# Patient Record
Sex: MALE | Race: WHITE | NOT HISPANIC OR LATINO | URBAN - METROPOLITAN AREA
[De-identification: names, ages, dates, MRNs, and addresses within clinical notes are randomized per-mention and may not be internally consistent; named-entity substitution may affect disease eponyms.]

---

## 2017-02-22 ENCOUNTER — OUTPATIENT (OUTPATIENT)
Dept: OUTPATIENT SERVICES | Facility: HOSPITAL | Age: 52
LOS: 1 days | Discharge: HOME | End: 2017-02-22

## 2017-06-27 DIAGNOSIS — E11.65 TYPE 2 DIABETES MELLITUS WITH HYPERGLYCEMIA: ICD-10-CM

## 2017-07-30 ENCOUNTER — INPATIENT (INPATIENT)
Facility: HOSPITAL | Age: 52
LOS: 24 days | Discharge: NURSING, REHAB OR LONG-TERM CARE | End: 2017-08-24
Attending: INTERNAL MEDICINE

## 2017-07-30 DIAGNOSIS — M86.179 OTHER ACUTE OSTEOMYELITIS, UNSPECIFIED ANKLE AND FOOT: ICD-10-CM

## 2017-07-30 DIAGNOSIS — L97.509 NON-PRESSURE CHRONIC ULCER OF OTHER PART OF UNSPECIFIED FOOT WITH UNSPECIFIED SEVERITY: ICD-10-CM

## 2017-08-11 ENCOUNTER — APPOINTMENT (OUTPATIENT)
Dept: VASCULAR SURGERY | Facility: HOSPITAL | Age: 52
End: 2017-08-11
Payer: MEDICARE

## 2017-08-11 PROCEDURE — 75625 CONTRAST EXAM ABDOMINL AORTA: CPT | Mod: 26

## 2017-08-11 PROCEDURE — 76937 US GUIDE VASCULAR ACCESS: CPT | Mod: 26

## 2017-08-11 PROCEDURE — 36247 INS CATH ABD/L-EXT ART 3RD: CPT | Mod: LT

## 2017-08-11 PROCEDURE — 75710 ARTERY X-RAYS ARM/LEG: CPT | Mod: 26

## 2017-08-25 ENCOUNTER — INPATIENT (INPATIENT)
Facility: HOSPITAL | Age: 52
LOS: 25 days | Discharge: NURSING, REHAB OR LONG-TERM CARE | End: 2017-09-20
Attending: INTERNAL MEDICINE | Admitting: INTERNAL MEDICINE

## 2017-08-25 DIAGNOSIS — L97.509 NON-PRESSURE CHRONIC ULCER OF OTHER PART OF UNSPECIFIED FOOT WITH UNSPECIFIED SEVERITY: ICD-10-CM

## 2017-08-25 DIAGNOSIS — M86.179 OTHER ACUTE OSTEOMYELITIS, UNSPECIFIED ANKLE AND FOOT: ICD-10-CM

## 2017-08-25 DIAGNOSIS — T81.4XXA INFECTION FOLLOWING A PROCEDURE, INITIAL ENCOUNTER: ICD-10-CM

## 2017-08-29 DIAGNOSIS — L97.529 NON-PRESSURE CHRONIC ULCER OF OTHER PART OF LEFT FOOT WITH UNSPECIFIED SEVERITY: ICD-10-CM

## 2017-08-29 DIAGNOSIS — E11.52 TYPE 2 DIABETES MELLITUS WITH DIABETIC PERIPHERAL ANGIOPATHY WITH GANGRENE: ICD-10-CM

## 2017-08-29 DIAGNOSIS — E11.43 TYPE 2 DIABETES MELLITUS WITH DIABETIC AUTONOMIC (POLY)NEUROPATHY: ICD-10-CM

## 2017-08-29 DIAGNOSIS — E11.22 TYPE 2 DIABETES MELLITUS WITH DIABETIC CHRONIC KIDNEY DISEASE: ICD-10-CM

## 2017-08-29 DIAGNOSIS — A41.9 SEPSIS, UNSPECIFIED ORGANISM: ICD-10-CM

## 2017-08-29 DIAGNOSIS — E83.39 OTHER DISORDERS OF PHOSPHORUS METABOLISM: ICD-10-CM

## 2017-08-29 DIAGNOSIS — E11.319 TYPE 2 DIABETES MELLITUS WITH UNSPECIFIED DIABETIC RETINOPATHY WITHOUT MACULAR EDEMA: ICD-10-CM

## 2017-08-29 DIAGNOSIS — R26.9 UNSPECIFIED ABNORMALITIES OF GAIT AND MOBILITY: ICD-10-CM

## 2017-08-29 DIAGNOSIS — L97.519 NON-PRESSURE CHRONIC ULCER OF OTHER PART OF RIGHT FOOT WITH UNSPECIFIED SEVERITY: ICD-10-CM

## 2017-08-29 DIAGNOSIS — Z99.2 DEPENDENCE ON RENAL DIALYSIS: ICD-10-CM

## 2017-08-29 DIAGNOSIS — N18.6 END STAGE RENAL DISEASE: ICD-10-CM

## 2017-08-29 DIAGNOSIS — M72.6 NECROTIZING FASCIITIS: ICD-10-CM

## 2017-08-29 DIAGNOSIS — F32.9 MAJOR DEPRESSIVE DISORDER, SINGLE EPISODE, UNSPECIFIED: ICD-10-CM

## 2017-08-29 DIAGNOSIS — I25.10 ATHEROSCLEROTIC HEART DISEASE OF NATIVE CORONARY ARTERY WITHOUT ANGINA PECTORIS: ICD-10-CM

## 2017-08-29 DIAGNOSIS — Z53.29 PROCEDURE AND TREATMENT NOT CARRIED OUT BECAUSE OF PATIENT'S DECISION FOR OTHER REASONS: ICD-10-CM

## 2017-08-29 DIAGNOSIS — B96.89 OTHER SPECIFIED BACTERIAL AGENTS AS THE CAUSE OF DISEASES CLASSIFIED ELSEWHERE: ICD-10-CM

## 2017-08-29 DIAGNOSIS — Z79.4 LONG TERM (CURRENT) USE OF INSULIN: ICD-10-CM

## 2017-08-29 DIAGNOSIS — F41.1 GENERALIZED ANXIETY DISORDER: ICD-10-CM

## 2017-08-29 DIAGNOSIS — E78.5 HYPERLIPIDEMIA, UNSPECIFIED: ICD-10-CM

## 2017-08-29 DIAGNOSIS — M86.9 OSTEOMYELITIS, UNSPECIFIED: ICD-10-CM

## 2017-08-29 DIAGNOSIS — I12.0 HYPERTENSIVE CHRONIC KIDNEY DISEASE WITH STAGE 5 CHRONIC KIDNEY DISEASE OR END STAGE RENAL DISEASE: ICD-10-CM

## 2017-08-29 DIAGNOSIS — D63.1 ANEMIA IN CHRONIC KIDNEY DISEASE: ICD-10-CM

## 2017-08-29 DIAGNOSIS — Z91.14 PATIENT'S OTHER NONCOMPLIANCE WITH MEDICATION REGIMEN: ICD-10-CM

## 2017-08-29 DIAGNOSIS — E11.65 TYPE 2 DIABETES MELLITUS WITH HYPERGLYCEMIA: ICD-10-CM

## 2017-08-29 DIAGNOSIS — H54.8 LEGAL BLINDNESS, AS DEFINED IN USA: ICD-10-CM

## 2017-08-29 DIAGNOSIS — G47.00 INSOMNIA, UNSPECIFIED: ICD-10-CM

## 2017-08-29 DIAGNOSIS — K31.84 GASTROPARESIS: ICD-10-CM

## 2017-08-29 DIAGNOSIS — E66.01 MORBID (SEVERE) OBESITY DUE TO EXCESS CALORIES: ICD-10-CM

## 2017-08-30 DIAGNOSIS — I77.6 ARTERITIS, UNSPECIFIED: ICD-10-CM

## 2017-09-22 ENCOUNTER — OUTPATIENT (OUTPATIENT)
Dept: OUTPATIENT SERVICES | Facility: HOSPITAL | Age: 52
LOS: 1 days | Discharge: HOME | End: 2017-09-22

## 2017-09-22 DIAGNOSIS — L97.509 NON-PRESSURE CHRONIC ULCER OF OTHER PART OF UNSPECIFIED FOOT WITH UNSPECIFIED SEVERITY: ICD-10-CM

## 2017-09-22 DIAGNOSIS — M86.179 OTHER ACUTE OSTEOMYELITIS, UNSPECIFIED ANKLE AND FOOT: ICD-10-CM

## 2017-09-23 DIAGNOSIS — Z91.14 PATIENT'S OTHER NONCOMPLIANCE WITH MEDICATION REGIMEN: ICD-10-CM

## 2017-09-23 DIAGNOSIS — M86.8X7 OTHER OSTEOMYELITIS, ANKLE AND FOOT: ICD-10-CM
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
## 2017-09-23 DIAGNOSIS — L97.429 NON-PRESSURE CHRONIC ULCER OF LEFT HEEL AND MIDFOOT WITH UNSPECIFIED SEVERITY: ICD-10-CM

## 2017-09-23 DIAGNOSIS — E66.01 MORBID (SEVERE) OBESITY DUE TO EXCESS CALORIES: ICD-10-CM

## 2017-09-23 DIAGNOSIS — T81.4XXA INFECTION FOLLOWING A PROCEDURE, INITIAL ENCOUNTER: ICD-10-CM

## 2017-09-23 DIAGNOSIS — L97.514 NON-PRESSURE CHRONIC ULCER OF OTHER PART OF RIGHT FOOT WITH NECROSIS OF BONE: ICD-10-CM

## 2017-09-23 DIAGNOSIS — Z79.4 LONG TERM (CURRENT) USE OF INSULIN: ICD-10-CM

## 2017-09-23 DIAGNOSIS — E87.1 HYPO-OSMOLALITY AND HYPONATREMIA: ICD-10-CM

## 2017-09-23 DIAGNOSIS — Z99.2 DEPENDENCE ON RENAL DIALYSIS: ICD-10-CM

## 2017-09-23 DIAGNOSIS — I12.0 HYPERTENSIVE CHRONIC KIDNEY DISEASE WITH STAGE 5 CHRONIC KIDNEY DISEASE OR END STAGE RENAL DISEASE: ICD-10-CM

## 2017-09-23 DIAGNOSIS — E78.5 HYPERLIPIDEMIA, UNSPECIFIED: ICD-10-CM

## 2017-09-23 DIAGNOSIS — E11.52 TYPE 2 DIABETES MELLITUS WITH DIABETIC PERIPHERAL ANGIOPATHY WITH GANGRENE: ICD-10-CM

## 2017-09-23 DIAGNOSIS — E11.22 TYPE 2 DIABETES MELLITUS WITH DIABETIC CHRONIC KIDNEY DISEASE: ICD-10-CM

## 2017-09-23 DIAGNOSIS — E11.621 TYPE 2 DIABETES MELLITUS WITH FOOT ULCER: ICD-10-CM

## 2017-09-23 DIAGNOSIS — L03.115 CELLULITIS OF RIGHT LOWER LIMB: ICD-10-CM

## 2017-09-23 DIAGNOSIS — T87.43 INFECTION OF AMPUTATION STUMP, RIGHT LOWER EXTREMITY: ICD-10-CM

## 2017-09-23 DIAGNOSIS — E11.69 TYPE 2 DIABETES MELLITUS WITH OTHER SPECIFIED COMPLICATION: ICD-10-CM

## 2017-09-23 DIAGNOSIS — N18.6 END STAGE RENAL DISEASE: ICD-10-CM

## 2017-09-25 ENCOUNTER — OUTPATIENT (OUTPATIENT)
Dept: OUTPATIENT SERVICES | Facility: HOSPITAL | Age: 52
LOS: 1 days | Discharge: HOME | End: 2017-09-25

## 2017-09-25 DIAGNOSIS — M86.179 OTHER ACUTE OSTEOMYELITIS, UNSPECIFIED ANKLE AND FOOT: ICD-10-CM

## 2017-09-25 DIAGNOSIS — D64.9 ANEMIA, UNSPECIFIED: ICD-10-CM

## 2017-09-25 DIAGNOSIS — L97.509 NON-PRESSURE CHRONIC ULCER OF OTHER PART OF UNSPECIFIED FOOT WITH UNSPECIFIED SEVERITY: ICD-10-CM

## 2017-09-25 DIAGNOSIS — R79.9 ABNORMAL FINDING OF BLOOD CHEMISTRY, UNSPECIFIED: ICD-10-CM

## 2017-09-28 DIAGNOSIS — R05 COUGH: ICD-10-CM

## 2017-10-02 DIAGNOSIS — R76.11 NONSPECIFIC REACTION TO TUBERCULIN SKIN TEST WITHOUT ACTIVE TUBERCULOSIS: ICD-10-CM

## 2017-11-10 ENCOUNTER — EMERGENCY (EMERGENCY)
Facility: HOSPITAL | Age: 52
LOS: 0 days | Discharge: HOME | End: 2017-11-11

## 2017-11-10 DIAGNOSIS — N18.6 END STAGE RENAL DISEASE: ICD-10-CM

## 2017-11-10 DIAGNOSIS — R55 SYNCOPE AND COLLAPSE: ICD-10-CM
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
## 2017-11-10 DIAGNOSIS — Z99.2 DEPENDENCE ON RENAL DIALYSIS: ICD-10-CM

## 2017-11-10 DIAGNOSIS — L97.509 NON-PRESSURE CHRONIC ULCER OF OTHER PART OF UNSPECIFIED FOOT WITH UNSPECIFIED SEVERITY: ICD-10-CM

## 2017-11-10 DIAGNOSIS — M86.179 OTHER ACUTE OSTEOMYELITIS, UNSPECIFIED ANKLE AND FOOT: ICD-10-CM

## 2017-11-10 DIAGNOSIS — S09.90XA UNSPECIFIED INJURY OF HEAD, INITIAL ENCOUNTER: ICD-10-CM

## 2017-11-10 DIAGNOSIS — E78.00 PURE HYPERCHOLESTEROLEMIA, UNSPECIFIED: ICD-10-CM

## 2017-11-10 DIAGNOSIS — Z79.899 OTHER LONG TERM (CURRENT) DRUG THERAPY: ICD-10-CM

## 2017-11-10 DIAGNOSIS — E11.9 TYPE 2 DIABETES MELLITUS WITHOUT COMPLICATIONS: ICD-10-CM
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
## 2017-11-10 DIAGNOSIS — Z23 ENCOUNTER FOR IMMUNIZATION: ICD-10-CM

## 2017-11-10 DIAGNOSIS — I12.0 HYPERTENSIVE CHRONIC KIDNEY DISEASE WITH STAGE 5 CHRONIC KIDNEY DISEASE OR END STAGE RENAL DISEASE: ICD-10-CM
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
## 2017-11-24 ENCOUNTER — OUTPATIENT (OUTPATIENT)
Dept: OUTPATIENT SERVICES | Facility: HOSPITAL | Age: 52
LOS: 1 days | Discharge: HOME | End: 2017-11-24

## 2017-11-24 DIAGNOSIS — L97.509 NON-PRESSURE CHRONIC ULCER OF OTHER PART OF UNSPECIFIED FOOT WITH UNSPECIFIED SEVERITY: ICD-10-CM

## 2017-11-24 DIAGNOSIS — M86.179 OTHER ACUTE OSTEOMYELITIS, UNSPECIFIED ANKLE AND FOOT: ICD-10-CM

## 2017-11-27 ENCOUNTER — OUTPATIENT (OUTPATIENT)
Dept: OUTPATIENT SERVICES | Facility: HOSPITAL | Age: 52
LOS: 1 days | Discharge: HOME | End: 2017-11-27

## 2017-11-27 DIAGNOSIS — L97.509 NON-PRESSURE CHRONIC ULCER OF OTHER PART OF UNSPECIFIED FOOT WITH UNSPECIFIED SEVERITY: ICD-10-CM

## 2017-11-27 DIAGNOSIS — M86.179 OTHER ACUTE OSTEOMYELITIS, UNSPECIFIED ANKLE AND FOOT: ICD-10-CM

## 2017-11-30 DIAGNOSIS — B99.9 UNSPECIFIED INFECTIOUS DISEASE: ICD-10-CM

## 2017-12-12 ENCOUNTER — OUTPATIENT (OUTPATIENT)
Dept: OUTPATIENT SERVICES | Facility: HOSPITAL | Age: 52
LOS: 1 days | Discharge: HOME | End: 2017-12-12

## 2017-12-12 DIAGNOSIS — M86.179 OTHER ACUTE OSTEOMYELITIS, UNSPECIFIED ANKLE AND FOOT: ICD-10-CM

## 2017-12-12 DIAGNOSIS — L97.509 NON-PRESSURE CHRONIC ULCER OF OTHER PART OF UNSPECIFIED FOOT WITH UNSPECIFIED SEVERITY: ICD-10-CM

## 2017-12-12 DIAGNOSIS — R79.9 ABNORMAL FINDING OF BLOOD CHEMISTRY, UNSPECIFIED: ICD-10-CM

## 2017-12-21 ENCOUNTER — OUTPATIENT (OUTPATIENT)
Dept: OUTPATIENT SERVICES | Facility: HOSPITAL | Age: 52
LOS: 1 days | Discharge: HOME | End: 2017-12-21

## 2017-12-21 DIAGNOSIS — L97.509 NON-PRESSURE CHRONIC ULCER OF OTHER PART OF UNSPECIFIED FOOT WITH UNSPECIFIED SEVERITY: ICD-10-CM

## 2017-12-21 DIAGNOSIS — M86.179 OTHER ACUTE OSTEOMYELITIS, UNSPECIFIED ANKLE AND FOOT: ICD-10-CM

## 2017-12-22 DIAGNOSIS — Z51.81 ENCOUNTER FOR THERAPEUTIC DRUG LEVEL MONITORING: ICD-10-CM

## 2017-12-28 ENCOUNTER — OUTPATIENT (OUTPATIENT)
Dept: OUTPATIENT SERVICES | Facility: HOSPITAL | Age: 52
LOS: 1 days | Discharge: HOME | End: 2017-12-28

## 2017-12-28 DIAGNOSIS — M86.179 OTHER ACUTE OSTEOMYELITIS, UNSPECIFIED ANKLE AND FOOT: ICD-10-CM

## 2017-12-28 DIAGNOSIS — L97.509 NON-PRESSURE CHRONIC ULCER OF OTHER PART OF UNSPECIFIED FOOT WITH UNSPECIFIED SEVERITY: ICD-10-CM

## 2017-12-29 ENCOUNTER — OUTPATIENT (OUTPATIENT)
Dept: OUTPATIENT SERVICES | Facility: HOSPITAL | Age: 52
LOS: 1 days | Discharge: HOME | End: 2017-12-29

## 2017-12-29 DIAGNOSIS — L97.509 NON-PRESSURE CHRONIC ULCER OF OTHER PART OF UNSPECIFIED FOOT WITH UNSPECIFIED SEVERITY: ICD-10-CM

## 2017-12-29 DIAGNOSIS — R79.9 ABNORMAL FINDING OF BLOOD CHEMISTRY, UNSPECIFIED: ICD-10-CM

## 2017-12-29 DIAGNOSIS — Z79.899 OTHER LONG TERM (CURRENT) DRUG THERAPY: ICD-10-CM

## 2017-12-29 DIAGNOSIS — M86.179 OTHER ACUTE OSTEOMYELITIS, UNSPECIFIED ANKLE AND FOOT: ICD-10-CM

## 2018-01-02 DIAGNOSIS — Z79.2 LONG TERM (CURRENT) USE OF ANTIBIOTICS: ICD-10-CM

## 2018-01-02 DIAGNOSIS — R78.81 BACTEREMIA: ICD-10-CM

## 2018-01-16 ENCOUNTER — APPOINTMENT (OUTPATIENT)
Dept: PLASTIC SURGERY | Facility: CLINIC | Age: 53
End: 2018-01-16

## 2018-01-16 ENCOUNTER — OUTPATIENT (OUTPATIENT)
Dept: OUTPATIENT SERVICES | Facility: HOSPITAL | Age: 53
LOS: 1 days | Discharge: HOME | End: 2018-01-16

## 2018-01-16 DIAGNOSIS — M86.179 OTHER ACUTE OSTEOMYELITIS, UNSPECIFIED ANKLE AND FOOT: ICD-10-CM

## 2018-01-16 DIAGNOSIS — L97.509 NON-PRESSURE CHRONIC ULCER OF OTHER PART OF UNSPECIFIED FOOT WITH UNSPECIFIED SEVERITY: ICD-10-CM

## 2018-01-24 ENCOUNTER — OUTPATIENT (OUTPATIENT)
Dept: OUTPATIENT SERVICES | Facility: HOSPITAL | Age: 53
LOS: 1 days | Discharge: HOME | End: 2018-01-24

## 2018-01-24 DIAGNOSIS — E10.9 TYPE 1 DIABETES MELLITUS WITHOUT COMPLICATIONS: ICD-10-CM

## 2018-01-24 DIAGNOSIS — D64.9 ANEMIA, UNSPECIFIED: ICD-10-CM

## 2018-01-24 DIAGNOSIS — R79.9 ABNORMAL FINDING OF BLOOD CHEMISTRY, UNSPECIFIED: ICD-10-CM

## 2018-01-25 ENCOUNTER — OUTPATIENT (OUTPATIENT)
Dept: OUTPATIENT SERVICES | Facility: HOSPITAL | Age: 53
LOS: 1 days | Discharge: HOME | End: 2018-01-25

## 2018-01-27 ENCOUNTER — OUTPATIENT (OUTPATIENT)
Dept: OUTPATIENT SERVICES | Facility: HOSPITAL | Age: 53
LOS: 1 days | Discharge: HOME | End: 2018-01-27

## 2018-01-30 ENCOUNTER — OUTPATIENT (OUTPATIENT)
Dept: OUTPATIENT SERVICES | Facility: HOSPITAL | Age: 53
LOS: 1 days | Discharge: HOME | End: 2018-01-30

## 2018-01-30 DIAGNOSIS — A49.9 BACTERIAL INFECTION, UNSPECIFIED: ICD-10-CM

## 2018-02-01 ENCOUNTER — OUTPATIENT (OUTPATIENT)
Dept: OUTPATIENT SERVICES | Facility: HOSPITAL | Age: 53
LOS: 1 days | Discharge: HOME | End: 2018-02-01

## 2018-02-01 DIAGNOSIS — R79.9 ABNORMAL FINDING OF BLOOD CHEMISTRY, UNSPECIFIED: ICD-10-CM

## 2018-02-04 DIAGNOSIS — M86.179 OTHER ACUTE OSTEOMYELITIS, UNSPECIFIED ANKLE AND FOOT: ICD-10-CM

## 2018-02-04 DIAGNOSIS — L97.509 NON-PRESSURE CHRONIC ULCER OF OTHER PART OF UNSPECIFIED FOOT WITH UNSPECIFIED SEVERITY: ICD-10-CM

## 2018-02-06 ENCOUNTER — APPOINTMENT (OUTPATIENT)
Dept: PODIATRY | Facility: CLINIC | Age: 53
End: 2018-02-06
Payer: COMMERCIAL

## 2018-02-06 ENCOUNTER — OUTPATIENT (OUTPATIENT)
Dept: OUTPATIENT SERVICES | Facility: HOSPITAL | Age: 53
LOS: 1 days | Discharge: HOME | End: 2018-02-06

## 2018-02-06 DIAGNOSIS — L97.409 NON-PRESSURE CHRONIC ULCER OF UNSPECIFIED HEEL AND MIDFOOT WITH UNSPECIFIED SEVERITY: ICD-10-CM

## 2018-02-06 PROCEDURE — ZZZZZ: CPT

## 2018-02-07 PROBLEM — L97.409 CHRONIC HEEL ULCER: Status: ACTIVE | Noted: 2018-02-07

## 2018-02-08 DIAGNOSIS — Z79.899 OTHER LONG TERM (CURRENT) DRUG THERAPY: ICD-10-CM

## 2018-02-09 ENCOUNTER — OUTPATIENT (OUTPATIENT)
Dept: OUTPATIENT SERVICES | Facility: HOSPITAL | Age: 53
LOS: 1 days | Discharge: HOME | End: 2018-02-09

## 2018-02-09 DIAGNOSIS — Z79.899 OTHER LONG TERM (CURRENT) DRUG THERAPY: ICD-10-CM

## 2018-02-13 ENCOUNTER — APPOINTMENT (OUTPATIENT)
Dept: PODIATRY | Facility: CLINIC | Age: 53
End: 2018-02-13
Payer: MEDICARE

## 2018-02-13 ENCOUNTER — OUTPATIENT (OUTPATIENT)
Dept: OUTPATIENT SERVICES | Facility: HOSPITAL | Age: 53
LOS: 1 days | Discharge: HOME | End: 2018-02-13

## 2018-02-13 DIAGNOSIS — L97.512 NON-PRESSURE CHRONIC ULCER OF OTHER PART OF RIGHT FOOT WITH FAT LAYER EXPOSED: ICD-10-CM

## 2018-02-13 DIAGNOSIS — E11.610 TYPE 2 DIABETES MELLITUS WITH DIABETIC NEUROPATHIC ARTHROPATHY: ICD-10-CM

## 2018-02-13 PROCEDURE — 29580 STRAPPING UNNA BOOT: CPT

## 2018-02-21 ENCOUNTER — OUTPATIENT (OUTPATIENT)
Dept: OUTPATIENT SERVICES | Facility: HOSPITAL | Age: 53
LOS: 1 days | Discharge: HOME | End: 2018-02-21

## 2018-02-21 ENCOUNTER — APPOINTMENT (OUTPATIENT)
Dept: PODIATRY | Facility: CLINIC | Age: 53
End: 2018-02-21
Payer: MEDICARE

## 2018-02-21 VITALS
HEIGHT: 72.5 IN | BODY MASS INDEX: 42.2 KG/M2 | WEIGHT: 315 LBS | DIASTOLIC BLOOD PRESSURE: 74 MMHG | HEART RATE: 80 BPM | SYSTOLIC BLOOD PRESSURE: 149 MMHG

## 2018-02-21 PROCEDURE — 11042 DBRDMT SUBQ TIS 1ST 20SQCM/<: CPT

## 2018-02-22 ENCOUNTER — OUTPATIENT (OUTPATIENT)
Dept: OUTPATIENT SERVICES | Facility: HOSPITAL | Age: 53
LOS: 1 days | Discharge: HOME | End: 2018-02-22

## 2018-02-22 DIAGNOSIS — Z02.9 ENCOUNTER FOR ADMINISTRATIVE EXAMINATIONS, UNSPECIFIED: ICD-10-CM

## 2018-02-22 DIAGNOSIS — Z51.81 ENCOUNTER FOR THERAPEUTIC DRUG LEVEL MONITORING: ICD-10-CM

## 2018-02-22 RX ORDER — COLLAGENASE SANTYL 250 [ARB'U]/G
250 OINTMENT TOPICAL DAILY
Qty: 1 | Refills: 3 | Status: ACTIVE | COMMUNITY
Start: 2018-02-21 | End: 1900-01-01

## 2018-02-27 ENCOUNTER — APPOINTMENT (OUTPATIENT)
Dept: PODIATRY | Facility: CLINIC | Age: 53
End: 2018-02-27

## 2018-02-27 ENCOUNTER — EMERGENCY (EMERGENCY)
Facility: HOSPITAL | Age: 53
LOS: 0 days | Discharge: LEFT BEFORE DISCHARGE | End: 2018-02-27
Attending: EMERGENCY MEDICINE

## 2018-02-27 ENCOUNTER — OUTPATIENT (OUTPATIENT)
Dept: OUTPATIENT SERVICES | Facility: HOSPITAL | Age: 53
LOS: 1 days | Discharge: HOME | End: 2018-02-27

## 2018-02-27 VITALS
DIASTOLIC BLOOD PRESSURE: 59 MMHG | RESPIRATION RATE: 18 BRPM | SYSTOLIC BLOOD PRESSURE: 123 MMHG | OXYGEN SATURATION: 95 % | HEART RATE: 103 BPM | TEMPERATURE: 100 F

## 2018-02-27 VITALS
OXYGEN SATURATION: 98 % | RESPIRATION RATE: 20 BRPM | HEART RATE: 108 BPM | DIASTOLIC BLOOD PRESSURE: 84 MMHG | TEMPERATURE: 100 F | SYSTOLIC BLOOD PRESSURE: 186 MMHG

## 2018-02-27 DIAGNOSIS — R71.8 OTHER ABNORMALITY OF RED BLOOD CELLS: ICD-10-CM

## 2018-02-27 DIAGNOSIS — N18.6 END STAGE RENAL DISEASE: ICD-10-CM

## 2018-02-27 DIAGNOSIS — Z79.899 OTHER LONG TERM (CURRENT) DRUG THERAPY: ICD-10-CM

## 2018-02-27 DIAGNOSIS — L97.519 NON-PRESSURE CHRONIC ULCER OF OTHER PART OF RIGHT FOOT WITH UNSPECIFIED SEVERITY: ICD-10-CM

## 2018-02-27 DIAGNOSIS — Z48.00 ENCOUNTER FOR CHANGE OR REMOVAL OF NONSURGICAL WOUND DRESSING: ICD-10-CM

## 2018-02-27 DIAGNOSIS — E11.9 TYPE 2 DIABETES MELLITUS WITHOUT COMPLICATIONS: ICD-10-CM

## 2018-02-27 DIAGNOSIS — E78.5 HYPERLIPIDEMIA, UNSPECIFIED: ICD-10-CM

## 2018-02-27 DIAGNOSIS — D64.9 ANEMIA, UNSPECIFIED: ICD-10-CM

## 2018-02-27 DIAGNOSIS — L97.511 NON-PRESSURE CHRONIC ULCER OF OTHER PART OF RIGHT FOOT LIMITED TO BREAKDOWN OF SKIN: ICD-10-CM

## 2018-02-27 DIAGNOSIS — L97.521 NON-PRESSURE CHRONIC ULCER OF OTHER PART OF LEFT FOOT LIMITED TO BREAKDOWN OF SKIN: ICD-10-CM

## 2018-02-27 DIAGNOSIS — E11.621 TYPE 2 DIABETES MELLITUS WITH FOOT ULCER: ICD-10-CM

## 2018-02-27 DIAGNOSIS — F41.9 ANXIETY DISORDER, UNSPECIFIED: ICD-10-CM

## 2018-02-27 DIAGNOSIS — I12.0 HYPERTENSIVE CHRONIC KIDNEY DISEASE WITH STAGE 5 CHRONIC KIDNEY DISEASE OR END STAGE RENAL DISEASE: ICD-10-CM

## 2018-02-27 DIAGNOSIS — Z51.81 ENCOUNTER FOR THERAPEUTIC DRUG LEVEL MONITORING: ICD-10-CM

## 2018-02-27 DIAGNOSIS — I48.91 UNSPECIFIED ATRIAL FIBRILLATION: ICD-10-CM

## 2018-02-27 LAB
ALBUMIN SERPL ELPH-MCNC: 2.5 G/DL — LOW (ref 3–5.5)
ALP SERPL-CCNC: 131 U/L — HIGH (ref 30–115)
ALT FLD-CCNC: 11 U/L — SIGNIFICANT CHANGE UP (ref 0–41)
ANION GAP SERPL CALC-SCNC: 11 MMOL/L — SIGNIFICANT CHANGE UP (ref 7–14)
APTT BLD: 31.4 SEC — SIGNIFICANT CHANGE UP (ref 27–39.2)
AST SERPL-CCNC: 11 U/L — SIGNIFICANT CHANGE UP (ref 0–41)
BASOPHILS # BLD AUTO: 0.06 K/UL — SIGNIFICANT CHANGE UP (ref 0–0.2)
BASOPHILS NFR BLD AUTO: 0.4 % — SIGNIFICANT CHANGE UP (ref 0–1)
BILIRUB SERPL-MCNC: 0.7 MG/DL — SIGNIFICANT CHANGE UP (ref 0.2–1.2)
BUN SERPL-MCNC: 48 MG/DL — HIGH (ref 10–20)
CALCIUM SERPL-MCNC: 8.7 MG/DL — SIGNIFICANT CHANGE UP (ref 8.5–10.1)
CHLORIDE SERPL-SCNC: 94 MMOL/L — LOW (ref 98–110)
CO2 SERPL-SCNC: 31 MMOL/L — SIGNIFICANT CHANGE UP (ref 17–32)
CREAT SERPL-MCNC: 5.5 MG/DL — CRITICAL HIGH (ref 0.7–1.5)
EOSINOPHIL # BLD AUTO: 0.49 K/UL — SIGNIFICANT CHANGE UP (ref 0–0.7)
EOSINOPHIL NFR BLD AUTO: 2.9 % — SIGNIFICANT CHANGE UP (ref 0–8)
ERYTHROCYTE [SEDIMENTATION RATE] IN BLOOD: 82 MM/HR — HIGH (ref 0–10)
GAS PNL BLDV: SIGNIFICANT CHANGE UP
GLUCOSE SERPL-MCNC: 213 MG/DL — HIGH (ref 70–110)
HCT VFR BLD CALC: 27.5 % — LOW (ref 42–52)
HGB BLD-MCNC: 8 G/DL — LOW (ref 14–18)
IMM GRANULOCYTES NFR BLD AUTO: 0.8 % — HIGH (ref 0.1–0.3)
INR BLD: 1.19 RATIO — SIGNIFICANT CHANGE UP (ref 0.65–1.3)
LYMPHOCYTES # BLD AUTO: 1.35 K/UL — SIGNIFICANT CHANGE UP (ref 1.2–3.4)
LYMPHOCYTES # BLD AUTO: 8.1 % — LOW (ref 20.5–51.1)
MCHC RBC-ENTMCNC: 23.7 PG — LOW (ref 27–31)
MCHC RBC-ENTMCNC: 29.1 G/DL — LOW (ref 32–37)
MCV RBC AUTO: 81.6 FL — SIGNIFICANT CHANGE UP (ref 80–94)
MONOCYTES # BLD AUTO: 1.28 K/UL — HIGH (ref 0.1–0.6)
MONOCYTES NFR BLD AUTO: 7.7 % — SIGNIFICANT CHANGE UP (ref 1.7–9.3)
NEUTROPHILS # BLD AUTO: 13.37 K/UL — HIGH (ref 1.4–6.5)
NEUTROPHILS NFR BLD AUTO: 80.1 % — HIGH (ref 42.2–75.2)
PLATELET # BLD AUTO: 367 K/UL — SIGNIFICANT CHANGE UP (ref 130–400)
POTASSIUM SERPL-MCNC: 4.8 MMOL/L — SIGNIFICANT CHANGE UP (ref 3.5–5)
POTASSIUM SERPL-SCNC: 4.8 MMOL/L — SIGNIFICANT CHANGE UP (ref 3.5–5)
PROT SERPL-MCNC: 5.5 G/DL — LOW (ref 6–8)
PROTHROM AB SERPL-ACNC: 12.9 SEC — HIGH (ref 9.95–12.87)
RBC # BLD: 3.37 M/UL — LOW (ref 4.7–6.1)
RBC # FLD: 16.5 % — HIGH (ref 11.5–14.5)
SODIUM SERPL-SCNC: 136 MMOL/L — SIGNIFICANT CHANGE UP (ref 135–146)
WBC # BLD: 16.68 K/UL — HIGH (ref 4.8–10.8)
WBC # FLD AUTO: 16.68 K/UL — HIGH (ref 4.8–10.8)

## 2018-02-27 RX ORDER — PIPERACILLIN AND TAZOBACTAM 4; .5 G/20ML; G/20ML
3.38 INJECTION, POWDER, LYOPHILIZED, FOR SOLUTION INTRAVENOUS ONCE
Qty: 0 | Refills: 0 | Status: COMPLETED | OUTPATIENT
Start: 2018-02-27 | End: 2018-02-27

## 2018-02-27 RX ORDER — SODIUM CHLORIDE 9 MG/ML
3 INJECTION INTRAMUSCULAR; INTRAVENOUS; SUBCUTANEOUS ONCE
Qty: 0 | Refills: 0 | Status: COMPLETED | OUTPATIENT
Start: 2018-02-27 | End: 2018-02-27

## 2018-02-27 RX ORDER — ACETAMINOPHEN 500 MG
650 TABLET ORAL ONCE
Qty: 0 | Refills: 0 | Status: COMPLETED | OUTPATIENT
Start: 2018-02-27 | End: 2018-02-27

## 2018-02-27 RX ADMIN — Medication 650 MILLIGRAM(S): at 06:45

## 2018-02-27 RX ADMIN — PIPERACILLIN AND TAZOBACTAM 200 GRAM(S): 4; .5 INJECTION, POWDER, LYOPHILIZED, FOR SOLUTION INTRAVENOUS at 06:45

## 2018-02-27 RX ADMIN — SODIUM CHLORIDE 3 MILLILITER(S): 9 INJECTION INTRAMUSCULAR; INTRAVENOUS; SUBCUTANEOUS at 06:46

## 2018-02-27 NOTE — ED PROVIDER NOTE - OBJECTIVE STATEMENT
54 yo obese m w hx of DM, ESRD w HD T/TH/SAT (Dr. Mckee), HTN, HLD, Anxiety, and multiple foot ulcers with all toes amputated on right foot as well as bone debridement for osteomylitis, on vancomycin for cellulitis on right foot sent from [facility] for bleeding ulcer to right lateral foot.  No fevers at home, but noted to be febrile on arrival.  Pt appears very anxious.  Denies chest pain or sob, no cough or congestion.  Podiatry is Dr. Georges/Misael.

## 2018-02-27 NOTE — ED PROVIDER NOTE - PROGRESS NOTE DETAILS
Endorsed to Dr Hough to follow up labs, podiatry consult, reassess and dispo . Patient seen by podiatry resident Dr. Sexton, recommended admission for IV antibiotics, patient does not want to stay, will finish IV antibiotics and requests to be discharged against medical advice. Patient is awake/alert/interactive with normal mental status and normal neurologic function. Patient reports no SI/HI and demonstrates normal thought processes with no evidence of intoxication, delirium, delusions or hallucinations. Patient requesting to leave against medical advice at this time. Advised patient of potential risks of leaving AMA which include potential disability or death. Attempted to convince patient to stay and continue work up/treatment and patient refused. Patient has capacity to make medical decisions at this time and will be signed out AMA. Patient instructed to follow up with his primary care provider as an outpatient and is aware they can return to the emergency department at any time for evaluation. Zosyn given here and will discuss with dr. Mckee to give dose during dialysis today. Patient states will return tomorrow for admission and Iv abx.

## 2018-02-27 NOTE — ED ADULT NURSE NOTE - PMH
Diabetes    End stage renal disease Blind    Diabetes    End stage renal disease    HTN (hypertension)

## 2018-02-27 NOTE — CONSULT NOTE ADULT - SUBJECTIVE AND OBJECTIVE BOX
PODIATRY CONSULT   THEURER, [place] is a pleasant well-nourished, well developed 53y Male in no acute distress, alert awake, and oriented to person, place and time.   Patient is a 53y old  Male who presents with a chief complaint of bleeding from right foot sent in by [facility].   Podiatry consulted for the bilateral lower extremity wounds bleeding from right foot sent in by [facility].   Pt voicing complaint of "he wants to leave since his parents are going to [place] and wants to see them before they leave. pt states he will leave AMA but will return Wednesday or Thursday."    PAST MEDICAL & SURGICAL HISTORY:  Blind  HTN (hypertension)  End stage renal disease  Diabetes      FAMILY HISTORY:    Vital Signs Last 24 Hrs  T(C): 38.2 (27 Feb 2018 05:30), Max: 38.2 (27 Feb 2018 05:30)  T(F): 100.8 (27 Feb 2018 05:30), Max: 100.8 (27 Feb 2018 05:30)  HR: 108 (27 Feb 2018 04:45) (108 - 108)  BP: 186/84 (27 Feb 2018 04:45) (186/84 - 186/84)  BP(mean): --  RR: 20 (27 Feb 2018 04:45) (20 - 20)  SpO2: 98% (27 Feb 2018 04:45) (98% - 98%)                        8.0    16.68 )-----------( 367      ( 27 Feb 2018 06:36 )             27.5     PHYSICAL EXAM  LE Focused examination conducted:    DERM:  Right foot plantar lateral ulceration superficial. Right lateral ankle would probes 2.5cm into ST. Left sub 5th styloid process.   VASC:   Dorsalis Pedis nonpalpable bilaterally   Posterior Tibial non palpable bilaterally  Temperature gradient: Right Warm to warm. ; Left: warm to warm.  Edema: Right & Left +2  NEURO: Protective sensation not intact to the level of the digits bilateral.  ORTHO: Muscle strength 5/5 all major muscle groups bilateral. TMA right foot closed.     A:  Acute charcot Right foot  Bilateral foot ulcera ration 2/2 DM and neuropathy  Right ankle ulceration 2.5cm  P:  Pt is NWB to the bilateral extremities.   IV ABx:   ID consult: for Abx coverage  Bilateral foot and tib-fib xrays: rule out soft tissue emphysema.   Podiatry will follow up if patient does not leave AMA.   Attending updated and added to note as coscaleer.     02-27-18 @ 07:12

## 2018-02-27 NOTE — ED POST DISCHARGE NOTE - OTHER COMMUNICATION
DISCUSSED FINDING WITH ATTENDING WILLIAMS HUDSON AND PATIENT IS AWARE OF FINDINGS OF OSTEOMYELITIS, BUT CHOOSE TO SIGN OUT AMA.

## 2018-02-27 NOTE — ED PROVIDER NOTE - MEDICAL DECISION MAKING DETAILS
54 yo M with DM presented to Ed with swelling and pain from R foot with discharge. Patien seen by podiatry. We recommended admission to patient, however patient does not want to be admitted, understands the risk of leaving without full treatment, understands importance of follow up and return to ED for further management.

## 2018-02-27 NOTE — ED PROVIDER NOTE - NS ED ROS FT
Gen: See HPI  Eyes:  No visual changes, eye pain or discharge.  ENMT:  No hearing changes, pain. No neck pain or stiffness.  Cardiac:  No chest pain, SOB   Respiratory:  No cough or respiratory distress.  GI:  No nausea, vomiting, diarrhea or abdominal pain.  :  see HPI  MS:  See HPI  Neuro:  No headache or weakness.  No LOC.    Skin:  see HPI  Endocrine: See HPI

## 2018-02-27 NOTE — ED PROVIDER NOTE - PHYSICAL EXAMINATION
CONSTITUTIONAL: Well-developed; obese, in no acute distress.   SKIN: swollen, warm, erythematous, pus-like discharge with intermittent bleeding from right lower ext.  Closed ulcer noted to sole of left foot, no bleeding or discharge.    HEAD: Normocephalic; atraumatic.  EYES: no conj injection  ENT: No nasal discharge; airway clear.  NECK: Supple; non tender.  CARD: S1, S2 normal; no murmurs, gallops, or rubs. Rapid, regular.    RESP: No wheezes, rales or rhonchi.  ABD: soft ntnd  EXT: Normal ROM.  see SKIN.    NEURO: Alert, oriented, grossly unremarkable  PSYCH: Cooperative, anxious.

## 2018-02-27 NOTE — ED PROVIDER NOTE - ATTENDING CONTRIBUTION TO CARE
54 yo male h/o longstanding diabetes, ESRD in HD ( due today), currently residing in NH since September of last year following partial rt foot amputation  sent for evaluation of bleeding from the rt ankle noticed last night.  Patient reports chronic pain in his rt foot, has an appointment with his podiatrist today at 10 AM.  Febrile in ED, awake and alert, speaking full sentences, + clean based ulcer outer edge of the left foot , +large skin defect on the plantar surface of the rt foot, ankle is chronically deformed , + few wounds over the lateral aspect of the ankle with copious bloody discharge, skin warm to the touch, patient is awake and alert.  Will check labs, give abx and antipyretics, podiatry consult.  Spoke with patient regarding need for admission, but a this time he refuses, says his parents are going to [place] today and he must see them off, agreable with initiation of work up.

## 2018-02-28 ENCOUNTER — OUTPATIENT (OUTPATIENT)
Dept: OUTPATIENT SERVICES | Facility: HOSPITAL | Age: 53
LOS: 1 days | Discharge: HOME | End: 2018-02-28

## 2018-02-28 DIAGNOSIS — R79.9 ABNORMAL FINDING OF BLOOD CHEMISTRY, UNSPECIFIED: ICD-10-CM

## 2018-02-28 DIAGNOSIS — E11.9 TYPE 2 DIABETES MELLITUS WITHOUT COMPLICATIONS: ICD-10-CM

## 2018-02-28 DIAGNOSIS — E11.621 TYPE 2 DIABETES MELLITUS WITH FOOT ULCER: ICD-10-CM

## 2018-02-28 DIAGNOSIS — L89.893 PRESSURE ULCER OF OTHER SITE, STAGE 3: ICD-10-CM

## 2018-03-01 ENCOUNTER — APPOINTMENT (OUTPATIENT)
Dept: PODIATRY | Facility: CLINIC | Age: 53
End: 2018-03-01
Payer: MEDICARE

## 2018-03-01 ENCOUNTER — OUTPATIENT (OUTPATIENT)
Dept: OUTPATIENT SERVICES | Facility: HOSPITAL | Age: 53
LOS: 1 days | Discharge: HOME | End: 2018-03-01

## 2018-03-01 DIAGNOSIS — L97.521 NON-PRESSURE CHRONIC ULCER OF OTHER PART OF LEFT FOOT LIMITED TO BREAKDOWN OF SKIN: ICD-10-CM

## 2018-03-01 DIAGNOSIS — E11.42 TYPE 2 DIABETES MELLITUS WITH DIABETIC POLYNEUROPATHY: ICD-10-CM

## 2018-03-01 DIAGNOSIS — L89.893 PRESSURE ULCER OF OTHER SITE, STAGE 3: ICD-10-CM

## 2018-03-01 DIAGNOSIS — L97.511 NON-PRESSURE CHRONIC ULCER OF OTHER PART OF RIGHT FOOT LIMITED TO BREAKDOWN OF SKIN: ICD-10-CM

## 2018-03-01 DIAGNOSIS — L97.512 NON-PRESSURE CHRONIC ULCER OF OTHER PART OF RIGHT FOOT WITH FAT LAYER EXPOSED: ICD-10-CM

## 2018-03-01 DIAGNOSIS — E11.9 TYPE 2 DIABETES MELLITUS W/OUT COMPLICATIONS: ICD-10-CM

## 2018-03-01 PROCEDURE — 99212 OFFICE O/P EST SF 10 MIN: CPT

## 2018-03-02 ENCOUNTER — OUTPATIENT (OUTPATIENT)
Dept: OUTPATIENT SERVICES | Facility: HOSPITAL | Age: 53
LOS: 1 days | Discharge: HOME | End: 2018-03-02

## 2018-03-02 DIAGNOSIS — R79.9 ABNORMAL FINDING OF BLOOD CHEMISTRY, UNSPECIFIED: ICD-10-CM

## 2018-03-02 DIAGNOSIS — D64.9 ANEMIA, UNSPECIFIED: ICD-10-CM

## 2018-03-03 ENCOUNTER — OUTPATIENT (OUTPATIENT)
Dept: OUTPATIENT SERVICES | Facility: HOSPITAL | Age: 53
LOS: 1 days | Discharge: HOME | End: 2018-03-03

## 2018-03-03 DIAGNOSIS — R79.9 ABNORMAL FINDING OF BLOOD CHEMISTRY, UNSPECIFIED: ICD-10-CM

## 2018-03-04 LAB
CULTURE RESULTS: SIGNIFICANT CHANGE UP
SPECIMEN SOURCE: SIGNIFICANT CHANGE UP

## 2018-03-05 ENCOUNTER — OUTPATIENT (OUTPATIENT)
Dept: OUTPATIENT SERVICES | Facility: HOSPITAL | Age: 53
LOS: 1 days | Discharge: HOME | End: 2018-03-05

## 2018-03-05 DIAGNOSIS — A49.9 BACTERIAL INFECTION, UNSPECIFIED: ICD-10-CM

## 2018-03-06 ENCOUNTER — OUTPATIENT (OUTPATIENT)
Dept: OUTPATIENT SERVICES | Facility: HOSPITAL | Age: 53
LOS: 1 days | Discharge: HOME | End: 2018-03-06

## 2018-03-06 DIAGNOSIS — A49.8 OTHER BACTERIAL INFECTIONS OF UNSPECIFIED SITE: ICD-10-CM

## 2018-03-08 ENCOUNTER — OUTPATIENT (OUTPATIENT)
Dept: OUTPATIENT SERVICES | Facility: HOSPITAL | Age: 53
LOS: 1 days | Discharge: HOME | End: 2018-03-08

## 2018-03-08 ENCOUNTER — RESULT REVIEW (OUTPATIENT)
Age: 53
End: 2018-03-08

## 2018-03-08 ENCOUNTER — APPOINTMENT (OUTPATIENT)
Dept: VASCULAR SURGERY | Facility: HOSPITAL | Age: 53
End: 2018-03-08
Payer: MEDICARE

## 2018-03-08 ENCOUNTER — INPATIENT (INPATIENT)
Facility: HOSPITAL | Age: 53
LOS: 18 days | Discharge: NURSING, REHAB OR LONG-TERM CARE | End: 2018-03-27
Attending: SURGERY | Admitting: INTERNAL MEDICINE

## 2018-03-08 VITALS
SYSTOLIC BLOOD PRESSURE: 165 MMHG | HEART RATE: 116 BPM | OXYGEN SATURATION: 96 % | RESPIRATION RATE: 20 BRPM | TEMPERATURE: 100 F | DIASTOLIC BLOOD PRESSURE: 71 MMHG

## 2018-03-08 DIAGNOSIS — D64.9 ANEMIA, UNSPECIFIED: ICD-10-CM

## 2018-03-08 DIAGNOSIS — L98.499 NON-PRESSURE CHRONIC ULCER OF SKIN OF OTHER SITES WITH UNSPECIFIED SEVERITY: ICD-10-CM

## 2018-03-08 LAB
ALBUMIN SERPL ELPH-MCNC: 2.4 G/DL — LOW (ref 3–5.5)
ALP SERPL-CCNC: 211 U/L — HIGH (ref 30–115)
ALT FLD-CCNC: 38 U/L — SIGNIFICANT CHANGE UP (ref 0–41)
ANION GAP SERPL CALC-SCNC: 13 MMOL/L — SIGNIFICANT CHANGE UP (ref 7–14)
APTT BLD: 32.2 SEC — SIGNIFICANT CHANGE UP (ref 27–39.2)
AST SERPL-CCNC: 30 U/L — SIGNIFICANT CHANGE UP (ref 0–41)
B-TYPE NATRIURETIC PEPTIDE BNP RESULT: 258 PG/ML — HIGH (ref 0–99)
BASOPHILS # BLD AUTO: 0.09 K/UL — SIGNIFICANT CHANGE UP (ref 0–0.2)
BASOPHILS NFR BLD AUTO: 0.6 % — SIGNIFICANT CHANGE UP (ref 0–1)
BILIRUB SERPL-MCNC: 0.7 MG/DL — SIGNIFICANT CHANGE UP (ref 0.2–1.2)
BLD GP AB SCN SERPL QL: SIGNIFICANT CHANGE UP
BUN SERPL-MCNC: 52 MG/DL — HIGH (ref 10–20)
CALCIUM SERPL-MCNC: 9 MG/DL — SIGNIFICANT CHANGE UP (ref 8.5–10.1)
CHLORIDE SERPL-SCNC: 96 MMOL/L — LOW (ref 98–110)
CO2 SERPL-SCNC: 27 MMOL/L — SIGNIFICANT CHANGE UP (ref 17–32)
CREAT SERPL-MCNC: 6.5 MG/DL — CRITICAL HIGH (ref 0.7–1.5)
EOSINOPHIL # BLD AUTO: 0.25 K/UL — SIGNIFICANT CHANGE UP (ref 0–0.7)
EOSINOPHIL NFR BLD AUTO: 1.7 % — SIGNIFICANT CHANGE UP (ref 0–8)
GLUCOSE SERPL-MCNC: 299 MG/DL — HIGH (ref 70–110)
HCT VFR BLD CALC: 27.1 % — LOW (ref 42–52)
HGB BLD-MCNC: 7.8 G/DL — LOW (ref 14–18)
IMM GRANULOCYTES NFR BLD AUTO: 1 % — HIGH (ref 0.1–0.3)
INR BLD: 1.32 RATIO — HIGH (ref 0.65–1.3)
LACTATE SERPL-SCNC: 2.1 MMOL/L — SIGNIFICANT CHANGE UP (ref 0.5–2.2)
LYMPHOCYTES # BLD AUTO: 1.12 K/UL — LOW (ref 1.2–3.4)
LYMPHOCYTES # BLD AUTO: 7.6 % — LOW (ref 20.5–51.1)
MCHC RBC-ENTMCNC: 23.4 PG — LOW (ref 27–31)
MCHC RBC-ENTMCNC: 28.8 G/DL — LOW (ref 32–37)
MCV RBC AUTO: 81.4 FL — SIGNIFICANT CHANGE UP (ref 80–94)
MONOCYTES # BLD AUTO: 1.61 K/UL — HIGH (ref 0.1–0.6)
MONOCYTES NFR BLD AUTO: 10.9 % — HIGH (ref 1.7–9.3)
NEUTROPHILS # BLD AUTO: 11.54 K/UL — HIGH (ref 1.4–6.5)
NEUTROPHILS NFR BLD AUTO: 78.2 % — HIGH (ref 42.2–75.2)
NRBC # BLD: 0 /100 WBCS — SIGNIFICANT CHANGE UP (ref 0–0)
PLATELET # BLD AUTO: 455 K/UL — HIGH (ref 130–400)
POTASSIUM SERPL-MCNC: 5 MMOL/L — SIGNIFICANT CHANGE UP (ref 3.5–5)
POTASSIUM SERPL-SCNC: 5 MMOL/L — SIGNIFICANT CHANGE UP (ref 3.5–5)
PROT SERPL-MCNC: 5.9 G/DL — LOW (ref 6–8)
PROTHROM AB SERPL-ACNC: 14.3 SEC — HIGH (ref 9.95–12.87)
RBC # BLD: 3.33 M/UL — LOW (ref 4.7–6.1)
RBC # FLD: 16.1 % — HIGH (ref 11.5–14.5)
SODIUM SERPL-SCNC: 136 MMOL/L — SIGNIFICANT CHANGE UP (ref 135–146)
TYPE + AB SCN PNL BLD: SIGNIFICANT CHANGE UP
WBC # BLD: 14.76 K/UL — HIGH (ref 4.8–10.8)
WBC # FLD AUTO: 14.76 K/UL — HIGH (ref 4.8–10.8)

## 2018-03-08 PROCEDURE — 27882 AMPUTATION OF LOWER LEG: CPT | Mod: RT

## 2018-03-08 RX ORDER — SIMVASTATIN 20 MG/1
10 TABLET, FILM COATED ORAL AT BEDTIME
Qty: 0 | Refills: 0 | Status: DISCONTINUED | OUTPATIENT
Start: 2018-03-08 | End: 2018-03-13

## 2018-03-08 RX ORDER — HYDROMORPHONE HYDROCHLORIDE 2 MG/ML
0.5 INJECTION INTRAMUSCULAR; INTRAVENOUS; SUBCUTANEOUS
Qty: 0 | Refills: 0 | Status: DISCONTINUED | OUTPATIENT
Start: 2018-03-08 | End: 2018-03-08

## 2018-03-08 RX ORDER — SODIUM CHLORIDE 9 MG/ML
1000 INJECTION INTRAMUSCULAR; INTRAVENOUS; SUBCUTANEOUS ONCE
Qty: 0 | Refills: 0 | Status: DISCONTINUED | OUTPATIENT
Start: 2018-03-08 | End: 2018-03-08

## 2018-03-08 RX ORDER — INSULIN HUMAN 100 [IU]/ML
6 INJECTION, SOLUTION SUBCUTANEOUS ONCE
Qty: 0 | Refills: 0 | Status: COMPLETED | OUTPATIENT
Start: 2018-03-08 | End: 2018-03-08

## 2018-03-08 RX ORDER — ERYTHROPOIETIN 10000 [IU]/ML
10000 INJECTION, SOLUTION INTRAVENOUS; SUBCUTANEOUS
Qty: 0 | Refills: 0 | Status: DISCONTINUED | OUTPATIENT
Start: 2018-03-08 | End: 2018-03-08

## 2018-03-08 RX ORDER — MORPHINE SULFATE 50 MG/1
4 CAPSULE, EXTENDED RELEASE ORAL ONCE
Qty: 0 | Refills: 0 | Status: DISCONTINUED | OUTPATIENT
Start: 2018-03-08 | End: 2018-03-08

## 2018-03-08 RX ORDER — SODIUM CHLORIDE 9 MG/ML
1000 INJECTION INTRAMUSCULAR; INTRAVENOUS; SUBCUTANEOUS ONCE
Qty: 0 | Refills: 0 | Status: COMPLETED | OUTPATIENT
Start: 2018-03-08 | End: 2018-03-08

## 2018-03-08 RX ORDER — HEPARIN SODIUM 5000 [USP'U]/ML
5000 INJECTION INTRAVENOUS; SUBCUTANEOUS EVERY 8 HOURS
Qty: 0 | Refills: 0 | Status: DISCONTINUED | OUTPATIENT
Start: 2018-03-09 | End: 2018-03-09

## 2018-03-08 RX ORDER — DEXTROSE 50 % IN WATER 50 %
25 SYRINGE (ML) INTRAVENOUS ONCE
Qty: 0 | Refills: 0 | Status: DISCONTINUED | OUTPATIENT
Start: 2018-03-08 | End: 2018-03-13

## 2018-03-08 RX ORDER — DOCUSATE SODIUM 100 MG
100 CAPSULE ORAL
Qty: 0 | Refills: 0 | Status: DISCONTINUED | OUTPATIENT
Start: 2018-03-08 | End: 2018-03-13

## 2018-03-08 RX ORDER — SEVELAMER CARBONATE 2400 MG/1
800 POWDER, FOR SUSPENSION ORAL
Qty: 0 | Refills: 0 | Status: DISCONTINUED | OUTPATIENT
Start: 2018-03-08 | End: 2018-03-12

## 2018-03-08 RX ORDER — SODIUM CHLORIDE 9 MG/ML
250 INJECTION INTRAMUSCULAR; INTRAVENOUS; SUBCUTANEOUS ONCE
Qty: 0 | Refills: 0 | Status: COMPLETED | OUTPATIENT
Start: 2018-03-08 | End: 2018-03-08

## 2018-03-08 RX ORDER — SODIUM CHLORIDE 9 MG/ML
1000 INJECTION INTRAMUSCULAR; INTRAVENOUS; SUBCUTANEOUS
Qty: 0 | Refills: 0 | Status: DISCONTINUED | OUTPATIENT
Start: 2018-03-08 | End: 2018-03-08

## 2018-03-08 RX ORDER — VANCOMYCIN HCL 1 G
750 VIAL (EA) INTRAVENOUS
Qty: 0 | Refills: 0 | Status: DISCONTINUED | OUTPATIENT
Start: 2018-03-08 | End: 2018-03-12

## 2018-03-08 RX ORDER — OXYCODONE AND ACETAMINOPHEN 5; 325 MG/1; MG/1
1 TABLET ORAL EVERY 4 HOURS
Qty: 0 | Refills: 0 | Status: DISCONTINUED | OUTPATIENT
Start: 2018-03-08 | End: 2018-03-13

## 2018-03-08 RX ORDER — INSULIN LISPRO 100/ML
VIAL (ML) SUBCUTANEOUS
Qty: 0 | Refills: 0 | Status: DISCONTINUED | OUTPATIENT
Start: 2018-03-08 | End: 2018-03-09

## 2018-03-08 RX ORDER — AMLODIPINE BESYLATE 2.5 MG/1
5 TABLET ORAL DAILY
Qty: 0 | Refills: 0 | Status: DISCONTINUED | OUTPATIENT
Start: 2018-03-08 | End: 2018-03-12

## 2018-03-08 RX ORDER — MORPHINE SULFATE 50 MG/1
2 CAPSULE, EXTENDED RELEASE ORAL EVERY 4 HOURS
Qty: 0 | Refills: 0 | Status: DISCONTINUED | OUTPATIENT
Start: 2018-03-08 | End: 2018-03-13

## 2018-03-08 RX ORDER — OXYCODONE AND ACETAMINOPHEN 5; 325 MG/1; MG/1
2 TABLET ORAL EVERY 4 HOURS
Qty: 0 | Refills: 0 | Status: DISCONTINUED | OUTPATIENT
Start: 2018-03-08 | End: 2018-03-13

## 2018-03-08 RX ORDER — PIPERACILLIN AND TAZOBACTAM 4; .5 G/20ML; G/20ML
4.5 INJECTION, POWDER, LYOPHILIZED, FOR SOLUTION INTRAVENOUS ONCE
Qty: 0 | Refills: 0 | Status: COMPLETED | OUTPATIENT
Start: 2018-03-08 | End: 2018-03-08

## 2018-03-08 RX ORDER — PIPERACILLIN AND TAZOBACTAM 4; .5 G/20ML; G/20ML
4.5 INJECTION, POWDER, LYOPHILIZED, FOR SOLUTION INTRAVENOUS EVERY 12 HOURS
Qty: 0 | Refills: 0 | Status: DISCONTINUED | OUTPATIENT
Start: 2018-03-08 | End: 2018-03-09

## 2018-03-08 RX ORDER — VANCOMYCIN HCL 1 G
750 VIAL (EA) INTRAVENOUS
Qty: 0 | Refills: 0 | Status: DISCONTINUED | OUTPATIENT
Start: 2018-03-08 | End: 2018-03-08

## 2018-03-08 RX ORDER — ERYTHROPOIETIN 10000 [IU]/ML
10000 INJECTION, SOLUTION INTRAVENOUS; SUBCUTANEOUS
Qty: 0 | Refills: 0 | Status: DISCONTINUED | OUTPATIENT
Start: 2018-03-08 | End: 2018-03-13

## 2018-03-08 RX ORDER — INSULIN LISPRO 100/ML
VIAL (ML) SUBCUTANEOUS AT BEDTIME
Qty: 0 | Refills: 0 | Status: DISCONTINUED | OUTPATIENT
Start: 2018-03-08 | End: 2018-03-09

## 2018-03-08 RX ORDER — METOPROLOL TARTRATE 50 MG
25 TABLET ORAL
Qty: 0 | Refills: 0 | Status: DISCONTINUED | OUTPATIENT
Start: 2018-03-08 | End: 2018-03-09

## 2018-03-08 RX ORDER — VANCOMYCIN HCL 1 G
3000 VIAL (EA) INTRAVENOUS ONCE
Qty: 0 | Refills: 0 | Status: DISCONTINUED | OUTPATIENT
Start: 2018-03-08 | End: 2018-03-08

## 2018-03-08 RX ADMIN — MORPHINE SULFATE 4 MILLIGRAM(S): 50 CAPSULE, EXTENDED RELEASE ORAL at 14:41

## 2018-03-08 RX ADMIN — MORPHINE SULFATE 2 MILLIGRAM(S): 50 CAPSULE, EXTENDED RELEASE ORAL at 22:39

## 2018-03-08 RX ADMIN — HYDROMORPHONE HYDROCHLORIDE 0.5 MILLIGRAM(S): 2 INJECTION INTRAMUSCULAR; INTRAVENOUS; SUBCUTANEOUS at 18:28

## 2018-03-08 RX ADMIN — INSULIN HUMAN 6 UNIT(S): 100 INJECTION, SOLUTION SUBCUTANEOUS at 22:51

## 2018-03-08 RX ADMIN — HYDROMORPHONE HYDROCHLORIDE 0.5 MILLIGRAM(S): 2 INJECTION INTRAMUSCULAR; INTRAVENOUS; SUBCUTANEOUS at 19:20

## 2018-03-08 RX ADMIN — Medication 100 MILLIGRAM(S): at 14:41

## 2018-03-08 RX ADMIN — ERYTHROPOIETIN 10000 UNIT(S): 10000 INJECTION, SOLUTION INTRAVENOUS; SUBCUTANEOUS at 14:13

## 2018-03-08 RX ADMIN — PIPERACILLIN AND TAZOBACTAM 25 GRAM(S): 4; .5 INJECTION, POWDER, LYOPHILIZED, FOR SOLUTION INTRAVENOUS at 22:56

## 2018-03-08 RX ADMIN — PIPERACILLIN AND TAZOBACTAM 200 GRAM(S): 4; .5 INJECTION, POWDER, LYOPHILIZED, FOR SOLUTION INTRAVENOUS at 14:41

## 2018-03-08 RX ADMIN — SODIUM CHLORIDE 1000 MILLILITER(S): 9 INJECTION INTRAMUSCULAR; INTRAVENOUS; SUBCUTANEOUS at 11:24

## 2018-03-08 RX ADMIN — SODIUM CHLORIDE 20 MILLILITER(S): 9 INJECTION INTRAMUSCULAR; INTRAVENOUS; SUBCUTANEOUS at 17:50

## 2018-03-08 RX ADMIN — HYDROMORPHONE HYDROCHLORIDE 0.5 MILLIGRAM(S): 2 INJECTION INTRAMUSCULAR; INTRAVENOUS; SUBCUTANEOUS at 18:40

## 2018-03-08 RX ADMIN — MORPHINE SULFATE 4 MILLIGRAM(S): 50 CAPSULE, EXTENDED RELEASE ORAL at 11:24

## 2018-03-08 RX ADMIN — Medication 100 MILLIGRAM(S): at 20:02

## 2018-03-08 RX ADMIN — SODIUM CHLORIDE 500 MILLILITER(S): 9 INJECTION INTRAMUSCULAR; INTRAVENOUS; SUBCUTANEOUS at 19:30

## 2018-03-08 RX ADMIN — Medication 100 MILLIGRAM(S): at 19:56

## 2018-03-08 RX ADMIN — MORPHINE SULFATE 2 MILLIGRAM(S): 50 CAPSULE, EXTENDED RELEASE ORAL at 22:00

## 2018-03-08 RX ADMIN — HYDROMORPHONE HYDROCHLORIDE 0.5 MILLIGRAM(S): 2 INJECTION INTRAMUSCULAR; INTRAVENOUS; SUBCUTANEOUS at 18:09

## 2018-03-08 RX ADMIN — Medication 6: at 20:17

## 2018-03-08 NOTE — ED PROVIDER NOTE - NS ED ROS FT
Review of Systems    Constitutional: (+) subjective fever  Eyes/ENT: (-) blurry vision  Cardiovascular: (-) chest pain, (-) syncope  Respiratory: (-) cough, (-) shortness of breath  Gastrointestinal: (-) vomiting, (-) diarrhea  Musculoskeletal: (-) neck pain, (-) back pain  Integumentary: (see hpi   Neurological: (-) headache, (-) altered mental status

## 2018-03-08 NOTE — PRE-OP CHECKLIST - SELECT TESTS ORDERED
Type and Screen/PT/PTT/BMP/CBC/CMP/INR INR/POCT Blood Glucose/BMP/CBC/CMP/PT/PTT/Type and Screen/, Dr Shaver aware

## 2018-03-08 NOTE — CONSULT NOTE ADULT - SUBJECTIVE AND OBJECTIVE BOX
VASCULAR SURGERY CONSULT NOTE    HPI:    53y year old Male seen at bedside for Right and Left foot ulceration.  Patient relates to having the ulceration for several months and previously had gas consistent with necrotizing fascitis in August/September 2017 with right foot TMA.  Patient has been coming to podiatry clinic for the past few months to have an Unna Boot applied by Charly and has been seen by the podiatry clinic. Patient was recently in the ED but left AMA.       Mr. Roth presents sent from nursing home with worsening bilateral foot/ankle ulcers, especially a new right lateral ankle. In ED pt is tachycardic, febrile and WBC 15. Xray c/w   < from: Xray Foot AP + Lateral + Oblique, Bilat (03.08.18 @ 11:27) >  1. Subcutaneous gas throughout the right ankle consistent with   necrotizing fasciitis/gangrene.  2. Septic arthritis/osteomyelitis in the right midfoot/hindfoot as above.  3. Diffuse soft tissue swelling throughout the left foot without definite   radiographic evidence for osteomyelitis.     < end of copied text >           PAST MEDICAL & SURGICAL HISTORY:  Blind  HTN (hypertension)  End stage renal disease  Diabetes  Right leg angiogram (Dr. Bocanegra) 8/2017: 3 vessel run off      No Known Allergies    Home Medications:  amLODIPine 5 mg oral tablet: 1 tab(s) orally once a day (27 Feb 2018 05:44)  Colace 100 mg oral capsule: 1 cap(s) orally 2 times a day (27 Feb 2018 05:44)  enalapril 10 mg oral tablet: 1 tab(s) orally once a day (27 Feb 2018 05:44)  furosemide 80 mg oral tablet: 1 tab(s) orally once a day (27 Feb 2018 05:44)  Lyrica 100 mg oral capsule: 1 cap(s) orally 2 times a day (27 Feb 2018 05:44)  metOLazone 5 mg oral tablet: 1 tab(s) orally once a day (27 Feb 2018 05:44)  Metoprolol Tartrate 25 mg oral tablet: 1 tab(s) orally 2 times a day (27 Feb 2018 05:44)  Renvela 800 mg oral tablet: 1 tab(s) orally 3 times a day (with meals) (27 Feb 2018 05:44)  simvastatin 10 mg oral tablet: 1 tab(s) orally once a day (at bedtime) (27 Feb 2018 05:44)      REVIEW OF SYSTEMS:  GENERAL:                                         negative  SKIN/BREAST:                                see HPI  OPTHALMOLOGIC:                          negative  ENMT:                                               negative  RESPIRATORY AND THORAX:        negative  CARDIOVASCULAR:                         negative  GASTROINTESTINAL:                       negative  MUSCULOSKELETAL:                       negative  NEUROLOGIC:                                   negative  PSYCHIATRIC:                                    negative  HEMATOLOGY/LYMPHATICS:         negative  ENDOCRINE:                                     negative  ALLERGIC/IMMUNOLOGIC:            negative      PHYSICAL EXAM  Vital Signs Last 24 Hrs  T(C): 38 (08 Mar 2018 10:03), Max: 38 (08 Mar 2018 10:03)  T(F): 100.4 (08 Mar 2018 10:03), Max: 100.4 (08 Mar 2018 10:03)  HR: 115 (08 Mar 2018 13:25) (103 - 116)  BP: 130/73 (08 Mar 2018 13:25) (130/73 - 165/71)  BP(mean): --  RR: 20 (08 Mar 2018 13:25) (20 - 20)  SpO2: 96% (08 Mar 2018 10:03) (96% - 96%)    Appearance: Normal	  HEENT:   Normal oral mucosa, PERRL, EOMI	  Neck: Supple, + JVD/ - JVD; Carotid Bruit   Cardiovascular: Normal S1 S2, No JVD, No murmurs,   Respiratory: Lungs clear to auscultation/Decreased Breath Sounds/No Rales, Rhonchi, Wheezing	  Gastrointestinal:  Soft, Non-tender, + BS	  Skin: No rashes, No ecchymoses, No cyanosis  Extremities: Vasc: palpable pulses to DP/PT bilaterally. Skin temp warm to cool on the left and warm to warm on the right with focal warmth to the lateral ankle. CFT <3 seconds to dorsal aspect of the foot bilaterally.  Derm: Left plantar foot wound with fibrogranular base and hyperkeratotic tissue periwound. Right lateral foot ulcerations with fibrogranular base and hyperkeratotic tissue periwound. Right lateral ankle ulcerations (x2) +PTB with erythema periwound. cyanotic discoloration noted to the more proximal wound at level of lateral malleoli.     Neurologic: Non-focal  Psychiatry: A & O x 3, Mood & affect appropriate          MEDICATIONS:   MEDICATIONS  (STANDING):  clindamycin IVPB 900 milliGRAM(s) IV Intermittent Once  epoetin keagan Injectable 22382 Unit(s) IV Push <User Schedule>  piperacillin/tazobactam IVPB. 4.5 Gram(s) IV Intermittent once  vancomycin  IVPB 750 milliGRAM(s) IV Intermittent <User Schedule>    MEDICATIONS  (PRN):      LAB/STUDIES:                        7.8    14.76 )-----------( 455      ( 08 Mar 2018 10:51 )             27.1     03-08    136  |  96<L>  |  52<H>  ----------------------------<  299<H>  5.0   |  27  |  6.5<HH>    Ca    9.0      08 Mar 2018 10:51    TPro  5.9<L>  /  Alb  2.4<L>  /  TBili  0.7  /  DBili  x   /  AST  30  /  ALT  38  /  AlkPhos  211<H>  03-08    PT/INR - ( 08 Mar 2018 10:51 )   PT: 14.30 sec;   INR: 1.32 ratio         PTT - ( 08 Mar 2018 10:51 )  PTT:32.2 sec  LIVER FUNCTIONS - ( 08 Mar 2018 10:51 )  Alb: 2.4 g/dL / Pro: 5.9 g/dL / ALK PHOS: 211 U/L / ALT: 38 U/L / AST: 30 U/L / GGT: x               ASSESSMENT/PLAN:   53y year old Male seen at bedside for Right and Left foot ulceration, Right TMA, known 3 vessel runoff, worsening right ankle, xray c/w necrotizing fasciitis  - follow up CT right foot/ankle  - will discuss debridement and possible amputations if CT is abnormal    Case seen with Dr. Bocanegra      SPECTRA: [id number]

## 2018-03-08 NOTE — ED ADULT TRIAGE NOTE - CHIEF COMPLAINT QUOTE
patient kenna from [facility] - reports right ankle pain. s/p right toe amputations - leg tender to touch . swollen mild redness. to leg . britney dressing on ankle in triage

## 2018-03-08 NOTE — CONSULT NOTE ADULT - ATTENDING COMMENTS
Chronic right foot ulcer, now with soft tissue gas on CT and X ray and crepitus consistent with necrotizing soft tissue infection of right foot and ankle. Offered emergent Right open BKA, initially patient kept on refusing. Spoke to patients brother, finally patient agreed for OR today. Risks of open amputation, need for above knee amputation, septic shock, MI and death discussed with the patient and family.     Plan to OR as level 1 for Right guillotine below knee amputation.
Assessment and plan above were discussed with me over the phone. I was not present for examination.
Patient will require right LE BKA due to the Proximal nature of aggressive infection     Vascular surgery consulted     Podiatry will f/u with patient

## 2018-03-08 NOTE — CONSULT NOTE ADULT - PROBLEM SELECTOR RECOMMENDATION 9
Chart reviewed and Patient evaluated  Applied Betadine with dry sterile dressing  X-rays reviewed : Shows locules of air on the right lateral ankle  Recommend A duplex and Vascular and ID consults  Podiatry will follow while in house.

## 2018-03-08 NOTE — PROGRESS NOTE ADULT - SUBJECTIVE AND OBJECTIVE BOX
[place] [facility] INITIAL CONSULT NOTE  --------------------------------------------------------------------------------  HPI: 52 YO male with past medical history of ESRD presumed secondary to diabetic nephropathy. He is maintained on hemodialysis on TTS at [facility]. His HD access is a LUE AVF. Has been receiving Vanco post dialysis for R foot cellulitis/OM. Presents with fever, chills, and nausea.     PAST HISTORY  --------------------------------------------------------------------------------  PAST MEDICAL & SURGICAL HISTORY:  Blind  HTN (hypertension)  End stage renal disease  Diabetes    FAMILY HISTORY: negative for kidney disease    PAST SOCIAL HISTORY: Denies ETOH, tobacco, and illicit drug use.    ALLERGIES & MEDICATIONS  --------------------------------------------------------------------------------  Allergies    No Known Allergies    Standing Inpatient Medications  piperacillin/tazobactam IVPB. 4.5 Gram(s) IV Intermittent once  sodium chloride 0.9% Bolus 1000 milliLiter(s) IV Bolus once    REVIEW OF SYSTEMS  --------------------------------------------------------------------------------  Gen:  fevers/chills  Skin: No rashes  Head/Eyes/Ears/Mouth: No sinus pain/discomfort, sore throat  Respiratory: No dyspnea, cough, wheezing, hemoptysis  CV: No chest pain, PND, orthopnea  GI: No abdominal pain, +nausea  : No increased frequency, dysuria, hematuria, nocturia    All other systems were reviewed and are negative, except as noted.    VITALS/PHYSICAL EXAM  --------------------------------------------------------------------------------  T(C): 38 (03-08-18 @ 10:03), Max: 38 (03-08-18 @ 10:03)  HR: 116 (03-08-18 @ 10:03) (116 - 116)  BP: 165/71 (03-08-18 @ 10:03) (165/71 - 165/71)  RR: 20 (03-08-18 @ 10:03) (20 - 20)  SpO2: 96% (03-08-18 @ 10:03) (96% - 96%)    Physical Exam:  	Gen: NAD  	HEENT: supple neck, clear oropharynx  	Pulm: CTA B/L  	CV: RRR, S1S2  	Back: No CVA tenderness; no sacral edema  	Abd: +BS, soft, nontender/nondistended  	: No suprapubic tenderness  	LE: Warm, edema  	Neuro: AAO x3  	Psych: Normal affect and mood  	Vascular access: LUE AVF with good thrill/bruit    LABS/STUDIES  --------------------------------------------------------------------------------              7.8    14.76 >-----------<  455      [03-08-18 @ 10:51]              27.1     136  |  96  |  52  ----------------------------<  299      [03-08-18 @ 10:51]  5.0   |  27  |  6.5        Ca     9.0     [03-08-18 @ 10:51]    TPro  5.9  /  Alb  2.4  /  TBili  0.7  /  DBili  x   /  AST  30  /  ALT  38  /  AlkPhos  211  [03-08-18 @ 10:51]    PT/INR: PT 14.30, INR 1.32       [03-08-18 @ 10:51]  PTT: 32.2       [03-08-18 @ 10:51]

## 2018-03-08 NOTE — ED PROVIDER NOTE - CRITICAL CARE PROVIDED
direct patient care (not related to procedure)/additional history taking/interpretation of diagnostic studies/documentation/consultation with other physicians/telephone consultation with the patient's family

## 2018-03-08 NOTE — ED PROVIDER NOTE - CARE PLAN
Principal Discharge DX:	Necrotizing fasciitis  Secondary Diagnosis:	Leukocytosis  Secondary Diagnosis:	Anemia

## 2018-03-08 NOTE — ED ADULT NURSE NOTE - CHIEF COMPLAINT QUOTE
patient kenna from [facility] - reports right ankle pain. s/p right toe amputations - leg tender to touch . swollen mild redness. to leg . britney dressing on ankle in triage

## 2018-03-08 NOTE — CONSULT NOTE ADULT - SUBJECTIVE AND OBJECTIVE BOX
SICU Consultation Note  =====================================================    53 male with hx HTN, DM,  ESRD on HD, chronic b/l LE cellulitis on post HD vanco,  followed by podiatry, p/w worsening b/l feet/ankle pain, Rt more than the left. On presentation in the ED, he was febrile, tachycardic, found to have rt ankle necrotizing fasciitis, septic arthritis/OM, and LUE soft tissue swelling. Pt was evaluated by vascular surgery and taken to OR for Rt yazan KRISHNAMURTHY. Prior to OR, received 1x Zosyn, Clinda. Intraop patient received vanco 1 gm.   Pt was extubated post surgery,       Surgery Information  OR time: 25mins    EBL: 150cc    UO: 250cc   IV Fluids: 500cc     PAST MEDICAL & SURGICAL HISTORY:  Blind  HTN (hypertension)  End stage renal disease  Diabetes    Allergies: No Known Allergies    Home Medications:  amlodipine 5 mg oral tablet: 1 tab(s) orally once a day   Colace 100 mg oral capsule: 1 cap(s) orally 2 times a day   enalapril 10 mg oral tablet: 1 tab(s) orally once a day  furosemide 80 mg oral tablet: 1 tab(s) orally once a day  Lyrica 100 mg oral capsule: 1 cap(s) orally 2 times a day   metolazone 5 mg oral tablet: 1 tab(s) orally once a day  Metoprolol Tartrate 25 mg oral tablet: 1 tab(s) orally 2 times a day  Renvela 800 mg oral tablet: 1 tab(s) orally 3 times a day (with meals)  simvastatin 10 mg oral tablet: 1 tab(s) orally once a day (at bedtime)    Advanced Directives: Full Code     ROS:  [X ] A ten-point review of systems was otherwise negative except as noted.  [ ] Due to altered mental status/intubation, subjective information were not able to be obtained from the patient. History was obtained, to the extent possible, from review of the chart and collateral sources of information.        CURRENT MEDICATIONS:   --------------------------------------------------------------------------------------  Neurologic Medications  morphine  - Injectable 2 milliGRAM(s) IV Push every 4 hours PRN severe pain, breakthrough after oral meds given  oxyCODONE    5 mG/acetaminophen 325 mG 1 Tablet(s) Oral every 4 hours PRN Mild Pain (1 - 3)  oxyCODONE    5 mG/acetaminophen 325 mG 2 Tablet(s) Oral every 4 hours PRN Moderate Pain (4 - 6)  pregabalin 100 milliGRAM(s) Oral two times a day    Respiratory Medications    Cardiovascular Medications:  amLODIPine   Tablet 5 milliGRAM(s) Oral daily  enalapril 10 milliGRAM(s) Oral daily  metolazone 5 milliGRAM(s) Oral daily  metoprolol     tartrate 25 milliGRAM(s) Oral two times a day    Gastrointestinal Medications:  docusate sodium 100 milliGRAM(s) Oral two times a day  sodium chloride 0.9%. 1000 milliLiter(s) IV Continuous <Continuous>  sodium chloride 0.9%. 1000 milliLiter(s) IV Continuous <Continuous>    Genitourinary Medications    Hematologic/Oncologic Medications:  epoetin keagan Injectable 15922 Unit(s) IV Push <User Schedule>    Antimicrobial/Immunologic Medications:  clindamycin IVPB      piperacillin/tazobactam IVPB. 4.5 Gram(s) IV Intermittent every 12 hours  vancomycin  IVPB 750 milliGRAM(s) IV Intermittent every 48 hours    Endocrine/Metabolic Medications:  dextrose 50% Injectable 25 Gram(s) IV Push once  insulin lispro (HumaLOG) corrective regimen sliding scale   SubCutaneous three times a day before meals  insulin lispro (HumaLOG) corrective regimen sliding scale   SubCutaneous at bedtime  insulin regular  human recombinant. 6 Unit(s) SubCutaneous once  simvastatin 10 milliGRAM(s) Oral at bedtime    Topical/Other Medications:  artificial  tears Solution 1 Drop(s) Both EYES every 12 hours  sevelamer hydrochloride 800 milliGRAM(s) Oral three times a day with meals    --------------------------------------------------------------------------------------    Vital Signs Last 24 Hrs  T(C): 37 (08 Mar 2018 20:45), Max: 38.4 (08 Mar 2018 17:50)  T(F): 98.6 (08 Mar 2018 20:45), Max: 101.2 (08 Mar 2018 17:50)  HR: 82 (08 Mar 2018 20:45) (81 - 116)  BP: 117/47 (08 Mar 2018 20:45) (100/49 - 165/71)  BP(mean): --  RR: 19 (08 Mar 2018 20:45) (12 - 23)  SpO2: 99% (08 Mar 2018 20:45) (96% - 100%)    EXAM:  General: Lying on the bed, extubated, not in respiratory distress  Neuro: GCS 15, AAO X 3, no focal deficits.  Respiratory: Lungs clear to auscultation  Cardiovascular: S1, S2.  Regular rate and rhythm.    Cardiac Rhythm: Normal Sinus Rhythm  GI: soft, Non-tender, Non-distended.  Current diet: on carb consistent diet  Extremities: Rt LE amputation with dressing intact, left LE dressing on the foot.   :  no Hunter catheter,     Tubes/Lines/Drains  ***  [x] Peripheral IV  [] Arterial Line		[] R	[] L	[] Fem	[] Rad	[] Ax	Date Placed: SICU Consultation Note  =====================================================    53 male with hx HTN, DM,  ESRD on HD, chronic b/l LE cellulitis on post HD vanco,  followed by podiatry, p/w worsening b/l feet/ankle pain, Rt more than the left. On presentation in the ED, he was febrile, tachycardic, found to have rt ankle necrotizing fasciitis, septic arthritis/OM, and LUE soft tissue swelling. Pt was evaluated by vascular surgery and taken to OR for Rt yazan KRISHNAMURTHY. Prior to OR, received 1x Zosyn, Clinda. Intraop patient received vanco 1 gm.   Pt was extubated post surgery,       Surgery Information  OR time: 25mins    EBL: 150cc    UO: 250cc   IV Fluids: 500cc     PAST MEDICAL & SURGICAL HISTORY:  Blind  HTN (hypertension)  End stage renal disease  Diabetes    Allergies: No Known Allergies    Home Medications:  amlodipine 5 mg oral tablet: 1 tab(s) orally once a day   Colace 100 mg oral capsule: 1 cap(s) orally 2 times a day   enalapril 10 mg oral tablet: 1 tab(s) orally once a day  furosemide 80 mg oral tablet: 1 tab(s) orally once a day  Lyrica 100 mg oral capsule: 1 cap(s) orally 2 times a day   metolazone 5 mg oral tablet: 1 tab(s) orally once a day  Metoprolol Tartrate 25 mg oral tablet: 1 tab(s) orally 2 times a day  Renvela 800 mg oral tablet: 1 tab(s) orally 3 times a day (with meals)  simvastatin 10 mg oral tablet: 1 tab(s) orally once a day (at bedtime)    Advanced Directives: Full Code     ROS:  [X ] A ten-point review of systems was otherwise negative except as noted.  [ ] Due to altered mental status/intubation, subjective information were not able to be obtained from the patient. History was obtained, to the extent possible, from review of the chart and collateral sources of information.        CURRENT MEDICATIONS:   --------------------------------------------------------------------------------------  Neurologic Medications  morphine  - Injectable 2 milliGRAM(s) IV Push every 4 hours PRN severe pain, breakthrough after oral meds given  oxyCODONE    5 mG/acetaminophen 325 mG 1 Tablet(s) Oral every 4 hours PRN Mild Pain (1 - 3)  oxyCODONE    5 mG/acetaminophen 325 mG 2 Tablet(s) Oral every 4 hours PRN Moderate Pain (4 - 6)  pregabalin 100 milliGRAM(s) Oral two times a day    Respiratory Medications    Cardiovascular Medications:  amLODIPine   Tablet 5 milliGRAM(s) Oral daily  enalapril 10 milliGRAM(s) Oral daily  metolazone 5 milliGRAM(s) Oral daily  metoprolol     tartrate 25 milliGRAM(s) Oral two times a day    Gastrointestinal Medications:  docusate sodium 100 milliGRAM(s) Oral two times a day  sodium chloride 0.9%. 1000 milliLiter(s) IV Continuous <Continuous>  sodium chloride 0.9%. 1000 milliLiter(s) IV Continuous <Continuous>    Genitourinary Medications    Hematologic/Oncologic Medications:  epoetin keagan Injectable 27485 Unit(s) IV Push <User Schedule>    Antimicrobial/Immunologic Medications:  clindamycin IVPB      piperacillin/tazobactam IVPB. 4.5 Gram(s) IV Intermittent every 12 hours  vancomycin  IVPB 750 milliGRAM(s) IV Intermittent every 48 hours    Endocrine/Metabolic Medications:  dextrose 50% Injectable 25 Gram(s) IV Push once  insulin lispro (HumaLOG) corrective regimen sliding scale   SubCutaneous three times a day before meals  insulin lispro (HumaLOG) corrective regimen sliding scale   SubCutaneous at bedtime  insulin regular  human recombinant. 6 Unit(s) SubCutaneous once  simvastatin 10 milliGRAM(s) Oral at bedtime    Topical/Other Medications:  artificial  tears Solution 1 Drop(s) Both EYES every 12 hours  sevelamer hydrochloride 800 milliGRAM(s) Oral three times a day with meals    --------------------------------------------------------------------------------------    Vital Signs Last 24 Hrs  T(C): 37 (08 Mar 2018 20:45), Max: 38.4 (08 Mar 2018 17:50)  T(F): 98.6 (08 Mar 2018 20:45), Max: 101.2 (08 Mar 2018 17:50)  HR: 82 (08 Mar 2018 20:45) (81 - 116)  BP: 117/47 (08 Mar 2018 20:45) (100/49 - 165/71)  RR: 19 (08 Mar 2018 20:45) (12 - 23)  SpO2: 99% (08 Mar 2018 20:45) (96% - 100%)    EXAM:  General: Lying on the bed, extubated, not in respiratory distress  Neuro: GCS 15, AAO X 3, no focal deficits.  Respiratory: Lungs clear to auscultation  Cardiovascular: S1, S2.  Regular rate and rhythm.    Cardiac Rhythm: Normal Sinus Rhythm  GI: soft, Non-tender, Non-distended.  Current diet: on carb consistent diet  Extremities: Rt LE amputation with dressing intact, left LE dressing on the foot.   :  no Hunter catheter,     Tubes/Lines/Drains  ***  [x] Peripheral IV  [] Arterial Line		[] R	[] L	[] Fem	[] Rad	[] Ax	Date Placed:

## 2018-03-08 NOTE — ED PROVIDER NOTE - PROGRESS NOTE DETAILS
podiatry, dr. saenz, at bedside requests xrays and duplex. pt to be taken to dialysis per RN they are aware of pt discussed with dr. watkins, nephro, he is aware of pt. discussed with vascular RADHA sapp she is aware of pt. pt in dialysis now. discussed with dr. watkins to transport pt back to ED due to nec fascitis. he relates will bring pt to CT as soon as ready d/w dr goins renal, will interrupt dialysis to take pt to CT to evaluate extent of fasciitis pt agreeable to OR. vascular . Dr. zheng approves for sicu Attending note:  54 y/o M PMH of ESRD, presents with R foot pain x 2 months, worse x 2 weeks. Pt has been treated for a DUF as an outpatient without improvement in SX. He now reports fever and was sent to ED for further evaluation and management.    VSS, awake, alert, non-toxic appearing, lying comfortably in stretcher, in NAD, ears clear, oropharynx clear, mmm, no JVD or bruit, no skin rash or lesions, chest CTAB, non-labored breathing, no w/r/r, +S1/S2, RRR, no m/r/g, abdomen soft, NT, ND, +BS. pt initially declined OR after discussion with Dr Bocanegra, however after extensive conversation with pts brother, agreeable to OR. vascular . Dr. zheng approves for sicu Attending note:  52 y/o M PMH of ESRD, presents with R foot pain x 2 months, worse x 2 weeks. Pt has been treated for a DUF as an outpatient without improvement in SX. He now reports fever and was sent to ED for further evaluation and management.  VSS, awake, alert, non-toxic appearing, lying comfortably in stretcher, in NAD, ears clear, oropharynx clear, mmm, no JVD or bruit, no skin rash or lesions, chest CTAB, non-labored breathing, no w/r/r, +S1/S2, RRR, no m/r/g, abdomen soft, NT, ND, +BS. Due to the need for continuous patient care in the emergency department, the times documented are the time of computer entry, not necessarily the one the event occurred. Attending note:  54 y/o M PMH of ESRD, presents with R foot pain x 2 months, worse x 2 weeks. Pt has been treated for a DUF as an outpatient without improvement in SX. He now reports fever and was sent to ED for further evaluation and management.  VSS, awake, alert, non-toxic appearing, lying comfortably in stretcher, in NAD, ears clear, oropharynx clear, mmm, no JVD or bruit, chest CTAB, non-labored breathing, no w/r/r, +S1/S2, RRR, no m/r/g, abdomen soft, NT, ND, +BS.

## 2018-03-08 NOTE — PROGRESS NOTE ADULT - SUBJECTIVE AND OBJECTIVE BOX
Patient was examined during HD treatment.    Hemodialysis Prescription:  	Access: 3  	Dialyzer: Opti 160  	Blood Flow (mL/Min): 400  	Dialysate Flow (mL/Min): 500  	Target UF (Liters): 4  	Treatment Time: 3 hours  	Potassium: 2K  	Calcium: 2.5

## 2018-03-08 NOTE — ED ADULT NURSE NOTE - OBJECTIVE STATEMENT
pt from nursing home with c/o of right and left foot pain, has a hx of cellulitis, pt feeling weak , febrile and has nausea and vomiting today.

## 2018-03-08 NOTE — CONSULT NOTE ADULT - SUBJECTIVE AND OBJECTIVE BOX
S : 53y year old Male seen at bedside for Right and Left foot ulceration.  Patient relates to having the ulceration for several months and previously had gas consistent with necrotizing fascitis in August/September 2017 with right foot TMA.  Patient has been coming to podiatry clinic for the past few months to have an Unna Boot applied by Charly and has been seen by the podiatry clinic. Patient was recently in the ED but left AMA.     Chief Complaint : Patient is a 53y old  Male who presents with a chief complaint of   HPI : 54 yo male presents with worsening pain and ulcerations to bilateral feet especially new ulcerations to the lateral right ankle. Patient relates that he came into the ED due to the pain and is currently tachycardic, febrile, and has an elevated BP. Patient is know to podiatry service.       Patient admits to  (+) Fevers, (-) Chills, (-) Nausea, (-) Vomiting, (-) Shortness of Breath      PMH: Blind  HTN (hypertension)  End stage renal disease  Diabetes    PSH:    Allergies:No Known Allergies      Labs:                          7.8    14.76 )-----------( 455      ( 08 Mar 2018 10:51 )             27.1     WBC Trend  14.76<H> Date (03-08 @ 10:51)  16.68<H> Date (02-27 @ 06:36)      Chem  03-08    136  |  96<L>  |  52<H>  ----------------------------<  299<H>  5.0   |  27  |  6.5<HH>    Ca    9.0      08 Mar 2018 10:51    TPro  5.9<L>  /  Alb  2.4<L>  /  TBili  0.7  /  DBili  x   /  AST  30  /  ALT  38  /  AlkPhos  211<H>  03-08          T(F): 100.4 (03-08-18 @ 10:03), Max: 100.4 (03-08-18 @ 10:03)  HR: 116 (03-08-18 @ 10:03) (116 - 116)  BP: 165/71 (03-08-18 @ 10:03) (165/71 - 165/71)  RR: 20 (03-08-18 @ 10:03) (20 - 20)  SpO2: 96% (03-08-18 @ 10:03) (96% - 96%)  Wt(kg): --    O:   Patient is NAD and AAOx3   Vasc: palpable pulses to DP/PT bilaterally. Skin temp warm to cool on the left and warm to warm on the right with focal warmth to the lateral ankle. CFT <3 seconds to dorsal aspect of the foot bilaterally.  Derm: Left plantar foot wound with fibrogranular base and hyperkeratotic tissue periwound. Right lateral foot ulcerations with fibrogranular base and hyperkeratotic tissue periwound. Right lateral ankle ulcerations (x2) +PTB with erythema periwound. cyanotic discoloration noted to the more proximal wound at level of lateral malleoli.   Msk: Pain to palpation of the right foot. Right foot s/p TMA.

## 2018-03-08 NOTE — CHART NOTE - NSCHARTNOTEFT_GEN_A_CORE
PACU ANESTHESIA ADMISSION NOTE      Procedure: necrotizing fascitis  Post op diagnosis:      ____  Intubated  TV:______       Rate: ______      FiO2: ______    _x___  Patent Airway    _x___  Full return of protective reflexes    __  Full recovery from anesthesia / back to baseline status    Vitals:  T100.5  HR: 113  BP: 141/75  RR: 18  SpO2: 99    Mental Status:  _x___ Awake   _____ Alert   _____ Drowsy   _____ Sedated    Nausea/Vomiting:  _x___  NO       ______Yes,   See Post - Op Orders         Pain Scale (0-10):  __0___    Treatment: _x___ None    ____ See Post - Op/PCA Orders    Post - Operative Fluids:   __x__ Oral   ____ See Post - Op Orders    Plan: Discharge:   ___Home       _____Floor     __x___Critical Care    _____  Other:_________________    Comments: hemodynamically stable, Spontaneously breathing  No anesthesia issues or complications noted.  Discharge when criteria met.

## 2018-03-08 NOTE — PROGRESS NOTE ADULT - ASSESSMENT
1. Fever, chills. Likely R foot OM. Received Zosyn in ER. Will give Vanco post-HD. ID and vascular surgery eval. Podiatry f/u.  2. ESRD on HD TTS. HD today: 3 hours, opti 160 dialyzer, 2K bath, 4L UF.  3. Anemia. Start EPO 10,000 units IV with HD.  4. Hyperphosphatemia. Renal diet. Sevelamer with meals.

## 2018-03-08 NOTE — ED PROVIDER NOTE - OBJECTIVE STATEMENT
54 y/o M with PMH obesity, HTN, HLD, DM, ESRD on HD T, Th, sat with L AV fistula followed by dr. mccallum, osteomyelitis, necrotizing fasciitis, s/p 5 R toe amputations, R>L DFU on IV vancomycin given at dialysis x 2 mos, in rehab at [facility] since july since toe amputation presents with worsening ulceration on R foot associated with increased swelling, erythema and pain , fever, chills, sweats x 2 mos. He was told that he needed a foot amputation in the past, but he declined. He also presented to the ED for the same symptoms and left AMA last week. He denies SOB, COLLIER, CP, n/v/d, calf tenderness, palliating/provoking factors, other symptoms.

## 2018-03-08 NOTE — PROGRESS NOTE ADULT - ASSESSMENT
Pt is s/p procedure above and appears to be recovering well despite RLE pain. Plan to continue care per ICU team, f/u renal for HD, bedrest.

## 2018-03-08 NOTE — ED PROVIDER NOTE - ATTENDING CONTRIBUTION TO CARE
I personally evaluated the patient. I reviewed the Physician Assistant’s note (as assigned above), and agree with the findings and plan except as documented in my note.

## 2018-03-08 NOTE — BRIEF OPERATIVE NOTE - PROCEDURE
<<-----Click on this checkbox to enter Procedure Amputation of leg through tibia and fibula  03/08/2018  Open right BKA  Active  [id number]

## 2018-03-08 NOTE — ED ADULT NURSE REASSESSMENT NOTE - NS ED NURSE REASSESS COMMENT FT1
pt taken to or by podiatry team for a stat procedure, report given to or nurse, pre op checklist done as per protocol.

## 2018-03-08 NOTE — ED PROVIDER NOTE - PHYSICAL EXAMINATION
PHYSICAL EXAM:    GENERAL: Alert, appears stated age, well appearing, non-toxic. anxious.   SKIN: Warm, pink and dry. MMM. R foot with 2 lateral ulcerations with minimally active serous discharge and with superimposed erythema up. there is some cyanosis proximal to superior ulceration.   EYE: Normal lids/conjunctiva  ENT: Normal hearing, patent oropharynx   NECK: +supple. No meningismus  Pulm: Bilateral BS, normal resp effort, no wheezes, stridor, or retractions  CV: RRR, no M/R/G, 2+ pulses throughout  Abd: soft, obese non-tender  Mskel: FROM. TTP of R anterior tib/fib area and foot. s/p 5 R toe amputations. see skin   Neuro: AAOx3, no motor deficits. decreased sensation R foot.

## 2018-03-08 NOTE — PROGRESS NOTE ADULT - SUBJECTIVE AND OBJECTIVE BOX
Procedure: Right BKA    Patient is HD stable and c/o pain in his right leg that is improved with pain medications. Pt denies fever, chills, nausea, vomiting, CP and endorses SOB due to anxiety. Pt is tolerating PO intake and is on bedrest.     PAST MEDICAL & SURGICAL HISTORY:  Blind  HTN (hypertension)  End stage renal disease  Diabetes    Vital Signs Last 24 Hrs  T(C): 37 (08 Mar 2018 20:45), Max: 38.4 (08 Mar 2018 17:50)  T(F): 98.6 (08 Mar 2018 20:45), Max: 101.2 (08 Mar 2018 17:50)  HR: 82 (08 Mar 2018 20:45) (81 - 116)  BP: 117/47 (08 Mar 2018 20:45) (100/49 - 165/71)  BP(mean): --  RR: 19 (08 Mar 2018 20:45) (12 - 23)  SpO2: 99% (08 Mar 2018 20:45) (96% - 100%)        Diet, Consistent Carbohydrate Renal/No Snacks (03-08-18 @ 18:13)      I&O's Summary    08 Mar 2018 07:01  -  08 Mar 2018 22:02  --------------------------------------------------------  IN: 500 mL / OUT: 1500 mL / NET: -1000 mL     I&O's Detail    08 Mar 2018 07:01  -  08 Mar 2018 22:02  --------------------------------------------------------  IN:    Oral Fluid: 210 mL    Sodium Chloride 0.9% IV Bolus: 250 mL    sodium chloride 0.9%.: 40 mL  Total IN: 500 mL    OUT:    Other: 1500 mL  Total OUT: 1500 mL    Total NET: -1000 mL          MEDICATIONS  (STANDING):  amLODIPine   Tablet 5 milliGRAM(s) Oral daily  artificial  tears Solution 1 Drop(s) Both EYES every 12 hours  clindamycin IVPB      dextrose 50% Injectable 25 Gram(s) IV Push once  docusate sodium 100 milliGRAM(s) Oral two times a day  enalapril 10 milliGRAM(s) Oral daily  epoetin keagan Injectable 21648 Unit(s) IV Push <User Schedule>  insulin lispro (HumaLOG) corrective regimen sliding scale   SubCutaneous three times a day before meals  insulin lispro (HumaLOG) corrective regimen sliding scale   SubCutaneous at bedtime  metolazone 5 milliGRAM(s) Oral daily  metoprolol     tartrate 25 milliGRAM(s) Oral two times a day  piperacillin/tazobactam IVPB. 4.5 Gram(s) IV Intermittent every 12 hours  pregabalin 100 milliGRAM(s) Oral two times a day  sevelamer hydrochloride 800 milliGRAM(s) Oral three times a day with meals  simvastatin 10 milliGRAM(s) Oral at bedtime  sodium chloride 0.9%. 1000 milliLiter(s) (20 mL/Hr) IV Continuous <Continuous>  sodium chloride 0.9%. 1000 milliLiter(s) (50 mL/Hr) IV Continuous <Continuous>  vancomycin  IVPB 750 milliGRAM(s) IV Intermittent every 48 hours    MEDICATIONS  (PRN):  morphine  - Injectable 2 milliGRAM(s) IV Push every 4 hours PRN severe pain, breakthrough after oral meds given  oxyCODONE    5 mG/acetaminophen 325 mG 1 Tablet(s) Oral every 4 hours PRN Mild Pain (1 - 3)  oxyCODONE    5 mG/acetaminophen 325 mG 2 Tablet(s) Oral every 4 hours PRN Moderate Pain (4 - 6)      PHYSICAL EXAM:  GENERAL: NAD  HEENT: NCAT  CHEST/LUNGS: CTAB  HEART: RRR,  No murmurs, rubs, or gallops  ABDOMEN: SNTND +BS  EXTREMITIES:  FROM, No clubbing, cyanosis, or edema, palpable pulse  NEURO: No focal neurological deficits  SKIN: No rashes or lesions  INCISION/WOUNDS: RLE in knee immobilizer and wrapped in ACE bandage, bloody fluid noted on dejah under RLE, no signs of active bleeding.                           7.8    14.76 )-----------( 455      ( 08 Mar 2018 10:51 )             27.1        CBC Full  -  ( 08 Mar 2018 10:51 )  WBC Count : 14.76 K/uL  Hemoglobin : 7.8 g/dL  Hematocrit : 27.1 %  Platelet Count - Automated : 455 K/uL  Mean Cell Volume : 81.4 fL  Mean Cell Hemoglobin : 23.4 pg  Mean Cell Hemoglobin Concentration : 28.8 g/dL  Auto Neutrophil # : 11.54 K/uL  Auto Lymphocyte # : 1.12 K/uL  Auto Monocyte # : 1.61 K/uL  Auto Eosinophil # : 0.25 K/uL  Auto Basophil # : 0.09 K/uL  Auto Neutrophil % : 78.2 %  Auto Lymphocyte % : 7.6 %  Auto Monocyte % : 10.9 %  Auto Eosinophil % : 1.7 %  Auto Basophil % : 0.6 %               136   |  96    |  52                 Ca: 9.0    BMP:   ----------------------------< 299    Mg: x     (03-08-18 @ 10:51)             5.0    |  27    | 6.5                Ph: x        LFT:     TPro: 5.9 / Alb: 2.4 / TBili: 0.7 / DBili: x / AST: 30 / ALT: 38 / AlkPhos: 211   (03-08-18 @ 10:51)    LIVER FUNCTIONS - ( 08 Mar 2018 10:51 )  Alb: 2.4 g/dL / Pro: 5.9 g/dL / ALK PHOS: 211 U/L / ALT: 38 U/L / AST: 30 U/L / GGT: x           PT/INR - ( 08 Mar 2018 10:51 )   PT: 14.30 sec;   INR: 1.32 ratio    PTT - ( 08 Mar 2018 10:51 )  PTT:32.2 sec            IMAGING:    PATHOLOGY:      SPECTRA:

## 2018-03-09 DIAGNOSIS — Z02.9 ENCOUNTER FOR ADMINISTRATIVE EXAMINATIONS, UNSPECIFIED: ICD-10-CM

## 2018-03-09 LAB
ANION GAP SERPL CALC-SCNC: 10 MMOL/L — SIGNIFICANT CHANGE UP (ref 7–14)
BASOPHILS NFR BLD AUTO: 0 % — SIGNIFICANT CHANGE UP (ref 0–1)
BUN SERPL-MCNC: 38 MG/DL — HIGH (ref 10–20)
CALCIUM SERPL-MCNC: 8 MG/DL — LOW (ref 8.5–10.1)
CHLORIDE SERPL-SCNC: 100 MMOL/L — SIGNIFICANT CHANGE UP (ref 98–110)
CK MB BLD-MCNC: 4 % — SIGNIFICANT CHANGE UP (ref 0–4)
CK MB CFR SERPL CALC: 1.8 NG/ML — SIGNIFICANT CHANGE UP (ref 0.6–6.3)
CK MB CFR SERPL CALC: 2.4 NG/ML — SIGNIFICANT CHANGE UP (ref 0.6–6.3)
CK MB CFR SERPL CALC: 2.5 NG/ML — SIGNIFICANT CHANGE UP (ref 0.6–6.3)
CK SERPL-CCNC: 45 U/L — SIGNIFICANT CHANGE UP (ref 0–225)
CK SERPL-CCNC: 64 U/L — SIGNIFICANT CHANGE UP (ref 0–225)
CK SERPL-CCNC: 69 U/L — SIGNIFICANT CHANGE UP (ref 0–225)
CO2 SERPL-SCNC: 27 MMOL/L — SIGNIFICANT CHANGE UP (ref 17–32)
CREAT SERPL-MCNC: 5.1 MG/DL — CRITICAL HIGH (ref 0.7–1.5)
EOSINOPHIL NFR BLD AUTO: 1.8 % — SIGNIFICANT CHANGE UP (ref 0–8)
ESTIMATED AVERAGE GLUCOSE: 197 MG/DL — HIGH (ref 68–114)
GLUCOSE SERPL-MCNC: 207 MG/DL — HIGH (ref 70–110)
HBA1C BLD-MCNC: 8.5 % — HIGH (ref 4–5.6)
HCT VFR BLD CALC: 21.4 % — LOW (ref 42–52)
HCT VFR BLD CALC: 22.9 % — LOW (ref 42–52)
HCT VFR BLD CALC: 22.9 % — LOW (ref 42–52)
HCT VFR BLD CALC: 28.5 % — LOW (ref 42–52)
HGB BLD-MCNC: 6 G/DL — CRITICAL LOW (ref 14–18)
HGB BLD-MCNC: 6.6 G/DL — CRITICAL LOW (ref 14–18)
HGB BLD-MCNC: 8.4 G/DL — LOW (ref 14–18)
LYMPHOCYTES # BLD AUTO: 0.69 K/UL — LOW (ref 1.2–3.4)
LYMPHOCYTES # BLD AUTO: 7.1 % — LOW (ref 20.5–51.1)
MAGNESIUM SERPL-MCNC: 2.2 MG/DL — SIGNIFICANT CHANGE UP (ref 1.8–2.4)
MCHC RBC-ENTMCNC: 23.2 PG — LOW (ref 27–31)
MCHC RBC-ENTMCNC: 23.3 PG — LOW (ref 27–31)
MCHC RBC-ENTMCNC: 23.8 PG — LOW (ref 27–31)
MCHC RBC-ENTMCNC: 24.3 PG — LOW (ref 27–31)
MCHC RBC-ENTMCNC: 27.9 G/DL — LOW (ref 32–37)
MCHC RBC-ENTMCNC: 28 G/DL — LOW (ref 32–37)
MCHC RBC-ENTMCNC: 28.8 G/DL — LOW (ref 32–37)
MCHC RBC-ENTMCNC: 29.5 G/DL — LOW (ref 32–37)
MCV RBC AUTO: 82.4 FL — SIGNIFICANT CHANGE UP (ref 80–94)
MCV RBC AUTO: 82.6 FL — SIGNIFICANT CHANGE UP (ref 80–94)
MCV RBC AUTO: 82.7 FL — SIGNIFICANT CHANGE UP (ref 80–94)
MCV RBC AUTO: 83.3 FL — SIGNIFICANT CHANGE UP (ref 80–94)
MONOCYTES NFR BLD AUTO: 3.6 % — SIGNIFICANT CHANGE UP (ref 1.7–9.3)
NEUTROPHILS # BLD AUTO: 8.27 K/UL — HIGH (ref 1.4–6.5)
NEUTROPHILS NFR BLD AUTO: 85.7 % — HIGH (ref 42.2–75.2)
NRBC # BLD: 0 /100 WBCS — SIGNIFICANT CHANGE UP (ref 0–0)
PHOSPHATE SERPL-MCNC: 4.2 MG/DL — SIGNIFICANT CHANGE UP (ref 2.1–4.9)
PLATELET # BLD AUTO: 356 K/UL — SIGNIFICANT CHANGE UP (ref 130–400)
PLATELET # BLD AUTO: 373 K/UL — SIGNIFICANT CHANGE UP (ref 130–400)
PLATELET # BLD AUTO: 394 K/UL — SIGNIFICANT CHANGE UP (ref 130–400)
PLATELET # BLD AUTO: 407 K/UL — HIGH (ref 130–400)
POTASSIUM SERPL-MCNC: 4.8 MMOL/L — SIGNIFICANT CHANGE UP (ref 3.5–5)
POTASSIUM SERPL-SCNC: 4.8 MMOL/L — SIGNIFICANT CHANGE UP (ref 3.5–5)
RBC # BLD: 2.59 M/UL — LOW (ref 4.7–6.1)
RBC # BLD: 2.75 M/UL — LOW (ref 4.7–6.1)
RBC # BLD: 2.77 M/UL — LOW (ref 4.7–6.1)
RBC # BLD: 3.46 M/UL — LOW (ref 4.7–6.1)
RBC # FLD: 16 % — HIGH (ref 11.5–14.5)
RBC # FLD: 16 % — HIGH (ref 11.5–14.5)
RBC # FLD: 16.1 % — HIGH (ref 11.5–14.5)
RBC # FLD: 16.2 % — HIGH (ref 11.5–14.5)
SODIUM SERPL-SCNC: 137 MMOL/L — SIGNIFICANT CHANGE UP (ref 135–146)
TROPONIN I SERPL-MCNC: 0.04 NG/ML — SIGNIFICANT CHANGE UP (ref 0–0.05)
TROPONIN I SERPL-MCNC: 0.05 NG/ML — SIGNIFICANT CHANGE UP (ref 0–0.05)
TROPONIN I SERPL-MCNC: 0.05 NG/ML — SIGNIFICANT CHANGE UP (ref 0–0.05)
VANCOMYCIN TROUGH SERPL-MCNC: 16.8 UG/ML — HIGH (ref 5–10)
WBC # BLD: 11 K/UL — HIGH (ref 4.8–10.8)
WBC # BLD: 11.15 K/UL — HIGH (ref 4.8–10.8)
WBC # BLD: 12.63 K/UL — HIGH (ref 4.8–10.8)
WBC # BLD: 9.65 K/UL — SIGNIFICANT CHANGE UP (ref 4.8–10.8)
WBC # FLD AUTO: 11 K/UL — HIGH (ref 4.8–10.8)
WBC # FLD AUTO: 11.15 K/UL — HIGH (ref 4.8–10.8)
WBC # FLD AUTO: 12.63 K/UL — HIGH (ref 4.8–10.8)
WBC # FLD AUTO: 9.65 K/UL — SIGNIFICANT CHANGE UP (ref 4.8–10.8)

## 2018-03-09 RX ORDER — METOPROLOL TARTRATE 50 MG
25 TABLET ORAL
Qty: 0 | Refills: 0 | Status: DISCONTINUED | OUTPATIENT
Start: 2018-03-09 | End: 2018-03-13

## 2018-03-09 RX ORDER — DEXTROSE 50 % IN WATER 50 %
1 SYRINGE (ML) INTRAVENOUS ONCE
Qty: 0 | Refills: 0 | Status: DISCONTINUED | OUTPATIENT
Start: 2018-03-09 | End: 2018-03-13

## 2018-03-09 RX ORDER — GLUCAGON INJECTION, SOLUTION 0.5 MG/.1ML
1 INJECTION, SOLUTION SUBCUTANEOUS ONCE
Qty: 0 | Refills: 0 | Status: DISCONTINUED | OUTPATIENT
Start: 2018-03-09 | End: 2018-03-13

## 2018-03-09 RX ORDER — MORPHINE SULFATE 50 MG/1
4 CAPSULE, EXTENDED RELEASE ORAL ONCE
Qty: 0 | Refills: 0 | Status: DISCONTINUED | OUTPATIENT
Start: 2018-03-09 | End: 2018-03-09

## 2018-03-09 RX ORDER — FUROSEMIDE 40 MG
40 TABLET ORAL EVERY 8 HOURS
Qty: 0 | Refills: 0 | Status: DISCONTINUED | OUTPATIENT
Start: 2018-03-09 | End: 2018-03-12

## 2018-03-09 RX ORDER — PIPERACILLIN AND TAZOBACTAM 4; .5 G/20ML; G/20ML
2.25 INJECTION, POWDER, LYOPHILIZED, FOR SOLUTION INTRAVENOUS EVERY 12 HOURS
Qty: 0 | Refills: 0 | Status: DISCONTINUED | OUTPATIENT
Start: 2018-03-09 | End: 2018-03-10

## 2018-03-09 RX ORDER — INSULIN LISPRO 100/ML
VIAL (ML) SUBCUTANEOUS EVERY 4 HOURS
Qty: 0 | Refills: 0 | Status: DISCONTINUED | OUTPATIENT
Start: 2018-03-09 | End: 2018-03-13

## 2018-03-09 RX ORDER — SODIUM CHLORIDE 9 MG/ML
1000 INJECTION, SOLUTION INTRAVENOUS
Qty: 0 | Refills: 0 | Status: DISCONTINUED | OUTPATIENT
Start: 2018-03-09 | End: 2018-03-13

## 2018-03-09 RX ORDER — HYDROMORPHONE HYDROCHLORIDE 2 MG/ML
0.5 INJECTION INTRAMUSCULAR; INTRAVENOUS; SUBCUTANEOUS ONCE
Qty: 0 | Refills: 0 | Status: DISCONTINUED | OUTPATIENT
Start: 2018-03-09 | End: 2018-03-09

## 2018-03-09 RX ORDER — ACETAMINOPHEN 500 MG
650 TABLET ORAL EVERY 6 HOURS
Qty: 0 | Refills: 0 | Status: COMPLETED | OUTPATIENT
Start: 2018-03-09 | End: 2018-03-12

## 2018-03-09 RX ORDER — IBUPROFEN 200 MG
400 TABLET ORAL EVERY 4 HOURS
Qty: 0 | Refills: 0 | Status: COMPLETED | OUTPATIENT
Start: 2018-03-09 | End: 2018-03-12

## 2018-03-09 RX ADMIN — HEPARIN SODIUM 5000 UNIT(S): 5000 INJECTION INTRAVENOUS; SUBCUTANEOUS at 05:29

## 2018-03-09 RX ADMIN — PIPERACILLIN AND TAZOBACTAM 25 GRAM(S): 4; .5 INJECTION, POWDER, LYOPHILIZED, FOR SOLUTION INTRAVENOUS at 08:00

## 2018-03-09 RX ADMIN — Medication 400 MILLIGRAM(S): at 23:57

## 2018-03-09 RX ADMIN — Medication 100 MILLIGRAM(S): at 21:08

## 2018-03-09 RX ADMIN — Medication 1 DROP(S): at 05:56

## 2018-03-09 RX ADMIN — MORPHINE SULFATE 2 MILLIGRAM(S): 50 CAPSULE, EXTENDED RELEASE ORAL at 19:26

## 2018-03-09 RX ADMIN — OXYCODONE AND ACETAMINOPHEN 2 TABLET(S): 5; 325 TABLET ORAL at 05:28

## 2018-03-09 RX ADMIN — Medication 40 MILLIGRAM(S): at 13:44

## 2018-03-09 RX ADMIN — Medication 100 MILLIGRAM(S): at 05:25

## 2018-03-09 RX ADMIN — Medication 400 MILLIGRAM(S): at 13:45

## 2018-03-09 RX ADMIN — SEVELAMER CARBONATE 800 MILLIGRAM(S): 2400 POWDER, FOR SUSPENSION ORAL at 18:05

## 2018-03-09 RX ADMIN — Medication 100 MILLIGRAM(S): at 19:26

## 2018-03-09 RX ADMIN — Medication 150 MILLIGRAM(S): at 06:22

## 2018-03-09 RX ADMIN — MORPHINE SULFATE 2 MILLIGRAM(S): 50 CAPSULE, EXTENDED RELEASE ORAL at 04:00

## 2018-03-09 RX ADMIN — MORPHINE SULFATE 2 MILLIGRAM(S): 50 CAPSULE, EXTENDED RELEASE ORAL at 04:15

## 2018-03-09 RX ADMIN — OXYCODONE AND ACETAMINOPHEN 2 TABLET(S): 5; 325 TABLET ORAL at 20:25

## 2018-03-09 RX ADMIN — MORPHINE SULFATE 4 MILLIGRAM(S): 50 CAPSULE, EXTENDED RELEASE ORAL at 10:20

## 2018-03-09 RX ADMIN — Medication 100 MILLIGRAM(S): at 05:26

## 2018-03-09 RX ADMIN — Medication 1 DROP(S): at 18:00

## 2018-03-09 RX ADMIN — MORPHINE SULFATE 4 MILLIGRAM(S): 50 CAPSULE, EXTENDED RELEASE ORAL at 10:00

## 2018-03-09 RX ADMIN — Medication 400 MILLIGRAM(S): at 21:08

## 2018-03-09 RX ADMIN — SEVELAMER CARBONATE 800 MILLIGRAM(S): 2400 POWDER, FOR SUSPENSION ORAL at 11:38

## 2018-03-09 RX ADMIN — Medication 2: at 21:22

## 2018-03-09 RX ADMIN — Medication 400 MILLIGRAM(S): at 18:01

## 2018-03-09 RX ADMIN — Medication 8: at 13:59

## 2018-03-09 RX ADMIN — OXYCODONE AND ACETAMINOPHEN 2 TABLET(S): 5; 325 TABLET ORAL at 05:30

## 2018-03-09 RX ADMIN — SIMVASTATIN 10 MILLIGRAM(S): 20 TABLET, FILM COATED ORAL at 01:33

## 2018-03-09 RX ADMIN — Medication 4: at 18:02

## 2018-03-09 RX ADMIN — SEVELAMER CARBONATE 800 MILLIGRAM(S): 2400 POWDER, FOR SUSPENSION ORAL at 07:52

## 2018-03-09 RX ADMIN — Medication 100 MILLIGRAM(S): at 05:30

## 2018-03-09 RX ADMIN — Medication 2: at 06:38

## 2018-03-09 RX ADMIN — OXYCODONE AND ACETAMINOPHEN 2 TABLET(S): 5; 325 TABLET ORAL at 19:30

## 2018-03-09 RX ADMIN — MORPHINE SULFATE 2 MILLIGRAM(S): 50 CAPSULE, EXTENDED RELEASE ORAL at 18:07

## 2018-03-09 RX ADMIN — Medication 10 MILLIGRAM(S): at 05:39

## 2018-03-09 RX ADMIN — Medication 8: at 00:55

## 2018-03-09 RX ADMIN — AMLODIPINE BESYLATE 5 MILLIGRAM(S): 2.5 TABLET ORAL at 05:29

## 2018-03-09 RX ADMIN — Medication 400 MILLIGRAM(S): at 14:42

## 2018-03-09 RX ADMIN — SIMVASTATIN 10 MILLIGRAM(S): 20 TABLET, FILM COATED ORAL at 21:08

## 2018-03-09 RX ADMIN — Medication 650 MILLIGRAM(S): at 12:00

## 2018-03-09 RX ADMIN — PIPERACILLIN AND TAZOBACTAM 200 GRAM(S): 4; .5 INJECTION, POWDER, LYOPHILIZED, FOR SOLUTION INTRAVENOUS at 18:02

## 2018-03-09 RX ADMIN — Medication 650 MILLIGRAM(S): at 20:47

## 2018-03-09 RX ADMIN — Medication 650 MILLIGRAM(S): at 18:01

## 2018-03-09 RX ADMIN — Medication 650 MILLIGRAM(S): at 11:38

## 2018-03-09 RX ADMIN — Medication 2: at 09:08

## 2018-03-09 RX ADMIN — Medication 25 MILLIGRAM(S): at 05:39

## 2018-03-09 RX ADMIN — Medication 400 MILLIGRAM(S): at 19:26

## 2018-03-09 NOTE — PROGRESS NOTE ADULT - ASSESSMENT
1. R foot necrotizing fasciitis. On vanco/Zosyn. Zosyn dose should be 2.25g every 12 hours.   2. ESRD on HD TTS. HD tomorrow: 3 hours, opti 160 dialyzer, 2K bath, 4L UF.  3. Anemia. Start EPO 10,000 units IV with HD.  4. Hyperphosphatemia. Renal diet. Sevelamer with meals.

## 2018-03-09 NOTE — PROGRESS NOTE ADULT - SUBJECTIVE AND OBJECTIVE BOX
PODIATRY PROGRESS NOTE   [id number] FL[place] HAMM is a pleasant well-nourished, well developed 53y Male   Pt seen bedside with Attending Dr. Damon Hinojosa.   Patient is being followed by Podiatry Service for management of: Left foot .    Vital Signs Last 24 Hrs  T(C): 36.8 (09 Mar 2018 04:09), Max: 38.4 (08 Mar 2018 17:50)  T(F): 98.3 (09 Mar 2018 04:09), Max: 101.2 (08 Mar 2018 17:50)  HR: 86 (09 Mar 2018 06:24) (78 - 116)  BP: 97/42 (09 Mar 2018 06:24) (86/40 - 167/70)  BP(mean): 55 (09 Mar 2018 06:24) (55 - 110)  RR: 10 (09 Mar 2018 06:24) (10 - 27)  SpO2: 99% (09 Mar 2018 06:24) (52% - 100%)                        6.4    11.15 )-----------( 356      ( 09 Mar 2018 02:21 )             22.9              03-08  137  |  100  |  38<H>  ----------------------------<  207<H>  4.8   |  27  |  5.1<HH>    Ca    8.0<L>      08 Mar 2018 23:37  Phos  4.2     03-08  Mg     2.2     03-08    TPro  5.9<L>  /  Alb  2.4<L>  /  TBili  0.7  /  DBili  x   /  AST  30  /  ALT  38  /  AlkPhos  211<H>  03-08    A:  Left foot bandage CDI     P:  Wound Care Orders: Betadine DSD kerlix  Continue with heel off  to left foot   Podiatry to follow up in q24h for continued evaluation and management.    Attending updated and added to note for review.   03-09-18 @ 07:27

## 2018-03-09 NOTE — PROGRESS NOTE ADULT - SUBJECTIVE AND OBJECTIVE BOX
FLORIDA ABARCAR  53y Male   [id number]    Hospital Day: 2  Post Operative Day: 1  Procedure: right BKA  Patient is a 53y old  Male who presents with a chief complaint of   PAST MEDICAL & SURGICAL HISTORY:  Blind  HTN (hypertension)  End stage renal disease  Diabetes      Events of the Last 24h: Pt s/p right BKA, admitted to icu, heme 6.4->getting 1U PRBCs  Vital Signs Last 24 Hrs  T(C): 36.8 (09 Mar 2018 04:09), Max: 38.4 (08 Mar 2018 17:50)  T(F): 98.3 (09 Mar 2018 04:09), Max: 101.2 (08 Mar 2018 17:50)  HR: 84 (09 Mar 2018 04:09) (78 - 116)  BP: 141/58 (09 Mar 2018 04:09) (86/40 - 165/71)  BP(mean): 100 (09 Mar 2018 04:09) (57 - 100)  RR: 15 (09 Mar 2018 04:09) (10 - 26)  SpO2: 100% (09 Mar 2018 04:09) (52% - 100%)        Diet, Consistent Carbohydrate Renal/No Snacks (18 @ 18:13)      I&O's Summary    08 Mar 2018 07:  -  09 Mar 2018 05:28  --------------------------------------------------------  IN: 620 mL / OUT: 1940 mL / NET: -1320 mL     I&O's Detail    08 Mar 2018 07:  -  09 Mar 2018 05:28  --------------------------------------------------------  IN:    IV PiggyBack: 100 mL    Oral Fluid: 210 mL    sodium chloride 0.9%: 60 mL    Sodium Chloride 0.9% IV Bolus: 250 mL  Total IN: 620 mL    OUT:    Indwelling Catheter - Urethral: 440 mL    Other: 1500 mL  Total OUT: 1940 mL    Total NET: -1320 mL        MEDICATIONS  (STANDING):  amLODIPine   Tablet 5 milliGRAM(s) Oral daily  artificial  tears Solution 1 Drop(s) Both EYES every 12 hours  clindamycin IVPB      clindamycin IVPB 600 milliGRAM(s) IV Intermittent every 8 hours  dextrose 5%. 1000 milliLiter(s) (50 mL/Hr) IV Continuous <Continuous>  dextrose 50% Injectable 25 Gram(s) IV Push once  docusate sodium 100 milliGRAM(s) Oral two times a day  enalapril 10 milliGRAM(s) Oral daily  epoetin keagan Injectable 49314 Unit(s) IV Push <User Schedule>  heparin  Injectable 5000 Unit(s) SubCutaneous every 8 hours  insulin lispro (HumaLOG) corrective regimen sliding scale   SubCutaneous every 4 hours  metolazone 5 milliGRAM(s) Oral daily  metoprolol     tartrate 25 milliGRAM(s) Oral two times a day  piperacillin/tazobactam IVPB. 4.5 Gram(s) IV Intermittent every 12 hours  pregabalin 100 milliGRAM(s) Oral two times a day  sevelamer hydrochloride 800 milliGRAM(s) Oral three times a day with meals  simvastatin 10 milliGRAM(s) Oral at bedtime  vancomycin  IVPB 750 milliGRAM(s) IV Intermittent every 48 hours    MEDICATIONS  (PRN):  dextrose Gel 1 Dose(s) Oral once PRN Blood Glucose LESS THAN 70 milliGRAM(s)/deciliter  glucagon  Injectable 1 milliGRAM(s) IntraMuscular once PRN Glucose LESS THAN 70 milligrams/deciliter  morphine  - Injectable 2 milliGRAM(s) IV Push every 4 hours PRN severe pain, breakthrough after oral meds given  oxyCODONE    5 mG/acetaminophen 325 mG 1 Tablet(s) Oral every 4 hours PRN Mild Pain (1 - 3)  oxyCODONE    5 mG/acetaminophen 325 mG 2 Tablet(s) Oral every 4 hours PRN Moderate Pain (4 - 6)      PHYSICAL EXAM:  GENERAL: distress due to pain  HEENT: NCAT  CHEST/LUNGS: CTAB  HEART: RRR,  No murmurs, rubs, or gallops  ABDOMEN: SNTND +BS  EXTREMITIES:  FROM, No clubbing, cyanosis, or edema, unable to assess pule due to pain  NEURO: No focal neurological deficits  SKIN: No rashes or lesions  INCISION/WOUNDS: RLE in knee immobilizer and wrapped in ACE bandage, bloody fluid noted on dejah under RLE, no signs of active bleeding.                           6.4    11.15 )-----------( 356      ( 09 Mar 2018 02:21 )             22.9        CBC Full  -  ( 09 Mar 2018 02:21 )  WBC Count : 11.15 K/uL  Hemoglobin : 6.4 g/dL  Hematocrit : 22.9 %  Platelet Count - Automated : 356 K/uL  Mean Cell Volume : 83.3 fL  Mean Cell Hemoglobin : 23.3 pg  Mean Cell Hemoglobin Concentration : 27.9 g/dL                 137   |  100   |  38                 Ca: 8.0    BMP:   ----------------------------< 207    M.2   (18 @ 23:37)             4.8    |  27    | 5.1                Ph: 4.2      LFT:     TPro: 5.9 / Alb: 2.4 / TBili: 0.7 / DBili: x / AST: 30 / ALT: 38 / AlkPhos: 211   (18 @ 10:51)    LIVER FUNCTIONS - ( 08 Mar 2018 10:51 )  Alb: 2.4 g/dL / Pro: 5.9 g/dL / ALK PHOS: 211 U/L / ALT: 38 U/L / AST: 30 U/L / GGT: x           PT/INR - ( 08 Mar 2018 10:51 )   PT: 14.30 sec;   INR: 1.32 ratio    PTT - ( 08 Mar 2018 10:51 )  PTT:32.2 sec  CARDIAC MARKERS ( 08 Mar 2018 23:37 )  0.05 ng/mL / x     / 69 U/L / x     / 2.5 ng/mL

## 2018-03-09 NOTE — PROGRESS NOTE ADULT - SUBJECTIVE AND OBJECTIVE BOX
[facility] FOLLOW UP NOTE  --------------------------------------------------------------------------------  24 hour events/subjective: Patient examined. Appears comfortable.    PAST HISTORY  --------------------------------------------------------------------------------  No significant changes to PMH, PSH, FHx, SHx, unless otherwise noted    ALLERGIES & MEDICATIONS  --------------------------------------------------------------------------------  Allergies    No Known Allergies    Standing Inpatient Medications  acetaminophen   Tablet. 650 milliGRAM(s) Oral every 6 hours  amLODIPine   Tablet 5 milliGRAM(s) Oral daily  artificial  tears Solution 1 Drop(s) Both EYES every 12 hours  clindamycin IVPB      clindamycin IVPB 600 milliGRAM(s) IV Intermittent every 8 hours  dextrose 5%. 1000 milliLiter(s) IV Continuous <Continuous>  dextrose 50% Injectable 25 Gram(s) IV Push once  docusate sodium 100 milliGRAM(s) Oral two times a day  enalapril 10 milliGRAM(s) Oral daily  epoetin keagan Injectable 15224 Unit(s) IV Push <User Schedule>  furosemide   Injectable 40 milliGRAM(s) IV Push every 8 hours  heparin  Injectable 5000 Unit(s) SubCutaneous every 8 hours  ibuprofen  Tablet 400 milliGRAM(s) Oral every 4 hours  insulin lispro (HumaLOG) corrective regimen sliding scale   SubCutaneous every 4 hours  metolazone 5 milliGRAM(s) Oral daily  metoprolol     tartrate 25 milliGRAM(s) Oral two times a day  piperacillin/tazobactam IVPB. 4.5 Gram(s) IV Intermittent every 12 hours  pregabalin 100 milliGRAM(s) Oral two times a day  sevelamer hydrochloride 800 milliGRAM(s) Oral three times a day with meals  simvastatin 10 milliGRAM(s) Oral at bedtime  vancomycin  IVPB 750 milliGRAM(s) IV Intermittent every 48 hours    PRN Inpatient Medications  dextrose Gel 1 Dose(s) Oral once PRN  glucagon  Injectable 1 milliGRAM(s) IntraMuscular once PRN  morphine  - Injectable 2 milliGRAM(s) IV Push every 4 hours PRN  oxyCODONE    5 mG/acetaminophen 325 mG 1 Tablet(s) Oral every 4 hours PRN  oxyCODONE    5 mG/acetaminophen 325 mG 2 Tablet(s) Oral every 4 hours PRN      VITALS/PHYSICAL EXAM  --------------------------------------------------------------------------------  T(C): 36.7 (03-09-18 @ 08:09), Max: 38.4 (03-08-18 @ 17:50)  HR: 74 (03-09-18 @ 10:30) (74 - 116)  BP: 109/58 (03-09-18 @ 10:30) (86/40 - 167/70)  RR: 12 (03-09-18 @ 10:30) (9 - 34)  SpO2: 100% (03-09-18 @ 10:30) (52% - 100%)  Height (cm): 184.15 (03-08-18 @ 16:00)  Weight (kg): 140.6 (03-08-18 @ 16:00)  BMI (kg/m2): 41.5 (03-08-18 @ 16:00)  BSA (m2): 2.58 (03-08-18 @ 16:00)    03-08-18 @ 07:01  -  03-09-18 @ 07:00  --------------------------------------------------------  IN: 820 mL / OUT: 2150 mL / NET: -1330 mL    03-09-18 @ 07:01  -  03-09-18 @ 13:15  --------------------------------------------------------  IN: 389 mL / OUT: 20 mL / NET: 369 mL    Physical Exam:  	Gen: NAD  	Pulm: CTA B/L  	CV: RRR, S1S2  	Abd: +BS, soft, nontender/nondistended  	: No suprapubic tenderness  	LE: Warm,  edema  	Vascular access: AVF    LABS/STUDIES  --------------------------------------------------------------------------------              6.6    11.00 >-----------<  394      [03-09-18 @ 11:18]              22.9     137  |  100  |  38  ----------------------------<  207      [03-08-18 @ 23:37]  4.8   |  27  |  5.1        Ca     8.0     [03-08-18 @ 23:37]      Mg     2.2     [03-08-18 @ 23:37]      Phos  4.2     [03-08-18 @ 23:37]    TPro  5.9  /  Alb  2.4  /  TBili  0.7  /  DBili  x   /  AST  30  /  ALT  38  /  AlkPhos  211  [03-08-18 @ 10:51]    PT/INR: PT 14.30, INR 1.32       [03-08-18 @ 10:51]  PTT: 32.2       [03-08-18 @ 10:51]    Troponin 0.04      [03-09-18 @ 11:18]  CK 45      [03-09-18 @ 11:18]

## 2018-03-09 NOTE — CHART NOTE - NSCHARTNOTEFT_GEN_A_CORE
53 male with hx HTN, DM,  ESRD on HD, chronic b/l LE cellulitis on post HD vanco,  followed by podiatry, p/w worsening b/l feet/ankle pain, R>L. On presentation in the ED, he was febrile, tachycardic, found to have rt ankle necrotizing fasciitis, septic arthritis/OM, and LUE soft tissue swelling. Pt was evaluated by vascular surgery and taken to OR for Rt yazan KRISHNAMURTHY. Prior to OR, received 1x Zosyn, Clinda. Intraop patient received vanco 1 gm. Extubated post surgery, OR time: 25mins    EBL: 150cc    UO: 250cc   IV Fluids: 500cc - Post op Hb6.1, 1UPRBC transfused. This AM while dressing was changed, a lot of oozing, repeat CBC Hb 6.6, also chronically anemic 2/2 ESRD, starting EPO during next HD, transfusing 2UPRBC. Dr. Graciela fuentes pt, no HD today. On Zosyn, Clinda, and post HD Vanc. VSS, ok for floor 53 male with hx HTN, DM,  ESRD on HD, chronic b/l LE cellulitis on post HD vanco,  followed by podiatry, p/w worsening b/l feet/ankle pain, R>L. On presentation in the ED, he was febrile, tachycardic, found to have rt ankle necrotizing fasciitis, septic arthritis/OM, and LUE soft tissue swelling. Pt was evaluated by vascular surgery and taken to OR for Rt yazan KRISHNAMURTHY. Prior to OR, received 1x Zosyn, Clinda. Intraop patient received vanco 1 gm. Extubated post surgery, OR time: 25mins    EBL: 150cc    UO: 250cc   IV Fluids: 500cc - Post op Hb6.1, 1UPRBC transfused. This AM while dressing was changed, a lot of oozing, repeat CBC Hb 6.6, also chronically anemic 2/2 ESRD, starting EPO during next HD, transfusing 2UPRBC. Dr. Graciela encarnacion-marcie pt, no HD today. Requesting Lasix to see if pt can make urine- Hunter d/c-d. On Zosyn, Clinda, and post HD Vanc. VSS, ok for floor, d/w Dr. Barney

## 2018-03-09 NOTE — PROGRESS NOTE ADULT - SUBJECTIVE AND OBJECTIVE BOX
FLORIDA HAMM  [id number]  53y Male    Indication for ICU admission: necrotizing fasciitis/ septic arthritis    Admit Date:03-08-18  ICU Date: 3/8/18  OR Date: 3/18/18    Allergies:   No Known Allergies    PAST MEDICAL & SURGICAL HISTORY:  Blind  HTN (hypertension)  End stage renal disease  Diabetes    Home Medications:  amLODIPine 5 mg oral tablet: 1 tab(s) orally once a day (27 Feb 2018 05:44)  Colace 100 mg oral capsule: 1 cap(s) orally 2 times a day (27 Feb 2018 05:44)  enalapril 10 mg oral tablet: 1 tab(s) orally once a day (27 Feb 2018 05:44)  furosemide 80 mg oral tablet: 1 tab(s) orally once a day (27 Feb 2018 05:44)  Lyrica 100 mg oral capsule: 1 cap(s) orally 2 times a day (27 Feb 2018 05:44)  metOLazone 5 mg oral tablet: 1 tab(s) orally once a day (27 Feb 2018 05:44)  Metoprolol Tartrate 25 mg oral tablet: 1 tab(s) orally 2 times a day (27 Feb 2018 05:44)  Renvela 800 mg oral tablet: 1 tab(s) orally 3 times a day (with meals) (27 Feb 2018 05:44)  simvastatin 10 mg oral tablet: 1 tab(s) orally once a day (at bedtime) (27 Feb 2018 05:44)        24HRS EVENT:  53 male with hx HTN, DM,  ESRD on HD, chronic b/l LE cellulitis on post HD vanco,  followed by podiatry, p/w worsening b/l feet/ankle pain, Rt more than the left. On presentation in the ED, he was febrile, tachycardic, found to have rt ankle necrotizing fasciitis, septic arthritis/OM, and LUE soft tissue swelling. Pt was evaluated by vascular surgery and taken to OR for Rt yazan KRISHNAMURTHY. Prior to OR, received 1x Zosyn, Clinda. Intraop patient received vanco 1 gm.   Pt was extubated post surgery,   Surgery Information  OR time: 25mins    EBL: 150cc    UO: 250cc   IV Fluids: 500cc     stable post op, singh inserted for retention       DVT PTX:     GI PTX:     Code Status:     ***Tubes/Lines/Drains  ***  Peripheral IV      Urinary Catheter	 		Indication: Strict I&O    Date Placed:     REVIEW OF SYSTEMS    [ ] A ten-point review of systems was otherwise negative except as noted.  [ ] Due to altered mental status/intubation, subjective information were not able to be obtained from the patient. History was obtained, to the extent possible, from review of the chart and collateral sources of information. FLOR[place] HAMM  [id number]  53y Male    Indication for ICU admission: necrotizing fasciitis/ septic arthritis    Admit Date:03-08-18  ICU Date: 3/8/18  OR Date: 3/18/18    Allergies:   No Known Allergies    PAST MEDICAL & SURGICAL HISTORY:  Blind  HTN (hypertension)  End stage renal disease  Diabetes    Home Medications:  amLODIPine 5 mg oral tablet: 1 tab(s) orally once a day (27 Feb 2018 05:44)  Colace 100 mg oral capsule: 1 cap(s) orally 2 times a day (27 Feb 2018 05:44)  enalapril 10 mg oral tablet: 1 tab(s) orally once a day (27 Feb 2018 05:44)  furosemide 80 mg oral tablet: 1 tab(s) orally once a day (27 Feb 2018 05:44)  Lyrica 100 mg oral capsule: 1 cap(s) orally 2 times a day (27 Feb 2018 05:44)  metOLazone 5 mg oral tablet: 1 tab(s) orally once a day (27 Feb 2018 05:44)  Metoprolol Tartrate 25 mg oral tablet: 1 tab(s) orally 2 times a day (27 Feb 2018 05:44)  Renvela 800 mg oral tablet: 1 tab(s) orally 3 times a day (with meals) (27 Feb 2018 05:44)  simvastatin 10 mg oral tablet: 1 tab(s) orally once a day (at bedtime) (27 Feb 2018 05:44)        24HRS EVENT:  53 male with hx HTN, DM,  ESRD on HD, chronic b/l LE cellulitis on post HD vanco,  followed by podiatry, p/w worsening b/l feet/ankle pain, Rt more than the left. On presentation in the ED, he was febrile, tachycardic, found to have rt ankle necrotizing fasciitis, septic arthritis/OM, and LUE soft tissue swelling. Pt was evaluated by vascular surgery and taken to OR for Rt yazan KRISHNAMURTHY. Prior to OR, received 1x Zosyn, Clinda. Intraop patient received vanco 1 gm.   Pt was extubated post surgery,   Surgery Information  OR time: 25mins    EBL: 150cc    UO: 250cc   IV Fluids: 500cc     stable post op, singh inserted for retention   post op hg 6 1 u prbc transfused    DVT PTX:     GI PTX:     Code Status:     ***Tubes/Lines/Drains  ***  Peripheral IV      Urinary Catheter	 		Indication: Strict I&O    Date Placed:     REVIEW OF SYSTEMS    [ ] A ten-point review of systems was otherwise negative except as noted.  [ ] Due to altered mental status/intubation, subjective information were not able to be obtained from the patient. History was obtained, to the extent possible, from review of the chart and collateral sources of information.    HD: 1d  Daily Height in cm: 195.58 (08 Mar 2018 15:38)    Daily     Diet, Consistent Carbohydrate Renal/No Snacks (03-08-18 @ 18:13)      CURRENT MEDS:  Neurologic Medications  morphine  - Injectable 2 milliGRAM(s) IV Push every 4 hours PRN severe pain, breakthrough after oral meds given  oxyCODONE    5 mG/acetaminophen 325 mG 1 Tablet(s) Oral every 4 hours PRN Mild Pain (1 - 3)  oxyCODONE    5 mG/acetaminophen 325 mG 2 Tablet(s) Oral every 4 hours PRN Moderate Pain (4 - 6)  pregabalin 100 milliGRAM(s) Oral two times a day    Respiratory Medications    Cardiovascular Medications  amLODIPine   Tablet 5 milliGRAM(s) Oral daily  enalapril 10 milliGRAM(s) Oral daily  metolazone 5 milliGRAM(s) Oral daily  metoprolol     tartrate 25 milliGRAM(s) Oral two times a day    Gastrointestinal Medications  dextrose 5%. 1000 milliLiter(s) IV Continuous <Continuous>  docusate sodium 100 milliGRAM(s) Oral two times a day    Genitourinary Medications    Hematologic/Oncologic Medications  epoetin keagan Injectable 03546 Unit(s) IV Push <User Schedule>  heparin  Injectable 5000 Unit(s) SubCutaneous every 8 hours    Antimicrobial/Immunologic Medications  clindamycin IVPB      clindamycin IVPB 600 milliGRAM(s) IV Intermittent every 8 hours  piperacillin/tazobactam IVPB. 4.5 Gram(s) IV Intermittent every 12 hours  vancomycin  IVPB 750 milliGRAM(s) IV Intermittent every 48 hours    Endocrine/Metabolic Medications  dextrose 50% Injectable 25 Gram(s) IV Push once  dextrose Gel 1 Dose(s) Oral once PRN Blood Glucose LESS THAN 70 milliGRAM(s)/deciliter  glucagon  Injectable 1 milliGRAM(s) IntraMuscular once PRN Glucose LESS THAN 70 milligrams/deciliter  insulin lispro (HumaLOG) corrective regimen sliding scale   SubCutaneous every 4 hours  simvastatin 10 milliGRAM(s) Oral at bedtime    Topical/Other Medications  artificial  tears Solution 1 Drop(s) Both EYES every 12 hours  sevelamer hydrochloride 800 milliGRAM(s) Oral three times a day with meals      ICU Vital Signs Last 24 Hrs  T(C): 36.8 (09 Mar 2018 04:09), Max: 38.4 (08 Mar 2018 17:50)  T(F): 98.3 (09 Mar 2018 04:09), Max: 101.2 (08 Mar 2018 17:50)  HR: 84 (09 Mar 2018 04:09) (78 - 116)  BP: 141/58 (09 Mar 2018 04:09) (86/40 - 165/71)  BP(mean): 100 (09 Mar 2018 04:09) (57 - 100)  ABP: --  ABP(mean): --  RR: 15 (09 Mar 2018 04:09) (10 - 26)  SpO2: 100% (09 Mar 2018 04:09) (52% - 100%)            I&O's Summary    08 Mar 2018 07:01  -  09 Mar 2018 05:06  --------------------------------------------------------  IN: 620 mL / OUT: 1940 mL / NET: -1320 mL      I&O's Detail    08 Mar 2018 07:01  -  09 Mar 2018 05:06  --------------------------------------------------------  IN:    IV PiggyBack: 100 mL    Oral Fluid: 210 mL    sodium chloride 0.9%: 60 mL    Sodium Chloride 0.9% IV Bolus: 250 mL  Total IN: 620 mL    OUT:    Indwelling Catheter - Urethral: 440 mL    Other: 1500 mL  Total OUT: 1940 mL    Total NET: -1320 mL          PHYSICAL EXAM:    General/Neuro  RASS:             GCS:    15  Deficits:                             alert & oriented x 3, no focal deficits    Lungs:      clear to auscultation, Normal expansion/effort.     Cardiovascular : S1, S2.  Regular rate and rhythm.    Cardiac Rhythm: Normal Sinus Rhythm    GI: Abdomen soft, Non-tender, Non-distended.      Extremities: rle wound clean dressing in ace wrap    Derm: Good skin turgor, no skin breakdown.      :       Singh catheter in place.      CXR:     LABS:    Labs:  CAPILLARY BLOOD GLUCOSE  195 (09 Mar 2018 01:39)  220 (09 Mar 2018 00:00)  280 (08 Mar 2018 20:00)                              6.4    11.15 )-----------( 356      ( 09 Mar 2018 02:21 )             22.9       Auto Neutrophil %: 85.7 % (03-08-18 @ 23:37)  Auto Neutrophil %: 78.2 % (03-08-18 @ 10:51)  Auto Immature Granulocyte %: 1.0 % (03-08-18 @ 10:51)    03-08    137  |  100  |  38<H>  ----------------------------<  207<H>  4.8   |  27  |  5.1<HH>      Calcium, Total Serum: 8.0 mg/dL (03-08-18 @ 23:37)  Magnesium, Serum: 2.2 mg/dL (03-08-18 @ 23:37)  Phosphorus Level, Serum: 4.2 mg/dL (03-08-18 @ 23:37)      LFTs:             5.9  | 0.7  | 30       ------------------[211     ( 08 Mar 2018 10:51 )  2.4  | x    | 38          Lipase:x      Amylase:x         Lactate, Blood: 2.1 mmol/L (03-08-18 @ 10:51)      Coags:     14.30  ----< 1.32    ( 08 Mar 2018 10:51 )     32.2        CARDIAC MARKERS ( 08 Mar 2018 23:37 )  0.05 ng/mL / x     / 69 U/L / x     / 2.5 ng/mL

## 2018-03-09 NOTE — PROGRESS NOTE ADULT - ASSESSMENT
Assessment and Plan: 52yo M s/p R open BKA for necrotizing fasciitis     Neurologic: AAO x 3. Motor 5/5. Sensory intact.   A/P: pain control percocet morphine    Respiratory: CTA B/L No wheeze, rales, rhochi.  A/P: is, pulm toleit    Cardiovascular: RRR. No murmurs, rubs, gallops.  A/P: home meds, hd monitering    Gastrointestinal/Nutrition: Soft NTND; bowel sounds equal.     Renal/Genitourinary: Singh in place: [x]yes []no  A/P:  ESRD on HD TRS: Nephro following- receiving post HD vanco  however oliguric, singh catheter reinserted as patient wanted to void    Hematologic:   A/P: Anemia 2/2 ESRD: Starting EPO during HD    Infectious Disease:   A/P:  clinda, zosyn, vanco post HD   Sepsis 2/2 RLE necrotizing fasciitis/osteomyelitis, s/p BKA- Cont abx, f/u cultures    Endocrine:   A/P: DM2: On ISS    MSK: 2+ peripheral pulses equal B/L.   A/P: RLE necrotizing fasciitis/OM s/p BKA- wound c/d/i    Lines/Tubes:  piv singh    Disposition: downgrade Assessment and Plan: 54yo M s/p R open BKA for necrotizing fasciitis     Neurologic: AAO x 3. Motor 5/5. Sensory intact.   A/P: pain control percocet morphine    Respiratory: CTA B/L No wheeze, rales, rhochi.  A/P: is, pulm toleit    Cardiovascular: RRR. No murmurs, rubs, gallops.  A/P: home meds, hd monitering    Gastrointestinal/Nutrition: Soft NTND; bowel sounds equal.     Renal/Genitourinary: Singh in place: [x]yes []no  A/P:  ESRD on HD TRS: Nephro following- receiving post HD vanco, hd interrupted for or will need to complete   however oliguric, singh catheter reinserted as patient wanted to void    Hematologic:   A/P: Anemia 2/2 ESRD: Starting EPO during HD    Infectious Disease:   A/P:  clinda, zosyn, vanco post HD   Sepsis 2/2 RLE necrotizing fasciitis/osteomyelitis, s/p BKA- Cont abx, f/u cultures    Endocrine:   A/P: DM2: On ISS    MSK: 2+ peripheral pulses equal B/L.   A/P: RLE necrotizing fasciitis/OM s/p BKA- wound c/d/i    Lines/Tubes:  piv singh    Disposition: downgrade Assessment and Plan: 54yo M s/p R open BKA for necrotizing fasciitis     Neurologic: AAO x 3. Motor 5/5. Sensory intact.   A/P: pain control percocet morphine    Respiratory: CTA B/L No wheeze, rales, rhochi.  A/P: is, pulm toleit    Cardiovascular: RRR. No murmurs, rubs, gallops.  A/P: home meds, hd monitering    Gastrointestinal/Nutrition: Soft NTND; bowel sounds equal.     Renal/Genitourinary: Singh in place: [x]yes []no  A/P:  ESRD on HD TRS: Nephro following- receiving post HD vanco, hd interrupted for or will need to complete   however oliguric, singh catheter reinserted as patient wanted to void    Hematologic:   A/P: Anemia 2/2 ESRD: Starting EPO during HD  acute anemia hg6 -> 1 u prbc    Infectious Disease:   A/P:  clinda, zosyn, vanco post HD   Sepsis 2/2 RLE necrotizing fasciitis/osteomyelitis, s/p BKA- Cont abx, f/u cultures    Endocrine:   A/P: DM2: On ISS    MSK: 2+ peripheral pulses equal B/L.   A/P: RLE necrotizing fasciitis/OM s/p BKA- wound c/d/i    Lines/Tubes:  piv singh    Disposition: downgrade

## 2018-03-09 NOTE — PROGRESS NOTE ADULT - ASSESSMENT
Pt is HD stable s/p procedure. Pt was found to have heme of 6.4 and will be transfused 1U PRBC this AM, will f/u H&H. Plan to continue ABX, knee immobilizer and bedrest. Will f/u w/ attending when revision will be done. Continue care per ICU team

## 2018-03-10 LAB
ANION GAP SERPL CALC-SCNC: 10 MMOL/L — SIGNIFICANT CHANGE UP (ref 7–14)
BUN SERPL-MCNC: 50 MG/DL — HIGH (ref 10–20)
CALCIUM SERPL-MCNC: 8.7 MG/DL — SIGNIFICANT CHANGE UP (ref 8.5–10.1)
CHLORIDE SERPL-SCNC: 104 MMOL/L — SIGNIFICANT CHANGE UP (ref 98–110)
CO2 SERPL-SCNC: 27 MMOL/L — SIGNIFICANT CHANGE UP (ref 17–32)
CREAT SERPL-MCNC: 6.4 MG/DL — CRITICAL HIGH (ref 0.7–1.5)
GLUCOSE SERPL-MCNC: 113 MG/DL — HIGH (ref 70–110)
HCT VFR BLD CALC: 29.3 % — LOW (ref 42–52)
HGB BLD-MCNC: 8.7 G/DL — LOW (ref 14–18)
MAGNESIUM SERPL-MCNC: 2.4 MG/DL — SIGNIFICANT CHANGE UP (ref 1.8–2.4)
MCHC RBC-ENTMCNC: 24.4 PG — LOW (ref 27–31)
MCHC RBC-ENTMCNC: 29.7 G/DL — LOW (ref 32–37)
MCV RBC AUTO: 82.1 FL — SIGNIFICANT CHANGE UP (ref 80–94)
NRBC # BLD: 0 /100 WBCS — SIGNIFICANT CHANGE UP (ref 0–0)
PHOSPHATE SERPL-MCNC: 6 MG/DL — HIGH (ref 2.1–4.9)
PLATELET # BLD AUTO: 433 K/UL — HIGH (ref 130–400)
POTASSIUM SERPL-MCNC: 5.5 MMOL/L — HIGH (ref 3.5–5)
POTASSIUM SERPL-SCNC: 5.5 MMOL/L — HIGH (ref 3.5–5)
RBC # BLD: 3.57 M/UL — LOW (ref 4.7–6.1)
RBC # FLD: 16 % — HIGH (ref 11.5–14.5)
SODIUM SERPL-SCNC: 141 MMOL/L — SIGNIFICANT CHANGE UP (ref 135–146)
VANCOMYCIN FLD-MCNC: 13.7 UG/ML — HIGH (ref 5–10)
WBC # BLD: 12.09 K/UL — HIGH (ref 4.8–10.8)
WBC # FLD AUTO: 12.09 K/UL — HIGH (ref 4.8–10.8)

## 2018-03-10 RX ORDER — MORPHINE SULFATE 50 MG/1
2 CAPSULE, EXTENDED RELEASE ORAL ONCE
Qty: 0 | Refills: 0 | Status: DISCONTINUED | OUTPATIENT
Start: 2018-03-10 | End: 2018-03-10

## 2018-03-10 RX ORDER — PIPERACILLIN AND TAZOBACTAM 4; .5 G/20ML; G/20ML
INJECTION, POWDER, LYOPHILIZED, FOR SOLUTION INTRAVENOUS
Qty: 0 | Refills: 0 | Status: DISCONTINUED | OUTPATIENT
Start: 2018-03-10 | End: 2018-03-13

## 2018-03-10 RX ORDER — PIPERACILLIN AND TAZOBACTAM 4; .5 G/20ML; G/20ML
2.25 INJECTION, POWDER, LYOPHILIZED, FOR SOLUTION INTRAVENOUS EVERY 6 HOURS
Qty: 0 | Refills: 0 | Status: DISCONTINUED | OUTPATIENT
Start: 2018-03-11 | End: 2018-03-13

## 2018-03-10 RX ORDER — PIPERACILLIN AND TAZOBACTAM 4; .5 G/20ML; G/20ML
2.25 INJECTION, POWDER, LYOPHILIZED, FOR SOLUTION INTRAVENOUS ONCE
Qty: 0 | Refills: 0 | Status: COMPLETED | OUTPATIENT
Start: 2018-03-10 | End: 2018-03-10

## 2018-03-10 RX ADMIN — Medication 400 MILLIGRAM(S): at 02:18

## 2018-03-10 RX ADMIN — MORPHINE SULFATE 2 MILLIGRAM(S): 50 CAPSULE, EXTENDED RELEASE ORAL at 02:22

## 2018-03-10 RX ADMIN — Medication 100 MILLIGRAM(S): at 10:49

## 2018-03-10 RX ADMIN — MORPHINE SULFATE 2 MILLIGRAM(S): 50 CAPSULE, EXTENDED RELEASE ORAL at 10:33

## 2018-03-10 RX ADMIN — PIPERACILLIN AND TAZOBACTAM 200 GRAM(S): 4; .5 INJECTION, POWDER, LYOPHILIZED, FOR SOLUTION INTRAVENOUS at 18:57

## 2018-03-10 RX ADMIN — Medication 25 MILLIGRAM(S): at 08:31

## 2018-03-10 RX ADMIN — Medication 100 MILLIGRAM(S): at 17:01

## 2018-03-10 RX ADMIN — Medication 100 MILLIGRAM(S): at 22:12

## 2018-03-10 RX ADMIN — Medication 25 MILLIGRAM(S): at 17:02

## 2018-03-10 RX ADMIN — PIPERACILLIN AND TAZOBACTAM 200 GRAM(S): 4; .5 INJECTION, POWDER, LYOPHILIZED, FOR SOLUTION INTRAVENOUS at 10:48

## 2018-03-10 RX ADMIN — Medication 650 MILLIGRAM(S): at 02:15

## 2018-03-10 RX ADMIN — MORPHINE SULFATE 2 MILLIGRAM(S): 50 CAPSULE, EXTENDED RELEASE ORAL at 20:51

## 2018-03-10 RX ADMIN — Medication 40 MILLIGRAM(S): at 22:06

## 2018-03-10 RX ADMIN — Medication 650 MILLIGRAM(S): at 17:09

## 2018-03-10 RX ADMIN — Medication 400 MILLIGRAM(S): at 03:01

## 2018-03-10 RX ADMIN — MORPHINE SULFATE 2 MILLIGRAM(S): 50 CAPSULE, EXTENDED RELEASE ORAL at 08:38

## 2018-03-10 RX ADMIN — MORPHINE SULFATE 2 MILLIGRAM(S): 50 CAPSULE, EXTENDED RELEASE ORAL at 03:01

## 2018-03-10 RX ADMIN — Medication 1 DROP(S): at 22:05

## 2018-03-10 RX ADMIN — SIMVASTATIN 10 MILLIGRAM(S): 20 TABLET, FILM COATED ORAL at 22:07

## 2018-03-10 RX ADMIN — Medication 40 MILLIGRAM(S): at 08:10

## 2018-03-10 RX ADMIN — Medication 650 MILLIGRAM(S): at 03:01

## 2018-03-10 RX ADMIN — MORPHINE SULFATE 2 MILLIGRAM(S): 50 CAPSULE, EXTENDED RELEASE ORAL at 17:09

## 2018-03-10 RX ADMIN — Medication 1 DROP(S): at 10:47

## 2018-03-10 RX ADMIN — Medication 400 MILLIGRAM(S): at 18:58

## 2018-03-10 RX ADMIN — Medication 4: at 22:08

## 2018-03-10 RX ADMIN — SEVELAMER CARBONATE 800 MILLIGRAM(S): 2400 POWDER, FOR SUSPENSION ORAL at 17:01

## 2018-03-10 RX ADMIN — ERYTHROPOIETIN 10000 UNIT(S): 10000 INJECTION, SOLUTION INTRAVENOUS; SUBCUTANEOUS at 14:28

## 2018-03-10 RX ADMIN — Medication 400 MILLIGRAM(S): at 10:48

## 2018-03-10 RX ADMIN — Medication 400 MILLIGRAM(S): at 22:05

## 2018-03-10 RX ADMIN — Medication 6: at 17:03

## 2018-03-10 NOTE — PROGRESS NOTE ADULT - ASSESSMENT
A/P  1. R foot necrotizing fasciitis. s/p Rt BKA  vascular on case.  2. ESRD on HD TTS.   HD today 3 hours, opti 160, UF 4 litres  3. Anemia.  EPO 10,000 units IV with HD.  4. Hyperphosphatemia. Renal diet. Sevelamer with meals.

## 2018-03-10 NOTE — PROGRESS NOTE ADULT - SUBJECTIVE AND OBJECTIVE BOX
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    PAST MEDICAL & SURGICAL HISTORY:  Blind  HTN (hypertension)  End stage renal disease  Diabetes    Vital Signs Last 24 Hrs  T(C): 36.5 (09 Mar 2018 22:31), Max: 36.8 (09 Mar 2018 12:00)  T(F): 97.7 (09 Mar 2018 22:31), Max: 98.2 (09 Mar 2018 12:00)  HR: 82 (09 Mar 2018 22:31) (70 - 92)  BP: 160/71 (09 Mar 2018 22:31) (79/46 - 160/71)  BP(mean): 70 (09 Mar 2018 21:00) (53 - 110)  RR: 20 (09 Mar 2018 22:31) (9 - 34)  SpO2: 98% (09 Mar 2018 21:00) (94% - 100%)    I&O's Detail    08 Mar 2018 07:01  -  09 Mar 2018 07:00  --------------------------------------------------------  IN:    Oral Fluid: 210 mL    sodium chloride 0.9%: 60 mL    Sodium Chloride 0.9% IV Bolus: 250 mL    Solution: 50 mL    Solution: 250 mL  Total IN: 820 mL    OUT:    Indwelling Catheter - Urethral: 650 mL    Other: 1500 mL  Total OUT: 2150 mL    Total NET: -1330 mL      09 Mar 2018 07:01  -  10 Mar 2018 05:02  --------------------------------------------------------  IN:    Packed Red Blood Cells: 789 mL    Solution: 100 mL  Total IN: 889 mL    OUT:    Indwelling Catheter - Urethral: 520 mL    Voided: 200 mL  Total OUT: 720 mL    Total NET: 169 mL               8.7    12.09 )-----------( 433      ( 03-10 @ 01:15 )             29.3                8.4    12.63 )-----------( 407      (  @ 20:44 )             28.5                6.6    11.00 )-----------( 394      (  @ 11:18 )             22.9                6.4    11.15 )-----------( 356      (  @ 02:21 )             22.9                6.0    9.65  )-----------( 373      (  @ 23:37 )             21.4                7.8    14.76 )-----------( 455      (  @ 10:51 )             27.1                  141   |  104   |  50                 Ca: 8.7    BMP:   ----------------------------< 113    M.4   (03-10-18 @ 01:15)             5.5    |  27    | 6.4                Ph: 6.0      LFT:     TPro: 5.9 / Alb: 2.4 / TBili: 0.7 / DBili: x / AST: 30 / ALT: 38 / AlkPhos: 211   (18 @ 10:51)      PT/INR - ( 08 Mar 2018 10:51 )   PT: 14.30 sec;   INR: 1.32 ratio       PTT - ( 08 Mar 2018 10:51 )  PTT:32.2 sec    CARDIAC MARKERS ( 09 Mar 2018 11:18 )  0.04 ng/mL / x     / 45 U/L / x     / 1.8 ng/mL  CARDIAC MARKERS ( 09 Mar 2018 04:05 )  0.05 ng/mL / x     / 64 U/L / x     / 2.4 ng/mL  CARDIAC MARKERS ( 08 Mar 2018 23:37 )  0.05 ng/mL / x     / 69 U/L / x     / 2.5 ng/mL    Culture - Blood (collected 08 Mar 2018 10:45)  Source: .Blood Blood  Preliminary Report (09 Mar 2018 16:02):    No growth to date.    MEDICATIONS  (STANDING):  acetaminophen   Tablet. 650 milliGRAM(s) Oral every 6 hours  amLODIPine   Tablet 5 milliGRAM(s) Oral daily  artificial  tears Solution 1 Drop(s) Both EYES every 12 hours  dextrose 5%. 1000 milliLiter(s) (50 mL/Hr) IV Continuous <Continuous>  dextrose 50% Injectable 25 Gram(s) IV Push once  docusate sodium 100 milliGRAM(s) Oral two times a day  enalapril 10 milliGRAM(s) Oral daily  epoetin keagan Injectable 22544 Unit(s) IV Push <User Schedule>  furosemide   Injectable 40 milliGRAM(s) IV Push every 8 hours  ibuprofen  Tablet 400 milliGRAM(s) Oral every 4 hours  insulin lispro (HumaLOG) corrective regimen sliding scale   SubCutaneous every 4 hours  metolazone 5 milliGRAM(s) Oral daily  metoprolol     tartrate 25 milliGRAM(s) Oral two times a day  piperacillin/tazobactam IVPB. 2.25 Gram(s) IV Intermittent every 12 hours  pregabalin 100 milliGRAM(s) Oral two times a day  sevelamer hydrochloride 800 milliGRAM(s) Oral three times a day with meals  simvastatin 10 milliGRAM(s) Oral at bedtime  vancomycin  IVPB 750 milliGRAM(s) IV Intermittent every 48 hours    MEDICATIONS  (PRN):  dextrose Gel 1 Dose(s) Oral once PRN Blood Glucose LESS THAN 70 milliGRAM(s)/deciliter  glucagon  Injectable 1 milliGRAM(s) IntraMuscular once PRN Glucose LESS THAN 70 milligrams/deciliter  morphine  - Injectable 2 milliGRAM(s) IV Push every 4 hours PRN severe pain, breakthrough after oral meds given  oxyCODONE    5 mG/acetaminophen 325 mG 1 Tablet(s) Oral every 4 hours PRN Mild Pain (1 - 3)  oxyCODONE    5 mG/acetaminophen 325 mG 2 Tablet(s) Oral every 4 hours PRN Moderate Pain (4 - 6)      Diet, Consistent Carbohydrate Renal/No Snacks (18 @ 18:13)      PHYSICAL EXAM:  General Appearance:NAD  Neck: Supple  Chest: Equal expansion bilaterally, equal breath sounds  CV: S1, S2, RRR  Abdomen: Soft, NT, ND  Extremities: RIGHT BKA, dressing on, dry  Neuro: A&Ox3

## 2018-03-10 NOTE — PROGRESS NOTE ADULT - SUBJECTIVE AND OBJECTIVE BOX
INTERVAL HPI/OVERNIGHT EVENTS:  Pt seen  and examined for follow up of ESRD and Rt ankle Nectrotizing fascitis s/p Rt BKA  doing ok, denies chest pain or sob  going for HD today        REVIEW OF SYSTEMS:  CONSTITUTIONAL: No fever, weight loss, or fatigue  EYES: No eye pain, visual disturbances, or discharge  ENMT:  No difficulty hearing, tinnitus, vertigo; No sinus or throat pain  NECK: No pain or stiffness  BREASTS: No pain, masses, or nipple discharge  RESPIRATORY: No cough, wheezing, chills or hemoptysis; No shortness of breath  CARDIOVASCULAR: No chest pain, palpitations, dizziness, or leg swelling  GASTROINTESTINAL: No abdominal or epigastric pain. No nausea, vomiting, or hematemesis; No diarrhea or constipation. No melena or hematochezia.  GENITOURINARY: No dysuria, frequency, hematuria, or incontinence  NEUROLOGICAL: No headaches, memory loss, loss of strength, numbness, or tremors  SKIN: No itching, burning, rashes, or lesions   LYMPH NODES: No enlarged glands  ENDOCRINE: No heat or cold intolerance; No hair loss  MUSCULOSKELETAL: Rt stump pain  PSYCHIATRIC: No depression, anxiety, mood swings, or difficulty sleeping  HEME/LYMPH: No easy bruising, or bleeding gums  ALLERY AND IMMUNOLOGIC: No hives or eczema    VITALS:  T(F): 96.6, Max: 98 (03-09-18 @ 20:00)  HR: 75  BP: 145/65  RR: 16  SpO2: 98%    PHYSICAL EXAM:  GENERAL: NAD,  HEAD:  Atraumatic, Normocephalic  EYES: EOMI, PERRLA, conjunctiva and sclera clear  ENMT: No tonsillar erythema, exudates, or enlargement; Moist mucous membranes, Good dentition, No lesions  NECK: Supple, No JVD, Normal thyroid  NERVOUS SYSTEM:  Alert & Oriented X3,   CHEST/LUNG: decrease breath sounds  HEART: Regular rate and rhythm; No murmurs, rubs, or gallops  ABDOMEN: Soft, Nontender, Nondistended; Bowel sounds present  EXTREMITIES:  rt BKA  LYMPH: No lymphadenopathy noted  SKIN: No rashes or lesions      LABS:    03-10    141  |  104  |  50<H>  ----------------------------<  113<H>  5.5<H>   |  27  |  6.4<HH>    Ca    8.7      10 Mar 2018 01:15  Phos  6.0     03-10  Mg     2.4     03-10                            8.7    12.09 )-----------( 433      ( 10 Mar 2018 01:15 )             29.3       Culture - Surgical Swab (collected 08 Mar 2018 21:11)  Source: .Surgical Swab WOUND  Preliminary Report (10 Mar 2018 11:24):    No growth    Culture - Surgical Swab (collected 08 Mar 2018 21:11)  Source: .Surgical Swab WOUND  Preliminary Report (10 Mar 2018 11:26):    No growth    Culture - Blood (collected 08 Mar 2018 10:45)  Source: .Blood Blood  Preliminary Report (09 Mar 2018 16:02):    No growth to date.          RADIOLOGY & ADDITIONAL TESTS:    Imaging Personally Reviewed:  [ ] YES  [ ] NO    MEDICATIONS:     MEDICATIONS  (STANDING):  acetaminophen   Tablet. 650 milliGRAM(s) Oral every 6 hours  amLODIPine   Tablet 5 milliGRAM(s) Oral daily  artificial  tears Solution 1 Drop(s) Both EYES every 12 hours  dextrose 5%. 1000 milliLiter(s) (50 mL/Hr) IV Continuous <Continuous>  dextrose 50% Injectable 25 Gram(s) IV Push once  docusate sodium 100 milliGRAM(s) Oral two times a day  enalapril 10 milliGRAM(s) Oral daily  epoetin keagan Injectable 65291 Unit(s) IV Push <User Schedule>  furosemide   Injectable 40 milliGRAM(s) IV Push every 8 hours  ibuprofen  Tablet 400 milliGRAM(s) Oral every 4 hours  insulin lispro (HumaLOG) corrective regimen sliding scale   SubCutaneous every 4 hours  metolazone 5 milliGRAM(s) Oral daily  metoprolol     tartrate 25 milliGRAM(s) Oral two times a day  piperacillin/tazobactam IVPB. 2.25 Gram(s) IV Intermittent every 12 hours  pregabalin 100 milliGRAM(s) Oral two times a day  sevelamer hydrochloride 800 milliGRAM(s) Oral three times a day with meals  simvastatin 10 milliGRAM(s) Oral at bedtime  vancomycin  IVPB 750 milliGRAM(s) IV Intermittent every 48 hours    MEDICATIONS  (PRN):  dextrose Gel 1 Dose(s) Oral once PRN Blood Glucose LESS THAN 70 milliGRAM(s)/deciliter  glucagon  Injectable 1 milliGRAM(s) IntraMuscular once PRN Glucose LESS THAN 70 milligrams/deciliter  morphine  - Injectable 2 milliGRAM(s) IV Push every 4 hours PRN severe pain, breakthrough after oral meds given  oxyCODONE    5 mG/acetaminophen 325 mG 1 Tablet(s) Oral every 4 hours PRN Mild Pain (1 - 3)  oxyCODONE    5 mG/acetaminophen 325 mG 2 Tablet(s) Oral every 4 hours PRN Moderate Pain (4 - 6)

## 2018-03-10 NOTE — CONSULT NOTE ADULT - SUBJECTIVE AND OBJECTIVE BOX
53 male with extensive past hx including HTN, DM,  ESRD on HD presents with worsening bilateral  leg pain but much worse in right leg. He also had fevers and imaging revealed necrotizing fasciitis and septic arthritis. He has /o MRSA. He was seen in dialysis unit. The patient is currently s/p Right BKA.      Surgery Information  OR time: 25mins    EBL: 150cc    UO: 250cc   IV Fluids: 500cc     PAST MEDICAL & SURGICAL HISTORY:  Blind  HTN (hypertension)  End stage renal disease  Diabetes    EXAM: Sleepy, at HD, Right BKA (surgical dressing), abd soft, no wheezing.   Allergies: No Known Allergies    Home Medications:  amlodipine 5 mg oral tablet: 1 tab(s) orally once a day   Colace 100 mg oral capsule: 1 cap(s) orally 2 times a day   enalapril 10 mg oral tablet: 1 tab(s) orally once a day  furosemide 80 mg oral tablet: 1 tab(s) orally once a day  Lyrica 100 mg oral capsule: 1 cap(s) orally 2 times a day   metolazone 5 mg oral tablet: 1 tab(s) orally once a day  Metoprolol Tartrate 25 mg oral tablet: 1 tab(s) orally 2 times a day  Renvela 800 mg oral tablet: 1 tab(s) orally 3 times a day (with meals)  simvastatin 10 mg oral tablet: 1 tab(s) orally once a day (at bedtime)    Advanced Directives: Full Code   Vital Signs Last 24 Hrs  T(C): 35.9 (10 Mar 2018 06:02), Max: 36.7 (09 Mar 2018 20:00)  T(F): 96.6 (10 Mar 2018 06:02), Max: 98 (09 Mar 2018 20:00)  HR: 74 (10 Mar 2018 14:30) (70 - 86)  BP: 120/63 (10 Mar 2018 14:30) (108/48 - 190/80)  BP(mean): 70 (09 Mar 2018 21:00) (58 - 72)  RR: 16 (10 Mar 2018 11:30) (11 - 20)  SpO2: 98% (09 Mar 2018 21:00) (96% - 98%)    MEDS:   acetaminophen   Tablet. 650 milliGRAM(s) Oral every 6 hours  amLODIPine   Tablet 5 milliGRAM(s) Oral daily  artificial  tears Solution 1 Drop(s) Both EYES every 12 hours  dextrose 5%. 1000 milliLiter(s) IV Continuous <Continuous>  dextrose 50% Injectable 25 Gram(s) IV Push once  dextrose Gel 1 Dose(s) Oral once PRN  docusate sodium 100 milliGRAM(s) Oral two times a day  enalapril 10 milliGRAM(s) Oral daily  epoetin keagan Injectable 54760 Unit(s) IV Push <User Schedule>  furosemide   Injectable 40 milliGRAM(s) IV Push every 8 hours  glucagon  Injectable 1 milliGRAM(s) IntraMuscular once PRN  ibuprofen  Tablet 400 milliGRAM(s) Oral every 4 hours  insulin lispro (HumaLOG) corrective regimen sliding scale   SubCutaneous every 4 hours  metolazone 5 milliGRAM(s) Oral daily  metoprolol     tartrate 25 milliGRAM(s) Oral two times a day  morphine  - Injectable 2 milliGRAM(s) IV Push every 4 hours PRN  oxyCODONE    5 mG/acetaminophen 325 mG 1 Tablet(s) Oral every 4 hours PRN  oxyCODONE    5 mG/acetaminophen 325 mG 2 Tablet(s) Oral every 4 hours PRN  piperacillin/tazobactam IVPB.      pregabalin 100 milliGRAM(s) Oral two times a day  sevelamer hydrochloride 800 milliGRAM(s) Oral three times a day with meals  simvastatin 10 milliGRAM(s) Oral at bedtime  vancomycin  IVPB 750 milliGRAM(s) IV Intermittent every 48 hours    LABS:                        8.7    12.09 )-----------( 433      ( 10 Mar 2018 01:15 )             29.3     03-10    141  |  104  |  50<H>  ----------------------------<  113<H>  5.5<H>   |  27  |  6.4<HH>    Complete Blood Count in AM (03.10.18 @ 01:15)    Nucleated RBC: 0 /100 WBCs    WBC Count: 12.09 K/uL    RBC Count: 3.57 M/uL    Hemoglobin: 8.7 g/dL    Hematocrit: 29.3 %    Mean Cell Volume: 82.1 fL    Mean Cell Hemoglobin: 24.4 pg    Mean Cell Hemoglobin Conc: 29.7 g/dL    Red Cell Distrib Width: 16.0 %    Platelet Count - Automated: 433 K/uL    Creatinine, Serum: 6.4 mg/dL (03.10.18 @ 01:15)  Culture - Surgical Swab (03.08.18 @ 21:11)    Specimen Source: .Surgical Swab WOUND    Culture Results:   No growth    Culture - Surgical Swab (03.08.18 @ 21:11)    Specimen Source: .Surgical Swab WOUND    Culture Results:   No growth    Culture - Blood (03.08.18 @ 10:45)    Specimen Source: .Blood Blood    Culture Results:   No growth to date.    Culture - Blood (02.27.18 @ 06:45)    Specimen Source: .Blood Blood-Peripheral    Culture Results:   No growth at 5 days.  CXR- No focal pulmonary consolidation.  CT right leg;  Extensive subcutaneous emphysema surrounding the midfoot and ankle,   extending proximally to the distal tibia/fibula, approximately 4.5 cm   proximal to the ankle joint consistent with necrotizing fasciitis.   2. Ulcer overlying the lateral hindfoot with evidence of septic   arthritis/osteomyelitis throughout the midfoot and hindfoot as above  3. Severe neuropathic arthropathy.

## 2018-03-10 NOTE — PROGRESS NOTE ADULT - ASSESSMENT
Doing well. Postop hgb 8.7; had to be transfused 3u prbc because of low hgb postop and oozing from amputation site. Dressing change today.

## 2018-03-11 LAB
ANION GAP SERPL CALC-SCNC: 10 MMOL/L — SIGNIFICANT CHANGE UP (ref 7–14)
BUN SERPL-MCNC: 30 MG/DL — HIGH (ref 10–20)
CALCIUM SERPL-MCNC: 8.4 MG/DL — LOW (ref 8.5–10.1)
CHLORIDE SERPL-SCNC: 101 MMOL/L — SIGNIFICANT CHANGE UP (ref 98–110)
CO2 SERPL-SCNC: 31 MMOL/L — SIGNIFICANT CHANGE UP (ref 17–32)
CREAT SERPL-MCNC: 5 MG/DL — CRITICAL HIGH (ref 0.7–1.5)
GLUCOSE SERPL-MCNC: 182 MG/DL — HIGH (ref 70–110)
HCT VFR BLD CALC: 33 % — LOW (ref 42–52)
HGB BLD-MCNC: 9.7 G/DL — LOW (ref 14–18)
MAGNESIUM SERPL-MCNC: 2.3 MG/DL — SIGNIFICANT CHANGE UP (ref 1.8–2.4)
MCHC RBC-ENTMCNC: 24.2 PG — LOW (ref 27–31)
MCHC RBC-ENTMCNC: 29.4 G/DL — LOW (ref 32–37)
MCV RBC AUTO: 82.3 FL — SIGNIFICANT CHANGE UP (ref 80–94)
NRBC # BLD: 0 /100 WBCS — SIGNIFICANT CHANGE UP (ref 0–0)
PHOSPHATE SERPL-MCNC: 5.2 MG/DL — HIGH (ref 2.1–4.9)
PLATELET # BLD AUTO: 444 K/UL — HIGH (ref 130–400)
POTASSIUM SERPL-MCNC: 4.8 MMOL/L — SIGNIFICANT CHANGE UP (ref 3.5–5)
POTASSIUM SERPL-SCNC: 4.8 MMOL/L — SIGNIFICANT CHANGE UP (ref 3.5–5)
RBC # BLD: 4.01 M/UL — LOW (ref 4.7–6.1)
RBC # FLD: 16.3 % — HIGH (ref 11.5–14.5)
SODIUM SERPL-SCNC: 142 MMOL/L — SIGNIFICANT CHANGE UP (ref 135–146)
WBC # BLD: 12.42 K/UL — HIGH (ref 4.8–10.8)
WBC # FLD AUTO: 12.42 K/UL — HIGH (ref 4.8–10.8)

## 2018-03-11 RX ADMIN — SEVELAMER CARBONATE 800 MILLIGRAM(S): 2400 POWDER, FOR SUSPENSION ORAL at 09:21

## 2018-03-11 RX ADMIN — MORPHINE SULFATE 2 MILLIGRAM(S): 50 CAPSULE, EXTENDED RELEASE ORAL at 21:53

## 2018-03-11 RX ADMIN — Medication 400 MILLIGRAM(S): at 17:34

## 2018-03-11 RX ADMIN — Medication 100 MILLIGRAM(S): at 17:35

## 2018-03-11 RX ADMIN — Medication 40 MILLIGRAM(S): at 21:55

## 2018-03-11 RX ADMIN — PIPERACILLIN AND TAZOBACTAM 200 GRAM(S): 4; .5 INJECTION, POWDER, LYOPHILIZED, FOR SOLUTION INTRAVENOUS at 17:37

## 2018-03-11 RX ADMIN — Medication 650 MILLIGRAM(S): at 17:35

## 2018-03-11 RX ADMIN — Medication 400 MILLIGRAM(S): at 01:15

## 2018-03-11 RX ADMIN — MORPHINE SULFATE 2 MILLIGRAM(S): 50 CAPSULE, EXTENDED RELEASE ORAL at 23:03

## 2018-03-11 RX ADMIN — Medication 650 MILLIGRAM(S): at 05:00

## 2018-03-11 RX ADMIN — Medication 10: at 21:59

## 2018-03-11 RX ADMIN — Medication 25 MILLIGRAM(S): at 17:34

## 2018-03-11 RX ADMIN — Medication 400 MILLIGRAM(S): at 14:38

## 2018-03-11 RX ADMIN — SIMVASTATIN 10 MILLIGRAM(S): 20 TABLET, FILM COATED ORAL at 21:54

## 2018-03-11 RX ADMIN — Medication 1 DROP(S): at 17:36

## 2018-03-11 RX ADMIN — Medication 4: at 12:06

## 2018-03-11 RX ADMIN — PIPERACILLIN AND TAZOBACTAM 200 GRAM(S): 4; .5 INJECTION, POWDER, LYOPHILIZED, FOR SOLUTION INTRAVENOUS at 01:14

## 2018-03-11 RX ADMIN — Medication 100 MILLIGRAM(S): at 17:34

## 2018-03-11 RX ADMIN — Medication 400 MILLIGRAM(S): at 21:54

## 2018-03-11 RX ADMIN — Medication 400 MILLIGRAM(S): at 18:31

## 2018-03-11 RX ADMIN — Medication 10 MILLIGRAM(S): at 04:59

## 2018-03-11 RX ADMIN — Medication 25 MILLIGRAM(S): at 04:59

## 2018-03-11 RX ADMIN — Medication 1 DROP(S): at 04:58

## 2018-03-11 RX ADMIN — SEVELAMER CARBONATE 800 MILLIGRAM(S): 2400 POWDER, FOR SUSPENSION ORAL at 17:34

## 2018-03-11 RX ADMIN — Medication 40 MILLIGRAM(S): at 14:38

## 2018-03-11 RX ADMIN — Medication 4: at 09:20

## 2018-03-11 RX ADMIN — PIPERACILLIN AND TAZOBACTAM 200 GRAM(S): 4; .5 INJECTION, POWDER, LYOPHILIZED, FOR SOLUTION INTRAVENOUS at 04:57

## 2018-03-11 RX ADMIN — Medication 650 MILLIGRAM(S): at 01:14

## 2018-03-11 RX ADMIN — Medication 400 MILLIGRAM(S): at 09:23

## 2018-03-11 RX ADMIN — Medication 40 MILLIGRAM(S): at 04:59

## 2018-03-11 RX ADMIN — PIPERACILLIN AND TAZOBACTAM 200 GRAM(S): 4; .5 INJECTION, POWDER, LYOPHILIZED, FOR SOLUTION INTRAVENOUS at 12:07

## 2018-03-11 RX ADMIN — Medication 100 MILLIGRAM(S): at 05:00

## 2018-03-11 RX ADMIN — SEVELAMER CARBONATE 800 MILLIGRAM(S): 2400 POWDER, FOR SUSPENSION ORAL at 12:07

## 2018-03-11 RX ADMIN — Medication 650 MILLIGRAM(S): at 12:07

## 2018-03-11 RX ADMIN — Medication 6: at 17:35

## 2018-03-11 RX ADMIN — AMLODIPINE BESYLATE 5 MILLIGRAM(S): 2.5 TABLET ORAL at 04:59

## 2018-03-11 RX ADMIN — Medication 650 MILLIGRAM(S): at 18:31

## 2018-03-11 RX ADMIN — Medication 400 MILLIGRAM(S): at 05:02

## 2018-03-11 RX ADMIN — Medication 100 MILLIGRAM(S): at 05:06

## 2018-03-11 RX ADMIN — Medication 400 MILLIGRAM(S): at 23:03

## 2018-03-11 RX ADMIN — Medication 150 MILLIGRAM(S): at 04:57

## 2018-03-11 NOTE — PROGRESS NOTE ADULT - ASSESSMENT
A/P  1. Rt  foot necrotizing fasciitis/septic arthritis/cellulitis .   s/p Rt BKA  vascular on case.  2. ESRD on HD TTS.   s/p HD yesterday  no need for HD today  next HD tuesday  3. Anemia.  EPO 10,000 units IV with HD.  4. Hyperphosphatemia. Renal diet. Sevelamer with meals.  Discussed with vascular team

## 2018-03-11 NOTE — PROGRESS NOTE ADULT - SUBJECTIVE AND OBJECTIVE BOX
INTERVAL HPI/OVERNIGHT EVENTS:  Pt seen  and examined for follow up of ESRD and Rt ankle Nectrotizing fascitis s/p Rt BKA  doing ok, denies chest pain or sob  s/p HD yesterday  c/o mild pain at the stump site      REVIEW OF SYSTEMS:  CONSTITUTIONAL:  fatigue  EYES: No eye pain, visual disturbances, or discharge  ENMT:  No difficulty hearing, tinnitus, vertigo; No sinus or throat pain  NECK: No pain or stiffness  BREASTS: No pain, masses, or nipple discharge  RESPIRATORY: No cough, wheezing, chills or hemoptysis; No shortness of breath  CARDIOVASCULAR: No chest pain, palpitations, dizziness, or leg swelling  GASTROINTESTINAL: No abdominal or epigastric pain. No nausea, vomiting, or hematemesis; No diarrhea or constipation. No melena or hematochezia.  GENITOURINARY: No dysuria, frequency, hematuria, or incontinence  NEUROLOGICAL: No headaches, memory loss, loss of strength, numbness, or tremors  SKIN: No itching, burning, rashes, or lesions   LYMPH NODES: No enlarged glands  ENDOCRINE: No heat or cold intolerance; No hair loss  MUSCULOSKELETAL:pain at the rt BKA stump site  PSYCHIATRIC: No depression, anxiety, mood swings, or difficulty sleeping  HEME/LYMPH: No easy bruising, or bleeding gums  ALLERY AND IMMUNOLOGIC: No hives or eczema    VITALS:  T(F): 97.5, Max: 97.6 (03-10-18 @ 22:17)  HR: 76  BP: 164/74  RR: 19  SpO2: 97%    PHYSICAL EXAM:  GENERAL: NAD, well-groomed, well-developed  HEAD:  Atraumatic, Normocephalic  EYES: EOMI, PERRLA, conjunctiva and sclera clear  ENMT: No tonsillar erythema, exudates, or enlargement; Moist mucous membranes, Good dentition, No lesions  NECK: Supple, No JVD, Normal thyroid  NERVOUS SYSTEM:  Alert & Oriented X3, Good concentration; Motor Strength 5/5 B/L upper and lower extremities; DTRs 2+ intact and symmetric  CHEST/LUNG: decrease breath sounds  ABDOMEN: Soft, Nontender, Nondistended; Bowel sounds present  EXTREMITIES:  Rt BKA  : No lymphadenopathy noted  SKIN: No rashes or lesions      LABS:    03-11    142  |  101  |  30<H>  ----------------------------<  182<H>  4.8   |  31  |  5.0<HH>    Ca    8.4<L>      11 Mar 2018 00:55  Phos  5.2     03-11  Mg     2.3     03-11                            9.7    12.42 )-----------( 444      ( 11 Mar 2018 00:55 )             33.0       Culture - Surgical Swab (collected 08 Mar 2018 21:11)  Source: .Surgical Swab WOUND  Preliminary Report (10 Mar 2018 11:24):    No growth    Culture - Surgical Swab (collected 08 Mar 2018 21:11)  Source: .Surgical Swab WOUND  Preliminary Report (10 Mar 2018 11:26):    No growth          RADIOLOGY & ADDITIONAL TESTS:    Imaging Personally Reviewed:  [ ] YES  [ ] NO    MEDICATIONS:     MEDICATIONS  (STANDING):  acetaminophen   Tablet. 650 milliGRAM(s) Oral every 6 hours  amLODIPine   Tablet 5 milliGRAM(s) Oral daily  artificial  tears Solution 1 Drop(s) Both EYES every 12 hours  dextrose 5%. 1000 milliLiter(s) (50 mL/Hr) IV Continuous <Continuous>  dextrose 50% Injectable 25 Gram(s) IV Push once  docusate sodium 100 milliGRAM(s) Oral two times a day  enalapril 10 milliGRAM(s) Oral daily  epoetin keagan Injectable 64452 Unit(s) IV Push <User Schedule>  furosemide   Injectable 40 milliGRAM(s) IV Push every 8 hours  ibuprofen  Tablet 400 milliGRAM(s) Oral every 4 hours  insulin lispro (HumaLOG) corrective regimen sliding scale   SubCutaneous every 4 hours  metolazone 5 milliGRAM(s) Oral daily  metoprolol     tartrate 25 milliGRAM(s) Oral two times a day  piperacillin/tazobactam IVPB.      piperacillin/tazobactam IVPB. 2.25 Gram(s) IV Intermittent every 6 hours  pregabalin 100 milliGRAM(s) Oral two times a day  sevelamer hydrochloride 800 milliGRAM(s) Oral three times a day with meals  simvastatin 10 milliGRAM(s) Oral at bedtime  vancomycin  IVPB 750 milliGRAM(s) IV Intermittent every 48 hours    MEDICATIONS  (PRN):  dextrose Gel 1 Dose(s) Oral once PRN Blood Glucose LESS THAN 70 milliGRAM(s)/deciliter  glucagon  Injectable 1 milliGRAM(s) IntraMuscular once PRN Glucose LESS THAN 70 milligrams/deciliter  morphine  - Injectable 2 milliGRAM(s) IV Push every 4 hours PRN severe pain, breakthrough after oral meds given  oxyCODONE    5 mG/acetaminophen 325 mG 1 Tablet(s) Oral every 4 hours PRN Mild Pain (1 - 3)  oxyCODONE    5 mG/acetaminophen 325 mG 2 Tablet(s) Oral every 4 hours PRN Moderate Pain (4 - 6)

## 2018-03-11 NOTE — PROGRESS NOTE ADULT - SUBJECTIVE AND OBJECTIVE BOX
VASCULAR SURGERY PROGRESS NOTE    Hospital Day #3d  Post-Op Day # 4    Procedure/Dx: s/p right BKA    Events of past 24 hours:    Patient went for HD 3/10, reports "not feeling well", pt c/o vomiting 1/2 his food lunch/dinner. Dressing was changed, stump looks healthy, pink. Negative for fever/chills/sob/cp  Diet:    I&O's Detail    09 Mar 2018 07:01  -  10 Mar 2018 07:00  --------------------------------------------------------  IN:    Packed Red Blood Cells: 789 mL    Solution: 100 mL  Total IN: 889 mL    OUT:    Indwelling Catheter - Urethral: 520 mL    Voided: 200 mL  Total OUT: 720 mL    Total NET: 169 mL      10 Mar 2018 06:01  -  11 Mar 2018 06:01  --------------------------------------------------------  IN:    Solution: 200 mL    Solution: 150 mL  Total IN: 350 mL    OUT:    Other: 4000 mL  Total OUT: 4000 mL    Total NET: -3650 mL          PHYSICAL EXAM    Vital Signs Last 24 Hrs  T(C): 36.4 (11 Mar 2018 05:37), Max: 36.4 (10 Mar 2018 22:17)  T(F): 97.5 (11 Mar 2018 05:37), Max: 97.6 (10 Mar 2018 22:17)  HR: 76 (11 Mar 2018 05:17) (74 - 86)  BP: 164/74 (11 Mar 2018 05:17) (120/63 - 190/80)  BP(mean): --  RR: 19 (11 Mar 2018 05:17) (16 - 19)  SpO2: 97% (11 Mar 2018 05:17) (97% - 97%)    Gen: NAD, AAOx3  CV: S1 S2 RRR  Lungs: CTABL  Abd: +BS SOFT NT ND  Incision: right open bka  Ext: NO EDEMA NON TENDER    MEDICATIONS  (STANDING):  acetaminophen   Tablet. 650 milliGRAM(s) Oral every 6 hours  amLODIPine   Tablet 5 milliGRAM(s) Oral daily  artificial  tears Solution 1 Drop(s) Both EYES every 12 hours  dextrose 5%. 1000 milliLiter(s) (50 mL/Hr) IV Continuous <Continuous>  dextrose 50% Injectable 25 Gram(s) IV Push once  docusate sodium 100 milliGRAM(s) Oral two times a day  enalapril 10 milliGRAM(s) Oral daily  epoetin keagan Injectable 49846 Unit(s) IV Push <User Schedule>  furosemide   Injectable 40 milliGRAM(s) IV Push every 8 hours  ibuprofen  Tablet 400 milliGRAM(s) Oral every 4 hours  insulin lispro (HumaLOG) corrective regimen sliding scale   SubCutaneous every 4 hours  metolazone 5 milliGRAM(s) Oral daily  metoprolol     tartrate 25 milliGRAM(s) Oral two times a day  piperacillin/tazobactam IVPB.      piperacillin/tazobactam IVPB. 2.25 Gram(s) IV Intermittent every 6 hours  pregabalin 100 milliGRAM(s) Oral two times a day  sevelamer hydrochloride 800 milliGRAM(s) Oral three times a day with meals  simvastatin 10 milliGRAM(s) Oral at bedtime  vancomycin  IVPB 750 milliGRAM(s) IV Intermittent every 48 hours    MEDICATIONS  (PRN):  dextrose Gel 1 Dose(s) Oral once PRN Blood Glucose LESS THAN 70 milliGRAM(s)/deciliter  glucagon  Injectable 1 milliGRAM(s) IntraMuscular once PRN Glucose LESS THAN 70 milligrams/deciliter  morphine  - Injectable 2 milliGRAM(s) IV Push every 4 hours PRN severe pain, breakthrough after oral meds given  oxyCODONE    5 mG/acetaminophen 325 mG 1 Tablet(s) Oral every 4 hours PRN Mild Pain (1 - 3)  oxyCODONE    5 mG/acetaminophen 325 mG 2 Tablet(s) Oral every 4 hours PRN Moderate Pain (4 - 6)        LAB/STUDIES:                        9.7    12.42 )-----------( 444      ( 11 Mar 2018 00:55 )             33.0     03-11    142  |  101  |  30<H>  ----------------------------<  182<H>  4.8   |  31  |  5.0<HH>    Ca    8.4<L>      11 Mar 2018 00:55  Phos  5.2     03-11  Mg     2.3     03-11        CARDIAC MARKERS ( 09 Mar 2018 11:18 )  0.04 ng/mL / x     / 45 U/L / x     / 1.8 ng/mL          Culture - Surgical Swab (collected 08 Mar 2018 21:11)  Source: .Surgical Swab WOUND  Preliminary Report (10 Mar 2018 11:24):    No growth    Culture - Surgical Swab (collected 08 Mar 2018 21:11)  Source: .Surgical Swab WOUND  Preliminary Report (10 Mar 2018 11:26):    No growth    Culture - Blood (collected 08 Mar 2018 10:45)  Source: .Blood Blood  Preliminary Report (09 Mar 2018 16:02):    No growth to date.

## 2018-03-12 LAB
BLD GP AB SCN SERPL QL: SIGNIFICANT CHANGE UP
TYPE + AB SCN PNL BLD: SIGNIFICANT CHANGE UP

## 2018-03-12 RX ORDER — FUROSEMIDE 40 MG
80 TABLET ORAL
Qty: 0 | Refills: 0 | Status: DISCONTINUED | OUTPATIENT
Start: 2018-03-12 | End: 2018-03-13

## 2018-03-12 RX ORDER — VANCOMYCIN HCL 1 G
1000 VIAL (EA) INTRAVENOUS
Qty: 0 | Refills: 0 | Status: DISCONTINUED | OUTPATIENT
Start: 2018-03-12 | End: 2018-03-13

## 2018-03-12 RX ORDER — ALPRAZOLAM 0.25 MG
0.5 TABLET ORAL ONCE
Qty: 0 | Refills: 0 | Status: DISCONTINUED | OUTPATIENT
Start: 2018-03-12 | End: 2018-03-12

## 2018-03-12 RX ORDER — SEVELAMER CARBONATE 2400 MG/1
1600 POWDER, FOR SUSPENSION ORAL
Qty: 0 | Refills: 0 | Status: DISCONTINUED | OUTPATIENT
Start: 2018-03-12 | End: 2018-03-13

## 2018-03-12 RX ADMIN — PIPERACILLIN AND TAZOBACTAM 200 GRAM(S): 4; .5 INJECTION, POWDER, LYOPHILIZED, FOR SOLUTION INTRAVENOUS at 19:35

## 2018-03-12 RX ADMIN — PIPERACILLIN AND TAZOBACTAM 200 GRAM(S): 4; .5 INJECTION, POWDER, LYOPHILIZED, FOR SOLUTION INTRAVENOUS at 06:54

## 2018-03-12 RX ADMIN — SEVELAMER CARBONATE 800 MILLIGRAM(S): 2400 POWDER, FOR SUSPENSION ORAL at 08:11

## 2018-03-12 RX ADMIN — Medication 10 MILLIGRAM(S): at 06:51

## 2018-03-12 RX ADMIN — Medication 40 MILLIGRAM(S): at 06:51

## 2018-03-12 RX ADMIN — Medication 25 MILLIGRAM(S): at 06:51

## 2018-03-12 RX ADMIN — Medication 400 MILLIGRAM(S): at 06:56

## 2018-03-12 RX ADMIN — Medication 1 DROP(S): at 06:52

## 2018-03-12 RX ADMIN — SEVELAMER CARBONATE 800 MILLIGRAM(S): 2400 POWDER, FOR SUSPENSION ORAL at 12:05

## 2018-03-12 RX ADMIN — Medication 100 MILLIGRAM(S): at 06:53

## 2018-03-12 RX ADMIN — Medication 650 MILLIGRAM(S): at 00:51

## 2018-03-12 RX ADMIN — Medication 12: at 18:42

## 2018-03-12 RX ADMIN — Medication 250 MILLIGRAM(S): at 16:35

## 2018-03-12 RX ADMIN — MORPHINE SULFATE 2 MILLIGRAM(S): 50 CAPSULE, EXTENDED RELEASE ORAL at 12:12

## 2018-03-12 RX ADMIN — Medication 8: at 12:04

## 2018-03-12 RX ADMIN — Medication 100 MILLIGRAM(S): at 18:41

## 2018-03-12 RX ADMIN — Medication 400 MILLIGRAM(S): at 02:23

## 2018-03-12 RX ADMIN — Medication 80 MILLIGRAM(S): at 18:42

## 2018-03-12 RX ADMIN — MORPHINE SULFATE 2 MILLIGRAM(S): 50 CAPSULE, EXTENDED RELEASE ORAL at 01:53

## 2018-03-12 RX ADMIN — PIPERACILLIN AND TAZOBACTAM 200 GRAM(S): 4; .5 INJECTION, POWDER, LYOPHILIZED, FOR SOLUTION INTRAVENOUS at 12:05

## 2018-03-12 RX ADMIN — MORPHINE SULFATE 2 MILLIGRAM(S): 50 CAPSULE, EXTENDED RELEASE ORAL at 08:10

## 2018-03-12 RX ADMIN — Medication 0.5 MILLIGRAM(S): at 03:28

## 2018-03-12 RX ADMIN — Medication 100 MILLIGRAM(S): at 06:50

## 2018-03-12 RX ADMIN — SIMVASTATIN 10 MILLIGRAM(S): 20 TABLET, FILM COATED ORAL at 23:10

## 2018-03-12 RX ADMIN — AMLODIPINE BESYLATE 5 MILLIGRAM(S): 2.5 TABLET ORAL at 06:50

## 2018-03-12 RX ADMIN — Medication 400 MILLIGRAM(S): at 01:53

## 2018-03-12 RX ADMIN — Medication 20 MILLIGRAM(S): at 18:41

## 2018-03-12 RX ADMIN — Medication 1 DROP(S): at 18:41

## 2018-03-12 RX ADMIN — Medication 650 MILLIGRAM(S): at 06:50

## 2018-03-12 RX ADMIN — Medication 14: at 23:33

## 2018-03-12 RX ADMIN — Medication 25 MILLIGRAM(S): at 18:43

## 2018-03-12 RX ADMIN — PIPERACILLIN AND TAZOBACTAM 200 GRAM(S): 4; .5 INJECTION, POWDER, LYOPHILIZED, FOR SOLUTION INTRAVENOUS at 00:45

## 2018-03-12 RX ADMIN — MORPHINE SULFATE 2 MILLIGRAM(S): 50 CAPSULE, EXTENDED RELEASE ORAL at 02:08

## 2018-03-12 RX ADMIN — SEVELAMER CARBONATE 1600 MILLIGRAM(S): 2400 POWDER, FOR SUSPENSION ORAL at 18:43

## 2018-03-12 RX ADMIN — Medication 2: at 06:55

## 2018-03-12 RX ADMIN — MORPHINE SULFATE 2 MILLIGRAM(S): 50 CAPSULE, EXTENDED RELEASE ORAL at 12:18

## 2018-03-12 RX ADMIN — PIPERACILLIN AND TAZOBACTAM 200 GRAM(S): 4; .5 INJECTION, POWDER, LYOPHILIZED, FOR SOLUTION INTRAVENOUS at 23:10

## 2018-03-12 RX ADMIN — MORPHINE SULFATE 2 MILLIGRAM(S): 50 CAPSULE, EXTENDED RELEASE ORAL at 08:30

## 2018-03-12 RX ADMIN — Medication 100 MILLIGRAM(S): at 18:43

## 2018-03-12 NOTE — PROGRESS NOTE ADULT - SUBJECTIVE AND OBJECTIVE BOX
VASCULAR SURGERY PROGRESS NOTE    Hospital Day #4d  Post-Op Day #4    Procedure/Dx:s.p right below knee amputation        PAST MEDICAL & SURGICAL HISTORY:  Blind  HTN (hypertension)  End stage renal disease  Diabetes      Vital Signs Last 24 Hrs  T(C): 35.9 (11 Mar 2018 21:20), Max: 36.7 (11 Mar 2018 12:21)  T(F): 96.7 (11 Mar 2018 21:20), Max: 98.1 (11 Mar 2018 12:21)  HR: 68 (11 Mar 2018 21:20) (68 - 68)  BP: 142/65 (11 Mar 2018 21:20) (142/65 - 173/77)  BP(mean): --  RR: 20 (11 Mar 2018 21:20) (20 - 20)  SpO2: --    Pain (0-10):            Pain Control Adequate: [] YES [] N    Diet:  Diet, Consistent Carbohydrate Renal/No Snacks      I&O's Detail    10 Mar 2018 06:01  -  11 Mar 2018 07:00  --------------------------------------------------------  IN:    Solution: 200 mL    Solution: 150 mL  Total IN: 350 mL    OUT:    Other: 4000 mL    Voided: 600 mL  Total OUT: 4600 mL    Total NET: -4250 mL      11 Mar 2018 07:01  -  12 Mar 2018 05:37  --------------------------------------------------------  IN:  Total IN: 0 mL    OUT:    Stool: 3 mL  Total OUT: 3 mL    Total NET: -3 mL          Bowel Movement:[x] YES [] NO  Flatus: [x] YES [] NO  Activity: bedrest  PHYSICAL EXAM  Gen: pallor  CV: no icterus  Lungs: b/l clear  Abd: s1/s2 normal  Inc: right bka present  Ext: right bka stump present    MEDICATIONS  (STANDING):  acetaminophen   Tablet. 650 milliGRAM(s) Oral every 6 hours  amLODIPine   Tablet 5 milliGRAM(s) Oral daily  artificial  tears Solution 1 Drop(s) Both EYES every 12 hours  dextrose 5%. 1000 milliLiter(s) (50 mL/Hr) IV Continuous <Continuous>  dextrose 50% Injectable 25 Gram(s) IV Push once  docusate sodium 100 milliGRAM(s) Oral two times a day  enalapril 10 milliGRAM(s) Oral daily  epoetin keagan Injectable 76056 Unit(s) IV Push <User Schedule>  furosemide   Injectable 40 milliGRAM(s) IV Push every 8 hours  ibuprofen  Tablet 400 milliGRAM(s) Oral every 4 hours  insulin lispro (HumaLOG) corrective regimen sliding scale   SubCutaneous every 4 hours  metolazone 5 milliGRAM(s) Oral daily  metoprolol     tartrate 25 milliGRAM(s) Oral two times a day  piperacillin/tazobactam IVPB.      piperacillin/tazobactam IVPB. 2.25 Gram(s) IV Intermittent every 6 hours  pregabalin 100 milliGRAM(s) Oral two times a day  sevelamer hydrochloride 800 milliGRAM(s) Oral three times a day with meals  simvastatin 10 milliGRAM(s) Oral at bedtime  vancomycin  IVPB 750 milliGRAM(s) IV Intermittent every 48 hours    MEDICATIONS  (PRN):  dextrose Gel 1 Dose(s) Oral once PRN Blood Glucose LESS THAN 70 milliGRAM(s)/deciliter  glucagon  Injectable 1 milliGRAM(s) IntraMuscular once PRN Glucose LESS THAN 70 milligrams/deciliter  morphine  - Injectable 2 milliGRAM(s) IV Push every 4 hours PRN severe pain, breakthrough after oral meds given  oxyCODONE    5 mG/acetaminophen 325 mG 1 Tablet(s) Oral every 4 hours PRN Mild Pain (1 - 3)  oxyCODONE    5 mG/acetaminophen 325 mG 2 Tablet(s) Oral every 4 hours PRN Moderate Pain (4 - 6)      DVT PROPHYLAXIS: [] YES [x] NO   GI PROPHYLAXIS: [] YES [x] NO   ANTIPLATELETS: [] YES [x] NO   ANTICOAGULATION: [] YES [x] NO   ANTIBIOTICS: [x] YES [] NO piperacillin/tazobactam IVPB.    piperacillin/tazobactam IVPB. 2.25 Gram(s)  vancomycin  IVPB 750 milliGRAM(s)      LAB/STUDIES:              9.7                 12.42  >---------<  444                             33.0                               142    | 101    | 30     ---------------------------<182   03-11-18 @ 00:55  4.8    | 31     | 5.0      Anion Gap:10     eGFR:12               8.4              v           03-11-18 @ 00:55  5.2     |  2.3              x      |x      |x                          -------------------------[x    (03-11-18 @ 00:55)           x       |x       |x                                                                         x      ----------<  x     03-11-18 @ 00:55  x        Lipase: x    03-11-18 @ 00:55  Amylase: x    03-11-18 @ 00:55  Lactate: x    03-11-18 @ 00:55        CAPILLARY BLOOD GLUCOSE  267 (11 Mar 2018 21:20)  205 (11 Mar 2018 16:09)  177 (11 Mar 2018 11:09)  167 (11 Mar 2018 05:40)              Culture - Surgical Swab (collected 03-08-18 @ 21:11)  Source: .Surgical Swab WOUND  Preliminary Report (03-10-18 @ 11:24):    No growth    Culture - Surgical Swab (collected 03-08-18 @ 21:11)  Source: .Surgical Swab WOUND  Preliminary Report (03-10-18 @ 11:26):    No growth    Culture - Blood (collected 03-08-18 @ 10:45)  Source: .Blood Blood  Preliminary Report (03-09-18 @ 16:02):    No growth to date.    Culture - Blood (collected 02-27-18 @ 06:45)  Source: .Blood Blood-Peripheral  Final Report (03-04-18 @ 11:00):    No growth at 5 days.      SPECTRA: [id number]

## 2018-03-12 NOTE — PROGRESS NOTE ADULT - ASSESSMENT
1. R foot necrotizing fasciitis. s/p BKA, revision tomorrow.  2. ESRD on HD TTS. HD tomorrow: 3 hours, opti 160 dialyzer, 2K bath, 4L UF.  3. Anemia. Start EPO 10,000 units IV with HD.  4. Hyperphosphatemia. Renal diet. Increase sevelamer to 1600mg with meals.  5. HTN. Stop amlodipine. Increase enalapril to 20mg PO BID. Stop IV furosemide and change to furosemide 80mg PO BID. Continue metolazone.

## 2018-03-12 NOTE — PROGRESS NOTE ADULT - ASSESSMENT
#Diabetic male with ESRD p/w necrotizing fasciitis of right lower extremity.   #Currently s/p BKA, right leg. OR tomorrow for closure of wound.   #Sepsis- Improving   #ESRD    - IV zosyn 2.5 G q6  - IV vancomycin post HD  - Level is low. Increase to vancomycin 1g post HD.     At discharge: One more week of Vancomycin 1gpostHD, Oral ciprofloxacin 750 mg daily and oral Flagyl 500 q8.   d/w Vascular service.

## 2018-03-12 NOTE — PROGRESS NOTE ADULT - SUBJECTIVE AND OBJECTIVE BOX
[facility] NEPHROLOGY FOLLOW UP NOTE  --------------------------------------------------------------------------------  24 hour events/subjective: Patient examined. Appears comfortable.    PAST HISTORY  --------------------------------------------------------------------------------  No significant changes to PMH, PSH, FHx, SHx, unless otherwise noted    ALLERGIES & MEDICATIONS  --------------------------------------------------------------------------------  Allergies    No Known Allergies    Standing Inpatient Medications  amLODIPine   Tablet 5 milliGRAM(s) Oral daily  artificial  tears Solution 1 Drop(s) Both EYES every 12 hours  dextrose 5%. 1000 milliLiter(s) IV Continuous <Continuous>  dextrose 50% Injectable 25 Gram(s) IV Push once  docusate sodium 100 milliGRAM(s) Oral two times a day  enalapril 10 milliGRAM(s) Oral daily  epoetin keagan Injectable 01590 Unit(s) IV Push <User Schedule>  furosemide   Injectable 40 milliGRAM(s) IV Push every 8 hours  insulin lispro (HumaLOG) corrective regimen sliding scale   SubCutaneous every 4 hours  metolazone 5 milliGRAM(s) Oral daily  metoprolol     tartrate 25 milliGRAM(s) Oral two times a day  piperacillin/tazobactam IVPB.      piperacillin/tazobactam IVPB. 2.25 Gram(s) IV Intermittent every 6 hours  pregabalin 100 milliGRAM(s) Oral two times a day  sevelamer hydrochloride 800 milliGRAM(s) Oral three times a day with meals  simvastatin 10 milliGRAM(s) Oral at bedtime  vancomycin  IVPB 750 milliGRAM(s) IV Intermittent every 48 hours    PRN Inpatient Medications  dextrose Gel 1 Dose(s) Oral once PRN  glucagon  Injectable 1 milliGRAM(s) IntraMuscular once PRN  morphine  - Injectable 2 milliGRAM(s) IV Push every 4 hours PRN  oxyCODONE    5 mG/acetaminophen 325 mG 1 Tablet(s) Oral every 4 hours PRN  oxyCODONE    5 mG/acetaminophen 325 mG 2 Tablet(s) Oral every 4 hours PRN    VITALS/PHYSICAL EXAM  --------------------------------------------------------------------------------  T(C): 36.3 (03-12-18 @ 05:45), Max: 36.3 (03-12-18 @ 05:45)  HR: 65 (03-12-18 @ 05:45) (65 - 68)  BP: 157/63 (03-12-18 @ 05:45) (142/65 - 157/63)  RR: 20 (03-11-18 @ 21:20) (20 - 20)  Height (cm): 182.88 (03-11-18 @ 06:01)  Weight (kg): 140.6 (03-11-18 @ 06:01)  BMI (kg/m2): 42 (03-11-18 @ 06:01)  BSA (m2): 2.57 (03-11-18 @ 06:01)    03-11-18 @ 07:01  -  03-12-18 @ 07:00  --------------------------------------------------------  IN: 0 mL / OUT: 3 mL / NET: -3 mL    Physical Exam:  	Gen: NAD  	Pulm: CTA B/L  	CV: RRR, S1S2  	Abd: +BS, soft, nontender/nondistended  	: No suprapubic tenderness  	LE: Warm,  no edema  	Vascular access: AVF    LABS/STUDIES  --------------------------------------------------------------------------------              9.7    12.42 >-----------<  444      [03-11-18 @ 00:55]              33.0     142  |  101  |  30  ----------------------------<  182      [03-11-18 @ 00:55]  4.8   |  31  |  5.0        Ca     8.4     [03-11-18 @ 00:55]      Mg     2.3     [03-11-18 @ 00:55]      Phos  5.2     [03-11-18 @ 00:55]

## 2018-03-12 NOTE — PROGRESS NOTE ADULT - SUBJECTIVE AND OBJECTIVE BOX
INTERVAL HPI/OVERNIGHT EVENTS:  OR tomorrow. S/P BKA  Allergies: No Known Allergies    EXAM: AOx3, worried about tomorrow's procedure, Right BKA (surgical dressing), abd soft, no wheezing.     Vital Signs Last 24 Hrs  T(C): 36.1 (12 Mar 2018 12:32), Max: 36.3 (12 Mar 2018 05:45)  T(F): 97 (12 Mar 2018 12:32), Max: 97.4 (12 Mar 2018 05:45)  HR: 77 (12 Mar 2018 14:20) (65 - 77)  BP: 165/66 (12 Mar 2018 14:20) (132/81 - 165/66)  BP(mean): --  RR: 18 (12 Mar 2018 14:20) (18 - 20)  MEDS:   artificial  tears Solution 1 Drop(s) Both EYES every 12 hours  dextrose 5%. 1000 milliLiter(s) IV Continuous <Continuous>  dextrose 50% Injectable 25 Gram(s) IV Push once  dextrose Gel 1 Dose(s) Oral once PRN  docusate sodium 100 milliGRAM(s) Oral two times a day  enalapril 20 milliGRAM(s) Oral two times a day  epoetin keagan Injectable 07787 Unit(s) IV Push <User Schedule>  furosemide    Tablet 80 milliGRAM(s) Oral two times a day  glucagon  Injectable 1 milliGRAM(s) IntraMuscular once PRN  insulin lispro (HumaLOG) corrective regimen sliding scale   SubCutaneous every 4 hours  metolazone 5 milliGRAM(s) Oral daily  metoprolol     tartrate 25 milliGRAM(s) Oral two times a day  morphine  - Injectable 2 milliGRAM(s) IV Push every 4 hours PRN  oxyCODONE    5 mG/acetaminophen 325 mG 1 Tablet(s) Oral every 4 hours PRN  oxyCODONE    5 mG/acetaminophen 325 mG 2 Tablet(s) Oral every 4 hours PRN  piperacillin/tazobactam IVPB.      piperacillin/tazobactam IVPB. 2.25 Gram(s) IV Intermittent every 6 hours  pregabalin 100 milliGRAM(s) Oral two times a day  sevelamer hydrochloride 1600 milliGRAM(s) Oral three times a day with meals  simvastatin 10 milliGRAM(s) Oral at bedtime  vancomycin  IVPB 1000 milliGRAM(s) IV Intermittent every 48 hours PRN    LABS:                        9.7    12.42 )-----------( 444      ( 11 Mar 2018 00:55 )             33.0     03-11    142  |  101  |  30<H>  ----------------------------<  182<H>  4.8   |  31  |  5.0<HH>    Culture - Surgical Swab (03.08.18 @ 21:11)    Specimen Source: .Surgical Swab WOUND    Culture Results:   No growth    Culture - Surgical Swab (03.08.18 @ 21:11)    Specimen Source: .Surgical Swab WOUND    Culture Results:   No growth    Culture - Blood (03.08.18 @ 10:45)    Specimen Source: .Blood Blood    Culture Results:   No growth to date.    Culture - Blood (02.27.18 @ 06:45)    Specimen Source: .Blood Blood-Peripheral    Culture Results:   No growth at 5 days.    3/9 CXR- No focal pulmonary consolidation.  3/8 CT right leg:  Extensive subcutaneous emphysema surrounding the midfoot and ankle,   extending proximally to the distal tibia/fibula, approximately 4.5 cm   proximal to the ankle joint consistent with necrotizing fasciitis.   2. Ulcer overlying the lateral hindfoot with evidence of septic   arthritis/osteomyelitis throughout the midfoot and hindfoot as above  3. Severe neuropathic arthropathy.

## 2018-03-13 ENCOUNTER — APPOINTMENT (OUTPATIENT)
Dept: VASCULAR SURGERY | Facility: HOSPITAL | Age: 53
End: 2018-03-13
Payer: MEDICARE

## 2018-03-13 ENCOUNTER — APPOINTMENT (OUTPATIENT)
Dept: PODIATRY | Facility: CLINIC | Age: 53
End: 2018-03-13

## 2018-03-13 ENCOUNTER — RESULT REVIEW (OUTPATIENT)
Age: 53
End: 2018-03-13

## 2018-03-13 LAB
CULTURE RESULTS: SIGNIFICANT CHANGE UP
SPECIMEN SOURCE: SIGNIFICANT CHANGE UP

## 2018-03-13 PROCEDURE — 27884 AMPUTATION FOLLOW-UP SURGERY: CPT | Mod: 58,RT

## 2018-03-13 RX ORDER — OXYCODONE AND ACETAMINOPHEN 5; 325 MG/1; MG/1
1 TABLET ORAL EVERY 4 HOURS
Qty: 0 | Refills: 0 | Status: DISCONTINUED | OUTPATIENT
Start: 2018-03-13 | End: 2018-03-15

## 2018-03-13 RX ORDER — ONDANSETRON 8 MG/1
4 TABLET, FILM COATED ORAL ONCE
Qty: 0 | Refills: 0 | Status: DISCONTINUED | OUTPATIENT
Start: 2018-03-13 | End: 2018-03-13

## 2018-03-13 RX ORDER — SODIUM CHLORIDE 9 MG/ML
1000 INJECTION, SOLUTION INTRAVENOUS
Qty: 0 | Refills: 0 | Status: DISCONTINUED | OUTPATIENT
Start: 2018-03-13 | End: 2018-03-20

## 2018-03-13 RX ORDER — MORPHINE SULFATE 50 MG/1
2 CAPSULE, EXTENDED RELEASE ORAL EVERY 4 HOURS
Qty: 0 | Refills: 0 | Status: DISCONTINUED | OUTPATIENT
Start: 2018-03-13 | End: 2018-03-14

## 2018-03-13 RX ORDER — BUTORPHANOL TARTRATE 2 MG/ML
1 INJECTION, SOLUTION INTRAMUSCULAR; INTRAVENOUS ONCE
Qty: 0 | Refills: 0 | Status: DISCONTINUED | OUTPATIENT
Start: 2018-03-13 | End: 2018-03-13

## 2018-03-13 RX ORDER — DEXTROSE 50 % IN WATER 50 %
1 SYRINGE (ML) INTRAVENOUS ONCE
Qty: 0 | Refills: 0 | Status: DISCONTINUED | OUTPATIENT
Start: 2018-03-13 | End: 2018-03-18

## 2018-03-13 RX ORDER — GLUCAGON INJECTION, SOLUTION 0.5 MG/.1ML
1 INJECTION, SOLUTION SUBCUTANEOUS ONCE
Qty: 0 | Refills: 0 | Status: DISCONTINUED | OUTPATIENT
Start: 2018-03-13 | End: 2018-03-18

## 2018-03-13 RX ORDER — AMLODIPINE BESYLATE 2.5 MG/1
5 TABLET ORAL DAILY
Qty: 0 | Refills: 0 | Status: DISCONTINUED | OUTPATIENT
Start: 2018-03-13 | End: 2018-03-13

## 2018-03-13 RX ORDER — SEVELAMER CARBONATE 2400 MG/1
1600 POWDER, FOR SUSPENSION ORAL
Qty: 0 | Refills: 0 | Status: DISCONTINUED | OUTPATIENT
Start: 2018-03-13 | End: 2018-03-20

## 2018-03-13 RX ORDER — MORPHINE SULFATE 50 MG/1
2 CAPSULE, EXTENDED RELEASE ORAL EVERY 4 HOURS
Qty: 0 | Refills: 0 | Status: DISCONTINUED | OUTPATIENT
Start: 2018-03-13 | End: 2018-03-13

## 2018-03-13 RX ORDER — INSULIN LISPRO 100/ML
VIAL (ML) SUBCUTANEOUS EVERY 4 HOURS
Qty: 0 | Refills: 0 | Status: DISCONTINUED | OUTPATIENT
Start: 2018-03-13 | End: 2018-03-13

## 2018-03-13 RX ORDER — FUROSEMIDE 40 MG
80 TABLET ORAL
Qty: 0 | Refills: 0 | Status: DISCONTINUED | OUTPATIENT
Start: 2018-03-13 | End: 2018-03-20

## 2018-03-13 RX ORDER — FUROSEMIDE 40 MG
80 TABLET ORAL
Qty: 0 | Refills: 0 | Status: DISCONTINUED | OUTPATIENT
Start: 2018-03-13 | End: 2018-03-13

## 2018-03-13 RX ORDER — GLUCAGON INJECTION, SOLUTION 0.5 MG/.1ML
1 INJECTION, SOLUTION SUBCUTANEOUS ONCE
Qty: 0 | Refills: 0 | Status: DISCONTINUED | OUTPATIENT
Start: 2018-03-13 | End: 2018-03-13

## 2018-03-13 RX ORDER — INSULIN LISPRO 100/ML
VIAL (ML) SUBCUTANEOUS EVERY 4 HOURS
Qty: 0 | Refills: 0 | Status: DISCONTINUED | OUTPATIENT
Start: 2018-03-13 | End: 2018-03-20

## 2018-03-13 RX ORDER — MORPHINE SULFATE 50 MG/1
4 CAPSULE, EXTENDED RELEASE ORAL ONCE
Qty: 0 | Refills: 0 | Status: DISCONTINUED | OUTPATIENT
Start: 2018-03-13 | End: 2018-03-13

## 2018-03-13 RX ORDER — OXYCODONE AND ACETAMINOPHEN 5; 325 MG/1; MG/1
2 TABLET ORAL EVERY 4 HOURS
Qty: 0 | Refills: 0 | Status: DISCONTINUED | OUTPATIENT
Start: 2018-03-13 | End: 2018-03-13

## 2018-03-13 RX ORDER — SEVELAMER CARBONATE 2400 MG/1
1600 POWDER, FOR SUSPENSION ORAL
Qty: 0 | Refills: 0 | Status: DISCONTINUED | OUTPATIENT
Start: 2018-03-13 | End: 2018-03-13

## 2018-03-13 RX ORDER — SIMVASTATIN 20 MG/1
10 TABLET, FILM COATED ORAL AT BEDTIME
Qty: 0 | Refills: 0 | Status: DISCONTINUED | OUTPATIENT
Start: 2018-03-13 | End: 2018-03-13

## 2018-03-13 RX ORDER — DEXTROSE 50 % IN WATER 50 %
1 SYRINGE (ML) INTRAVENOUS ONCE
Qty: 0 | Refills: 0 | Status: DISCONTINUED | OUTPATIENT
Start: 2018-03-13 | End: 2018-03-13

## 2018-03-13 RX ORDER — HYDROMORPHONE HYDROCHLORIDE 2 MG/ML
0.5 INJECTION INTRAMUSCULAR; INTRAVENOUS; SUBCUTANEOUS
Qty: 0 | Refills: 0 | Status: DISCONTINUED | OUTPATIENT
Start: 2018-03-13 | End: 2018-03-13

## 2018-03-13 RX ORDER — ALPRAZOLAM 0.25 MG
0.5 TABLET ORAL ONCE
Qty: 0 | Refills: 0 | Status: DISCONTINUED | OUTPATIENT
Start: 2018-03-13 | End: 2018-03-13

## 2018-03-13 RX ORDER — DEXTROSE 50 % IN WATER 50 %
25 SYRINGE (ML) INTRAVENOUS ONCE
Qty: 0 | Refills: 0 | Status: DISCONTINUED | OUTPATIENT
Start: 2018-03-13 | End: 2018-03-13

## 2018-03-13 RX ORDER — DOCUSATE SODIUM 100 MG
100 CAPSULE ORAL
Qty: 0 | Refills: 0 | Status: DISCONTINUED | OUTPATIENT
Start: 2018-03-13 | End: 2018-03-20

## 2018-03-13 RX ORDER — SIMVASTATIN 20 MG/1
10 TABLET, FILM COATED ORAL AT BEDTIME
Qty: 0 | Refills: 0 | Status: DISCONTINUED | OUTPATIENT
Start: 2018-03-13 | End: 2018-03-20

## 2018-03-13 RX ORDER — METOPROLOL TARTRATE 50 MG
25 TABLET ORAL
Qty: 0 | Refills: 0 | Status: DISCONTINUED | OUTPATIENT
Start: 2018-03-13 | End: 2018-03-13

## 2018-03-13 RX ORDER — AMLODIPINE BESYLATE 2.5 MG/1
5 TABLET ORAL DAILY
Qty: 0 | Refills: 0 | Status: DISCONTINUED | OUTPATIENT
Start: 2018-03-13 | End: 2018-03-20

## 2018-03-13 RX ORDER — ERYTHROPOIETIN 10000 [IU]/ML
10000 INJECTION, SOLUTION INTRAVENOUS; SUBCUTANEOUS
Qty: 0 | Refills: 0 | Status: DISCONTINUED | OUTPATIENT
Start: 2018-03-13 | End: 2018-03-13

## 2018-03-13 RX ORDER — METOPROLOL TARTRATE 50 MG
25 TABLET ORAL
Qty: 0 | Refills: 0 | Status: DISCONTINUED | OUTPATIENT
Start: 2018-03-13 | End: 2018-03-20

## 2018-03-13 RX ORDER — OXYCODONE AND ACETAMINOPHEN 5; 325 MG/1; MG/1
1 TABLET ORAL ONCE
Qty: 0 | Refills: 0 | Status: DISCONTINUED | OUTPATIENT
Start: 2018-03-13 | End: 2018-03-13

## 2018-03-13 RX ORDER — MIDAZOLAM HYDROCHLORIDE 1 MG/ML
1 INJECTION, SOLUTION INTRAMUSCULAR; INTRAVENOUS ONCE
Qty: 0 | Refills: 0 | Status: DISCONTINUED | OUTPATIENT
Start: 2018-03-13 | End: 2018-03-13

## 2018-03-13 RX ORDER — DOCUSATE SODIUM 100 MG
100 CAPSULE ORAL
Qty: 0 | Refills: 0 | Status: DISCONTINUED | OUTPATIENT
Start: 2018-03-13 | End: 2018-03-13

## 2018-03-13 RX ORDER — ERYTHROPOIETIN 10000 [IU]/ML
10000 INJECTION, SOLUTION INTRAVENOUS; SUBCUTANEOUS
Qty: 0 | Refills: 0 | Status: DISCONTINUED | OUTPATIENT
Start: 2018-03-13 | End: 2018-03-14

## 2018-03-13 RX ADMIN — Medication 1 DROP(S): at 14:28

## 2018-03-13 RX ADMIN — Medication 100 MILLIGRAM(S): at 06:54

## 2018-03-13 RX ADMIN — Medication 6: at 14:25

## 2018-03-13 RX ADMIN — MIDAZOLAM HYDROCHLORIDE 1 MILLIGRAM(S): 1 INJECTION, SOLUTION INTRAMUSCULAR; INTRAVENOUS at 10:44

## 2018-03-13 RX ADMIN — MORPHINE SULFATE 4 MILLIGRAM(S): 50 CAPSULE, EXTENDED RELEASE ORAL at 18:47

## 2018-03-13 RX ADMIN — Medication 8: at 02:17

## 2018-03-13 RX ADMIN — Medication 10: at 18:48

## 2018-03-13 RX ADMIN — Medication 80 MILLIGRAM(S): at 18:45

## 2018-03-13 RX ADMIN — SEVELAMER CARBONATE 1600 MILLIGRAM(S): 2400 POWDER, FOR SUSPENSION ORAL at 18:43

## 2018-03-13 RX ADMIN — BUTORPHANOL TARTRATE 1 MILLIGRAM(S): 2 INJECTION, SOLUTION INTRAMUSCULAR; INTRAVENOUS at 10:57

## 2018-03-13 RX ADMIN — OXYCODONE AND ACETAMINOPHEN 1 TABLET(S): 5; 325 TABLET ORAL at 15:54

## 2018-03-13 RX ADMIN — MORPHINE SULFATE 2 MILLIGRAM(S): 50 CAPSULE, EXTENDED RELEASE ORAL at 14:35

## 2018-03-13 RX ADMIN — Medication 25 MILLIGRAM(S): at 06:58

## 2018-03-13 RX ADMIN — BUTORPHANOL TARTRATE 1 MILLIGRAM(S): 2 INJECTION, SOLUTION INTRAMUSCULAR; INTRAVENOUS at 11:25

## 2018-03-13 RX ADMIN — OXYCODONE AND ACETAMINOPHEN 1 TABLET(S): 5; 325 TABLET ORAL at 22:51

## 2018-03-13 RX ADMIN — Medication 80 MILLIGRAM(S): at 06:26

## 2018-03-13 RX ADMIN — Medication 100 MILLIGRAM(S): at 18:43

## 2018-03-13 RX ADMIN — PIPERACILLIN AND TAZOBACTAM 200 GRAM(S): 4; .5 INJECTION, POWDER, LYOPHILIZED, FOR SOLUTION INTRAVENOUS at 06:30

## 2018-03-13 RX ADMIN — OXYCODONE AND ACETAMINOPHEN 1 TABLET(S): 5; 325 TABLET ORAL at 11:55

## 2018-03-13 RX ADMIN — OXYCODONE AND ACETAMINOPHEN 1 TABLET(S): 5; 325 TABLET ORAL at 11:05

## 2018-03-13 RX ADMIN — Medication 6: at 22:46

## 2018-03-13 RX ADMIN — Medication 25 MILLIGRAM(S): at 18:43

## 2018-03-13 RX ADMIN — MORPHINE SULFATE 2 MILLIGRAM(S): 50 CAPSULE, EXTENDED RELEASE ORAL at 21:03

## 2018-03-13 RX ADMIN — Medication 1 DROP(S): at 06:22

## 2018-03-13 RX ADMIN — BUTORPHANOL TARTRATE 1 MILLIGRAM(S): 2 INJECTION, SOLUTION INTRAMUSCULAR; INTRAVENOUS at 11:53

## 2018-03-13 RX ADMIN — Medication 0.5 MILLIGRAM(S): at 23:11

## 2018-03-13 RX ADMIN — Medication 100 MILLIGRAM(S): at 18:46

## 2018-03-13 RX ADMIN — AMLODIPINE BESYLATE 5 MILLIGRAM(S): 2.5 TABLET ORAL at 14:24

## 2018-03-13 RX ADMIN — SIMVASTATIN 10 MILLIGRAM(S): 20 TABLET, FILM COATED ORAL at 21:04

## 2018-03-13 RX ADMIN — Medication 20 MILLIGRAM(S): at 18:48

## 2018-03-13 NOTE — CHART NOTE - NSCHARTNOTEFT_GEN_A_CORE
Vascular Surgery Post-Op Note, PCN:     Pre-Op Dx: NECROTIZING FASCITIS LEUKOCYTOSIS ANEMIA                      .....  NECROTIZING FASCITIS LEUKOCYTOSIS ANEMIA  Gangrene  Necrotizing fasciitis    Procedure: Amputation below knee  Amputation of leg through tibia and fibula    Surgeon: Daljit    Subjective: 54yo male s/p Right BKA, resting In bed complaining of pain.      Physical Exam:  General: NAD, resting comfortably in bed  Pulmonary: Nonlabored breathing, no respiratory distress  Cardiovascular: NSR  Abdominal: soft, NT/ND  Extremities: Right BKA.  Neuro: A/O x 3, CNs II-XII grossly intact, normal motor/sensation, no focal deficits          Assessment:53y Male s/p Rt BKA    Plan:  Pain/nausea control PRN  Home meds  Incentive spirometer  Vitals per protocol

## 2018-03-13 NOTE — PROGRESS NOTE ADULT - SUBJECTIVE AND OBJECTIVE BOX
[facility] NEPHROLOGY FOLLOW UP NOTE  --------------------------------------------------------------------------------  24 hour events/subjective: Patient examined. Appears comfortable.    PAST HISTORY  --------------------------------------------------------------------------------  No significant changes to PMH, PSH, FHx, SHx, unless otherwise noted    ALLERGIES & MEDICATIONS  --------------------------------------------------------------------------------  Allergies    No Known Allergies    Standing Inpatient Medications    PRN Inpatient Medications  HYDROmorphone  Injectable 0.5 milliGRAM(s) IV Push every 10 minutes PRN  ondansetron Injectable 4 milliGRAM(s) IV Push once PRN    VITALS/PHYSICAL EXAM  --------------------------------------------------------------------------------  T(C): 36.1 (03-13-18 @ 10:31), Max: 36.4 (03-12-18 @ 21:44)  HR: 78 (03-13-18 @ 11:40) (71 - 90)  BP: 154/59 (03-13-18 @ 12:00) (107/53 - 166/93)  RR: 24 (03-13-18 @ 12:00) (16 - 33)  SpO2: 98% (03-13-18 @ 12:00) (95% - 98%)  Height (cm): 182.88 (03-12-18 @ 17:16)  Weight (kg): 140.6 (03-12-18 @ 17:16)  BMI (kg/m2): 42 (03-12-18 @ 17:16)  BSA (m2): 2.57 (03-12-18 @ 17:16)    03-12-18 @ 07:01  -  03-13-18 @ 07:00  --------------------------------------------------------  IN: 0 mL / OUT: 4001 mL / NET: -4001 mL    Physical Exam:  	Gen: NAD  	Pulm: CTA B/L  	CV: RRR, S1S2  	Abd: +BS, soft, nontender/nondistended  	: No suprapubic tenderness  	LE: Warm, no edema  	Vascular access: AVF    LABS/STUDIES  --------------------------------------------------------------------------------  Creatinine Trend:  SCr 5.0 [03-11 @ 00:55]  SCr 6.4 [03-10 @ 01:15]  SCr 5.1 [03-08 @ 23:37]  SCr 6.5 [03-08 @ 10:51]  SCr 5.5 [02-27 @ 06:36]

## 2018-03-13 NOTE — PROGRESS NOTE ADULT - ASSESSMENT
1. R foot necrotizing fasciitis. s/p BKA, revision today.  2. ESRD on HD TTS. HD tomorrow: 3 hours, opti 160 dialyzer, 2K bath, 4L UF.  3. Anemia. Start EPO 10,000 units IV with HD.  4. Hyperphosphatemia. Renal diet. Sevelamer with meals.  5. HTN. Continue enalapril, furosemide, and metolazone.

## 2018-03-13 NOTE — H&P ADULT - ASSESSMENT
53 male with hx HTN, DM,  ESRD on HD, chronic b/l LE cellulitis on post HD vanco,  followed by podiatry, p/w worsening b/l feet/ankle pain, Rt more than the left. On presentation in the ED, he was febrile, tachycardic, found to have rt ankle necrotizing fasciitis, septic arthritis/OM, and LUE soft tissue swelling. Pt was evaluated by vascular surgery and taken to OR for Rt yazan [email or web address]. Prior to OR, received 1x Zosyn, Clinda. Intraop patient received vanco 1 gm.   Pt started on Abx and awaits closure of BKA

## 2018-03-13 NOTE — PROGRESS NOTE ADULT - ASSESSMENT
#Diabetic male with ESRD p/w necrotizing fasciitis of right lower extremity.   #Currently s/p BKA, right leg. s/p closure of wound.   #Sepsis- Improving   #ESRD  IV vancomycin 1 g post HD  One more week of Vancomycin 1gpostHD.  Oral ciprofloxacin 750 mg daily and oral Flagyl 500 q8 x 1 week.  Close follow up with vascular for assessment of stump.   d/w Vascular service.

## 2018-03-13 NOTE — H&P ADULT - HISTORY OF PRESENT ILLNESS
53 male with hx HTN, DM,  ESRD on HD, chronic b/l LE cellulitis on post HD vanco,  followed by podiatry, p/w worsening b/l feet/ankle pain, Rt more than the left. On presentation in the ED, he was febrile, tachycardic, found to have rt ankle necrotizing fasciitis, septic arthritis/OM, and LUE soft tissue swelling. Pt was evaluated by vascular surgery and taken to OR for Rt yazan [id number]. Prior to OR, received 1x Zosyn, Clinda. Intraop patient received vanco 1 gm.

## 2018-03-13 NOTE — H&P ADULT - NSHPPHYSICALEXAM_GEN_ALL_CORE
EXAM:  General: Lying on the bed, extubated, not in respiratory distress  Neuro: GCS 15, AAO X 3, no focal deficits.  Respiratory: Lungs clear to auscultation  Cardiovascular: S1, S2.  Regular rate and rhythm.    Cardiac Rhythm: Normal Sinus Rhythm  GI: soft, Non-tender, Non-distended.  Current diet: on carb consistent diet  Extremities: Rt LE amputation with dressing intact, left LE dressing on the foot.   :  no Hunter catheter,

## 2018-03-13 NOTE — PROGRESS NOTE ADULT - SUBJECTIVE AND OBJECTIVE BOX
INTERVAL HPI/OVERNIGHT EVENTS:  S/P BKA  Allergies: No Known Allergies  EXAM: AOx3, Right BKA, s/p closure (surgical dressing), abd soft, no wheezing.     Vital Signs Last 24 Hrs  T(C): 36.4 (13 Mar 2018 12:42), Max: 36.6 (13 Mar 2018 12:25)  T(F): 97.6 (13 Mar 2018 12:42), Max: 97.8 (13 Mar 2018 12:25)  HR: 72 (13 Mar 2018 12:42) (72 - 92)  BP: 129/72 (13 Mar 2018 12:42) (107/53 - 166/93)  BP(mean): --  RR: 18 (13 Mar 2018 12:42) (12 - 33)  SpO2: 96% (13 Mar 2018 12:25) (95% - 98%)    03-12-18 @ 07:01  -  03-13-18 @ 07:00  --------------------------------------------------------  IN: 0 mL / OUT: 4001 mL / NET: -4001 mL    MEDS:   amLODIPine   Tablet 5 milliGRAM(s) Oral daily  artificial  tears Solution 1 Drop(s) Both EYES every 12 hours  dextrose 5%. 1000 milliLiter(s) IV Continuous <Continuous>  dextrose 5%. 1000 milliLiter(s) IV Continuous <Continuous>  dextrose Gel 1 Dose(s) Oral once PRN  docusate sodium 100 milliGRAM(s) Oral two times a day  enalapril 20 milliGRAM(s) Oral two times a day  epoetin keagan Injectable 65733 Unit(s) IV Push <User Schedule>  furosemide    Tablet 80 milliGRAM(s) Oral two times a day  glucagon  Injectable 1 milliGRAM(s) IntraMuscular once PRN  insulin lispro (HumaLOG) corrective regimen sliding scale   SubCutaneous every 4 hours  metolazone 5 milliGRAM(s) Oral daily  metoprolol     tartrate 25 milliGRAM(s) Oral two times a day  morphine  - Injectable 2 milliGRAM(s) IV Push every 4 hours PRN  oxyCODONE    5 mG/acetaminophen 325 mG 1 Tablet(s) Oral every 4 hours PRN  oxyCODONE    5 mG/acetaminophen 325 mG 1 Tablet(s) Oral every 4 hours PRN  pregabalin 100 milliGRAM(s) Oral two times a day  sevelamer hydrochloride 1600 milliGRAM(s) Oral three times a day with meals  simvastatin 10 milliGRAM(s) Oral at bedtime                          9.7    12.42 )-----------( 444      ( 11 Mar 2018 00:55 )             33.0     03-11    142  |  101  |  30<H>  ----------------------------<  182<H>  4.8   |  31  |  5.0<HH>    Culture - Surgical Swab (03.08.18 @ 21:11)    Specimen Source: .Surgical Swab WOUND    Culture Results:   No growth    Culture - Surgical Swab (03.08.18 @ 21:11)    Specimen Source: .Surgical Swab WOUND    Culture Results:   No growth    Culture - Blood (03.08.18 @ 10:45)    Specimen Source: .Blood Blood    Culture Results:   No growth to date.    Culture - Blood (02.27.18 @ 06:45)    Specimen Source: .Blood Blood-Peripheral    Culture Results:   No growth at 5 days.    3/9 CXR- No focal pulmonary consolidation.  3/8 CT right leg:  Extensive subcutaneous emphysema surrounding the midfoot and ankle,   extending proximally to the distal tibia/fibula, approximately 4.5 cm   proximal to the ankle joint consistent with necrotizing fasciitis.   2. Ulcer overlying the lateral hindfoot with evidence of septic   arthritis/osteomyelitis throughout the midfoot and hindfoot as above  3. Severe neuropathic arthropathy.

## 2018-03-13 NOTE — BRIEF OPERATIVE NOTE - PROCEDURE
<<-----Click on this checkbox to enter Procedure Amputation below knee  03/13/2018  BKA REVISION  Active  [id number]

## 2018-03-14 LAB
ANION GAP SERPL CALC-SCNC: 11 MMOL/L — SIGNIFICANT CHANGE UP (ref 7–14)
BUN SERPL-MCNC: 35 MG/DL — HIGH (ref 10–20)
CALCIUM SERPL-MCNC: 8.6 MG/DL — SIGNIFICANT CHANGE UP (ref 8.5–10.1)
CHLORIDE SERPL-SCNC: 102 MMOL/L — SIGNIFICANT CHANGE UP (ref 98–110)
CO2 SERPL-SCNC: 28 MMOL/L — SIGNIFICANT CHANGE UP (ref 17–32)
CREAT SERPL-MCNC: 6.6 MG/DL — CRITICAL HIGH (ref 0.7–1.5)
CULTURE RESULTS: SIGNIFICANT CHANGE UP
CULTURE RESULTS: SIGNIFICANT CHANGE UP
GLUCOSE SERPL-MCNC: 147 MG/DL — HIGH (ref 70–110)
HCT VFR BLD CALC: 30.1 % — LOW (ref 42–52)
HGB BLD-MCNC: 8.7 G/DL — LOW (ref 14–18)
MAGNESIUM SERPL-MCNC: 2.2 MG/DL — SIGNIFICANT CHANGE UP (ref 1.8–2.4)
MCHC RBC-ENTMCNC: 24.2 PG — LOW (ref 27–31)
MCHC RBC-ENTMCNC: 28.9 G/DL — LOW (ref 32–37)
MCV RBC AUTO: 83.8 FL — SIGNIFICANT CHANGE UP (ref 80–94)
NRBC # BLD: 0 /100 WBCS — SIGNIFICANT CHANGE UP (ref 0–0)
PLATELET # BLD AUTO: 439 K/UL — HIGH (ref 130–400)
POTASSIUM SERPL-MCNC: 4 MMOL/L — SIGNIFICANT CHANGE UP (ref 3.5–5)
POTASSIUM SERPL-SCNC: 4 MMOL/L — SIGNIFICANT CHANGE UP (ref 3.5–5)
RBC # BLD: 3.59 M/UL — LOW (ref 4.7–6.1)
RBC # FLD: 17 % — HIGH (ref 11.5–14.5)
SODIUM SERPL-SCNC: 141 MMOL/L — SIGNIFICANT CHANGE UP (ref 135–146)
SPECIMEN SOURCE: SIGNIFICANT CHANGE UP
SPECIMEN SOURCE: SIGNIFICANT CHANGE UP
WBC # BLD: 16.45 K/UL — HIGH (ref 4.8–10.8)
WBC # FLD AUTO: 16.45 K/UL — HIGH (ref 4.8–10.8)

## 2018-03-14 RX ORDER — ERYTHROPOIETIN 10000 [IU]/ML
10000 INJECTION, SOLUTION INTRAVENOUS; SUBCUTANEOUS
Qty: 0 | Refills: 0 | Status: DISCONTINUED | OUTPATIENT
Start: 2018-03-14 | End: 2018-03-20

## 2018-03-14 RX ORDER — VANCOMYCIN HCL 1 G
1000 VIAL (EA) INTRAVENOUS ONCE
Qty: 0 | Refills: 0 | Status: COMPLETED | OUTPATIENT
Start: 2018-03-14 | End: 2018-03-14

## 2018-03-14 RX ORDER — MORPHINE SULFATE 50 MG/1
4 CAPSULE, EXTENDED RELEASE ORAL EVERY 6 HOURS
Qty: 0 | Refills: 0 | Status: DISCONTINUED | OUTPATIENT
Start: 2018-03-14 | End: 2018-03-20

## 2018-03-14 RX ADMIN — OXYCODONE AND ACETAMINOPHEN 1 TABLET(S): 5; 325 TABLET ORAL at 12:30

## 2018-03-14 RX ADMIN — SEVELAMER CARBONATE 1600 MILLIGRAM(S): 2400 POWDER, FOR SUSPENSION ORAL at 11:35

## 2018-03-14 RX ADMIN — Medication 80 MILLIGRAM(S): at 06:12

## 2018-03-14 RX ADMIN — AMLODIPINE BESYLATE 5 MILLIGRAM(S): 2.5 TABLET ORAL at 05:59

## 2018-03-14 RX ADMIN — Medication 10: at 17:16

## 2018-03-14 RX ADMIN — Medication 25 MILLIGRAM(S): at 06:12

## 2018-03-14 RX ADMIN — Medication 1 DROP(S): at 17:09

## 2018-03-14 RX ADMIN — Medication 100 MILLIGRAM(S): at 17:10

## 2018-03-14 RX ADMIN — Medication 250 MILLIGRAM(S): at 15:53

## 2018-03-14 RX ADMIN — MORPHINE SULFATE 2 MILLIGRAM(S): 50 CAPSULE, EXTENDED RELEASE ORAL at 01:13

## 2018-03-14 RX ADMIN — ERYTHROPOIETIN 10000 UNIT(S): 10000 INJECTION, SOLUTION INTRAVENOUS; SUBCUTANEOUS at 15:52

## 2018-03-14 RX ADMIN — Medication 2: at 02:07

## 2018-03-14 RX ADMIN — SEVELAMER CARBONATE 1600 MILLIGRAM(S): 2400 POWDER, FOR SUSPENSION ORAL at 17:09

## 2018-03-14 RX ADMIN — MORPHINE SULFATE 2 MILLIGRAM(S): 50 CAPSULE, EXTENDED RELEASE ORAL at 10:19

## 2018-03-14 RX ADMIN — MORPHINE SULFATE 2 MILLIGRAM(S): 50 CAPSULE, EXTENDED RELEASE ORAL at 05:59

## 2018-03-14 RX ADMIN — Medication 100 MILLIGRAM(S): at 06:18

## 2018-03-14 RX ADMIN — SEVELAMER CARBONATE 1600 MILLIGRAM(S): 2400 POWDER, FOR SUSPENSION ORAL at 08:29

## 2018-03-14 RX ADMIN — MORPHINE SULFATE 4 MILLIGRAM(S): 50 CAPSULE, EXTENDED RELEASE ORAL at 21:51

## 2018-03-14 RX ADMIN — MORPHINE SULFATE 4 MILLIGRAM(S): 50 CAPSULE, EXTENDED RELEASE ORAL at 21:23

## 2018-03-14 RX ADMIN — Medication 1 DROP(S): at 06:19

## 2018-03-14 RX ADMIN — OXYCODONE AND ACETAMINOPHEN 1 TABLET(S): 5; 325 TABLET ORAL at 09:30

## 2018-03-14 RX ADMIN — MORPHINE SULFATE 2 MILLIGRAM(S): 50 CAPSULE, EXTENDED RELEASE ORAL at 10:54

## 2018-03-14 RX ADMIN — Medication 20 MILLIGRAM(S): at 06:03

## 2018-03-14 RX ADMIN — Medication 6: at 10:22

## 2018-03-14 RX ADMIN — Medication 20 MILLIGRAM(S): at 17:11

## 2018-03-14 RX ADMIN — Medication 100 MILLIGRAM(S): at 06:01

## 2018-03-14 RX ADMIN — Medication 10: at 21:30

## 2018-03-14 RX ADMIN — SIMVASTATIN 10 MILLIGRAM(S): 20 TABLET, FILM COATED ORAL at 21:24

## 2018-03-14 RX ADMIN — Medication 25 MILLIGRAM(S): at 17:11

## 2018-03-14 RX ADMIN — Medication 100 MILLIGRAM(S): at 17:15

## 2018-03-14 RX ADMIN — OXYCODONE AND ACETAMINOPHEN 1 TABLET(S): 5; 325 TABLET ORAL at 08:28

## 2018-03-14 RX ADMIN — Medication 80 MILLIGRAM(S): at 17:10

## 2018-03-14 NOTE — PROGRESS NOTE ADULT - ASSESSMENT
ID recommends to continue antibiotics for 1 week, please continue ID plan and will continue to follow

## 2018-03-14 NOTE — PROGRESS NOTE ADULT - SUBJECTIVE AND OBJECTIVE BOX
[facility] NEPHROLOGY FOLLOW UP NOTE  --------------------------------------------------------------------------------  24 hour events/subjective: Patient examined. Appears comfortable.    PAST HISTORY  --------------------------------------------------------------------------------  No significant changes to PMH, PSH, FHx, SHx, unless otherwise noted    ALLERGIES & MEDICATIONS  --------------------------------------------------------------------------------  Allergies    No Known Allergies    Standing Inpatient Medications  amLODIPine   Tablet 5 milliGRAM(s) Oral daily  artificial  tears Solution 1 Drop(s) Both EYES every 12 hours  dextrose 5%. 1000 milliLiter(s) IV Continuous <Continuous>  dextrose 5%. 1000 milliLiter(s) IV Continuous <Continuous>  docusate sodium 100 milliGRAM(s) Oral two times a day  enalapril 20 milliGRAM(s) Oral two times a day  epoetin keagan Injectable 39757 Unit(s) IV Push <User Schedule>  furosemide    Tablet 80 milliGRAM(s) Oral two times a day  insulin lispro (HumaLOG) corrective regimen sliding scale   SubCutaneous every 4 hours  metolazone 5 milliGRAM(s) Oral daily  metoprolol     tartrate 25 milliGRAM(s) Oral two times a day  pregabalin 100 milliGRAM(s) Oral two times a day  sevelamer hydrochloride 1600 milliGRAM(s) Oral three times a day with meals  simvastatin 10 milliGRAM(s) Oral at bedtime    PRN Inpatient Medications  dextrose Gel 1 Dose(s) Oral once PRN  glucagon  Injectable 1 milliGRAM(s) IntraMuscular once PRN  morphine  - Injectable 2 milliGRAM(s) IV Push every 4 hours PRN  oxyCODONE    5 mG/acetaminophen 325 mG 1 Tablet(s) Oral every 4 hours PRN  oxyCODONE    5 mG/acetaminophen 325 mG 1 Tablet(s) Oral every 4 hours PRN      VITALS/PHYSICAL EXAM  --------------------------------------------------------------------------------  T(C): 37.3 (03-14-18 @ 05:07), Max: 37.3 (03-14-18 @ 05:07)  HR: 77 (03-14-18 @ 05:07) (77 - 87)  BP: 130/60 (03-14-18 @ 05:07) (130/60 - 179/78)  RR: 18 (03-14-18 @ 05:07) (18 - 18)  Height (cm): 182.88 (03-12-18 @ 17:16)  Weight (kg): 140.6 (03-12-18 @ 17:16)  BMI (kg/m2): 42 (03-12-18 @ 17:16)  BSA (m2): 2.57 (03-12-18 @ 17:16)    03-13-18 @ 07:01  -  03-14-18 @ 07:00  --------------------------------------------------------  IN: 0 mL / OUT: 1 mL / NET: -1 mL    03-14-18 @ 07:01  -  03-14-18 @ 12:48  --------------------------------------------------------  IN: 240 mL / OUT: 0 mL / NET: 240 mL    Physical Exam:  	Gen: NAD  	Pulm: CTA B/L  	CV: RRR, S1S2  	Abd: +BS, soft, nontender/nondistended  	: No suprapubic tenderness  	LE: Warm, no edema  	Vascular access: AVF    LABS/STUDIES  --------------------------------------------------------------------------------              8.7    16.45 >-----------<  439      [03-14-18 @ 01:17]              30.1     141  |  102  |  35  ----------------------------<  147      [03-14-18 @ 01:17]  4.0   |  28  |  6.6        Ca     8.6     [03-14-18 @ 01:17]      Mg     2.2     [03-14-18 @ 01:17]

## 2018-03-14 NOTE — CONSULT NOTE ADULT - ASSESSMENT
#Diabetic male with ESRD p/w necrotizing fasciitis of right lower extremity.   #Currently s/p BKA, right leg.  #Sepsis- Improving   #ESRD     IV zosyn 2.5 G q6   IV vancomycin post HD   Check level brodie.    Cultures are prelim neg.
IMPRESSION: Rehab of R BKA    PRECAUTIONS: [  ] Cardiac  [  ] Respiratory  [  ] Seizures [  ] Contact Isolation  [  ] Droplet Isolation  [  ] Other    Weight Bearing Status: NWB RLE    RECOMMENDATION:    Out of Bed to Chair     DVT/Decubiti Prophylaxis    REHAB PLAN:     [ X  ] Bedside P/T 3-5 times a week   [   ]   Bedside O/T  2-3 times a week             [   ] No Rehab Therapy Indicated                   [   ]  Speech Therapy   Conditioning/ROM                                    ADL  Bed Mobility                                               Conditioning/ROM  Transfers                                                     Bed Mobility  Sitting /Standing Balance                         Transfers                                        Gait Training                                               Sitting/Standing Balance  Stair Training [   ]Applicable                    Home equipment Eval                                                                        Splinting  [   ] Only      GOALS:   ADL   [   ]   Independent                    Transfers  [   ] Independent                          Ambulation  [   ] Independent     [  X  ] With device                            [   ]  CG                                                         [X   ]  CG                                                                  [   ] CG                            [    ] Min A                                                   [   ] Min A                                                              [ X  ] Min  A          DISCHARGE PLAN:   [   ]  Good candidate for Intensive Rehabilitation/Hospital based-[facility]                                             Will tolerate 3hrs Intensive Rehab Daily                                       [ X   ]  Short Term Rehab in Skilled Nursing Facility                                       [    ]  Home with Outpatient or  services                                         [    ]  Possible Candidate for Intensive Hospital based Rehab
Assessment and Plan: 53 male with rt yazan KRISHNAMURTHY necrotizing fasciitis    Neurologic: AAO X 3, pain control    Respiratory: stable, saturating good on room air    Cardiovascular: stable heart rate and blood pressure.     Gastrointestinal/Nutrition: on carb consistent diet    Renal/Genitourinary: ESRD on HD< however oliguric, singh catheter reinserted as patient wanted to void    Hematologic: hep s/q started    Infectious Disease: clinda, zosyn, vanco post HD    Endocrine: Insulin, monitor FS and give insulin as needed     Lines/Tubes:    Disposition: Upgraded to SICU for overnight monitoring
Right foot ulcerations with OM and superimposed cellulitis  Left foot ulceration

## 2018-03-14 NOTE — CONSULT NOTE ADULT - CONSULT REASON
Right DFU with suspected necrotizing fasciitis
Right lateral ankle wounds
S/P right guillotine BKA for  necrotizing fasciitis due to nonhealing diabetic foot ulcers
BKA
Sepsis

## 2018-03-14 NOTE — PROGRESS NOTE ADULT - SUBJECTIVE AND OBJECTIVE BOX
VASCULAR SURGERY PROGRESS NOTE    Hospital Day #6d  Post-Op Day # 2    Procedure/Dx: s/p completion of closure of below knee amputation    Events of past 24 hours: complaining of pain given morphine yesterday      PAST MEDICAL & SURGICAL HISTORY:  Blind  HTN (hypertension)  End stage renal disease  Diabetes      Vital Signs Last 24 Hrs  T(C): 37.3 (14 Mar 2018 05:07), Max: 37.3 (14 Mar 2018 05:07)  T(F): 99.1 (14 Mar 2018 05:07), Max: 99.1 (14 Mar 2018 05:07)  HR: 77 (14 Mar 2018 05:07) (72 - 92)  BP: 130/60 (14 Mar 2018 05:07) (129/72 - 179/78)  BP(mean): --  RR: 18 (14 Mar 2018 05:07) (12 - 28)  SpO2: 96% (13 Mar 2018 12:25) (96% - 98%)    Pain (0-10):            Pain Control Adequate: [] YES [] N    Diet:  Diet, Consistent Carbohydrate Renal/No Snacks      I&O's Detail    13 Mar 2018 07:01  -  14 Mar 2018 07:00  --------------------------------------------------------  IN:  Total IN: 0 mL    OUT:    Voided: 1 mL  Total OUT: 1 mL    Total NET: -1 mL      14 Mar 2018 07:01  -  14 Mar 2018 10:42  --------------------------------------------------------  IN:    Oral Fluid: 240 mL  Total IN: 240 mL    OUT:  Total OUT: 0 mL    Total NET: 240 mL          Bowel Movement:[x] YES [] NO  Flatus: [x] YES [] NO  Activity: bed rest, OOB to chair  PHYSICAL EXAM  Gen: no pallor  CV: no icterus  Lungs: s1/s2 normal  Abd: soft, non tender  Inc: closed stump present  Ext: right below knee stump present and dressed     MEDICATIONS  (STANDING):  amLODIPine   Tablet 5 milliGRAM(s) Oral daily  artificial  tears Solution 1 Drop(s) Both EYES every 12 hours  dextrose 5%. 1000 milliLiter(s) (50 mL/Hr) IV Continuous <Continuous>  dextrose 5%. 1000 milliLiter(s) (50 mL/Hr) IV Continuous <Continuous>  docusate sodium 100 milliGRAM(s) Oral two times a day  enalapril 20 milliGRAM(s) Oral two times a day  epoetin keagan Injectable 70719 Unit(s) IV Push <User Schedule>  furosemide    Tablet 80 milliGRAM(s) Oral two times a day  insulin lispro (HumaLOG) corrective regimen sliding scale   SubCutaneous every 4 hours  metolazone 5 milliGRAM(s) Oral daily  metoprolol     tartrate 25 milliGRAM(s) Oral two times a day  pregabalin 100 milliGRAM(s) Oral two times a day  sevelamer hydrochloride 1600 milliGRAM(s) Oral three times a day with meals  simvastatin 10 milliGRAM(s) Oral at bedtime    MEDICATIONS  (PRN):  dextrose Gel 1 Dose(s) Oral once PRN Blood Glucose LESS THAN 70 milliGRAM(s)/deciliter  glucagon  Injectable 1 milliGRAM(s) IntraMuscular once PRN Glucose LESS THAN 70 milligrams/deciliter  morphine  - Injectable 2 milliGRAM(s) IV Push every 4 hours PRN severe pain, breakthrough after oral meds given  oxyCODONE    5 mG/acetaminophen 325 mG 1 Tablet(s) Oral every 4 hours PRN Mild Pain (1 - 3)  oxyCODONE    5 mG/acetaminophen 325 mG 1 Tablet(s) Oral every 4 hours PRN Mild Pain (1 - 3)      DVT PROPHYLAXIS: [] YES [] NO   GI PROPHYLAXIS: [x] YES [] NO   ANTIPLATELETS: [] YES [] NO   ANTICOAGULATION: [] YES [x] NO   ANTIBIOTICS: [] YES [] NO     LAB/STUDIES:              8.7                 16.45  >---------<  439                             30.1                               141    | 102    | 35     ---------------------------<147   03-14-18 @ 01:17  4.0    | 28     | 6.6      Anion Gap:11     eGFR:9                8.6              v           03-14-18 @ 01:17  x       |  2.2              x      |x      |x                          -------------------------[x    (03-14-18 @ 01:17)           x       |x       |x                                                                         x      ----------<  x     03-14-18 @ 01:17  x        Lipase: x    03-14-18 @ 01:17  Amylase: x    03-14-18 @ 01:17  Lactate: x    03-14-18 @ 01:17        CAPILLARY BLOOD GLUCOSE  198 (14 Mar 2018 10:25)  148 (14 Mar 2018 06:10)  199 (13 Mar 2018 22:25)  246 (13 Mar 2018 16:40)  196 (13 Mar 2018 13:47)              Culture - Surgical Swab (collected 03-08-18 @ 21:11)  Source: .Surgical Swab WOUND  Preliminary Report (03-10-18 @ 11:24):    No growth    Culture - Surgical Swab (collected 03-08-18 @ 21:11)  Source: .Surgical Swab WOUND  Preliminary Report (03-10-18 @ 11:26):    No growth    Culture - Blood (collected 03-08-18 @ 10:45)  Source: .Blood Blood  Final Report (03-13-18 @ 17:01):    No growth at 5 days.    Culture - Blood (collected 02-27-18 @ 06:45)  Source: .Blood Blood-Peripheral  Final Report (03-04-18 @ 11:00):    No growth at 5 days.        IMAGING:    ASSESSMENT/PLAN:      SPECTRA: [id number]

## 2018-03-14 NOTE — CONSULT NOTE ADULT - SUBJECTIVE AND OBJECTIVE BOX
HPI:  53 male with hx HTN, DM,  ESRD on HD, chronic b/l LE cellulitis on post HD vanco,  followed by podiatry, p/w worsening b/l feet/ankle pain, Rt more than the left. On presentation in the ED, he was febrile, tachycardic, found to have rt ankle necrotizing fasciitis, septic arthritis/OM, and LUE soft tissue swelling. Pt was evaluated by vascular surgery and taken to OR for Rt yazan KRISHNAMURTHY. Prior to OR, received 1x Zosyn, Clinda. Intraop patient received vanco 1 gm. (13 Mar 2018 07:29). S/P closure of BKA      PAST MEDICAL & SURGICAL HISTORY:  Blind  HTN (hypertension)  End stage renal disease  Diabetes      Hospital Course:    TODAY'S SUBJECTIVE & REVIEW OF SYMPTOMS:     Constitutional WNL   Cardio WNL   Resp WNL   GI WNL  Heme WNL  Endo WNL  Skin WNL  MSK right leg stump pain  Neuro WNL  Cognitive WNL  Psych WNL      MEDICATIONS  (STANDING):  amLODIPine   Tablet 5 milliGRAM(s) Oral daily  artificial  tears Solution 1 Drop(s) Both EYES every 12 hours  dextrose 5%. 1000 milliLiter(s) (50 mL/Hr) IV Continuous <Continuous>  dextrose 5%. 1000 milliLiter(s) (50 mL/Hr) IV Continuous <Continuous>  docusate sodium 100 milliGRAM(s) Oral two times a day  enalapril 20 milliGRAM(s) Oral two times a day  epoetin keagan Injectable 43597 Unit(s) IV Push <User Schedule>  furosemide    Tablet 80 milliGRAM(s) Oral two times a day  insulin lispro (HumaLOG) corrective regimen sliding scale   SubCutaneous every 4 hours  metolazone 5 milliGRAM(s) Oral daily  metoprolol     tartrate 25 milliGRAM(s) Oral two times a day  pregabalin 100 milliGRAM(s) Oral two times a day  sevelamer hydrochloride 1600 milliGRAM(s) Oral three times a day with meals  simvastatin 10 milliGRAM(s) Oral at bedtime    MEDICATIONS  (PRN):  dextrose Gel 1 Dose(s) Oral once PRN Blood Glucose LESS THAN 70 milliGRAM(s)/deciliter  glucagon  Injectable 1 milliGRAM(s) IntraMuscular once PRN Glucose LESS THAN 70 milligrams/deciliter  morphine  - Injectable 2 milliGRAM(s) IV Push every 4 hours PRN severe pain, breakthrough after oral meds given  oxyCODONE    5 mG/acetaminophen 325 mG 1 Tablet(s) Oral every 4 hours PRN Mild Pain (1 - 3)  oxyCODONE    5 mG/acetaminophen 325 mG 1 Tablet(s) Oral every 4 hours PRN Mild Pain (1 - 3)  vancomycin  IVPB 1000 milliGRAM(s) IV Intermittent once PRN dialysis      FAMILY HISTORY:      Allergies    No Known Allergies    Intolerances        SOCIAL HISTORY:    [  ] Etoh  [  ] Smoking  [  ] Substance abuse     Home Environment:  [  ] Home Alone  [  ] Lives with Family  [  ] Home Health Aid    Dwelling:  [  ] Apartment  [  ] Private House  [  ] Adult Home  [  ] Skilled Nursing Facility      [ x ] Short Term  [  ] Long Term  [  ] Stairs       Elevator [  ]    FUNCTIONAL STATUS PTA: (Check all that apply)  Ambulation: [   ]Independent    [ x ] Dependent     [  ] Non-Ambulatory  Assistive Device: [  ] SA Cane  [  ]  Q Cane  [x  ] Walker  [  ]  Wheelchair  ADL : [  ] Independent  [x  ]  Dependent       Vital Signs Last 24 Hrs  T(C): 37 (14 Mar 2018 12:48), Max: 37.3 (14 Mar 2018 05:07)  T(F): 98.6 (14 Mar 2018 12:48), Max: 99.1 (14 Mar 2018 05:07)  HR: 87 (14 Mar 2018 13:30) (77 - 89)  BP: 142/49 (14 Mar 2018 13:30) (130/60 - 179/78)  BP(mean): --  RR: 15 (14 Mar 2018 13:30) (15 - 18)  SpO2: --      PHYSICAL EXAM: Alert & Oriented X3  GENERAL: NAD, well-groomed, well-developed  HEAD:  Atraumatic, Normocephalic  EYES: EOMI, PERRLA, conjunctiva and sclera clear  NECK: Supple, No JVD, Normal thyroid  CHEST/LUNG: Clear  HEART: Regular   ABDOMEN: Soft  EXTREMITIES: R BKA    NERVOUS SYSTEM:  Cranial Nerves 2-12 intact [  ] Abnormal  [  ]  ROM: WFL all extremities [  ]  Abnormal [ x ]limited rle  Motor Strength: WFL all extremities  [  ]  Abnormal [x  ]limited rle  Sensation: intact to light touch [  ] Abnormal [  ]  Reflexes: Symmetric [  ]  Abnormal [  ]    FUNCTIONAL STATUS:  Bed Mobility: Independent [  ]  Supervision [  ]  Needs Assistance [x  ]  N/A [  ]  Transfers: Independent [  ]  Supervision [  ]  Needs Assistance [x  ]  N/A [  ]   Ambulation: Independent [  ]  Supervision [  ]  Needs Assistance [  ]  N/A [  ]  ADL: Independent [  ] Requires Assistance [  ] N/A [  ]      LABS:                        8.7    16.45 )-----------( 439      ( 14 Mar 2018 01:17 )             30.1     03-14    141  |  102  |  35<H>  ----------------------------<  147<H>  4.0   |  28  |  6.6<HH>    Ca    8.6      14 Mar 2018 01:17  Mg     2.2     03-14            RADIOLOGY & ADDITIONAL STUDIES:    Assesment:

## 2018-03-14 NOTE — PROGRESS NOTE ADULT - ASSESSMENT
1. R foot necrotizing fasciitis. s/p BKA, revision today.  2. ESRD on HD TTS. HD today: 3 hours, opti 160 dialyzer, 2K bath, 4L UF.  3. Anemia. Start EPO 10,000 units IV with HD.  4. Hyperphosphatemia. Renal diet. Sevelamer with meals.  5. HTN. Continue enalapril, furosemide, and metolazone.

## 2018-03-14 NOTE — PROGRESS NOTE ADULT - SUBJECTIVE AND OBJECTIVE BOX
PROGRESS NOTE   Pt seen at bedside NAD.      Vital Signs Last 24 Hrs  T(C): 37.3 (14 Mar 2018 05:07), Max: 37.3 (14 Mar 2018 05:07)  T(F): 99.1 (14 Mar 2018 05:07), Max: 99.1 (14 Mar 2018 05:07)  HR: 77 (14 Mar 2018 05:07) (72 - 87)  BP: 130/60 (14 Mar 2018 05:07) (129/72 - 179/78)  BP(mean): --  RR: 18 (14 Mar 2018 05:07) (12 - 28)  SpO2: 96% (13 Mar 2018 12:25) (96% - 98%)                          8.7    16.45 )-----------( 439      ( 14 Mar 2018 01:17 )             30.1               03-14    141  |  102  |  35<H>  ----------------------------<  147<H>  4.0   |  28  |  6.6<HH>    Ca    8.6      14 Mar 2018 01:17  Mg     2.2     03-14        PHYSICAL EXAM  GEN: FLOR[place] HAMM is a pleasant well-nourished, well developed 53y Male in no acute distress, alert awake, and oriented to person, place and time.   LE Focused:  Derm: left plantar foot preulcerative lesion. no active sign of infection.    A/P  left preulcerative lesion plantar foot.  no surgical intervention from podiatry stand point.  DSD applied q24h.

## 2018-03-15 LAB
ANION GAP SERPL CALC-SCNC: 9 MMOL/L — SIGNIFICANT CHANGE UP (ref 7–14)
BUN SERPL-MCNC: 30 MG/DL — HIGH (ref 10–20)
CALCIUM SERPL-MCNC: 8.5 MG/DL — SIGNIFICANT CHANGE UP (ref 8.5–10.1)
CHLORIDE SERPL-SCNC: 103 MMOL/L — SIGNIFICANT CHANGE UP (ref 98–110)
CO2 SERPL-SCNC: 30 MMOL/L — SIGNIFICANT CHANGE UP (ref 17–32)
CREAT SERPL-MCNC: 6.2 MG/DL — CRITICAL HIGH (ref 0.7–1.5)
GLUCOSE SERPL-MCNC: 182 MG/DL — HIGH (ref 70–110)
HCT VFR BLD CALC: 31.7 % — LOW (ref 42–52)
HGB BLD-MCNC: 9.1 G/DL — LOW (ref 14–18)
MAGNESIUM SERPL-MCNC: 2.4 MG/DL — SIGNIFICANT CHANGE UP (ref 1.8–2.4)
MCHC RBC-ENTMCNC: 24.5 PG — LOW (ref 27–31)
MCHC RBC-ENTMCNC: 28.7 G/DL — LOW (ref 32–37)
MCV RBC AUTO: 85.4 FL — SIGNIFICANT CHANGE UP (ref 80–94)
NRBC # BLD: 0 /100 WBCS — SIGNIFICANT CHANGE UP (ref 0–0)
PLATELET # BLD AUTO: 403 K/UL — HIGH (ref 130–400)
POTASSIUM SERPL-MCNC: 4.4 MMOL/L — SIGNIFICANT CHANGE UP (ref 3.5–5)
POTASSIUM SERPL-SCNC: 4.4 MMOL/L — SIGNIFICANT CHANGE UP (ref 3.5–5)
RBC # BLD: 3.71 M/UL — LOW (ref 4.7–6.1)
RBC # FLD: 17.5 % — HIGH (ref 11.5–14.5)
SODIUM SERPL-SCNC: 142 MMOL/L — SIGNIFICANT CHANGE UP (ref 135–146)
WBC # BLD: 17.37 K/UL — HIGH (ref 4.8–10.8)
WBC # FLD AUTO: 17.37 K/UL — HIGH (ref 4.8–10.8)

## 2018-03-15 RX ORDER — METRONIDAZOLE 500 MG
500 TABLET ORAL EVERY 8 HOURS
Qty: 0 | Refills: 0 | Status: DISCONTINUED | OUTPATIENT
Start: 2018-03-15 | End: 2018-03-20

## 2018-03-15 RX ORDER — OXYCODONE AND ACETAMINOPHEN 5; 325 MG/1; MG/1
2 TABLET ORAL EVERY 4 HOURS
Qty: 0 | Refills: 0 | Status: DISCONTINUED | OUTPATIENT
Start: 2018-03-15 | End: 2018-03-20

## 2018-03-15 RX ORDER — VANCOMYCIN HCL 1 G
1000 VIAL (EA) INTRAVENOUS
Qty: 0 | Refills: 0 | Status: DISCONTINUED | OUTPATIENT
Start: 2018-03-15 | End: 2018-03-16

## 2018-03-15 RX ORDER — CIPROFLOXACIN LACTATE 400MG/40ML
750 VIAL (ML) INTRAVENOUS DAILY
Qty: 0 | Refills: 0 | Status: DISCONTINUED | OUTPATIENT
Start: 2018-03-15 | End: 2018-03-20

## 2018-03-15 RX ADMIN — Medication 20 MILLIGRAM(S): at 17:37

## 2018-03-15 RX ADMIN — OXYCODONE AND ACETAMINOPHEN 1 TABLET(S): 5; 325 TABLET ORAL at 03:29

## 2018-03-15 RX ADMIN — AMLODIPINE BESYLATE 5 MILLIGRAM(S): 2.5 TABLET ORAL at 05:52

## 2018-03-15 RX ADMIN — Medication 4: at 05:57

## 2018-03-15 RX ADMIN — MORPHINE SULFATE 4 MILLIGRAM(S): 50 CAPSULE, EXTENDED RELEASE ORAL at 06:29

## 2018-03-15 RX ADMIN — OXYCODONE AND ACETAMINOPHEN 2 TABLET(S): 5; 325 TABLET ORAL at 17:46

## 2018-03-15 RX ADMIN — MORPHINE SULFATE 4 MILLIGRAM(S): 50 CAPSULE, EXTENDED RELEASE ORAL at 20:18

## 2018-03-15 RX ADMIN — SEVELAMER CARBONATE 1600 MILLIGRAM(S): 2400 POWDER, FOR SUSPENSION ORAL at 17:35

## 2018-03-15 RX ADMIN — MORPHINE SULFATE 4 MILLIGRAM(S): 50 CAPSULE, EXTENDED RELEASE ORAL at 13:22

## 2018-03-15 RX ADMIN — OXYCODONE AND ACETAMINOPHEN 2 TABLET(S): 5; 325 TABLET ORAL at 22:34

## 2018-03-15 RX ADMIN — Medication 100 MILLIGRAM(S): at 17:36

## 2018-03-15 RX ADMIN — Medication 1 DROP(S): at 17:38

## 2018-03-15 RX ADMIN — Medication 80 MILLIGRAM(S): at 17:37

## 2018-03-15 RX ADMIN — Medication 100 MILLIGRAM(S): at 05:55

## 2018-03-15 RX ADMIN — Medication 14: at 13:12

## 2018-03-15 RX ADMIN — Medication 8: at 22:43

## 2018-03-15 RX ADMIN — MORPHINE SULFATE 4 MILLIGRAM(S): 50 CAPSULE, EXTENDED RELEASE ORAL at 13:19

## 2018-03-15 RX ADMIN — MORPHINE SULFATE 4 MILLIGRAM(S): 50 CAPSULE, EXTENDED RELEASE ORAL at 06:02

## 2018-03-15 RX ADMIN — Medication 14: at 17:39

## 2018-03-15 RX ADMIN — Medication 1 DROP(S): at 05:52

## 2018-03-15 RX ADMIN — OXYCODONE AND ACETAMINOPHEN 2 TABLET(S): 5; 325 TABLET ORAL at 16:15

## 2018-03-15 RX ADMIN — OXYCODONE AND ACETAMINOPHEN 2 TABLET(S): 5; 325 TABLET ORAL at 16:04

## 2018-03-15 RX ADMIN — Medication 25 MILLIGRAM(S): at 17:35

## 2018-03-15 RX ADMIN — OXYCODONE AND ACETAMINOPHEN 2 TABLET(S): 5; 325 TABLET ORAL at 10:17

## 2018-03-15 RX ADMIN — OXYCODONE AND ACETAMINOPHEN 1 TABLET(S): 5; 325 TABLET ORAL at 02:26

## 2018-03-15 RX ADMIN — Medication 20 MILLIGRAM(S): at 05:52

## 2018-03-15 RX ADMIN — Medication 500 MILLIGRAM(S): at 22:38

## 2018-03-15 RX ADMIN — Medication 500 MILLIGRAM(S): at 16:04

## 2018-03-15 RX ADMIN — SIMVASTATIN 10 MILLIGRAM(S): 20 TABLET, FILM COATED ORAL at 22:38

## 2018-03-15 RX ADMIN — SEVELAMER CARBONATE 1600 MILLIGRAM(S): 2400 POWDER, FOR SUSPENSION ORAL at 13:13

## 2018-03-15 RX ADMIN — MORPHINE SULFATE 4 MILLIGRAM(S): 50 CAPSULE, EXTENDED RELEASE ORAL at 20:15

## 2018-03-15 RX ADMIN — Medication 100 MILLIGRAM(S): at 05:52

## 2018-03-15 RX ADMIN — Medication 4: at 13:08

## 2018-03-15 RX ADMIN — Medication 6: at 02:23

## 2018-03-15 RX ADMIN — Medication 750 MILLIGRAM(S): at 16:04

## 2018-03-15 RX ADMIN — Medication 80 MILLIGRAM(S): at 05:56

## 2018-03-15 RX ADMIN — Medication 250 MILLIGRAM(S): at 16:06

## 2018-03-15 RX ADMIN — Medication 25 MILLIGRAM(S): at 05:53

## 2018-03-15 RX ADMIN — Medication 100 MILLIGRAM(S): at 17:39

## 2018-03-15 NOTE — PHYSICAL THERAPY INITIAL EVALUATION ADULT - IMPAIRMENTS FOUND, PT EVAL
August 9, 2017      Hayde LUAJN Say  [address]  [place] [address]        Dear Hayde,    Your stool test from last week showed that you still have an infection with h pylori, a bacteria that is probably causing ulcers in your stomach. I think this is why you still have abdominal pain. I sent 3 medicines to your pharmacy that you should take two times per day for the next two weeks. You should also continue to take the omeprazole two times per day. Some of the medicines are the same as they ones you had before, and there is one new medicine. It is important that you do not drink any alcohol while taking these medicines. If you need to break the capsules in order to swallow the medicines, mix them with a little bit of food, such as applesauce. Please call the clinic if you have any questions about your new medications or how to take them. Please schedule an appointment to follow up with Dr. Rivas within 1 month.     Please see below for your test results.    Resulted Orders   H. Pylori Agn Fecal ([facility])   Result Value Ref Range    H Pylori Antigen Positive (A) Negative      Comment:         Test Performed by:  [address] [id number]      Narrative    Test performed by:  [facility]  [address] [place] [id number]       If you have any questions, please call the clinic to make an appointment.    Sincerely,    Dr. Pagan  
gait, locomotion, and balance/muscle strength/anthropometric characteristics

## 2018-03-15 NOTE — PROGRESS NOTE ADULT - ASSESSMENT
1. R foot necrotizing fasciitis. s/p BKA.  2. ESRD on HD TTS. HD tomorrow: 3 hours, opti 160 dialyzer, 2K bath, 4L UF. Blood clots in HD needles yesterday with poor blood flow; check AVF duplex.  3. Anemia. EPO 10,000 units IV with HD.  4. Hyperphosphatemia. Renal diet. Sevelamer with meals.  5. HTN. Continue enalapril, metoprolol, amlodipine, furosemide, and metolazone.

## 2018-03-15 NOTE — PHYSICAL THERAPY INITIAL EVALUATION ADULT - LEVEL OF INDEPENDENCE: SIT/STAND, REHAB EVAL
Patiuent achieved lift off , but unable to achieve full standing position and place second hand on rolling walker   x2 trials/unable to perform

## 2018-03-15 NOTE — DIETITIAN INITIAL EVALUATION ADULT. - ORAL INTAKE PTA
good/Pt reports good appetite/po intake and denies use of oral nutrition supplements. Pt doesn't follow a specific diet at home and has NKFA.

## 2018-03-15 NOTE — DIETITIAN INITIAL EVALUATION ADULT. - OTHER INFO
Pt p/w R foot necrotizing fasciitis s/p BKA and left plantar foot lesion. ESRD on HD TTS. Reason for Assessment: LOS

## 2018-03-15 NOTE — PROGRESS NOTE ADULT - SUBJECTIVE AND OBJECTIVE BOX
[facility] FOLLOW UP NOTE  --------------------------------------------------------------------------------  24 hour events/subjective: Patient examined. Appears comfortable.    PAST HISTORY  --------------------------------------------------------------------------------  No significant changes to PMH, PSH, FHx, SHx, unless otherwise noted    ALLERGIES & MEDICATIONS  --------------------------------------------------------------------------------  Allergies    No Known Allergies    Standing Inpatient Medications  amLODIPine   Tablet 5 milliGRAM(s) Oral daily  artificial  tears Solution 1 Drop(s) Both EYES every 12 hours  dextrose 5%. 1000 milliLiter(s) IV Continuous <Continuous>  dextrose 5%. 1000 milliLiter(s) IV Continuous <Continuous>  docusate sodium 100 milliGRAM(s) Oral two times a day  enalapril 20 milliGRAM(s) Oral two times a day  epoetin keagan Injectable 20447 Unit(s) IV Push <User Schedule>  furosemide    Tablet 80 milliGRAM(s) Oral two times a day  insulin lispro (HumaLOG) corrective regimen sliding scale   SubCutaneous every 4 hours  metolazone 5 milliGRAM(s) Oral daily  metoprolol     tartrate 25 milliGRAM(s) Oral two times a day  pregabalin 100 milliGRAM(s) Oral two times a day  sevelamer hydrochloride 1600 milliGRAM(s) Oral three times a day with meals  simvastatin 10 milliGRAM(s) Oral at bedtime    PRN Inpatient Medications  dextrose Gel 1 Dose(s) Oral once PRN  glucagon  Injectable 1 milliGRAM(s) IntraMuscular once PRN  morphine  - Injectable 4 milliGRAM(s) IV Push every 6 hours PRN  oxyCODONE    5 mG/acetaminophen 325 mG 2 Tablet(s) Oral every 4 hours PRN    VITALS/PHYSICAL EXAM  --------------------------------------------------------------------------------  T(C): 36.2 (03-15-18 @ 05:38), Max: 37 (03-14-18 @ 12:48)  HR: 72 (03-15-18 @ 05:38) (72 - 93)  BP: 151/71 (03-15-18 @ 05:38) (114/59 - 151/71)  RR: 18 (03-15-18 @ 05:38) (15 - 18)    03-14-18 @ 07:01  -  03-15-18 @ 07:00  --------------------------------------------------------  IN: 240 mL / OUT: 2100 mL / NET: -1860 mL    Physical Exam:  	Gen: NAD  	Pulm: CTA B/L  	CV: RRR, S1S2  	Abd: +BS, soft, nontender/nondistended  	: No suprapubic tenderness  	LE: Warm,  no edema  	Vascular access: AVF with good thrill/bruit    LABS/STUDIES  --------------------------------------------------------------------------------              9.1    17.37 >-----------<  403      [03-15-18 @ 01:27]              31.7     142  |  103  |  30  ----------------------------<  182      [03-15-18 @ 01:27]  4.4   |  30  |  6.2        Ca     8.5     [03-15-18 @ 01:27]      Mg     2.4     [03-15-18 @ 01:27]

## 2018-03-15 NOTE — PHYSICAL THERAPY INITIAL EVALUATION ADULT - GENERAL OBSERVATIONS, REHAB EVAL
13:55-14:30 35 min Patient encountered semi ron in bed + IV lock, + R stump bandaged , L foot bandaged , NAD receptive to PT  , family present at bed side

## 2018-03-15 NOTE — PROGRESS NOTE ADULT - SUBJECTIVE AND OBJECTIVE BOX
PT with s/p Right BKA and left plantar foot lesion.       Vital Signs Last 24 Hrs  T(C): 36.2 (15 Mar 2018 05:38), Max: 37 (14 Mar 2018 12:48)  T(F): 97.1 (15 Mar 2018 05:38), Max: 98.6 (14 Mar 2018 12:48)  HR: 72 (15 Mar 2018 05:38) (72 - 93)  BP: 151/71 (15 Mar 2018 05:38) (114/59 - 151/71)  BP(mean): --  RR: 18 (15 Mar 2018 05:38) (15 - 18)  SpO2: --  PROGRESS NOTE   Pt seen at bedside NAD.                          9.1    17.37 )-----------( 403      ( 15 Mar 2018 01:27 )             31.7     LE Focused:  Derm: left plantar foot preulcerative lesion. no active sign of infection.    A/P  left preulcerative lesion plantar foot.  no surgical intervention from podiatry stand point.  DSD applied q24h.  F/u with podiatry in Clinic  Recall podiatry PRN

## 2018-03-15 NOTE — DIETITIAN INITIAL EVALUATION ADULT. - ENERGY NEEDS
Estimated Calorie Needs: 4264-1645 kcal/day (25-30 kcal/kg IBW)  Estimated Protein Needs:  gm/day (1.2-1.3 gm/kg IBW)-- higher d/t HD   Estimated Fluid Needs: urine output + 1000ml

## 2018-03-15 NOTE — PROGRESS NOTE ADULT - SUBJECTIVE AND OBJECTIVE BOX
VASCULAR SURGERY PROGRESS NOTE    Hospital Day #7d  Post-Op Day # 2    Procedure/Dx: s/p completion of right bka closure      PAST MEDICAL & SURGICAL HISTORY:  Blind  HTN (hypertension)  End stage renal disease  Diabetes      Vital Signs Last 24 Hrs  T(C): 36.7 (14 Mar 2018 21:27), Max: 37.3 (14 Mar 2018 05:07)  T(F): 98.1 (14 Mar 2018 21:27), Max: 99.1 (14 Mar 2018 05:07)  HR: 80 (14 Mar 2018 21:27) (74 - 93)  BP: 114/59 (14 Mar 2018 21:27) (114/59 - 147/65)  BP(mean): --  RR: 18 (14 Mar 2018 21:27) (15 - 18)  SpO2: --    Pain (0-10):            Pain Control Adequate: [] YES [] N    Diet:  Diet, Consistent Carbohydrate Renal/No Snacks      I&O's Detail    13 Mar 2018 07:01  -  14 Mar 2018 07:00  --------------------------------------------------------  IN:  Total IN: 0 mL    OUT:    Voided: 1 mL  Total OUT: 1 mL    Total NET: -1 mL      14 Mar 2018 07:01  -  15 Mar 2018 00:57  --------------------------------------------------------  IN:    Oral Fluid: 240 mL  Total IN: 240 mL    OUT:    Other: 2100 mL  Total OUT: 2100 mL    Total NET: -1860 mL          Bowel Movement:[x] YES [] NO  Flatus: [x] YES [] NO  Activity:   PHYSICAL EXAM  Gen: no pallor  CV: s1/s2 normal  Lungs: b/l clear  Abd: soft, non tender  Inc: right bka stump closed and in splint      MEDICATIONS  (STANDING):  amLODIPine   Tablet 5 milliGRAM(s) Oral daily  artificial  tears Solution 1 Drop(s) Both EYES every 12 hours  dextrose 5%. 1000 milliLiter(s) (50 mL/Hr) IV Continuous <Continuous>  dextrose 5%. 1000 milliLiter(s) (50 mL/Hr) IV Continuous <Continuous>  docusate sodium 100 milliGRAM(s) Oral two times a day  enalapril 20 milliGRAM(s) Oral two times a day  epoetin keagan Injectable 27830 Unit(s) IV Push <User Schedule>  furosemide    Tablet 80 milliGRAM(s) Oral two times a day  insulin lispro (HumaLOG) corrective regimen sliding scale   SubCutaneous every 4 hours  metolazone 5 milliGRAM(s) Oral daily  metoprolol     tartrate 25 milliGRAM(s) Oral two times a day  pregabalin 100 milliGRAM(s) Oral two times a day  sevelamer hydrochloride 1600 milliGRAM(s) Oral three times a day with meals  simvastatin 10 milliGRAM(s) Oral at bedtime    MEDICATIONS  (PRN):  dextrose Gel 1 Dose(s) Oral once PRN Blood Glucose LESS THAN 70 milliGRAM(s)/deciliter  glucagon  Injectable 1 milliGRAM(s) IntraMuscular once PRN Glucose LESS THAN 70 milligrams/deciliter  morphine  - Injectable 4 milliGRAM(s) IV Push every 6 hours PRN Severe Pain (7 - 10)  oxyCODONE    5 mG/acetaminophen 325 mG 1 Tablet(s) Oral every 4 hours PRN Mild Pain (1 - 3)  oxyCODONE    5 mG/acetaminophen 325 mG 1 Tablet(s) Oral every 4 hours PRN Mild Pain (1 - 3)      DVT PROPHYLAXIS: x[x] YES [] NO   GI PROPHYLAXIS: [x] YES [] NO   ANTIPLATELETS: [] YES [] NO   ANTICOAGULATION: [] YES [] NO   ANTIBIOTICS: [] YES [] NO     LAB/STUDIES:              8.7                 16.45  >---------<  439                             30.1                               141    | 102    | 35     ---------------------------<147   03-14-18 @ 01:17  4.0    | 28     | 6.6      Anion Gap:11     eGFR:9                8.6              v           03-14-18 @ 01:17  x       |  2.2              x      |x      |x                          -------------------------[x    (03-14-18 @ 01:17)           x       |x       |x                                                                         x      ----------<  x     03-14-18 @ 01:17  x        Lipase: x    03-14-18 @ 01:17  Amylase: x    03-14-18 @ 01:17  Lactate: x    03-14-18 @ 01:17        CAPILLARY BLOOD GLUCOSE  269 (14 Mar 2018 21:31)  261 (14 Mar 2018 16:45)  198 (14 Mar 2018 10:25)  148 (14 Mar 2018 06:10)              Culture - Surgical Swab (collected 03-08-18 @ 21:11)  Source: .Surgical Swab WOUND  Final Report (03-14-18 @ 11:53):    No growth at 5 days    Culture - Surgical Swab (collected 03-08-18 @ 21:11)  Source: .Surgical Swab WOUND  Final Report (03-14-18 @ 12:05):    No growth at 5 days    Culture - Blood (collected 03-08-18 @ 10:45)  Source: .Blood Blood  Final Report (03-13-18 @ 17:01):    No growth at 5 days.    Culture - Blood (collected 02-27-18 @ 06:45)  Source: .Blood Blood-Peripheral  Final Report (03-04-18 @ 11:00):    No growth at 5 days.        IMAGING:    ASSESSMENT/PLAN:      SPECTRA: [id number]

## 2018-03-16 RX ORDER — VANCOMYCIN HCL 1 G
1000 VIAL (EA) INTRAVENOUS
Qty: 0 | Refills: 0 | Status: DISCONTINUED | OUTPATIENT
Start: 2018-03-16 | End: 2018-03-20

## 2018-03-16 RX ORDER — ALPRAZOLAM 0.25 MG
0.25 TABLET ORAL DAILY
Qty: 0 | Refills: 0 | Status: DISCONTINUED | OUTPATIENT
Start: 2018-03-16 | End: 2018-03-20

## 2018-03-16 RX ADMIN — SEVELAMER CARBONATE 1600 MILLIGRAM(S): 2400 POWDER, FOR SUSPENSION ORAL at 17:10

## 2018-03-16 RX ADMIN — OXYCODONE AND ACETAMINOPHEN 2 TABLET(S): 5; 325 TABLET ORAL at 20:20

## 2018-03-16 RX ADMIN — Medication 80 MILLIGRAM(S): at 06:53

## 2018-03-16 RX ADMIN — Medication 100 MILLIGRAM(S): at 17:07

## 2018-03-16 RX ADMIN — Medication 0.25 MILLIGRAM(S): at 21:18

## 2018-03-16 RX ADMIN — SIMVASTATIN 10 MILLIGRAM(S): 20 TABLET, FILM COATED ORAL at 21:23

## 2018-03-16 RX ADMIN — Medication 4: at 02:13

## 2018-03-16 RX ADMIN — Medication 500 MILLIGRAM(S): at 06:53

## 2018-03-16 RX ADMIN — Medication 6: at 08:41

## 2018-03-16 RX ADMIN — Medication 500 MILLIGRAM(S): at 12:44

## 2018-03-16 RX ADMIN — Medication 750 MILLIGRAM(S): at 12:45

## 2018-03-16 RX ADMIN — Medication 1 DROP(S): at 17:06

## 2018-03-16 RX ADMIN — Medication 1 DROP(S): at 06:52

## 2018-03-16 RX ADMIN — Medication 25 MILLIGRAM(S): at 06:53

## 2018-03-16 RX ADMIN — AMLODIPINE BESYLATE 5 MILLIGRAM(S): 2.5 TABLET ORAL at 06:52

## 2018-03-16 RX ADMIN — MORPHINE SULFATE 4 MILLIGRAM(S): 50 CAPSULE, EXTENDED RELEASE ORAL at 14:24

## 2018-03-16 RX ADMIN — Medication 80 MILLIGRAM(S): at 17:07

## 2018-03-16 RX ADMIN — OXYCODONE AND ACETAMINOPHEN 2 TABLET(S): 5; 325 TABLET ORAL at 19:48

## 2018-03-16 RX ADMIN — Medication 100 MILLIGRAM(S): at 06:53

## 2018-03-16 RX ADMIN — Medication 250 MILLIGRAM(S): at 14:56

## 2018-03-16 RX ADMIN — MORPHINE SULFATE 4 MILLIGRAM(S): 50 CAPSULE, EXTENDED RELEASE ORAL at 02:11

## 2018-03-16 RX ADMIN — Medication 25 MILLIGRAM(S): at 17:09

## 2018-03-16 RX ADMIN — Medication 20 MILLIGRAM(S): at 21:21

## 2018-03-16 RX ADMIN — Medication 8: at 17:04

## 2018-03-16 RX ADMIN — ERYTHROPOIETIN 10000 UNIT(S): 10000 INJECTION, SOLUTION INTRAVENOUS; SUBCUTANEOUS at 14:55

## 2018-03-16 RX ADMIN — MORPHINE SULFATE 4 MILLIGRAM(S): 50 CAPSULE, EXTENDED RELEASE ORAL at 10:11

## 2018-03-16 RX ADMIN — Medication 500 MILLIGRAM(S): at 21:18

## 2018-03-16 RX ADMIN — Medication 2: at 12:47

## 2018-03-16 RX ADMIN — Medication 20 MILLIGRAM(S): at 06:53

## 2018-03-16 RX ADMIN — Medication 100 MILLIGRAM(S): at 17:10

## 2018-03-16 RX ADMIN — SEVELAMER CARBONATE 1600 MILLIGRAM(S): 2400 POWDER, FOR SUSPENSION ORAL at 12:38

## 2018-03-16 NOTE — PROGRESS NOTE ADULT - SUBJECTIVE AND OBJECTIVE BOX
Patient was examined during HD treatment.    Hemodialysis Prescription:  	Access: AVF  	Dialyzer: Opti 160  	Blood Flow (mL/Min): 400  	Dialysate Flow (mL/Min): 500  	Target UF (Liters): 4  	Treatment Time: 3 hours  	Potassium: 2K  	Calcium: 2.5

## 2018-03-16 NOTE — PROGRESS NOTE ADULT - ASSESSMENT
1. R foot necrotizing fasciitis. s/p BKA. On abx.  2. ESRD on HD TTS. HD today: 3 hours, opti 160 dialyzer, 2K bath, 4L UF. AVF duplex shows stenosis, vascular f/u.  3. Anemia. EPO 10,000 units IV with HD.  4. Hyperphosphatemia. Renal diet. Sevelamer with meals.  5. HTN. Continue enalapril, metoprolol, amlodipine, furosemide, and metolazone.

## 2018-03-17 LAB
ANION GAP SERPL CALC-SCNC: 12 MMOL/L — SIGNIFICANT CHANGE UP (ref 7–14)
BUN SERPL-MCNC: 31 MG/DL — HIGH (ref 10–20)
CALCIUM SERPL-MCNC: 8.5 MG/DL — SIGNIFICANT CHANGE UP (ref 8.5–10.1)
CHLORIDE SERPL-SCNC: 97 MMOL/L — LOW (ref 98–110)
CO2 SERPL-SCNC: 29 MMOL/L — SIGNIFICANT CHANGE UP (ref 17–32)
CREAT SERPL-MCNC: 5.9 MG/DL — CRITICAL HIGH (ref 0.7–1.5)
GLUCOSE SERPL-MCNC: 143 MG/DL — HIGH (ref 70–110)
HCT VFR BLD CALC: 30.8 % — LOW (ref 42–52)
HGB BLD-MCNC: 8.7 G/DL — LOW (ref 14–18)
MCHC RBC-ENTMCNC: 24.2 PG — LOW (ref 27–31)
MCHC RBC-ENTMCNC: 28.2 G/DL — LOW (ref 32–37)
MCV RBC AUTO: 85.8 FL — SIGNIFICANT CHANGE UP (ref 80–94)
NRBC # BLD: 0 /100 WBCS — SIGNIFICANT CHANGE UP (ref 0–0)
PLATELET # BLD AUTO: 383 K/UL — SIGNIFICANT CHANGE UP (ref 130–400)
POTASSIUM SERPL-MCNC: 4.2 MMOL/L — SIGNIFICANT CHANGE UP (ref 3.5–5)
POTASSIUM SERPL-SCNC: 4.2 MMOL/L — SIGNIFICANT CHANGE UP (ref 3.5–5)
RBC # BLD: 3.59 M/UL — LOW (ref 4.7–6.1)
RBC # FLD: 18.1 % — HIGH (ref 11.5–14.5)
SODIUM SERPL-SCNC: 138 MMOL/L — SIGNIFICANT CHANGE UP (ref 135–146)
WBC # BLD: 15.85 K/UL — HIGH (ref 4.8–10.8)
WBC # FLD AUTO: 15.85 K/UL — HIGH (ref 4.8–10.8)

## 2018-03-17 RX ORDER — SENNA PLUS 8.6 MG/1
1 TABLET ORAL ONCE
Qty: 0 | Refills: 0 | Status: COMPLETED | OUTPATIENT
Start: 2018-03-17 | End: 2018-03-17

## 2018-03-17 RX ORDER — SENNA PLUS 8.6 MG/1
1 TABLET ORAL
Qty: 0 | Refills: 0 | Status: DISCONTINUED | OUTPATIENT
Start: 2018-03-17 | End: 2018-03-20

## 2018-03-17 RX ORDER — MAGNESIUM HYDROXIDE 400 MG/1
30 TABLET, CHEWABLE ORAL DAILY
Qty: 0 | Refills: 0 | Status: DISCONTINUED | OUTPATIENT
Start: 2018-03-17 | End: 2018-03-18

## 2018-03-17 RX ADMIN — OXYCODONE AND ACETAMINOPHEN 2 TABLET(S): 5; 325 TABLET ORAL at 08:23

## 2018-03-17 RX ADMIN — Medication 6: at 10:44

## 2018-03-17 RX ADMIN — Medication 20 MILLIGRAM(S): at 17:41

## 2018-03-17 RX ADMIN — Medication 750 MILLIGRAM(S): at 13:06

## 2018-03-17 RX ADMIN — Medication 500 MILLIGRAM(S): at 22:19

## 2018-03-17 RX ADMIN — OXYCODONE AND ACETAMINOPHEN 2 TABLET(S): 5; 325 TABLET ORAL at 08:50

## 2018-03-17 RX ADMIN — Medication 100 MILLIGRAM(S): at 17:38

## 2018-03-17 RX ADMIN — Medication 2: at 22:15

## 2018-03-17 RX ADMIN — SENNA PLUS 1 TABLET(S): 8.6 TABLET ORAL at 22:19

## 2018-03-17 RX ADMIN — SEVELAMER CARBONATE 1600 MILLIGRAM(S): 2400 POWDER, FOR SUSPENSION ORAL at 08:08

## 2018-03-17 RX ADMIN — Medication 4: at 02:15

## 2018-03-17 RX ADMIN — Medication 25 MILLIGRAM(S): at 17:40

## 2018-03-17 RX ADMIN — AMLODIPINE BESYLATE 5 MILLIGRAM(S): 2.5 TABLET ORAL at 05:52

## 2018-03-17 RX ADMIN — Medication 100 MILLIGRAM(S): at 05:52

## 2018-03-17 RX ADMIN — Medication 25 MILLIGRAM(S): at 05:52

## 2018-03-17 RX ADMIN — SENNA PLUS 1 TABLET(S): 8.6 TABLET ORAL at 11:40

## 2018-03-17 RX ADMIN — SEVELAMER CARBONATE 1600 MILLIGRAM(S): 2400 POWDER, FOR SUSPENSION ORAL at 16:37

## 2018-03-17 RX ADMIN — Medication 100 MILLIGRAM(S): at 17:42

## 2018-03-17 RX ADMIN — Medication 1 DROP(S): at 17:38

## 2018-03-17 RX ADMIN — Medication 500 MILLIGRAM(S): at 13:07

## 2018-03-17 RX ADMIN — OXYCODONE AND ACETAMINOPHEN 2 TABLET(S): 5; 325 TABLET ORAL at 00:36

## 2018-03-17 RX ADMIN — Medication 100 MILLIGRAM(S): at 05:56

## 2018-03-17 RX ADMIN — Medication 2: at 06:02

## 2018-03-17 RX ADMIN — Medication 20 MILLIGRAM(S): at 06:32

## 2018-03-17 RX ADMIN — Medication 500 MILLIGRAM(S): at 06:31

## 2018-03-17 RX ADMIN — Medication 6: at 14:32

## 2018-03-17 RX ADMIN — Medication 0.25 MILLIGRAM(S): at 22:24

## 2018-03-17 RX ADMIN — Medication 80 MILLIGRAM(S): at 17:39

## 2018-03-17 RX ADMIN — Medication 1 DROP(S): at 05:53

## 2018-03-17 RX ADMIN — Medication 6: at 17:36

## 2018-03-17 RX ADMIN — OXYCODONE AND ACETAMINOPHEN 2 TABLET(S): 5; 325 TABLET ORAL at 00:06

## 2018-03-17 RX ADMIN — SEVELAMER CARBONATE 1600 MILLIGRAM(S): 2400 POWDER, FOR SUSPENSION ORAL at 11:36

## 2018-03-17 RX ADMIN — SENNA PLUS 1 TABLET(S): 8.6 TABLET ORAL at 17:40

## 2018-03-17 RX ADMIN — Medication 80 MILLIGRAM(S): at 05:52

## 2018-03-17 RX ADMIN — SIMVASTATIN 10 MILLIGRAM(S): 20 TABLET, FILM COATED ORAL at 22:20

## 2018-03-17 RX ADMIN — OXYCODONE AND ACETAMINOPHEN 2 TABLET(S): 5; 325 TABLET ORAL at 14:34

## 2018-03-17 RX ADMIN — OXYCODONE AND ACETAMINOPHEN 2 TABLET(S): 5; 325 TABLET ORAL at 15:05

## 2018-03-17 NOTE — PROGRESS NOTE ADULT - SUBJECTIVE AND OBJECTIVE BOX
[place] [facility] FOLLOW UP NOTE  --------------------------------------------------------------------------------   52 yo wm with pmh of HTN, DM,  ESRD on HD, chronic b/l LE cellulitis, s/p right BKA 3/8 and R BKA completion 3/13.      24 hour events/subjective: Patient examined. Appears comfortable.    PAST HISTORY  --------------------------------------------------------------------------------  No significant changes to PMH, PSH, FHx, SHx, unless otherwise noted    ALLERGIES & MEDICATIONS  --------------------------------------------------------------------------------  Allergies    No Known Allergies      Physical Exam:  	Gen: NAD  	Pulm: CTA B/L  	CV: RRR, S1S2  	Abd: +BS, soft, nontender/nondistended  	: No suprapubic tenderness  	LE: Warm,  no edema  	Vascular access: AVF with good thrill/bruit    [facility] Medications:   MEDICATIONS  (STANDING):  amLODIPine   Tablet 5 milliGRAM(s) Oral daily  artificial  tears Solution 1 Drop(s) Both EYES every 12 hours  ciprofloxacin     Tablet 750 milliGRAM(s) Oral daily  dextrose 5%. 1000 milliLiter(s) (50 mL/Hr) IV Continuous <Continuous>  dextrose 5%. 1000 milliLiter(s) (50 mL/Hr) IV Continuous <Continuous>  docusate sodium 100 milliGRAM(s) Oral two times a day  enalapril 20 milliGRAM(s) Oral two times a day  epoetin keagan Injectable 67617 Unit(s) IV Push <User Schedule>  furosemide    Tablet 80 milliGRAM(s) Oral two times a day  insulin lispro (HumaLOG) corrective regimen sliding scale   SubCutaneous every 4 hours  metolazone 5 milliGRAM(s) Oral daily  metoprolol     tartrate 25 milliGRAM(s) Oral two times a day  metroNIDAZOLE    Tablet 500 milliGRAM(s) Oral every 8 hours  pregabalin 100 milliGRAM(s) Oral two times a day  sevelamer hydrochloride 1600 milliGRAM(s) Oral three times a day with meals  simvastatin 10 milliGRAM(s) Oral at bedtime  vancomycin  IVPB 1000 milliGRAM(s) IV Intermittent <User Schedule>        VITALS:  T(F): 97.7 (03-17-18 @ 05:36), Max: 98.7 (03-16-18 @ 17:01)  HR: 77 (03-17-18 @ 05:36)  BP: 170/71 (03-17-18 @ 05:36)  RR: 20 (03-16-18 @ 20:45)  SpO2: --  Wt(kg): --    03-15 @ 07:01  -  03-16 @ 07:00  --------------------------------------------------------  IN: 0 mL / OUT: 300 mL / NET: -300 mL    03-16 @ 07:01  -  03-17 @ 07:00  --------------------------------------------------------  IN: 0 mL / OUT: 3570 mL / NET: -3570 mL          LABS:  03-17    138  |  97<L>  |  31<H>  ----------------------------<  143<H>  4.2   |  29  |  5.9<HH>    Ca    8.5      17 Mar 2018 01:12                            8.7    15.85 )-----------( 383      ( 17 Mar 2018 01:12 )             30.8       Urine Studies:        RADIOLOGY & ADDITIONAL STUDIES: [place] [facility] FOLLOW UP NOTE  --------------------------------------------------------------------------------   52 yo wm with pmh of HTN, DM,  ESRD on HD, chronic b/l LE cellulitis, s/p right BKA 3/8 and R BKA completion 3/13.      spoke with vascular, hd nursing and pt's mother on phone.  No overnight events.  Last Hd yesterday.    PAST HISTORY  --------------------------------------------------------------------------------  No significant changes to PMH, PSH, FHx, SHx, unless otherwise noted    ALLERGIES & MEDICATIONS  --------------------------------------------------------------------------------  Allergies    No Known Allergies      Physical Exam: obese  	Gen: NAD  	Pulm: CTA B/L  	CV: RRR, S1S2  	Abd: +BS, soft, nontender/nondistended  	: No suprapubic tenderness  	LE: Warm,  no edema  	Vascular access: AVF with good thrill/bruit    Hospital Medications:   MEDICATIONS  (STANDING):  amLODIPine   Tablet 5 milliGRAM(s) Oral daily  artificial  tears Solution 1 Drop(s) Both EYES every 12 hours  ciprofloxacin     Tablet 750 milliGRAM(s) Oral daily  dextrose 5%. 1000 milliLiter(s) (50 mL/Hr) IV Continuous <Continuous>  dextrose 5%. 1000 milliLiter(s) (50 mL/Hr) IV Continuous <Continuous>  docusate sodium 100 milliGRAM(s) Oral two times a day  enalapril 20 milliGRAM(s) Oral two times a day  epoetin keagan Injectable 26020 Unit(s) IV Push <User Schedule>  furosemide    Tablet 80 milliGRAM(s) Oral two times a day  insulin lispro (HumaLOG) corrective regimen sliding scale   SubCutaneous every 4 hours  metolazone 5 milliGRAM(s) Oral daily  metoprolol     tartrate 25 milliGRAM(s) Oral two times a day  metroNIDAZOLE    Tablet 500 milliGRAM(s) Oral every 8 hours  pregabalin 100 milliGRAM(s) Oral two times a day  sevelamer hydrochloride 1600 milliGRAM(s) Oral three times a day with meals  simvastatin 10 milliGRAM(s) Oral at bedtime  vancomycin  IVPB 1000 milliGRAM(s) IV Intermittent <User Schedule>    Advance care planning and directives and coordination of care d/w pt and family    VITALS:  T(F): 97.7 (03-17-18 @ 05:36), Max: 98.7 (03-16-18 @ 17:01)  HR: 77 (03-17-18 @ 05:36)  BP: 170/71 (03-17-18 @ 05:36)  RR: 20 (03-16-18 @ 20:45)  SpO2: --  Wt(kg): --    03-15 @ 07:01  -  03-16 @ 07:00  --------------------------------------------------------  IN: 0 mL / OUT: 300 mL / NET: -300 mL    03-16 @ 07:01  -  03-17 @ 07:00  --------------------------------------------------------  IN: 0 mL / OUT: 3570 mL / NET: -3570 mL          LABS:  03-17    138  |  97<L>  |  31<H>  ----------------------------<  143<H>  4.2   |  29  |  5.9<HH>    Ca    8.5      17 Mar 2018 01:12                            8.7    15.85 )-----------( 383      ( 17 Mar 2018 01:12 )             30.8       Urine Studies:        RADIOLOGY & ADDITIONAL STUDIES:

## 2018-03-17 NOTE — PROGRESS NOTE ADULT - ASSESSMENT
ESRD on HD (MWF inpt and TTS oupt)  S/p Right BKA due to necrotizing fascitis  HTN  DM  Anemia on epo  AVF dysfunction - clots, poor flow - duplex with high grade distal vein stenosis    next hd monday  vanco / cipro / flagyl  epo wuth hd  f/u h/h  f/u vasc re fistulagram  no change in bp meds and diuretics  pt / rehab ESRD on HD (MWF inpt and TTS oupt)  S/p Right BKA due to necrotizing fascitis  HTN  DM  Anemia on epo  AVF dysfunction - clots, poor flow - duplex with high grade distal vein stenosis    next hd monday  fistulagram on Tuesday  vanco / cipro / flagyl  epo with hd  f/u h/h  no change in bp meds and diuretics  insulin regimen, check FS  pt / rehab  full code

## 2018-03-17 NOTE — PROGRESS NOTE ADULT - SUBJECTIVE AND OBJECTIVE BOX
[facility] [address]  [place]  53yMale  [id number]    SURGICAL INTERN PROGRESS NOTE  HPI: 53 male with hx HTN, DM,  ESRD on HD, chronic b/l LE cellulitis s/p right BKA 3/8 and RBKA completion 3/13    Home Medications:  amLODIPine 5 mg oral tablet: 1 tab(s) orally once a day (27 Feb 2018 05:44)  Colace 100 mg oral capsule: 1 cap(s) orally 2 times a day (27 Feb 2018 05:44)  enalapril 10 mg oral tablet: 1 tab(s) orally once a day (27 Feb 2018 05:44)  furosemide 80 mg oral tablet: 1 tab(s) orally once a day (27 Feb 2018 05:44)  Lyrica 100 mg oral capsule: 1 cap(s) orally 2 times a day (27 Feb 2018 05:44)  metOLazone 5 mg oral tablet: 1 tab(s) orally once a day (27 Feb 2018 05:44)  Metoprolol Tartrate 25 mg oral tablet: 1 tab(s) orally 2 times a day (27 Feb 2018 05:44)  Renvela 800 mg oral tablet: 1 tab(s) orally 3 times a day (with meals) (27 Feb 2018 05:44)  simvastatin 10 mg oral tablet: 1 tab(s) orally once a day (at bedtime) (27 Feb 2018 05:44)    OVERNIGHT EVENTS: Patient complained of SOB. Was vitally stable. Lungs CTA, RR 14. Resolved with reassurance   VITALS: Vital Signs Last 24 Hrs  T(C): 36.5 (17 Mar 2018 05:36), Max: 37.1 (16 Mar 2018 17:01)  T(F): 97.7 (17 Mar 2018 05:36), Max: 98.7 (16 Mar 2018 17:01)  HR: 77 (17 Mar 2018 05:36) (74 - 88)  BP: 170/71 (17 Mar 2018 05:36) (117/51 - 170/71)  RR: 20 (16 Mar 2018 20:45) (18 - 20)    PHYSICAL EXAM: middle-aged obese male, lying in bed with head elevated, NAD. right BKA. Lungs CTAb/L. Heart RRR.     MEDICATIONS  (STANDING):  amLODIPine   Tablet 5 milliGRAM(s) Oral daily  artificial  tears Solution 1 Drop(s) Both EYES every 12 hours  ciprofloxacin     Tablet 750 milliGRAM(s) Oral daily  dextrose 5%. 1000 milliLiter(s) (50 mL/Hr) IV Continuous <Continuous>  dextrose 5%. 1000 milliLiter(s) (50 mL/Hr) IV Continuous <Continuous>  docusate sodium 100 milliGRAM(s) Oral two times a day  enalapril 20 milliGRAM(s) Oral two times a day  epoetin keagan Injectable 61097 Unit(s) IV Push <User Schedule>  furosemide    Tablet 80 milliGRAM(s) Oral two times a day  insulin lispro (HumaLOG) corrective regimen sliding scale   SubCutaneous every 4 hours  metolazone 5 milliGRAM(s) Oral daily  metoprolol     tartrate 25 milliGRAM(s) Oral two times a day  metroNIDAZOLE    Tablet 500 milliGRAM(s) Oral every 8 hours  pregabalin 100 milliGRAM(s) Oral two times a day  sevelamer hydrochloride 1600 milliGRAM(s) Oral three times a day with meals  simvastatin 10 milliGRAM(s) Oral at bedtime  vancomycin  IVPB 1000 milliGRAM(s) IV Intermittent <User Schedule>    MEDICATIONS  (PRN):  ALPRAZolam 0.25 milliGRAM(s) Oral daily PRN anxiety  dextrose Gel 1 Dose(s) Oral once PRN Blood Glucose LESS THAN 70 milliGRAM(s)/deciliter  glucagon  Injectable 1 milliGRAM(s) IntraMuscular once PRN Glucose LESS THAN 70 milligrams/deciliter  morphine  - Injectable 4 milliGRAM(s) IV Push every 6 hours PRN Severe Pain (7 - 10)  oxyCODONE    5 mG/acetaminophen 325 mG 2 Tablet(s) Oral every 4 hours PRN Mild Pain (1 - 3)      LABS:                        8.7    15.85 )-----------( 383      ( 17 Mar 2018 01:12 )             30.8     03-17    138  |  97<L>  |  31<H>  ----------------------------<  143<H>  4.2   |  29  |  5.9<HH>    Ca    8.5      17 Mar 2018 01:12                      RADIOLOGY & ADDITIONAL STUDIES:

## 2018-03-18 LAB
ANION GAP SERPL CALC-SCNC: 17 MMOL/L — HIGH (ref 7–14)
BUN SERPL-MCNC: 46 MG/DL — HIGH (ref 10–20)
CALCIUM SERPL-MCNC: 8.8 MG/DL — SIGNIFICANT CHANGE UP (ref 8.5–10.1)
CHLORIDE SERPL-SCNC: 95 MMOL/L — LOW (ref 98–110)
CO2 SERPL-SCNC: 27 MMOL/L — SIGNIFICANT CHANGE UP (ref 17–32)
CREAT SERPL-MCNC: 7.4 MG/DL — CRITICAL HIGH (ref 0.7–1.5)
GLUCOSE SERPL-MCNC: 128 MG/DL — HIGH (ref 70–110)
HCT VFR BLD CALC: 32.7 % — LOW (ref 42–52)
HGB BLD-MCNC: 9.3 G/DL — LOW (ref 14–18)
MCHC RBC-ENTMCNC: 24.7 PG — LOW (ref 27–31)
MCHC RBC-ENTMCNC: 28.4 G/DL — LOW (ref 32–37)
MCV RBC AUTO: 86.7 FL — SIGNIFICANT CHANGE UP (ref 80–94)
NRBC # BLD: 0 /100 WBCS — SIGNIFICANT CHANGE UP (ref 0–0)
PLATELET # BLD AUTO: 393 K/UL — SIGNIFICANT CHANGE UP (ref 130–400)
POTASSIUM SERPL-MCNC: 4.5 MMOL/L — SIGNIFICANT CHANGE UP (ref 3.5–5)
POTASSIUM SERPL-SCNC: 4.5 MMOL/L — SIGNIFICANT CHANGE UP (ref 3.5–5)
RBC # BLD: 3.77 M/UL — LOW (ref 4.7–6.1)
RBC # FLD: 18.6 % — HIGH (ref 11.5–14.5)
SODIUM SERPL-SCNC: 139 MMOL/L — SIGNIFICANT CHANGE UP (ref 135–146)
WBC # BLD: 16.29 K/UL — HIGH (ref 4.8–10.8)
WBC # FLD AUTO: 16.29 K/UL — HIGH (ref 4.8–10.8)

## 2018-03-18 RX ORDER — NYSTATIN CREAM 100000 [USP'U]/G
1 CREAM TOPICAL
Qty: 0 | Refills: 0 | Status: DISCONTINUED | OUTPATIENT
Start: 2018-03-18 | End: 2018-03-20

## 2018-03-18 RX ORDER — MINERAL OIL
133 OIL (ML) MISCELLANEOUS ONCE
Qty: 0 | Refills: 0 | Status: DISCONTINUED | OUTPATIENT
Start: 2018-03-18 | End: 2018-03-20

## 2018-03-18 RX ORDER — MAGNESIUM HYDROXIDE 400 MG/1
60 TABLET, CHEWABLE ORAL ONCE
Qty: 0 | Refills: 0 | Status: DISCONTINUED | OUTPATIENT
Start: 2018-03-18 | End: 2018-03-20

## 2018-03-18 RX ADMIN — NYSTATIN CREAM 1 APPLICATION(S): 100000 CREAM TOPICAL at 17:53

## 2018-03-18 RX ADMIN — Medication 80 MILLIGRAM(S): at 06:08

## 2018-03-18 RX ADMIN — Medication 100 MILLIGRAM(S): at 17:29

## 2018-03-18 RX ADMIN — OXYCODONE AND ACETAMINOPHEN 2 TABLET(S): 5; 325 TABLET ORAL at 18:00

## 2018-03-18 RX ADMIN — SEVELAMER CARBONATE 1600 MILLIGRAM(S): 2400 POWDER, FOR SUSPENSION ORAL at 17:22

## 2018-03-18 RX ADMIN — Medication 25 MILLIGRAM(S): at 06:11

## 2018-03-18 RX ADMIN — Medication 500 MILLIGRAM(S): at 06:10

## 2018-03-18 RX ADMIN — Medication 750 MILLIGRAM(S): at 12:37

## 2018-03-18 RX ADMIN — Medication 1 DROP(S): at 06:11

## 2018-03-18 RX ADMIN — OXYCODONE AND ACETAMINOPHEN 2 TABLET(S): 5; 325 TABLET ORAL at 17:32

## 2018-03-18 RX ADMIN — Medication 500 MILLIGRAM(S): at 22:21

## 2018-03-18 RX ADMIN — Medication 2: at 10:14

## 2018-03-18 RX ADMIN — Medication 14: at 17:36

## 2018-03-18 RX ADMIN — Medication 100 MILLIGRAM(S): at 06:14

## 2018-03-18 RX ADMIN — SEVELAMER CARBONATE 1600 MILLIGRAM(S): 2400 POWDER, FOR SUSPENSION ORAL at 08:17

## 2018-03-18 RX ADMIN — Medication 2: at 07:34

## 2018-03-18 RX ADMIN — Medication 25 MILLIGRAM(S): at 17:23

## 2018-03-18 RX ADMIN — SENNA PLUS 1 TABLET(S): 8.6 TABLET ORAL at 06:12

## 2018-03-18 RX ADMIN — Medication 20 MILLIGRAM(S): at 06:10

## 2018-03-18 RX ADMIN — SEVELAMER CARBONATE 1600 MILLIGRAM(S): 2400 POWDER, FOR SUSPENSION ORAL at 11:33

## 2018-03-18 RX ADMIN — AMLODIPINE BESYLATE 5 MILLIGRAM(S): 2.5 TABLET ORAL at 06:07

## 2018-03-18 RX ADMIN — Medication 6: at 02:49

## 2018-03-18 RX ADMIN — Medication 2: at 13:41

## 2018-03-18 RX ADMIN — MORPHINE SULFATE 4 MILLIGRAM(S): 50 CAPSULE, EXTENDED RELEASE ORAL at 00:54

## 2018-03-18 RX ADMIN — Medication 4: at 22:25

## 2018-03-18 RX ADMIN — Medication 80 MILLIGRAM(S): at 17:22

## 2018-03-18 RX ADMIN — Medication 100 MILLIGRAM(S): at 06:07

## 2018-03-18 RX ADMIN — SENNA PLUS 1 TABLET(S): 8.6 TABLET ORAL at 17:23

## 2018-03-18 RX ADMIN — OXYCODONE AND ACETAMINOPHEN 2 TABLET(S): 5; 325 TABLET ORAL at 12:00

## 2018-03-18 RX ADMIN — Medication 20 MILLIGRAM(S): at 17:24

## 2018-03-18 RX ADMIN — SIMVASTATIN 10 MILLIGRAM(S): 20 TABLET, FILM COATED ORAL at 22:22

## 2018-03-18 RX ADMIN — OXYCODONE AND ACETAMINOPHEN 2 TABLET(S): 5; 325 TABLET ORAL at 11:29

## 2018-03-18 RX ADMIN — Medication 1 DROP(S): at 17:22

## 2018-03-18 RX ADMIN — Medication 100 MILLIGRAM(S): at 17:22

## 2018-03-18 RX ADMIN — Medication 500 MILLIGRAM(S): at 13:43

## 2018-03-18 NOTE — PROGRESS NOTE ADULT - ASSESSMENT
ESRD on HD (MWF inpt and TTS oupt)  S/p Right BKA   HTN  DM  Anemia on epo  AVF dysfunction - clots, poor flow - duplex with high grade distal vein stenosis    next hd monday  fistulagram on Tuesday per vasc fellow  con't abx  epo with hd  f/u h/h  cont lasix, metolazone, norvac, enalapril  insulin regimen, check FS  pt / rehab  dc planning

## 2018-03-18 NOTE — PROGRESS NOTE ADULT - SUBJECTIVE AND OBJECTIVE BOX
VASCULAR SURGERY PROGRESS NOTE    Hospital Day #10d  Post-Op Day # 6    Procedure/Dx: 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  S/P RT BKA COMPLETION 3/13    Events of past 24 hours:  Patient did not complain of any active issues over the day. Repeat CBC BMP to follow    PAST MEDICAL & SURGICAL HISTORY:  Blind  HTN (hypertension)  End stage renal disease  Diabetes      Vital Signs Last 24 Hrs  T(C): 36.9 (17 Mar 2018 19:50), Max: 36.9 (17 Mar 2018 19:50)  T(F): 98.4 (17 Mar 2018 19:50), Max: 98.4 (17 Mar 2018 19:50)  HR: 71 (17 Mar 2018 19:50) (71 - 101)  BP: 112/59 (17 Mar 2018 19:50) (112/59 - 170/71)  BP(mean): --  RR: 18 (17 Mar 2018 19:50) (18 - 18)  SpO2: --    Pain (0-10):            Pain Control Adequate: [] YES [] N    Diet:  Diet, Consistent Carbohydrate Renal/No Snacks      I&O's Detail    16 Mar 2018 07:01  -  17 Mar 2018 07:00  --------------------------------------------------------  IN:  Total IN: 0 mL    OUT:    Other: 3200 mL    Voided: 370 mL  Total OUT: 3570 mL    Total NET: -3570 mL          Bowel Movement:[x] YES [] NO  Flatus: [x] YES [] NO  Activity: abat  PHYSICAL EXAM  Gen: no pallor  CV: s1/s2 normal  Lungs: b/l clear  Abd: soft, non tender  Inc: right leg splint and dressing in place, not soaked, dry  Ext:     MEDICATIONS  (STANDING):  amLODIPine   Tablet 5 milliGRAM(s) Oral daily  artificial  tears Solution 1 Drop(s) Both EYES every 12 hours  ciprofloxacin     Tablet 750 milliGRAM(s) Oral daily  dextrose 5%. 1000 milliLiter(s) (50 mL/Hr) IV Continuous <Continuous>  dextrose 5%. 1000 milliLiter(s) (50 mL/Hr) IV Continuous <Continuous>  docusate sodium 100 milliGRAM(s) Oral two times a day  enalapril 20 milliGRAM(s) Oral two times a day  epoetin keagan Injectable 94494 Unit(s) IV Push <User Schedule>  furosemide    Tablet 80 milliGRAM(s) Oral two times a day  insulin lispro (HumaLOG) corrective regimen sliding scale   SubCutaneous every 4 hours  metolazone 5 milliGRAM(s) Oral daily  metoprolol     tartrate 25 milliGRAM(s) Oral two times a day  metroNIDAZOLE    Tablet 500 milliGRAM(s) Oral every 8 hours  pregabalin 100 milliGRAM(s) Oral two times a day  senna 1 Tablet(s) Oral two times a day  sevelamer hydrochloride 1600 milliGRAM(s) Oral three times a day with meals  simvastatin 10 milliGRAM(s) Oral at bedtime  vancomycin  IVPB 1000 milliGRAM(s) IV Intermittent <User Schedule>    MEDICATIONS  (PRN):  ALPRAZolam 0.25 milliGRAM(s) Oral daily PRN anxiety  dextrose Gel 1 Dose(s) Oral once PRN Blood Glucose LESS THAN 70 milliGRAM(s)/deciliter  glucagon  Injectable 1 milliGRAM(s) IntraMuscular once PRN Glucose LESS THAN 70 milligrams/deciliter  magnesium hydroxide Suspension 30 milliLiter(s) Oral daily PRN Constipation  morphine  - Injectable 4 milliGRAM(s) IV Push every 6 hours PRN Severe Pain (7 - 10)  oxyCODONE    5 mG/acetaminophen 325 mG 2 Tablet(s) Oral every 4 hours PRN Mild Pain (1 - 3)      DVT PROPHYLAXIS: [x] YES [] NO   GI PROPHYLAXIS: [x] YES [] NO   ANTIPLATELETS: [] YES [x] NO   ANTICOAGULATION: [] YES [x] NO   ANTIBIOTICS: [x] YES [] NO ciprofloxacin     Tablet 750 milliGRAM(s)  metroNIDAZOLE    Tablet 500 milliGRAM(s)  vancomycin  IVPB 1000 milliGRAM(s)      LAB/STUDIES:              8.7                 15.85  >---------<  383                             30.8                               138    | 97     | 31     ---------------------------<143   03-17-18 @ 01:12  4.2    | 29     | 5.9      Anion Gap:12     eGFR:10               8.5              v           03-17-18 @ 01:12  x       |  x                x      |x      |x                          -------------------------[x    (03-17-18 @ 01:12)           x       |x       |x                                                                         x      ----------<  x     03-17-18 @ 01:12  x        Lipase: x    03-17-18 @ 01:12  Amylase: x    03-17-18 @ 01:12  Lactate: x    03-17-18 @ 01:12        CAPILLARY BLOOD GLUCOSE  149 (17 Mar 2018 20:53)  207 (17 Mar 2018 16:32)  203 (17 Mar 2018 13:55)  187 (17 Mar 2018 10:00)  125 (17 Mar 2018 05:36)              Culture - Surgical Swab (collected 03-08-18 @ 21:11)  Source: .Surgical Swab WOUND  Final Report (03-14-18 @ 11:53):    No growth at 5 days    Culture - Surgical Swab (collected 03-08-18 @ 21:11)  Source: .Surgical Swab WOUND  Final Report (03-14-18 @ 12:05):    No growth at 5 days    Culture - Blood (collected 03-08-18 @ 10:45)  Source: .Blood Blood  Final Report (03-13-18 @ 17:01):    No growth at 5 days.    Culture - Blood (collected 02-27-18 @ 06:45)  Source: .Blood Blood-Peripheral  Final Report (03-04-18 @ 11:00):    No growth at 5 days.        IMAGING:    ASSESSMENT/PLAN:      SPECTRA: [id number]

## 2018-03-18 NOTE — PROGRESS NOTE ADULT - ASSESSMENT
#Diabetic male with ESRD p/w necrotizing fasciitis of right lower extremity.   #Currently s/p right BKA s/p closure of wound.  #Sepsis- Improving   #ESRD  -IV vancomycin 1 g post HD, Oral ciprofloxacin 750 mg daily and oral Flagyl 500 q8.   Extend another week. Still has mild leukocytosis.

## 2018-03-18 NOTE — PROGRESS NOTE ADULT - SUBJECTIVE AND OBJECTIVE BOX
[place] [facility] FOLLOW UP NOTE  --------------------------------------------------------------------------------   52 yo wm with pmh of HTN, DM,  ESRD on HD, chronic b/l LE cellulitis, s/p right BKA 3/8 and R BKA completion 3/13.      No new complaints.  Last hd friday.  No cp, sob, fever.    PAST HISTORY  --------------------------------------------------------------------------------  No significant changes to PMH, PSH, FHx, SHx, unless otherwise noted    ALLERGIES & MEDICATIONS  --------------------------------------------------------------------------------  Allergies    No Known Allergies      Physical Exam: obese  	Gen: NAD  	Pulm: CTA B/L  	CV: RRR, S1S2  	Abd: +BS, soft, nontender/nondistended  	: No suprapubic tenderness  	LE: Warm,  no edema  	Vascular access: AVF with good thrill/bruit    Hospital Medications:   MEDICATIONS  (STANDING):  amLODIPine   Tablet 5 milliGRAM(s) Oral daily  artificial  tears Solution 1 Drop(s) Both EYES every 12 hours  ciprofloxacin     Tablet 750 milliGRAM(s) Oral daily  dextrose 5%. 1000 milliLiter(s) (50 mL/Hr) IV Continuous <Continuous>  dextrose 5%. 1000 milliLiter(s) (50 mL/Hr) IV Continuous <Continuous>  docusate sodium 100 milliGRAM(s) Oral two times a day  enalapril 20 milliGRAM(s) Oral two times a day  epoetin keagan Injectable 70769 Unit(s) IV Push <User Schedule>  furosemide    Tablet 80 milliGRAM(s) Oral two times a day  insulin lispro (HumaLOG) corrective regimen sliding scale   SubCutaneous every 4 hours  metolazone 5 milliGRAM(s) Oral daily  metoprolol     tartrate 25 milliGRAM(s) Oral two times a day  metroNIDAZOLE    Tablet 500 milliGRAM(s) Oral every 8 hours  pregabalin 100 milliGRAM(s) Oral two times a day  senna 1 Tablet(s) Oral two times a day  sevelamer hydrochloride 1600 milliGRAM(s) Oral three times a day with meals  simvastatin 10 milliGRAM(s) Oral at bedtime  vancomycin  IVPB 1000 milliGRAM(s) IV Intermittent <User Schedule>        VITALS:  T(F): 97.7 (03-18-18 @ 06:07), Max: 98.4 (03-17-18 @ 19:50)  HR: 76 (03-18-18 @ 06:07)  BP: 150/66 (03-18-18 @ 06:07)  RR: 18 (03-17-18 @ 19:50)  SpO2: --  Wt(kg): --    03-16 @ 07:01  -  03-17 @ 07:00  --------------------------------------------------------  IN: 0 mL / OUT: 3570 mL / NET: -3570 mL    03-18 @ 07:01  -  03-18 @ 10:01  --------------------------------------------------------  IN: 240 mL / OUT: 0 mL / NET: 240 mL          LABS:  03-17    138  |  97<L>  |  31<H>  ----------------------------<  143<H>  4.2   |  29  |  5.9<HH>    Ca    8.5      17 Mar 2018 01:12                            8.7    15.85 )-----------( 383      ( 17 Mar 2018 01:12 )             30.8       Urine Studies:        RADIOLOGY & ADDITIONAL STUDIES:

## 2018-03-18 NOTE — PROGRESS NOTE ADULT - ASSESSMENT
Rehab and s/w evaluation for discharge planning, Nephro concerned about access possible fistulogram on tuesday

## 2018-03-18 NOTE — PROGRESS NOTE ADULT - SUBJECTIVE AND OBJECTIVE BOX
INTERVAL HPI/OVERNIGHT EVENTS:  S/P BKA  Allergies: No Known Allergies  EXAM: AOx3, Right BKA, s/p closure, wound is clean, mild erythema and pain, abd soft, no wheezing.   Vital Signs Last 24 Hrs  T(C): 36.2 (18 Mar 2018 14:00), Max: 36.9 (17 Mar 2018 19:50)  T(F): 97.2 (18 Mar 2018 14:00), Max: 98.4 (17 Mar 2018 19:50)  HR: 75 (18 Mar 2018 14:00) (71 - 76)  BP: 153/68 (18 Mar 2018 14:00) (112/59 - 153/68)  BP(mean): --  RR: 19 (18 Mar 2018 14:00) (18 - 19)    03-18-18 @ 07:01  -  03-18-18 @ 16:26  --------------------------------------------------------  IN: 240 mL / OUT: 0 mL / NET: 240 mL    ALPRAZolam 0.25 milliGRAM(s) Oral daily PRN  amLODIPine   Tablet 5 milliGRAM(s) Oral daily  artificial  tears Solution 1 Drop(s) Both EYES every 12 hours  ciprofloxacin     Tablet 750 milliGRAM(s) Oral daily  dextrose 5%. 1000 milliLiter(s) IV Continuous <Continuous>  dextrose 5%. 1000 milliLiter(s) IV Continuous <Continuous>  docusate sodium 100 milliGRAM(s) Oral two times a day  enalapril 20 milliGRAM(s) Oral two times a day  epoetin keagan Injectable 45036 Unit(s) IV Push <User Schedule>  furosemide    Tablet 80 milliGRAM(s) Oral two times a day  insulin lispro (HumaLOG) corrective regimen sliding scale   SubCutaneous every 4 hours  magnesium hydroxide Suspension 60 milliLiter(s) Oral once  metolazone 5 milliGRAM(s) Oral daily  metoprolol     tartrate 25 milliGRAM(s) Oral two times a day  metroNIDAZOLE    Tablet 500 milliGRAM(s) Oral every 8 hours  mineral oil enema 133 milliLiter(s) Rectal once  morphine  - Injectable 4 milliGRAM(s) IV Push every 6 hours PRN  nystatin Powder 1 Application(s) Topical two times a day  oxyCODONE    5 mG/acetaminophen 325 mG 2 Tablet(s) Oral every 4 hours PRN  pregabalin 100 milliGRAM(s) Oral two times a day  senna 1 Tablet(s) Oral two times a day  sevelamer hydrochloride 1600 milliGRAM(s) Oral three times a day with meals  simvastatin 10 milliGRAM(s) Oral at bedtime  vancomycin  IVPB 1000 milliGRAM(s) IV Intermittent <User Schedule>      LABS:                        9.3    16.29 )-----------( 393      ( 18 Mar 2018 11:47 )             32.7     03-18    139  |  95<L>  |  46<H>  ----------------------------<  128<H>  4.5   |  27  |  7.4<HH>                 9.7    12.42 )-----------( 444      ( 11 Mar 2018 00:55 )             33.0     03-11    142  |  101  |  30<H>  ----------------------------<  182<H>  4.8   |  31  |  5.0<HH>    Culture - Surgical Swab (03.08.18 @ 21:11)    Specimen Source: .Surgical Swab WOUND    Culture Results:   No growth    Culture - Surgical Swab (03.08.18 @ 21:11)    Specimen Source: .Surgical Swab WOUND    Culture Results:   No growth    Culture - Blood (03.08.18 @ 10:45)    Specimen Source: .Blood Blood    Culture Results:   No growth to date.    Culture - Blood (02.27.18 @ 06:45)    Specimen Source: .Blood Blood-Peripheral    Culture Results:   No growth at 5 days.    3/9 CXR- No focal pulmonary consolidation.  3/8 CT right leg:  Extensive subcutaneous emphysema surrounding the midfoot and ankle,   extending proximally to the distal tibia/fibula, approximately 4.5 cm   proximal to the ankle joint consistent with necrotizing fasciitis.   2. Ulcer overlying the lateral hindfoot with evidence of septic   arthritis/osteomyelitis throughout the midfoot and hindfoot as above  3. Severe neuropathic arthropathy.

## 2018-03-19 LAB
BLD GP AB SCN SERPL QL: SIGNIFICANT CHANGE UP
SURGICAL PATHOLOGY STUDY: SIGNIFICANT CHANGE UP
TYPE + AB SCN PNL BLD: SIGNIFICANT CHANGE UP

## 2018-03-19 RX ADMIN — MORPHINE SULFATE 4 MILLIGRAM(S): 50 CAPSULE, EXTENDED RELEASE ORAL at 14:24

## 2018-03-19 RX ADMIN — Medication 8: at 19:01

## 2018-03-19 RX ADMIN — Medication 500 MILLIGRAM(S): at 14:27

## 2018-03-19 RX ADMIN — Medication 100 MILLIGRAM(S): at 18:59

## 2018-03-19 RX ADMIN — SEVELAMER CARBONATE 1600 MILLIGRAM(S): 2400 POWDER, FOR SUSPENSION ORAL at 08:17

## 2018-03-19 RX ADMIN — Medication 500 MILLIGRAM(S): at 05:39

## 2018-03-19 RX ADMIN — SEVELAMER CARBONATE 1600 MILLIGRAM(S): 2400 POWDER, FOR SUSPENSION ORAL at 12:28

## 2018-03-19 RX ADMIN — Medication 750 MILLIGRAM(S): at 14:25

## 2018-03-19 RX ADMIN — Medication 1 DROP(S): at 05:40

## 2018-03-19 RX ADMIN — Medication 100 MILLIGRAM(S): at 05:38

## 2018-03-19 RX ADMIN — SENNA PLUS 1 TABLET(S): 8.6 TABLET ORAL at 19:03

## 2018-03-19 RX ADMIN — Medication 1 DROP(S): at 18:59

## 2018-03-19 RX ADMIN — Medication 0.25 MILLIGRAM(S): at 12:30

## 2018-03-19 RX ADMIN — NYSTATIN CREAM 1 APPLICATION(S): 100000 CREAM TOPICAL at 19:02

## 2018-03-19 RX ADMIN — Medication 100 MILLIGRAM(S): at 19:05

## 2018-03-19 RX ADMIN — MORPHINE SULFATE 4 MILLIGRAM(S): 50 CAPSULE, EXTENDED RELEASE ORAL at 08:21

## 2018-03-19 RX ADMIN — SEVELAMER CARBONATE 1600 MILLIGRAM(S): 2400 POWDER, FOR SUSPENSION ORAL at 19:00

## 2018-03-19 RX ADMIN — ERYTHROPOIETIN 10000 UNIT(S): 10000 INJECTION, SOLUTION INTRAVENOUS; SUBCUTANEOUS at 11:07

## 2018-03-19 RX ADMIN — Medication 14: at 14:26

## 2018-03-19 RX ADMIN — MORPHINE SULFATE 4 MILLIGRAM(S): 50 CAPSULE, EXTENDED RELEASE ORAL at 22:35

## 2018-03-19 RX ADMIN — Medication 2: at 06:39

## 2018-03-19 RX ADMIN — Medication 80 MILLIGRAM(S): at 05:48

## 2018-03-19 RX ADMIN — Medication 100 MILLIGRAM(S): at 05:45

## 2018-03-19 RX ADMIN — NYSTATIN CREAM 1 APPLICATION(S): 100000 CREAM TOPICAL at 05:46

## 2018-03-19 RX ADMIN — Medication 250 MILLIGRAM(S): at 10:34

## 2018-03-19 RX ADMIN — OXYCODONE AND ACETAMINOPHEN 2 TABLET(S): 5; 325 TABLET ORAL at 08:25

## 2018-03-19 RX ADMIN — SENNA PLUS 1 TABLET(S): 8.6 TABLET ORAL at 05:48

## 2018-03-19 NOTE — PROGRESS NOTE ADULT - ASSESSMENT
Pt continues to c/o pain at incision site. SOB my be caused by morphine use, Pt encouraged to use percocet by nursing w/o improvement. Pt is going to OR 3/20 for fistulogram. Will f/u w/ SW to begin dispo back to SNF.

## 2018-03-19 NOTE — PROGRESS NOTE ADULT - SUBJECTIVE AND OBJECTIVE BOX
[place] ABRACAR  53y Male   [id number]    Hospital Day: 11  Post Operative Day: 7  Procedure: 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, S/P RT BKA COMPLETION 3/13  Patient is a 53y old  Male who presents with a chief complaint of right foot/ankle necrotizing fasiitis (13 Mar 2018 07:29)    PAST MEDICAL & SURGICAL HISTORY:  Blind  HTN (hypertension)  End stage renal disease  Diabetes    Events of the Last 24h: Pt continues to c/o SOB and pain at amputation site, 8/10  Vital Signs Last 24 Hrs  T(C): 36.1 (19 Mar 2018 05:13), Max: 36.4 (18 Mar 2018 22:25)  T(F): 96.9 (19 Mar 2018 05:13), Max: 97.5 (18 Mar 2018 22:25)  HR: 68 (19 Mar 2018 05:13) (68 - 75)  BP: 112/54 (19 Mar 2018 05:13) (112/54 - 159/85)  BP(mean): --  RR: 18 (19 Mar 2018 05:13) (18 - 19)  SpO2: --        Diet, Consistent Carbohydrate Renal/No Snacks (03-13-18 @ 10:31)      I&O's Summary    18 Mar 2018 07:01  -  19 Mar 2018 06:40  --------------------------------------------------------  IN: 240 mL / OUT: 0 mL / NET: 240 mL     I&O's Detail    18 Mar 2018 07:01  -  19 Mar 2018 06:40  --------------------------------------------------------  IN:    Oral Fluid: 240 mL  Total IN: 240 mL    OUT:  Total OUT: 0 mL    Total NET: 240 mL          MEDICATIONS  (STANDING):  amLODIPine   Tablet 5 milliGRAM(s) Oral daily  artificial  tears Solution 1 Drop(s) Both EYES every 12 hours  ciprofloxacin     Tablet 750 milliGRAM(s) Oral daily  dextrose 5%. 1000 milliLiter(s) (50 mL/Hr) IV Continuous <Continuous>  dextrose 5%. 1000 milliLiter(s) (50 mL/Hr) IV Continuous <Continuous>  docusate sodium 100 milliGRAM(s) Oral two times a day  enalapril 20 milliGRAM(s) Oral two times a day  epoetin keagan Injectable 87330 Unit(s) IV Push <User Schedule>  furosemide    Tablet 80 milliGRAM(s) Oral two times a day  insulin lispro (HumaLOG) corrective regimen sliding scale   SubCutaneous every 4 hours  magnesium hydroxide Suspension 60 milliLiter(s) Oral once  metolazone 5 milliGRAM(s) Oral daily  metoprolol     tartrate 25 milliGRAM(s) Oral two times a day  metroNIDAZOLE    Tablet 500 milliGRAM(s) Oral every 8 hours  mineral oil enema 133 milliLiter(s) Rectal once  nystatin Powder 1 Application(s) Topical two times a day  pregabalin 100 milliGRAM(s) Oral two times a day  senna 1 Tablet(s) Oral two times a day  sevelamer hydrochloride 1600 milliGRAM(s) Oral three times a day with meals  simvastatin 10 milliGRAM(s) Oral at bedtime  vancomycin  IVPB 1000 milliGRAM(s) IV Intermittent <User Schedule>    MEDICATIONS  (PRN):  ALPRAZolam 0.25 milliGRAM(s) Oral daily PRN anxiety  morphine  - Injectable 4 milliGRAM(s) IV Push every 6 hours PRN Severe Pain (7 - 10)  oxyCODONE    5 mG/acetaminophen 325 mG 2 Tablet(s) Oral every 4 hours PRN Mild Pain (1 - 3)      PHYSICAL EXAM:  GENERAL: Some distress due to pain  HEENT: Atraumatic, no acute changes  CHEST/LUNGS: CTAB  HEART: RRR,  No murmurs, rubs, or gallops  ABDOMEN: SNTND, busing noted over abd  EXTREMITIES:  right bka w/ dressing and ACE bandage c/d/i, left foot discolored w/ c/d/i dressing. no palp pulses right PT.   NEURO: No focal neurological deficits  SKIN: see extrem description                          9.3    16.29 )-----------( 393      ( 18 Mar 2018 11:47 )             32.7        CBC Full  -  ( 18 Mar 2018 11:47 )  WBC Count : 16.29 K/uL  Hemoglobin : 9.3 g/dL  Hematocrit : 32.7 %  Platelet Count - Automated : 393 K/uL  Mean Cell Volume : 86.7 fL  Mean Cell Hemoglobin : 24.7 pg  Mean Cell Hemoglobin Concentration : 28.4 g/dL               139   |  95    |  46                 Ca: 8.8    BMP:   ----------------------------< 128    Mg: x     (03-18-18 @ 11:47)             4.5    |  27    | 7.4                Ph: x

## 2018-03-19 NOTE — PROGRESS NOTE ADULT - SUBJECTIVE AND OBJECTIVE BOX
Patient was examined during HD treatment.    Hemodialysis Prescription:  	Access: 3  	Dialyzer: Opti 160  	Blood Flow (mL/Min): 400  	Dialysate Flow (mL/Min): 500  	Target UF (Liters): 3-4  	Treatment Time: 3 hours  	Potassium: 2K  	Calcium: 2.5

## 2018-03-19 NOTE — PROGRESS NOTE ADULT - ASSESSMENT
1. R foot necrotizing fasciitis. s/p BKA. On abx.  2. ESRD on HD TTS (MWF in-hospital). HD today: 3 hours, opti 160 dialyzer, 2K bath, 4L UF. AVF duplex shows stenosis, fistulogram tomorrow.  3. Anemia. EPO 10,000 units IV with HD.  4. Hyperphosphatemia. Renal diet. Sevelamer with meals.  5. HTN. Continue enalapril, metoprolol, amlodipine, furosemide, and metolazone.

## 2018-03-20 ENCOUNTER — APPOINTMENT (OUTPATIENT)
Dept: VASCULAR SURGERY | Facility: HOSPITAL | Age: 53
End: 2018-03-20
Payer: MEDICARE

## 2018-03-20 LAB
ANION GAP SERPL CALC-SCNC: 19 MMOL/L — HIGH (ref 7–14)
BASOPHILS # BLD AUTO: 0.16 K/UL — SIGNIFICANT CHANGE UP (ref 0–0.2)
BASOPHILS NFR BLD AUTO: 0.8 % — SIGNIFICANT CHANGE UP (ref 0–1)
BUN SERPL-MCNC: 41 MG/DL — HIGH (ref 10–20)
CALCIUM SERPL-MCNC: 8.9 MG/DL — SIGNIFICANT CHANGE UP (ref 8.5–10.1)
CHLORIDE SERPL-SCNC: 95 MMOL/L — LOW (ref 98–110)
CO2 SERPL-SCNC: 26 MMOL/L — SIGNIFICANT CHANGE UP (ref 17–32)
CREAT SERPL-MCNC: 7 MG/DL — CRITICAL HIGH (ref 0.7–1.5)
EOSINOPHIL # BLD AUTO: 0.82 K/UL — HIGH (ref 0–0.7)
EOSINOPHIL NFR BLD AUTO: 4.2 % — SIGNIFICANT CHANGE UP (ref 0–8)
GLUCOSE SERPL-MCNC: 148 MG/DL — HIGH (ref 70–110)
HCT VFR BLD CALC: 36 % — LOW (ref 42–52)
HGB BLD-MCNC: 10 G/DL — LOW (ref 14–18)
IMM GRANULOCYTES NFR BLD AUTO: 1.2 % — HIGH (ref 0.1–0.3)
LYMPHOCYTES # BLD AUTO: 16.3 % — LOW (ref 20.5–51.1)
LYMPHOCYTES # BLD AUTO: 3.18 K/UL — SIGNIFICANT CHANGE UP (ref 1.2–3.4)
MAGNESIUM SERPL-MCNC: 2.5 MG/DL — HIGH (ref 1.8–2.4)
MCHC RBC-ENTMCNC: 24.4 PG — LOW (ref 27–31)
MCHC RBC-ENTMCNC: 27.8 G/DL — LOW (ref 32–37)
MCV RBC AUTO: 87.8 FL — SIGNIFICANT CHANGE UP (ref 80–94)
MONOCYTES # BLD AUTO: 1.28 K/UL — HIGH (ref 0.1–0.6)
MONOCYTES NFR BLD AUTO: 6.6 % — SIGNIFICANT CHANGE UP (ref 1.7–9.3)
NEUTROPHILS # BLD AUTO: 13.79 K/UL — HIGH (ref 1.4–6.5)
NEUTROPHILS NFR BLD AUTO: 70.9 % — SIGNIFICANT CHANGE UP (ref 42.2–75.2)
NRBC # BLD: 0 /100 WBCS — SIGNIFICANT CHANGE UP (ref 0–0)
PHOSPHATE SERPL-MCNC: 4.8 MG/DL — SIGNIFICANT CHANGE UP (ref 2.1–4.9)
PLATELET # BLD AUTO: 439 K/UL — HIGH (ref 130–400)
POTASSIUM SERPL-MCNC: 4.7 MMOL/L — SIGNIFICANT CHANGE UP (ref 3.5–5)
POTASSIUM SERPL-SCNC: 4.7 MMOL/L — SIGNIFICANT CHANGE UP (ref 3.5–5)
RBC # BLD: 4.1 M/UL — LOW (ref 4.7–6.1)
RBC # FLD: 18.8 % — HIGH (ref 11.5–14.5)
SODIUM SERPL-SCNC: 140 MMOL/L — SIGNIFICANT CHANGE UP (ref 135–146)
WBC # BLD: 19.46 K/UL — HIGH (ref 4.8–10.8)
WBC # FLD AUTO: 19.46 K/UL — HIGH (ref 4.8–10.8)

## 2018-03-20 PROCEDURE — 36902 INTRO CATH DIALYSIS CIRCUIT: CPT | Mod: 79

## 2018-03-20 RX ORDER — SENNA PLUS 8.6 MG/1
1 TABLET ORAL
Qty: 0 | Refills: 0 | Status: DISCONTINUED | OUTPATIENT
Start: 2018-03-20 | End: 2018-03-23

## 2018-03-20 RX ORDER — HYDROMORPHONE HYDROCHLORIDE 2 MG/ML
0.5 INJECTION INTRAMUSCULAR; INTRAVENOUS; SUBCUTANEOUS
Qty: 0 | Refills: 0 | Status: DISCONTINUED | OUTPATIENT
Start: 2018-03-20 | End: 2018-03-21

## 2018-03-20 RX ORDER — NYSTATIN CREAM 100000 [USP'U]/G
1 CREAM TOPICAL
Qty: 0 | Refills: 0 | Status: DISCONTINUED | OUTPATIENT
Start: 2018-03-20 | End: 2018-03-27

## 2018-03-20 RX ORDER — AMLODIPINE BESYLATE 2.5 MG/1
5 TABLET ORAL DAILY
Qty: 0 | Refills: 0 | Status: DISCONTINUED | OUTPATIENT
Start: 2018-03-20 | End: 2018-03-22

## 2018-03-20 RX ORDER — FUROSEMIDE 40 MG
80 TABLET ORAL
Qty: 0 | Refills: 0 | Status: DISCONTINUED | OUTPATIENT
Start: 2018-03-20 | End: 2018-03-27

## 2018-03-20 RX ORDER — OXYCODONE AND ACETAMINOPHEN 5; 325 MG/1; MG/1
2 TABLET ORAL EVERY 4 HOURS
Qty: 0 | Refills: 0 | Status: DISCONTINUED | OUTPATIENT
Start: 2018-03-20 | End: 2018-03-26

## 2018-03-20 RX ORDER — ERYTHROPOIETIN 10000 [IU]/ML
10000 INJECTION, SOLUTION INTRAVENOUS; SUBCUTANEOUS
Qty: 0 | Refills: 0 | Status: DISCONTINUED | OUTPATIENT
Start: 2018-03-20 | End: 2018-03-27

## 2018-03-20 RX ORDER — MORPHINE SULFATE 50 MG/1
4 CAPSULE, EXTENDED RELEASE ORAL EVERY 6 HOURS
Qty: 0 | Refills: 0 | Status: DISCONTINUED | OUTPATIENT
Start: 2018-03-20 | End: 2018-03-27

## 2018-03-20 RX ORDER — SERTRALINE 25 MG/1
25 TABLET, FILM COATED ORAL DAILY
Qty: 0 | Refills: 0 | Status: DISCONTINUED | OUTPATIENT
Start: 2018-03-20 | End: 2018-03-27

## 2018-03-20 RX ORDER — SERTRALINE 25 MG/1
25 TABLET, FILM COATED ORAL DAILY
Qty: 0 | Refills: 0 | Status: DISCONTINUED | OUTPATIENT
Start: 2018-03-20 | End: 2018-03-20

## 2018-03-20 RX ORDER — MEROPENEM 1 G/30ML
500 INJECTION INTRAVENOUS EVERY 24 HOURS
Qty: 0 | Refills: 0 | Status: DISCONTINUED | OUTPATIENT
Start: 2018-03-20 | End: 2018-03-27

## 2018-03-20 RX ORDER — ALPRAZOLAM 0.25 MG
0.25 TABLET ORAL DAILY
Qty: 0 | Refills: 0 | Status: DISCONTINUED | OUTPATIENT
Start: 2018-03-20 | End: 2018-03-26

## 2018-03-20 RX ORDER — MEPERIDINE HYDROCHLORIDE 50 MG/ML
12.5 INJECTION INTRAMUSCULAR; INTRAVENOUS; SUBCUTANEOUS
Qty: 0 | Refills: 0 | Status: DISCONTINUED | OUTPATIENT
Start: 2018-03-20 | End: 2018-03-21

## 2018-03-20 RX ORDER — DOCUSATE SODIUM 100 MG
100 CAPSULE ORAL
Qty: 0 | Refills: 0 | Status: DISCONTINUED | OUTPATIENT
Start: 2018-03-20 | End: 2018-03-23

## 2018-03-20 RX ORDER — MEROPENEM 1 G/30ML
500 INJECTION INTRAVENOUS EVERY 24 HOURS
Qty: 0 | Refills: 0 | Status: DISCONTINUED | OUTPATIENT
Start: 2018-03-20 | End: 2018-03-20

## 2018-03-20 RX ORDER — SIMVASTATIN 20 MG/1
10 TABLET, FILM COATED ORAL AT BEDTIME
Qty: 0 | Refills: 0 | Status: DISCONTINUED | OUTPATIENT
Start: 2018-03-20 | End: 2018-03-27

## 2018-03-20 RX ORDER — ONDANSETRON 8 MG/1
4 TABLET, FILM COATED ORAL EVERY 6 HOURS
Qty: 0 | Refills: 0 | Status: DISCONTINUED | OUTPATIENT
Start: 2018-03-20 | End: 2018-03-21

## 2018-03-20 RX ORDER — METOPROLOL TARTRATE 50 MG
25 TABLET ORAL
Qty: 0 | Refills: 0 | Status: DISCONTINUED | OUTPATIENT
Start: 2018-03-20 | End: 2018-03-27

## 2018-03-20 RX ORDER — ALPRAZOLAM 0.25 MG
0.25 TABLET ORAL ONCE
Qty: 0 | Refills: 0 | Status: DISCONTINUED | OUTPATIENT
Start: 2018-03-20 | End: 2018-03-21

## 2018-03-20 RX ORDER — HYDROMORPHONE HYDROCHLORIDE 2 MG/ML
0.25 INJECTION INTRAMUSCULAR; INTRAVENOUS; SUBCUTANEOUS
Qty: 0 | Refills: 0 | Status: DISCONTINUED | OUTPATIENT
Start: 2018-03-20 | End: 2018-03-21

## 2018-03-20 RX ORDER — ALPRAZOLAM 0.25 MG
0.25 TABLET ORAL ONCE
Qty: 0 | Refills: 0 | Status: DISCONTINUED | OUTPATIENT
Start: 2018-03-20 | End: 2018-03-20

## 2018-03-20 RX ORDER — INSULIN LISPRO 100/ML
VIAL (ML) SUBCUTANEOUS EVERY 4 HOURS
Qty: 0 | Refills: 0 | Status: DISCONTINUED | OUTPATIENT
Start: 2018-03-20 | End: 2018-03-25

## 2018-03-20 RX ORDER — SEVELAMER CARBONATE 2400 MG/1
1600 POWDER, FOR SUSPENSION ORAL
Qty: 0 | Refills: 0 | Status: DISCONTINUED | OUTPATIENT
Start: 2018-03-20 | End: 2018-03-22

## 2018-03-20 RX ORDER — VANCOMYCIN HCL 1 G
1000 VIAL (EA) INTRAVENOUS
Qty: 0 | Refills: 0 | Status: DISCONTINUED | OUTPATIENT
Start: 2018-03-20 | End: 2018-03-23

## 2018-03-20 RX ADMIN — Medication 500 MILLIGRAM(S): at 07:01

## 2018-03-20 RX ADMIN — SIMVASTATIN 10 MILLIGRAM(S): 20 TABLET, FILM COATED ORAL at 01:30

## 2018-03-20 RX ADMIN — Medication 2: at 01:31

## 2018-03-20 RX ADMIN — NYSTATIN CREAM 1 APPLICATION(S): 100000 CREAM TOPICAL at 07:03

## 2018-03-20 RX ADMIN — Medication 100 MILLIGRAM(S): at 19:12

## 2018-03-20 RX ADMIN — MORPHINE SULFATE 4 MILLIGRAM(S): 50 CAPSULE, EXTENDED RELEASE ORAL at 19:24

## 2018-03-20 RX ADMIN — Medication 2: at 20:41

## 2018-03-20 RX ADMIN — Medication 25 MILLIGRAM(S): at 07:01

## 2018-03-20 RX ADMIN — SENNA PLUS 1 TABLET(S): 8.6 TABLET ORAL at 07:01

## 2018-03-20 RX ADMIN — Medication 500 MILLIGRAM(S): at 01:30

## 2018-03-20 RX ADMIN — NYSTATIN CREAM 1 APPLICATION(S): 100000 CREAM TOPICAL at 19:29

## 2018-03-20 RX ADMIN — SEVELAMER CARBONATE 1600 MILLIGRAM(S): 2400 POWDER, FOR SUSPENSION ORAL at 19:13

## 2018-03-20 RX ADMIN — SERTRALINE 25 MILLIGRAM(S): 25 TABLET, FILM COATED ORAL at 21:03

## 2018-03-20 RX ADMIN — Medication 80 MILLIGRAM(S): at 07:02

## 2018-03-20 RX ADMIN — SENNA PLUS 1 TABLET(S): 8.6 TABLET ORAL at 19:12

## 2018-03-20 RX ADMIN — MEROPENEM 100 MILLIGRAM(S): 1 INJECTION INTRAVENOUS at 19:55

## 2018-03-20 RX ADMIN — Medication 0.25 MILLIGRAM(S): at 10:11

## 2018-03-20 RX ADMIN — AMLODIPINE BESYLATE 5 MILLIGRAM(S): 2.5 TABLET ORAL at 07:01

## 2018-03-20 RX ADMIN — MORPHINE SULFATE 4 MILLIGRAM(S): 50 CAPSULE, EXTENDED RELEASE ORAL at 10:38

## 2018-03-20 RX ADMIN — Medication 250 MILLIGRAM(S): at 21:02

## 2018-03-20 RX ADMIN — Medication 0.25 MILLIGRAM(S): at 01:31

## 2018-03-20 RX ADMIN — Medication 80 MILLIGRAM(S): at 19:12

## 2018-03-20 RX ADMIN — SIMVASTATIN 10 MILLIGRAM(S): 20 TABLET, FILM COATED ORAL at 21:02

## 2018-03-20 RX ADMIN — MORPHINE SULFATE 4 MILLIGRAM(S): 50 CAPSULE, EXTENDED RELEASE ORAL at 02:41

## 2018-03-20 RX ADMIN — Medication 100 MILLIGRAM(S): at 07:00

## 2018-03-20 RX ADMIN — Medication 1 DROP(S): at 19:08

## 2018-03-20 RX ADMIN — Medication 100 MILLIGRAM(S): at 07:01

## 2018-03-20 RX ADMIN — Medication 4: at 01:32

## 2018-03-20 RX ADMIN — Medication 1 DROP(S): at 07:02

## 2018-03-20 RX ADMIN — Medication 100 MILLIGRAM(S): at 21:03

## 2018-03-20 NOTE — PACU DISCHARGE NOTE - COMMENTS
Patient stable upon arrival to PACU. Report given to RN. VSS: 95/51, HR 71, RR 12, Temp 98, O2 saturation 100%. Uneventful course intraop.

## 2018-03-20 NOTE — BRIEF OPERATIVE NOTE - POST-OP DX
Gangrene  03/08/2018  right foot  Active  Milena Brunson
Arteriovenous fistula stenosis, initial encounter  03/20/2018    Active  Milena Brunson  Gangrene  03/08/2018  right foot  Active  Milena Brunson
Gangrene  03/08/2018  right foot  Active  Milena Brunson

## 2018-03-20 NOTE — PROGRESS NOTE ADULT - SUBJECTIVE AND OBJECTIVE BOX
INTERVAL HPI/OVERNIGHT EVENTS:  S/P BKA. Leukocytosis noted.   Allergies: No Known Allergies  EXAM: AOx3, feels anxious, right BKA, s/p closure, wound is clean, mild erythema and pain, abd soft, no wheezing.     Vital Signs Last 24 Hrs  T(C): 37 (20 Mar 2018 13:26), Max: 37.2 (20 Mar 2018 00:56)  T(F): 98.6 (20 Mar 2018 13:26), Max: 98.9 (20 Mar 2018 00:56)  HR: 69 (20 Mar 2018 13:26) (69 - 97)  BP: 100/49 (20 Mar 2018 13:26) (88/52 - 119/53)  BP(mean): --  RR: 18 (20 Mar 2018 13:26) (18 - 20)  SpO2: 97% (20 Mar 2018 11:04) (97% - 100%)  03-19-18 @ 07:01  -  03-20-18 @ 07:00  --------------------------------------------------------  IN: 0 mL / OUT: 3550 mL / NET: -3550 mL    ALPRAZolam 0.25 milliGRAM(s) Oral daily PRN  amLODIPine   Tablet 5 milliGRAM(s) Oral daily  artificial  tears Solution 1 Drop(s) Both EYES every 12 hours  dextrose 5%. 1000 milliLiter(s) IV Continuous <Continuous>  dextrose 5%. 1000 milliLiter(s) IV Continuous <Continuous>  docusate sodium 100 milliGRAM(s) Oral two times a day  enalapril 20 milliGRAM(s) Oral two times a day  epoetin keagan Injectable 73943 Unit(s) IV Push <User Schedule>  furosemide    Tablet 80 milliGRAM(s) Oral two times a day  insulin lispro (HumaLOG) corrective regimen sliding scale   SubCutaneous every 4 hours  magnesium hydroxide Suspension 60 milliLiter(s) Oral once  meropenem  IVPB 500 milliGRAM(s) IV Intermittent every 24 hours  metolazone 5 milliGRAM(s) Oral daily  metoprolol     tartrate 25 milliGRAM(s) Oral two times a day  mineral oil enema 133 milliLiter(s) Rectal once  morphine  - Injectable 4 milliGRAM(s) IV Push every 6 hours PRN  nystatin Powder 1 Application(s) Topical two times a day  oxyCODONE    5 mG/acetaminophen 325 mG 2 Tablet(s) Oral every 4 hours PRN  pregabalin 100 milliGRAM(s) Oral two times a day  senna 1 Tablet(s) Oral two times a day  sevelamer hydrochloride 1600 milliGRAM(s) Oral three times a day with meals  simvastatin 10 milliGRAM(s) Oral at bedtime  vancomycin  IVPB 1000 milliGRAM(s) IV Intermittent <User Schedule>  LABS:                        10.0   19.46 )-----------( 439      ( 20 Mar 2018 01:12 )             36.0     03-20    140  |  95<L>  |  41<H>  ----------------------------<  148<H>  4.7   |  26  |  7.0<HH>                        9.3    16.29 )-----------( 393      ( 18 Mar 2018 11:47 )             32.7     03-18    139  |  95<L>  |  46<H>  ----------------------------<  128<H>  4.5   |  27  |  7.4<HH>                 9.7    12.42 )-----------( 444      ( 11 Mar 2018 00:55 )             33.0     03-11    142  |  101  |  30<H>  ----------------------------<  182<H>  4.8   |  31  |  5.0<HH>    Culture - Surgical Swab (03.08.18 @ 21:11)    Specimen Source: .Surgical Swab WOUND    Culture Results:   No growth    Culture - Surgical Swab (03.08.18 @ 21:11)    Specimen Source: .Surgical Swab WOUND    Culture Results:   No growth    Culture - Blood (03.08.18 @ 10:45)    Specimen Source: .Blood Blood    Culture Results:   No growth to date.    Culture - Blood (02.27.18 @ 06:45)    Specimen Source: .Blood Blood-Peripheral    Culture Results:   No growth at 5 days.    3/9 CXR- No focal pulmonary consolidation.  3/8 CT right leg:  Extensive subcutaneous emphysema surrounding the midfoot and ankle,   extending proximally to the distal tibia/fibula, approximately 4.5 cm   proximal to the ankle joint consistent with necrotizing fasciitis.   2. Ulcer overlying the lateral hindfoot with evidence of septic   arthritis/osteomyelitis throughout the midfoot and hindfoot as above  3. Severe neuropathic arthropathy.

## 2018-03-20 NOTE — PRE-ANESTHESIA EVALUATION ADULT - NSANSTRISK2NDMDRD_GEN_ALL_CORE
Anesthesia risk alerts addressed and discussed with second provider
Anesthesia risk alerts addressed and discussed with second provider

## 2018-03-20 NOTE — PROGRESS NOTE ADULT - ASSESSMENT
1. R foot necrotizing fasciitis. s/p BKA. On abx.  2. ESRD on HD TTS (MWF in-hospital). HD tomorrow: 3 hours, opti 160 dialyzer, 2K bath, 3.5L UF. AVF duplex shows stenosis, fistulogram today.  3. Anemia. EPO 10,000 units IV with HD.  4. Hyperphosphatemia. Renal diet. Sevelamer with meals.  5. HTN. Continue enalapril, metoprolol, amlodipine, furosemide, and metolazone.

## 2018-03-20 NOTE — PRE-OP CHECKLIST - SELECT TESTS ORDERED
POCT Blood Glucose/EKG/CXR/BMP/Type and Screen/PT/PTT/CBC/CMP/INR finger stick noted at 14:00 = 159/POCT Blood Glucose/EKG/BMP/CMP/Type and Screen/PT/PTT/INR/CBC/CXR

## 2018-03-20 NOTE — PRE-ANESTHESIA EVALUATION ADULT - NSANTHPEFT_GEN_ALL_CORE
AAOX3,   CVS: S1S2 RRR  RESP: CTABL no W/R/R  ABD: Soft NT, ND   EXT: BKA noted.
[id number]  Lungs Clerar

## 2018-03-20 NOTE — PRE-OP CHECKLIST - PATIENT'S PERSONAL PROPERTY REMOVED
contacts/hearing aids/body piercing/prosthesis/dentures/glasses/jewelry/removable braces/retainers/bridgework

## 2018-03-20 NOTE — BRIEF OPERATIVE NOTE - PROCEDURE
<<-----Click on this checkbox to enter Procedure Fistulogram, arteriovenous  03/20/2018  Left arm arteriovenous fistula angiogram, drug-coated balloon angioplasty of venous outflow stenosis  Active  JESSA

## 2018-03-20 NOTE — PROGRESS NOTE ADULT - SUBJECTIVE AND OBJECTIVE BOX
[place] NEPHROLOGY FOLLOW UP NOTE  --------------------------------------------------------------------------------  24 hour events/subjective: Patient examined. Appears comfortable. Mother at bedside. Offered HD today but he refused.    PAST HISTORY  --------------------------------------------------------------------------------  No significant changes to PMH, PSH, FHx, SHx, unless otherwise noted    ALLERGIES & MEDICATIONS  --------------------------------------------------------------------------------  Allergies    No Known Allergies    Standing Inpatient Medications  amLODIPine   Tablet 5 milliGRAM(s) Oral daily  artificial  tears Solution 1 Drop(s) Both EYES every 12 hours  ciprofloxacin     Tablet 750 milliGRAM(s) Oral daily  dextrose 5%. 1000 milliLiter(s) IV Continuous <Continuous>  dextrose 5%. 1000 milliLiter(s) IV Continuous <Continuous>  docusate sodium 100 milliGRAM(s) Oral two times a day  enalapril 20 milliGRAM(s) Oral two times a day  epoetin keagan Injectable 54569 Unit(s) IV Push <User Schedule>  furosemide    Tablet 80 milliGRAM(s) Oral two times a day  insulin lispro (HumaLOG) corrective regimen sliding scale   SubCutaneous every 4 hours  magnesium hydroxide Suspension 60 milliLiter(s) Oral once  metolazone 5 milliGRAM(s) Oral daily  metoprolol     tartrate 25 milliGRAM(s) Oral two times a day  metroNIDAZOLE    Tablet 500 milliGRAM(s) Oral every 8 hours  mineral oil enema 133 milliLiter(s) Rectal once  nystatin Powder 1 Application(s) Topical two times a day  pregabalin 100 milliGRAM(s) Oral two times a day  senna 1 Tablet(s) Oral two times a day  sevelamer hydrochloride 1600 milliGRAM(s) Oral three times a day with meals  simvastatin 10 milliGRAM(s) Oral at bedtime  vancomycin  IVPB 1000 milliGRAM(s) IV Intermittent <User Schedule>    PRN Inpatient Medications  ALPRAZolam 0.25 milliGRAM(s) Oral daily PRN  morphine  - Injectable 4 milliGRAM(s) IV Push every 6 hours PRN  oxyCODONE    5 mG/acetaminophen 325 mG 2 Tablet(s) Oral every 4 hours PRN      VITALS/PHYSICAL EXAM  --------------------------------------------------------------------------------  T(C): 37 (03-20-18 @ 13:26), Max: 37.2 (03-20-18 @ 00:56)  HR: 69 (03-20-18 @ 13:26) (69 - 97)  BP: 100/49 (03-20-18 @ 13:26) (88/52 - 119/53)  RR: 18 (03-20-18 @ 13:26) (18 - 20)  SpO2: 97% (03-20-18 @ 11:04) (97% - 100%)    03-19-18 @ 07:01  -  03-20-18 @ 07:00  --------------------------------------------------------  IN: 0 mL / OUT: 3550 mL / NET: -3550 mL    Physical Exam:  	Gen: NAD  	Pulm: CTA B/L  	CV: RRR, S1S2  	Abd: +BS, soft, nontender/nondistended  	: No suprapubic tenderness  	LE: Warm, no edema  	Vascular access: AVF    LABS/STUDIES  --------------------------------------------------------------------------------              10.0   19.46 >-----------<  439      [03-20-18 @ 01:12]              36.0     140  |  95  |  41  ----------------------------<  148      [03-20-18 @ 01:12]  4.7   |  26  |  7.0        Ca     8.9     [03-20-18 @ 01:12]      Mg     2.5     [03-20-18 @ 01:12]      Phos  4.8     [03-20-18 @ 01:12]    Creatinine Trend:  SCr 7.0 [03-20 @ 01:12]  SCr 7.4 [03-18 @ 11:47]  SCr 5.9 [03-17 @ 01:12]  SCr 6.2 [03-15 @ 01:27]  SCr 6.6 [03-14 @ 01:17]    HbA1c 8.5      [03-09-18 @ 04:05]

## 2018-03-20 NOTE — BRIEF OPERATIVE NOTE - OPERATION/FINDINGS
Focal stenosis of venous outflow at distal end of existing stent; patent arterial inflow and anastomosis
Viable muscle; significant edema; no venus purulence
n/a

## 2018-03-20 NOTE — PROGRESS NOTE ADULT - ASSESSMENT
Pt has rhonchi on exam but no longer c/o SOB, will f/u CXR report and trend vitals. Pt going to OR today for fistulogram. Will continue to follow SW for SNF placement post op.

## 2018-03-20 NOTE — PROGRESS NOTE ADULT - SUBJECTIVE AND OBJECTIVE BOX
[place] HAMM  53y Male   [id number]    Hospital Day: 12  Post Operative Day: 8  Procedure: 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, S/P RT BKA COMPLETION 3/13  Patient is a 53y old  Male who presents with a chief complaint of right foot/ankle necrotizing fasiitis (13 Mar 2018 07:29)    PAST MEDICAL & SURGICAL HISTORY:  Blind  HTN (hypertension)  End stage renal disease  Diabetes      Events of the Last 24h: Pt c/o SOB possibly related to anxiety->improved with home dose alprazolam  Vital Signs Last 24 Hrs  T(C): 36.6 (20 Mar 2018 05:10), Max: 37.2 (20 Mar 2018 00:56)  T(F): 97.9 (20 Mar 2018 05:10), Max: 98.9 (20 Mar 2018 00:56)  HR: 85 (20 Mar 2018 05:10) (81 - 98)  BP: 119/53 (20 Mar 2018 05:10) (88/52 - 150/52)  BP(mean): --  RR: 18 (20 Mar 2018 05:10) (18 - 20)  SpO2: 100% (20 Mar 2018 00:56) (100% - 100%)        Diet, Consistent Carbohydrate Renal/No Snacks (18 @ 10:31)  Diet, NPO after Midnight:      NPO Start Date: 19-Mar-2018,   NPO Start Time: 23:59 (18 @ 10:05)      I&O's Summary    18 Mar 2018 07:  -  19 Mar 2018 07:00  --------------------------------------------------------  IN: 240 mL / OUT: 0 mL / NET: 240 mL    19 Mar 2018 07:  -  20 Mar 2018 06:38  --------------------------------------------------------  IN: 0 mL / OUT: 3550 mL / NET: -3550 mL     I&O's Detail    18 Mar 2018 07:01  -  19 Mar 2018 07:00  --------------------------------------------------------  IN:    Oral Fluid: 240 mL  Total IN: 240 mL    OUT:  Total OUT: 0 mL    Total NET: 240 mL      19 Mar 2018 07:  -  20 Mar 2018 06:38  --------------------------------------------------------  IN:  Total IN: 0 mL    OUT:    Other: 3500 mL    Voided: 50 mL  Total OUT: 3550 mL    Total NET: -3550 mL      MEDICATIONS  (STANDING):  amLODIPine   Tablet 5 milliGRAM(s) Oral daily  artificial  tears Solution 1 Drop(s) Both EYES every 12 hours  ciprofloxacin     Tablet 750 milliGRAM(s) Oral daily  dextrose 5%. 1000 milliLiter(s) (50 mL/Hr) IV Continuous <Continuous>  dextrose 5%. 1000 milliLiter(s) (50 mL/Hr) IV Continuous <Continuous>  docusate sodium 100 milliGRAM(s) Oral two times a day  enalapril 20 milliGRAM(s) Oral two times a day  epoetin keagan Injectable 46157 Unit(s) IV Push <User Schedule>  furosemide    Tablet 80 milliGRAM(s) Oral two times a day  insulin lispro (HumaLOG) corrective regimen sliding scale   SubCutaneous every 4 hours  magnesium hydroxide Suspension 60 milliLiter(s) Oral once  metolazone 5 milliGRAM(s) Oral daily  metoprolol     tartrate 25 milliGRAM(s) Oral two times a day  metroNIDAZOLE    Tablet 500 milliGRAM(s) Oral every 8 hours  mineral oil enema 133 milliLiter(s) Rectal once  nystatin Powder 1 Application(s) Topical two times a day  pregabalin 100 milliGRAM(s) Oral two times a day  senna 1 Tablet(s) Oral two times a day  sevelamer hydrochloride 1600 milliGRAM(s) Oral three times a day with meals  simvastatin 10 milliGRAM(s) Oral at bedtime  vancomycin  IVPB 1000 milliGRAM(s) IV Intermittent <User Schedule>    MEDICATIONS  (PRN):  ALPRAZolam 0.25 milliGRAM(s) Oral daily PRN anxiety  ALPRAZolam 0.25 milliGRAM(s) Oral once PRN anxiety  morphine  - Injectable 4 milliGRAM(s) IV Push every 6 hours PRN Severe Pain (7 - 10)  oxyCODONE    5 mG/acetaminophen 325 mG 2 Tablet(s) Oral every 4 hours PRN Mild Pain (1 - 3)      PHYSICAL EXAM:  GENERAL: somewhat somnolent but awakes easily answering questions appropriately   HEENT: NCAT  CHEST/LUNGS: expiratory rhonchi bilat apical fields  HEART: RRR,  No murmurs, rubs, or gallops  ABDOMEN: SNTND  EXTREMITIES:  R BKA w/ c/d/i dressing and ACE bandage  SKIN: No rashes or lesions                            10.0   19.46 )-----------( 439      ( 20 Mar 2018 01:12 )             36.0        CBC Full  -  ( 20 Mar 2018 01:12 )  WBC Count : 19.46 K/uL  Hemoglobin : 10.0 g/dL  Hematocrit : 36.0 %  Platelet Count - Automated : 439 K/uL  Mean Cell Volume : 87.8 fL  Mean Cell Hemoglobin : 24.4 pg  Mean Cell Hemoglobin Concentration : 27.8 g/dL  Auto Neutrophil # : 13.79 K/uL  Auto Lymphocyte # : 3.18 K/uL  Auto Monocyte # : 1.28 K/uL  Auto Eosinophil # : 0.82 K/uL  Auto Basophil # : 0.16 K/uL  Auto Neutrophil % : 70.9 %  Auto Lymphocyte % : 16.3 %  Auto Monocyte % : 6.6 %  Auto Eosinophil % : 4.2 %  Auto Basophil % : 0.8 %               140   |  95    |  41                 Ca: 8.9    BMP:   ----------------------------< 148    M.5   (18 @ 01:12)             4.7    |  26    | 7.0                Ph: 4.8          IMAGING:  Overnight CXR report pending

## 2018-03-20 NOTE — PRE-ANESTHESIA EVALUATION ADULT - NSANTHOSAYNRD_GEN_A_CORE
No. HOMAR screening performed.  STOP BANG Legend: 0-2 = LOW Risk; 3-4 = INTERMEDIATE Risk; 5-8 = HIGH Risk

## 2018-03-20 NOTE — PROGRESS NOTE ADULT - ASSESSMENT
#Diabetic male with ESRD p/w necrotizing fasciitis of right lower extremity.   #Currently s/p right BKA s/p closure of wound.  #Sepsis-Leukocytosis is worse from yesterday.   -IV vancomycin 1 g post HD. Add merrem 500 mg daily. CT scan of stump. D/W vascular team.   -PICC line

## 2018-03-21 LAB
ANION GAP SERPL CALC-SCNC: 15 MMOL/L — HIGH (ref 7–14)
BASOPHILS # BLD AUTO: 0.15 K/UL — SIGNIFICANT CHANGE UP (ref 0–0.2)
BASOPHILS NFR BLD AUTO: 0.9 % — SIGNIFICANT CHANGE UP (ref 0–1)
BUN SERPL-MCNC: 54 MG/DL — HIGH (ref 10–20)
CALCIUM SERPL-MCNC: 8.3 MG/DL — LOW (ref 8.5–10.1)
CHLORIDE SERPL-SCNC: 98 MMOL/L — SIGNIFICANT CHANGE UP (ref 98–110)
CO2 SERPL-SCNC: 27 MMOL/L — SIGNIFICANT CHANGE UP (ref 17–32)
CREAT SERPL-MCNC: 8.5 MG/DL — CRITICAL HIGH (ref 0.7–1.5)
EOSINOPHIL # BLD AUTO: 1.96 K/UL — HIGH (ref 0–0.7)
EOSINOPHIL NFR BLD AUTO: 12.4 % — HIGH (ref 0–8)
GLUCOSE SERPL-MCNC: 229 MG/DL — HIGH (ref 70–110)
HCT VFR BLD CALC: 31.3 % — LOW (ref 42–52)
HGB BLD-MCNC: 9 G/DL — LOW (ref 14–18)
IMM GRANULOCYTES NFR BLD AUTO: 0.7 % — HIGH (ref 0.1–0.3)
LYMPHOCYTES # BLD AUTO: 14.9 % — LOW (ref 20.5–51.1)
LYMPHOCYTES # BLD AUTO: 2.37 K/UL — SIGNIFICANT CHANGE UP (ref 1.2–3.4)
MAGNESIUM SERPL-MCNC: 2.5 MG/DL — HIGH (ref 1.8–2.4)
MCHC RBC-ENTMCNC: 24.9 PG — LOW (ref 27–31)
MCHC RBC-ENTMCNC: 28.8 G/DL — LOW (ref 32–37)
MCV RBC AUTO: 86.5 FL — SIGNIFICANT CHANGE UP (ref 80–94)
MONOCYTES # BLD AUTO: 1.17 K/UL — HIGH (ref 0.1–0.6)
MONOCYTES NFR BLD AUTO: 7.4 % — SIGNIFICANT CHANGE UP (ref 1.7–9.3)
NEUTROPHILS # BLD AUTO: 10.11 K/UL — HIGH (ref 1.4–6.5)
NEUTROPHILS NFR BLD AUTO: 63.7 % — SIGNIFICANT CHANGE UP (ref 42.2–75.2)
NRBC # BLD: 0 /100 WBCS — SIGNIFICANT CHANGE UP (ref 0–0)
PHOSPHATE SERPL-MCNC: 6.6 MG/DL — HIGH (ref 2.1–4.9)
PLATELET # BLD AUTO: 367 K/UL — SIGNIFICANT CHANGE UP (ref 130–400)
POTASSIUM SERPL-MCNC: 4.8 MMOL/L — SIGNIFICANT CHANGE UP (ref 3.5–5)
POTASSIUM SERPL-SCNC: 4.8 MMOL/L — SIGNIFICANT CHANGE UP (ref 3.5–5)
RBC # BLD: 3.62 M/UL — LOW (ref 4.7–6.1)
RBC # FLD: 18.6 % — HIGH (ref 11.5–14.5)
SODIUM SERPL-SCNC: 140 MMOL/L — SIGNIFICANT CHANGE UP (ref 135–146)
WBC # BLD: 15.87 K/UL — HIGH (ref 4.8–10.8)
WBC # FLD AUTO: 15.87 K/UL — HIGH (ref 4.8–10.8)

## 2018-03-21 RX ADMIN — ONDANSETRON 4 MILLIGRAM(S): 8 TABLET, FILM COATED ORAL at 06:44

## 2018-03-21 RX ADMIN — SENNA PLUS 1 TABLET(S): 8.6 TABLET ORAL at 18:01

## 2018-03-21 RX ADMIN — MORPHINE SULFATE 4 MILLIGRAM(S): 50 CAPSULE, EXTENDED RELEASE ORAL at 17:46

## 2018-03-21 RX ADMIN — Medication 100 MILLIGRAM(S): at 06:05

## 2018-03-21 RX ADMIN — SEVELAMER CARBONATE 1600 MILLIGRAM(S): 2400 POWDER, FOR SUSPENSION ORAL at 17:47

## 2018-03-21 RX ADMIN — SENNA PLUS 1 TABLET(S): 8.6 TABLET ORAL at 06:04

## 2018-03-21 RX ADMIN — Medication 80 MILLIGRAM(S): at 06:05

## 2018-03-21 RX ADMIN — MORPHINE SULFATE 4 MILLIGRAM(S): 50 CAPSULE, EXTENDED RELEASE ORAL at 14:26

## 2018-03-21 RX ADMIN — SEVELAMER CARBONATE 1600 MILLIGRAM(S): 2400 POWDER, FOR SUSPENSION ORAL at 13:16

## 2018-03-21 RX ADMIN — NYSTATIN CREAM 1 APPLICATION(S): 100000 CREAM TOPICAL at 06:07

## 2018-03-21 RX ADMIN — Medication 1 DROP(S): at 06:05

## 2018-03-21 RX ADMIN — Medication 20 MILLIGRAM(S): at 06:04

## 2018-03-21 RX ADMIN — Medication 250 MILLIGRAM(S): at 11:34

## 2018-03-21 RX ADMIN — MEROPENEM 100 MILLIGRAM(S): 1 INJECTION INTRAVENOUS at 18:09

## 2018-03-21 RX ADMIN — Medication 6: at 18:01

## 2018-03-21 RX ADMIN — Medication 0.25 MILLIGRAM(S): at 06:44

## 2018-03-21 RX ADMIN — SERTRALINE 25 MILLIGRAM(S): 25 TABLET, FILM COATED ORAL at 17:47

## 2018-03-21 RX ADMIN — AMLODIPINE BESYLATE 5 MILLIGRAM(S): 2.5 TABLET ORAL at 06:05

## 2018-03-21 RX ADMIN — Medication 1 DROP(S): at 17:47

## 2018-03-21 RX ADMIN — NYSTATIN CREAM 1 APPLICATION(S): 100000 CREAM TOPICAL at 18:00

## 2018-03-21 RX ADMIN — Medication 100 MILLIGRAM(S): at 17:48

## 2018-03-21 RX ADMIN — ERYTHROPOIETIN 10000 UNIT(S): 10000 INJECTION, SOLUTION INTRAVENOUS; SUBCUTANEOUS at 10:21

## 2018-03-21 RX ADMIN — Medication 4: at 06:06

## 2018-03-21 RX ADMIN — SIMVASTATIN 10 MILLIGRAM(S): 20 TABLET, FILM COATED ORAL at 22:15

## 2018-03-21 RX ADMIN — Medication 4: at 13:17

## 2018-03-21 RX ADMIN — Medication 100 MILLIGRAM(S): at 06:08

## 2018-03-21 RX ADMIN — MORPHINE SULFATE 4 MILLIGRAM(S): 50 CAPSULE, EXTENDED RELEASE ORAL at 07:51

## 2018-03-21 RX ADMIN — Medication 25 MILLIGRAM(S): at 06:05

## 2018-03-21 NOTE — PROGRESS NOTE ADULT - ASSESSMENT
Pt is recovering well s/p fistulogram yesterday. There is a palp thrill and he will go to dialysis today. Pt's WBC has also decreased from 19->15.8 after ABX change to alec. Will continue to follow WBC and vitals for signs of infection, especially at stump. Pt will also go for PICC today for access and long term abx.

## 2018-03-21 NOTE — PROGRESS NOTE ADULT - SUBJECTIVE AND OBJECTIVE BOX
FLOR[place] RIMAR  53y Male   [id number]    Hospital Day: 13  Post Operative Day: 1  Procedure: 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, S/P RT BKA COMPLETION 3/13, fistulogram 3/20  Patient is a 53y old  Male who presents with a chief complaint of right foot/ankle necrotizing fasiitis (13 Mar 2018 07:29)    PAST MEDICAL & SURGICAL HISTORY:  Blind  HTN (hypertension)  End stage renal disease  Diabetes      Events of the Last 24h: s/p fistulogram yesterday  Vital Signs Last 24 Hrs  T(C): 36.8 (20 Mar 2018 19:00), Max: 37 (20 Mar 2018 13:26)  T(F): 98.2 (20 Mar 2018 19:00), Max: 98.6 (20 Mar 2018 13:26)  HR: 72 (21 Mar 2018 08:35) (68 - 82)  BP: 92/42 (21 Mar 2018 08:35) (78/47 - 111/55)  BP(mean): --  RR: 18 (21 Mar 2018 08:35) (10 - 20)  SpO2: 100% (20 Mar 2018 18:30) (97% - 100%)        Diet, Consistent Carbohydrate Renal/No Snacks (18 @ 17:23)      I&O's Summary   I&O's Detail      MEDICATIONS  (STANDING):  amLODIPine   Tablet 5 milliGRAM(s) Oral daily  artificial  tears Solution 1 Drop(s) Both EYES every 12 hours  docusate sodium 100 milliGRAM(s) Oral two times a day  enalapril 20 milliGRAM(s) Oral two times a day  epoetin keagan Injectable 15929 Unit(s) IV Push <User Schedule>  furosemide    Tablet 80 milliGRAM(s) Oral two times a day  insulin lispro (HumaLOG) corrective regimen sliding scale   SubCutaneous every 4 hours  meropenem  IVPB 500 milliGRAM(s) IV Intermittent every 24 hours  metolazone 5 milliGRAM(s) Oral daily  metoprolol tartrate 25 milliGRAM(s) Oral two times a day  nystatin Powder 1 Application(s) Topical two times a day  pregabalin 100 milliGRAM(s) Oral two times a day  senna 1 Tablet(s) Oral two times a day  sertraline 25 milliGRAM(s) Oral daily  sevelamer hydrochloride 1600 milliGRAM(s) Oral three times a day with meals  simvastatin 10 milliGRAM(s) Oral at bedtime  vancomycin  IVPB 1000 milliGRAM(s) IV Intermittent every 48 hours    MEDICATIONS  (PRN):  ALPRAZolam 0.25 milliGRAM(s) Oral daily PRN anxiety  morphine  - Injectable 4 milliGRAM(s) IV Push every 6 hours PRN Severe Pain (7 - 10)  oxyCODONE    5 mG/acetaminophen 325 mG 2 Tablet(s) Oral every 4 hours PRN Mild Pain (1 - 3)      PHYSICAL EXAM:  GENERAL: NAD  HEENT: NCAT  CHEST/LUNGS: expiratory rhonchi bilat apical fields  HEART: RRR,  No murmurs, rubs, or gallops  ABDOMEN: SNTND  EXTREMITIES:  R BKA w/ c/d/i dressing and ACE bandage, BRANDONE has c/d/i dressing over fistula, palp thrill and upper extrem pulses bilat. L foot is chronically discolored and dressed.  SKIN: No rashes or lesions                          9.0    15.87 )-----------( 367      ( 21 Mar 2018 00:29 )             31.3        CBC Full  -  ( 21 Mar 2018 00:29 )  WBC Count : 15.87 K/uL  Hemoglobin : 9.0 g/dL  Hematocrit : 31.3 %  Platelet Count - Automated : 367 K/uL  Mean Cell Volume : 86.5 fL  Mean Cell Hemoglobin : 24.9 pg  Mean Cell Hemoglobin Concentration : 28.8 g/dL  Auto Neutrophil # : 10.11 K/uL  Auto Lymphocyte # : 2.37 K/uL  Auto Monocyte # : 1.17 K/uL  Auto Eosinophil # : 1.96 K/uL  Auto Basophil # : 0.15 K/uL  Auto Neutrophil % : 63.7 %  Auto Lymphocyte % : 14.9 %  Auto Monocyte % : 7.4 %  Auto Eosinophil % : 12.4 %  Auto Basophil % : 0.9 %               140   |  98    |  54                 Ca: 8.3    BMP:   ----------------------------< 229    M.5   (18 @ 00:29)             4.8    |  27    | 8.5                Ph: 6.6

## 2018-03-21 NOTE — PROGRESS NOTE ADULT - SUBJECTIVE AND OBJECTIVE BOX
Procedure: Status post fistulogram, arteriovenous , Left arm arteriovenous fistula angiogram, drug-coated balloon angioplasty of venous outflow stenosis    Post Operative day #1    Patient is resting comfortably no compliants.    vitals:    T(C): 36.8 (18 @ 19:00), Max: 37.2 (18 @ 00:56)  HR: 82 (18 @ 19:00) (68 - 86)  BP: 108/53 (18 @ 19:00) (78/47 - 119/53)  RR: 20 (18 @ 19:00) (10 - 20)  SpO2: 100% (18 @ 18:30) (97% - 100%)    18 @ 07:01  -  18 @ 07:00  --------------------------------------------------------  IN: 0 mL / OUT: 3550 mL / NET: -3550 mL      physical exam:  General: Alert and oriented times 3, not in acute distress   Heart: Regular rate and rhythm, no rubs , murmurs or gallops  Lungs: Clear to auscultation bilaterally, no wheezes, rales, rhonci appreciated  Abdomen: Soft , positive bowel sounds, no tenderness, no distention, no peritoneal signs   Extremites: left upper extremity warm good color incision clean dry intact , thrill present, radial and ulnar palpable pulses.               10.0   19.46 )-----------( 439      (  @ 01:12 )             36.0                    140   |  95    |  41                 Ca: 8.9    BMP:   ----------------------------< 148    M.5   (18 @ 01:12)             4.7    |  26    | 7.0                Ph: 4.8

## 2018-03-21 NOTE — PROGRESS NOTE ADULT - ASSESSMENT
Plan and assessment    Pt. 53yMale status post Left arm arteriovenous fistula angiogram, drug-coated balloon angioplasty of venous outflow stenosis      -Pain management  - picc line   -iv anitbiotics     CHIQUITA Hyman   03-21-18 @ 00:30  # [id number]/ [id number]

## 2018-03-21 NOTE — PROGRESS NOTE ADULT - SUBJECTIVE AND OBJECTIVE BOX
Patient was examined during HD treatment.    Hemodialysis Prescription:  	Access: AVF  	Dialyzer: Opti 160  	Blood Flow (mL/Min): 400  	Dialysate Flow (mL/Min): 500  	Target UF (Liters): 3.5  	Treatment Time: 3 hours  	Potassium: 2K  	Calcium: 2.5

## 2018-03-21 NOTE — PROGRESS NOTE ADULT - ASSESSMENT
1. R foot necrotizing fasciitis. s/p BKA. On abx.  2. ESRD on HD TTS (MWF in-hospital). HD today: 3 hours, opti 160 dialyzer, 2K bath, 3.5L UF. AVF duplex shows stenosis, fistulogram today.  3. Anemia. EPO 10,000 units IV with HD.  4. Hyperphosphatemia. Renal diet. Sevelamer with meals.  5. HTN. Continue enalapril, metoprolol, amlodipine, furosemide, and metolazone.

## 2018-03-22 LAB
ANION GAP SERPL CALC-SCNC: 12 MMOL/L — SIGNIFICANT CHANGE UP (ref 7–14)
BUN SERPL-MCNC: 40 MG/DL — HIGH (ref 10–20)
CALCIUM SERPL-MCNC: 8.2 MG/DL — LOW (ref 8.5–10.1)
CHLORIDE SERPL-SCNC: 102 MMOL/L — SIGNIFICANT CHANGE UP (ref 98–110)
CO2 SERPL-SCNC: 28 MMOL/L — SIGNIFICANT CHANGE UP (ref 17–32)
CREAT SERPL-MCNC: 7.6 MG/DL — CRITICAL HIGH (ref 0.7–1.5)
GLUCOSE SERPL-MCNC: 185 MG/DL — HIGH (ref 70–110)
HCT VFR BLD CALC: 31.2 % — LOW (ref 42–52)
HGB BLD-MCNC: 8.9 G/DL — LOW (ref 14–18)
MAGNESIUM SERPL-MCNC: 2.4 MG/DL — SIGNIFICANT CHANGE UP (ref 1.8–2.4)
MCHC RBC-ENTMCNC: 24.9 PG — LOW (ref 27–31)
MCHC RBC-ENTMCNC: 28.5 G/DL — LOW (ref 32–37)
MCV RBC AUTO: 87.4 FL — SIGNIFICANT CHANGE UP (ref 80–94)
NRBC # BLD: 0 /100 WBCS — SIGNIFICANT CHANGE UP (ref 0–0)
PHOSPHATE SERPL-MCNC: 5.9 MG/DL — HIGH (ref 2.1–4.9)
PLATELET # BLD AUTO: 310 K/UL — SIGNIFICANT CHANGE UP (ref 130–400)
POTASSIUM SERPL-MCNC: 4.4 MMOL/L — SIGNIFICANT CHANGE UP (ref 3.5–5)
POTASSIUM SERPL-SCNC: 4.4 MMOL/L — SIGNIFICANT CHANGE UP (ref 3.5–5)
RBC # BLD: 3.57 M/UL — LOW (ref 4.7–6.1)
RBC # FLD: 18.9 % — HIGH (ref 11.5–14.5)
SODIUM SERPL-SCNC: 142 MMOL/L — SIGNIFICANT CHANGE UP (ref 135–146)
WBC # BLD: 15.3 K/UL — HIGH (ref 4.8–10.8)
WBC # FLD AUTO: 15.3 K/UL — HIGH (ref 4.8–10.8)

## 2018-03-22 RX ORDER — SEVELAMER CARBONATE 2400 MG/1
2400 POWDER, FOR SUSPENSION ORAL THREE TIMES A DAY
Qty: 0 | Refills: 0 | Status: DISCONTINUED | OUTPATIENT
Start: 2018-03-22 | End: 2018-03-27

## 2018-03-22 RX ADMIN — Medication 80 MILLIGRAM(S): at 05:46

## 2018-03-22 RX ADMIN — Medication 20 MILLIGRAM(S): at 17:31

## 2018-03-22 RX ADMIN — MEROPENEM 100 MILLIGRAM(S): 1 INJECTION INTRAVENOUS at 19:51

## 2018-03-22 RX ADMIN — Medication 8: at 17:28

## 2018-03-22 RX ADMIN — Medication 250 MILLIGRAM(S): at 21:19

## 2018-03-22 RX ADMIN — MORPHINE SULFATE 4 MILLIGRAM(S): 50 CAPSULE, EXTENDED RELEASE ORAL at 14:16

## 2018-03-22 RX ADMIN — Medication 25 MILLIGRAM(S): at 17:30

## 2018-03-22 RX ADMIN — Medication 10: at 22:28

## 2018-03-22 RX ADMIN — Medication 25 MILLIGRAM(S): at 05:47

## 2018-03-22 RX ADMIN — SEVELAMER CARBONATE 2400 MILLIGRAM(S): 2400 POWDER, FOR SUSPENSION ORAL at 21:18

## 2018-03-22 RX ADMIN — Medication 100 MILLIGRAM(S): at 05:47

## 2018-03-22 RX ADMIN — MORPHINE SULFATE 4 MILLIGRAM(S): 50 CAPSULE, EXTENDED RELEASE ORAL at 15:26

## 2018-03-22 RX ADMIN — SENNA PLUS 1 TABLET(S): 8.6 TABLET ORAL at 05:51

## 2018-03-22 RX ADMIN — SEVELAMER CARBONATE 1600 MILLIGRAM(S): 2400 POWDER, FOR SUSPENSION ORAL at 07:58

## 2018-03-22 RX ADMIN — SENNA PLUS 1 TABLET(S): 8.6 TABLET ORAL at 17:32

## 2018-03-22 RX ADMIN — Medication 1 DROP(S): at 05:48

## 2018-03-22 RX ADMIN — Medication 100 MILLIGRAM(S): at 17:30

## 2018-03-22 RX ADMIN — SEVELAMER CARBONATE 1600 MILLIGRAM(S): 2400 POWDER, FOR SUSPENSION ORAL at 12:56

## 2018-03-22 RX ADMIN — Medication 100 MILLIGRAM(S): at 17:34

## 2018-03-22 RX ADMIN — Medication 100 MILLIGRAM(S): at 05:51

## 2018-03-22 RX ADMIN — MORPHINE SULFATE 4 MILLIGRAM(S): 50 CAPSULE, EXTENDED RELEASE ORAL at 07:57

## 2018-03-22 RX ADMIN — SIMVASTATIN 10 MILLIGRAM(S): 20 TABLET, FILM COATED ORAL at 21:18

## 2018-03-22 RX ADMIN — Medication 4: at 10:07

## 2018-03-22 RX ADMIN — NYSTATIN CREAM 1 APPLICATION(S): 100000 CREAM TOPICAL at 05:48

## 2018-03-22 RX ADMIN — Medication 20 MILLIGRAM(S): at 05:49

## 2018-03-22 RX ADMIN — SERTRALINE 25 MILLIGRAM(S): 25 TABLET, FILM COATED ORAL at 12:56

## 2018-03-22 RX ADMIN — Medication 80 MILLIGRAM(S): at 17:31

## 2018-03-22 RX ADMIN — AMLODIPINE BESYLATE 5 MILLIGRAM(S): 2.5 TABLET ORAL at 05:47

## 2018-03-22 RX ADMIN — Medication 1 DROP(S): at 17:29

## 2018-03-22 RX ADMIN — MORPHINE SULFATE 4 MILLIGRAM(S): 50 CAPSULE, EXTENDED RELEASE ORAL at 01:14

## 2018-03-22 RX ADMIN — MORPHINE SULFATE 4 MILLIGRAM(S): 50 CAPSULE, EXTENDED RELEASE ORAL at 01:30

## 2018-03-22 NOTE — PROGRESS NOTE ADULT - SUBJECTIVE AND OBJECTIVE BOX
[place] [facility] FOLLOW UP NOTE  --------------------------------------------------------------------------------  24 hour events/subjective: Patient examined. Appears comfortable.    PAST HISTORY  --------------------------------------------------------------------------------  No significant changes to PMH, PSH, FHx, SHx, unless otherwise noted    ALLERGIES & MEDICATIONS  --------------------------------------------------------------------------------  Allergies    No Known Allergies    Standing Inpatient Medications  amLODIPine   Tablet 5 milliGRAM(s) Oral daily  artificial  tears Solution 1 Drop(s) Both EYES every 12 hours  docusate sodium 100 milliGRAM(s) Oral two times a day  enalapril 20 milliGRAM(s) Oral two times a day  epoetin keagan Injectable 20166 Unit(s) IV Push <User Schedule>  furosemide    Tablet 80 milliGRAM(s) Oral two times a day  insulin lispro (HumaLOG) corrective regimen sliding scale   SubCutaneous every 4 hours  meropenem  IVPB 500 milliGRAM(s) IV Intermittent every 24 hours  metolazone 5 milliGRAM(s) Oral daily  metoprolol tartrate 25 milliGRAM(s) Oral two times a day  nystatin Powder 1 Application(s) Topical two times a day  pregabalin 100 milliGRAM(s) Oral two times a day  senna 1 Tablet(s) Oral two times a day  sertraline 25 milliGRAM(s) Oral daily  sevelamer hydrochloride 1600 milliGRAM(s) Oral three times a day with meals  simvastatin 10 milliGRAM(s) Oral at bedtime  vancomycin  IVPB 1000 milliGRAM(s) IV Intermittent every 48 hours    PRN Inpatient Medications  ALPRAZolam 0.25 milliGRAM(s) Oral daily PRN  morphine  - Injectable 4 milliGRAM(s) IV Push every 6 hours PRN  oxyCODONE    5 mG/acetaminophen 325 mG 2 Tablet(s) Oral every 4 hours PRN      VITALS/PHYSICAL EXAM  --------------------------------------------------------------------------------  T(C): 36.6 (03-22-18 @ 14:36), Max: 37.4 (03-21-18 @ 20:10)  HR: 75 (03-22-18 @ 14:36) (73 - 88)  BP: 130/64 (03-22-18 @ 14:36) (95/46 - 130/64)  RR: 18 (03-22-18 @ 14:36) (18 - 20)  Height (cm): 182.88 (03-20-18 @ 15:31)  Weight (kg): 140.6 (03-20-18 @ 15:31)  BMI (kg/m2): 42 (03-20-18 @ 15:31)  BSA (m2): 2.57 (03-20-18 @ 15:31)    03-21-18 @ 07:01  -  03-22-18 @ 07:00  --------------------------------------------------------  IN: 0 mL / OUT: 2300 mL / NET: -2300 mL    03-22-18 @ 07:01  -  03-22-18 @ 14:52  --------------------------------------------------------  IN: 1040 mL / OUT: 0 mL / NET: 1040 mL    Physical Exam:  	Gen: NAD  	Pulm: CTA B/L  	CV: RRR, S1S2  	Abd: +BS, soft, nontender/nondistended  	: No suprapubic tenderness  	LE: Warm, no edema  	Vascular access: AVF    LABS/STUDIES  --------------------------------------------------------------------------------              8.9    15.30 >-----------<  310      [03-22-18 @ 09:25]              31.2     142  |  102  |  40  ----------------------------<  185      [03-22-18 @ 09:25]  4.4   |  28  |  7.6        Ca     8.2     [03-22-18 @ 09:25]      Mg     2.4     [03-22-18 @ 09:25]      Phos  5.9     [03-22-18 @ 09:25]

## 2018-03-22 NOTE — PROGRESS NOTE ADULT - ASSESSMENT
1. R foot necrotizing fasciitis. s/p BKA. On abx.  2. ESRD on HD TTS (MWF in-hospital). HD today: 3 hours, opti 160 dialyzer, 2K bath, 3.5L UF. AVF duplex shows stenosis, fistulogram today.  3. Anemia. EPO 10,000 units IV with HD.  4. Hyperphosphatemia. Renal diet. Increase Sevelamer to 2400mg with meals.  5. HTN. Continue enalapril, metoprolol, furosemide, and metolazone. Stop amlodipine.

## 2018-03-22 NOTE — PROGRESS NOTE ADULT - SUBJECTIVE AND OBJECTIVE BOX
FLOR[place] HAMM  53y Male   [id number]    Hospital Day: 14  Post Operative Day: 2  Procedure: 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, S/P RT BKA COMPLETION 3/13, fistulogram 3/20  Patient is a 53y old  Male who presents with a chief complaint of right foot/ankle necrotizing fasiitis (13 Mar 2018 07:29)    PAST MEDICAL & SURGICAL HISTORY:  Blind  HTN (hypertension)  End stage renal disease  Diabetes    Events of the Last 24h: PICC placed in RUE  Vital Signs Last 24 Hrs  T(C): 35.6 (22 Mar 2018 01:13), Max: 37.4 (21 Mar 2018 20:10)  T(F): 96 (22 Mar 2018 01:13), Max: 99.3 (21 Mar 2018 20:10)  HR: 88 (22 Mar 2018 01:) (72 - 88)  BP: 110/51 (22 Mar 2018 01:13) (92/42 - 110/51)  BP(mean): --  RR: 19 (22 Mar 2018 01:13) (18 - 20)  SpO2: --        Diet, Consistent Carbohydrate Renal/No Snacks (18 @ 17:23)      I&O's Summary    21 Mar 2018 07:  -  22 Mar 2018 03:26  --------------------------------------------------------  IN: 0 mL / OUT: 2300 mL / NET: -2300 mL     I&O's Detail    21 Mar 2018 07:  -  22 Mar 2018 03:26  --------------------------------------------------------  IN:  Total IN: 0 mL    OUT:    Other: 2300 mL  Total OUT: 2300 mL    Total NET: -2300 mL    MEDICATIONS  (STANDING):  amLODIPine   Tablet 5 milliGRAM(s) Oral daily  artificial  tears Solution 1 Drop(s) Both EYES every 12 hours  docusate sodium 100 milliGRAM(s) Oral two times a day  enalapril 20 milliGRAM(s) Oral two times a day  epoetin keagan Injectable 19346 Unit(s) IV Push <User Schedule>  furosemide    Tablet 80 milliGRAM(s) Oral two times a day  insulin lispro (HumaLOG) corrective regimen sliding scale   SubCutaneous every 4 hours  meropenem  IVPB 500 milliGRAM(s) IV Intermittent every 24 hours  metolazone 5 milliGRAM(s) Oral daily  metoprolol tartrate 25 milliGRAM(s) Oral two times a day  nystatin Powder 1 Application(s) Topical two times a day  pregabalin 100 milliGRAM(s) Oral two times a day  senna 1 Tablet(s) Oral two times a day  sertraline 25 milliGRAM(s) Oral daily  sevelamer hydrochloride 1600 milliGRAM(s) Oral three times a day with meals  simvastatin 10 milliGRAM(s) Oral at bedtime  vancomycin  IVPB 1000 milliGRAM(s) IV Intermittent every 48 hours    MEDICATIONS  (PRN):  ALPRAZolam 0.25 milliGRAM(s) Oral daily PRN anxiety  morphine  - Injectable 4 milliGRAM(s) IV Push every 6 hours PRN Severe Pain (7 - 10)  oxyCODONE    5 mG/acetaminophen 325 mG 2 Tablet(s) Oral every 4 hours PRN Mild Pain (1 - 3)      PHYSICAL EXAM:  GENERAL: NAD  HEENT: NCAT  CHEST/LUNGS:  HEART: RRR,  No murmurs, rubs, or gallops  ABDOMEN: SNTND  EXTREMITIES:  R BKA w/ c/d/i dressing and ACE bandage, LUE has c/d/i dressing over fistula, palp thrill and upper extrem pulses bilat. L foot is chronically discolored and dressed. RUE PICC in place  SKIN: No rashes or lesions                          9.0    15.87 )-----------( 367      ( 21 Mar 2018 00:29 )             31.3        CBC Full  -  ( 21 Mar 2018 00:29 )  WBC Count : 15.87 K/uL  Hemoglobin : 9.0 g/dL  Hematocrit : 31.3 %  Platelet Count - Automated : 367 K/uL  Mean Cell Volume : 86.5 fL  Mean Cell Hemoglobin : 24.9 pg  Mean Cell Hemoglobin Concentration : 28.8 g/dL  Auto Neutrophil # : 10.11 K/uL  Auto Lymphocyte # : 2.37 K/uL  Auto Monocyte # : 1.17 K/uL  Auto Eosinophil # : 1.96 K/uL  Auto Basophil # : 0.15 K/uL  Auto Neutrophil % : 63.7 %  Auto Lymphocyte % : 14.9 %  Auto Monocyte % : 7.4 %  Auto Eosinophil % : 12.4 %  Auto Basophil % : 0.9 %               140   |  98    |  54                 Ca: 8.3    BMP:   ----------------------------< 229    M.5   (18 @ 00:29)             4.8    |  27    | 8.5                Ph: 6.6 [place] HAMM  53y Male   [id number]    Hospital Day: 14  Post Operative Day: 2  Procedure: 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, S/P RT BKA COMPLETION 3/13, fistulogram 3/20  Patient is a 53y old  Male who presents with a chief complaint of right foot/ankle necrotizing fasiitis (13 Mar 2018 07:29)    PAST MEDICAL & SURGICAL HISTORY:  Blind  HTN (hypertension)  End stage renal disease  Diabetes    Events of the Last 24h: PICC placed in RUE  Vital Signs Last 24 Hrs  T(C): 35.6 (22 Mar 2018 01:13), Max: 37.4 (21 Mar 2018 20:10)  T(F): 96 (22 Mar 2018 01:13), Max: 99.3 (21 Mar 2018 20:10)  HR: 88 (22 Mar 2018 01:) (72 - 88)  BP: 110/51 (22 Mar 2018 01:13) (92/42 - 110/51)  BP(mean): --  RR: 19 (22 Mar 2018 01:13) (18 - 20)  SpO2: --        Diet, Consistent Carbohydrate Renal/No Snacks (18 @ 17:23)      I&O's Summary    21 Mar 2018 07:  -  22 Mar 2018 03:26  --------------------------------------------------------  IN: 0 mL / OUT: 2300 mL / NET: -2300 mL     I&O's Detail    21 Mar 2018 07:  -  22 Mar 2018 03:26  --------------------------------------------------------  IN:  Total IN: 0 mL    OUT:    Other: 2300 mL  Total OUT: 2300 mL    Total NET: -2300 mL    MEDICATIONS  (STANDING):  amLODIPine   Tablet 5 milliGRAM(s) Oral daily  artificial  tears Solution 1 Drop(s) Both EYES every 12 hours  docusate sodium 100 milliGRAM(s) Oral two times a day  enalapril 20 milliGRAM(s) Oral two times a day  epoetin keagan Injectable 34015 Unit(s) IV Push <User Schedule>  furosemide    Tablet 80 milliGRAM(s) Oral two times a day  insulin lispro (HumaLOG) corrective regimen sliding scale   SubCutaneous every 4 hours  meropenem  IVPB 500 milliGRAM(s) IV Intermittent every 24 hours  metolazone 5 milliGRAM(s) Oral daily  metoprolol tartrate 25 milliGRAM(s) Oral two times a day  nystatin Powder 1 Application(s) Topical two times a day  pregabalin 100 milliGRAM(s) Oral two times a day  senna 1 Tablet(s) Oral two times a day  sertraline 25 milliGRAM(s) Oral daily  sevelamer hydrochloride 1600 milliGRAM(s) Oral three times a day with meals  simvastatin 10 milliGRAM(s) Oral at bedtime  vancomycin  IVPB 1000 milliGRAM(s) IV Intermittent every 48 hours    MEDICATIONS  (PRN):  ALPRAZolam 0.25 milliGRAM(s) Oral daily PRN anxiety  morphine  - Injectable 4 milliGRAM(s) IV Push every 6 hours PRN Severe Pain (7 - 10)  oxyCODONE    5 mG/acetaminophen 325 mG 2 Tablet(s) Oral every 4 hours PRN Mild Pain (1 - 3)      PHYSICAL EXAM:  GENERAL: NAD  HEENT: NCAT  CHEST/LUNGS: CTAB  HEART: RRR,  systolic murmur  ABDOMEN: SNTND  EXTREMITIES:  R BKA w/ c/d/i dressing and ACE bandage, LUE has c/d/i dressing over fistula, palp thrill and upper extrem pulses bilat. L foot is chronically discolored and dressed. RUE PICC in place  SKIN: No rashes or lesions                          9.0    15.87 )-----------( 367      ( 21 Mar 2018 00:29 )             31.3        CBC Full  -  ( 21 Mar 2018 00:29 )  WBC Count : 15.87 K/uL  Hemoglobin : 9.0 g/dL  Hematocrit : 31.3 %  Platelet Count - Automated : 367 K/uL  Mean Cell Volume : 86.5 fL  Mean Cell Hemoglobin : 24.9 pg  Mean Cell Hemoglobin Concentration : 28.8 g/dL  Auto Neutrophil # : 10.11 K/uL  Auto Lymphocyte # : 2.37 K/uL  Auto Monocyte # : 1.17 K/uL  Auto Eosinophil # : 1.96 K/uL  Auto Basophil # : 0.15 K/uL  Auto Neutrophil % : 63.7 %  Auto Lymphocyte % : 14.9 %  Auto Monocyte % : 7.4 %  Auto Eosinophil % : 12.4 %  Auto Basophil % : 0.9 %               140   |  98    |  54                 Ca: 8.3    BMP:   ----------------------------< 229    M.5   (18 @ 00:29)             4.8    |  27    | 8.5                Ph: 6.6

## 2018-03-22 NOTE — CHART NOTE - NSCHARTNOTEFT_GEN_A_CORE
Registered Dietitian Not At Risk Follow-Up    Pt s/p RT BKA completion on 3/13, fistulogram on 3/20. Pt s/p PICC placement on 3/21 for access and long term abx. Meds and labs reviewed. No GI distress noted. Spoke to pt and he reports he has a good appetite; consuming % of his meal trays. No nutritional interventions at this time.

## 2018-03-22 NOTE — PROGRESS NOTE ADULT - SUBJECTIVE AND OBJECTIVE BOX
INTERVAL HPI/OVERNIGHT EVENTS:  S/P BKA.   Episode of diarrhea    Allergies: No Known Allergies  EXAM: AOx3, right BKA, s/p closure, wound is clean, mild erythema and pain, abd soft, no wheezing.     Vital Signs Last 24 Hrs  T(C): 36.6 (22 Mar 2018 14:36), Max: 37.4 (21 Mar 2018 20:10)  T(F): 97.9 (22 Mar 2018 14:36), Max: 99.3 (21 Mar 2018 20:10)  HR: 75 (22 Mar 2018 14:36) (73 - 88)  BP: 130/64 (22 Mar 2018 14:36) (95/46 - 130/64)  BP(mean): --  RR: 18 (22 Mar 2018 14:36) (18 - 20)  SpO2: --    03-21-18 @ 07:01  -  03-22-18 @ 07:00  --------------------------------------------------------  IN: 0 mL / OUT: 2300 mL / NET: -2300 mL    03-22-18 @ 07:01  -  03-22-18 @ 15:44  --------------------------------------------------------  IN: 1040 mL / OUT: 0 mL / NET: 1040 m    ALPRAZolam 0.25 milliGRAM(s) Oral daily PRN  artificial  tears Solution 1 Drop(s) Both EYES every 12 hours  docusate sodium 100 milliGRAM(s) Oral two times a day  enalapril 20 milliGRAM(s) Oral two times a day  epoetin keagan Injectable 36560 Unit(s) IV Push <User Schedule>  furosemide    Tablet 80 milliGRAM(s) Oral two times a day  insulin lispro (HumaLOG) corrective regimen sliding scale   SubCutaneous every 4 hours  meropenem  IVPB 500 milliGRAM(s) IV Intermittent every 24 hours  metolazone 5 milliGRAM(s) Oral daily  metoprolol tartrate 25 milliGRAM(s) Oral two times a day  morphine  - Injectable 4 milliGRAM(s) IV Push every 6 hours PRN  nystatin Powder 1 Application(s) Topical two times a day  oxyCODONE    5 mG/acetaminophen 325 mG 2 Tablet(s) Oral every 4 hours PRN  pregabalin 100 milliGRAM(s) Oral two times a day  senna 1 Tablet(s) Oral two times a day  sertraline 25 milliGRAM(s) Oral daily  sevelamer hydrochloride 2400 milliGRAM(s) Oral three times a day  simvastatin 10 milliGRAM(s) Oral at bedtime  vancomycin  IVPB 1000 milliGRAM(s) IV Intermittent every 48 hours             8.9    15.30 )-----------( 310      ( 22 Mar 2018 09:25 )             31.2     03-22    142  |  102  |  40<H>  ----------------------------<  185<H>  4.4   |  28  |  7.6<HH>                   10.0   19.46 )-----------( 439      ( 20 Mar 2018 01:12 )             36.0     03-20    140  |  95<L>  |  41<H>  ----------------------------<  148<H>  4.7   |  26  |  7.0<HH>                        9.3    16.29 )-----------( 393      ( 18 Mar 2018 11:47 )             32.7     03-18    139  |  95<L>  |  46<H>  ----------------------------<  128<H>  4.5   |  27  |  7.4<HH>      Culture - Surgical Swab (03.08.18 @ 21:11)    Specimen Source: .Surgical Swab WOUND    Culture Results:   No growth    Culture - Surgical Swab (03.08.18 @ 21:11)    Specimen Source: .Surgical Swab WOUND    Culture Results:   No growth    Culture - Blood (03.08.18 @ 10:45)    Specimen Source: .Blood Blood    Culture Results:   No growth to date.    Culture - Blood (02.27.18 @ 06:45)    Specimen Source: .Blood Blood-Peripheral    Culture Results:   No growth at 5 days.    3/9 CXR- No focal pulmonary consolidation.  3/8 CT right leg:  Extensive subcutaneous emphysema surrounding the midfoot and ankle,   extending proximally to the distal tibia/fibula, approximately 4.5 cm   proximal to the ankle joint consistent with necrotizing fasciitis.   2. Ulcer overlying the lateral hindfoot with evidence of septic   arthritis/osteomyelitis throughout the midfoot and hindfoot as above  3. Severe neuropathic arthropathy.

## 2018-03-22 NOTE — PROGRESS NOTE ADULT - ASSESSMENT
Pt WBC count decreased 19->15 after change to alec, will continue to trend WBC. Pt continues to have pain at right stump. Will f/u ID recs for long term/outpatient abx treatment. Will also f/u with  for authorization to [facility] and dispo.

## 2018-03-22 NOTE — PROGRESS NOTE ADULT - ASSESSMENT
#Diabetic male with ESRD p/w necrotizing fasciitis of right lower extremity.   #Currently s/p right BKA s/p closure of wound.  #Sepsis-Leukocytosis improving  -IV vancomycin 1 g post HD and Merrem 500 mg daily. 2 weeks from now. F/U with vascular.   D/W vascular team and .   -PICC line placed  - Diarrhea If persists, check for c. difficle.

## 2018-03-23 RX ORDER — VANCOMYCIN HCL 1 G
1000 VIAL (EA) INTRAVENOUS
Qty: 0 | Refills: 0 | Status: DISCONTINUED | OUTPATIENT
Start: 2018-03-23 | End: 2018-03-27

## 2018-03-23 RX ADMIN — Medication 20 MILLIGRAM(S): at 06:22

## 2018-03-23 RX ADMIN — Medication 6: at 22:10

## 2018-03-23 RX ADMIN — Medication 100 MILLIGRAM(S): at 06:26

## 2018-03-23 RX ADMIN — Medication 6: at 14:44

## 2018-03-23 RX ADMIN — Medication 80 MILLIGRAM(S): at 06:21

## 2018-03-23 RX ADMIN — MORPHINE SULFATE 4 MILLIGRAM(S): 50 CAPSULE, EXTENDED RELEASE ORAL at 07:47

## 2018-03-23 RX ADMIN — Medication 6: at 03:13

## 2018-03-23 RX ADMIN — MEROPENEM 100 MILLIGRAM(S): 1 INJECTION INTRAVENOUS at 19:43

## 2018-03-23 RX ADMIN — SEVELAMER CARBONATE 2400 MILLIGRAM(S): 2400 POWDER, FOR SUSPENSION ORAL at 06:21

## 2018-03-23 RX ADMIN — Medication 25 MILLIGRAM(S): at 06:20

## 2018-03-23 RX ADMIN — Medication 1 DROP(S): at 06:21

## 2018-03-23 RX ADMIN — SENNA PLUS 1 TABLET(S): 8.6 TABLET ORAL at 06:22

## 2018-03-23 RX ADMIN — ERYTHROPOIETIN 10000 UNIT(S): 10000 INJECTION, SOLUTION INTRAVENOUS; SUBCUTANEOUS at 09:31

## 2018-03-23 RX ADMIN — SERTRALINE 25 MILLIGRAM(S): 25 TABLET, FILM COATED ORAL at 12:02

## 2018-03-23 RX ADMIN — SEVELAMER CARBONATE 2400 MILLIGRAM(S): 2400 POWDER, FOR SUSPENSION ORAL at 22:09

## 2018-03-23 RX ADMIN — SIMVASTATIN 10 MILLIGRAM(S): 20 TABLET, FILM COATED ORAL at 22:09

## 2018-03-23 RX ADMIN — Medication 2: at 06:25

## 2018-03-23 RX ADMIN — Medication 2 MILLIGRAM(S): at 17:52

## 2018-03-23 RX ADMIN — NYSTATIN CREAM 1 APPLICATION(S): 100000 CREAM TOPICAL at 06:24

## 2018-03-23 NOTE — PROGRESS NOTE ADULT - SUBJECTIVE AND OBJECTIVE BOX
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, S/P RT BKA COMPLETION 3/13, fistulogram 3/20    Events of past 24 hours:  ID recommendations updated, one loose bowel movement      PAST MEDICAL & SURGICAL HISTORY:  Blind  HTN (hypertension)  End stage renal disease  Diabetes      Vital Signs Last 24 Hrs  T(C): 36.9 (22 Mar 2018 20:56), Max: 36.9 (22 Mar 2018 20:56)  T(F): 98.4 (22 Mar 2018 20:56), Max: 98.4 (22 Mar 2018 20:56)  HR: 70 (22 Mar 2018 20:56) (70 - 88)  BP: 130/61 (22 Mar 2018 20:56) (110/51 - 130/64)  BP(mean): --  RR: 18 (22 Mar 2018 20:56) (18 - 19)  SpO2: 97% (22 Mar 2018 23:33) (97% - 97%)    Pain (0-10):            Pain Control Adequate: [] YES [] N    Diet:  Diet, Consistent Carbohydrate Renal/No Snacks      I&O's Detail    21 Mar 2018 07:01  -  22 Mar 2018 07:00  --------------------------------------------------------  IN:  Total IN: 0 mL    OUT:    Other: 2300 mL  Total OUT: 2300 mL    Total NET: -2300 mL      22 Mar 2018 07:01  -  23 Mar 2018 01:12  --------------------------------------------------------  IN:    Oral Fluid: 1040 mL  Total IN: 1040 mL    OUT:  Total OUT: 0 mL    Total NET: 1040 mL          Bowel Movement:[x] YES [] NO  Flatus: [x] YES [] NO  Activity: aat  PHYSICAL EXAM  Gen: no pallor  CV: s1/s2 normal  Lungs: b/l clear  Abd: soft, non tender  Inc: right bka stump in brace    MEDICATIONS  (STANDING):  artificial  tears Solution 1 Drop(s) Both EYES every 12 hours  docusate sodium 100 milliGRAM(s) Oral two times a day  enalapril 20 milliGRAM(s) Oral two times a day  epoetin keagan Injectable 68399 Unit(s) IV Push <User Schedule>  furosemide    Tablet 80 milliGRAM(s) Oral two times a day  insulin lispro (HumaLOG) corrective regimen sliding scale   SubCutaneous every 4 hours  meropenem  IVPB 500 milliGRAM(s) IV Intermittent every 24 hours  metolazone 5 milliGRAM(s) Oral daily  metoprolol tartrate 25 milliGRAM(s) Oral two times a day  nystatin Powder 1 Application(s) Topical two times a day  pregabalin 100 milliGRAM(s) Oral two times a day  senna 1 Tablet(s) Oral two times a day  sertraline 25 milliGRAM(s) Oral daily  sevelamer hydrochloride 2400 milliGRAM(s) Oral three times a day  simvastatin 10 milliGRAM(s) Oral at bedtime  vancomycin  IVPB 1000 milliGRAM(s) IV Intermittent every 48 hours    MEDICATIONS  (PRN):  ALPRAZolam 0.25 milliGRAM(s) Oral daily PRN anxiety  morphine  - Injectable 4 milliGRAM(s) IV Push every 6 hours PRN Severe Pain (7 - 10)  oxyCODONE    5 mG/acetaminophen 325 mG 2 Tablet(s) Oral every 4 hours PRN Mild Pain (1 - 3)      DVT PROPHYLAXIS: [] YES [x] NO   GI PROPHYLAXIS: [] YES [x] NO   ANTIPLATELETS: [] YES [x] NO   ANTICOAGULATION: [] YES [x] NO   ANTIBIOTICS: [x] YES [] NO meropenem  IVPB 500 milliGRAM(s)  vancomycin  IVPB 1000 milliGRAM(s)      LAB/STUDIES:              8.9                 15.30  >---------<  310                             31.2                               142    | 102    | 40     ---------------------------<185   03-22-18 @ 09:25  4.4    | 28     | 7.6      Anion Gap:12     eGFR:7                8.2              v           03-22-18 @ 09:25  5.9     |  2.4              x      |x      |x                          -------------------------[x    (03-22-18 @ 09:25)           x       |x       |x                                                                         x      ----------<  x     03-22-18 @ 09:25  x        Lipase: x    03-22-18 @ 09:25  Amylase: x    03-22-18 @ 09:25  Lactate: x    03-22-18 @ 09:25        CAPILLARY BLOOD GLUCOSE  251 (22 Mar 2018 21:47)  236 (22 Mar 2018 16:09)  157 (22 Mar 2018 10:00)  131 (22 Mar 2018 06:15)  77 (22 Mar 2018 01:27)              Culture - Surgical Swab (collected 03-08-18 @ 21:11)  Source: .Surgical Swab WOUND  Final Report (03-14-18 @ 11:53):    No growth at 5 days    Culture - Surgical Swab (collected 03-08-18 @ 21:11)  Source: .Surgical Swab WOUND  Final Report (03-14-18 @ 12:05):    No growth at 5 days    Culture - Blood (collected 03-08-18 @ 10:45)  Source: .Blood Blood  Final Report (03-13-18 @ 17:01):    No growth at 5 days.    Culture - Blood (collected 02-27-18 @ 06:45)  Source: .Blood Blood-Peripheral  Final Report (03-04-18 @ 11:00):    No growth at 5 days.        IMAGING:    ASSESSMENT/PLAN:      SPECTRA: [id number]

## 2018-03-23 NOTE — PROGRESS NOTE ADULT - ASSESSMENT
ID recommendations,  IV vancomycin 1 g post HD and Merrem 500 mg daily. 2 weeks from now, follow up authorization from NH, discharge planning and possible discharge today. Downtrending CBC, F/U AM labs

## 2018-03-23 NOTE — PROGRESS NOTE ADULT - ASSESSMENT
1. R foot necrotizing fasciitis. s/p BKA. On abx.  2. ESRD on HD TTS (MWF in-hospital). HD today: 3 hours, opti 160 dialyzer, 2K bath, 3.5L UF.   3. Anemia. EPO 10,000 units IV with HD.  4. Hyperphosphatemia. Renal diet.  Sevelamer with meals.  5. HTN. Continue enalapril, metoprolol, furosemide, and metolazone.

## 2018-03-24 LAB
ANION GAP SERPL CALC-SCNC: 13 MMOL/L — SIGNIFICANT CHANGE UP (ref 7–14)
BASOPHILS # BLD AUTO: 0.26 K/UL — HIGH (ref 0–0.2)
BASOPHILS NFR BLD AUTO: 1.9 % — HIGH (ref 0–1)
BUN SERPL-MCNC: 34 MG/DL — HIGH (ref 10–20)
CALCIUM SERPL-MCNC: 8.7 MG/DL — SIGNIFICANT CHANGE UP (ref 8.5–10.1)
CHLORIDE SERPL-SCNC: 100 MMOL/L — SIGNIFICANT CHANGE UP (ref 98–110)
CO2 SERPL-SCNC: 32 MMOL/L — SIGNIFICANT CHANGE UP (ref 17–32)
CREAT SERPL-MCNC: 7.4 MG/DL — CRITICAL HIGH (ref 0.7–1.5)
EOSINOPHIL # BLD AUTO: 2.22 K/UL — HIGH (ref 0–0.7)
EOSINOPHIL NFR BLD AUTO: 15.8 % — HIGH (ref 0–8)
GLUCOSE SERPL-MCNC: 162 MG/DL — HIGH (ref 70–99)
HCT VFR BLD CALC: 33.4 % — LOW (ref 42–52)
HGB BLD-MCNC: 9.6 G/DL — LOW (ref 14–18)
IMM GRANULOCYTES NFR BLD AUTO: 0.6 % — HIGH (ref 0.1–0.3)
LYMPHOCYTES # BLD AUTO: 14.8 % — LOW (ref 20.5–51.1)
LYMPHOCYTES # BLD AUTO: 2.08 K/UL — SIGNIFICANT CHANGE UP (ref 1.2–3.4)
MAGNESIUM SERPL-MCNC: 2.6 MG/DL — HIGH (ref 1.8–2.4)
MCHC RBC-ENTMCNC: 25.1 PG — LOW (ref 27–31)
MCHC RBC-ENTMCNC: 28.7 G/DL — LOW (ref 32–37)
MCV RBC AUTO: 87.2 FL — SIGNIFICANT CHANGE UP (ref 80–94)
MONOCYTES # BLD AUTO: 0.91 K/UL — HIGH (ref 0.1–0.6)
MONOCYTES NFR BLD AUTO: 6.5 % — SIGNIFICANT CHANGE UP (ref 1.7–9.3)
NEUTROPHILS # BLD AUTO: 8.48 K/UL — HIGH (ref 1.4–6.5)
NEUTROPHILS NFR BLD AUTO: 60.4 % — SIGNIFICANT CHANGE UP (ref 42.2–75.2)
NRBC # BLD: 0 /100 WBCS — SIGNIFICANT CHANGE UP (ref 0–0)
PHOSPHATE SERPL-MCNC: 5.4 MG/DL — HIGH (ref 2.1–4.9)
PLATELET # BLD AUTO: 317 K/UL — SIGNIFICANT CHANGE UP (ref 130–400)
POTASSIUM SERPL-MCNC: 4.6 MMOL/L — SIGNIFICANT CHANGE UP (ref 3.5–5)
POTASSIUM SERPL-SCNC: 4.6 MMOL/L — SIGNIFICANT CHANGE UP (ref 3.5–5)
RBC # BLD: 3.83 M/UL — LOW (ref 4.7–6.1)
RBC # FLD: 19.1 % — HIGH (ref 11.5–14.5)
SODIUM SERPL-SCNC: 145 MMOL/L — SIGNIFICANT CHANGE UP (ref 135–146)
WBC # BLD: 14.03 K/UL — HIGH (ref 4.8–10.8)
WBC # FLD AUTO: 14.03 K/UL — HIGH (ref 4.8–10.8)

## 2018-03-24 RX ADMIN — MORPHINE SULFATE 4 MILLIGRAM(S): 50 CAPSULE, EXTENDED RELEASE ORAL at 02:30

## 2018-03-24 RX ADMIN — Medication 25 MILLIGRAM(S): at 06:15

## 2018-03-24 RX ADMIN — MORPHINE SULFATE 4 MILLIGRAM(S): 50 CAPSULE, EXTENDED RELEASE ORAL at 02:07

## 2018-03-24 RX ADMIN — Medication 1 DROP(S): at 06:18

## 2018-03-24 RX ADMIN — Medication 80 MILLIGRAM(S): at 06:19

## 2018-03-24 RX ADMIN — SEVELAMER CARBONATE 2400 MILLIGRAM(S): 2400 POWDER, FOR SUSPENSION ORAL at 11:40

## 2018-03-24 RX ADMIN — MEROPENEM 100 MILLIGRAM(S): 1 INJECTION INTRAVENOUS at 21:21

## 2018-03-24 RX ADMIN — Medication 80 MILLIGRAM(S): at 18:26

## 2018-03-24 RX ADMIN — Medication 0.25 MILLIGRAM(S): at 02:08

## 2018-03-24 RX ADMIN — MORPHINE SULFATE 4 MILLIGRAM(S): 50 CAPSULE, EXTENDED RELEASE ORAL at 17:34

## 2018-03-24 RX ADMIN — OXYCODONE AND ACETAMINOPHEN 2 TABLET(S): 5; 325 TABLET ORAL at 21:25

## 2018-03-24 RX ADMIN — SIMVASTATIN 10 MILLIGRAM(S): 20 TABLET, FILM COATED ORAL at 21:22

## 2018-03-24 RX ADMIN — Medication 6: at 17:23

## 2018-03-24 RX ADMIN — NYSTATIN CREAM 1 APPLICATION(S): 100000 CREAM TOPICAL at 06:18

## 2018-03-24 RX ADMIN — SEVELAMER CARBONATE 2400 MILLIGRAM(S): 2400 POWDER, FOR SUSPENSION ORAL at 06:15

## 2018-03-24 RX ADMIN — Medication 100 MILLIGRAM(S): at 06:15

## 2018-03-24 RX ADMIN — Medication 20 MILLIGRAM(S): at 18:25

## 2018-03-24 RX ADMIN — Medication 6: at 09:56

## 2018-03-24 RX ADMIN — Medication 25 MILLIGRAM(S): at 18:26

## 2018-03-24 RX ADMIN — SERTRALINE 25 MILLIGRAM(S): 25 TABLET, FILM COATED ORAL at 11:39

## 2018-03-24 RX ADMIN — Medication 20 MILLIGRAM(S): at 06:18

## 2018-03-24 RX ADMIN — Medication 6: at 02:31

## 2018-03-24 RX ADMIN — Medication 4: at 23:34

## 2018-03-24 RX ADMIN — Medication 1 DROP(S): at 17:25

## 2018-03-24 RX ADMIN — MORPHINE SULFATE 4 MILLIGRAM(S): 50 CAPSULE, EXTENDED RELEASE ORAL at 23:54

## 2018-03-24 RX ADMIN — SEVELAMER CARBONATE 2400 MILLIGRAM(S): 2400 POWDER, FOR SUSPENSION ORAL at 17:28

## 2018-03-24 RX ADMIN — Medication 4: at 06:19

## 2018-03-24 RX ADMIN — NYSTATIN CREAM 1 APPLICATION(S): 100000 CREAM TOPICAL at 17:26

## 2018-03-24 NOTE — PROGRESS NOTE ADULT - SUBJECTIVE AND OBJECTIVE BOX
S/P LEFT ARM FISTULOGRAM  BALLOON ANGIOPLASTY OF VENOUS OUTFLOW 3/20, 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    PAST MEDICAL & SURGICAL HISTORY:  Blind  HTN (hypertension)  End stage renal disease  Diabetes    Vital Signs Last 24 Hrs  T(C): 36.3 (24 Mar 2018 05:05), Max: 37.2 (23 Mar 2018 14:00)  T(F): 97.3 (24 Mar 2018 05:05), Max: 98.9 (23 Mar 2018 14:00)  HR: 76 (24 Mar 2018 05:05) (71 - 99)  BP: 154/70 (24 Mar 2018 05:05) (97/56 - 154/70)  BP(mean): --  RR: 18 (24 Mar 2018 05:05) (18 - 29)  SpO2: 99% (23 Mar 2018 17:59) (98% - 99%)    I&O's Detail    22 Mar 2018 07:01  -  23 Mar 2018 07:00  --------------------------------------------------------  IN:    Oral Fluid: 1040 mL  Total IN: 1040 mL    OUT:  Total OUT: 0 mL    Total NET: 1040 mL      23 Mar 2018 07:01  -  24 Mar 2018 05:23  --------------------------------------------------------  IN:    Oral Fluid: 240 mL  Total IN: 240 mL    OUT:    Other: 3000 mL    Stool: 1 mL    Voided: 175 mL  Total OUT: 3176 mL    Total NET: -2936 mL                 8.9    15.30 )-----------( 310      (  @ 09:25 )             31.2                    142   |  102   |  40                 Ca: 8.2    BMP:   ----------------------------< 185    M.4   (18 @ 09:25)             4.4    |  28    | 7.6                Ph: 5.9      MEDICATIONS  (STANDING):  artificial  tears Solution 1 Drop(s) Both EYES every 12 hours  enalapril 20 milliGRAM(s) Oral two times a day  epoetin keagan Injectable 00377 Unit(s) IV Push <User Schedule>  furosemide    Tablet 80 milliGRAM(s) Oral two times a day  insulin lispro (HumaLOG) corrective regimen sliding scale   SubCutaneous every 4 hours  meropenem  IVPB 500 milliGRAM(s) IV Intermittent every 24 hours  metolazone 5 milliGRAM(s) Oral daily  metoprolol tartrate 25 milliGRAM(s) Oral two times a day  nystatin Powder 1 Application(s) Topical two times a day  pregabalin 100 milliGRAM(s) Oral two times a day  sertraline 25 milliGRAM(s) Oral daily  sevelamer hydrochloride 2400 milliGRAM(s) Oral three times a day  simvastatin 10 milliGRAM(s) Oral at bedtime  vancomycin  IVPB 1000 milliGRAM(s) IV Intermittent <User Schedule>    MEDICATIONS  (PRN):  ALPRAZolam 0.25 milliGRAM(s) Oral daily PRN anxiety  morphine  - Injectable 4 milliGRAM(s) IV Push every 6 hours PRN Severe Pain (7 - 10)  oxyCODONE    5 mG/acetaminophen 325 mG 2 Tablet(s) Oral every 4 hours PRN Mild Pain (1 - 3)    Diet, Consistent Carbohydrate Renal/No Snacks (18 @ 17:23)      PHYSICAL EXAM:  General Appearance:  NAD, sleeping  Neck: Supple  Chest: Equal expansion bilaterally, equal breath sounds  CV: S1, S2, RRR  Abdomen: Soft, NT, ND  Extremities: s/p rt bka  Neuro: A&Ox3

## 2018-03-24 NOTE — PROGRESS NOTE ADULT - ASSESSMENT
Had panic attack yesterday evening and was feeling restless, ordered 2mg ativan, medication helped . F/u social work.

## 2018-03-24 NOTE — PROGRESS NOTE ADULT - NSHPATTENDINGPLANDISCUSS_GEN_ALL_CORE
resident
House staff
House staff and family
Primary team
House staff
patient

## 2018-03-25 RX ORDER — GLUCAGON INJECTION, SOLUTION 0.5 MG/.1ML
1 INJECTION, SOLUTION SUBCUTANEOUS ONCE
Qty: 0 | Refills: 0 | Status: DISCONTINUED | OUTPATIENT
Start: 2018-03-25 | End: 2018-03-27

## 2018-03-25 RX ORDER — DEXTROSE 50 % IN WATER 50 %
12.5 SYRINGE (ML) INTRAVENOUS ONCE
Qty: 0 | Refills: 0 | Status: DISCONTINUED | OUTPATIENT
Start: 2018-03-25 | End: 2018-03-27

## 2018-03-25 RX ORDER — INSULIN LISPRO 100/ML
5 VIAL (ML) SUBCUTANEOUS
Qty: 0 | Refills: 0 | Status: DISCONTINUED | OUTPATIENT
Start: 2018-03-25 | End: 2018-03-27

## 2018-03-25 RX ORDER — INSULIN LISPRO 100/ML
VIAL (ML) SUBCUTANEOUS
Qty: 0 | Refills: 0 | Status: DISCONTINUED | OUTPATIENT
Start: 2018-03-25 | End: 2018-03-27

## 2018-03-25 RX ORDER — DEXTROSE 50 % IN WATER 50 %
25 SYRINGE (ML) INTRAVENOUS ONCE
Qty: 0 | Refills: 0 | Status: DISCONTINUED | OUTPATIENT
Start: 2018-03-25 | End: 2018-03-27

## 2018-03-25 RX ORDER — SODIUM CHLORIDE 9 MG/ML
1000 INJECTION, SOLUTION INTRAVENOUS
Qty: 0 | Refills: 0 | Status: DISCONTINUED | OUTPATIENT
Start: 2018-03-25 | End: 2018-03-27

## 2018-03-25 RX ORDER — DEXTROSE 50 % IN WATER 50 %
1 SYRINGE (ML) INTRAVENOUS ONCE
Qty: 0 | Refills: 0 | Status: DISCONTINUED | OUTPATIENT
Start: 2018-03-25 | End: 2018-03-27

## 2018-03-25 RX ADMIN — SEVELAMER CARBONATE 2400 MILLIGRAM(S): 2400 POWDER, FOR SUSPENSION ORAL at 11:44

## 2018-03-25 RX ADMIN — NYSTATIN CREAM 1 APPLICATION(S): 100000 CREAM TOPICAL at 05:55

## 2018-03-25 RX ADMIN — OXYCODONE AND ACETAMINOPHEN 2 TABLET(S): 5; 325 TABLET ORAL at 01:02

## 2018-03-25 RX ADMIN — Medication 2: at 05:54

## 2018-03-25 RX ADMIN — Medication 80 MILLIGRAM(S): at 18:07

## 2018-03-25 RX ADMIN — Medication 20 MILLIGRAM(S): at 21:39

## 2018-03-25 RX ADMIN — Medication 25 MILLIGRAM(S): at 05:55

## 2018-03-25 RX ADMIN — NYSTATIN CREAM 1 APPLICATION(S): 100000 CREAM TOPICAL at 18:08

## 2018-03-25 RX ADMIN — Medication 5 UNIT(S): at 18:05

## 2018-03-25 RX ADMIN — SIMVASTATIN 10 MILLIGRAM(S): 20 TABLET, FILM COATED ORAL at 21:23

## 2018-03-25 RX ADMIN — MORPHINE SULFATE 4 MILLIGRAM(S): 50 CAPSULE, EXTENDED RELEASE ORAL at 06:02

## 2018-03-25 RX ADMIN — Medication 20 MILLIGRAM(S): at 05:53

## 2018-03-25 RX ADMIN — SEVELAMER CARBONATE 2400 MILLIGRAM(S): 2400 POWDER, FOR SUSPENSION ORAL at 05:55

## 2018-03-25 RX ADMIN — Medication 0.5 MILLIGRAM(S): at 21:31

## 2018-03-25 RX ADMIN — Medication 1 DROP(S): at 05:52

## 2018-03-25 RX ADMIN — SEVELAMER CARBONATE 2400 MILLIGRAM(S): 2400 POWDER, FOR SUSPENSION ORAL at 18:09

## 2018-03-25 RX ADMIN — Medication 80 MILLIGRAM(S): at 05:52

## 2018-03-25 RX ADMIN — MEROPENEM 100 MILLIGRAM(S): 1 INJECTION INTRAVENOUS at 21:33

## 2018-03-25 RX ADMIN — Medication 1 MILLIGRAM(S): at 11:42

## 2018-03-25 RX ADMIN — SERTRALINE 25 MILLIGRAM(S): 25 TABLET, FILM COATED ORAL at 11:42

## 2018-03-25 RX ADMIN — Medication 25 MILLIGRAM(S): at 18:08

## 2018-03-25 RX ADMIN — Medication 1: at 18:06

## 2018-03-25 RX ADMIN — MORPHINE SULFATE 4 MILLIGRAM(S): 50 CAPSULE, EXTENDED RELEASE ORAL at 05:51

## 2018-03-25 RX ADMIN — Medication 2: at 21:28

## 2018-03-25 RX ADMIN — Medication 0.25 MILLIGRAM(S): at 00:38

## 2018-03-25 RX ADMIN — Medication 1 DROP(S): at 18:07

## 2018-03-25 RX ADMIN — Medication 2: at 10:23

## 2018-03-25 NOTE — PROGRESS NOTE ADULT - ASSESSMENT
ESRD on HD  awaiting auth for STR  patient requesting to go back to Tu, Th, Sat schedule  will draw BMP tomorrow  It being d/c to SNF tomorrow can resume outpt schedule on Tuesday

## 2018-03-25 NOTE — PROGRESS NOTE ADULT - SUBJECTIVE AND OBJECTIVE BOX
NETO FOLLOW UP NOTE  --------------------------------------------------------------------------------  24 hour events/subjective:  No new events      PAST HISTORY  --------------------------------------------------------------------------------  No significant changes to PMH, PSH, FHx, SHx, unless otherwise noted    ALLERGIES & MEDICATIONS  --------------------------------------------------------------------------------  Allergies    No Known Allergies    Intolerances      Standing Inpatient Medications  artificial  tears Solution 1 Drop(s) Both EYES every 12 hours  dextrose 5%. 1000 milliLiter(s) IV Continuous <Continuous>  dextrose 50% Injectable 12.5 Gram(s) IV Push once  dextrose 50% Injectable 25 Gram(s) IV Push once  dextrose 50% Injectable 25 Gram(s) IV Push once  enalapril 20 milliGRAM(s) Oral two times a day  epoetin keagan Injectable 53030 Unit(s) IV Push <User Schedule>  furosemide    Tablet 80 milliGRAM(s) Oral two times a day  insulin lispro (HumaLOG) corrective regimen sliding scale   SubCutaneous Before meals and at bedtime  insulin lispro Injectable (HumaLOG) 5 Unit(s) SubCutaneous before breakfast  insulin lispro Injectable (HumaLOG) 5 Unit(s) SubCutaneous before lunch  insulin lispro Injectable (HumaLOG) 5 Unit(s) SubCutaneous before dinner  meropenem  IVPB 500 milliGRAM(s) IV Intermittent every 24 hours  metolazone 5 milliGRAM(s) Oral daily  metoprolol tartrate 25 milliGRAM(s) Oral two times a day  nystatin Powder 1 Application(s) Topical two times a day  pregabalin 100 milliGRAM(s) Oral two times a day  sertraline 25 milliGRAM(s) Oral daily  sevelamer hydrochloride 2400 milliGRAM(s) Oral three times a day  simvastatin 10 milliGRAM(s) Oral at bedtime  vancomycin  IVPB 1000 milliGRAM(s) IV Intermittent <User Schedule>    PRN Inpatient Medications  ALPRAZolam 0.25 milliGRAM(s) Oral daily PRN  dextrose Gel 1 Dose(s) Oral once PRN  glucagon  Injectable 1 milliGRAM(s) IntraMuscular once PRN  morphine  - Injectable 4 milliGRAM(s) IV Push every 6 hours PRN  oxyCODONE    5 mG/acetaminophen 325 mG 2 Tablet(s) Oral every 4 hours PRN    VITALS/PHYSICAL EXAM  --------------------------------------------------------------------------------  T(C): 36.7 (03-25-18 @ 05:08), Max: 36.7 (03-25-18 @ 05:08)  HR: 75 (03-25-18 @ 05:08) (70 - 75)  BP: 180/75 (03-25-18 @ 05:08) (117/51 - 180/75)  RR: 18 (03-24-18 @ 21:14) (18 - 18)  SpO2: --  Wt(kg): --        03-24-18 @ 07:01  -  03-25-18 @ 07:00  --------------------------------------------------------  IN: 240 mL / OUT: 100 mL / NET: 140 mL      Physical Exam:  	Gen: NAD, no SOB lying flat  	HEENT: no JVD  	Pulm: CTA B/L  	CV: RRR, S1S2; no rub  	Abd: +BS, soft, nontender/nondistended  	tr edema    LABS/STUDIES  --------------------------------------------------------------------------------              9.6    14.03 >-----------<  317      [03-24-18 @ 13:43]              33.4     145  |  100  |  34  ----------------------------<  162      [03-24-18 @ 13:43]  4.6   |  32  |  7.4        Ca     8.7     [03-24-18 @ 13:43]      Mg     2.6     [03-24-18 @ 13:43]      Phos  5.4     [03-24-18 @ 13:43]      HbA1c 8.5      [03-09-18 @ 04:05]

## 2018-03-25 NOTE — PROGRESS NOTE ADULT - ASSESSMENT
Pt continues to do well post op. will continue ABX, f/u sw for SNF auth, f/u PT/rehab evaluation for SNF placement.

## 2018-03-26 ENCOUNTER — TRANSCRIPTION ENCOUNTER (OUTPATIENT)
Age: 53
End: 2018-03-26

## 2018-03-26 LAB
ANION GAP SERPL CALC-SCNC: 12 MMOL/L — SIGNIFICANT CHANGE UP (ref 7–14)
BASOPHILS # BLD AUTO: 0.25 K/UL — HIGH (ref 0–0.2)
BASOPHILS NFR BLD AUTO: 1.7 % — HIGH (ref 0–1)
BUN SERPL-MCNC: 25 MG/DL — HIGH (ref 10–20)
CALCIUM SERPL-MCNC: 8.6 MG/DL — SIGNIFICANT CHANGE UP (ref 8.5–10.1)
CHLORIDE SERPL-SCNC: 98 MMOL/L — SIGNIFICANT CHANGE UP (ref 98–110)
CO2 SERPL-SCNC: 30 MMOL/L — SIGNIFICANT CHANGE UP (ref 17–32)
CREAT SERPL-MCNC: 5.8 MG/DL — CRITICAL HIGH (ref 0.7–1.5)
EOSINOPHIL # BLD AUTO: 0.8 K/UL — HIGH (ref 0–0.7)
EOSINOPHIL NFR BLD AUTO: 5.3 % — SIGNIFICANT CHANGE UP (ref 0–8)
GLUCOSE SERPL-MCNC: 187 MG/DL — HIGH (ref 70–99)
HCT VFR BLD CALC: 34.4 % — LOW (ref 42–52)
HGB BLD-MCNC: 10.2 G/DL — LOW (ref 14–18)
IMM GRANULOCYTES NFR BLD AUTO: 0.6 % — HIGH (ref 0.1–0.3)
LYMPHOCYTES # BLD AUTO: 1.33 K/UL — SIGNIFICANT CHANGE UP (ref 1.2–3.4)
LYMPHOCYTES # BLD AUTO: 8.9 % — LOW (ref 20.5–51.1)
MAGNESIUM SERPL-MCNC: 2.2 MG/DL — SIGNIFICANT CHANGE UP (ref 1.8–2.4)
MCHC RBC-ENTMCNC: 25.4 PG — LOW (ref 27–31)
MCHC RBC-ENTMCNC: 29.7 G/DL — LOW (ref 32–37)
MCV RBC AUTO: 85.6 FL — SIGNIFICANT CHANGE UP (ref 80–94)
MONOCYTES # BLD AUTO: 0.91 K/UL — HIGH (ref 0.1–0.6)
MONOCYTES NFR BLD AUTO: 6.1 % — SIGNIFICANT CHANGE UP (ref 1.7–9.3)
NEUTROPHILS # BLD AUTO: 11.61 K/UL — HIGH (ref 1.4–6.5)
NEUTROPHILS NFR BLD AUTO: 77.4 % — HIGH (ref 42.2–75.2)
NRBC # BLD: 0 /100 WBCS — SIGNIFICANT CHANGE UP (ref 0–0)
PHOSPHATE SERPL-MCNC: 4 MG/DL — SIGNIFICANT CHANGE UP (ref 2.1–4.9)
PLATELET # BLD AUTO: 291 K/UL — SIGNIFICANT CHANGE UP (ref 130–400)
POTASSIUM SERPL-MCNC: 4.2 MMOL/L — SIGNIFICANT CHANGE UP (ref 3.5–5)
POTASSIUM SERPL-SCNC: 4.2 MMOL/L — SIGNIFICANT CHANGE UP (ref 3.5–5)
RBC # BLD: 4.02 M/UL — LOW (ref 4.7–6.1)
RBC # FLD: 18.9 % — HIGH (ref 11.5–14.5)
SODIUM SERPL-SCNC: 140 MMOL/L — SIGNIFICANT CHANGE UP (ref 135–146)
WBC # BLD: 14.99 K/UL — HIGH (ref 4.8–10.8)
WBC # FLD AUTO: 14.99 K/UL — HIGH (ref 4.8–10.8)

## 2018-03-26 RX ORDER — ALPRAZOLAM 0.25 MG
0.25 TABLET ORAL
Qty: 0 | Refills: 0 | Status: DISCONTINUED | OUTPATIENT
Start: 2018-03-26 | End: 2018-03-27

## 2018-03-26 RX ORDER — ALTEPLASE 100 MG
2 KIT INTRAVENOUS ONCE
Qty: 0 | Refills: 0 | Status: COMPLETED | OUTPATIENT
Start: 2018-03-26 | End: 2018-03-26

## 2018-03-26 RX ADMIN — Medication 25 MILLIGRAM(S): at 19:04

## 2018-03-26 RX ADMIN — SEVELAMER CARBONATE 2400 MILLIGRAM(S): 2400 POWDER, FOR SUSPENSION ORAL at 05:17

## 2018-03-26 RX ADMIN — Medication 20 MILLIGRAM(S): at 05:16

## 2018-03-26 RX ADMIN — Medication 5 UNIT(S): at 13:05

## 2018-03-26 RX ADMIN — OXYCODONE AND ACETAMINOPHEN 2 TABLET(S): 5; 325 TABLET ORAL at 08:18

## 2018-03-26 RX ADMIN — Medication 100 MILLIGRAM(S): at 05:19

## 2018-03-26 RX ADMIN — OXYCODONE AND ACETAMINOPHEN 2 TABLET(S): 5; 325 TABLET ORAL at 19:01

## 2018-03-26 RX ADMIN — SEVELAMER CARBONATE 2400 MILLIGRAM(S): 2400 POWDER, FOR SUSPENSION ORAL at 21:54

## 2018-03-26 RX ADMIN — Medication 25 MILLIGRAM(S): at 05:17

## 2018-03-26 RX ADMIN — SIMVASTATIN 10 MILLIGRAM(S): 20 TABLET, FILM COATED ORAL at 21:55

## 2018-03-26 RX ADMIN — MORPHINE SULFATE 4 MILLIGRAM(S): 50 CAPSULE, EXTENDED RELEASE ORAL at 17:12

## 2018-03-26 RX ADMIN — MORPHINE SULFATE 4 MILLIGRAM(S): 50 CAPSULE, EXTENDED RELEASE ORAL at 17:05

## 2018-03-26 RX ADMIN — NYSTATIN CREAM 1 APPLICATION(S): 100000 CREAM TOPICAL at 17:06

## 2018-03-26 RX ADMIN — Medication 1 DROP(S): at 17:03

## 2018-03-26 RX ADMIN — ERYTHROPOIETIN 10000 UNIT(S): 10000 INJECTION, SOLUTION INTRAVENOUS; SUBCUTANEOUS at 11:37

## 2018-03-26 RX ADMIN — Medication 100 MILLIGRAM(S): at 19:00

## 2018-03-26 RX ADMIN — Medication 250 MILLIGRAM(S): at 10:57

## 2018-03-26 RX ADMIN — Medication 1: at 17:04

## 2018-03-26 RX ADMIN — Medication 1 DROP(S): at 05:17

## 2018-03-26 RX ADMIN — Medication 1: at 08:22

## 2018-03-26 RX ADMIN — Medication 5 UNIT(S): at 17:04

## 2018-03-26 RX ADMIN — Medication 0.5 MILLIGRAM(S): at 22:17

## 2018-03-26 RX ADMIN — Medication 0.25 MILLIGRAM(S): at 05:15

## 2018-03-26 RX ADMIN — Medication 0.25 MILLIGRAM(S): at 15:06

## 2018-03-26 RX ADMIN — SERTRALINE 25 MILLIGRAM(S): 25 TABLET, FILM COATED ORAL at 15:07

## 2018-03-26 RX ADMIN — NYSTATIN CREAM 1 APPLICATION(S): 100000 CREAM TOPICAL at 05:21

## 2018-03-26 RX ADMIN — ALTEPLASE 2 MILLIGRAM(S): KIT at 15:09

## 2018-03-26 RX ADMIN — MEROPENEM 100 MILLIGRAM(S): 1 INJECTION INTRAVENOUS at 19:04

## 2018-03-26 RX ADMIN — Medication 5 UNIT(S): at 08:22

## 2018-03-26 RX ADMIN — SEVELAMER CARBONATE 2400 MILLIGRAM(S): 2400 POWDER, FOR SUSPENSION ORAL at 13:04

## 2018-03-26 RX ADMIN — Medication 1: at 13:05

## 2018-03-26 RX ADMIN — Medication 80 MILLIGRAM(S): at 19:03

## 2018-03-26 RX ADMIN — Medication 80 MILLIGRAM(S): at 05:16

## 2018-03-26 NOTE — DISCHARGE NOTE ADULT - PATIENT PORTAL LINK FT
You can access the [facility] Patient Portal, offered by [facility], by registering with the following website: [email or web address]

## 2018-03-26 NOTE — PROGRESS NOTE ADULT - SUBJECTIVE AND OBJECTIVE BOX
INTERVAL HPI/OVERNIGHT EVENTS:  S/P BKA.   Episode of diarrhea resolved  Now c/o some dyspnea without chest pain.     Allergies: No Known Allergies  EXAM: AOx3, right BKA, s/p closure, wound is clean, mild erythema and pain, abd soft, no wheezing or crackles.     Vital Signs Last 24 Hrs  T(C): 36.1 (26 Mar 2018 13:35), Max: 37 (25 Mar 2018 20:53)  T(F): 96.9 (26 Mar 2018 13:35), Max: 98.6 (25 Mar 2018 20:53)  HR: 74 (26 Mar 2018 13:35) (64 - 82)  BP: 113/49 (26 Mar 2018 13:35) (113/49 - 194/87)  BP(mean): --  RR: 20 (26 Mar 2018 13:35) (20 - 20)  SpO2: 99% (26 Mar 2018 08:45) (99% - 99%)      03-25-18 @ 07:01  -  03-26-18 @ 07:00  --------------------------------------------------------  IN: 290 mL / OUT: 200 mL / NET: 90 mL    03-26-18 @ 07:01  -  03-26-18 @ 15:07  --------------------------------------------------------  IN: 0 mL / OUT: 3000 mL / NET: -3000 mL      ALPRAZolam 0.25 milliGRAM(s) Oral two times a day PRN  alteplase for catheter clearance 2 milliGRAM(s) Catheter once  artificial  tears Solution 1 Drop(s) Both EYES every 12 hours  dextrose 5%. 1000 milliLiter(s) IV Continuous <Continuous>  dextrose 50% Injectable 12.5 Gram(s) IV Push once  dextrose 50% Injectable 25 Gram(s) IV Push once  dextrose 50% Injectable 25 Gram(s) IV Push once  dextrose Gel 1 Dose(s) Oral once PRN  enalapril 20 milliGRAM(s) Oral two times a day  epoetin keagan Injectable 34909 Unit(s) IV Push <User Schedule>  furosemide    Tablet 80 milliGRAM(s) Oral two times a day  glucagon  Injectable 1 milliGRAM(s) IntraMuscular once PRN  insulin lispro (HumaLOG) corrective regimen sliding scale   SubCutaneous Before meals and at bedtime  insulin lispro Injectable (HumaLOG) 5 Unit(s) SubCutaneous before breakfast  insulin lispro Injectable (HumaLOG) 5 Unit(s) SubCutaneous before lunch  insulin lispro Injectable (HumaLOG) 5 Unit(s) SubCutaneous before dinner  meropenem  IVPB 500 milliGRAM(s) IV Intermittent every 24 hours  metolazone 5 milliGRAM(s) Oral daily  metoprolol tartrate 25 milliGRAM(s) Oral two times a day  morphine  - Injectable 4 milliGRAM(s) IV Push every 6 hours PRN  nystatin Powder 1 Application(s) Topical two times a day  oxyCODONE    5 mG/acetaminophen 325 mG 2 Tablet(s) Oral every 4 hours PRN  pregabalin 100 milliGRAM(s) Oral two times a day  sertraline 25 milliGRAM(s) Oral daily  sevelamer hydrochloride 2400 milliGRAM(s) Oral three times a day  simvastatin 10 milliGRAM(s) Oral at bedtime  vancomycin  IVPB 1000 milliGRAM(s) IV Intermittent <User Schedule>  WBC 14               8.9    15.30 )-----------( 310      ( 22 Mar 2018 09:25 )             31.2                10.0   19.46 )-----------( 439      ( 20 Mar 2018 01:12 )             36.0     03-20    140  |  95<L>  |  41<H>  ----------------------------<  148<H>  4.7   |  26  |  7.0<HH>                        9.3    16.29 )-----------( 393      ( 18 Mar 2018 11:47 )             32.7     03-18    139  |  95<L>  |  46<H>  ----------------------------<  128<H>  4.5   |  27  |  7.4<HH>      Culture - Surgical Swab (03.08.18 @ 21:11)    Specimen Source: .Surgical Swab WOUND    Culture Results:   No growth    Culture - Surgical Swab (03.08.18 @ 21:11)    Specimen Source: .Surgical Swab WOUND    Culture Results:   No growth    Culture - Blood (03.08.18 @ 10:45)    Specimen Source: .Blood Blood    Culture Results:   No growth to date.    Culture - Blood (02.27.18 @ 06:45)    Specimen Source: .Blood Blood-Peripheral    Culture Results:   No growth at 5 days.  3/22 CXR- No consolidation or pneumothorax.  3/9 CXR- No focal pulmonary consolidation.  3/8 CT right leg:  Extensive subcutaneous emphysema surrounding the midfoot and ankle,   extending proximally to the distal tibia/fibula, approximately 4.5 cm   proximal to the ankle joint consistent with necrotizing fasciitis.   2. Ulcer overlying the lateral hindfoot with evidence of septic   arthritis/osteomyelitis throughout the midfoot and hindfoot as above  3. Severe neuropathic arthropathy.

## 2018-03-26 NOTE — PROGRESS NOTE ADULT - SUBJECTIVE AND OBJECTIVE BOX
[place] [employer]  53y Male   [id number]    Procedure: S/P LEFT ARM FISTULOGRAM  BALLOON ANGIOPLASTY OF VENOUS OUTFLOW 3/20, 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, S/P RT BKA COMPLETION 3/13  Patient is a 53y old  Male who presents with a chief complaint of right foot/ankle necrotizing fasiitis (13 Mar 2018 07:29)    PAST MEDICAL & SURGICAL HISTORY:  Blind  HTN (hypertension)  End stage renal disease  Diabetes    Events of the Last 24h: one episode of NB vomit not associated with nausea  Vital Signs Last 24 Hrs  T(C): 36.6 (26 Mar 2018 06:06), Max: 37 (25 Mar 2018 20:53)  T(F): 97.8 (26 Mar 2018 06:06), Max: 98.6 (25 Mar 2018 20:53)  HR: 82 (26 Mar 2018 06:06) (65 - 82)  BP: 194/87 (26 Mar 2018 06:06) (164/74 - 194/87)  BP(mean): --  RR: 20 (26 Mar 2018 06:06) (18 - 20)  SpO2: --      Diet, Consistent Carbohydrate Renal/No Snacks (18 @ 17:23)      I&O's Summary    24 Mar 2018 07:  -  25 Mar 2018 07:00  --------------------------------------------------------  IN: 240 mL / OUT: 100 mL / NET: 140 mL    25 Mar 2018 07:  -  26 Mar 2018 06:26  --------------------------------------------------------  IN: 290 mL / OUT: 200 mL / NET: 90 mL     I&O's Detail    24 Mar 2018 07:  -  25 Mar 2018 07:00  --------------------------------------------------------  IN:    Oral Fluid: 240 mL  Total IN: 240 mL    OUT:    Voided: 100 mL  Total OUT: 100 mL    Total NET: 140 mL      25 Mar 2018 07:01  -  26 Mar 2018 06:26  --------------------------------------------------------  IN:    Oral Fluid: 240 mL    Solution: 50 mL  Total IN: 290 mL    OUT:    Voided: 200 mL  Total OUT: 200 mL    Total NET: 90 mL      MEDICATIONS  (STANDING):  artificial  tears Solution 1 Drop(s) Both EYES every 12 hours  dextrose 5%. 1000 milliLiter(s) (50 mL/Hr) IV Continuous <Continuous>  dextrose 50% Injectable 12.5 Gram(s) IV Push once  dextrose 50% Injectable 25 Gram(s) IV Push once  dextrose 50% Injectable 25 Gram(s) IV Push once  enalapril 20 milliGRAM(s) Oral two times a day  epoetin keagan Injectable 69776 Unit(s) IV Push <User Schedule>  furosemide    Tablet 80 milliGRAM(s) Oral two times a day  insulin lispro (HumaLOG) corrective regimen sliding scale   SubCutaneous Before meals and at bedtime  insulin lispro Injectable (HumaLOG) 5 Unit(s) SubCutaneous before breakfast  insulin lispro Injectable (HumaLOG) 5 Unit(s) SubCutaneous before lunch  insulin lispro Injectable (HumaLOG) 5 Unit(s) SubCutaneous before dinner  meropenem  IVPB 500 milliGRAM(s) IV Intermittent every 24 hours  metolazone 5 milliGRAM(s) Oral daily  metoprolol tartrate 25 milliGRAM(s) Oral two times a day  nystatin Powder 1 Application(s) Topical two times a day  pregabalin 100 milliGRAM(s) Oral two times a day  sertraline 25 milliGRAM(s) Oral daily  sevelamer hydrochloride 2400 milliGRAM(s) Oral three times a day  simvastatin 10 milliGRAM(s) Oral at bedtime  vancomycin  IVPB 1000 milliGRAM(s) IV Intermittent <User Schedule>    MEDICATIONS  (PRN):  ALPRAZolam 0.25 milliGRAM(s) Oral daily PRN anxiety  dextrose Gel 1 Dose(s) Oral once PRN Blood Glucose LESS THAN 70 milliGRAM(s)/deciliter  glucagon  Injectable 1 milliGRAM(s) IntraMuscular once PRN Glucose LESS THAN 70 milligrams/deciliter  morphine  - Injectable 4 milliGRAM(s) IV Push every 6 hours PRN Severe Pain (7 - 10)  oxyCODONE    5 mG/acetaminophen 325 mG 2 Tablet(s) Oral every 4 hours PRN Mild Pain (1 - 3)      PHYSICAL EXAM:  GENERAL: NAD  HEENT: NCAT  CHEST/LUNGS: CTAB  HEART: RRR,  No murmurs, rubs, or gallops  ABDOMEN: SNTND  EXTREMITIES:  right bka, LLE w/o edema/ cyanosis  NEURO: partial blindness, motor/sensation grossly intact  SKIN: No rashes or lesions  INCISION/WOUNDS: RLE bka, stump dressed, c/d/i                          9.6    14.03 )-----------( 317      ( 24 Mar 2018 13:43 )             33.4        CBC Full  -  ( 24 Mar 2018 13:43 )  WBC Count : 14.03 K/uL  Hemoglobin : 9.6 g/dL  Hematocrit : 33.4 %  Platelet Count - Automated : 317 K/uL  Mean Cell Volume : 87.2 fL  Mean Cell Hemoglobin : 25.1 pg  Mean Cell Hemoglobin Concentration : 28.7 g/dL  Auto Neutrophil # : 8.48 K/uL  Auto Lymphocyte # : 2.08 K/uL  Auto Monocyte # : 0.91 K/uL  Auto Eosinophil # : 2.22 K/uL  Auto Basophil # : 0.26 K/uL  Auto Neutrophil % : 60.4 %  Auto Lymphocyte % : 14.8 %  Auto Monocyte % : 6.5 %  Auto Eosinophil % : 15.8 %  Auto Basophil % : 1.9 %               145   |  100   |  34                 Ca: 8.7    BMP:   ----------------------------< 162    M.6   (18 @ 13:43)             4.6    |  32    | 7.4                Ph: 5.4

## 2018-03-26 NOTE — DISCHARGE NOTE ADULT - CARE PLAN
Principal Discharge DX:	Necrotizing fasciitis  Goal:	Infection control  Assessment and plan of treatment:	Right BKA for infection control

## 2018-03-26 NOTE — DISCHARGE NOTE ADULT - CARE PROVIDER_API CALL
Kirk Bocanegra (HANS), Surgery; Vascular Surgery  [address] [place] [address]  Phone: [phone]  Fax: [phone] Kirk Bocanegra (HANS), Surgery; Vascular Surgery  [address] [place] [address]  Phone: [phone]  Fax: [phone]

## 2018-03-26 NOTE — DISCHARGE NOTE ADULT - MEDICATION SUMMARY - MEDICATIONS TO TAKE
I will START or STAY ON the medications listed below when I get home from the hospital:    enalapril 10 mg oral tablet  -- 1 tab(s) by mouth once a day  -- Indication: For Home medication    Lyrica 100 mg oral capsule  -- 1 cap(s) by mouth 2 times a day  -- Indication: For Home medication    Zoloft 25 mg oral tablet  -- 1 tab(s) by mouth once a day  -- Indication: For Home medication    simvastatin 10 mg oral tablet  -- 1 tab(s) by mouth once a day (at bedtime)  -- Indication: For Home medication    Metoprolol Tartrate 25 mg oral tablet  -- 1 tab(s) by mouth 2 times a day  -- Indication: For Home medication    amLODIPine 5 mg oral tablet  -- 1 tab(s) by mouth once a day  -- Indication: For Home medication    meropenem 500 mg intravenous injection  -- 500 milligram(s) intravenous every 24 hours  -- Indication: For Infection    metOLazone 5 mg oral tablet  -- 1 tab(s) by mouth once a day  -- Indication: For Home medication    furosemide 80 mg oral tablet  -- 1 tab(s) by mouth once a day  -- Indication: For Home medication    vancomycin 1 g intravenous injection  -- 1 gram(s) intravenous   -- Indication: For Infection    Colace 100 mg oral capsule  -- 1 cap(s) by mouth 2 times a day  -- Indication: For Home medication    Renvela 800 mg oral tablet  -- 1 tab(s) by mouth 3 times a day (with meals)  -- Indication: For Home medication

## 2018-03-26 NOTE — PROGRESS NOTE ADULT - ASSESSMENT
#Diabetic male with ESRD p/w necrotizing fasciitis of right lower extremity.   #Currently s/p right BKA s/p closure of wound.  #Sepsis-Leukocytosis improving  -IV vancomycin 1 g post HD and Merrem 500 mg daily.   -PICC line placed  -Diarrhea resolved  -Chest xray negative.

## 2018-03-26 NOTE — DISCHARGE NOTE ADULT - HOSPITAL COURSE
53 male with hx HTN, DM,  ESRD on HD, chronic b/l LE cellulitis on post HD vanco,  followed by podiatry, p/w worsening b/l feet/ankle pain, Rt more than the left. On presentation in the ED, he was febrile, tachycardic, found to have rt ankle necrotizing fasciitis, septic arthritis/OM, and LUE soft tissue swelling. Pt was evaluated by vascular surgery and taken to OR for Rt guillotine BKA. Prior to OR, received 1x Zosyn, Clinda. Intraop patient received vanco 1 gm. Pt was admitted and under went right BKA on 3/8 which he tolerated well and was admitted to ICU. Pt underwent BKA revision on 3/18. ID had been following Pt of increased WBC count and had been making ABX recs. While admitted Pt was found to have AVF stenosis and underwent Left arm arteriovenous fistula angiogram, drug-coated balloon angioplasty of venous outflow stenosis on 3/20. on 3/21 Pt had PICC line placed for long term ABX administration. PT/rehab also followed for recs for placement back to [facility]. On 3/27, Pt was discharged in good condition to [facility] w/ PICC and instruction to continue home meds as well as meropenem/vancomycin and f/u with Dr. Bocanegra in two weeks.

## 2018-03-26 NOTE — PROGRESS NOTE ADULT - ASSESSMENT
Pt c/o pain at stump site and SOB. Pt also had episode of emesis w/o nausea. Pt awaiting auth for return to NH, will f/u SW. PT/rehab was also consulted for evaluation, will f/u.

## 2018-03-27 VITALS
DIASTOLIC BLOOD PRESSURE: 58 MMHG | HEART RATE: 66 BPM | TEMPERATURE: 98 F | SYSTOLIC BLOOD PRESSURE: 127 MMHG | RESPIRATION RATE: 18 BRPM

## 2018-03-27 LAB
ANION GAP SERPL CALC-SCNC: 10 MMOL/L — SIGNIFICANT CHANGE UP (ref 7–14)
BASOPHILS # BLD AUTO: 0.29 K/UL — HIGH (ref 0–0.2)
BASOPHILS NFR BLD AUTO: 2.5 % — HIGH (ref 0–1)
BUN SERPL-MCNC: 37 MG/DL — HIGH (ref 10–20)
CALCIUM SERPL-MCNC: 8.7 MG/DL — SIGNIFICANT CHANGE UP (ref 8.5–10.1)
CHLORIDE SERPL-SCNC: 96 MMOL/L — LOW (ref 98–110)
CO2 SERPL-SCNC: 30 MMOL/L — SIGNIFICANT CHANGE UP (ref 17–32)
CREAT SERPL-MCNC: 7.7 MG/DL — CRITICAL HIGH (ref 0.7–1.5)
EOSINOPHIL # BLD AUTO: 0.75 K/UL — HIGH (ref 0–0.7)
EOSINOPHIL NFR BLD AUTO: 6.5 % — SIGNIFICANT CHANGE UP (ref 0–8)
GLUCOSE SERPL-MCNC: 239 MG/DL — HIGH (ref 70–99)
HCT VFR BLD CALC: 33.4 % — LOW (ref 42–52)
HGB BLD-MCNC: 9.7 G/DL — LOW (ref 14–18)
IMM GRANULOCYTES NFR BLD AUTO: 0.6 % — HIGH (ref 0.1–0.3)
LYMPHOCYTES # BLD AUTO: 17.3 % — LOW (ref 20.5–51.1)
LYMPHOCYTES # BLD AUTO: 2 K/UL — SIGNIFICANT CHANGE UP (ref 1.2–3.4)
MAGNESIUM SERPL-MCNC: 2.4 MG/DL — SIGNIFICANT CHANGE UP (ref 1.8–2.4)
MCHC RBC-ENTMCNC: 24.9 PG — LOW (ref 27–31)
MCHC RBC-ENTMCNC: 29 G/DL — LOW (ref 32–37)
MCV RBC AUTO: 85.6 FL — SIGNIFICANT CHANGE UP (ref 80–94)
MONOCYTES # BLD AUTO: 0.64 K/UL — HIGH (ref 0.1–0.6)
MONOCYTES NFR BLD AUTO: 5.5 % — SIGNIFICANT CHANGE UP (ref 1.7–9.3)
NEUTROPHILS # BLD AUTO: 7.79 K/UL — HIGH (ref 1.4–6.5)
NEUTROPHILS NFR BLD AUTO: 67.6 % — SIGNIFICANT CHANGE UP (ref 42.2–75.2)
NRBC # BLD: 0 /100 WBCS — SIGNIFICANT CHANGE UP (ref 0–0)
PHOSPHATE SERPL-MCNC: 5.6 MG/DL — HIGH (ref 2.1–4.9)
PLATELET # BLD AUTO: 271 K/UL — SIGNIFICANT CHANGE UP (ref 130–400)
POTASSIUM SERPL-MCNC: 4.6 MMOL/L — SIGNIFICANT CHANGE UP (ref 3.5–5)
POTASSIUM SERPL-SCNC: 4.6 MMOL/L — SIGNIFICANT CHANGE UP (ref 3.5–5)
RBC # BLD: 3.9 M/UL — LOW (ref 4.7–6.1)
RBC # FLD: 18.9 % — HIGH (ref 11.5–14.5)
SODIUM SERPL-SCNC: 136 MMOL/L — SIGNIFICANT CHANGE UP (ref 135–146)
WBC # BLD: 11.54 K/UL — HIGH (ref 4.8–10.8)
WBC # FLD AUTO: 11.54 K/UL — HIGH (ref 4.8–10.8)

## 2018-03-27 RX ORDER — MEROPENEM 1 G/30ML
500 INJECTION INTRAVENOUS
Qty: 0 | Refills: 0 | DISCHARGE
Start: 2018-03-27

## 2018-03-27 RX ORDER — SIMVASTATIN 20 MG/1
1 TABLET, FILM COATED ORAL
Qty: 0 | Refills: 0 | COMMUNITY

## 2018-03-27 RX ORDER — SERTRALINE 25 MG/1
1 TABLET, FILM COATED ORAL
Qty: 0 | Refills: 0 | COMMUNITY
Start: 2018-03-27

## 2018-03-27 RX ORDER — SIMVASTATIN 20 MG/1
1 TABLET, FILM COATED ORAL
Qty: 0 | Refills: 0 | DISCHARGE
Start: 2018-03-27

## 2018-03-27 RX ORDER — ALPRAZOLAM 0.25 MG
0.25 TABLET ORAL ONCE
Qty: 0 | Refills: 0 | Status: DISCONTINUED | OUTPATIENT
Start: 2018-03-27 | End: 2018-03-27

## 2018-03-27 RX ORDER — SERTRALINE 25 MG/1
3 TABLET, FILM COATED ORAL
Qty: 0 | Refills: 0 | COMMUNITY
Start: 2018-03-27

## 2018-03-27 RX ORDER — VANCOMYCIN HCL 1 G
1 VIAL (EA) INTRAVENOUS
Qty: 0 | Refills: 0 | DISCHARGE
Start: 2018-03-27

## 2018-03-27 RX ADMIN — NYSTATIN CREAM 1 APPLICATION(S): 100000 CREAM TOPICAL at 05:55

## 2018-03-27 RX ADMIN — NYSTATIN CREAM 1 APPLICATION(S): 100000 CREAM TOPICAL at 18:07

## 2018-03-27 RX ADMIN — Medication 1 DROP(S): at 18:04

## 2018-03-27 RX ADMIN — SEVELAMER CARBONATE 2400 MILLIGRAM(S): 2400 POWDER, FOR SUSPENSION ORAL at 05:56

## 2018-03-27 RX ADMIN — Medication 1 DROP(S): at 05:53

## 2018-03-27 RX ADMIN — Medication 5 UNIT(S): at 18:04

## 2018-03-27 RX ADMIN — SERTRALINE 25 MILLIGRAM(S): 25 TABLET, FILM COATED ORAL at 12:56

## 2018-03-27 RX ADMIN — MORPHINE SULFATE 4 MILLIGRAM(S): 50 CAPSULE, EXTENDED RELEASE ORAL at 12:55

## 2018-03-27 RX ADMIN — Medication 25 MILLIGRAM(S): at 05:55

## 2018-03-27 RX ADMIN — SEVELAMER CARBONATE 2400 MILLIGRAM(S): 2400 POWDER, FOR SUSPENSION ORAL at 18:03

## 2018-03-27 RX ADMIN — Medication 25 MILLIGRAM(S): at 18:06

## 2018-03-27 RX ADMIN — Medication 0.25 MILLIGRAM(S): at 18:10

## 2018-03-27 RX ADMIN — Medication 80 MILLIGRAM(S): at 18:06

## 2018-03-27 RX ADMIN — Medication 80 MILLIGRAM(S): at 05:55

## 2018-03-27 RX ADMIN — Medication 5 UNIT(S): at 12:56

## 2018-03-27 RX ADMIN — Medication 1: at 12:56

## 2018-03-27 RX ADMIN — Medication 20 MILLIGRAM(S): at 05:54

## 2018-03-27 RX ADMIN — MORPHINE SULFATE 4 MILLIGRAM(S): 50 CAPSULE, EXTENDED RELEASE ORAL at 05:53

## 2018-03-27 RX ADMIN — Medication 1: at 18:03

## 2018-03-27 NOTE — PROGRESS NOTE ADULT - ASSESSMENT
Pt no longer c/o pain in right stump but now c/o of intermittent SOB likely related to anxiety. Pt worked with PT yesterday, will f/u on their recs. Waiting for authorization from [facility] for d/c, will follow up with AL.

## 2018-03-27 NOTE — PROGRESS NOTE ADULT - SUBJECTIVE AND OBJECTIVE BOX
INTERVAL HPI/OVERNIGHT EVENTS:  S/P BKA.   Episode of diarrhea resolved. anxious.     Allergies: No Known Allergies  EXAM: AOx3, anxious and c/o something quick to fix his shortness of breath, right BKA, s/p closure, wound is clean, no wheezing or crackles.   Vital Signs Last 24 Hrs  T(C): 36.4 (27 Mar 2018 14:00), Max: 36.9 (27 Mar 2018 05:18)  T(F): 97.5 (27 Mar 2018 14:00), Max: 98.4 (27 Mar 2018 05:18)  HR: 66 (27 Mar 2018 14:00) (66 - 77)  BP: 127/58 (27 Mar 2018 14:00) (116/54 - 195/80)  BP(mean): --  RR: 18 (27 Mar 2018 14:00) (18 - 20)  SpO2: --      03-26-18 @ 07:01  -  03-27-18 @ 07:00  --------------------------------------------------------  IN: 0 mL / OUT: 3075 mL / NET: -3075 mL      ALPRAZolam 0.25 milliGRAM(s) Oral two times a day PRN  ALPRAZolam 0.25 milliGRAM(s) Oral once  artificial  tears Solution 1 Drop(s) Both EYES every 12 hours  dextrose 5%. 1000 milliLiter(s) IV Continuous <Continuous>  dextrose 50% Injectable 12.5 Gram(s) IV Push once  dextrose 50% Injectable 25 Gram(s) IV Push once  dextrose 50% Injectable 25 Gram(s) IV Push once  dextrose Gel 1 Dose(s) Oral once PRN  enalapril 20 milliGRAM(s) Oral two times a day  epoetin keagan Injectable 17764 Unit(s) IV Push <User Schedule>  furosemide    Tablet 80 milliGRAM(s) Oral two times a day  glucagon  Injectable 1 milliGRAM(s) IntraMuscular once PRN  insulin lispro (HumaLOG) corrective regimen sliding scale   SubCutaneous Before meals and at bedtime  insulin lispro Injectable (HumaLOG) 5 Unit(s) SubCutaneous before breakfast  insulin lispro Injectable (HumaLOG) 5 Unit(s) SubCutaneous before lunch  insulin lispro Injectable (HumaLOG) 5 Unit(s) SubCutaneous before dinner  meropenem  IVPB 500 milliGRAM(s) IV Intermittent every 24 hours  metolazone 5 milliGRAM(s) Oral daily  metoprolol tartrate 25 milliGRAM(s) Oral two times a day  morphine  - Injectable 4 milliGRAM(s) IV Push every 6 hours PRN  nystatin Powder 1 Application(s) Topical two times a day  oxyCODONE    5 mG/acetaminophen 325 mG 2 Tablet(s) Oral every 4 hours PRN  pregabalin 100 milliGRAM(s) Oral two times a day  sertraline 25 milliGRAM(s) Oral daily  sevelamer hydrochloride 2400 milliGRAM(s) Oral three times a day  simvastatin 10 milliGRAM(s) Oral at bedtime  vancomycin  IVPB 1000 milliGRAM(s) IV Intermittent <User Schedule>  LABS:                        9.7    11.54 )-----------( 271      ( 27 Mar 2018 14:31 )             33.4     03-26    140  |  98  |  25<H>  ----------------------------<  187<H>  4.2   |  30  |  5.8<HH>                 8.9    15.30 )-----------( 310      ( 22 Mar 2018 09:25 )             31.2                10.0   19.46 )-----------( 439      ( 20 Mar 2018 01:12 )             36.0     03-20    140  |  95<L>  |  41<H>  ----------------------------<  148<H>  4.7   |  26  |  7.0<HH>                        9.3    16.29 )-----------( 393      ( 18 Mar 2018 11:47 )             32.7     03-18    139  |  95<L>  |  46<H>  ----------------------------<  128<H>  4.5   |  27  |  7.4<HH>      Culture - Surgical Swab (03.08.18 @ 21:11)    Specimen Source: .Surgical Swab WOUND    Culture Results:   No growth    Culture - Surgical Swab (03.08.18 @ 21:11)    Specimen Source: .Surgical Swab WOUND    Culture Results:   No growth    Culture - Blood (03.08.18 @ 10:45)    Specimen Source: .Blood Blood    Culture Results:   No growth to date.    Culture - Blood (02.27.18 @ 06:45)    Specimen Source: .Blood Blood-Peripheral    Culture Results:   No growth at 5 days.  3/22 CXR- No consolidation or pneumothorax.  3/9 CXR- No focal pulmonary consolidation.  3/8 CT right leg:  Extensive subcutaneous emphysema surrounding the midfoot and ankle,   extending proximally to the distal tibia/fibula, approximately 4.5 cm   proximal to the ankle joint consistent with necrotizing fasciitis.   2. Ulcer overlying the lateral hindfoot with evidence of septic   arthritis/osteomyelitis throughout the midfoot and hindfoot as above  3. Severe neuropathic arthropathy.

## 2018-03-27 NOTE — PROGRESS NOTE ADULT - ASSESSMENT
#Diabetic male with ESRD p/w necrotizing fasciitis of right lower extremity.   #Currently s/p right BKA s/p closure of wound.  #Sepsis-Leukocytosis resolved.   -IV vancomycin 1 g post HD and Merrem 500 mg daily.   -PICC line placed  -Diarrhea resolved  -Chest xray negative, patient is anxious.

## 2018-03-27 NOTE — PROGRESS NOTE ADULT - ASSESSMENT
1. R foot necrotizing fasciitis. s/p BKA. On abx.  2. ESRD on HD TTS (MWF in-hospital). HD tomorrow: 3 hours, opti 160 dialyzer, 2K bath, 3L UF.   3. Anemia. EPO 10,000 units IV with HD.  4. Hyperphosphatemia. Renal diet.  Sevelamer with meals.  5. HTN. Continue enalapril, metoprolol, furosemide, and metolazone.

## 2018-03-27 NOTE — PROGRESS NOTE ADULT - SUBJECTIVE AND OBJECTIVE BOX
[facility] FOLLOW UP NOTE  --------------------------------------------------------------------------------  24 hour events/subjective: Patient examined. Appears comfortable.    PAST HISTORY  --------------------------------------------------------------------------------  No significant changes to PMH, PSH, FHx, SHx, unless otherwise noted    ALLERGIES & MEDICATIONS  --------------------------------------------------------------------------------  Allergies    No Known Allergies    Standing Inpatient Medications  artificial  tears Solution 1 Drop(s) Both EYES every 12 hours  dextrose 5%. 1000 milliLiter(s) IV Continuous <Continuous>  dextrose 50% Injectable 12.5 Gram(s) IV Push once  dextrose 50% Injectable 25 Gram(s) IV Push once  dextrose 50% Injectable 25 Gram(s) IV Push once  enalapril 20 milliGRAM(s) Oral two times a day  epoetin keagan Injectable 31718 Unit(s) IV Push <User Schedule>  furosemide    Tablet 80 milliGRAM(s) Oral two times a day  insulin lispro (HumaLOG) corrective regimen sliding scale   SubCutaneous Before meals and at bedtime  insulin lispro Injectable (HumaLOG) 5 Unit(s) SubCutaneous before breakfast  insulin lispro Injectable (HumaLOG) 5 Unit(s) SubCutaneous before lunch  insulin lispro Injectable (HumaLOG) 5 Unit(s) SubCutaneous before dinner  meropenem  IVPB 500 milliGRAM(s) IV Intermittent every 24 hours  metolazone 5 milliGRAM(s) Oral daily  metoprolol tartrate 25 milliGRAM(s) Oral two times a day  nystatin Powder 1 Application(s) Topical two times a day  pregabalin 100 milliGRAM(s) Oral two times a day  sertraline 25 milliGRAM(s) Oral daily  sevelamer hydrochloride 2400 milliGRAM(s) Oral three times a day  simvastatin 10 milliGRAM(s) Oral at bedtime  vancomycin  IVPB 1000 milliGRAM(s) IV Intermittent <User Schedule>    PRN Inpatient Medications  ALPRAZolam 0.25 milliGRAM(s) Oral two times a day PRN  dextrose Gel 1 Dose(s) Oral once PRN  glucagon  Injectable 1 milliGRAM(s) IntraMuscular once PRN  morphine  - Injectable 4 milliGRAM(s) IV Push every 6 hours PRN  oxyCODONE    5 mG/acetaminophen 325 mG 2 Tablet(s) Oral every 4 hours PRN    VITALS/PHYSICAL EXAM  --------------------------------------------------------------------------------  T(C): 36.9 (03-27-18 @ 05:18), Max: 36.9 (03-27-18 @ 05:18)  HR: 70 (03-27-18 @ 05:18) (67 - 77)  BP: 195/80 (03-27-18 @ 05:18) (113/49 - 195/80)  RR: 18 (03-27-18 @ 05:18) (18 - 20)    03-26-18 @ 07:01  -  03-27-18 @ 07:00  --------------------------------------------------------  IN: 0 mL / OUT: 3075 mL / NET: -3075 mL    Physical Exam:  	Gen: NAD  	Pulm: CTA B/L  	CV: RRR, S1S2  	Abd: +BS, soft, nontender/nondistended  	: No suprapubic tenderness  	LE: Warm, no edema  	Vascular access: AVF    LABS/STUDIES  --------------------------------------------------------------------------------              10.2   14.99 >-----------<  291      [03-26-18 @ 16:10]              34.4     140  |  98  |  25  ----------------------------<  187      [03-26-18 @ 16:10]  4.2   |  30  |  5.8        Ca     8.6     [03-26-18 @ 16:10]      Mg     2.2     [03-26-18 @ 16:10]      Phos  4.0     [03-26-18 @ 16:10]

## 2018-03-27 NOTE — PROGRESS NOTE ADULT - ATTENDING COMMENTS
Pt seen at bedside. Left ulcer is stable. Patient to follow up in podiatry clinic on Monday or Thursday afternoon
Antibiotics per ID, d/c to [facility], BKA stump stable.
BKA stump stable, no fever, continue IV abx, d/c to [facility] when approved. F/u in 2 weeks, staples to stay.
Continue antibiotics and dressing changes in BKA. Fistulogram on Tuesday, has stenosis in fistula
Continue cipro and vanco. Fistulogram on Tuesday
Doing well s/p fistuloplasty. Improving erythema on BKA stump. WBC trending down after change in antibiotics. PICC line and abx for at least 2 weeks. D/c planning. Keep staples.
Patient was admitted with sepsis secondary to necrotizing fascitis of right foot and ankle. Did not involve other organs. Sepsis is now resolving after amputation. Local cultures pending. Will do dressing change today. Start discharge planning.
WBC 16, no fever, stump stable. Continue abx. Fistulogram tomorrow.
agree
awaiting placement. Stump looks ok.
d/c planning, with IV antibiotics.
s/p HeRO graft, doing well. Some arm post op pain. pulse on the left arm graft. Recommend transfusion, stop IV heparin and use HeRo graft for next HD. Will f/u
Assessment and plan above were modified and discussed with residents, physician assistants, and nurses.  I have provided  25 min of Critical Care to this patient.

## 2018-03-27 NOTE — PROGRESS NOTE ADULT - PROVIDER SPECIALTY LIST ADULT
Infectious Disease
Nephrology
Podiatry
SICU
Vascular Surgery
Nephrology
Infectious Disease
Infectious Disease
Nephrology
Vascular Surgery

## 2018-03-27 NOTE — PROGRESS NOTE ADULT - SUBJECTIVE AND OBJECTIVE BOX
[facility] [employer]  53y Male   [id number]    Procedure: S/P LEFT ARM FISTULOGRAM  BALLOON ANGIOPLASTY OF VENOUS OUTFLOW 3/20, 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, S/P RT BKA COMPLETION 3/13  Patient is a 53y old  Male who presents with a chief complaint of right foot/ankle necrotizing fasiitis (13 Mar 2018 07:29)    PAST MEDICAL & SURGICAL HISTORY:  Blind  HTN (hypertension)  End stage renal disease  Diabetes    Events of the Last 24h: Pt started on standing xanax for anxiety, worked with PT as well  Vital Signs Last 24 Hrs  T(C): 36.9 (27 Mar 2018 05:18), Max: 36.9 (27 Mar 2018 05:18)  T(F): 98.4 (27 Mar 2018 05:18), Max: 98.4 (27 Mar 2018 05:18)  HR: 70 (27 Mar 2018 05:18) (64 - 77)  BP: 195/80 (27 Mar 2018 05:18) (113/49 - 195/80)  BP(mean): --  RR: 18 (27 Mar 2018 05:18) (18 - 20)  SpO2: 99% (26 Mar 2018 08:45) (99% - 99%)        Diet, Consistent Carbohydrate Renal/No Snacks (18 @ 17:23)      I&O's Summary    25 Mar 2018 07:  -  26 Mar 2018 07:00  --------------------------------------------------------  IN: 290 mL / OUT: 200 mL / NET: 90 mL    26 Mar 2018 07:  -  27 Mar 2018 06:26  --------------------------------------------------------  IN: 0 mL / OUT: 3075 mL / NET: -3075 mL     I&O's Detail    25 Mar 2018 07:  -  26 Mar 2018 07:00  --------------------------------------------------------  IN:    Oral Fluid: 240 mL    Solution: 50 mL  Total IN: 290 mL    OUT:    Voided: 200 mL  Total OUT: 200 mL    Total NET: 90 mL      26 Mar 2018 07:  -  27 Mar 2018 06:26  --------------------------------------------------------  IN:  Total IN: 0 mL    OUT:    Other: 3000 mL    Voided: 75 mL  Total OUT: 3075 mL    Total NET: -3075 mL          MEDICATIONS  (STANDING):  artificial  tears Solution 1 Drop(s) Both EYES every 12 hours  dextrose 5%. 1000 milliLiter(s) (50 mL/Hr) IV Continuous <Continuous>  dextrose 50% Injectable 12.5 Gram(s) IV Push once  dextrose 50% Injectable 25 Gram(s) IV Push once  dextrose 50% Injectable 25 Gram(s) IV Push once  enalapril 20 milliGRAM(s) Oral two times a day  epoetin keagan Injectable 20698 Unit(s) IV Push <User Schedule>  furosemide    Tablet 80 milliGRAM(s) Oral two times a day  insulin lispro (HumaLOG) corrective regimen sliding scale   SubCutaneous Before meals and at bedtime  insulin lispro Injectable (HumaLOG) 5 Unit(s) SubCutaneous before breakfast  insulin lispro Injectable (HumaLOG) 5 Unit(s) SubCutaneous before lunch  insulin lispro Injectable (HumaLOG) 5 Unit(s) SubCutaneous before dinner  meropenem  IVPB 500 milliGRAM(s) IV Intermittent every 24 hours  metolazone 5 milliGRAM(s) Oral daily  metoprolol tartrate 25 milliGRAM(s) Oral two times a day  nystatin Powder 1 Application(s) Topical two times a day  pregabalin 100 milliGRAM(s) Oral two times a day  sertraline 25 milliGRAM(s) Oral daily  sevelamer hydrochloride 2400 milliGRAM(s) Oral three times a day  simvastatin 10 milliGRAM(s) Oral at bedtime  vancomycin  IVPB 1000 milliGRAM(s) IV Intermittent <User Schedule>    MEDICATIONS  (PRN):  ALPRAZolam 0.25 milliGRAM(s) Oral two times a day PRN Anxiety  dextrose Gel 1 Dose(s) Oral once PRN Blood Glucose LESS THAN 70 milliGRAM(s)/deciliter  glucagon  Injectable 1 milliGRAM(s) IntraMuscular once PRN Glucose LESS THAN 70 milligrams/deciliter  morphine  - Injectable 4 milliGRAM(s) IV Push every 6 hours PRN Severe Pain (7 - 10)  oxyCODONE    5 mG/acetaminophen 325 mG 2 Tablet(s) Oral every 4 hours PRN Mild Pain (1 - 3)      PHYSICAL EXAM:  GENERAL: NAD  HEENT: NCAT  CHEST/LUNGS: CTAB  HEART: RRR,  No murmurs, rubs, or gallops  ABDOMEN: SNTND  EXTREMITIES:  right bka, LLE w/o edema/ cyanosis, left foot dressed c/d/i  NEURO: partial blindness, motor/sensation grossly intact  SKIN: No rashes or lesions  INCISION/WOUNDS: RLE bka, stump dressed, c/d/i                          10.2   14.99 )-----------( 291      ( 26 Mar 2018 16:10 )             34.4        CBC Full  -  ( 26 Mar 2018 16:10 )  WBC Count : 14.99 K/uL  Hemoglobin : 10.2 g/dL  Hematocrit : 34.4 %  Platelet Count - Automated : 291 K/uL  Mean Cell Volume : 85.6 fL  Mean Cell Hemoglobin : 25.4 pg  Mean Cell Hemoglobin Concentration : 29.7 g/dL  Auto Neutrophil # : 11.61 K/uL  Auto Lymphocyte # : 1.33 K/uL  Auto Monocyte # : 0.91 K/uL  Auto Eosinophil # : 0.80 K/uL  Auto Basophil # : 0.25 K/uL  Auto Neutrophil % : 77.4 %  Auto Lymphocyte % : 8.9 %  Auto Monocyte % : 6.1 %  Auto Eosinophil % : 5.3 %  Auto Basophil % : 1.7 %               140   |  98    |  25                 Ca: 8.6    BMP:   ----------------------------< 187    M.2   (18 @ 16:10)             4.2    |  30    | 5.8                Ph: 4.0

## 2018-03-29 LAB — SURGICAL PATHOLOGY STUDY: SIGNIFICANT CHANGE UP

## 2018-03-31 ENCOUNTER — OUTPATIENT (OUTPATIENT)
Dept: OUTPATIENT SERVICES | Facility: HOSPITAL | Age: 53
LOS: 1 days | Discharge: HOME | End: 2018-03-31

## 2018-03-31 DIAGNOSIS — D64.9 ANEMIA, UNSPECIFIED: ICD-10-CM

## 2018-03-31 DIAGNOSIS — E21.3 HYPERPARATHYROIDISM, UNSPECIFIED: ICD-10-CM

## 2018-03-31 DIAGNOSIS — E11.9 TYPE 2 DIABETES MELLITUS WITHOUT COMPLICATIONS: ICD-10-CM

## 2018-03-31 DIAGNOSIS — I10 ESSENTIAL (PRIMARY) HYPERTENSION: ICD-10-CM

## 2018-03-31 DIAGNOSIS — R79.9 ABNORMAL FINDING OF BLOOD CHEMISTRY, UNSPECIFIED: ICD-10-CM

## 2018-03-31 DIAGNOSIS — Z51.81 ENCOUNTER FOR THERAPEUTIC DRUG LEVEL MONITORING: ICD-10-CM

## 2018-04-05 ENCOUNTER — OUTPATIENT (OUTPATIENT)
Dept: OUTPATIENT SERVICES | Facility: HOSPITAL | Age: 53
LOS: 1 days | Discharge: HOME | End: 2018-04-05

## 2018-04-05 DIAGNOSIS — T82.858A STENOSIS OF OTHER VASCULAR PROSTHETIC DEVICES, IMPLANTS AND GRAFTS, INITIAL ENCOUNTER: ICD-10-CM

## 2018-04-05 DIAGNOSIS — E66.01 MORBID (SEVERE) OBESITY DUE TO EXCESS CALORIES: ICD-10-CM

## 2018-04-05 DIAGNOSIS — E66.9 OBESITY, UNSPECIFIED: ICD-10-CM

## 2018-04-05 DIAGNOSIS — Z99.2 DEPENDENCE ON RENAL DIALYSIS: ICD-10-CM

## 2018-04-05 DIAGNOSIS — M86.9 OSTEOMYELITIS, UNSPECIFIED: ICD-10-CM

## 2018-04-05 DIAGNOSIS — M25.579 PAIN IN UNSPECIFIED ANKLE AND JOINTS OF UNSPECIFIED FOOT: ICD-10-CM

## 2018-04-05 DIAGNOSIS — A41.9 SEPSIS, UNSPECIFIED ORGANISM: ICD-10-CM

## 2018-04-05 DIAGNOSIS — I12.0 HYPERTENSIVE CHRONIC KIDNEY DISEASE WITH STAGE 5 CHRONIC KIDNEY DISEASE OR END STAGE RENAL DISEASE: ICD-10-CM

## 2018-04-05 DIAGNOSIS — L97.511 NON-PRESSURE CHRONIC ULCER OF OTHER PART OF RIGHT FOOT LIMITED TO BREAKDOWN OF SKIN: ICD-10-CM

## 2018-04-05 DIAGNOSIS — D63.1 ANEMIA IN CHRONIC KIDNEY DISEASE: ICD-10-CM

## 2018-04-05 DIAGNOSIS — M00.9 PYOGENIC ARTHRITIS, UNSPECIFIED: ICD-10-CM

## 2018-04-05 DIAGNOSIS — D72.829 ELEVATED WHITE BLOOD CELL COUNT, UNSPECIFIED: ICD-10-CM

## 2018-04-05 DIAGNOSIS — Z51.81 ENCOUNTER FOR THERAPEUTIC DRUG LEVEL MONITORING: ICD-10-CM

## 2018-04-05 DIAGNOSIS — E78.5 HYPERLIPIDEMIA, UNSPECIFIED: ICD-10-CM

## 2018-04-05 DIAGNOSIS — M72.6 NECROTIZING FASCIITIS: ICD-10-CM

## 2018-04-05 DIAGNOSIS — L03.116 CELLULITIS OF LEFT LOWER LIMB: ICD-10-CM

## 2018-04-05 DIAGNOSIS — N18.6 END STAGE RENAL DISEASE: ICD-10-CM

## 2018-04-05 DIAGNOSIS — E83.39 OTHER DISORDERS OF PHOSPHORUS METABOLISM: ICD-10-CM

## 2018-04-05 DIAGNOSIS — L97.521 NON-PRESSURE CHRONIC ULCER OF OTHER PART OF LEFT FOOT LIMITED TO BREAKDOWN OF SKIN: ICD-10-CM

## 2018-04-05 DIAGNOSIS — E11.21 TYPE 2 DIABETES MELLITUS WITH DIABETIC NEPHROPATHY: ICD-10-CM

## 2018-04-05 DIAGNOSIS — E11.69 TYPE 2 DIABETES MELLITUS WITH OTHER SPECIFIED COMPLICATION: ICD-10-CM

## 2018-04-05 DIAGNOSIS — E11.22 TYPE 2 DIABETES MELLITUS WITH DIABETIC CHRONIC KIDNEY DISEASE: ICD-10-CM

## 2018-04-05 DIAGNOSIS — L03.115 CELLULITIS OF RIGHT LOWER LIMB: ICD-10-CM
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
## 2018-04-05 DIAGNOSIS — E11.621 TYPE 2 DIABETES MELLITUS WITH FOOT ULCER: ICD-10-CM

## 2018-04-10 ENCOUNTER — OUTPATIENT (OUTPATIENT)
Dept: OUTPATIENT SERVICES | Facility: HOSPITAL | Age: 53
LOS: 1 days | Discharge: HOME | End: 2018-04-10

## 2018-04-10 DIAGNOSIS — Z51.81 ENCOUNTER FOR THERAPEUTIC DRUG LEVEL MONITORING: ICD-10-CM

## 2018-04-12 ENCOUNTER — OUTPATIENT (OUTPATIENT)
Dept: OUTPATIENT SERVICES | Facility: HOSPITAL | Age: 53
LOS: 1 days | Discharge: HOME | End: 2018-04-12

## 2018-04-12 DIAGNOSIS — Z51.81 ENCOUNTER FOR THERAPEUTIC DRUG LEVEL MONITORING: ICD-10-CM

## 2018-04-16 ENCOUNTER — APPOINTMENT (OUTPATIENT)
Dept: VASCULAR SURGERY | Facility: CLINIC | Age: 53
End: 2018-04-16
Payer: MEDICARE

## 2018-04-16 VITALS — WEIGHT: 278 LBS | BODY MASS INDEX: 37.65 KG/M2 | HEIGHT: 72 IN

## 2018-04-16 DIAGNOSIS — Z89.511 ACQUIRED ABSENCE OF RIGHT LEG BELOW KNEE: ICD-10-CM

## 2018-04-16 DIAGNOSIS — Z99.2 END STAGE RENAL DISEASE: ICD-10-CM

## 2018-04-16 DIAGNOSIS — L97.511 NON-PRESSURE CHRONIC ULCER OF OTHER PART OF RIGHT FOOT LIMITED TO BREAKDOWN OF SKIN: ICD-10-CM

## 2018-04-16 DIAGNOSIS — N18.6 END STAGE RENAL DISEASE: ICD-10-CM

## 2018-04-16 PROCEDURE — 99024 POSTOP FOLLOW-UP VISIT: CPT

## 2018-04-19 ENCOUNTER — OUTPATIENT (OUTPATIENT)
Dept: OUTPATIENT SERVICES | Facility: HOSPITAL | Age: 53
LOS: 1 days | Discharge: HOME | End: 2018-04-19

## 2018-04-19 DIAGNOSIS — R06.00 DYSPNEA, UNSPECIFIED: ICD-10-CM

## 2018-04-19 DIAGNOSIS — R94.5 ABNORMAL RESULTS OF LIVER FUNCTION STUDIES: ICD-10-CM

## 2018-04-20 ENCOUNTER — OUTPATIENT (OUTPATIENT)
Dept: OUTPATIENT SERVICES | Facility: HOSPITAL | Age: 53
LOS: 1 days | Discharge: HOME | End: 2018-04-20

## 2018-04-20 DIAGNOSIS — N39.0 URINARY TRACT INFECTION, SITE NOT SPECIFIED: ICD-10-CM

## 2018-04-21 ENCOUNTER — OUTPATIENT (OUTPATIENT)
Dept: OUTPATIENT SERVICES | Facility: HOSPITAL | Age: 53
LOS: 1 days | Discharge: HOME | End: 2018-04-21

## 2018-04-21 DIAGNOSIS — R79.9 ABNORMAL FINDING OF BLOOD CHEMISTRY, UNSPECIFIED: ICD-10-CM

## 2018-04-23 ENCOUNTER — OUTPATIENT (OUTPATIENT)
Dept: OUTPATIENT SERVICES | Facility: HOSPITAL | Age: 53
LOS: 1 days | Discharge: HOME | End: 2018-04-23

## 2018-04-23 DIAGNOSIS — R79.9 ABNORMAL FINDING OF BLOOD CHEMISTRY, UNSPECIFIED: ICD-10-CM

## 2018-04-26 ENCOUNTER — OUTPATIENT (OUTPATIENT)
Dept: OUTPATIENT SERVICES | Facility: HOSPITAL | Age: 53
LOS: 1 days | Discharge: HOME | End: 2018-04-26

## 2018-04-26 DIAGNOSIS — R79.9 ABNORMAL FINDING OF BLOOD CHEMISTRY, UNSPECIFIED: ICD-10-CM

## 2018-04-26 DIAGNOSIS — I48.91 UNSPECIFIED ATRIAL FIBRILLATION: ICD-10-CM

## 2018-05-15 ENCOUNTER — OUTPATIENT (OUTPATIENT)
Dept: OUTPATIENT SERVICES | Facility: HOSPITAL | Age: 53
LOS: 1 days | Discharge: HOME | End: 2018-05-15

## 2018-05-15 DIAGNOSIS — I77.0 ARTERIOVENOUS FISTULA, ACQUIRED: Chronic | ICD-10-CM

## 2018-05-15 DIAGNOSIS — Z98.83 FILTERING (VITREOUS) BLEB AFTER GLAUCOMA SURGERY STATUS: Chronic | ICD-10-CM

## 2018-05-15 DIAGNOSIS — R10.9 UNSPECIFIED ABDOMINAL PAIN: ICD-10-CM

## 2018-05-15 DIAGNOSIS — Z89.519 ACQUIRED ABSENCE OF UNSPECIFIED LEG BELOW KNEE: Chronic | ICD-10-CM

## 2018-05-30 ENCOUNTER — OUTPATIENT (OUTPATIENT)
Dept: OUTPATIENT SERVICES | Facility: HOSPITAL | Age: 53
LOS: 1 days | Discharge: HOME | End: 2018-05-30

## 2018-05-30 ENCOUNTER — EMERGENCY (EMERGENCY)
Facility: HOSPITAL | Age: 53
LOS: 0 days | Discharge: HOME | End: 2018-05-30
Attending: EMERGENCY MEDICINE | Admitting: EMERGENCY MEDICINE

## 2018-05-30 VITALS
TEMPERATURE: 97 F | HEART RATE: 74 BPM | OXYGEN SATURATION: 95 % | RESPIRATION RATE: 16 BRPM | DIASTOLIC BLOOD PRESSURE: 78 MMHG | SYSTOLIC BLOOD PRESSURE: 164 MMHG

## 2018-05-30 DIAGNOSIS — I12.0 HYPERTENSIVE CHRONIC KIDNEY DISEASE WITH STAGE 5 CHRONIC KIDNEY DISEASE OR END STAGE RENAL DISEASE: ICD-10-CM

## 2018-05-30 DIAGNOSIS — Z98.83 FILTERING (VITREOUS) BLEB AFTER GLAUCOMA SURGERY STATUS: Chronic | ICD-10-CM

## 2018-05-30 DIAGNOSIS — Z89.519 ACQUIRED ABSENCE OF UNSPECIFIED LEG BELOW KNEE: Chronic | ICD-10-CM

## 2018-05-30 DIAGNOSIS — I77.0 ARTERIOVENOUS FISTULA, ACQUIRED: Chronic | ICD-10-CM

## 2018-05-30 DIAGNOSIS — N18.6 END STAGE RENAL DISEASE: ICD-10-CM

## 2018-05-30 DIAGNOSIS — F41.9 ANXIETY DISORDER, UNSPECIFIED: ICD-10-CM

## 2018-05-30 DIAGNOSIS — E11.9 TYPE 2 DIABETES MELLITUS WITHOUT COMPLICATIONS: ICD-10-CM

## 2018-05-30 DIAGNOSIS — Z79.899 OTHER LONG TERM (CURRENT) DRUG THERAPY: ICD-10-CM

## 2018-05-30 DIAGNOSIS — R41.82 ALTERED MENTAL STATUS, UNSPECIFIED: ICD-10-CM

## 2018-05-30 DIAGNOSIS — R41.0 DISORIENTATION, UNSPECIFIED: ICD-10-CM

## 2018-05-30 LAB
ALBUMIN SERPL ELPH-MCNC: 3.4 G/DL — LOW (ref 3.5–5.2)
ALP SERPL-CCNC: 167 U/L — HIGH (ref 30–115)
ALT FLD-CCNC: 217 U/L — HIGH (ref 0–41)
ANION GAP SERPL CALC-SCNC: 14 MMOL/L — SIGNIFICANT CHANGE UP (ref 7–14)
APPEARANCE UR: CLEAR — SIGNIFICANT CHANGE UP
AST SERPL-CCNC: 26 U/L — SIGNIFICANT CHANGE UP (ref 0–41)
BASE EXCESS BLDV CALC-SCNC: 4.9 MMOL/L — HIGH (ref -2–2)
BILIRUB SERPL-MCNC: 0.5 MG/DL — SIGNIFICANT CHANGE UP (ref 0.2–1.2)
BILIRUB UR-MCNC: NEGATIVE — SIGNIFICANT CHANGE UP
BUN SERPL-MCNC: 50 MG/DL — HIGH (ref 10–20)
CA-I SERPL-SCNC: 1.15 MMOL/L — SIGNIFICANT CHANGE UP (ref 1.12–1.3)
CALCIUM SERPL-MCNC: 8.5 MG/DL — SIGNIFICANT CHANGE UP (ref 8.5–10.1)
CHLORIDE SERPL-SCNC: 96 MMOL/L — LOW (ref 98–110)
CO2 SERPL-SCNC: 29 MMOL/L — SIGNIFICANT CHANGE UP (ref 17–32)
COLOR SPEC: YELLOW — SIGNIFICANT CHANGE UP
CREAT SERPL-MCNC: 5.5 MG/DL — CRITICAL HIGH (ref 0.7–1.5)
DIFF PNL FLD: (no result)
EPI CELLS # UR: (no result) /HPF
GAS PNL BLDV: 137 MMOL/L — SIGNIFICANT CHANGE UP (ref 136–145)
GAS PNL BLDV: SIGNIFICANT CHANGE UP
GLUCOSE SERPL-MCNC: 142 MG/DL — HIGH (ref 70–99)
GLUCOSE UR QL: NEGATIVE MG/DL — SIGNIFICANT CHANGE UP
HCO3 BLDV-SCNC: 31 MMOL/L — HIGH (ref 22–29)
HCT VFR BLD CALC: 28.8 % — LOW (ref 42–52)
HCT VFR BLDA CALC: 31.8 % — LOW (ref 34–44)
HGB BLD CALC-MCNC: 10.4 G/DL — LOW (ref 14–18)
HGB BLD-MCNC: 8.5 G/DL — LOW (ref 14–18)
KETONES UR-MCNC: NEGATIVE — SIGNIFICANT CHANGE UP
LACTATE BLDV-MCNC: 0.6 MMOL/L — SIGNIFICANT CHANGE UP (ref 0.5–1.6)
LEUKOCYTE ESTERASE UR-ACNC: NEGATIVE — SIGNIFICANT CHANGE UP
MCHC RBC-ENTMCNC: 24.4 PG — LOW (ref 27–31)
MCHC RBC-ENTMCNC: 29.5 G/DL — LOW (ref 32–37)
MCV RBC AUTO: 82.5 FL — SIGNIFICANT CHANGE UP (ref 80–94)
NITRITE UR-MCNC: NEGATIVE — SIGNIFICANT CHANGE UP
NRBC # BLD: 0 /100 WBCS — SIGNIFICANT CHANGE UP (ref 0–0)
PCO2 BLDV: 52 MMHG — HIGH (ref 41–51)
PH BLDV: 7.38 — SIGNIFICANT CHANGE UP (ref 7.26–7.43)
PH UR: 7 — SIGNIFICANT CHANGE UP (ref 5–8)
PLATELET # BLD AUTO: 228 K/UL — SIGNIFICANT CHANGE UP (ref 130–400)
PO2 BLDV: 28 MMHG — SIGNIFICANT CHANGE UP (ref 20–40)
POTASSIUM BLDV-SCNC: 4.6 MMOL/L — SIGNIFICANT CHANGE UP (ref 3.3–5.6)
POTASSIUM SERPL-MCNC: 5 MMOL/L — SIGNIFICANT CHANGE UP (ref 3.5–5)
POTASSIUM SERPL-SCNC: 5 MMOL/L — SIGNIFICANT CHANGE UP (ref 3.5–5)
PROT SERPL-MCNC: 6.6 G/DL — SIGNIFICANT CHANGE UP (ref 6–8)
PROT UR-MCNC: 100 MG/DL
RBC # BLD: 3.49 M/UL — LOW (ref 4.7–6.1)
RBC # FLD: 19.8 % — HIGH (ref 11.5–14.5)
RBC CASTS # UR COMP ASSIST: (no result) /HPF
SAO2 % BLDV: 42 % — SIGNIFICANT CHANGE UP
SODIUM SERPL-SCNC: 139 MMOL/L — SIGNIFICANT CHANGE UP (ref 135–146)
SP GR SPEC: 1.01 — SIGNIFICANT CHANGE UP (ref 1.01–1.03)
UROBILINOGEN FLD QL: 0.2 MG/DL — SIGNIFICANT CHANGE UP (ref 0.2–0.2)
WBC # BLD: 14.42 K/UL — HIGH (ref 4.8–10.8)
WBC # FLD AUTO: 14.42 K/UL — HIGH (ref 4.8–10.8)

## 2018-05-30 RX ORDER — HYDROXYZINE HCL 10 MG
25 TABLET ORAL ONCE
Qty: 0 | Refills: 0 | Status: COMPLETED | OUTPATIENT
Start: 2018-05-30 | End: 2018-05-30

## 2018-05-30 RX ORDER — HYDROXYZINE HCL 10 MG
25 TABLET ORAL ONCE
Qty: 0 | Refills: 0 | Status: DISCONTINUED | OUTPATIENT
Start: 2018-05-30 | End: 2018-05-30

## 2018-05-30 NOTE — ED ADULT TRIAGE NOTE - NURSING HOMES
[facility] and [facility], formerly [facility], [facility]

## 2018-05-30 NOTE — ED BEHAVIORAL HEALTH ASSESSMENT NOTE - HPI (INCLUDE ILLNESS QUALITY, SEVERITY, DURATION, TIMING, CONTEXT, MODIFYING FACTORS, ASSOCIATED SIGNS AND SYMPTOMS)
Pt is a 54y/o male, disabled, domiciled at nursing home, psych history of depression, with a pmh of ESRD on HD (MWF), HTN, HLD, DM presents today c/o worsening anxiety x 1 week since being started on haldol at group home for agitation. Patient states that he has been feeling anxious and has been finding himself confused and crying for the last month. He states that he typically is "sharp" and knows what is going on but states that he has been feeling worse since being started on haldol which he and his mother believe to be a standing order. He states that when he is given haldol he falls asleep and tends to sleep all day and be awake all night. He notes sleep-wake reversal. He states that he also takes zoloft 100mg which has helped his mood. Patient states that he used to take xanax at the nursing home and that this medication may have been stopped recently. He is treated in [facility] x 6 months by Dr. Mosqueda and has an appointment to see her this Saturday. He states that he does not know what triggers his anxiety attacks. Denies panic symptoms. He gives an example when he woke up feeling sad and started crying which is out of character for him. His mother also states that sometimes he becomes confused and forgets where he is or what the year is. On approach, patient is alert, oriented, calm, compliant, and appears anxious. He states that he wants to go home and he believes the haldol is worsening his anxiety.

## 2018-05-30 NOTE — ED BEHAVIORAL HEALTH ASSESSMENT NOTE - SUMMARY
Patient is a 55 year old male with past psych history of depression who comes in to the ED to be evaluated for anxiety in the setting of recently being started on Haldol x 1 week. He states that this medication sedates him throughout the day, causing him to be awake all night. He also states that his medication causes him anxiety and makes him feel confused. His presentation might also be complicated by the fact that he his given xanax which may have also been stopped. Benzodiazepines are known to increase the risk of delirium, which the patient appears to have given his waxing and waning mental status  and disorientation. Haldol is likely causing him daytime sedation and leading to sleep wake reversals.

## 2018-05-30 NOTE — ED PROVIDER NOTE - NS ED ROS FT
Cardiac:  No chest pain, SOB or edema  Respiratory:  No cough or respiratory distress. No hemoptysis. No history of asthma or RAD.  GI:  No nausea, vomiting, diarrhea or abdominal pain.  :  No dysuria, frequency or burning.  MS:  No myalgia, muscle weakness, joint pain or back pain.  Neuro:  No headache or weakness.  No LOC.  Skin:  No skin rash.   Endocrine: + diabetes.  Except as documented in the HPI,  all other systems are negative.

## 2018-05-30 NOTE — ED PROVIDER NOTE - PROGRESS NOTE DETAILS
2 calls put out for psych, pt en route to [place] psych aware and will see pt Pt seen and eval by psych, ok to d/c back to [facility], to stop haldol and xanax and pt can take vistaril 25 mg q6 hours prn.  Called [facility], spoke with pt's nurse Maureen and explained psych rec's.  to d/c.

## 2018-05-30 NOTE — ED PROVIDER NOTE - PHYSICAL EXAMINATION
VITAL SIGNS: I have reviewed nursing notes and confirm.  CONSTITUTIONAL: Well-developed; well-nourished; in no acute distress.   SKIN: skin exam is warm and dry, no acute rash.    HEAD: Normocephalic; atraumatic.  EYES: conjunctiva and sclera clear.  ENT: No nasal discharge; airway clear.  CARD: S1, S2 normal; no murmurs, gallops, or rubs. Regular rate and rhythm.   RESP: No wheezes, rales or rhonchi.  ABD: Normal bowel sounds; soft; non-distended; non-tender  EXT: right BKA, Normal ROM.  No clubbing, cyanosis or edema.   NEURO: Alert, oriented, grossly unremarkable

## 2018-05-30 NOTE — ED PROVIDER NOTE - ATTENDING CONTRIBUTION TO CARE
52 y/o male with h/o ESRD on HD (MWF), HTN, HLD, DM, s/p R BKA sent to ER for HD for eval of anxiety (completed 2.5 hrs of HD).  Pt has been at [facility] since 9/2017, states he's been more anxious and agitated recently and so was started on haldol by [facility], pt states since then his symptoms have gotten worse. Today at HD, became very agitated and anxious and so sent to ER for eval.  PT denies any depression, si/hi.  denies any recent illness or injury.  no ha/dizziness/loc.  no abd pain.    pe - as above,  -check labs, psych eval.

## 2018-05-30 NOTE — ED BEHAVIORAL HEALTH ASSESSMENT NOTE - SUICIDE PROTECTIVE FACTORS
Responsibility to family and others/Future oriented/Fear of death or dying due to pain/suffering/Ability to cope with stress/Identifies reasons for living

## 2018-05-30 NOTE — ED ADULT NURSE NOTE - PSH
A-V fistula  LEFT ARM  S/P BKA (below knee amputation) unilateral  RIGHT  Status post glaucoma surgery  RIGHT EYE

## 2018-05-30 NOTE — ED PROVIDER NOTE - OBJECTIVE STATEMENT
Pt is a 54y/o male with a pmh xof ESRD on HD (MWF), HTN, HLD, DM presents today c/o worsening anxiety x 1 week since being started on haldol at group home for agitation. Pt denies CP, SOB, weakness, n/v/d, fever, chills, abd pain.

## 2018-05-31 ENCOUNTER — OUTPATIENT (OUTPATIENT)
Dept: OUTPATIENT SERVICES | Facility: HOSPITAL | Age: 53
LOS: 1 days | Discharge: HOME | End: 2018-05-31

## 2018-05-31 ENCOUNTER — INPATIENT (INPATIENT)
Facility: HOSPITAL | Age: 53
LOS: 6 days | Discharge: NURSING, REHAB OR LONG-TERM CARE | End: 2018-06-07
Attending: INTERNAL MEDICINE | Admitting: INTERNAL MEDICINE

## 2018-05-31 VITALS
OXYGEN SATURATION: 97 % | RESPIRATION RATE: 18 BRPM | WEIGHT: 309.97 LBS | HEIGHT: 72 IN | HEART RATE: 66 BPM | TEMPERATURE: 98 F | DIASTOLIC BLOOD PRESSURE: 70 MMHG | SYSTOLIC BLOOD PRESSURE: 154 MMHG

## 2018-05-31 DIAGNOSIS — Z98.83 FILTERING (VITREOUS) BLEB AFTER GLAUCOMA SURGERY STATUS: Chronic | ICD-10-CM

## 2018-05-31 DIAGNOSIS — I77.0 ARTERIOVENOUS FISTULA, ACQUIRED: Chronic | ICD-10-CM

## 2018-05-31 DIAGNOSIS — N18.6 END STAGE RENAL DISEASE: ICD-10-CM

## 2018-05-31 DIAGNOSIS — Z89.519 ACQUIRED ABSENCE OF UNSPECIFIED LEG BELOW KNEE: Chronic | ICD-10-CM

## 2018-05-31 DIAGNOSIS — D72.829 ELEVATED WHITE BLOOD CELL COUNT, UNSPECIFIED: ICD-10-CM

## 2018-05-31 DIAGNOSIS — I10 ESSENTIAL (PRIMARY) HYPERTENSION: ICD-10-CM

## 2018-05-31 DIAGNOSIS — R52 PAIN, UNSPECIFIED: ICD-10-CM

## 2018-05-31 DIAGNOSIS — D64.9 ANEMIA, UNSPECIFIED: ICD-10-CM

## 2018-05-31 DIAGNOSIS — H40.053 OCULAR HYPERTENSION, BILATERAL: ICD-10-CM

## 2018-05-31 DIAGNOSIS — F31.9 BIPOLAR DISORDER, UNSPECIFIED: ICD-10-CM

## 2018-05-31 DIAGNOSIS — R74.8 ABNORMAL LEVELS OF OTHER SERUM ENZYMES: ICD-10-CM

## 2018-05-31 LAB
ALBUMIN SERPL ELPH-MCNC: 3.3 G/DL — LOW (ref 3.5–5.2)
ALP SERPL-CCNC: 152 U/L — HIGH (ref 30–115)
ALT FLD-CCNC: 164 U/L — HIGH (ref 0–41)
ANION GAP SERPL CALC-SCNC: 13 MMOL/L — SIGNIFICANT CHANGE UP (ref 7–14)
ANION GAP SERPL CALC-SCNC: 16 MMOL/L — HIGH (ref 7–14)
APPEARANCE UR: CLEAR — SIGNIFICANT CHANGE UP
AST SERPL-CCNC: 29 U/L — SIGNIFICANT CHANGE UP (ref 0–41)
BASE EXCESS BLDV CALC-SCNC: 3.2 MMOL/L — HIGH (ref -2–2)
BASOPHILS # BLD AUTO: 0.08 K/UL — SIGNIFICANT CHANGE UP (ref 0–0.2)
BASOPHILS # BLD AUTO: 0.1 K/UL — SIGNIFICANT CHANGE UP (ref 0–0.2)
BASOPHILS NFR BLD AUTO: 0.6 % — SIGNIFICANT CHANGE UP (ref 0–1)
BASOPHILS NFR BLD AUTO: 0.8 % — SIGNIFICANT CHANGE UP (ref 0–1)
BILIRUB DIRECT SERPL-MCNC: 0.3 MG/DL — HIGH (ref 0–0.2)
BILIRUB INDIRECT FLD-MCNC: 0.2 MG/DL — SIGNIFICANT CHANGE UP (ref 0.2–1.2)
BILIRUB SERPL-MCNC: 0.5 MG/DL — SIGNIFICANT CHANGE UP (ref 0.2–1.2)
BILIRUB UR-MCNC: NEGATIVE — SIGNIFICANT CHANGE UP
BUN SERPL-MCNC: 66 MG/DL — CRITICAL HIGH (ref 10–20)
BUN SERPL-MCNC: 74 MG/DL — CRITICAL HIGH (ref 10–20)
CA-I SERPL-SCNC: 1.12 MMOL/L — SIGNIFICANT CHANGE UP (ref 1.12–1.3)
CALCIUM SERPL-MCNC: 8.7 MG/DL — SIGNIFICANT CHANGE UP (ref 8.5–10.1)
CALCIUM SERPL-MCNC: 9 MG/DL — SIGNIFICANT CHANGE UP (ref 8.5–10.1)
CHLORIDE SERPL-SCNC: 96 MMOL/L — LOW (ref 98–110)
CHLORIDE SERPL-SCNC: 96 MMOL/L — LOW (ref 98–110)
CK MB CFR SERPL CALC: 4.1 NG/ML — SIGNIFICANT CHANGE UP (ref 0.6–6.3)
CK SERPL-CCNC: 80 U/L — SIGNIFICANT CHANGE UP (ref 0–225)
CK SERPL-CCNC: 86 U/L — SIGNIFICANT CHANGE UP (ref 0–225)
CO2 SERPL-SCNC: 26 MMOL/L — SIGNIFICANT CHANGE UP (ref 17–32)
CO2 SERPL-SCNC: 29 MMOL/L — SIGNIFICANT CHANGE UP (ref 17–32)
COLOR SPEC: YELLOW — SIGNIFICANT CHANGE UP
CREAT SERPL-MCNC: 6.5 MG/DL — CRITICAL HIGH (ref 0.7–1.5)
CREAT SERPL-MCNC: 6.8 MG/DL — CRITICAL HIGH (ref 0.7–1.5)
DIFF PNL FLD: (no result)
EOSINOPHIL # BLD AUTO: 0.59 K/UL — SIGNIFICANT CHANGE UP (ref 0–0.7)
EOSINOPHIL # BLD AUTO: 0.65 K/UL — SIGNIFICANT CHANGE UP (ref 0–0.7)
EOSINOPHIL NFR BLD AUTO: 4.5 % — SIGNIFICANT CHANGE UP (ref 0–8)
EOSINOPHIL NFR BLD AUTO: 4.6 % — SIGNIFICANT CHANGE UP (ref 0–8)
GAS PNL BLDV: 138 MMOL/L — SIGNIFICANT CHANGE UP (ref 136–145)
GAS PNL BLDV: SIGNIFICANT CHANGE UP
GLUCOSE SERPL-MCNC: 162 MG/DL — HIGH (ref 70–99)
GLUCOSE SERPL-MCNC: 90 MG/DL — SIGNIFICANT CHANGE UP (ref 70–99)
GLUCOSE UR QL: NEGATIVE MG/DL — SIGNIFICANT CHANGE UP
HCO3 BLDV-SCNC: 30 MMOL/L — HIGH (ref 22–29)
HCT VFR BLD CALC: 27.9 % — LOW (ref 42–52)
HCT VFR BLD CALC: 29.9 % — LOW (ref 42–52)
HCT VFR BLDA CALC: 36.9 % — SIGNIFICANT CHANGE UP (ref 34–44)
HGB BLD CALC-MCNC: 12 G/DL — LOW (ref 14–18)
HGB BLD-MCNC: 8.4 G/DL — LOW (ref 14–18)
HGB BLD-MCNC: 9 G/DL — LOW (ref 14–18)
HOROWITZ INDEX BLDV+IHG-RTO: 21 — SIGNIFICANT CHANGE UP
IMM GRANULOCYTES NFR BLD AUTO: 0.4 % — HIGH (ref 0.1–0.3)
IMM GRANULOCYTES NFR BLD AUTO: 0.8 % — HIGH (ref 0.1–0.3)
KETONES UR-MCNC: NEGATIVE — SIGNIFICANT CHANGE UP
LACTATE BLDV-MCNC: 0.9 MMOL/L — SIGNIFICANT CHANGE UP (ref 0.5–1.6)
LACTATE SERPL-SCNC: 0.6 MMOL/L — SIGNIFICANT CHANGE UP (ref 0.5–2.2)
LEUKOCYTE ESTERASE UR-ACNC: NEGATIVE — SIGNIFICANT CHANGE UP
LYMPHOCYTES # BLD AUTO: 1.71 K/UL — SIGNIFICANT CHANGE UP (ref 1.2–3.4)
LYMPHOCYTES # BLD AUTO: 1.86 K/UL — SIGNIFICANT CHANGE UP (ref 1.2–3.4)
LYMPHOCYTES # BLD AUTO: 13.2 % — LOW (ref 20.5–51.1)
LYMPHOCYTES # BLD AUTO: 13.3 % — LOW (ref 20.5–51.1)
MCHC RBC-ENTMCNC: 24.9 PG — LOW (ref 27–31)
MCHC RBC-ENTMCNC: 25.3 PG — LOW (ref 27–31)
MCHC RBC-ENTMCNC: 30.1 G/DL — LOW (ref 32–37)
MCHC RBC-ENTMCNC: 30.1 G/DL — LOW (ref 32–37)
MCV RBC AUTO: 82.8 FL — SIGNIFICANT CHANGE UP (ref 80–94)
MCV RBC AUTO: 84 FL — SIGNIFICANT CHANGE UP (ref 80–94)
MONOCYTES # BLD AUTO: 1.24 K/UL — HIGH (ref 0.1–0.6)
MONOCYTES # BLD AUTO: 1.26 K/UL — HIGH (ref 0.1–0.6)
MONOCYTES NFR BLD AUTO: 8.9 % — SIGNIFICANT CHANGE UP (ref 1.7–9.3)
MONOCYTES NFR BLD AUTO: 9.7 % — HIGH (ref 1.7–9.3)
NEUTROPHILS # BLD AUTO: 10.11 K/UL — HIGH (ref 1.4–6.5)
NEUTROPHILS # BLD AUTO: 9.21 K/UL — HIGH (ref 1.4–6.5)
NEUTROPHILS NFR BLD AUTO: 71 % — SIGNIFICANT CHANGE UP (ref 42.2–75.2)
NEUTROPHILS NFR BLD AUTO: 72.2 % — SIGNIFICANT CHANGE UP (ref 42.2–75.2)
NITRITE UR-MCNC: NEGATIVE — SIGNIFICANT CHANGE UP
PCO2 BLDV: 55 MMHG — HIGH (ref 41–51)
PH BLDV: 7.35 — SIGNIFICANT CHANGE UP (ref 7.26–7.43)
PH UR: 7.5 — SIGNIFICANT CHANGE UP (ref 5–8)
PLATELET # BLD AUTO: 245 K/UL — SIGNIFICANT CHANGE UP (ref 130–400)
PLATELET # BLD AUTO: 249 K/UL — SIGNIFICANT CHANGE UP (ref 130–400)
PO2 BLDV: 34 MMHG — SIGNIFICANT CHANGE UP (ref 20–40)
POTASSIUM BLDV-SCNC: 4.8 MMOL/L — SIGNIFICANT CHANGE UP (ref 3.3–5.6)
POTASSIUM SERPL-MCNC: 5.2 MMOL/L — HIGH (ref 3.5–5)
POTASSIUM SERPL-MCNC: 5.8 MMOL/L — HIGH (ref 3.5–5)
POTASSIUM SERPL-SCNC: 5.2 MMOL/L — HIGH (ref 3.5–5)
POTASSIUM SERPL-SCNC: 5.8 MMOL/L — HIGH (ref 3.5–5)
PROT SERPL-MCNC: 6.5 G/DL — SIGNIFICANT CHANGE UP (ref 6–8)
PROT UR-MCNC: 100 MG/DL
RBC # BLD: 3.37 M/UL — LOW (ref 4.7–6.1)
RBC # BLD: 3.56 M/UL — LOW (ref 4.7–6.1)
RBC # FLD: 19.8 % — HIGH (ref 11.5–14.5)
RBC # FLD: 19.8 % — HIGH (ref 11.5–14.5)
SAO2 % BLDV: 43 % — SIGNIFICANT CHANGE UP
SODIUM SERPL-SCNC: 138 MMOL/L — SIGNIFICANT CHANGE UP (ref 135–146)
SODIUM SERPL-SCNC: 138 MMOL/L — SIGNIFICANT CHANGE UP (ref 135–146)
SP GR SPEC: 1.01 — SIGNIFICANT CHANGE UP (ref 1.01–1.03)
TROPONIN T SERPL-MCNC: 0.15 NG/ML — CRITICAL HIGH
TROPONIN T SERPL-MCNC: 0.18 NG/ML — CRITICAL HIGH
UROBILINOGEN FLD QL: 0.2 MG/DL — SIGNIFICANT CHANGE UP (ref 0.2–0.2)
WBC # BLD: 12.97 K/UL — HIGH (ref 4.8–10.8)
WBC # BLD: 14 K/UL — HIGH (ref 4.8–10.8)
WBC # FLD AUTO: 12.97 K/UL — HIGH (ref 4.8–10.8)
WBC # FLD AUTO: 14 K/UL — HIGH (ref 4.8–10.8)

## 2018-05-31 RX ORDER — INSULIN LISPRO 100/ML
VIAL (ML) SUBCUTANEOUS AT BEDTIME
Qty: 0 | Refills: 0 | Status: DISCONTINUED | OUTPATIENT
Start: 2018-05-31 | End: 2018-06-06

## 2018-05-31 RX ORDER — DEXTROSE 50 % IN WATER 50 %
25 SYRINGE (ML) INTRAVENOUS ONCE
Qty: 0 | Refills: 0 | Status: DISCONTINUED | OUTPATIENT
Start: 2018-05-31 | End: 2018-06-07

## 2018-05-31 RX ORDER — DEXTROSE 50 % IN WATER 50 %
15 SYRINGE (ML) INTRAVENOUS ONCE
Qty: 0 | Refills: 0 | Status: DISCONTINUED | OUTPATIENT
Start: 2018-05-31 | End: 2018-06-07

## 2018-05-31 RX ORDER — DORZOLAMIDE HYDROCHLORIDE 20 MG/ML
1 SOLUTION/ DROPS OPHTHALMIC
Qty: 0 | Refills: 0 | Status: DISCONTINUED | OUTPATIENT
Start: 2018-05-31 | End: 2018-06-07

## 2018-05-31 RX ORDER — FUROSEMIDE 40 MG
1 TABLET ORAL
Qty: 0 | Refills: 0 | COMMUNITY

## 2018-05-31 RX ORDER — HEPARIN SODIUM 5000 [USP'U]/ML
5000 INJECTION INTRAVENOUS; SUBCUTANEOUS EVERY 8 HOURS
Qty: 0 | Refills: 0 | Status: DISCONTINUED | OUTPATIENT
Start: 2018-05-31 | End: 2018-06-07

## 2018-05-31 RX ORDER — SERTRALINE 25 MG/1
75 TABLET, FILM COATED ORAL DAILY
Qty: 0 | Refills: 0 | Status: DISCONTINUED | OUTPATIENT
Start: 2018-05-31 | End: 2018-06-02

## 2018-05-31 RX ORDER — TRAZODONE HCL 50 MG
75 TABLET ORAL AT BEDTIME
Qty: 0 | Refills: 0 | Status: DISCONTINUED | OUTPATIENT
Start: 2018-05-31 | End: 2018-06-02

## 2018-05-31 RX ORDER — ASCORBIC ACID 60 MG
250 TABLET,CHEWABLE ORAL DAILY
Qty: 0 | Refills: 0 | Status: DISCONTINUED | OUTPATIENT
Start: 2018-05-31 | End: 2018-06-07

## 2018-05-31 RX ORDER — METOPROLOL TARTRATE 50 MG
25 TABLET ORAL
Qty: 0 | Refills: 0 | Status: DISCONTINUED | OUTPATIENT
Start: 2018-05-31 | End: 2018-06-07

## 2018-05-31 RX ORDER — ACETAMINOPHEN 500 MG
650 TABLET ORAL EVERY 6 HOURS
Qty: 0 | Refills: 0 | Status: DISCONTINUED | OUTPATIENT
Start: 2018-05-31 | End: 2018-06-07

## 2018-05-31 RX ORDER — HYDROCORTISONE 1 %
1 OINTMENT (GRAM) TOPICAL
Qty: 0 | Refills: 0 | Status: DISCONTINUED | OUTPATIENT
Start: 2018-05-31 | End: 2018-06-07

## 2018-05-31 RX ORDER — DOCUSATE SODIUM 100 MG
100 CAPSULE ORAL
Qty: 0 | Refills: 0 | Status: DISCONTINUED | OUTPATIENT
Start: 2018-05-31 | End: 2018-06-07

## 2018-05-31 RX ORDER — IPRATROPIUM/ALBUTEROL SULFATE 18-103MCG
3 AEROSOL WITH ADAPTER (GRAM) INHALATION EVERY 6 HOURS
Qty: 0 | Refills: 0 | Status: DISCONTINUED | OUTPATIENT
Start: 2018-05-31 | End: 2018-06-07

## 2018-05-31 RX ORDER — ZOLPIDEM TARTRATE 10 MG/1
5 TABLET ORAL AT BEDTIME
Qty: 0 | Refills: 0 | Status: DISCONTINUED | OUTPATIENT
Start: 2018-05-31 | End: 2018-06-06

## 2018-05-31 RX ORDER — ASPIRIN/CALCIUM CARB/MAGNESIUM 324 MG
325 TABLET ORAL DAILY
Qty: 0 | Refills: 0 | Status: DISCONTINUED | OUTPATIENT
Start: 2018-05-31 | End: 2018-06-01

## 2018-05-31 RX ORDER — BRIMONIDINE TARTRATE 2 MG/MG
1 SOLUTION/ DROPS OPHTHALMIC
Qty: 0 | Refills: 0 | Status: DISCONTINUED | OUTPATIENT
Start: 2018-05-31 | End: 2018-06-07

## 2018-05-31 RX ORDER — FUROSEMIDE 40 MG
80 TABLET ORAL
Qty: 0 | Refills: 0 | Status: DISCONTINUED | OUTPATIENT
Start: 2018-05-31 | End: 2018-06-07

## 2018-05-31 RX ORDER — INSULIN LISPRO 100/ML
VIAL (ML) SUBCUTANEOUS
Qty: 0 | Refills: 0 | Status: DISCONTINUED | OUTPATIENT
Start: 2018-05-31 | End: 2018-06-06

## 2018-05-31 RX ORDER — OXYCODONE AND ACETAMINOPHEN 5; 325 MG/1; MG/1
1 TABLET ORAL EVERY 4 HOURS
Qty: 0 | Refills: 0 | Status: DISCONTINUED | OUTPATIENT
Start: 2018-05-31 | End: 2018-06-06

## 2018-05-31 RX ORDER — HYDROXYZINE HCL 10 MG
25 TABLET ORAL ONCE
Qty: 0 | Refills: 0 | Status: COMPLETED | OUTPATIENT
Start: 2018-05-31 | End: 2018-05-31

## 2018-05-31 RX ORDER — TIMOLOL 0.5 %
1 DROPS OPHTHALMIC (EYE)
Qty: 0 | Refills: 0 | Status: DISCONTINUED | OUTPATIENT
Start: 2018-05-31 | End: 2018-06-07

## 2018-05-31 RX ORDER — AMLODIPINE BESYLATE 2.5 MG/1
5 TABLET ORAL DAILY
Qty: 0 | Refills: 0 | Status: DISCONTINUED | OUTPATIENT
Start: 2018-05-31 | End: 2018-06-07

## 2018-05-31 RX ORDER — SEVELAMER CARBONATE 2400 MG/1
800 POWDER, FOR SUSPENSION ORAL THREE TIMES A DAY
Qty: 0 | Refills: 0 | Status: DISCONTINUED | OUTPATIENT
Start: 2018-05-31 | End: 2018-06-07

## 2018-05-31 RX ORDER — KETOROLAC TROMETHAMINE 0.5 %
1 DROPS OPHTHALMIC (EYE)
Qty: 0 | Refills: 0 | Status: DISCONTINUED | OUTPATIENT
Start: 2018-05-31 | End: 2018-06-07

## 2018-05-31 RX ORDER — DEXTROSE 50 % IN WATER 50 %
12.5 SYRINGE (ML) INTRAVENOUS ONCE
Qty: 0 | Refills: 0 | Status: DISCONTINUED | OUTPATIENT
Start: 2018-05-31 | End: 2018-06-07

## 2018-05-31 RX ORDER — GLUCAGON INJECTION, SOLUTION 0.5 MG/.1ML
1 INJECTION, SOLUTION SUBCUTANEOUS ONCE
Qty: 0 | Refills: 0 | Status: DISCONTINUED | OUTPATIENT
Start: 2018-05-31 | End: 2018-06-07

## 2018-05-31 RX ORDER — LATANOPROST 0.05 MG/ML
1 SOLUTION/ DROPS OPHTHALMIC; TOPICAL AT BEDTIME
Qty: 0 | Refills: 0 | Status: DISCONTINUED | OUTPATIENT
Start: 2018-05-31 | End: 2018-06-07

## 2018-05-31 RX ORDER — SIMVASTATIN 20 MG/1
10 TABLET, FILM COATED ORAL AT BEDTIME
Qty: 0 | Refills: 0 | Status: DISCONTINUED | OUTPATIENT
Start: 2018-05-31 | End: 2018-06-07

## 2018-05-31 RX ORDER — INSULIN GLARGINE 100 [IU]/ML
40 INJECTION, SOLUTION SUBCUTANEOUS AT BEDTIME
Qty: 0 | Refills: 0 | Status: DISCONTINUED | OUTPATIENT
Start: 2018-05-31 | End: 2018-06-06

## 2018-05-31 RX ORDER — DORZOLAMIDE HYDROCHLORIDE TIMOLOL MALEATE 20; 5 MG/ML; MG/ML
1 SOLUTION/ DROPS OPHTHALMIC
Qty: 0 | Refills: 0 | Status: DISCONTINUED | OUTPATIENT
Start: 2018-05-31 | End: 2018-05-31

## 2018-05-31 RX ORDER — KETOCONAZOLE 20 MG/G
1 AEROSOL, FOAM TOPICAL
Qty: 0 | Refills: 0 | Status: DISCONTINUED | OUTPATIENT
Start: 2018-05-31 | End: 2018-05-31

## 2018-05-31 RX ORDER — SODIUM CHLORIDE 9 MG/ML
1000 INJECTION, SOLUTION INTRAVENOUS
Qty: 0 | Refills: 0 | Status: DISCONTINUED | OUTPATIENT
Start: 2018-05-31 | End: 2018-06-07

## 2018-05-31 RX ADMIN — SIMVASTATIN 10 MILLIGRAM(S): 20 TABLET, FILM COATED ORAL at 21:46

## 2018-05-31 RX ADMIN — INSULIN GLARGINE 40 UNIT(S): 100 INJECTION, SOLUTION SUBCUTANEOUS at 22:20

## 2018-05-31 RX ADMIN — DORZOLAMIDE HYDROCHLORIDE 1 DROP(S): 20 SOLUTION/ DROPS OPHTHALMIC at 22:16

## 2018-05-31 RX ADMIN — HEPARIN SODIUM 5000 UNIT(S): 5000 INJECTION INTRAVENOUS; SUBCUTANEOUS at 22:17

## 2018-05-31 RX ADMIN — Medication 75 MILLIGRAM(S): at 21:47

## 2018-05-31 RX ADMIN — Medication 25 MILLIGRAM(S): at 16:25

## 2018-05-31 RX ADMIN — SEVELAMER CARBONATE 800 MILLIGRAM(S): 2400 POWDER, FOR SUSPENSION ORAL at 21:46

## 2018-05-31 RX ADMIN — Medication 25 MILLIGRAM(S): at 00:31

## 2018-05-31 RX ADMIN — ZOLPIDEM TARTRATE 5 MILLIGRAM(S): 10 TABLET ORAL at 22:36

## 2018-05-31 RX ADMIN — Medication 4: at 19:08

## 2018-05-31 RX ADMIN — LATANOPROST 1 DROP(S): 0.05 SOLUTION/ DROPS OPHTHALMIC; TOPICAL at 21:47

## 2018-05-31 NOTE — H&P ADULT - HISTORY OF PRESENT ILLNESS
Patient is 54yo M whom resides at [facility] sent to ED for WBC 17 and AMS r/o sepsis. Patient found to have elevated troponin in ED and admitted for r/o acs. Patient denies any complaints, denies cp ,sob, abd pain, anorexia, fever, vomiting, headache, dysuria, or chills.

## 2018-05-31 NOTE — ED ADULT NURSE REASSESSMENT NOTE - NS ED NURSE REASSESS COMMENT FT1
Patient refusing fall alarm and safety precautions, red socks place and fall arm placed to left arm, patient moved close to nursing station, will continue to monitor patient closely.

## 2018-05-31 NOTE — H&P ADULT - PMH
Anemia, unspecified type    Anxiety disorder, unspecified type    Bilateral ocular hypertension    Bipolar affective disorder, remission status unspecified    Blind    Diabetes    End stage renal disease    HTN (hypertension)    Insomnia, unspecified type    Obesity, unspecified classification, unspecified obesity type, unspecified whether serious comorbidity present    Pain disorder

## 2018-05-31 NOTE — ED PROVIDER NOTE - OBJECTIVE STATEMENT
54 y/o male with pmhx of ESRD on HD MWF, BKA, HTN, DLD, DM presents to ER for r/o sepsis. Patient was sent in by [facility] and rehab center (Dr. Ahumada) for r/o sepsis due to AMS and increased WBC count on bloodwork done by [facility]. Patient was evaluated in ER yesterday for anxiety/agitation, was given vistaril and advised not to give haldol by psychiatry and discharged home. 52 y/o male with pmhx of ESRD on HD MWF, BKA, HTN, DLD, DM presents to ER for r/o sepsis. Patient was sent in by [facility] and rehab center (Dr. Ahumada) for r/o sepsis due to AMS and increased WBC count on bloodwork done by [facility]. Patient was evaluated in ER yesterday for anxiety/agitation, was given vistaril and advised not to give haldol by psychiatry and discharged home. Denies fevers/chills, SOB, chest pain, abdominal pain, n/v/d.

## 2018-05-31 NOTE — H&P ADULT - NSHPLABSRESULTS_GEN_ALL_CORE
9.0    12.97 )-----------( 249      ( 31 May 2018 14:21 )             29.9       138  |  96<L>  |  66<HH>  ----------------------------<  90  5.2<H>   |  29  |  6.5<HH>    Ca    9.0      31 May 2018 14:21    TPro  6.5  /  Alb  3.3<L>  /  TBili  0.5  /  DBili  0.3<H>  /  AST  29  /  ALT  164<H>  /  AlkPhos  152<H>    Urinalysis Basic - ( 31 May 2018 16:45 )    Color: Yellow / Appearance: Clear / S.015 / pH: x  Gluc: x / Ketone: Negative  / Bili: Negative / Urobili: 0.2 mg/dL   Blood: x / Protein: 100 mg/dL / Nitrite: Negative   Leuk Esterase: Negative / RBC: 1-2 /HPF / WBC 1-2 /HPF   Sq Epi: x / Non Sq Epi: Occasional /HPF / Bacteria: See Note        CARDIAC MARKERS ( 31 May 2018 14:21 )  x     / 0.18 ng/mL / 86 U/L / x     / x          < from: Xray Chest 1 View AP/PA (18 @ 13:57) >    Impression:      No radiographic evidence of acute pulmonary disease.    < from: CT Head No Cont (18 @ 22:27) >    Impression:      No acute intracranial hemorrhage or midline shift.    EKG NSR flattened t waves diffusely slight inversion III and AVF

## 2018-05-31 NOTE — ED PROVIDER NOTE - PROGRESS NOTE DETAILS
Pt with an elevated troponin which might be secondary to his renal status, but is elevated from his previous Troponin levels.  EKG now with flattening not present on previous.  Case D/w Renal (Dr. Wayne) who will follow.  Dr. Genao (Intensivist) rec admission to nonICU tele.  D/w Dr. Bowser from NH who requests pt be admitted to the hospitalist service.

## 2018-05-31 NOTE — H&P ADULT - NSHPPHYSICALEXAM_GEN_ALL_CORE
Vital Signs Last 24 Hrs  T(F): 97.6 (31 May 2018 17:35), Max: 97.6 (31 May 2018 17:35)  HR: 78 (31 May 2018 17:35) (66 - 78)  BP: 165/65 (31 May 2018 17:35) (154/70 - 165/65)  RR: 18 (31 May 2018 17:35) (18 - 18)  SpO2: 98% (31 May 2018 17:35) (97% - 98%)    PHYSICAL EXAM:      Constitutional: alert in NAD obese male sitting up in bed  Eyes: PERRLA, EOM intact  ENMT: no sore throat  Neck: no tenderness  Back: no spinal tenderness  Respiratory: cta b/l  Cardiovascular: s1 s2 rrr  Gastrointestinal: soft nt  nd + bs no rebound or guarding  Genitourinary: no cva tenderness  Extremities: +right AKA left leg swelling  Vascular: no cyanosis   Neurological :no focal deficits  Skin: erythema to lle  Lymph Nodes: no lymphadenopathy  Musculoskeletal: no joint swelling

## 2018-05-31 NOTE — ED PROVIDER NOTE - CARE PLAN
Principal Discharge DX:	Elevated troponin  Secondary Diagnosis:	End stage renal disease Principal Discharge DX:	Elevated troponin  Secondary Diagnosis:	End stage renal disease  Secondary Diagnosis:	Abnormal EKG

## 2018-05-31 NOTE — H&P ADULT - NSHPREVIEWOFSYSTEMS_GEN_ALL_CORE
General:	no fever, weight loss,  chills  Skin: +erythema/venous stasis dermatitis to LLE  Ophthalmologic: no visual changes  ENMT:	no sore throat  Respiratory and Thorax: no cough, wheeze,  sob  Cardiovascular:	no chest pain, palpitations, dizziness  Gastrointestinal:	no nausea, vomiting, diarrhea, abd pain  Genitourinary:	no dysuria, hematuria  Musculoskeletal:	no joint pains  Neurological:	 no speech disturbance, focal weakness, numbness

## 2018-05-31 NOTE — H&P ADULT - PROBLEM SELECTOR PLAN 1
admit to tele  check cardiac enzyme  dvt prophylaxis sq heparin admit to tele  check cardiac enzyme  dvt prophylaxis sq heparin  c/w asa  repeat ekg in am

## 2018-05-31 NOTE — ED ADULT NURSE NOTE - CHIEF COMPLAINT QUOTE
pt states he was sent to ED from [facility] because he has an infection in his blood

## 2018-05-31 NOTE — ED PROVIDER NOTE - PHYSICAL EXAMINATION
CONSTITUTIONAL: Well-developed; well-nourished; in no acute distress.   SKIN: warm, dry  HEAD: Normocephalic; atraumatic.  EYES: no conj injection  ENT: No nasal discharge; airway clear.  CARD: S1, S2 normal; no murmurs, gallops, or rubs. Regular rate and rhythm.   RESP: No wheezes, rales or rhonchi.  ABD: soft ntnd  EXT: R BKA, no signs of infection noted at stump; Normal ROM LLE.    NEURO: Alert, oriented, grossly unremarkable  PSYCH: Cooperative, appropriate.

## 2018-06-01 DIAGNOSIS — Z02.9 ENCOUNTER FOR ADMINISTRATIVE EXAMINATIONS, UNSPECIFIED: ICD-10-CM

## 2018-06-01 LAB
CK MB CFR SERPL CALC: 3.8 NG/ML — SIGNIFICANT CHANGE UP (ref 0.6–6.3)
CK SERPL-CCNC: 70 U/L — SIGNIFICANT CHANGE UP (ref 0–225)
ESTIMATED AVERAGE GLUCOSE: 200 MG/DL — HIGH (ref 68–114)
HBA1C BLD-MCNC: 8.6 % — HIGH (ref 4–5.6)
TROPONIN T SERPL-MCNC: 0.17 NG/ML — CRITICAL HIGH

## 2018-06-01 RX ORDER — ERYTHROPOIETIN 10000 [IU]/ML
10000 INJECTION, SOLUTION INTRAVENOUS; SUBCUTANEOUS
Qty: 0 | Refills: 0 | Status: DISCONTINUED | OUTPATIENT
Start: 2018-06-01 | End: 2018-06-07

## 2018-06-01 RX ORDER — ASPIRIN/CALCIUM CARB/MAGNESIUM 324 MG
81 TABLET ORAL DAILY
Qty: 0 | Refills: 0 | Status: DISCONTINUED | OUTPATIENT
Start: 2018-06-01 | End: 2018-06-07

## 2018-06-01 RX ADMIN — SERTRALINE 75 MILLIGRAM(S): 25 TABLET, FILM COATED ORAL at 14:43

## 2018-06-01 RX ADMIN — BRIMONIDINE TARTRATE 1 DROP(S): 2 SOLUTION/ DROPS OPHTHALMIC at 06:38

## 2018-06-01 RX ADMIN — Medication 1 DROP(S): at 06:38

## 2018-06-01 RX ADMIN — Medication 1 APPLICATION(S): at 18:55

## 2018-06-01 RX ADMIN — DORZOLAMIDE HYDROCHLORIDE 1 DROP(S): 20 SOLUTION/ DROPS OPHTHALMIC at 12:47

## 2018-06-01 RX ADMIN — Medication 100 MILLIGRAM(S): at 06:37

## 2018-06-01 RX ADMIN — SEVELAMER CARBONATE 800 MILLIGRAM(S): 2400 POWDER, FOR SUSPENSION ORAL at 12:47

## 2018-06-01 RX ADMIN — Medication 2 MILLIGRAM(S): at 02:03

## 2018-06-01 RX ADMIN — Medication 100 MILLIGRAM(S): at 06:55

## 2018-06-01 RX ADMIN — Medication 1 DROP(S): at 18:52

## 2018-06-01 RX ADMIN — HEPARIN SODIUM 5000 UNIT(S): 5000 INJECTION INTRAVENOUS; SUBCUTANEOUS at 06:39

## 2018-06-01 RX ADMIN — ERYTHROPOIETIN 10000 UNIT(S): 10000 INJECTION, SOLUTION INTRAVENOUS; SUBCUTANEOUS at 11:19

## 2018-06-01 RX ADMIN — AMLODIPINE BESYLATE 5 MILLIGRAM(S): 2.5 TABLET ORAL at 06:36

## 2018-06-01 RX ADMIN — Medication 25 MILLIGRAM(S): at 18:51

## 2018-06-01 RX ADMIN — Medication 1 APPLICATION(S): at 06:37

## 2018-06-01 RX ADMIN — Medication 80 MILLIGRAM(S): at 06:37

## 2018-06-01 RX ADMIN — Medication 20 MILLIGRAM(S): at 06:37

## 2018-06-01 RX ADMIN — BRIMONIDINE TARTRATE 1 DROP(S): 2 SOLUTION/ DROPS OPHTHALMIC at 18:54

## 2018-06-01 RX ADMIN — Medication 75 MILLIGRAM(S): at 21:12

## 2018-06-01 RX ADMIN — Medication 50 MILLIGRAM(S): at 21:13

## 2018-06-01 RX ADMIN — Medication 80 MILLIGRAM(S): at 18:55

## 2018-06-01 RX ADMIN — DORZOLAMIDE HYDROCHLORIDE 1 DROP(S): 20 SOLUTION/ DROPS OPHTHALMIC at 21:15

## 2018-06-01 RX ADMIN — OXYCODONE AND ACETAMINOPHEN 1 TABLET(S): 5; 325 TABLET ORAL at 22:00

## 2018-06-01 RX ADMIN — ZOLPIDEM TARTRATE 5 MILLIGRAM(S): 10 TABLET ORAL at 21:13

## 2018-06-01 RX ADMIN — Medication 25 MILLIGRAM(S): at 06:37

## 2018-06-01 RX ADMIN — INSULIN GLARGINE 40 UNIT(S): 100 INJECTION, SOLUTION SUBCUTANEOUS at 21:36

## 2018-06-01 RX ADMIN — OXYCODONE AND ACETAMINOPHEN 1 TABLET(S): 5; 325 TABLET ORAL at 21:13

## 2018-06-01 RX ADMIN — Medication 1 DROP(S): at 06:39

## 2018-06-01 RX ADMIN — SIMVASTATIN 10 MILLIGRAM(S): 20 TABLET, FILM COATED ORAL at 21:12

## 2018-06-01 RX ADMIN — LATANOPROST 1 DROP(S): 0.05 SOLUTION/ DROPS OPHTHALMIC; TOPICAL at 21:15

## 2018-06-01 RX ADMIN — Medication 100 MILLIGRAM(S): at 18:55

## 2018-06-01 RX ADMIN — Medication 250 MILLIGRAM(S): at 14:44

## 2018-06-01 RX ADMIN — SEVELAMER CARBONATE 800 MILLIGRAM(S): 2400 POWDER, FOR SUSPENSION ORAL at 06:37

## 2018-06-01 RX ADMIN — HEPARIN SODIUM 5000 UNIT(S): 5000 INJECTION INTRAVENOUS; SUBCUTANEOUS at 21:15

## 2018-06-01 RX ADMIN — SEVELAMER CARBONATE 800 MILLIGRAM(S): 2400 POWDER, FOR SUSPENSION ORAL at 21:13

## 2018-06-01 NOTE — PROGRESS NOTE ADULT - SUBJECTIVE AND OBJECTIVE BOX
Patient reports that although he makes urine at home (he is on dialysis), he hasn't urinated in the hospital and he is very uncomfortable. He is requesting a singh be placed. Will order one

## 2018-06-01 NOTE — PROGRESS NOTE ADULT - ASSESSMENT
1. Elevated Troponin: likely due to ESRD  No chest pain, Troponin is trending down.   Continue ASA, discontinue Telemetry.     2. Leukocytosis:   no signs of infection, it seems chronic.   CXR no acute infiltrates.   Pending Blood culture.     3. ESRD:   Continue with HD via left AVF  Nephrology follow up    4. HTN:   continue Norvasc, Lasix and Enalapril.      5. DM type2:   Continue Lantus and HISS.     6. Chronic normocytic anemia:   likely due to CKD.     7. Bipolar disease:   Continue with Zoloft.      DVT PPX: Heparin SC. 52 yo male with PMH of ESRD on HD, HTN, DM, Bipolar disease was sent from [facility] for leukocytosis, WBC 17k, In the ED his vital signs were stable, labs showed WBC 12K, CXR was normal, UA negative, he has no acute complaints, troponin was elevated.   Patient was admitted to rule out sepsis and ACS.     1. Elevated Troponin: likely due to ESRD  No chest pain, Troponin is trending down.   Continue ASA, discontinue Telemetry.     2. Leukocytosis:   no signs of infection, it seems chronic.   CXR no acute infiltrates.   Pending Blood culture.     3. ESRD:   Continue with HD via left AVF  Nephrology follow up    4. HTN:   continue Norvasc, Lasix and Enalapril.      5. DM type2:   Continue Lantus and HISS.     6. Chronic normocytic anemia:   likely due to CKD.     7. Bipolar disease:   Continue with Zoloft.    Lyrica dose decreased from 100mg BID to 50mg daily adjusted for ESRD.     DVT PPX: Heparin SC.

## 2018-06-01 NOTE — PROGRESS NOTE ADULT - SUBJECTIVE AND OBJECTIVE BOX
Patient was examined during HD treatment.    Hemodialysis Prescription:  	Access: AVF  	Dialyzer: Opti 160  	Blood Flow (mL/Min): 400  	Dialysate Flow (mL/Min): 500  	Target UF (Liters): 3.5  	Treatment Time: 3 hours  	Potassium: 2/1K  	Calcium: 2.5

## 2018-06-02 DIAGNOSIS — F51.02 ADJUSTMENT INSOMNIA: ICD-10-CM

## 2018-06-02 DIAGNOSIS — F32.9 MAJOR DEPRESSIVE DISORDER, SINGLE EPISODE, UNSPECIFIED: ICD-10-CM

## 2018-06-02 DIAGNOSIS — F41.9 ANXIETY DISORDER, UNSPECIFIED: ICD-10-CM

## 2018-06-02 LAB
ANION GAP SERPL CALC-SCNC: 15 MMOL/L — HIGH (ref 7–14)
BUN SERPL-MCNC: 62 MG/DL — CRITICAL HIGH (ref 10–20)
CALCIUM SERPL-MCNC: 8.6 MG/DL — SIGNIFICANT CHANGE UP (ref 8.5–10.1)
CHLORIDE SERPL-SCNC: 101 MMOL/L — SIGNIFICANT CHANGE UP (ref 98–110)
CO2 SERPL-SCNC: 30 MMOL/L — SIGNIFICANT CHANGE UP (ref 17–32)
CREAT SERPL-MCNC: 5.9 MG/DL — CRITICAL HIGH (ref 0.7–1.5)
GLUCOSE SERPL-MCNC: 68 MG/DL — LOW (ref 70–99)
HCT VFR BLD CALC: 32.7 % — LOW (ref 42–52)
HGB BLD-MCNC: 9.5 G/DL — LOW (ref 14–18)
MCHC RBC-ENTMCNC: 24.6 PG — LOW (ref 27–31)
MCHC RBC-ENTMCNC: 29.1 G/DL — LOW (ref 32–37)
MCV RBC AUTO: 84.7 FL — SIGNIFICANT CHANGE UP (ref 80–94)
NRBC # BLD: 0 /100 WBCS — SIGNIFICANT CHANGE UP (ref 0–0)
PLATELET # BLD AUTO: 309 K/UL — SIGNIFICANT CHANGE UP (ref 130–400)
POTASSIUM SERPL-MCNC: 4.9 MMOL/L — SIGNIFICANT CHANGE UP (ref 3.5–5)
POTASSIUM SERPL-SCNC: 4.9 MMOL/L — SIGNIFICANT CHANGE UP (ref 3.5–5)
RBC # BLD: 3.86 M/UL — LOW (ref 4.7–6.1)
RBC # FLD: 19.9 % — HIGH (ref 11.5–14.5)
SODIUM SERPL-SCNC: 146 MMOL/L — SIGNIFICANT CHANGE UP (ref 135–146)
WBC # BLD: 12.73 K/UL — HIGH (ref 4.8–10.8)
WBC # FLD AUTO: 12.73 K/UL — HIGH (ref 4.8–10.8)

## 2018-06-02 RX ORDER — HYDROXYZINE HCL 10 MG
25 TABLET ORAL ONCE
Qty: 0 | Refills: 0 | Status: COMPLETED | OUTPATIENT
Start: 2018-06-02 | End: 2018-06-02

## 2018-06-02 RX ORDER — TRAZODONE HCL 50 MG
100 TABLET ORAL AT BEDTIME
Qty: 0 | Refills: 0 | Status: DISCONTINUED | OUTPATIENT
Start: 2018-06-02 | End: 2018-06-07

## 2018-06-02 RX ORDER — TAMSULOSIN HYDROCHLORIDE 0.4 MG/1
0.4 CAPSULE ORAL AT BEDTIME
Qty: 0 | Refills: 0 | Status: DISCONTINUED | OUTPATIENT
Start: 2018-06-02 | End: 2018-06-07

## 2018-06-02 RX ORDER — HYDROXYZINE HCL 10 MG
25 TABLET ORAL EVERY 6 HOURS
Qty: 0 | Refills: 0 | Status: DISCONTINUED | OUTPATIENT
Start: 2018-06-02 | End: 2018-06-07

## 2018-06-02 RX ORDER — SERTRALINE 25 MG/1
100 TABLET, FILM COATED ORAL DAILY
Qty: 0 | Refills: 0 | Status: DISCONTINUED | OUTPATIENT
Start: 2018-06-02 | End: 2018-06-07

## 2018-06-02 RX ORDER — HYDROXYZINE HCL 10 MG
25 TABLET ORAL EVERY 6 HOURS
Qty: 0 | Refills: 0 | Status: DISCONTINUED | OUTPATIENT
Start: 2018-06-02 | End: 2018-06-02

## 2018-06-02 RX ADMIN — DORZOLAMIDE HYDROCHLORIDE 1 DROP(S): 20 SOLUTION/ DROPS OPHTHALMIC at 12:31

## 2018-06-02 RX ADMIN — Medication 4: at 17:27

## 2018-06-02 RX ADMIN — Medication 1 DROP(S): at 05:55

## 2018-06-02 RX ADMIN — Medication 250 MILLIGRAM(S): at 12:31

## 2018-06-02 RX ADMIN — Medication 80 MILLIGRAM(S): at 17:30

## 2018-06-02 RX ADMIN — DORZOLAMIDE HYDROCHLORIDE 1 DROP(S): 20 SOLUTION/ DROPS OPHTHALMIC at 23:52

## 2018-06-02 RX ADMIN — HEPARIN SODIUM 5000 UNIT(S): 5000 INJECTION INTRAVENOUS; SUBCUTANEOUS at 05:47

## 2018-06-02 RX ADMIN — Medication 1 APPLICATION(S): at 17:29

## 2018-06-02 RX ADMIN — SEVELAMER CARBONATE 800 MILLIGRAM(S): 2400 POWDER, FOR SUSPENSION ORAL at 17:28

## 2018-06-02 RX ADMIN — SEVELAMER CARBONATE 800 MILLIGRAM(S): 2400 POWDER, FOR SUSPENSION ORAL at 23:55

## 2018-06-02 RX ADMIN — Medication 25 MILLIGRAM(S): at 05:50

## 2018-06-02 RX ADMIN — SERTRALINE 75 MILLIGRAM(S): 25 TABLET, FILM COATED ORAL at 12:33

## 2018-06-02 RX ADMIN — BRIMONIDINE TARTRATE 1 DROP(S): 2 SOLUTION/ DROPS OPHTHALMIC at 05:56

## 2018-06-02 RX ADMIN — Medication 25 MILLIGRAM(S): at 19:11

## 2018-06-02 RX ADMIN — Medication 25 MILLIGRAM(S): at 05:47

## 2018-06-02 RX ADMIN — Medication 25 MILLIGRAM(S): at 17:31

## 2018-06-02 RX ADMIN — TAMSULOSIN HYDROCHLORIDE 0.4 MILLIGRAM(S): 0.4 CAPSULE ORAL at 23:55

## 2018-06-02 RX ADMIN — Medication 1 DROP(S): at 17:32

## 2018-06-02 RX ADMIN — SIMVASTATIN 10 MILLIGRAM(S): 20 TABLET, FILM COATED ORAL at 23:55

## 2018-06-02 RX ADMIN — Medication 80 MILLIGRAM(S): at 05:50

## 2018-06-02 RX ADMIN — Medication 81 MILLIGRAM(S): at 17:28

## 2018-06-02 RX ADMIN — AMLODIPINE BESYLATE 5 MILLIGRAM(S): 2.5 TABLET ORAL at 05:46

## 2018-06-02 RX ADMIN — ZOLPIDEM TARTRATE 5 MILLIGRAM(S): 10 TABLET ORAL at 23:58

## 2018-06-02 RX ADMIN — Medication 1 MILLIGRAM(S): at 20:29

## 2018-06-02 RX ADMIN — OXYCODONE AND ACETAMINOPHEN 1 TABLET(S): 5; 325 TABLET ORAL at 17:26

## 2018-06-02 RX ADMIN — LATANOPROST 1 DROP(S): 0.05 SOLUTION/ DROPS OPHTHALMIC; TOPICAL at 23:55

## 2018-06-02 RX ADMIN — Medication 1 DROP(S): at 06:12

## 2018-06-02 RX ADMIN — Medication 1 DROP(S): at 19:11

## 2018-06-02 RX ADMIN — HEPARIN SODIUM 5000 UNIT(S): 5000 INJECTION INTRAVENOUS; SUBCUTANEOUS at 23:53

## 2018-06-02 RX ADMIN — SEVELAMER CARBONATE 800 MILLIGRAM(S): 2400 POWDER, FOR SUSPENSION ORAL at 05:47

## 2018-06-02 RX ADMIN — Medication 1 APPLICATION(S): at 17:30

## 2018-06-02 RX ADMIN — Medication 100 MILLIGRAM(S): at 23:56

## 2018-06-02 RX ADMIN — Medication 50 MILLIGRAM(S): at 12:30

## 2018-06-02 RX ADMIN — INSULIN GLARGINE 40 UNIT(S): 100 INJECTION, SOLUTION SUBCUTANEOUS at 23:53

## 2018-06-02 RX ADMIN — Medication 100 MILLIGRAM(S): at 17:30

## 2018-06-02 RX ADMIN — HEPARIN SODIUM 5000 UNIT(S): 5000 INJECTION INTRAVENOUS; SUBCUTANEOUS at 17:28

## 2018-06-02 RX ADMIN — Medication 100 MILLIGRAM(S): at 05:53

## 2018-06-02 RX ADMIN — BRIMONIDINE TARTRATE 1 DROP(S): 2 SOLUTION/ DROPS OPHTHALMIC at 17:31

## 2018-06-02 NOTE — PROGRESS NOTE ADULT - ASSESSMENT
1. ESRD on HD MWF HD monday: 3 hours, opti 160 dialyzer, 2K bath, 4L UF.  2. Anemia. EPO 10,000 units IV with HD.  3. Hyperphosphatemia. Renal diet. Sevelamer with meals.  4. Hyperkalemia. Resolved with HD.

## 2018-06-02 NOTE — PROGRESS NOTE ADULT - ASSESSMENT
54 yo male with PMH of ESRD on HD, HTN, DM, Bipolar disease was sent from [facility] for leukocytosis, WBC 17k, In the ED his vital signs were stable, labs showed WBC 12K, CXR was normal, UA negative, he has no acute complaints, troponin was elevated.   Patient was admitted to rule out sepsis and ACS.     1. Elevated Troponin: likely due to ESRD  No chest pain, Troponin is trending down.   Continue ASA, discontinue Telemetry.     2. Leukocytosis:   no signs of infection, it seems chronic.   CXR no acute infiltrates.   Pending Blood culture.     3. ESRD:   Continue with HD via left AVF  Nephrology follow up    4. Urinary Retention:   UA no UTI, order bladder US.   Start on Tamsulosin 0.4mg daily.     5. HTN:   continue Norvasc, Lasix and Enalapril.      6. DM type2:   Continue Lantus and HISS.     7. Chronic normocytic anemia:   likely due to CKD.     8. Agitation and Psych disease:   as per family, he has no hx of Psych disorder, he became agitated for the last few weeks.   Continue with Zoloft.  Psych consult   Lyrica dose decreased from 100mg BID to 50mg daily adjusted for ESRD.     DVT PPX: Heparin SC.

## 2018-06-02 NOTE — PROGRESS NOTE ADULT - SUBJECTIVE AND OBJECTIVE BOX
IVANANTONYFLORIDA WEN  53y  Male      Patient is a 53y old  Male who presents with a chief complaint of "infection in my blood" x 1day (31 May 2018 17:59)      INTERVAL HPI/OVERNIGHT EVENTS:  He was unable to urinate yesterday, Hunter Placed.     Vital Signs Last 24 Hrs  T(C): 36.7 (2018 05:48), Max: 36.7 (2018 22:00)  T(F): 98 (2018 05:48), Max: 98 (2018 22:00)  HR: 77 (2018 05:48) (71 - 91)  BP: 128/59 (2018 05:48) (128/59 - 181/79)  BP(mean): --  RR: 16 (2018 05:48) (16 - 16)  SpO2: --      18 @ 07:01  -  - @ 07:00  --------------------------------------------------------  IN: 0 mL / OUT: 3500 mL / NET: -3500 mL            Consultant(s) Notes Reviewed:  [x ] YES  [ ] NO          MEDICATIONS  (STANDING):  amLODIPine   Tablet 5 milliGRAM(s) Oral daily  ascorbic acid 250 milliGRAM(s) Oral daily  aspirin enteric coated 81 milliGRAM(s) Oral daily  brimonidine 0.2% Ophthalmic Solution 1 Drop(s) Both EYES two times a day  clotrimazole 1% Cream 1 Application(s) Topical two times a day  dextrose 5%. 1000 milliLiter(s) (50 mL/Hr) IV Continuous <Continuous>  dextrose 50% Injectable 12.5 Gram(s) IV Push once  dextrose 50% Injectable 25 Gram(s) IV Push once  dextrose 50% Injectable 25 Gram(s) IV Push once  docusate sodium 100 milliGRAM(s) Oral two times a day  dorzolamide 2% Ophthalmic Solution 1 Drop(s) Both EYES <User Schedule>  enalapril 20 milliGRAM(s) Oral daily  epoetin keagan Injectable 96937 Unit(s) IV Push <User Schedule>  furosemide    Tablet 80 milliGRAM(s) Oral two times a day  heparin  Injectable 5000 Unit(s) SubCutaneous every 8 hours  hydrocortisone 2.5% Cream 1 Application(s) Topical two times a day  insulin glargine Injectable (LANTUS) 40 Unit(s) SubCutaneous at bedtime  insulin lispro (HumaLOG) corrective regimen sliding scale   SubCutaneous three times a day before meals  insulin lispro (HumaLOG) corrective regimen sliding scale   SubCutaneous at bedtime  ketorolac 0.5% Ophthalmic Solution 1 Drop(s) Right EYE two times a day  latanoprost 0.005% Ophthalmic Solution 1 Drop(s) Left EYE at bedtime  metolazone 5 milliGRAM(s) Oral daily  metoprolol tartrate 25 milliGRAM(s) Oral two times a day  pregabalin 50 milliGRAM(s) Oral daily  sertraline 75 milliGRAM(s) Oral daily  sevelamer hydrochloride 800 milliGRAM(s) Oral three times a day  simvastatin 10 milliGRAM(s) Oral at bedtime  timolol 0.5% Solution 1 Drop(s) Both EYES two times a day  traZODone 75 milliGRAM(s) Oral at bedtime    MEDICATIONS  (PRN):  acetaminophen   Tablet. 650 milliGRAM(s) Oral every 6 hours PRN Mild Pain (1 - 3)  ALBUTerol/ipratropium for Nebulization 3 milliLiter(s) Nebulizer every 6 hours PRN Shortness of Breath and/or Wheezing  dextrose 40% Gel 15 Gram(s) Oral once PRN Blood Glucose LESS THAN 70 milliGRAM(s)/deciliter  glucagon  Injectable 1 milliGRAM(s) IntraMuscular once PRN Glucose LESS THAN 70 milligrams/deciliter  oxyCODONE    5 mG/acetaminophen 325 mG 1 Tablet(s) Oral every 4 hours PRN for moderate pain  zolpidem 5 milliGRAM(s) Oral at bedtime PRN Insomnia  zolpidem 5 milliGRAM(s) Oral at bedtime PRN Insomnia      LABS                          8.4    14.00 )-----------( 245      ( 31 May 2018 21:40 )             27.9     05-    138  |  96<L>  |  74<HH>  ----------------------------<  162<H>  5.8<H>   |  26  |  6.8<HH>    Ca    8.7      31 May 2018 21:40    TPro  6.5  /  Alb  3.3<L>  /  TBili  0.5  /  DBili  0.3<H>  /  AST  29  /  ALT  164<H>  /  AlkPhos  152<H>        Urinalysis Basic - ( 31 May 2018 16:45 )    Color: Yellow / Appearance: Clear / S.015 / pH: x  Gluc: x / Ketone: Negative  / Bili: Negative / Urobili: 0.2 mg/dL   Blood: x / Protein: 100 mg/dL / Nitrite: Negative   Leuk Esterase: Negative / RBC: 1-2 /HPF / WBC 1-2 /HPF   Sq Epi: x / Non Sq Epi: Occasional /HPF / Bacteria: See Note        Lactate Trend   @ 14:21 Lactate:0.6     CARDIAC MARKERS ( 2018 07:22 )  x     / 0.17 ng/mL / 70 U/L / x     / 3.8 ng/mL  CARDIAC MARKERS ( 31 May 2018 21:40 )  x     / 0.15 ng/mL / 80 U/L / x     / 4.1 ng/mL  CARDIAC MARKERS ( 31 May 2018 14:21 )  x     / 0.18 ng/mL / 86 U/L / x     / x          CAPILLARY BLOOD GLUCOSE  126 (2018 18:50)          Culture - Blood (collected 18 @ 14:23)  Source: .Blood Blood  Preliminary Report (18 @ 23:02):    No growth to date.    Culture - Blood (collected 18 @ 14:21)  Source: .Blood Blood  Preliminary Report (18 @ 23:02):    No growth to date.        RADIOLOGY & ADDITIONAL TESTS:    Imaging Personally Reviewed:  [ ] YES  [ ] NO    HEALTH ISSUES - PROBLEM Dx:  Bilateral ocular hypertension: Bilateral ocular hypertension  Anemia, unspecified type: Anemia, unspecified type  Hypertension, unspecified type: Hypertension, unspecified type  Pain disorder: Pain disorder  Bipolar affective disorder, remission status unspecified: Bipolar affective disorder, remission status unspecified  Leukocytosis, unspecified type: Leukocytosis, unspecified type  End stage renal disease: End stage renal disease  Elevated troponin: Elevated troponin        PHYSICAL EXAM:  GENERAL: NAD, well-developed  HEAD:  Atraumatic, Normocephalic  EYES: EOMI, PERRLA, conjunctiva and sclera clear  NECK: Supple, No JVD  CHEST/LUNG: Clear to auscultation bilaterally; No wheeze  HEART: Regular rate and rhythm; No murmurs, rubs, or gallops  ABDOMEN: Soft, Nontender, Nondistended; Bowel sounds present  EXTREMITIES:  LUE AVF, Right BKA

## 2018-06-02 NOTE — PROGRESS NOTE ADULT - SUBJECTIVE AND OBJECTIVE BOX
[place] NEPHROLOGY FOLLOW UP NOTE  --------------------------------------------------------------------------------  24 hour events/subjective: Patient examined. Appears comfortable.    PAST HISTORY  --------------------------------------------------------------------------------  No significant changes to PMH, PSH, FHx, SHx, unless otherwise noted    ALLERGIES & MEDICATIONS  --------------------------------------------------------------------------------  Allergies    No Known Allergies    Standing Inpatient Medications  amLODIPine   Tablet 5 milliGRAM(s) Oral daily  ascorbic acid 250 milliGRAM(s) Oral daily  aspirin enteric coated 81 milliGRAM(s) Oral daily  brimonidine 0.2% Ophthalmic Solution 1 Drop(s) Both EYES two times a day  clotrimazole 1% Cream 1 Application(s) Topical two times a day  dextrose 5%. 1000 milliLiter(s) IV Continuous <Continuous>  dextrose 50% Injectable 12.5 Gram(s) IV Push once  dextrose 50% Injectable 25 Gram(s) IV Push once  dextrose 50% Injectable 25 Gram(s) IV Push once  docusate sodium 100 milliGRAM(s) Oral two times a day  dorzolamide 2% Ophthalmic Solution 1 Drop(s) Both EYES <User Schedule>  enalapril 20 milliGRAM(s) Oral daily  epoetin keagan Injectable 48533 Unit(s) IV Push <User Schedule>  furosemide    Tablet 80 milliGRAM(s) Oral two times a day  heparin  Injectable 5000 Unit(s) SubCutaneous every 8 hours  hydrocortisone 2.5% Cream 1 Application(s) Topical two times a day  insulin glargine Injectable (LANTUS) 40 Unit(s) SubCutaneous at bedtime  insulin lispro (HumaLOG) corrective regimen sliding scale   SubCutaneous three times a day before meals  insulin lispro (HumaLOG) corrective regimen sliding scale   SubCutaneous at bedtime  ketorolac 0.5% Ophthalmic Solution 1 Drop(s) Right EYE two times a day  latanoprost 0.005% Ophthalmic Solution 1 Drop(s) Left EYE at bedtime  metolazone 5 milliGRAM(s) Oral daily  metoprolol tartrate 25 milliGRAM(s) Oral two times a day  pregabalin 50 milliGRAM(s) Oral daily  sertraline 75 milliGRAM(s) Oral daily  sevelamer hydrochloride 800 milliGRAM(s) Oral three times a day  simvastatin 10 milliGRAM(s) Oral at bedtime  tamsulosin 0.4 milliGRAM(s) Oral at bedtime  timolol 0.5% Solution 1 Drop(s) Both EYES two times a day  traZODone 75 milliGRAM(s) Oral at bedtime    PRN Inpatient Medications  acetaminophen   Tablet. 650 milliGRAM(s) Oral every 6 hours PRN  ALBUTerol/ipratropium for Nebulization 3 milliLiter(s) Nebulizer every 6 hours PRN  dextrose 40% Gel 15 Gram(s) Oral once PRN  glucagon  Injectable 1 milliGRAM(s) IntraMuscular once PRN  oxyCODONE    5 mG/acetaminophen 325 mG 1 Tablet(s) Oral every 4 hours PRN  zolpidem 5 milliGRAM(s) Oral at bedtime PRN  zolpidem 5 milliGRAM(s) Oral at bedtime PRN      VITALS/PHYSICAL EXAM  --------------------------------------------------------------------------------  T(C): 36.7 (06-02-18 @ 05:48), Max: 36.7 (06-01-18 @ 22:00)  HR: 77 (06-02-18 @ 05:48) (77 - 88)  BP: 128/59 (06-02-18 @ 05:48) (128/59 - 145/78)  RR: 16 (06-02-18 @ 05:48) (16 - 16)  Height (cm): 182.88 (05-31-18 @ 20:04)    06-01-18 @ 07:01  -  06-02-18 @ 07:00  --------------------------------------------------------  IN: 0 mL / OUT: 3500 mL / NET: -3500 mL    Physical Exam:  	Gen: NAD  	Pulm: CTA B/L  	CV: RRR, S1S2  	Abd: +BS, soft, nontender/nondistended  	: No suprapubic tenderness  	LE: Warm,  no edema  	Vascular access: AVF    LABS/STUDIES  --------------------------------------------------------------------------------              9.5    12.73 >-----------<  309      [06-02-18 @ 07:19]              32.7     146  |  101  |  62  ----------------------------<  68      [06-02-18 @ 07:19]  4.9   |  30  |  5.9        Ca     8.6     [06-02-18 @ 07:19]    Troponin 0.17      [06-01-18 @ 07:22]  CK 70      [06-01-18 @ 07:22]    Creatinine Trend:  SCr 5.9 [06-02 @ 07:19]  SCr 6.8 [05-31 @ 21:40]  SCr 6.5 [05-31 @ 14:21]  SCr 5.5 [05-30 @ 18:23]    Urinalysis - [05-31-18 @ 16:45]      Color Yellow / Appearance Clear / SG 1.015 / pH 7.5      Gluc Negative / Ketone Negative  / Bili Negative / Urobili 0.2       Blood Trace-lysed / Protein 100 / Leuk Est Negative / Nitrite Negative      RBC 1-2 / WBC 1-2 / Hyaline  / Gran  / Sq Epi  / Non Sq Epi Occasional / Bacteria See Note

## 2018-06-03 RX ADMIN — Medication 1 DROP(S): at 06:16

## 2018-06-03 RX ADMIN — SEVELAMER CARBONATE 800 MILLIGRAM(S): 2400 POWDER, FOR SUSPENSION ORAL at 21:28

## 2018-06-03 RX ADMIN — AMLODIPINE BESYLATE 5 MILLIGRAM(S): 2.5 TABLET ORAL at 06:13

## 2018-06-03 RX ADMIN — SIMVASTATIN 10 MILLIGRAM(S): 20 TABLET, FILM COATED ORAL at 21:28

## 2018-06-03 RX ADMIN — Medication 100 MILLIGRAM(S): at 06:13

## 2018-06-03 RX ADMIN — SERTRALINE 100 MILLIGRAM(S): 25 TABLET, FILM COATED ORAL at 11:11

## 2018-06-03 RX ADMIN — Medication 50 MILLIGRAM(S): at 11:17

## 2018-06-03 RX ADMIN — DORZOLAMIDE HYDROCHLORIDE 1 DROP(S): 20 SOLUTION/ DROPS OPHTHALMIC at 21:27

## 2018-06-03 RX ADMIN — Medication 80 MILLIGRAM(S): at 17:39

## 2018-06-03 RX ADMIN — Medication 20 MILLIGRAM(S): at 06:13

## 2018-06-03 RX ADMIN — BRIMONIDINE TARTRATE 1 DROP(S): 2 SOLUTION/ DROPS OPHTHALMIC at 06:22

## 2018-06-03 RX ADMIN — INSULIN GLARGINE 40 UNIT(S): 100 INJECTION, SOLUTION SUBCUTANEOUS at 21:27

## 2018-06-03 RX ADMIN — Medication 25 MILLIGRAM(S): at 17:36

## 2018-06-03 RX ADMIN — SEVELAMER CARBONATE 800 MILLIGRAM(S): 2400 POWDER, FOR SUSPENSION ORAL at 14:09

## 2018-06-03 RX ADMIN — HEPARIN SODIUM 5000 UNIT(S): 5000 INJECTION INTRAVENOUS; SUBCUTANEOUS at 14:09

## 2018-06-03 RX ADMIN — Medication 25 MILLIGRAM(S): at 06:14

## 2018-06-03 RX ADMIN — Medication 1 APPLICATION(S): at 06:18

## 2018-06-03 RX ADMIN — DORZOLAMIDE HYDROCHLORIDE 1 DROP(S): 20 SOLUTION/ DROPS OPHTHALMIC at 14:09

## 2018-06-03 RX ADMIN — HEPARIN SODIUM 5000 UNIT(S): 5000 INJECTION INTRAVENOUS; SUBCUTANEOUS at 21:26

## 2018-06-03 RX ADMIN — Medication 25 MILLIGRAM(S): at 11:10

## 2018-06-03 RX ADMIN — Medication 250 MILLIGRAM(S): at 11:11

## 2018-06-03 RX ADMIN — Medication 81 MILLIGRAM(S): at 14:08

## 2018-06-03 RX ADMIN — Medication 1 DROP(S): at 17:40

## 2018-06-03 RX ADMIN — Medication 1 APPLICATION(S): at 17:37

## 2018-06-03 RX ADMIN — Medication 1 APPLICATION(S): at 06:24

## 2018-06-03 RX ADMIN — SEVELAMER CARBONATE 800 MILLIGRAM(S): 2400 POWDER, FOR SUSPENSION ORAL at 06:15

## 2018-06-03 RX ADMIN — BRIMONIDINE TARTRATE 1 DROP(S): 2 SOLUTION/ DROPS OPHTHALMIC at 17:40

## 2018-06-03 RX ADMIN — OXYCODONE AND ACETAMINOPHEN 1 TABLET(S): 5; 325 TABLET ORAL at 16:05

## 2018-06-03 RX ADMIN — LATANOPROST 1 DROP(S): 0.05 SOLUTION/ DROPS OPHTHALMIC; TOPICAL at 21:28

## 2018-06-03 RX ADMIN — Medication 100 MILLIGRAM(S): at 21:28

## 2018-06-03 RX ADMIN — TAMSULOSIN HYDROCHLORIDE 0.4 MILLIGRAM(S): 0.4 CAPSULE ORAL at 21:28

## 2018-06-03 RX ADMIN — Medication 100 MILLIGRAM(S): at 17:37

## 2018-06-03 RX ADMIN — Medication 25 MILLIGRAM(S): at 17:38

## 2018-06-03 RX ADMIN — Medication 80 MILLIGRAM(S): at 06:22

## 2018-06-03 NOTE — PROGRESS NOTE ADULT - SUBJECTIVE AND OBJECTIVE BOX
HANSEL FLORIDA  53y  Male      Patient is a 53y old  Male who presents with a chief complaint of "infection in my blood" x 1day (31 May 2018 17:59)      INTERVAL HPI/OVERNIGHT EVENTS:  No acute event, patient is quite and pleasant today.     Vital Signs Last 24 Hrs  T(C): 35.6 (03 Jun 2018 05:54), Max: 35.8 (02 Jun 2018 22:09)  T(F): 96.1 (03 Jun 2018 05:54), Max: 96.4 (02 Jun 2018 22:09)  HR: 77 (03 Jun 2018 05:54) (73 - 77)  BP: 181/81 (03 Jun 2018 05:54) (110/52 - 181/81)  BP(mean): --  RR: 18 (03 Jun 2018 05:54) (16 - 18)  SpO2: --            Consultant(s) Notes Reviewed:  [x ] YES  [ ] NO          MEDICATIONS  (STANDING):  amLODIPine   Tablet 5 milliGRAM(s) Oral daily  ascorbic acid 250 milliGRAM(s) Oral daily  aspirin enteric coated 81 milliGRAM(s) Oral daily  brimonidine 0.2% Ophthalmic Solution 1 Drop(s) Both EYES two times a day  clotrimazole 1% Cream 1 Application(s) Topical two times a day  dextrose 5%. 1000 milliLiter(s) (50 mL/Hr) IV Continuous <Continuous>  dextrose 50% Injectable 12.5 Gram(s) IV Push once  dextrose 50% Injectable 25 Gram(s) IV Push once  dextrose 50% Injectable 25 Gram(s) IV Push once  docusate sodium 100 milliGRAM(s) Oral two times a day  dorzolamide 2% Ophthalmic Solution 1 Drop(s) Both EYES <User Schedule>  enalapril 20 milliGRAM(s) Oral daily  epoetin keagan Injectable 31179 Unit(s) IV Push <User Schedule>  furosemide    Tablet 80 milliGRAM(s) Oral two times a day  heparin  Injectable 5000 Unit(s) SubCutaneous every 8 hours  hydrocortisone 2.5% Cream 1 Application(s) Topical two times a day  insulin glargine Injectable (LANTUS) 40 Unit(s) SubCutaneous at bedtime  insulin lispro (HumaLOG) corrective regimen sliding scale   SubCutaneous three times a day before meals  insulin lispro (HumaLOG) corrective regimen sliding scale   SubCutaneous at bedtime  ketorolac 0.5% Ophthalmic Solution 1 Drop(s) Right EYE two times a day  latanoprost 0.005% Ophthalmic Solution 1 Drop(s) Left EYE at bedtime  metolazone 5 milliGRAM(s) Oral daily  metoprolol tartrate 25 milliGRAM(s) Oral two times a day  pregabalin 50 milliGRAM(s) Oral daily  sertraline 100 milliGRAM(s) Oral daily  sevelamer hydrochloride 800 milliGRAM(s) Oral three times a day  simvastatin 10 milliGRAM(s) Oral at bedtime  tamsulosin 0.4 milliGRAM(s) Oral at bedtime  timolol 0.5% Solution 1 Drop(s) Both EYES two times a day  traZODone 100 milliGRAM(s) Oral at bedtime    MEDICATIONS  (PRN):  acetaminophen   Tablet. 650 milliGRAM(s) Oral every 6 hours PRN Mild Pain (1 - 3)  ALBUTerol/ipratropium for Nebulization 3 milliLiter(s) Nebulizer every 6 hours PRN Shortness of Breath and/or Wheezing  dextrose 40% Gel 15 Gram(s) Oral once PRN Blood Glucose LESS THAN 70 milliGRAM(s)/deciliter  glucagon  Injectable 1 milliGRAM(s) IntraMuscular once PRN Glucose LESS THAN 70 milligrams/deciliter  hydrOXYzine hydrochloride 25 milliGRAM(s) Oral every 6 hours PRN Agitation  oxyCODONE    5 mG/acetaminophen 325 mG 1 Tablet(s) Oral every 4 hours PRN for moderate pain  zolpidem 5 milliGRAM(s) Oral at bedtime PRN Insomnia  zolpidem 5 milliGRAM(s) Oral at bedtime PRN Insomnia      LABS                          9.5    12.73 )-----------( 309      ( 02 Jun 2018 07:19 )             32.7     06-02    146  |  101  |  62<HH>  ----------------------------<  68<L>  4.9   |  30  |  5.9<HH>    Ca    8.6      02 Jun 2018 07:19            Lactate Trend  05-31 @ 14:21 Lactate:0.6         CAPILLARY BLOOD GLUCOSE  96 (03 Jun 2018 07:40)          Culture - Blood (collected 05-31-18 @ 14:23)  Source: .Blood Blood  Preliminary Report (06-01-18 @ 23:02):    No growth to date.    Culture - Blood (collected 05-31-18 @ 14:21)  Source: .Blood Blood  Preliminary Report (06-01-18 @ 23:02):    No growth to date.        RADIOLOGY & ADDITIONAL TESTS:    Imaging Personally Reviewed:  [ ] YES  [ ] NO    HEALTH ISSUES - PROBLEM Dx:  Anxiety: Anxiety  Adjustment insomnia: Adjustment insomnia  Depressed mood: Depressed mood  Bilateral ocular hypertension: Bilateral ocular hypertension  Anemia, unspecified type: Anemia, unspecified type  Hypertension, unspecified type: Hypertension, unspecified type  Pain disorder: Pain disorder  Bipolar affective disorder, remission status unspecified: Bipolar affective disorder, remission status unspecified  Leukocytosis, unspecified type: Leukocytosis, unspecified type  End stage renal disease: End stage renal disease  Elevated troponin: Elevated troponin        PHYSICAL EXAM:  GENERAL: NAD, well-developed  HEAD:  Atraumatic, Normocephalic  EYES: EOMI, PERRLA, conjunctiva and sclera clear  NECK: Supple, No JVD  CHEST/LUNG: Clear to auscultation bilaterally; No wheeze  HEART: Regular rate and rhythm; No murmurs, rubs, or gallops  ABDOMEN: Soft, Nontender, Nondistended; Bowel sounds present  EXTREMITIES:  LUE AVF, Right BKA

## 2018-06-03 NOTE — PROGRESS NOTE ADULT - ASSESSMENT
52 yo male with PMH of ESRD on HD, HTN, DM, Bipolar disease was sent from [facility] for leukocytosis, WBC 17k, In the ED his vital signs were stable, labs showed WBC 12K, CXR was normal, UA negative, he has no acute complaints, troponin was elevated.   Patient was admitted to rule out sepsis and ACS.     1. Elevated Troponin: likely due to ESRD  No chest pain, Troponin is trending down.   Continue ASA, discontinue Telemetry.     2. Leukocytosis:   no signs of infection, it seems chronic.   CXR no acute infiltrates.   Blood culture: no growth to date.      3. ESRD:   Continue with HD via left AVF  Nephrology follow up    4. Urinary Retention: improved.   UA no UTI, order bladder US.   Start on Tamsulosin 0.4mg daily.     5. HTN:   continue Norvasc, Lasix and Enalapril.      6. DM type2:   Continue Lantus and HISS.     7. Chronic normocytic anemia:   likely due to CKD. On Epoetin during HD.     8. Agitation and Psych disease:   as per family, he has no hx of Psych disorder, he became agitated for the last few weeks.   Continue with Zoloft.  Psych consult recommended to increase Zoloft to 100mg and Increase Trazadone to 100mg daily, Atarax q 6hrs prn.   Lyrica dose decreased from 100mg BID to 50mg daily adjusted for ESRD.     DVT PPX: Heparin SC.

## 2018-06-03 NOTE — PROGRESS NOTE ADULT - ASSESSMENT
1. ESRD on HD MWF HD tomorrow: 3 hours, opti 160 dialyzer, 2K bath, 4L UF.  2. Anemia. EPO 10,000 units IV with HD.  3. Hyperphosphatemia. Renal diet. Sevelamer with meals.  4. Hyperkalemia. Resolved with HD.  5. HTN. DC amlodipine. Can increase enalapril to BID if needed. 4L UF with HD tomorrow.

## 2018-06-03 NOTE — PROGRESS NOTE ADULT - SUBJECTIVE AND OBJECTIVE BOX
[place] [facility] NEPHROLOGY FOLLOW UP NOTE  --------------------------------------------------------------------------------  24 hour events/subjective: Patient examined. Appears comfortable.    PAST HISTORY  --------------------------------------------------------------------------------  No significant changes to PMH, PSH, FHx, SHx, unless otherwise noted    ALLERGIES & MEDICATIONS  --------------------------------------------------------------------------------  Allergies    No Known Allergies    Standing Inpatient Medications  amLODIPine   Tablet 5 milliGRAM(s) Oral daily  ascorbic acid 250 milliGRAM(s) Oral daily  aspirin enteric coated 81 milliGRAM(s) Oral daily  brimonidine 0.2% Ophthalmic Solution 1 Drop(s) Both EYES two times a day  clotrimazole 1% Cream 1 Application(s) Topical two times a day  dextrose 5%. 1000 milliLiter(s) IV Continuous <Continuous>  dextrose 50% Injectable 12.5 Gram(s) IV Push once  dextrose 50% Injectable 25 Gram(s) IV Push once  dextrose 50% Injectable 25 Gram(s) IV Push once  docusate sodium 100 milliGRAM(s) Oral two times a day  dorzolamide 2% Ophthalmic Solution 1 Drop(s) Both EYES <User Schedule>  enalapril 20 milliGRAM(s) Oral daily  epoetin keagan Injectable 24710 Unit(s) IV Push <User Schedule>  furosemide    Tablet 80 milliGRAM(s) Oral two times a day  heparin  Injectable 5000 Unit(s) SubCutaneous every 8 hours  hydrocortisone 2.5% Cream 1 Application(s) Topical two times a day  insulin glargine Injectable (LANTUS) 40 Unit(s) SubCutaneous at bedtime  insulin lispro (HumaLOG) corrective regimen sliding scale   SubCutaneous three times a day before meals  insulin lispro (HumaLOG) corrective regimen sliding scale   SubCutaneous at bedtime  ketorolac 0.5% Ophthalmic Solution 1 Drop(s) Right EYE two times a day  latanoprost 0.005% Ophthalmic Solution 1 Drop(s) Left EYE at bedtime  metolazone 5 milliGRAM(s) Oral daily  metoprolol tartrate 25 milliGRAM(s) Oral two times a day  pregabalin 50 milliGRAM(s) Oral daily  sertraline 100 milliGRAM(s) Oral daily  sevelamer hydrochloride 800 milliGRAM(s) Oral three times a day  simvastatin 10 milliGRAM(s) Oral at bedtime  tamsulosin 0.4 milliGRAM(s) Oral at bedtime  timolol 0.5% Solution 1 Drop(s) Both EYES two times a day  traZODone 100 milliGRAM(s) Oral at bedtime    PRN Inpatient Medications  acetaminophen   Tablet. 650 milliGRAM(s) Oral every 6 hours PRN  ALBUTerol/ipratropium for Nebulization 3 milliLiter(s) Nebulizer every 6 hours PRN  dextrose 40% Gel 15 Gram(s) Oral once PRN  glucagon  Injectable 1 milliGRAM(s) IntraMuscular once PRN  hydrOXYzine hydrochloride 25 milliGRAM(s) Oral every 6 hours PRN  oxyCODONE    5 mG/acetaminophen 325 mG 1 Tablet(s) Oral every 4 hours PRN  zolpidem 5 milliGRAM(s) Oral at bedtime PRN  zolpidem 5 milliGRAM(s) Oral at bedtime PRN      VITALS/PHYSICAL EXAM  --------------------------------------------------------------------------------  T(C): 36.8 (06-03-18 @ 13:49), Max: 36.8 (06-03-18 @ 13:49)  HR: 82 (06-03-18 @ 13:49) (73 - 82)  BP: 154/72 (06-03-18 @ 13:49) (110/52 - 181/81)  RR: 18 (06-03-18 @ 13:49) (16 - 18)    Physical Exam:  	Gen: NAD  	Pulm: CTA B/L  	CV: RRR, S1S2  	Abd: +BS, soft, nontender/nondistended  	: No suprapubic tenderness  	LE: Warm,  edema  	Vascular access: AVF    LABS/STUDIES  --------------------------------------------------------------------------------              9.5    12.73 >-----------<  309      [06-02-18 @ 07:19]              32.7     146  |  101  |  62  ----------------------------<  68      [06-02-18 @ 07:19]  4.9   |  30  |  5.9        Ca     8.6     [06-02-18 @ 07:19]    Creatinine Trend:  SCr 5.9 [06-02 @ 07:19]  SCr 6.8 [05-31 @ 21:40]  SCr 6.5 [05-31 @ 14:21]  SCr 5.5 [05-30 @ 18:23]    Urinalysis - [05-31-18 @ 16:45]      Color Yellow / Appearance Clear / SG 1.015 / pH 7.5      Gluc Negative / Ketone Negative  / Bili Negative / Urobili 0.2       Blood Trace-lysed / Protein 100 / Leuk Est Negative / Nitrite Negative      RBC 1-2 / WBC 1-2 / Hyaline  / Gran  / Sq Epi  / Non Sq Epi Occasional / Bacteria See Note

## 2018-06-04 ENCOUNTER — TRANSCRIPTION ENCOUNTER (OUTPATIENT)
Age: 53
End: 2018-06-04

## 2018-06-04 RX ORDER — TAMSULOSIN HYDROCHLORIDE 0.4 MG/1
1 CAPSULE ORAL
Qty: 0 | Refills: 0 | DISCHARGE
Start: 2018-06-04

## 2018-06-04 RX ORDER — HYDROXYZINE HCL 10 MG
1 TABLET ORAL
Qty: 0 | Refills: 0 | DISCHARGE
Start: 2018-06-04

## 2018-06-04 RX ORDER — TRAZODONE HCL 50 MG
1 TABLET ORAL
Qty: 0 | Refills: 0 | DISCHARGE
Start: 2018-06-04

## 2018-06-04 RX ORDER — TRAZODONE HCL 50 MG
75 TABLET ORAL
Qty: 0 | Refills: 0 | COMMUNITY

## 2018-06-04 RX ORDER — SERTRALINE 25 MG/1
1 TABLET, FILM COATED ORAL
Qty: 0 | Refills: 0 | DISCHARGE
Start: 2018-06-04

## 2018-06-04 RX ORDER — ASPIRIN/CALCIUM CARB/MAGNESIUM 324 MG
1 TABLET ORAL
Qty: 0 | Refills: 0 | DISCHARGE
Start: 2018-06-04

## 2018-06-04 RX ADMIN — Medication 2: at 12:23

## 2018-06-04 RX ADMIN — HEPARIN SODIUM 5000 UNIT(S): 5000 INJECTION INTRAVENOUS; SUBCUTANEOUS at 06:17

## 2018-06-04 RX ADMIN — Medication 1 DROP(S): at 17:35

## 2018-06-04 RX ADMIN — DORZOLAMIDE HYDROCHLORIDE 1 DROP(S): 20 SOLUTION/ DROPS OPHTHALMIC at 22:12

## 2018-06-04 RX ADMIN — AMLODIPINE BESYLATE 5 MILLIGRAM(S): 2.5 TABLET ORAL at 06:17

## 2018-06-04 RX ADMIN — Medication 25 MILLIGRAM(S): at 06:31

## 2018-06-04 RX ADMIN — Medication 25 MILLIGRAM(S): at 17:33

## 2018-06-04 RX ADMIN — SEVELAMER CARBONATE 800 MILLIGRAM(S): 2400 POWDER, FOR SUSPENSION ORAL at 22:12

## 2018-06-04 RX ADMIN — Medication 100 MILLIGRAM(S): at 22:11

## 2018-06-04 RX ADMIN — HEPARIN SODIUM 5000 UNIT(S): 5000 INJECTION INTRAVENOUS; SUBCUTANEOUS at 13:33

## 2018-06-04 RX ADMIN — Medication 80 MILLIGRAM(S): at 06:31

## 2018-06-04 RX ADMIN — Medication 1 DROP(S): at 06:18

## 2018-06-04 RX ADMIN — Medication 81 MILLIGRAM(S): at 12:13

## 2018-06-04 RX ADMIN — Medication 1 APPLICATION(S): at 17:29

## 2018-06-04 RX ADMIN — Medication 1 DROP(S): at 17:33

## 2018-06-04 RX ADMIN — SIMVASTATIN 10 MILLIGRAM(S): 20 TABLET, FILM COATED ORAL at 22:11

## 2018-06-04 RX ADMIN — Medication 80 MILLIGRAM(S): at 17:34

## 2018-06-04 RX ADMIN — Medication 1 APPLICATION(S): at 17:34

## 2018-06-04 RX ADMIN — Medication 250 MILLIGRAM(S): at 12:13

## 2018-06-04 RX ADMIN — LATANOPROST 1 DROP(S): 0.05 SOLUTION/ DROPS OPHTHALMIC; TOPICAL at 22:11

## 2018-06-04 RX ADMIN — SEVELAMER CARBONATE 800 MILLIGRAM(S): 2400 POWDER, FOR SUSPENSION ORAL at 13:33

## 2018-06-04 RX ADMIN — ERYTHROPOIETIN 10000 UNIT(S): 10000 INJECTION, SOLUTION INTRAVENOUS; SUBCUTANEOUS at 10:56

## 2018-06-04 RX ADMIN — SEVELAMER CARBONATE 800 MILLIGRAM(S): 2400 POWDER, FOR SUSPENSION ORAL at 06:17

## 2018-06-04 RX ADMIN — HEPARIN SODIUM 5000 UNIT(S): 5000 INJECTION INTRAVENOUS; SUBCUTANEOUS at 22:10

## 2018-06-04 RX ADMIN — Medication 100 MILLIGRAM(S): at 06:17

## 2018-06-04 RX ADMIN — INSULIN GLARGINE 40 UNIT(S): 100 INJECTION, SOLUTION SUBCUTANEOUS at 22:11

## 2018-06-04 RX ADMIN — Medication 25 MILLIGRAM(S): at 04:41

## 2018-06-04 RX ADMIN — SERTRALINE 100 MILLIGRAM(S): 25 TABLET, FILM COATED ORAL at 12:13

## 2018-06-04 RX ADMIN — BRIMONIDINE TARTRATE 1 DROP(S): 2 SOLUTION/ DROPS OPHTHALMIC at 06:21

## 2018-06-04 RX ADMIN — Medication 25 MILLIGRAM(S): at 12:16

## 2018-06-04 RX ADMIN — Medication 100 MILLIGRAM(S): at 17:33

## 2018-06-04 RX ADMIN — Medication 1 APPLICATION(S): at 06:17

## 2018-06-04 RX ADMIN — BRIMONIDINE TARTRATE 1 DROP(S): 2 SOLUTION/ DROPS OPHTHALMIC at 17:34

## 2018-06-04 RX ADMIN — Medication 6: at 16:54

## 2018-06-04 RX ADMIN — Medication 50 MILLIGRAM(S): at 12:16

## 2018-06-04 RX ADMIN — Medication 2: at 08:39

## 2018-06-04 RX ADMIN — ZOLPIDEM TARTRATE 5 MILLIGRAM(S): 10 TABLET ORAL at 22:30

## 2018-06-04 RX ADMIN — Medication 25 MILLIGRAM(S): at 18:22

## 2018-06-04 RX ADMIN — DORZOLAMIDE HYDROCHLORIDE 1 DROP(S): 20 SOLUTION/ DROPS OPHTHALMIC at 12:14

## 2018-06-04 RX ADMIN — TAMSULOSIN HYDROCHLORIDE 0.4 MILLIGRAM(S): 0.4 CAPSULE ORAL at 22:11

## 2018-06-04 NOTE — DISCHARGE NOTE ADULT - MEDICATION SUMMARY - MEDICATIONS TO TAKE
I will START or STAY ON the medications listed below when I get home from the hospital:    acetaminophen 325 mg oral tablet  -- 2 tab(s) by mouth every 6 hours  -- Indication: For Pain disorder    Percocet 5/325 oral tablet  -- 1 tab(s) by mouth every 4 hours, As Needed - for moderate pain  -- Indication: For Pain disorder    aspirin 81 mg oral delayed release tablet  -- 1 tab(s) by mouth once a day  -- Indication: For ELEVATED TROPONIN END STAGE RENAL DISEASE    enalapril 10 mg oral tablet  -- 2 tab(s) by mouth once a day  -- Indication: For Hypertension, unspecified type    tamsulosin 0.4 mg oral capsule  -- 1 cap(s) by mouth once a day (at bedtime)  -- Indication: For Urinary retention    pregabalin 50 mg oral capsule  -- 1 cap(s) by mouth once a day  -- Indication: For Pain disorder    traZODone 100 mg oral tablet  -- 1 tab(s) by mouth once a day (at bedtime)  -- Indication: For Anxiety    sertraline 100 mg oral tablet  -- 1 tab(s) by mouth once a day  -- Indication: For Depressed mood    HumaLOG 100 units/mL subcutaneous solution  -- 1 application subcutaneous 4 times a day (before meals and at bedtime) sliding scale 2-12 unit  -- Indication: For DM    Lantus 100 units/mL subcutaneous solution  -- 40 unit(s) subcutaneous once a day (at bedtime)  -- Indication: For DM    simvastatin 10 mg oral tablet  -- 1 tab(s) by mouth once a day (at bedtime)  -- Indication: For Elevated troponin    hydrOXYzine hydrochloride 25 mg oral tablet  -- 1 tab(s) by mouth every 6 hours, As needed, Agitation  -- Indication: For Anxiety    zolpidem 12.5 mg oral tablet, extended release  -- 1 tab(s) by mouth once a day (at bedtime)  -- Indication: For Insomnia, unspecified type    Metoprolol Tartrate 25 mg oral tablet  -- 1 tab(s) by mouth 2 times a day  -- Indication: For Hypertension, unspecified type    DuoNeb 0.5 mg-2.5 mg/3 mL inhalation solution  -- 1 application inhaled 3 times a day, As Needed  -- Indication: For SOB    amLODIPine 5 mg oral tablet  -- 1 tab(s) by mouth once a day  -- Indication: For Hypertension, unspecified type    Remedy (Miconazole) 2% topical powder  -- Apply on skin to affected area 2 times a day  -- Indication: For skin care    ketoconazole 2% topical cream  -- Apply on skin to affected area 2 times a day groin, sacrum buttocks  -- Indication: For skin rash    Hydrocortisone 1% In Absorbase  -- Apply on skin to affected area 2 times a day  -- Indication: For Skin rash    metOLazone 5 mg oral tablet  -- 1 tab(s) by mouth once a day  -- Indication: For Hypertension, unspecified type    furosemide 80 mg oral tablet  -- 1 tab(s) by mouth 2 times a day  -- Indication: For Hypertension, unspecified type    Colace 100 mg oral capsule  -- 1 cap(s) by mouth 2 times a day  -- Indication: For constipation    Alphagan P 0.1% ophthalmic solution  -- 1 drop(s) to each affected eye 2 times a day  -- Indication: For Bilateral ocular hypertension    dorzolamide-timolol 2.23%-0.68% ophthalmic solution  -- 1 drop(s) to each affected eye 2 times a day  -- Indication: For Bilateral ocular hypertension    ketorolac 0.4% ophthalmic solution  -- 1 drop(s) in the right eye 2 times a day  -- Indication: For Bilateral ocular hypertension    Lumigan 0.01% ophthalmic solution  -- 1 drop(s) in the left eye once a day (in the evening)  -- Indication: For Bilateral ocular hypertension    Renvela 800 mg oral tablet  -- 1 tab(s) by mouth 3 times a day (with meals)  -- Indication: For End stage renal disease    Nephro-Elsy oral tablet  -- 1 tab(s) by mouth once a day  -- Indication: For End stage renal disease    Vitamin C 250 mg oral tablet  -- 1 tab(s) by mouth once a day  -- Indication: For End stage renal disease I will START or STAY ON the medications listed below when I get home from the hospital:    Percocet 5/325 oral tablet  -- 1 tab(s) by mouth every 4 hours, As Needed - for moderate pain  -- Indication: For Pain disorder    acetaminophen 325 mg oral tablet  -- 2 tab(s) by mouth every 6 hours  -- Indication: For Pain disorder    aspirin 81 mg oral delayed release tablet  -- 1 tab(s) by mouth once a day  -- Indication: For ELEVATED TROPONIN END STAGE RENAL DISEASE    tamsulosin 0.4 mg oral capsule  -- 1 cap(s) by mouth once a day (at bedtime)  -- Indication: For Urinary retention    pregabalin 50 mg oral capsule  -- 1 cap(s) by mouth once a day  -- Indication: For Pain disorder    traZODone 100 mg oral tablet  -- 1 tab(s) by mouth once a day (at bedtime)  -- Indication: For Anxiety    sertraline 100 mg oral tablet  -- 1 tab(s) by mouth once a day  -- Indication: For Depressed mood    Lantus 100 units/mL subcutaneous solution  -- 25 unit(s) subcutaneous once a day (at bedtime)  -- Indication: For Diabetes    HumaLOG 100 units/mL subcutaneous solution  -- 1 application subcutaneous 4 times a day (before meals and at bedtime) sliding scale 2-12 unit  -- Indication: For DM    simvastatin 10 mg oral tablet  -- 1 tab(s) by mouth once a day (at bedtime)  -- Indication: For Elevated troponin    temazepam 15 mg oral capsule  -- 1 cap(s) by mouth once a day (at bedtime), As needed, Insomnia  -- Indication: For Insomnia, unspecified type    hydrOXYzine hydrochloride 25 mg oral tablet  -- 1 tab(s) by mouth every 6 hours, As needed, Agitation  -- Indication: For Anxiety    zolpidem 12.5 mg oral tablet, extended release  -- 1 tab(s) by mouth once a day (at bedtime)  -- Indication: For Insomnia, unspecified type    Metoprolol Tartrate 25 mg oral tablet  -- 1 tab(s) by mouth 2 times a day  -- Indication: For Hypertension, unspecified type    DuoNeb 0.5 mg-2.5 mg/3 mL inhalation solution  -- 1 application inhaled 3 times a day, As Needed  -- Indication: For SOB    amLODIPine 5 mg oral tablet  -- 1 tab(s) by mouth once a day  -- Indication: For Hypertension, unspecified type    Remedy (Miconazole) 2% topical powder  -- Apply on skin to affected area 2 times a day  -- Indication: For skin care    ketoconazole 2% topical cream  -- Apply on skin to affected area 2 times a day groin, sacrum buttocks  -- Indication: For skin rash    Hydrocortisone 1% In Absorbase  -- Apply on skin to affected area 2 times a day  -- Indication: For Skin rash    metOLazone 5 mg oral tablet  -- 1 tab(s) by mouth once a day  -- Indication: For Hypertension, unspecified type    furosemide 80 mg oral tablet  -- 1 tab(s) by mouth 2 times a day  -- Indication: For Hypertension, unspecified type    epoetin keagan 10,000 units/mL preservative-free injectable solution  -- 63480 unit(s) injectable 3 times a week during Dialysis  -- Indication: For Anemia, unspecified type    Colace 100 mg oral capsule  -- 1 cap(s) by mouth 2 times a day  -- Indication: For constipation    Alphagan P 0.1% ophthalmic solution  -- 1 drop(s) to each affected eye 2 times a day  -- Indication: For Bilateral ocular hypertension    dorzolamide-timolol 2.23%-0.68% ophthalmic solution  -- 1 drop(s) to each affected eye 2 times a day  -- Indication: For Bilateral ocular hypertension    ketorolac 0.4% ophthalmic solution  -- 1 drop(s) in the right eye 2 times a day  -- Indication: For Bilateral ocular hypertension    Lumigan 0.01% ophthalmic solution  -- 1 drop(s) in the left eye once a day (in the evening)  -- Indication: For Bilateral ocular hypertension    Renvela 800 mg oral tablet  -- 1 tab(s) by mouth 3 times a day (with meals)  -- Indication: For End stage renal disease    Nephro-Elsy oral tablet  -- 1 tab(s) by mouth once a day  -- Indication: For End stage renal disease    Vitamin C 250 mg oral tablet  -- 1 tab(s) by mouth once a day  -- Indication: For End stage renal disease

## 2018-06-04 NOTE — DISCHARGE NOTE ADULT - HOSPITAL COURSE
52 yo male with PMH of ESRD on HD, HTN, DM, Bipolar disease was sent from [facility] for leukocytosis, WBC 17k, In the ED his vital signs were stable, labs showed WBC 12K, CXR was normal, UA negative, he has no acute complaints, troponin was elevated.   Patient was admitted to rule out sepsis and ACS.     1. Anxiety and Depressed Mood:   Continue with Zoloft.  Psych consult recommended to increase Zoloft to 100mg and Increase Trazadone to 100mg daily, Atarax q 6hrs prn.   Lyrica dose decreased from 100mg BID to 50mg daily adjusted for ESRD.     2. Elevated Troponin: likely due to ESRD  No chest pain, Troponin is trending down.   Continue ASA, discontinue Telemetry.     3. Leukocytosis:   no signs of infection, it seems chronic.   CXR no acute infiltrates.   Blood culture: no growth to date.      4. ESRD:   Continue with HD via left AVF  Nephrology follow up    5. Urinary Retention: improved.   UA no UTI,   Start on Tamsulosin 0.4mg daily.     6. HTN:   continue Norvasc, Metoprolol, Lasix and Enalapril.      7. DM type2:   Continue Lantus and HISS.     8. Chronic normocytic anemia:   likely due to CKD. On Epoetin during HD. 53 year old Male with PMH of ESRD on HD, HTN, DM, Bipolar disease sent in from [facility] with leukocytosis, WBC 17k for further evaluation.  In the ED his vital signs were stable, labs showed WBC 12K, CXR was normal, UA negative, and patient had no complaints. He was however noted to have elevated Troponin of 0.18. Patient was admitted to rule out sepsis and ACS.     Assessment and Plan:  1. Anxiety and Depressed Mood:   Continue with Zoloft.  Psych consult: recommended increasing Zoloft to 100mg and Trazadone to 100mg daily, Atarax q 6hrs prn.   Symptoms improved, agitation improved, one to one discontinued on 6/3/18.     2. Elevated Troponin: likely due to ESRD. ACS ruled out.  No chest pain, Troponin Stable.  Continue ASA, discontinue Telemetry.   EKG: no acute ST-T changes. Patient asymptomatic.   No ECHO or Cardiology consult seen.      3. Leukocytosis: resolved. Sepsis ruled out  WBC 8K, no signs of infection.  CXR no acute infiltrates.   Blood culture: no growth to date.        4. ESRD: with Anemia and Hyperphosphatemia  Continue with HD via left AVF: Mon, Wed, Fri.  Nephrology follow up.  Hyperphosphatemia. Renal diet. Sevelamer with meals.  EPO 10,000 units IV with HD.        5. Urinary Retention: Improved.   UA no UTI,   Continue on Tamsulosin 0.4mg daily.       6. HTN:   Continue Norvasc, Metoprolol, Lasix/Metolazone.        7. DM type2:   Continue Lantus and HISS.   Doses adjusted to prevent Hypoglycemia.  Hemoglobin A1C, Whole Blood: 8.6 % (06.01.18 @ 07:22)      8. Chronic normocytic anemia:   likely due to CKD. On Epoetin during HD.       9. Chronic Pain:   On Percocet  Lyrica dose decreased from 100mg BID to 50mg daily adjusted for ESRD.

## 2018-06-04 NOTE — PROGRESS NOTE ADULT - SUBJECTIVE AND OBJECTIVE BOX
IVANANTONYFLORIDA WEN  53y  Male      Patient is a 53y old  Male who presents with a chief complaint of "infection in my blood" x 1day (31 May 2018 17:59)      INTERVAL HPI/OVERNIGHT EVENTS:  No acute events, patient is quite.     Vital Signs Last 24 Hrs  T(C): 36.8 (04 Jun 2018 06:00), Max: 36.8 (03 Jun 2018 13:49)  T(F): 98.3 (04 Jun 2018 06:00), Max: 98.3 (04 Jun 2018 06:00)  HR: 72 (04 Jun 2018 11:50) (70 - 84)  BP: 146/62 (04 Jun 2018 11:50) (140/63 - 158/68)  BP(mean): --  RR: 16 (04 Jun 2018 11:50) (16 - 18)  SpO2: --      06-03-18 @ 07:01  -  06-04-18 @ 07:00  --------------------------------------------------------  IN: 0 mL / OUT: 5 mL / NET: -5 mL    06-04-18 @ 07:01  -  06-04-18 @ 12:24  --------------------------------------------------------  IN: 0 mL / OUT: 3501 mL / NET: -3501 mL            Consultant(s) Notes Reviewed:  [x ] YES  [ ] NO          MEDICATIONS  (STANDING):  amLODIPine   Tablet 5 milliGRAM(s) Oral daily  ascorbic acid 250 milliGRAM(s) Oral daily  aspirin enteric coated 81 milliGRAM(s) Oral daily  brimonidine 0.2% Ophthalmic Solution 1 Drop(s) Both EYES two times a day  clotrimazole 1% Cream 1 Application(s) Topical two times a day  dextrose 5%. 1000 milliLiter(s) (50 mL/Hr) IV Continuous <Continuous>  dextrose 50% Injectable 12.5 Gram(s) IV Push once  dextrose 50% Injectable 25 Gram(s) IV Push once  dextrose 50% Injectable 25 Gram(s) IV Push once  docusate sodium 100 milliGRAM(s) Oral two times a day  dorzolamide 2% Ophthalmic Solution 1 Drop(s) Both EYES <User Schedule>  epoetin keagan Injectable 60206 Unit(s) IV Push <User Schedule>  furosemide    Tablet 80 milliGRAM(s) Oral two times a day  heparin  Injectable 5000 Unit(s) SubCutaneous every 8 hours  hydrocortisone 2.5% Cream 1 Application(s) Topical two times a day  insulin glargine Injectable (LANTUS) 40 Unit(s) SubCutaneous at bedtime  insulin lispro (HumaLOG) corrective regimen sliding scale   SubCutaneous three times a day before meals  insulin lispro (HumaLOG) corrective regimen sliding scale   SubCutaneous at bedtime  ketorolac 0.5% Ophthalmic Solution 1 Drop(s) Right EYE two times a day  latanoprost 0.005% Ophthalmic Solution 1 Drop(s) Left EYE at bedtime  metolazone 5 milliGRAM(s) Oral daily  metoprolol tartrate 25 milliGRAM(s) Oral two times a day  pregabalin 50 milliGRAM(s) Oral daily  sertraline 100 milliGRAM(s) Oral daily  sevelamer hydrochloride 800 milliGRAM(s) Oral three times a day  simvastatin 10 milliGRAM(s) Oral at bedtime  tamsulosin 0.4 milliGRAM(s) Oral at bedtime  timolol 0.5% Solution 1 Drop(s) Both EYES two times a day  traZODone 100 milliGRAM(s) Oral at bedtime    MEDICATIONS  (PRN):  acetaminophen   Tablet. 650 milliGRAM(s) Oral every 6 hours PRN Mild Pain (1 - 3)  ALBUTerol/ipratropium for Nebulization 3 milliLiter(s) Nebulizer every 6 hours PRN Shortness of Breath and/or Wheezing  dextrose 40% Gel 15 Gram(s) Oral once PRN Blood Glucose LESS THAN 70 milliGRAM(s)/deciliter  glucagon  Injectable 1 milliGRAM(s) IntraMuscular once PRN Glucose LESS THAN 70 milligrams/deciliter  hydrOXYzine hydrochloride 25 milliGRAM(s) Oral every 6 hours PRN Agitation  oxyCODONE    5 mG/acetaminophen 325 mG 1 Tablet(s) Oral every 4 hours PRN for moderate pain  zolpidem 5 milliGRAM(s) Oral at bedtime PRN Insomnia  zolpidem 5 milliGRAM(s) Oral at bedtime PRN Insomnia      LABS                  Lactate Trend  05-31 @ 14:21 Lactate:0.6         CAPILLARY BLOOD GLUCOSE  197 (04 Jun 2018 07:43)          Culture - Blood (collected 05-31-18 @ 14:23)  Source: .Blood Blood  Preliminary Report (06-01-18 @ 23:02):    No growth to date.    Culture - Blood (collected 05-31-18 @ 14:21)  Source: .Blood Blood  Preliminary Report (06-01-18 @ 23:02):    No growth to date.        RADIOLOGY & ADDITIONAL TESTS:    Imaging Personally Reviewed:  [ ] YES  [ ] NO    HEALTH ISSUES - PROBLEM Dx:  Anxiety: Anxiety  Adjustment insomnia: Adjustment insomnia  Depressed mood: Depressed mood  Bilateral ocular hypertension: Bilateral ocular hypertension  Anemia, unspecified type: Anemia, unspecified type  Hypertension, unspecified type: Hypertension, unspecified type  Pain disorder: Pain disorder  Bipolar affective disorder, remission status unspecified: Bipolar affective disorder, remission status unspecified  Leukocytosis, unspecified type: Leukocytosis, unspecified type  End stage renal disease: End stage renal disease  Elevated troponin: Elevated troponin        PHYSICAL EXAM:  GENERAL: NAD, well-developed  HEAD:  Atraumatic, Normocephalic  EYES: EOMI, PERRLA, conjunctiva and sclera clear  NECK: Supple, No JVD  CHEST/LUNG: Clear to auscultation bilaterally; No wheeze  HEART: Regular rate and rhythm; No murmurs, rubs, or gallops  ABDOMEN: Soft, Nontender, Nondistended; Bowel sounds present  EXTREMITIES:  LUE AVF, Right BKA

## 2018-06-04 NOTE — DISCHARGE NOTE ADULT - MEDICATION SUMMARY - MEDICATIONS TO STOP TAKING
I will STOP taking the medications listed below when I get home from the hospital:  None I will STOP taking the medications listed below when I get home from the hospital:    enalapril 10 mg oral tablet  -- 2 tab(s) by mouth once a day

## 2018-06-04 NOTE — DISCHARGE NOTE ADULT - MEDICATION SUMMARY - MEDICATIONS TO CHANGE
I will SWITCH the dose or number of times a day I take the medications listed below when I get home from the hospital:    Lyrica 100 mg oral capsule  -- 1 cap(s) by mouth 2 times a day    Zoloft 25 mg oral tablet  -- 3 tab(s) by mouth once a day    traZODone  -- 75 milligram(s) by mouth once a day (at bedtime)

## 2018-06-04 NOTE — DISCHARGE NOTE ADULT - CARE PLAN
Principal Discharge DX:	Elevated troponin  Goal:	stable, ACS rule dout  Assessment and plan of treatment:	Due to ESRD, continue ASA and Simvastatin  Secondary Diagnosis:	End stage renal disease  Assessment and plan of treatment:	continue HD  Secondary Diagnosis:	Diabetes  Assessment and plan of treatment:	Continue Lantus  Secondary Diagnosis:	Anxiety disorder, unspecified type  Assessment and plan of treatment:	Contiue Trazadon  Secondary Diagnosis:	Depressed mood  Assessment and plan of treatment:	Continue Zoloft  Secondary Diagnosis:	Essential hypertension  Secondary Diagnosis:	Pain disorder  Assessment and plan of treatment:	continue pain meds Principal Discharge DX:	Elevated troponin  Goal:	Stable. ACS ruled out.  Assessment and plan of treatment:	Due to ESRD, continue ASA and Simvastatin  Secondary Diagnosis:	End stage renal disease  Goal:	Continue Hemodialysis.  Assessment and plan of treatment:	Follow up with Nephrologists.  Secondary Diagnosis:	Diabetes  Goal:	Good Glycemic control  Assessment and plan of treatment:	Continue Lantus: Doses decreased form 40 units to 25 units.  Continue low dose sliding scale.  Secondary Diagnosis:	Anxiety disorder, unspecified type  Goal:	Good control of symptoms.  Assessment and plan of treatment:	with Depression.   Continue Atarax, Zoloft & Trazodone.  Secondary Diagnosis:	Essential hypertension  Goal:	Good Glycemic Control  Assessment and plan of treatment:	Continue Lasix, Metolazone, Amlodipine and Metoprolol.  Enalapril on Hold.  Secondary Diagnosis:	Pain disorder  Goal:	Continue Lyrica and Percocet.

## 2018-06-04 NOTE — DISCHARGE NOTE ADULT - SECONDARY DIAGNOSIS.
End stage renal disease Diabetes Anxiety disorder, unspecified type Depressed mood Essential hypertension Pain disorder

## 2018-06-04 NOTE — PROGRESS NOTE ADULT - ASSESSMENT
1. ESRD on HD MWF HD today: 3 hours, opti 160 dialyzer, 2K bath, 4L UF.  2. Anemia. EPO 10,000 units IV with HD.  3. Hyperphosphatemia. Renal diet. Sevelamer with meals.  4. Hyperkalemia. Resolved with HD.  5. HTN. Continue enalapril and metoprolol. 4L UF with HD tomorrow.

## 2018-06-04 NOTE — DISCHARGE NOTE ADULT - PLAN OF CARE
stable, ACS rule dout Due to ESRD, continue ASA and Simvastatin continue HD Continue Lantus Contiue Trazadon Continue Zoloft continue pain meds Stable. ACS ruled out. Continue Hemodialysis. Follow up with Nephrologists. Good Glycemic control Continue Lantus: Doses decreased form 40 units to 25 units.  Continue low dose sliding scale. Good control of symptoms. with Depression.   Continue Atarax, Zoloft & Trazodone. Good Glycemic Control Continue Lasix, Metolazone, Amlodipine and Metoprolol.  Enalapril on Hold. Continue Lyrica and Percocet.

## 2018-06-04 NOTE — DISCHARGE NOTE ADULT - PATIENT PORTAL LINK FT
You can access the [employer] Patient Portal, offered by [facility], by registering with the following website: [email or web address]

## 2018-06-04 NOTE — PROGRESS NOTE ADULT - ASSESSMENT
54 yo male with PMH of ESRD on HD, HTN, DM, Bipolar disease was sent from [facility] for leukocytosis, WBC 17k, In the ED his vital signs were stable, labs showed WBC 12K, CXR was normal, UA negative, he has no acute complaints, troponin was elevated.   Patient was admitted to rule out sepsis and ACS.     1. Agitation : improved.   Continue with Zoloft.  Psych consult recommended to increase Zoloft to 100mg and Increase Trazadone to 100mg daily, Atarax q 6hrs prn.   Lyrica dose decreased from 100mg BID to 50mg daily adjusted for ESRD.     2. Elevated Troponin: likely due to ESRD  No chest pain, Troponin is trending down.   Continue ASA, discontinue Telemetry.     3. Leukocytosis:   no signs of infection, it seems chronic.   CXR no acute infiltrates.   Blood culture: no growth to date.      4. ESRD:   Continue with HD via left AVF  Nephrology follow up    5. Urinary Retention: improved.   UA no UTI, order bladder US.   Start on Tamsulosin 0.4mg daily.     6. HTN:   continue Norvasc, Lasix and Enalapril.      7. DM type2:   Continue Lantus and HISS.     8. Chronic normocytic anemia:   likely due to CKD. On Epoetin during HD.       DVT PPX: Heparin SC.

## 2018-06-04 NOTE — DISCHARGE NOTE ADULT - CARE PROVIDER_API CALL
Damon Mendoza  Phone: (   )    -  Fax: (   )    - Nephrologist,   Phone: (   )    -  Fax: (   )    -    Cardiologist,   Phone: (   )    -  Fax: (   )    -    PMD,   Phone: (   )    -  Fax: (   )    -

## 2018-06-04 NOTE — DISCHARGE NOTE ADULT - OTHER SIGNIFICANT FINDINGS
LABS:                        8.2    8.65  )-----------( 282      ( 05 Jun 2018 07:35 )             27.8     06-05    143  |  100  |  68<HH>  ----------------------------<  73  4.8   |  29  |  7.0<HH>    Ca    8.3<L>      05 Jun 2018 07:35      MICROBIOLOGY:  Culture - Blood (05.31.18 @ 14:23)    Specimen Source: .Blood     Culture Results: No growth at 5 days.    Culture - Blood (05.31.18 @ 14:21)    Specimen Source: .Blood     Culture Results: No growth at 5 days.        RADIOLOGY & ADDITIONAL TESTS:  X-ray Chest 1 View AP/PA (05.31.18 @ 13:57)  No radiographic evidence of acute pulmonary disease.      X-ray Chest 1 View- PORTABLE-Urgent (05.30.18 @ 18:57)   Enlarged/magnified cardiac silhouette.        CT Head No Cont (05.30.18 @ 22:27)   No acute intracranial hemorrhage or midline shift.

## 2018-06-04 NOTE — DISCHARGE NOTE ADULT - PROVIDER TOKENS
FREE:[LAST:[Trusi],FIRST:[Damon],PHONE:[(   )    -],FAX:[(   )    -]] FREE:[LAST:[Nephrologist],PHONE:[(   )    -],FAX:[(   )    -]],FREE:[LAST:[Cardiologist],PHONE:[(   )    -],FAX:[(   )    -]],FREE:[LAST:[PMD],PHONE:[(   )    -],FAX:[(   )    -]]

## 2018-06-04 NOTE — DISCHARGE NOTE ADULT - NS AS DC HF EDUCATION INSTRUCTIONS
Monitor Weight Daily/Report weight gain of 2 or more pounds in one day or 3 or more pounds in one week, worsening shortness of breath, fatigue, weakness, increased swelling of hands and feet to primary care provider/Low salt diet/Activities as tolerated/Call Primary Care Provider for follow-up after discharge

## 2018-06-05 LAB
ANION GAP SERPL CALC-SCNC: 14 MMOL/L — SIGNIFICANT CHANGE UP (ref 7–14)
BUN SERPL-MCNC: 68 MG/DL — CRITICAL HIGH (ref 10–20)
CALCIUM SERPL-MCNC: 8.3 MG/DL — LOW (ref 8.5–10.1)
CHLORIDE SERPL-SCNC: 100 MMOL/L — SIGNIFICANT CHANGE UP (ref 98–110)
CO2 SERPL-SCNC: 29 MMOL/L — SIGNIFICANT CHANGE UP (ref 17–32)
CREAT SERPL-MCNC: 7 MG/DL — CRITICAL HIGH (ref 0.7–1.5)
CULTURE RESULTS: SIGNIFICANT CHANGE UP
CULTURE RESULTS: SIGNIFICANT CHANGE UP
GLUCOSE SERPL-MCNC: 73 MG/DL — SIGNIFICANT CHANGE UP (ref 70–99)
HCT VFR BLD CALC: 27.8 % — LOW (ref 42–52)
HGB BLD-MCNC: 8.2 G/DL — LOW (ref 14–18)
MCHC RBC-ENTMCNC: 24.7 PG — LOW (ref 27–31)
MCHC RBC-ENTMCNC: 29.5 G/DL — LOW (ref 32–37)
MCV RBC AUTO: 83.7 FL — SIGNIFICANT CHANGE UP (ref 80–94)
NRBC # BLD: 0 /100 WBCS — SIGNIFICANT CHANGE UP (ref 0–0)
PLATELET # BLD AUTO: 282 K/UL — SIGNIFICANT CHANGE UP (ref 130–400)
POTASSIUM SERPL-MCNC: 4.8 MMOL/L — SIGNIFICANT CHANGE UP (ref 3.5–5)
POTASSIUM SERPL-SCNC: 4.8 MMOL/L — SIGNIFICANT CHANGE UP (ref 3.5–5)
RBC # BLD: 3.32 M/UL — LOW (ref 4.7–6.1)
RBC # FLD: 19.1 % — HIGH (ref 11.5–14.5)
SODIUM SERPL-SCNC: 143 MMOL/L — SIGNIFICANT CHANGE UP (ref 135–146)
SPECIMEN SOURCE: SIGNIFICANT CHANGE UP
SPECIMEN SOURCE: SIGNIFICANT CHANGE UP
WBC # BLD: 8.65 K/UL — SIGNIFICANT CHANGE UP (ref 4.8–10.8)
WBC # FLD AUTO: 8.65 K/UL — SIGNIFICANT CHANGE UP (ref 4.8–10.8)

## 2018-06-05 RX ORDER — MORPHINE SULFATE 50 MG/1
4 CAPSULE, EXTENDED RELEASE ORAL ONCE
Qty: 0 | Refills: 0 | Status: DISCONTINUED | OUTPATIENT
Start: 2018-06-05 | End: 2018-06-05

## 2018-06-05 RX ORDER — INSULIN GLARGINE 100 [IU]/ML
30 INJECTION, SOLUTION SUBCUTANEOUS ONCE
Qty: 0 | Refills: 0 | Status: COMPLETED | OUTPATIENT
Start: 2018-06-05 | End: 2018-06-05

## 2018-06-05 RX ORDER — TEMAZEPAM 15 MG/1
15 CAPSULE ORAL AT BEDTIME
Qty: 0 | Refills: 0 | Status: DISCONTINUED | OUTPATIENT
Start: 2018-06-05 | End: 2018-06-07

## 2018-06-05 RX ADMIN — Medication 250 MILLIGRAM(S): at 11:18

## 2018-06-05 RX ADMIN — SEVELAMER CARBONATE 800 MILLIGRAM(S): 2400 POWDER, FOR SUSPENSION ORAL at 22:15

## 2018-06-05 RX ADMIN — Medication 1 DROP(S): at 06:52

## 2018-06-05 RX ADMIN — MORPHINE SULFATE 4 MILLIGRAM(S): 50 CAPSULE, EXTENDED RELEASE ORAL at 02:37

## 2018-06-05 RX ADMIN — BRIMONIDINE TARTRATE 1 DROP(S): 2 SOLUTION/ DROPS OPHTHALMIC at 17:37

## 2018-06-05 RX ADMIN — Medication 81 MILLIGRAM(S): at 11:19

## 2018-06-05 RX ADMIN — INSULIN GLARGINE 30 UNIT(S): 100 INJECTION, SOLUTION SUBCUTANEOUS at 22:58

## 2018-06-05 RX ADMIN — LATANOPROST 1 DROP(S): 0.05 SOLUTION/ DROPS OPHTHALMIC; TOPICAL at 22:16

## 2018-06-05 RX ADMIN — Medication 1 APPLICATION(S): at 06:52

## 2018-06-05 RX ADMIN — Medication 1 DROP(S): at 06:53

## 2018-06-05 RX ADMIN — BRIMONIDINE TARTRATE 1 DROP(S): 2 SOLUTION/ DROPS OPHTHALMIC at 06:52

## 2018-06-05 RX ADMIN — Medication 25 MILLIGRAM(S): at 17:38

## 2018-06-05 RX ADMIN — Medication 1 DROP(S): at 17:38

## 2018-06-05 RX ADMIN — AMLODIPINE BESYLATE 5 MILLIGRAM(S): 2.5 TABLET ORAL at 06:48

## 2018-06-05 RX ADMIN — SIMVASTATIN 10 MILLIGRAM(S): 20 TABLET, FILM COATED ORAL at 22:15

## 2018-06-05 RX ADMIN — Medication 100 MILLIGRAM(S): at 22:15

## 2018-06-05 RX ADMIN — DORZOLAMIDE HYDROCHLORIDE 1 DROP(S): 20 SOLUTION/ DROPS OPHTHALMIC at 11:19

## 2018-06-05 RX ADMIN — Medication 80 MILLIGRAM(S): at 17:39

## 2018-06-05 RX ADMIN — Medication 100 MILLIGRAM(S): at 17:38

## 2018-06-05 RX ADMIN — HEPARIN SODIUM 5000 UNIT(S): 5000 INJECTION INTRAVENOUS; SUBCUTANEOUS at 22:15

## 2018-06-05 RX ADMIN — Medication 50 MILLIGRAM(S): at 11:17

## 2018-06-05 RX ADMIN — Medication 1 DROP(S): at 17:37

## 2018-06-05 RX ADMIN — OXYCODONE AND ACETAMINOPHEN 1 TABLET(S): 5; 325 TABLET ORAL at 11:18

## 2018-06-05 RX ADMIN — DORZOLAMIDE HYDROCHLORIDE 1 DROP(S): 20 SOLUTION/ DROPS OPHTHALMIC at 22:13

## 2018-06-05 RX ADMIN — OXYCODONE AND ACETAMINOPHEN 1 TABLET(S): 5; 325 TABLET ORAL at 12:10

## 2018-06-05 RX ADMIN — SERTRALINE 100 MILLIGRAM(S): 25 TABLET, FILM COATED ORAL at 11:19

## 2018-06-05 RX ADMIN — Medication 80 MILLIGRAM(S): at 06:48

## 2018-06-05 RX ADMIN — Medication 1 APPLICATION(S): at 17:37

## 2018-06-05 RX ADMIN — SEVELAMER CARBONATE 800 MILLIGRAM(S): 2400 POWDER, FOR SUSPENSION ORAL at 06:48

## 2018-06-05 RX ADMIN — HEPARIN SODIUM 5000 UNIT(S): 5000 INJECTION INTRAVENOUS; SUBCUTANEOUS at 15:00

## 2018-06-05 RX ADMIN — TEMAZEPAM 15 MILLIGRAM(S): 15 CAPSULE ORAL at 22:25

## 2018-06-05 RX ADMIN — MORPHINE SULFATE 4 MILLIGRAM(S): 50 CAPSULE, EXTENDED RELEASE ORAL at 02:50

## 2018-06-05 RX ADMIN — TAMSULOSIN HYDROCHLORIDE 0.4 MILLIGRAM(S): 0.4 CAPSULE ORAL at 22:15

## 2018-06-05 RX ADMIN — Medication 1 APPLICATION(S): at 17:38

## 2018-06-05 RX ADMIN — Medication 100 MILLIGRAM(S): at 06:48

## 2018-06-05 RX ADMIN — Medication 25 MILLIGRAM(S): at 00:20

## 2018-06-05 RX ADMIN — SEVELAMER CARBONATE 800 MILLIGRAM(S): 2400 POWDER, FOR SUSPENSION ORAL at 15:00

## 2018-06-05 RX ADMIN — ZOLPIDEM TARTRATE 5 MILLIGRAM(S): 10 TABLET ORAL at 00:21

## 2018-06-05 RX ADMIN — Medication 25 MILLIGRAM(S): at 06:48

## 2018-06-05 RX ADMIN — HEPARIN SODIUM 5000 UNIT(S): 5000 INJECTION INTRAVENOUS; SUBCUTANEOUS at 06:49

## 2018-06-05 NOTE — CONSULT NOTE ADULT - SUBJECTIVE AND OBJECTIVE BOX
HPI:  Patient is 52yo M whom resident  at [facility] sent to ED for WBC 17 and AMS r/o sepsis. Patient found to have elevated troponin in ED and admitted for r/o acs. Patient denies any complaints, denies cp ,sob, abd pain, anorexia, fever, vomiting, headache, dysuria, or chills.     PTN  REFERRED TO ACUTE  REHAB  FOR  EVAL AND  TX   PAST MEDICAL & SURGICAL HISTORY:  Bilateral ocular hypertension  Insomnia, unspecified type  Bipolar affective disorder, remission status unspecified  Obesity, unspecified classification, unspecified obesity type, unspecified whether serious comorbidity present  Anxiety disorder, unspecified type  Pain disorder  Anemia, unspecified type  Blind  HTN (hypertension)  End stage renal disease  Diabetes  Status post glaucoma surgery: RIGHT EYE  S/P BKA (below knee amputation) unilateral: RIGHT  A-V fistula: LEFT ARM      Hospital Course:    TODAY'S SUBJECTIVE & REVIEW OF SYMPTOMS:     Constitutional WNL   Cardio WNL   Resp WNL   GI WNL  Heme WNL  Endo WNL  Skin WNL  MSK WNL  Neuro WNL  Cognitive WNL  Psych WNL      MEDICATIONS  (STANDING):  amLODIPine   Tablet 5 milliGRAM(s) Oral daily  ascorbic acid 250 milliGRAM(s) Oral daily  aspirin enteric coated 81 milliGRAM(s) Oral daily  brimonidine 0.2% Ophthalmic Solution 1 Drop(s) Both EYES two times a day  clotrimazole 1% Cream 1 Application(s) Topical two times a day  dextrose 5%. 1000 milliLiter(s) (50 mL/Hr) IV Continuous <Continuous>  dextrose 50% Injectable 12.5 Gram(s) IV Push once  dextrose 50% Injectable 25 Gram(s) IV Push once  dextrose 50% Injectable 25 Gram(s) IV Push once  docusate sodium 100 milliGRAM(s) Oral two times a day  dorzolamide 2% Ophthalmic Solution 1 Drop(s) Both EYES <User Schedule>  epoetin keagan Injectable 56329 Unit(s) IV Push <User Schedule>  furosemide    Tablet 80 milliGRAM(s) Oral two times a day  heparin  Injectable 5000 Unit(s) SubCutaneous every 8 hours  hydrocortisone 2.5% Cream 1 Application(s) Topical two times a day  insulin glargine Injectable (LANTUS) 40 Unit(s) SubCutaneous at bedtime  insulin lispro (HumaLOG) corrective regimen sliding scale   SubCutaneous three times a day before meals  insulin lispro (HumaLOG) corrective regimen sliding scale   SubCutaneous at bedtime  ketorolac 0.5% Ophthalmic Solution 1 Drop(s) Right EYE two times a day  latanoprost 0.005% Ophthalmic Solution 1 Drop(s) Left EYE at bedtime  metolazone 5 milliGRAM(s) Oral daily  metoprolol tartrate 25 milliGRAM(s) Oral two times a day  pregabalin 50 milliGRAM(s) Oral daily  sertraline 100 milliGRAM(s) Oral daily  sevelamer hydrochloride 800 milliGRAM(s) Oral three times a day  simvastatin 10 milliGRAM(s) Oral at bedtime  tamsulosin 0.4 milliGRAM(s) Oral at bedtime  timolol 0.5% Solution 1 Drop(s) Both EYES two times a day  traZODone 100 milliGRAM(s) Oral at bedtime    MEDICATIONS  (PRN):  acetaminophen   Tablet. 650 milliGRAM(s) Oral every 6 hours PRN Mild Pain (1 - 3)  ALBUTerol/ipratropium for Nebulization 3 milliLiter(s) Nebulizer every 6 hours PRN Shortness of Breath and/or Wheezing  dextrose 40% Gel 15 Gram(s) Oral once PRN Blood Glucose LESS THAN 70 milliGRAM(s)/deciliter  glucagon  Injectable 1 milliGRAM(s) IntraMuscular once PRN Glucose LESS THAN 70 milligrams/deciliter  hydrOXYzine hydrochloride 25 milliGRAM(s) Oral every 6 hours PRN Agitation  oxyCODONE    5 mG/acetaminophen 325 mG 1 Tablet(s) Oral every 4 hours PRN for moderate pain  zolpidem 5 milliGRAM(s) Oral at bedtime PRN Insomnia  zolpidem 5 milliGRAM(s) Oral at bedtime PRN Insomnia      FAMILY HISTORY:  No pertinent family history in first degree relatives      Allergies    No Known Allergies    Intolerances        SOCIAL HISTORY:    [  ] Etoh  [  ] Smoking  [  ] Substance abuse     Home Environment:  [  ] Home Alone  [  ] Lives with Family  [ x ] Home Health Aid    Dwelling:  [  ] Apartment  [  ] Private House  [  ] Adult Home  [  x] Skilled Nursing Facility      [  ] Short Term  [  ] Long Term  [  ] Stairs       Elevator [  ]    FUNCTIONAL STATUS PTA: (Check all that apply)  Ambulation: [   ]Independent    [  x] Dependent     [  ] Non-Ambulatory  Assistive Device: [  ] SA Cane  [  ]  Q Cane  [  ] Walker  [ x ]  Wheelchair  ADL : [x  ] Independent  [  ]  Dependent       Vital Signs Last 24 Hrs  T(C): 36.6 (05 Jun 2018 06:40), Max: 37.3 (04 Jun 2018 22:00)  T(F): 97.8 (05 Jun 2018 06:40), Max: 99.1 (04 Jun 2018 22:00)  HR: 75 (05 Jun 2018 06:40) (70 - 83)  BP: 158/70 (05 Jun 2018 06:40) (128/100 - 158/70)  BP(mean): --  RR: 16 (05 Jun 2018 06:40) (16 - 16)  SpO2: --      PHYSICAL EXAM: Alert & Oriented X3  GENERAL: NAD, well-groomed, well-developed  HEAD:  Atraumatic, Normocephalic  EYES: EOMI, PERRLA, conjunctiva and sclera clear  NECK: Supple, No JVD, Normal thyroid  CHEST/LUNG: Clear to percussion bilaterally; No rales, rhonchi, wheezing, or rubs  HEART: Regular rate and rhythm; No murmurs, rubs, or gallops  ABDOMEN: Soft, Nontender, Nondistended; Bowel sounds present  EXTREMITIES:  2+ Peripheral Pulses, No clubbing, cyanosis, or edema    NERVOUS SYSTEM:  Cranial Nerves 2-12 intact [  x] Abnormal  [  ]  ROM: WFL all extremities [ x ]  Abnormal [  ]  Motor Strength: WFL all extremities  [  ]  Abnormal [x  ]  Sensation: intact to light touch [  ] Abnormal [ x ]  Reflexes: Symmetric [  ]  Abnormal [x]    FUNCTIONAL STATUS:  Bed Mobility: Independent [  ]  Supervision [  ]  Needs Assistance [x  ]  N/A [  ]  Transfers: Independent [  ]  Supervision [  ]  Needs Assistance [ x ]  N/A [  ]   Ambulation: Independent [  ]  Supervision [  ]  Needs Assistance [ x]  N/A [  ]  ADL: Independent [  ] Requires Assistance [  x] N/A [  ]      LABS:                RADIOLOGY & ADDITIONAL STUDIES:    Assesment:
CC: depressed mood    HPI: 52yo M with h/o depression, multiple medical problems, [facility] resident, admitted to 3S due to elevated WBC (17) and increased troponin, r/o sepsis, reportedly has been "agitated" over the last 2 weeks with episodes of screaming and crying, mostly at night. Reportedly, these episodes were exacerbated by Haldol (currently d/c'd). The Hx was obtain from Pt, his mother Laurel, and EMR. Pt is currently calm, not agitated, well related, A,Ox3, coherent. Pt reports sad, depressed mood due to his medical problems and due to missing his home, irritability, insomnia, but denies si/hi, denies anhedonia (enjoys baseball games), there are no psychotic sx. According to 1:1 , Pt is generally calm.    PPH: treated by a psychiatrist at residence, no h/o IPP.    PMH: ESRD, DM, blindness (partial), h/o ostemyelitis, R BKA,    Drug Hx: denies etoh, illicit drugs or tobacco.    FH: denies FH of mental illness    SH: lives at [facility] since 07/2017, has supportive family    MSE: A,Ox3, cooperative, no pma/r, speech slow, NL r/v, mood depressed, affect constricted, t/p linear, t/cc no si/hi/ah/vh, no delusions, i/j not impaired.
[place] [facility] INITIAL CONSULT NOTE  --------------------------------------------------------------------------------  HPI:  Mr Roth is 54yo M with past medical history of end stage renal disease presumed secondary to diabetic nephropathy. He is maintained on hemodialysis on MWF at [facility]. Sent to ED for WBC 17 and AMS r/o sepsis. Patient found to have elevated troponin in ED and admitted for r/o acs. Patient was recently initiated on Haldol.    PAST HISTORY  --------------------------------------------------------------------------------  PAST MEDICAL & SURGICAL HISTORY:  Bilateral ocular hypertension  Insomnia, unspecified type  Bipolar affective disorder, remission status unspecified  Obesity, unspecified classification, unspecified obesity type, unspecified whether serious comorbidity present  Anxiety disorder, unspecified type  Pain disorder  Anemia, unspecified type  Blind  HTN (hypertension)  End stage renal disease  Diabetes  Status post glaucoma surgery: RIGHT EYE  S/P BKA (below knee amputation) unilateral: RIGHT  A-V fistula: LEFT ARM    FAMILY HISTORY:  No pertinent family history in first degree relatives    SOCIAL HISTORY: No current ETOH, tobacco, or illicit drugs    ALLERGIES & MEDICATIONS  --------------------------------------------------------------------------------  Allergies    No Known Allergies    Standing Inpatient Medications  amLODIPine   Tablet 5 milliGRAM(s) Oral daily  ascorbic acid 250 milliGRAM(s) Oral daily  aspirin enteric coated 325 milliGRAM(s) Oral daily  brimonidine 0.2% Ophthalmic Solution 1 Drop(s) Both EYES two times a day  clotrimazole 1% Cream 1 Application(s) Topical two times a day  dextrose 5%. 1000 milliLiter(s) IV Continuous <Continuous>  dextrose 50% Injectable 12.5 Gram(s) IV Push once  dextrose 50% Injectable 25 Gram(s) IV Push once  dextrose 50% Injectable 25 Gram(s) IV Push once  docusate sodium 100 milliGRAM(s) Oral two times a day  dorzolamide 2% Ophthalmic Solution 1 Drop(s) Both EYES <User Schedule>  enalapril 20 milliGRAM(s) Oral daily  epoetin keagan Injectable 99703 Unit(s) IV Push <User Schedule>  furosemide    Tablet 80 milliGRAM(s) Oral two times a day  heparin  Injectable 5000 Unit(s) SubCutaneous every 8 hours  hydrocortisone 2.5% Cream 1 Application(s) Topical two times a day  insulin glargine Injectable (LANTUS) 40 Unit(s) SubCutaneous at bedtime  insulin lispro (HumaLOG) corrective regimen sliding scale   SubCutaneous three times a day before meals  insulin lispro (HumaLOG) corrective regimen sliding scale   SubCutaneous at bedtime  ketorolac 0.5% Ophthalmic Solution 1 Drop(s) Right EYE two times a day  latanoprost 0.005% Ophthalmic Solution 1 Drop(s) Left EYE at bedtime  metolazone 5 milliGRAM(s) Oral daily  metoprolol tartrate 25 milliGRAM(s) Oral two times a day  pregabalin 100 milliGRAM(s) Oral two times a day  sertraline 75 milliGRAM(s) Oral daily  sevelamer hydrochloride 800 milliGRAM(s) Oral three times a day  simvastatin 10 milliGRAM(s) Oral at bedtime  timolol 0.5% Solution 1 Drop(s) Both EYES two times a day  traZODone 75 milliGRAM(s) Oral at bedtime    PRN Inpatient Medications  acetaminophen   Tablet. 650 milliGRAM(s) Oral every 6 hours PRN  ALBUTerol/ipratropium for Nebulization 3 milliLiter(s) Nebulizer every 6 hours PRN  dextrose 40% Gel 15 Gram(s) Oral once PRN  glucagon  Injectable 1 milliGRAM(s) IntraMuscular once PRN  oxyCODONE    5 mG/acetaminophen 325 mG 1 Tablet(s) Oral every 4 hours PRN  zolpidem 5 milliGRAM(s) Oral at bedtime PRN  zolpidem 5 milliGRAM(s) Oral at bedtime PRN    REVIEW OF SYSTEMS  --------------------------------------------------------------------------------  Gen: No fevers/chills,  Skin: No rashes  Head/Eyes/Ears/Mouth: No headache;  No sinus pain/discomfort, sore throat  Respiratory: No dyspnea, cough, wheezing, hemoptysis  CV: No chest pain, PND, orthopnea  GI: No abdominal pain, diarrhea, constipation, nausea, vomiting, melena, hematochezia  All other systems were reviewed and are negative, except as noted.    VITALS/PHYSICAL EXAM  --------------------------------------------------------------------------------  T(C): 37.1 (06-01-18 @ 06:00), Max: 37.1 (06-01-18 @ 06:00)  HR: 94 (06-01-18 @ 06:00) (66 - 94)  BP: 170/60 (06-01-18 @ 06:00) (125/53 - 182/76)  RR: 18 (06-01-18 @ 06:00) (16 - 18)  SpO2: 98% (05-31-18 @ 17:35) (97% - 98%)  Height (cm): 182.88 (05-31-18 @ 20:04)  Weight (kg): 140.6 (05-31-18 @ 13:13)  BMI (kg/m2): 42 (05-31-18 @ 20:04)  BSA (m2): 2.57 (05-31-18 @ 20:04)    Physical Exam:  	Gen: NAD  	HEENT: Supple neck, clear oropharynx  	Pulm: CTA B/L  	CV: RRR, S1S2  	Back: No CVA tenderness; no sacral edema  	Abd: +BS, soft, nontender/nondistended  	: No suprapubic tenderness  	LE: Warm, no edema  	Skin: Warm, without rashes  	Vascular access: AVF    LABS/STUDIES  --------------------------------------------------------------------------------              8.4    14.00 >-----------<  245      [05-31-18 @ 21:40]              27.9     138  |  96  |  74  ----------------------------<  162      [05-31-18 @ 21:40]  5.8   |  26  |  6.8        Ca     8.7     [05-31-18 @ 21:40]    TPro  6.5  /  Alb  3.3  /  TBili  0.5  /  DBili  0.3  /  AST  29  /  ALT  164  /  AlkPhos  152  [05-31-18 @ 14:21]    Troponin 0.17      [06-01-18 @ 07:22]  CK 70      [06-01-18 @ 07:22]    Creatinine Trend:  SCr 6.8 [05-31 @ 21:40]  SCr 6.5 [05-31 @ 14:21]  SCr 5.5 [05-30 @ 18:23]    Urinalysis - [05-31-18 @ 16:45]      Color Yellow / Appearance Clear / SG 1.015 / pH 7.5      Gluc Negative / Ketone Negative  / Bili Negative / Urobili 0.2       Blood Trace-lysed / Protein 100 / Leuk Est Negative / Nitrite Negative      RBC 1-2 / WBC 1-2 / Hyaline  / Gran  / Sq Epi  / Non Sq Epi Occasional / Bacteria See Note    HbA1c 8.5      [03-09-18 @ 04:05]

## 2018-06-05 NOTE — PROGRESS NOTE ADULT - SUBJECTIVE AND OBJECTIVE BOX
[place] [facility] FOLLOW UP NOTE  --------------------------------------------------------------------------------  24 hour events/subjective: Patient examined. Appears comfortable.    PAST HISTORY  --------------------------------------------------------------------------------  No significant changes to PMH, PSH, FHx, SHx, unless otherwise noted    ALLERGIES & MEDICATIONS  --------------------------------------------------------------------------------  Allergies    No Known Allergies    Standing Inpatient Medications  amLODIPine   Tablet 5 milliGRAM(s) Oral daily  ascorbic acid 250 milliGRAM(s) Oral daily  aspirin enteric coated 81 milliGRAM(s) Oral daily  brimonidine 0.2% Ophthalmic Solution 1 Drop(s) Both EYES two times a day  clotrimazole 1% Cream 1 Application(s) Topical two times a day  dextrose 5%. 1000 milliLiter(s) IV Continuous <Continuous>  dextrose 50% Injectable 12.5 Gram(s) IV Push once  dextrose 50% Injectable 25 Gram(s) IV Push once  dextrose 50% Injectable 25 Gram(s) IV Push once  docusate sodium 100 milliGRAM(s) Oral two times a day  dorzolamide 2% Ophthalmic Solution 1 Drop(s) Both EYES <User Schedule>  epoetin keagan Injectable 21705 Unit(s) IV Push <User Schedule>  furosemide    Tablet 80 milliGRAM(s) Oral two times a day  heparin  Injectable 5000 Unit(s) SubCutaneous every 8 hours  hydrocortisone 2.5% Cream 1 Application(s) Topical two times a day  insulin glargine Injectable (LANTUS) 40 Unit(s) SubCutaneous at bedtime  insulin lispro (HumaLOG) corrective regimen sliding scale   SubCutaneous three times a day before meals  insulin lispro (HumaLOG) corrective regimen sliding scale   SubCutaneous at bedtime  ketorolac 0.5% Ophthalmic Solution 1 Drop(s) Right EYE two times a day  latanoprost 0.005% Ophthalmic Solution 1 Drop(s) Left EYE at bedtime  metolazone 5 milliGRAM(s) Oral daily  metoprolol tartrate 25 milliGRAM(s) Oral two times a day  pregabalin 50 milliGRAM(s) Oral daily  sertraline 100 milliGRAM(s) Oral daily  sevelamer hydrochloride 800 milliGRAM(s) Oral three times a day  simvastatin 10 milliGRAM(s) Oral at bedtime  tamsulosin 0.4 milliGRAM(s) Oral at bedtime  timolol 0.5% Solution 1 Drop(s) Both EYES two times a day  traZODone 100 milliGRAM(s) Oral at bedtime    PRN Inpatient Medications  acetaminophen   Tablet. 650 milliGRAM(s) Oral every 6 hours PRN  ALBUTerol/ipratropium for Nebulization 3 milliLiter(s) Nebulizer every 6 hours PRN  dextrose 40% Gel 15 Gram(s) Oral once PRN  glucagon  Injectable 1 milliGRAM(s) IntraMuscular once PRN  hydrOXYzine hydrochloride 25 milliGRAM(s) Oral every 6 hours PRN  oxyCODONE    5 mG/acetaminophen 325 mG 1 Tablet(s) Oral every 4 hours PRN  zolpidem 5 milliGRAM(s) Oral at bedtime PRN  zolpidem 5 milliGRAM(s) Oral at bedtime PRN    VITALS/PHYSICAL EXAM  --------------------------------------------------------------------------------  T(C): 36.6 (06-05-18 @ 06:40), Max: 37.3 (06-04-18 @ 22:00)  HR: 75 (06-05-18 @ 06:40) (70 - 83)  BP: 158/70 (06-05-18 @ 06:40) (128/100 - 158/70)  RR: 16 (06-05-18 @ 06:40) (16 - 16)    06-04-18 @ 07:01  -  06-05-18 @ 07:00  --------------------------------------------------------  IN: 0 mL / OUT: 3501 mL / NET: -3501 mL      Physical Exam:  	Gen: NAD  	Pulm: CTA B/L  	CV: RRR, S1S2  	Abd: +BS, soft, nontender/nondistended  	: No suprapubic tenderness  	LE: Warm, FROM, no clubbing, intact strength; no edema  	Vascular access:    LABS/STUDIES  --------------------------------------------------------------------------------              8.2    8.65  >-----------<  282      [06-05-18 @ 07:35]              27.8                 Creatinine Trend:  SCr 5.9 [06-02 @ 07:19]  SCr 6.8 [05-31 @ 21:40]  SCr 6.5 [05-31 @ 14:21]  SCr 5.5 [05-30 @ 18:23]    Urinalysis - [05-31-18 @ 16:45]      Color Yellow / Appearance Clear / SG 1.015 / pH 7.5      Gluc Negative / Ketone Negative  / Bili Negative / Urobili 0.2       Blood Trace-lysed / Protein 100 / Leuk Est Negative / Nitrite Negative      RBC 1-2 / WBC 1-2 / Hyaline  / Gran  / Sq Epi  / Non Sq Epi Occasional / Bacteria See Note      HbA1c 8.6      [06-01-18 @ 07:22]

## 2018-06-05 NOTE — PROGRESS NOTE ADULT - ASSESSMENT
1. ESRD on HD MWF HD tomorrow: 3 hours, opti 160 dialyzer, 2K bath, 4L UF.  2. Anemia. EPO 10,000 units IV with HD.  3. Hyperphosphatemia. Renal diet. Sevelamer with meals.  4. Hyperkalemia. Resolved with HD.  5. HTN. Continue amlodipine and metoprolol. 4L UF with HD tomorrow.

## 2018-06-05 NOTE — PROGRESS NOTE ADULT - ASSESSMENT
52 yo male with PMH of ESRD on HD, HTN, DM, Bipolar disease was sent from [facility] for leukocytosis, WBC 17k, In the ED his vital signs were stable, labs showed WBC 12K, CXR was normal, UA negative, he has no acute complaints, troponin was elevated.   Patient was admitted to rule out sepsis and ACS.     1. Anxiety and Depressed Mood:   Continue with Zoloft.  Psych consult recommended to increase Zoloft to 100mg and Increase Trazadone to 100mg daily, Atarax q 6hrs prn.   Lyrica dose decreased from 100mg BID to 50mg daily adjusted for ESRD.     2. Elevated Troponin: likely due to ESRD  No chest pain, Troponin is trending down.   Continue ASA, discontinue Telemetry.     3. Leukocytosis:   no signs of infection, it seems chronic.   CXR no acute infiltrates.   Blood culture: no growth to date.      4. ESRD:   Continue with HD via left AVF  Nephrology follow up    5. Urinary Retention: improved.   UA no UTI,   Start on Tamsulosin 0.4mg daily.     6. HTN:   continue Norvasc, Metoprolol, Lasix and Enalapril.      7. DM type2:   Continue Lantus and HISS.     8. Chronic normocytic anemia:   likely due to CKD. On Epoetin during HD.     DVT PPX: Heparin SC. 52 yo male with PMH of ESRD on HD, HTN, DM, Bipolar disease was sent from [facility] for leukocytosis, WBC 17k, In the ED his vital signs were stable, labs showed WBC 12K, CXR was normal, UA negative, he has no acute complaints, troponin was elevated.   Patient was admitted to rule out sepsis and ACS.     1. Anxiety and Depressed Mood:   Continue with Zoloft.  Psych consult recommended to increase Zoloft to 100mg and Increase Trazadone to 100mg daily, Atarax q 6hrs prn.   Symptoms improved, agitation improved, one to one discontinued on 6/3/18.     2. Elevated Troponin: likely due to ESRD  No chest pain, Troponin is trending down.   Continue ASA, discontinue Telemetry.     3. Leukocytosis: resolved.   WBC 8K  no signs of infection.  CXR no acute infiltrates.   Blood culture: no growth to date.      4. ESRD:   Continue with HD via left AVF  Nephrology follow up    5. Urinary Retention: improved.   UA no UTI,   Start on Tamsulosin 0.4mg daily.     6. HTN:   continue Norvasc, Metoprolol, Lasix and Enalapril.      7. DM type2:   Continue Lantus and HISS.     8. Chronic normocytic anemia:   likely due to CKD. On Epoetin during HD.     9. Chronic Pain:   On Percocent  Lyrica dose decreased from 100mg BID to 50mg daily adjusted for ESRD.     DVT PPX: Heparin SC.

## 2018-06-05 NOTE — CONSULT NOTE ADULT - ASSESSMENT
IMPRESSION: Rehab of 52 y/o  m  rehab  for  rt  BKA GD    PRECAUTIONS: [  ] Cardiac  [  ] Respiratory  [  ] Seizures [xx  ] Contact Isolation  [  ] Droplet Isolation  [ x ] Other fall    Weight Bearing Status:     RECOMMENDATION:    Out of Bed to Chair     DVT/Decubiti Prophylaxis    REHAB PLAN:     [ x  ] Bedside P/T 3-5 times a week   [   ]   Bedside O/T  2-3 times a week             [   ] No Rehab Therapy Indicated                   [   ]  Speech Therapy   Conditioning/ROM                                    ADL  Bed Mobility                                               Conditioning/ROM  Transfers                                                     Bed Mobility  Sitting /Standing Balance                         Transfers                                        Gait Training                                               Sitting/Standing Balance  Stair Training [   ]Applicable                    Home equipment Eval                                                                        Splinting  [   ] Only      GOALS:   ADL   [  x ]   Independent                    Transfers  [  x ] Independent                          Ambulation  [  x ] Independent     [   x ] With device                            [   ]  CG                                                         [   ]  CG                                                                  [   ] CG                            [    ] Min A                                                   [   ] Min A                                                              [   ] Min  A          DISCHARGE PLAN:   [   ]  Good candidate for Intensive Rehabilitation/Hospital based-[facility]A [facility]                                             Will tolerate 3hrs Intensive Rehab Daily                                       [ xx   ]  Short Term Rehab in Skilled Nursing Facility                                       [    ]  Home with Outpatient or VN services                                         [    ]  Possible Candidate for Intensive Hospital based Rehab
1. Altered mental status. Consider decreasing Lyrica to once a day.  2. ESRD on HD MWF HD today: 3 hours, opti 160 dialyzer, 2/1K bath, 3.5L UF.  3. Anemia. Start EPO 10,000 units IV with HD.  4. Hyperphosphatemia. Renal diet. Sevelamer with meals.  5. Hyperkalemia. HD on 1K bath for last hour.
A/P 54yo M with depressed mood in context of severe medical illness and living at NH.  No psychiatric indications for Pt to stay in the hospital, can be d/c'd back to residence if medically cleared.    Dx MDD, mild

## 2018-06-05 NOTE — PROGRESS NOTE ADULT - SUBJECTIVE AND OBJECTIVE BOX
IVANANTONYFLORIDA WEN  53y  Male      Patient is a 53y old  Male who presents with a chief complaint of "infection in my blood" x 1day (04 Jun 2018 12:28)      INTERVAL HPI/OVERNIGHT EVENTS:  No acute events.     Vital Signs Last 24 Hrs  T(C): 36.6 (05 Jun 2018 06:40), Max: 37.3 (04 Jun 2018 22:00)  T(F): 97.8 (05 Jun 2018 06:40), Max: 99.1 (04 Jun 2018 22:00)  HR: 75 (05 Jun 2018 06:40) (70 - 83)  BP: 158/70 (05 Jun 2018 06:40) (128/100 - 158/70)  BP(mean): --  RR: 16 (05 Jun 2018 06:40) (16 - 16)  SpO2: --      06-04-18 @ 07:01  -  06-05-18 @ 07:00  --------------------------------------------------------  IN: 0 mL / OUT: 3501 mL / NET: -3501 mL            Consultant(s) Notes Reviewed:  [x ] YES  [ ] NO          MEDICATIONS  (STANDING):  amLODIPine   Tablet 5 milliGRAM(s) Oral daily  ascorbic acid 250 milliGRAM(s) Oral daily  aspirin enteric coated 81 milliGRAM(s) Oral daily  brimonidine 0.2% Ophthalmic Solution 1 Drop(s) Both EYES two times a day  clotrimazole 1% Cream 1 Application(s) Topical two times a day  dextrose 5%. 1000 milliLiter(s) (50 mL/Hr) IV Continuous <Continuous>  dextrose 50% Injectable 12.5 Gram(s) IV Push once  dextrose 50% Injectable 25 Gram(s) IV Push once  dextrose 50% Injectable 25 Gram(s) IV Push once  docusate sodium 100 milliGRAM(s) Oral two times a day  dorzolamide 2% Ophthalmic Solution 1 Drop(s) Both EYES <User Schedule>  epoetin keagan Injectable 86907 Unit(s) IV Push <User Schedule>  furosemide    Tablet 80 milliGRAM(s) Oral two times a day  heparin  Injectable 5000 Unit(s) SubCutaneous every 8 hours  hydrocortisone 2.5% Cream 1 Application(s) Topical two times a day  insulin glargine Injectable (LANTUS) 40 Unit(s) SubCutaneous at bedtime  insulin lispro (HumaLOG) corrective regimen sliding scale   SubCutaneous three times a day before meals  insulin lispro (HumaLOG) corrective regimen sliding scale   SubCutaneous at bedtime  ketorolac 0.5% Ophthalmic Solution 1 Drop(s) Right EYE two times a day  latanoprost 0.005% Ophthalmic Solution 1 Drop(s) Left EYE at bedtime  metolazone 5 milliGRAM(s) Oral daily  metoprolol tartrate 25 milliGRAM(s) Oral two times a day  pregabalin 50 milliGRAM(s) Oral daily  sertraline 100 milliGRAM(s) Oral daily  sevelamer hydrochloride 800 milliGRAM(s) Oral three times a day  simvastatin 10 milliGRAM(s) Oral at bedtime  tamsulosin 0.4 milliGRAM(s) Oral at bedtime  timolol 0.5% Solution 1 Drop(s) Both EYES two times a day  traZODone 100 milliGRAM(s) Oral at bedtime    MEDICATIONS  (PRN):  acetaminophen   Tablet. 650 milliGRAM(s) Oral every 6 hours PRN Mild Pain (1 - 3)  ALBUTerol/ipratropium for Nebulization 3 milliLiter(s) Nebulizer every 6 hours PRN Shortness of Breath and/or Wheezing  dextrose 40% Gel 15 Gram(s) Oral once PRN Blood Glucose LESS THAN 70 milliGRAM(s)/deciliter  glucagon  Injectable 1 milliGRAM(s) IntraMuscular once PRN Glucose LESS THAN 70 milligrams/deciliter  hydrOXYzine hydrochloride 25 milliGRAM(s) Oral every 6 hours PRN Agitation  oxyCODONE    5 mG/acetaminophen 325 mG 1 Tablet(s) Oral every 4 hours PRN for moderate pain  zolpidem 5 milliGRAM(s) Oral at bedtime PRN Insomnia  zolpidem 5 milliGRAM(s) Oral at bedtime PRN Insomnia      LABS                          8.2    8.65  )-----------( 282      ( 05 Jun 2018 07:35 )             27.8     06-05    143  |  100  |  68<HH>  ----------------------------<  73  4.8   |  29  |  7.0<HH>    Ca    8.3<L>      05 Jun 2018 07:35            Lactate Trend  05-31 @ 14:21 Lactate:0.6         CAPILLARY BLOOD GLUCOSE  93 (05 Jun 2018 08:31)          Culture - Blood (collected 05-31-18 @ 14:23)  Source: .Blood Blood  Preliminary Report (06-01-18 @ 23:02):    No growth to date.    Culture - Blood (collected 05-31-18 @ 14:21)  Source: .Blood Blood  Preliminary Report (06-01-18 @ 23:02):    No growth to date.        RADIOLOGY & ADDITIONAL TESTS:    Imaging Personally Reviewed:  [ ] YES  [ ] NO    HEALTH ISSUES - PROBLEM Dx:  Anxiety: Anxiety  Adjustment insomnia: Adjustment insomnia  Depressed mood: Depressed mood  Bilateral ocular hypertension: Bilateral ocular hypertension  Anemia, unspecified type: Anemia, unspecified type  Hypertension, unspecified type: Hypertension, unspecified type  Pain disorder: Pain disorder  Bipolar affective disorder, remission status unspecified: Bipolar affective disorder, remission status unspecified  Leukocytosis, unspecified type: Leukocytosis, unspecified type  End stage renal disease: End stage renal disease  Elevated troponin: Elevated troponin        PHYSICAL EXAM:  GENERAL: NAD, well-developed  HEAD:  Atraumatic, Normocephalic  EYES: EOMI, PERRLA, conjunctiva and sclera clear  NECK: Supple, No JVD  CHEST/LUNG: Clear to auscultation bilaterally; No wheeze  HEART: Regular rate and rhythm; No murmurs, rubs, or gallops  ABDOMEN: Soft, Nontender, Nondistended; Bowel sounds present  EXTREMITIES:  LUE AVF, Right BKA

## 2018-06-06 RX ORDER — DEXTROSE 50 % IN WATER 50 %
12.5 SYRINGE (ML) INTRAVENOUS ONCE
Qty: 0 | Refills: 0 | Status: COMPLETED | OUTPATIENT
Start: 2018-06-06 | End: 2018-06-06

## 2018-06-06 RX ORDER — INSULIN LISPRO 100/ML
VIAL (ML) SUBCUTANEOUS
Qty: 0 | Refills: 0 | Status: DISCONTINUED | OUTPATIENT
Start: 2018-06-06 | End: 2018-06-07

## 2018-06-06 RX ORDER — DEXTROSE 50 % IN WATER 50 %
25 SYRINGE (ML) INTRAVENOUS ONCE
Qty: 0 | Refills: 0 | Status: COMPLETED | OUTPATIENT
Start: 2018-06-06 | End: 2018-06-06

## 2018-06-06 RX ORDER — INSULIN GLARGINE 100 [IU]/ML
30 INJECTION, SOLUTION SUBCUTANEOUS AT BEDTIME
Qty: 0 | Refills: 0 | Status: DISCONTINUED | OUTPATIENT
Start: 2018-06-06 | End: 2018-06-07

## 2018-06-06 RX ADMIN — Medication 25 GRAM(S): at 09:55

## 2018-06-06 RX ADMIN — BRIMONIDINE TARTRATE 1 DROP(S): 2 SOLUTION/ DROPS OPHTHALMIC at 06:45

## 2018-06-06 RX ADMIN — Medication 81 MILLIGRAM(S): at 15:08

## 2018-06-06 RX ADMIN — Medication 1 APPLICATION(S): at 17:33

## 2018-06-06 RX ADMIN — Medication 1 APPLICATION(S): at 17:32

## 2018-06-06 RX ADMIN — Medication 25 MILLIGRAM(S): at 17:33

## 2018-06-06 RX ADMIN — INSULIN GLARGINE 40 UNIT(S): 100 INJECTION, SOLUTION SUBCUTANEOUS at 21:51

## 2018-06-06 RX ADMIN — Medication 2: at 17:31

## 2018-06-06 RX ADMIN — DORZOLAMIDE HYDROCHLORIDE 1 DROP(S): 20 SOLUTION/ DROPS OPHTHALMIC at 09:56

## 2018-06-06 RX ADMIN — SEVELAMER CARBONATE 800 MILLIGRAM(S): 2400 POWDER, FOR SUSPENSION ORAL at 06:45

## 2018-06-06 RX ADMIN — Medication 1 DROP(S): at 17:33

## 2018-06-06 RX ADMIN — Medication 25 MILLIGRAM(S): at 21:11

## 2018-06-06 RX ADMIN — ERYTHROPOIETIN 10000 UNIT(S): 10000 INJECTION, SOLUTION INTRAVENOUS; SUBCUTANEOUS at 12:54

## 2018-06-06 RX ADMIN — Medication 1 DROP(S): at 06:47

## 2018-06-06 RX ADMIN — TEMAZEPAM 15 MILLIGRAM(S): 15 CAPSULE ORAL at 21:11

## 2018-06-06 RX ADMIN — Medication 100 MILLIGRAM(S): at 17:32

## 2018-06-06 RX ADMIN — ZOLPIDEM TARTRATE 5 MILLIGRAM(S): 10 TABLET ORAL at 21:11

## 2018-06-06 RX ADMIN — BRIMONIDINE TARTRATE 1 DROP(S): 2 SOLUTION/ DROPS OPHTHALMIC at 17:32

## 2018-06-06 RX ADMIN — HEPARIN SODIUM 5000 UNIT(S): 5000 INJECTION INTRAVENOUS; SUBCUTANEOUS at 15:09

## 2018-06-06 RX ADMIN — SERTRALINE 100 MILLIGRAM(S): 25 TABLET, FILM COATED ORAL at 15:08

## 2018-06-06 RX ADMIN — Medication 80 MILLIGRAM(S): at 06:46

## 2018-06-06 RX ADMIN — Medication 250 MILLIGRAM(S): at 15:08

## 2018-06-06 RX ADMIN — Medication 1 DROP(S): at 17:34

## 2018-06-06 RX ADMIN — LATANOPROST 1 DROP(S): 0.05 SOLUTION/ DROPS OPHTHALMIC; TOPICAL at 21:51

## 2018-06-06 RX ADMIN — SIMVASTATIN 10 MILLIGRAM(S): 20 TABLET, FILM COATED ORAL at 21:12

## 2018-06-06 RX ADMIN — OXYCODONE AND ACETAMINOPHEN 1 TABLET(S): 5; 325 TABLET ORAL at 21:11

## 2018-06-06 RX ADMIN — Medication 50 MILLIGRAM(S): at 15:07

## 2018-06-06 RX ADMIN — HEPARIN SODIUM 5000 UNIT(S): 5000 INJECTION INTRAVENOUS; SUBCUTANEOUS at 21:13

## 2018-06-06 RX ADMIN — DORZOLAMIDE HYDROCHLORIDE 1 DROP(S): 20 SOLUTION/ DROPS OPHTHALMIC at 21:16

## 2018-06-06 RX ADMIN — AMLODIPINE BESYLATE 5 MILLIGRAM(S): 2.5 TABLET ORAL at 06:45

## 2018-06-06 RX ADMIN — SEVELAMER CARBONATE 800 MILLIGRAM(S): 2400 POWDER, FOR SUSPENSION ORAL at 15:07

## 2018-06-06 RX ADMIN — Medication 80 MILLIGRAM(S): at 17:33

## 2018-06-06 RX ADMIN — Medication 100 MILLIGRAM(S): at 06:45

## 2018-06-06 RX ADMIN — HEPARIN SODIUM 5000 UNIT(S): 5000 INJECTION INTRAVENOUS; SUBCUTANEOUS at 06:46

## 2018-06-06 RX ADMIN — Medication 1 APPLICATION(S): at 06:47

## 2018-06-06 RX ADMIN — ERYTHROPOIETIN 10000 UNIT(S): 10000 INJECTION, SOLUTION INTRAVENOUS; SUBCUTANEOUS at 15:01

## 2018-06-06 RX ADMIN — Medication 100 MILLIGRAM(S): at 21:12

## 2018-06-06 RX ADMIN — Medication 25 MILLIGRAM(S): at 06:45

## 2018-06-06 RX ADMIN — TAMSULOSIN HYDROCHLORIDE 0.4 MILLIGRAM(S): 0.4 CAPSULE ORAL at 21:12

## 2018-06-06 RX ADMIN — SEVELAMER CARBONATE 800 MILLIGRAM(S): 2400 POWDER, FOR SUSPENSION ORAL at 21:12

## 2018-06-06 NOTE — PROGRESS NOTE ADULT - ASSESSMENT
52 yo male with PMH of ESRD on HD, HTN, DM, Bipolar disease was sent from [facility] for leukocytosis, WBC 17k, In the ED his vital signs were stable, labs showed WBC 12K, CXR was normal, UA negative, he has no acute complaints, troponin was elevated.   Patient was admitted to rule out sepsis and ACS.     1. Anxiety and Depressed Mood:   Continue with Zoloft.  Psych consult recommended to increase Zoloft to 100mg and Increase Trazadone to 100mg daily, Atarax q 6hrs prn.   Symptoms improved, agitation improved, one to one discontinued on 6/3/18.     2. Elevated Troponin: likely due to ESRD. ACS ruled out.  No chest pain, Troponin is trending down.   Continue ASA, discontinue Telemetry.     3. Leukocytosis: resolved. Sepsis ruled out  WBC 8K  no signs of infection.  CXR no acute infiltrates.   Blood culture: no growth to date.      4. ESRD:   Continue with HD via left AVF  Nephrology follow up    5. Urinary Retention: improved.   UA no UTI,   Start on Tamsulosin 0.4mg daily.     6. HTN:   continue Norvasc, Metoprolol, Lasix and Enalapril.      7. DM type2:   Continue Lantus and HISS.   Doses adjusted to prevent Hypoglycemia.  Hemoglobin A1C, Whole Blood: 8.6 % (06.01.18 @ 07:22)      8. Chronic normocytic anemia:   likely due to CKD. On Epoetin during HD.     9. Chronic Pain:   On Percocet  Lyrica dose decreased from 100mg BID to 50mg daily adjusted for ESRD.     DVT PPX: Heparin SC. 53 year old Male with PMH of ESRD on HD, HTN, DM, Bipolar disease sent in from [facility] for leukocytosis, WBC 17k, In the ED his vital signs were stable, labs showed WBC 12K, CXR was normal, UA negative, he has no acute complaints, troponin was elevated.   Patient was admitted to rule out sepsis and ACS.     1. Anxiety and Depressed Mood:   Continue with Zoloft.  Psych consult recommended to increase Zoloft to 100mg and Increase Trazadone to 100mg daily, Atarax q 6hrs prn.   Symptoms improved, agitation improved, one to one discontinued on 6/3/18.     2. Elevated Troponin: likely due to ESRD. ACS ruled out.  No chest pain, Troponin is trending down.   Continue ASA, discontinue Telemetry.     3. Leukocytosis: resolved. Sepsis ruled out  WBC 8K  no signs of infection.  CXR no acute infiltrates.   Blood culture: no growth to date.      4. ESRD:   Continue with HD via left AVF  Nephrology follow up    5. Urinary Retention: improved.   UA no UTI,   Start on Tamsulosin 0.4mg daily.     6. HTN:   continue Norvasc, Metoprolol, Lasix and Enalapril.      7. DM type2:   Continue Lantus and HISS.   Doses adjusted to prevent Hypoglycemia.  Hemoglobin A1C, Whole Blood: 8.6 % (06.01.18 @ 07:22)      8. Chronic normocytic anemia:   likely due to CKD. On Epoetin during HD.     9. Chronic Pain:   On Percocet  Lyrica dose decreased from 100mg BID to 50mg daily adjusted for ESRD.     DVT PPX: Heparin SC. 53 year old Male with PMH of ESRD on HD, HTN, DM, Bipolar disease sent in from [facility] with leukocytosis, WBC 17k for further evaluation.  In the ED his vital signs were stable, labs showed WBC 12K, CXR was normal, UA negative, and patient had no complaints. He was however noted to have elevated Troponin of 0.18. Patient was admitted to rule out sepsis and ACS.     Assessment and Plan:  1. Anxiety and Depressed Mood:   Continue with Zoloft.  Psych consult: recommended increasing Zoloft to 100mg and Trazadone to 100mg daily, Atarax q 6hrs prn.   Symptoms improved, agitation improved, one to one discontinued on 6/3/18.     2. Elevated Troponin: likely due to ESRD. ACS ruled out.  No chest pain, Troponin is trending down.   Continue ASA, discontinue Telemetry.     3. Leukocytosis: resolved. Sepsis ruled out  WBC 8K  no signs of infection.  CXR no acute infiltrates.   Blood culture: no growth to date.        4. ESRD: with Anemia and Hyperphosphatemia  Continue with HD via left AVF: Mon, Wed, Fri.  Nephrology follow up.  Hyperphosphatemia. Renal diet. Sevelamer with meals.  EPO 10,000 units IV with HD.        5. Urinary Retention: Improved.   UA no UTI,   Continue on Tamsulosin 0.4mg daily.       6. HTN:   Continue Norvasc, Metoprolol, Lasix/Metolazone.        7. DM type2:   Continue Lantus and HISS.   Doses adjusted to prevent Hypoglycemia.  Hemoglobin A1C, Whole Blood: 8.6 % (06.01.18 @ 07:22)      8. Chronic normocytic anemia:   likely due to CKD. On Epoetin during HD.       9. Chronic Pain:   On Percocet  Lyrica dose decreased from 100mg BID to 50mg daily adjusted for ESRD.         DVT PPX: Heparin SC.   Disposition: [facility] pending Authorization.

## 2018-06-06 NOTE — PROGRESS NOTE ADULT - SUBJECTIVE AND OBJECTIVE BOX
[place] HAMM  53y  Male    Patient is a 53y old  Male who presents with a chief complaint of "infection in my blood" x 1day (04 Jun 2018 12:28)      INTERVAL HPI/OVERNIGHT EVENTS:  Patient complaining of extreme Anxiety. Found to be hypoglycemic this am without any symptoms.  Patient still on admission due to pending authorization    REVIEW OF SYSTEMS:  CONSTITUTIONAL: No fever, weight loss, or fatigue  EYES: No eye pain, visual disturbances, or discharge  ENMT:  No difficulty hearing, tinnitus, vertigo; No sinus or throat pain  NECK: No pain or stiffness  BREASTS: No pain, masses, or nipple discharge  RESPIRATORY: No cough, wheezing, chills or hemoptysis; No shortness of breath  CARDIOVASCULAR: No chest pain, palpitations, dizziness, or leg swelling  GASTROINTESTINAL: No abdominal or epigastric pain. No nausea, vomiting, or hematemesis; No diarrhea or constipation. No melena or hematochezia.  GENITOURINARY: No dysuria, frequency, hematuria, or incontinence  NEUROLOGICAL: No headaches, memory loss, loss of strength, numbness, or tremors  SKIN: No itching, burning, rashes, or lesions   LYMPH NODES: No enlarged glands  ENDOCRINE: No heat or cold intolerance; No hair loss  MUSCULOSKELETAL: No joint pain or swelling; No muscle, back, or extremity pain  PSYCHIATRIC: No depression, anxiety, mood swings, or difficulty sleeping  HEME/LYMPH: No easy bruising, or bleeding gums  ALLERY AND IMMUNOLOGIC: No hives or eczema    VITALS:  T(F): 97.8 (06-06-18 @ 21:00), Max: 97.8 (06-06-18 @ 21:00)  HR: 76 (06-06-18 @ 21:00) (61 - 76)  BP: 115/62 (06-06-18 @ 21:00) (114/56 - 145/60)  RR: 16 (06-06-18 @ 21:00) (16 - 18)  SpO2: --      CAPILLARY BLOOD GLUCOSE  222 (06 Jun 2018 21:00)  180 (06 Jun 2018 17:31)  119 (06 Jun 2018 11:06)  62 (06 Jun 2018 08:28)        PHYSICAL EXAM:  GENERAL: NAD, well-groomed, well-developed  HEAD:  Atraumatic, Normocephalic  EYES: EOMI, PERRLA, conjunctiva and sclera clear  ENMT: No tonsillar erythema, exudates, or enlargement; Moist mucous membranes, Good dentition, No lesions  NECK: Supple, No JVD, Normal thyroid  NERVOUS SYSTEM:  Alert & Oriented X3, Good concentration; Motor Strength 5/5 B/L upper and lower extremities; DTRs 2+ intact and symmetric  CHEST/LUNG: Clear to percussion bilaterally; No rales, rhonchi, wheezing, or rubs  HEART: Regular rate and rhythm; No murmurs, rubs, or gallops  ABDOMEN: Soft, Nontender, Nondistended; Bowel sounds present  EXTREMITIES:  2+ Peripheral Pulses, No clubbing, cyanosis, or edema  LYMPH: No lymphadenopathy noted  SKIN: No rashes or lesions    Consultant(s) Notes Reviewed:  [x ] YES  [ ] NO  Care Discussed with Consultants/Other Providers [ x] YES  [ ] NO    LABS:                        8.2    8.65  )-----------( 282      ( 05 Jun 2018 07:35 )             27.8     06-05    143  |  100  |  68<HH>  ----------------------------<  73  4.8   |  29  |  7.0<HH>    Ca    8.3<L>      05 Jun 2018 07:35      MICROBIOLOGY:      RADIOLOGY & ADDITIONAL TESTS:    Imaging Personally Reviewed:  [ ] YES  [ ] NO    MEDICATIONS  (STANDING):  amLODIPine   Tablet 5 milliGRAM(s) Oral daily  ascorbic acid 250 milliGRAM(s) Oral daily  aspirin enteric coated 81 milliGRAM(s) Oral daily  brimonidine 0.2% Ophthalmic Solution 1 Drop(s) Both EYES two times a day  clotrimazole 1% Cream 1 Application(s) Topical two times a day  dextrose 5%. 1000 milliLiter(s) (50 mL/Hr) IV Continuous <Continuous>  dextrose 50% Injectable 12.5 Gram(s) IV Push once  dextrose 50% Injectable 25 Gram(s) IV Push once  dextrose 50% Injectable 25 Gram(s) IV Push once  docusate sodium 100 milliGRAM(s) Oral two times a day  dorzolamide 2% Ophthalmic Solution 1 Drop(s) Both EYES <User Schedule>  epoetin keagan Injectable 37082 Unit(s) IV Push <User Schedule>  furosemide    Tablet 80 milliGRAM(s) Oral two times a day  heparin  Injectable 5000 Unit(s) SubCutaneous every 8 hours  hydrocortisone 2.5% Cream 1 Application(s) Topical two times a day  insulin glargine Injectable (LANTUS) 40 Unit(s) SubCutaneous at bedtime  insulin lispro (HumaLOG) corrective regimen sliding scale   SubCutaneous three times a day before meals  insulin lispro (HumaLOG) corrective regimen sliding scale   SubCutaneous at bedtime  ketorolac 0.5% Ophthalmic Solution 1 Drop(s) Right EYE two times a day  latanoprost 0.005% Ophthalmic Solution 1 Drop(s) Left EYE at bedtime  metolazone 5 milliGRAM(s) Oral daily  metoprolol tartrate 25 milliGRAM(s) Oral two times a day  pregabalin 50 milliGRAM(s) Oral daily  sertraline 100 milliGRAM(s) Oral daily  sevelamer hydrochloride 800 milliGRAM(s) Oral three times a day  simvastatin 10 milliGRAM(s) Oral at bedtime  tamsulosin 0.4 milliGRAM(s) Oral at bedtime  timolol 0.5% Solution 1 Drop(s) Both EYES two times a day  traZODone 100 milliGRAM(s) Oral at bedtime    MEDICATIONS  (PRN):  acetaminophen   Tablet. 650 milliGRAM(s) Oral every 6 hours PRN Mild Pain (1 - 3)  ALBUTerol/ipratropium for Nebulization 3 milliLiter(s) Nebulizer every 6 hours PRN Shortness of Breath and/or Wheezing  dextrose 40% Gel 15 Gram(s) Oral once PRN Blood Glucose LESS THAN 70 milliGRAM(s)/deciliter  glucagon  Injectable 1 milliGRAM(s) IntraMuscular once PRN Glucose LESS THAN 70 milligrams/deciliter  hydrOXYzine hydrochloride 25 milliGRAM(s) Oral every 6 hours PRN Agitation  oxyCODONE    5 mG/acetaminophen 325 mG 1 Tablet(s) Oral every 4 hours PRN for moderate pain  temazepam 15 milliGRAM(s) Oral at bedtime PRN Insomnia  zolpidem 5 milliGRAM(s) Oral at bedtime PRN Insomnia          HEALTH ISSUES - PROBLEM Dx:  Anxiety  Adjustment insomnia  Depressed mood  Bilateral ocular hypertension  Anemia, unspecified type  Hypertension, unspecified type  Pain disorder  Bipolar affective disorder, remission status unspecified  Leukocytosis, unspecified type  End stage renal disease  Elevated troponin [place] HAMM  53y  Male    Patient is a 53y old  Male admitted for evaluation of Leukocytosis. (04 Jun 2018 12:28)      INTERVAL HPI/OVERNIGHT EVENTS:  Patient complaining of extreme Anxiety. Found to be hypoglycemic this am without any symptoms.  Patient still on admission due to pending authorization      REVIEW OF SYSTEMS:  CONSTITUTIONAL: No fever, weight loss, or fatigue  EYES: No eye pain, visual disturbances, or discharge  ENMT:  No difficulty hearing, tinnitus, vertigo; No sinus or throat pain  NECK: No pain or stiffness  BREASTS: No pain, masses, or nipple discharge  RESPIRATORY: No cough, wheezing, chills or hemoptysis; No shortness of breath  CARDIOVASCULAR: No chest pain, palpitations, dizziness, or leg swelling  GASTROINTESTINAL: No abdominal or epigastric pain. No nausea, vomiting, or hematemesis; No diarrhea or constipation. No melena or hematochezia.  GENITOURINARY: No dysuria, frequency, hematuria, or incontinence  NEUROLOGICAL: No headaches, memory loss, loss of strength, numbness, or tremors  SKIN: No itching, burning, rashes, or lesions   LYMPH NODES: No enlarged glands  ENDOCRINE: No heat or cold intolerance; No hair loss  MUSCULOSKELETAL: No joint pain or swelling; No muscle, back, or extremity pain  PSYCHIATRIC: No depression, + anxiety, No mood swings, or difficulty sleeping  HEME/LYMPH: No easy bruising, or bleeding gums  ALLERGY AND IMMUNOLOGIC: No hives or eczema      VITALS:  T(F): 97.8 (06-06-18 @ 21:00), Max: 97.8 (06-06-18 @ 21:00)  HR: 76 (06-06-18 @ 21:00) (61 - 76)  BP: 115/62 (06-06-18 @ 21:00) (114/56 - 145/60)  RR: 16 (06-06-18 @ 21:00) (16 - 18)  SpO2: --      CAPILLARY BLOOD GLUCOSE  222 (06 Jun 2018 21:00)  180 (06 Jun 2018 17:31)  119 (06 Jun 2018 11:06)  62 (06 Jun 2018 08:28)        PHYSICAL EXAM:  GENERAL: NAD  HEAD:  Atraumatic, Normocephalic  EYES: EOMI, PERRLA, conjunctiva and sclera clear  ENMT: Moist mucous membranes  NECK: Supple, Normal thyroid  NERVOUS SYSTEM:  Alert & Oriented X3, Good concentration; Motor Strength 5/5 B/L upper and lower extremities  CHEST/LUNG: Clear to auscultation bilaterally; No rales, rhonchi, wheezing, or rubs  HEART: Regular rate and rhythm; No murmurs, rubs, or gallops  ABDOMEN: Soft, Nontender, Nondistended; Bowel sounds present  EXTREMITIES: Rt BKA, No clubbing, cyanosis, Left pedal edema, LUE AVF.  LYMPH: No lymphadenopathy noted  SKIN: No rashes or lesions    Consultant(s) Notes Reviewed:  [x ] YES  [ ] NO  Care Discussed with Consultants/Other Providers [ x] YES  [ ] NO    LABS:                        8.2    8.65  )-----------( 282      ( 05 Jun 2018 07:35 )             27.8     06-05    143  |  100  |  68<HH>  ----------------------------<  73  4.8   |  29  |  7.0<HH>    Ca    8.3<L>      05 Jun 2018 07:35      MICROBIOLOGY:  Culture - Blood (05.31.18 @ 14:23)    Specimen Source: .Blood Blood    Culture Results: No growth at 5 days.    Culture - Blood (05.31.18 @ 14:21)    Specimen Source: .Blood Blood    Culture Results: No growth at 5 days.        RADIOLOGY & ADDITIONAL TESTS:  X-ray Chest 1 View AP/PA (05.31.18 @ 13:57)  No radiographic evidence of acute pulmonary disease.      X-ray Chest 1 View- PORTABLE-Urgent (05.30.18 @ 18:57)   Enlarged/magnified cardiac silhouette.        CT Head No Cont (05.30.18 @ 22:27)   No acute intracranial hemorrhage or midline shift.        Imaging Personally Reviewed:  [x] YES  [ ] NO    MEDICATIONS  (STANDING):  amLODIPine   Tablet 5 milliGRAM(s) Oral daily  ascorbic acid 250 milliGRAM(s) Oral daily  aspirin enteric coated 81 milliGRAM(s) Oral daily  brimonidine 0.2% Ophthalmic Solution 1 Drop(s) Both EYES two times a day  clotrimazole 1% Cream 1 Application(s) Topical two times a day  dextrose 5%. 1000 milliLiter(s) (50 mL/Hr) IV Continuous <Continuous>  dextrose 50% Injectable 12.5 Gram(s) IV Push once  dextrose 50% Injectable 25 Gram(s) IV Push once  dextrose 50% Injectable 25 Gram(s) IV Push once  docusate sodium 100 milliGRAM(s) Oral two times a day  dorzolamide 2% Ophthalmic Solution 1 Drop(s) Both EYES <User Schedule>  epoetin keagan Injectable 70919 Unit(s) IV Push <User Schedule>  furosemide    Tablet 80 milliGRAM(s) Oral two times a day  heparin  Injectable 5000 Unit(s) SubCutaneous every 8 hours  hydrocortisone 2.5% Cream 1 Application(s) Topical two times a day  insulin glargine Injectable (LANTUS) 40 Unit(s) SubCutaneous at bedtime  insulin lispro (HumaLOG) corrective regimen sliding scale   SubCutaneous three times a day before meals  insulin lispro (HumaLOG) corrective regimen sliding scale   SubCutaneous at bedtime  ketorolac 0.5% Ophthalmic Solution 1 Drop(s) Right EYE two times a day  latanoprost 0.005% Ophthalmic Solution 1 Drop(s) Left EYE at bedtime  metolazone 5 milliGRAM(s) Oral daily  metoprolol tartrate 25 milliGRAM(s) Oral two times a day  pregabalin 50 milliGRAM(s) Oral daily  sertraline 100 milliGRAM(s) Oral daily  sevelamer hydrochloride 800 milliGRAM(s) Oral three times a day  simvastatin 10 milliGRAM(s) Oral at bedtime  tamsulosin 0.4 milliGRAM(s) Oral at bedtime  timolol 0.5% Solution 1 Drop(s) Both EYES two times a day  traZODone 100 milliGRAM(s) Oral at bedtime    MEDICATIONS  (PRN):  acetaminophen   Tablet. 650 milliGRAM(s) Oral every 6 hours PRN Mild Pain (1 - 3)  ALBUTerol/ipratropium for Nebulization 3 milliLiter(s) Nebulizer every 6 hours PRN Shortness of Breath and/or Wheezing  dextrose 40% Gel 15 Gram(s) Oral once PRN Blood Glucose LESS THAN 70 milliGRAM(s)/deciliter  glucagon  Injectable 1 milliGRAM(s) IntraMuscular once PRN Glucose LESS THAN 70 milligrams/deciliter  hydrOXYzine hydrochloride 25 milliGRAM(s) Oral every 6 hours PRN Agitation  oxyCODONE    5 mG/acetaminophen 325 mG 1 Tablet(s) Oral every 4 hours PRN for moderate pain  temazepam 15 milliGRAM(s) Oral at bedtime PRN Insomnia  zolpidem 5 milliGRAM(s) Oral at bedtime PRN Insomnia        HEALTH ISSUES - PROBLEM Dx:  Anxiety  Adjustment insomnia  Depressed mood  Bilateral ocular hypertension  Anemia, unspecified type  Hypertension, unspecified type  Pain disorder  Bipolar affective disorder, remission status unspecified  Leukocytosis, unspecified type  End stage renal disease  Elevated troponin

## 2018-06-06 NOTE — PROGRESS NOTE ADULT - ASSESSMENT
1. ESRD on HD MWF HD today: 3 hours, opti 160 dialyzer, 2K bath, 4L UF.  2. Anemia. EPO 10,000 units IV with HD.  3. Hyperphosphatemia. Renal diet. Sevelamer with meals.  4. Hyperkalemia. Resolved with HD.  5. HTN. Continue amlodipine and metoprolol.

## 2018-06-07 VITALS
DIASTOLIC BLOOD PRESSURE: 70 MMHG | RESPIRATION RATE: 16 BRPM | HEART RATE: 69 BPM | TEMPERATURE: 97 F | SYSTOLIC BLOOD PRESSURE: 129 MMHG

## 2018-06-07 DIAGNOSIS — R78.81 BACTEREMIA: ICD-10-CM

## 2018-06-07 RX ORDER — ONDANSETRON 8 MG/1
4 TABLET, FILM COATED ORAL EVERY 6 HOURS
Qty: 0 | Refills: 0 | Status: DISCONTINUED | OUTPATIENT
Start: 2018-06-07 | End: 2018-06-07

## 2018-06-07 RX ORDER — INSULIN GLARGINE 100 [IU]/ML
40 INJECTION, SOLUTION SUBCUTANEOUS
Qty: 0 | Refills: 0 | COMMUNITY

## 2018-06-07 RX ORDER — TEMAZEPAM 15 MG/1
1 CAPSULE ORAL
Qty: 0 | Refills: 0 | DISCHARGE
Start: 2018-06-07

## 2018-06-07 RX ADMIN — Medication 81 MILLIGRAM(S): at 12:00

## 2018-06-07 RX ADMIN — Medication 1 DROP(S): at 06:17

## 2018-06-07 RX ADMIN — BRIMONIDINE TARTRATE 1 DROP(S): 2 SOLUTION/ DROPS OPHTHALMIC at 06:17

## 2018-06-07 RX ADMIN — AMLODIPINE BESYLATE 5 MILLIGRAM(S): 2.5 TABLET ORAL at 06:15

## 2018-06-07 RX ADMIN — DORZOLAMIDE HYDROCHLORIDE 1 DROP(S): 20 SOLUTION/ DROPS OPHTHALMIC at 11:59

## 2018-06-07 RX ADMIN — Medication 50 MILLIGRAM(S): at 12:02

## 2018-06-07 RX ADMIN — Medication 25 MILLIGRAM(S): at 06:16

## 2018-06-07 RX ADMIN — Medication 250 MILLIGRAM(S): at 12:00

## 2018-06-07 RX ADMIN — Medication 1 APPLICATION(S): at 06:17

## 2018-06-07 RX ADMIN — SERTRALINE 100 MILLIGRAM(S): 25 TABLET, FILM COATED ORAL at 12:00

## 2018-06-07 RX ADMIN — Medication 1 DROP(S): at 06:18

## 2018-06-07 RX ADMIN — HEPARIN SODIUM 5000 UNIT(S): 5000 INJECTION INTRAVENOUS; SUBCUTANEOUS at 06:16

## 2018-06-07 RX ADMIN — SEVELAMER CARBONATE 800 MILLIGRAM(S): 2400 POWDER, FOR SUSPENSION ORAL at 06:16

## 2018-06-07 RX ADMIN — Medication 100 MILLIGRAM(S): at 06:16

## 2018-06-07 RX ADMIN — Medication 80 MILLIGRAM(S): at 06:16

## 2018-06-07 NOTE — PROGRESS NOTE ADULT - SUBJECTIVE AND OBJECTIVE BOX
[facility] FOLLOW UP NOTE  --------------------------------------------------------------------------------  24 hour events/subjective: Patient examined. Appears comfortable.    PAST HISTORY  --------------------------------------------------------------------------------  No significant changes to PMH, PSH, FHx, SHx, unless otherwise noted    ALLERGIES & MEDICATIONS  --------------------------------------------------------------------------------  Allergies    No Known Allergies    Standing Inpatient Medications  amLODIPine   Tablet 5 milliGRAM(s) Oral daily  ascorbic acid 250 milliGRAM(s) Oral daily  aspirin enteric coated 81 milliGRAM(s) Oral daily  brimonidine 0.2% Ophthalmic Solution 1 Drop(s) Both EYES two times a day  clotrimazole 1% Cream 1 Application(s) Topical two times a day  dextrose 5%. 1000 milliLiter(s) IV Continuous <Continuous>  dextrose 50% Injectable 12.5 Gram(s) IV Push once  dextrose 50% Injectable 25 Gram(s) IV Push once  dextrose 50% Injectable 25 Gram(s) IV Push once  docusate sodium 100 milliGRAM(s) Oral two times a day  dorzolamide 2% Ophthalmic Solution 1 Drop(s) Both EYES <User Schedule>  epoetin keagan Injectable 32991 Unit(s) IV Push <User Schedule>  furosemide    Tablet 80 milliGRAM(s) Oral two times a day  heparin  Injectable 5000 Unit(s) SubCutaneous every 8 hours  hydrocortisone 2.5% Cream 1 Application(s) Topical two times a day  insulin glargine Injectable (LANTUS) 30 Unit(s) SubCutaneous at bedtime  insulin lispro (HumaLOG) corrective regimen sliding scale   SubCutaneous three times a day before meals  ketorolac 0.5% Ophthalmic Solution 1 Drop(s) Right EYE two times a day  latanoprost 0.005% Ophthalmic Solution 1 Drop(s) Left EYE at bedtime  metolazone 5 milliGRAM(s) Oral daily  metoprolol tartrate 25 milliGRAM(s) Oral two times a day  pregabalin 50 milliGRAM(s) Oral daily  sertraline 100 milliGRAM(s) Oral daily  sevelamer hydrochloride 800 milliGRAM(s) Oral three times a day  simvastatin 10 milliGRAM(s) Oral at bedtime  tamsulosin 0.4 milliGRAM(s) Oral at bedtime  timolol 0.5% Solution 1 Drop(s) Both EYES two times a day  traZODone 100 milliGRAM(s) Oral at bedtime    PRN Inpatient Medications  acetaminophen   Tablet. 650 milliGRAM(s) Oral every 6 hours PRN  ALBUTerol/ipratropium for Nebulization 3 milliLiter(s) Nebulizer every 6 hours PRN  dextrose 40% Gel 15 Gram(s) Oral once PRN  glucagon  Injectable 1 milliGRAM(s) IntraMuscular once PRN  hydrOXYzine hydrochloride 25 milliGRAM(s) Oral every 6 hours PRN  ondansetron Injectable 4 milliGRAM(s) IV Push every 6 hours PRN  oxyCODONE    5 mG/acetaminophen 325 mG 1 Tablet(s) Oral every 4 hours PRN  temazepam 15 milliGRAM(s) Oral at bedtime PRN  zolpidem 5 milliGRAM(s) Oral at bedtime PRN  zolpidem 5 milliGRAM(s) Oral at bedtime PRN    VITALS/PHYSICAL EXAM  --------------------------------------------------------------------------------  T(C): 36.7 (06-07-18 @ 05:32), Max: 36.7 (06-07-18 @ 05:32)  HR: 70 (06-07-18 @ 05:32) (68 - 76)  BP: 118/50 (06-07-18 @ 05:32) (114/56 - 123/57)  RR: 16 (06-07-18 @ 05:32) (16 - 18)    06-06-18 @ 07:01  -  06-07-18 @ 07:00  --------------------------------------------------------  IN: 0 mL / OUT: 4000 mL / NET: -4000 mL    Physical Exam:  	Gen: NAD  	Pulm: CTA B/L  	CV: RRR, S1S2  	Abd: +BS, soft, nontender/nondistended  	: No suprapubic tenderness  	LE: Warm,  no edema  	Vascular access: AVF    LABS/STUDIES  --------------------------------------------------------------------------------    Creatinine Trend:  SCr 7.0 [06-05 @ 07:35]  SCr 5.9 [06-02 @ 07:19]  SCr 6.8 [05-31 @ 21:40]  SCr 6.5 [05-31 @ 14:21]  SCr 5.5 [05-30 @ 18:23]    Urinalysis - [05-31-18 @ 16:45]      Color Yellow / Appearance Clear / SG 1.015 / pH 7.5      Gluc Negative / Ketone Negative  / Bili Negative / Urobili 0.2       Blood Trace-lysed / Protein 100 / Leuk Est Negative / Nitrite Negative      RBC 1-2 / WBC 1-2 / Hyaline  / Gran  / Sq Epi  / Non Sq Epi Occasional / Bacteria See Note    HbA1c 8.6      [06-01-18 @ 07:22]

## 2018-06-07 NOTE — PROGRESS NOTE ADULT - ASSESSMENT
1. ESRD on HD MWF HD tomorrow: 3 hours, opti 160 dialyzer, 2K bath, 4L UF.  2. Anemia. EPO 10,000 units IV with HD.  3. Hyperphosphatemia. Renal diet. Sevelamer with meals.  4. Hyperkalemia. Resolved with HD.  5. HTN. Continue amlodipine and metoprolol.

## 2018-06-07 NOTE — PROGRESS NOTE ADULT - ASSESSMENT
53 year old Male with PMH of ESRD on HD, HTN, DM, Bipolar disease sent in from [facility] with leukocytosis, WBC 17k for further evaluation.  In the ED his vital signs were stable, labs showed WBC 12K, CXR was normal, UA negative, and patient had no complaints. He was however noted to have elevated Troponin of 0.18. Patient was admitted to rule out sepsis and ACS.     Assessment and Plan:  1. Anxiety and Depressed Mood:   Continue with Zoloft.  Psych consult: recommended increasing Zoloft to 100mg and Trazadone to 100mg daily, Atarax q 6hrs prn.   Symptoms improved, agitation improved, one to one discontinued on 6/3/18.     2. Elevated Troponin: likely due to ESRD. ACS ruled out.  No chest pain, Troponin Stable.  Continue ASA, discontinue Telemetry.   EKG: no acute ST-T changes. Patient asymptomatic.   No ECHO or Cardiology consult seen.      3. Leukocytosis: resolved. Sepsis ruled out  WBC 8K, no signs of infection.  CXR no acute infiltrates.   Blood culture: no growth to date.        4. ESRD: with Anemia and Hyperphosphatemia  Continue with HD via left AVF: Mon, Wed, Fri.  Nephrology follow up.  Hyperphosphatemia. Renal diet. Sevelamer with meals.  EPO 10,000 units IV with HD.        5. Urinary Retention: Improved.   UA no UTI,   Continue on Tamsulosin 0.4mg daily.       6. HTN:   Continue Norvasc, Metoprolol, Lasix/Metolazone.        7. DM type2:   Continue Lantus and HISS.   Doses adjusted to prevent Hypoglycemia.  Hemoglobin A1C, Whole Blood: 8.6 % (06.01.18 @ 07:22)      8. Chronic normocytic anemia:   likely due to CKD. On Epoetin during HD.       9. Chronic Pain:   On Percocet  Lyrica dose decreased from 100mg BID to 50mg daily adjusted for ESRD.         DVT PPX: Heparin SC.   Disposition: [facility] pending Authorization.

## 2018-06-07 NOTE — PROGRESS NOTE ADULT - PROVIDER SPECIALTY LIST ADULT
Hospitalist
Nephrology
Hospitalist

## 2018-06-07 NOTE — PROGRESS NOTE ADULT - SUBJECTIVE AND OBJECTIVE BOX
[place] HAMM  53y  Male    Patient is a 53y old  Male admitted for evaluation of Leukocytosis. (04 Jun 2018 12:28)      INTERVAL HPI/OVERNIGHT EVENTS:  No overnight events. Patient has no new complaints.      REVIEW OF SYSTEMS:  CONSTITUTIONAL: No fever, weight loss, or fatigue  EYES: No eye pain, visual disturbances, or discharge  ENMT:  No difficulty hearing, tinnitus, vertigo; No sinus or throat pain  NECK: No pain or stiffness  BREASTS: No pain, masses, or nipple discharge  RESPIRATORY: No cough, wheezing, chills or hemoptysis; No shortness of breath  CARDIOVASCULAR: No chest pain, palpitations, dizziness, or leg swelling  GASTROINTESTINAL: No abdominal or epigastric pain. No nausea, vomiting, or hematemesis; No diarrhea or constipation. No melena or hematochezia.  GENITOURINARY: No dysuria, frequency, hematuria, or incontinence  NEUROLOGICAL: No headaches, memory loss, loss of strength, numbness, or tremors  SKIN: No itching, burning, rashes, or lesions   LYMPH NODES: No enlarged glands  ENDOCRINE: No heat or cold intolerance; No hair loss  MUSCULOSKELETAL: No joint pain or swelling; No muscle, back, or extremity pain  PSYCHIATRIC: No depression, + anxiety, No mood swings, or difficulty sleeping  HEME/LYMPH: No easy bruising, or bleeding gums  ALLERGY AND IMMUNOLOGIC: No hives or eczema      VITALS:  T(C): 36.7 (07 Jun 2018 05:32), Max: 36.7 (07 Jun 2018 05:32)  T(F): 98 (07 Jun 2018 05:32), Max: 98 (07 Jun 2018 05:32)  HR: 70 (07 Jun 2018 05:32) (66 - 76)  BP: 118/50 (07 Jun 2018 05:32) (114/56 - 139/62)  BP(mean): --  RR: 16 (07 Jun 2018 05:32) (16 - 18)  SpO2: --      CAPILLARY BLOOD GLUCOSE  83 (07 Jun 2018 11:50)  98 (07 Jun 2018 08:16)  222 (06 Jun 2018 21:00)  180 (06 Jun 2018 17:31)        PHYSICAL EXAM:  GENERAL: NAD  HEAD:  Atraumatic, Normocephalic  EYES: EOMI, PERRLA, conjunctiva and sclera clear  ENMT: Moist mucous membranes  NECK: Supple, Normal thyroid  NERVOUS SYSTEM:  Alert & Oriented X3, Good concentration; Motor Strength 5/5 B/L upper and lower extremities  CHEST/LUNG: Clear to auscultation bilaterally; No rales, rhonchi, wheezing, or rubs  HEART: Regular rate and rhythm; No murmurs, rubs, or gallops  ABDOMEN: Soft, Nontender, Nondistended; Bowel sounds present  EXTREMITIES: Rt BKA, No clubbing, cyanosis, Left pedal edema, LUE AVF.  LYMPH: No lymphadenopathy noted  SKIN: No rashes or lesions    Consultant(s) Notes Reviewed:  [x ] YES  [ ] NO  Care Discussed with Consultants/Other Providers [ x] YES  [ ] NO    LABS:                        8.2    8.65  )-----------( 282      ( 05 Jun 2018 07:35 )             27.8     06-05    143  |  100  |  68<HH>  ----------------------------<  73  4.8   |  29  |  7.0<HH>    Ca    8.3<L>      05 Jun 2018 07:35      MICROBIOLOGY:  Culture - Blood (05.31.18 @ 14:23)    Specimen Source: .Blood     Culture Results: No growth at 5 days.    Culture - Blood (05.31.18 @ 14:21)    Specimen Source: .Blood     Culture Results: No growth at 5 days.        RADIOLOGY & ADDITIONAL TESTS:  X-ray Chest 1 View AP/PA (05.31.18 @ 13:57)  No radiographic evidence of acute pulmonary disease.      X-ray Chest 1 View- PORTABLE-Urgent (05.30.18 @ 18:57)   Enlarged/magnified cardiac silhouette.        CT Head No Cont (05.30.18 @ 22:27)   No acute intracranial hemorrhage or midline shift.        Imaging Personally Reviewed:  [x] YES  [ ] NO    MEDICATIONS  (STANDING):  amLODIPine   Tablet 5 milliGRAM(s) Oral daily  ascorbic acid 250 milliGRAM(s) Oral daily  aspirin enteric coated 81 milliGRAM(s) Oral daily  brimonidine 0.2% Ophthalmic Solution 1 Drop(s) Both EYES two times a day  clotrimazole 1% Cream 1 Application(s) Topical two times a day  dextrose 5%. 1000 milliLiter(s) (50 mL/Hr) IV Continuous <Continuous>  dextrose 50% Injectable 12.5 Gram(s) IV Push once  dextrose 50% Injectable 25 Gram(s) IV Push once  dextrose 50% Injectable 25 Gram(s) IV Push once  docusate sodium 100 milliGRAM(s) Oral two times a day  dorzolamide 2% Ophthalmic Solution 1 Drop(s) Both EYES <User Schedule>  epoetin keagan Injectable 42771 Unit(s) IV Push <User Schedule>  furosemide    Tablet 80 milliGRAM(s) Oral two times a day  heparin  Injectable 5000 Unit(s) SubCutaneous every 8 hours  hydrocortisone 2.5% Cream 1 Application(s) Topical two times a day  insulin glargine Injectable (LANTUS) 40 Unit(s) SubCutaneous at bedtime  insulin lispro (HumaLOG) corrective regimen sliding scale   SubCutaneous three times a day before meals  insulin lispro (HumaLOG) corrective regimen sliding scale   SubCutaneous at bedtime  ketorolac 0.5% Ophthalmic Solution 1 Drop(s) Right EYE two times a day  latanoprost 0.005% Ophthalmic Solution 1 Drop(s) Left EYE at bedtime  metolazone 5 milliGRAM(s) Oral daily  metoprolol tartrate 25 milliGRAM(s) Oral two times a day  pregabalin 50 milliGRAM(s) Oral daily  sertraline 100 milliGRAM(s) Oral daily  sevelamer hydrochloride 800 milliGRAM(s) Oral three times a day  simvastatin 10 milliGRAM(s) Oral at bedtime  tamsulosin 0.4 milliGRAM(s) Oral at bedtime  timolol 0.5% Solution 1 Drop(s) Both EYES two times a day  traZODone 100 milliGRAM(s) Oral at bedtime    MEDICATIONS  (PRN):  acetaminophen   Tablet. 650 milliGRAM(s) Oral every 6 hours PRN Mild Pain (1 - 3)  ALBUTerol/ipratropium for Nebulization 3 milliLiter(s) Nebulizer every 6 hours PRN Shortness of Breath and/or Wheezing  dextrose 40% Gel 15 Gram(s) Oral once PRN Blood Glucose LESS THAN 70 milliGRAM(s)/deciliter  glucagon  Injectable 1 milliGRAM(s) IntraMuscular once PRN Glucose LESS THAN 70 milligrams/deciliter  hydrOXYzine hydrochloride 25 milliGRAM(s) Oral every 6 hours PRN Agitation  oxyCODONE    5 mG/acetaminophen 325 mG 1 Tablet(s) Oral every 4 hours PRN for moderate pain  temazepam 15 milliGRAM(s) Oral at bedtime PRN Insomnia  zolpidem 5 milliGRAM(s) Oral at bedtime PRN Insomnia        HEALTH ISSUES - PROBLEM Dx:  Anxiety  Adjustment insomnia  Depressed mood  Bilateral ocular hypertension  Anemia, unspecified type  Hypertension, unspecified type  Pain disorder  Bipolar affective disorder, remission status unspecified  Leukocytosis, unspecified type  End stage renal disease  Elevated troponin

## 2018-06-12 DIAGNOSIS — E87.5 HYPERKALEMIA: ICD-10-CM

## 2018-06-12 DIAGNOSIS — Z89.511 ACQUIRED ABSENCE OF RIGHT LEG BELOW KNEE: ICD-10-CM

## 2018-06-12 DIAGNOSIS — G89.29 OTHER CHRONIC PAIN: ICD-10-CM

## 2018-06-12 DIAGNOSIS — R33.9 RETENTION OF URINE, UNSPECIFIED: ICD-10-CM

## 2018-06-12 DIAGNOSIS — E66.9 OBESITY, UNSPECIFIED: ICD-10-CM

## 2018-06-12 DIAGNOSIS — F31.9 BIPOLAR DISORDER, UNSPECIFIED: ICD-10-CM

## 2018-06-12 DIAGNOSIS — E11.649 TYPE 2 DIABETES MELLITUS WITH HYPOGLYCEMIA WITHOUT COMA: ICD-10-CM

## 2018-06-12 DIAGNOSIS — R94.31 ABNORMAL ELECTROCARDIOGRAM [ECG] [EKG]: ICD-10-CM

## 2018-06-12 DIAGNOSIS — D72.829 ELEVATED WHITE BLOOD CELL COUNT, UNSPECIFIED: ICD-10-CM

## 2018-06-12 DIAGNOSIS — E11.22 TYPE 2 DIABETES MELLITUS WITH DIABETIC CHRONIC KIDNEY DISEASE: ICD-10-CM

## 2018-06-12 DIAGNOSIS — D63.1 ANEMIA IN CHRONIC KIDNEY DISEASE: ICD-10-CM

## 2018-06-12 DIAGNOSIS — G47.00 INSOMNIA, UNSPECIFIED: ICD-10-CM

## 2018-06-12 DIAGNOSIS — R41.82 ALTERED MENTAL STATUS, UNSPECIFIED: ICD-10-CM

## 2018-06-12 DIAGNOSIS — K59.00 CONSTIPATION, UNSPECIFIED: ICD-10-CM

## 2018-06-12 DIAGNOSIS — E83.39 OTHER DISORDERS OF PHOSPHORUS METABOLISM: ICD-10-CM

## 2018-06-12 DIAGNOSIS — I12.0 HYPERTENSIVE CHRONIC KIDNEY DISEASE WITH STAGE 5 CHRONIC KIDNEY DISEASE OR END STAGE RENAL DISEASE: ICD-10-CM

## 2018-06-12 DIAGNOSIS — R79.89 OTHER SPECIFIED ABNORMAL FINDINGS OF BLOOD CHEMISTRY: ICD-10-CM

## 2018-06-12 DIAGNOSIS — E78.5 HYPERLIPIDEMIA, UNSPECIFIED: ICD-10-CM

## 2018-06-12 DIAGNOSIS — F41.9 ANXIETY DISORDER, UNSPECIFIED: ICD-10-CM

## 2018-06-12 DIAGNOSIS — N18.6 END STAGE RENAL DISEASE: ICD-10-CM

## 2018-06-12 DIAGNOSIS — H40.053 OCULAR HYPERTENSION, BILATERAL: ICD-10-CM

## 2018-06-12 DIAGNOSIS — R21 RASH AND OTHER NONSPECIFIC SKIN ERUPTION: ICD-10-CM

## 2018-06-12 DIAGNOSIS — F51.02 ADJUSTMENT INSOMNIA: ICD-10-CM

## 2018-06-19 NOTE — ED ADULT TRIAGE NOTE - NS ED NURSE BANDS TYPE
Name band; I have personally seen and examined this patient.  I have fully participated in the care of this patient. I have reviewed all pertinent clinical information, including history, physical exam, plan and the Resident’s note and agree except as noted.

## 2018-06-28 ENCOUNTER — OUTPATIENT (OUTPATIENT)
Dept: OUTPATIENT SERVICES | Facility: HOSPITAL | Age: 53
LOS: 1 days | Discharge: HOME | End: 2018-06-28

## 2018-06-28 DIAGNOSIS — I77.0 ARTERIOVENOUS FISTULA, ACQUIRED: Chronic | ICD-10-CM

## 2018-06-28 DIAGNOSIS — Z89.519 ACQUIRED ABSENCE OF UNSPECIFIED LEG BELOW KNEE: Chronic | ICD-10-CM

## 2018-06-28 DIAGNOSIS — Z98.83 FILTERING (VITREOUS) BLEB AFTER GLAUCOMA SURGERY STATUS: Chronic | ICD-10-CM

## 2018-07-05 DIAGNOSIS — Z79.899 OTHER LONG TERM (CURRENT) DRUG THERAPY: ICD-10-CM

## 2018-08-09 ENCOUNTER — OUTPATIENT (OUTPATIENT)
Dept: OUTPATIENT SERVICES | Facility: HOSPITAL | Age: 53
LOS: 1 days | Discharge: HOME | End: 2018-08-09

## 2018-08-09 DIAGNOSIS — I77.0 ARTERIOVENOUS FISTULA, ACQUIRED: Chronic | ICD-10-CM

## 2018-08-09 DIAGNOSIS — Z98.83 FILTERING (VITREOUS) BLEB AFTER GLAUCOMA SURGERY STATUS: Chronic | ICD-10-CM

## 2018-08-09 DIAGNOSIS — Z89.519 ACQUIRED ABSENCE OF UNSPECIFIED LEG BELOW KNEE: Chronic | ICD-10-CM

## 2018-08-10 DIAGNOSIS — E10.9 TYPE 1 DIABETES MELLITUS WITHOUT COMPLICATIONS: ICD-10-CM

## 2018-08-10 PROBLEM — H54.7 UNSPECIFIED VISUAL LOSS: Chronic | Status: ACTIVE | Noted: 2018-02-27

## 2018-08-10 PROBLEM — G47.00 INSOMNIA, UNSPECIFIED: Chronic | Status: ACTIVE | Noted: 2018-05-31

## 2018-08-10 PROBLEM — D64.9 ANEMIA, UNSPECIFIED: Chronic | Status: ACTIVE | Noted: 2018-05-31

## 2018-08-10 PROBLEM — H40.053 OCULAR HYPERTENSION, BILATERAL: Chronic | Status: ACTIVE | Noted: 2018-05-31

## 2018-08-10 PROBLEM — R52 PAIN, UNSPECIFIED: Chronic | Status: ACTIVE | Noted: 2018-05-31

## 2018-08-10 PROBLEM — I10 ESSENTIAL (PRIMARY) HYPERTENSION: Chronic | Status: ACTIVE | Noted: 2018-02-27

## 2018-08-10 PROBLEM — F41.9 ANXIETY DISORDER, UNSPECIFIED: Chronic | Status: ACTIVE | Noted: 2018-05-31

## 2018-08-10 PROBLEM — E66.9 OBESITY, UNSPECIFIED: Chronic | Status: ACTIVE | Noted: 2018-05-31

## 2018-08-10 PROBLEM — F31.9 BIPOLAR DISORDER, UNSPECIFIED: Chronic | Status: ACTIVE | Noted: 2018-05-31

## 2018-08-10 PROBLEM — E11.9 TYPE 2 DIABETES MELLITUS WITHOUT COMPLICATIONS: Chronic | Status: ACTIVE | Noted: 2018-02-27

## 2018-09-01 ENCOUNTER — OUTPATIENT (OUTPATIENT)
Dept: OUTPATIENT SERVICES | Facility: HOSPITAL | Age: 53
LOS: 1 days | Discharge: HOME | End: 2018-09-01

## 2018-09-01 DIAGNOSIS — Z89.519 ACQUIRED ABSENCE OF UNSPECIFIED LEG BELOW KNEE: Chronic | ICD-10-CM

## 2018-09-01 DIAGNOSIS — A49.9 BACTERIAL INFECTION, UNSPECIFIED: ICD-10-CM

## 2018-09-01 DIAGNOSIS — Z98.83 FILTERING (VITREOUS) BLEB AFTER GLAUCOMA SURGERY STATUS: Chronic | ICD-10-CM

## 2018-09-01 DIAGNOSIS — I77.0 ARTERIOVENOUS FISTULA, ACQUIRED: Chronic | ICD-10-CM

## 2018-10-27 ENCOUNTER — OUTPATIENT (OUTPATIENT)
Dept: OUTPATIENT SERVICES | Facility: HOSPITAL | Age: 53
LOS: 1 days | Discharge: HOME | End: 2018-10-27

## 2018-10-27 DIAGNOSIS — I77.0 ARTERIOVENOUS FISTULA, ACQUIRED: Chronic | ICD-10-CM

## 2018-10-27 DIAGNOSIS — Z98.83 FILTERING (VITREOUS) BLEB AFTER GLAUCOMA SURGERY STATUS: Chronic | ICD-10-CM

## 2018-10-27 DIAGNOSIS — Z89.519 ACQUIRED ABSENCE OF UNSPECIFIED LEG BELOW KNEE: Chronic | ICD-10-CM

## 2018-10-30 DIAGNOSIS — Z51.81 ENCOUNTER FOR THERAPEUTIC DRUG LEVEL MONITORING: ICD-10-CM

## 2018-11-13 ENCOUNTER — OUTPATIENT (OUTPATIENT)
Dept: OUTPATIENT SERVICES | Facility: HOSPITAL | Age: 53
LOS: 1 days | Discharge: HOME | End: 2018-11-13

## 2018-11-13 DIAGNOSIS — I77.0 ARTERIOVENOUS FISTULA, ACQUIRED: Chronic | ICD-10-CM

## 2018-11-13 DIAGNOSIS — Z98.83 FILTERING (VITREOUS) BLEB AFTER GLAUCOMA SURGERY STATUS: Chronic | ICD-10-CM

## 2018-11-13 DIAGNOSIS — R68.89 OTHER GENERAL SYMPTOMS AND SIGNS: ICD-10-CM

## 2018-11-13 DIAGNOSIS — Z89.519 ACQUIRED ABSENCE OF UNSPECIFIED LEG BELOW KNEE: Chronic | ICD-10-CM

## 2018-11-27 ENCOUNTER — APPOINTMENT (OUTPATIENT)
Dept: PODIATRY | Facility: CLINIC | Age: 53
End: 2018-11-27

## 2018-11-29 ENCOUNTER — OUTPATIENT (OUTPATIENT)
Dept: OUTPATIENT SERVICES | Facility: HOSPITAL | Age: 53
LOS: 1 days | Discharge: HOME | End: 2018-11-29

## 2018-11-29 DIAGNOSIS — I77.0 ARTERIOVENOUS FISTULA, ACQUIRED: Chronic | ICD-10-CM

## 2018-11-29 DIAGNOSIS — Z98.83 FILTERING (VITREOUS) BLEB AFTER GLAUCOMA SURGERY STATUS: Chronic | ICD-10-CM

## 2018-11-29 DIAGNOSIS — Z89.519 ACQUIRED ABSENCE OF UNSPECIFIED LEG BELOW KNEE: Chronic | ICD-10-CM

## 2018-11-30 DIAGNOSIS — R79.9 ABNORMAL FINDING OF BLOOD CHEMISTRY, UNSPECIFIED: ICD-10-CM

## 2018-12-06 NOTE — BRIEF OPERATIVE NOTE - PROCEDURE POST
<<-----Click on this checkbox to enter Post-Op Dx
SCM

## 2018-12-13 ENCOUNTER — OUTPATIENT (OUTPATIENT)
Dept: OUTPATIENT SERVICES | Facility: HOSPITAL | Age: 53
LOS: 1 days | Discharge: HOME | End: 2018-12-13

## 2018-12-13 DIAGNOSIS — Z89.519 ACQUIRED ABSENCE OF UNSPECIFIED LEG BELOW KNEE: Chronic | ICD-10-CM

## 2018-12-13 DIAGNOSIS — I77.0 ARTERIOVENOUS FISTULA, ACQUIRED: Chronic | ICD-10-CM

## 2018-12-13 DIAGNOSIS — Z98.83 FILTERING (VITREOUS) BLEB AFTER GLAUCOMA SURGERY STATUS: Chronic | ICD-10-CM

## 2018-12-17 DIAGNOSIS — R50.9 FEVER, UNSPECIFIED: ICD-10-CM

## 2018-12-17 DIAGNOSIS — R78.81 BACTEREMIA: ICD-10-CM

## 2019-03-11 ENCOUNTER — INPATIENT (INPATIENT)
Facility: HOSPITAL | Age: 54
LOS: 8 days | Discharge: NURSING, REHAB OR LONG-TERM CARE | End: 2019-03-20
Attending: INTERNAL MEDICINE | Admitting: INTERNAL MEDICINE

## 2019-03-11 ENCOUNTER — OUTPATIENT (OUTPATIENT)
Dept: OUTPATIENT SERVICES | Facility: HOSPITAL | Age: 54
LOS: 1 days | Discharge: HOME | End: 2019-03-11

## 2019-03-11 VITALS
HEART RATE: 65 BPM | OXYGEN SATURATION: 94 % | TEMPERATURE: 98 F | DIASTOLIC BLOOD PRESSURE: 74 MMHG | RESPIRATION RATE: 22 BRPM | WEIGHT: 300.05 LBS | SYSTOLIC BLOOD PRESSURE: 176 MMHG

## 2019-03-11 DIAGNOSIS — E11.9 TYPE 2 DIABETES MELLITUS WITHOUT COMPLICATIONS: ICD-10-CM

## 2019-03-11 DIAGNOSIS — F41.9 ANXIETY DISORDER, UNSPECIFIED: ICD-10-CM

## 2019-03-11 DIAGNOSIS — I77.0 ARTERIOVENOUS FISTULA, ACQUIRED: Chronic | ICD-10-CM

## 2019-03-11 DIAGNOSIS — Z98.83 FILTERING (VITREOUS) BLEB AFTER GLAUCOMA SURGERY STATUS: Chronic | ICD-10-CM

## 2019-03-11 DIAGNOSIS — Z89.519 ACQUIRED ABSENCE OF UNSPECIFIED LEG BELOW KNEE: Chronic | ICD-10-CM

## 2019-03-11 DIAGNOSIS — R94.5 ABNORMAL RESULTS OF LIVER FUNCTION STUDIES: ICD-10-CM

## 2019-03-11 DIAGNOSIS — D64.9 ANEMIA, UNSPECIFIED: ICD-10-CM

## 2019-03-11 DIAGNOSIS — J90 PLEURAL EFFUSION, NOT ELSEWHERE CLASSIFIED: ICD-10-CM

## 2019-03-11 DIAGNOSIS — R74.8 ABNORMAL LEVELS OF OTHER SERUM ENZYMES: ICD-10-CM

## 2019-03-11 DIAGNOSIS — Z99.2 DEPENDENCE ON RENAL DIALYSIS: ICD-10-CM

## 2019-03-11 DIAGNOSIS — I10 ESSENTIAL (PRIMARY) HYPERTENSION: ICD-10-CM

## 2019-03-11 DIAGNOSIS — N18.6 END STAGE RENAL DISEASE: ICD-10-CM

## 2019-03-11 DIAGNOSIS — F31.9 BIPOLAR DISORDER, UNSPECIFIED: ICD-10-CM

## 2019-03-11 DIAGNOSIS — A09 INFECTIOUS GASTROENTERITIS AND COLITIS, UNSPECIFIED: ICD-10-CM

## 2019-03-11 LAB
ALBUMIN SERPL ELPH-MCNC: 4 G/DL — SIGNIFICANT CHANGE UP (ref 3.5–5.2)
ALP SERPL-CCNC: 120 U/L — HIGH (ref 30–115)
ALT FLD-CCNC: <5 U/L — SIGNIFICANT CHANGE UP (ref 0–41)
ANION GAP SERPL CALC-SCNC: 12 MMOL/L — SIGNIFICANT CHANGE UP (ref 7–14)
AST SERPL-CCNC: 6 U/L — SIGNIFICANT CHANGE UP (ref 0–41)
BILIRUB SERPL-MCNC: 0.4 MG/DL — SIGNIFICANT CHANGE UP (ref 0.2–1.2)
BUN SERPL-MCNC: 54 MG/DL — HIGH (ref 10–20)
CALCIUM SERPL-MCNC: 8.7 MG/DL — SIGNIFICANT CHANGE UP (ref 8.5–10.1)
CHLORIDE SERPL-SCNC: 99 MMOL/L — SIGNIFICANT CHANGE UP (ref 98–110)
CO2 SERPL-SCNC: 28 MMOL/L — SIGNIFICANT CHANGE UP (ref 17–32)
CREAT SERPL-MCNC: 6.5 MG/DL — CRITICAL HIGH (ref 0.7–1.5)
GLUCOSE BLDC GLUCOMTR-MCNC: 108 MG/DL — HIGH (ref 70–99)
GLUCOSE BLDC GLUCOMTR-MCNC: 125 MG/DL — HIGH (ref 70–99)
GLUCOSE SERPL-MCNC: 141 MG/DL — HIGH (ref 70–99)
HCT VFR BLD CALC: 31.3 % — LOW (ref 42–52)
HGB BLD-MCNC: 8.5 G/DL — LOW (ref 14–18)
MCHC RBC-ENTMCNC: 23.9 PG — LOW (ref 27–31)
MCHC RBC-ENTMCNC: 27.2 G/DL — LOW (ref 32–37)
MCV RBC AUTO: 88.2 FL — SIGNIFICANT CHANGE UP (ref 80–94)
NRBC # BLD: 0 /100 WBCS — SIGNIFICANT CHANGE UP (ref 0–0)
PLATELET # BLD AUTO: 263 K/UL — SIGNIFICANT CHANGE UP (ref 130–400)
POTASSIUM SERPL-MCNC: 5.2 MMOL/L — HIGH (ref 3.5–5)
POTASSIUM SERPL-SCNC: 5.2 MMOL/L — HIGH (ref 3.5–5)
PROT SERPL-MCNC: 7 G/DL — SIGNIFICANT CHANGE UP (ref 6–8)
RBC # BLD: 3.55 M/UL — LOW (ref 4.7–6.1)
RBC # FLD: 17 % — HIGH (ref 11.5–14.5)
SODIUM SERPL-SCNC: 139 MMOL/L — SIGNIFICANT CHANGE UP (ref 135–146)
TROPONIN T SERPL-MCNC: 0.29 NG/ML — CRITICAL HIGH
WBC # BLD: 10.67 K/UL — SIGNIFICANT CHANGE UP (ref 4.8–10.8)
WBC # FLD AUTO: 10.67 K/UL — SIGNIFICANT CHANGE UP (ref 4.8–10.8)

## 2019-03-11 RX ORDER — ASPIRIN/CALCIUM CARB/MAGNESIUM 324 MG
81 TABLET ORAL DAILY
Qty: 0 | Refills: 0 | Status: DISCONTINUED | OUTPATIENT
Start: 2019-03-11 | End: 2019-03-20

## 2019-03-11 RX ORDER — SODIUM CHLORIDE 9 MG/ML
1000 INJECTION INTRAMUSCULAR; INTRAVENOUS; SUBCUTANEOUS
Qty: 0 | Refills: 0 | Status: DISCONTINUED | OUTPATIENT
Start: 2019-03-11 | End: 2019-03-11

## 2019-03-11 RX ORDER — HEPARIN SODIUM 5000 [USP'U]/ML
5000 INJECTION INTRAVENOUS; SUBCUTANEOUS EVERY 12 HOURS
Qty: 0 | Refills: 0 | Status: DISCONTINUED | OUTPATIENT
Start: 2019-03-11 | End: 2019-03-20

## 2019-03-11 RX ORDER — ASPIRIN/CALCIUM CARB/MAGNESIUM 324 MG
243 TABLET ORAL ONCE
Qty: 0 | Refills: 0 | Status: COMPLETED | OUTPATIENT
Start: 2019-03-11 | End: 2019-03-11

## 2019-03-11 RX ORDER — DOCUSATE SODIUM 100 MG
100 CAPSULE ORAL
Qty: 0 | Refills: 0 | Status: DISCONTINUED | OUTPATIENT
Start: 2019-03-11 | End: 2019-03-20

## 2019-03-11 RX ORDER — LATANOPROST 0.05 MG/ML
1 SOLUTION/ DROPS OPHTHALMIC; TOPICAL AT BEDTIME
Qty: 0 | Refills: 0 | Status: DISCONTINUED | OUTPATIENT
Start: 2019-03-11 | End: 2019-03-20

## 2019-03-11 RX ORDER — BRIMONIDINE TARTRATE 2 MG/MG
1 SOLUTION/ DROPS OPHTHALMIC
Qty: 0 | Refills: 0 | Status: DISCONTINUED | OUTPATIENT
Start: 2019-03-11 | End: 2019-03-20

## 2019-03-11 RX ORDER — ALBUTEROL 90 UG/1
2 AEROSOL, METERED ORAL EVERY 6 HOURS
Qty: 0 | Refills: 0 | Status: DISCONTINUED | OUTPATIENT
Start: 2019-03-11 | End: 2019-03-20

## 2019-03-11 RX ORDER — FUROSEMIDE 40 MG
40 TABLET ORAL ONCE
Qty: 0 | Refills: 0 | Status: COMPLETED | OUTPATIENT
Start: 2019-03-11 | End: 2019-03-11

## 2019-03-11 RX ORDER — SEVELAMER CARBONATE 2400 MG/1
800 POWDER, FOR SUSPENSION ORAL THREE TIMES A DAY
Qty: 0 | Refills: 0 | Status: DISCONTINUED | OUTPATIENT
Start: 2019-03-11 | End: 2019-03-20

## 2019-03-11 RX ORDER — ACETAMINOPHEN 500 MG
650 TABLET ORAL EVERY 6 HOURS
Qty: 0 | Refills: 0 | Status: DISCONTINUED | OUTPATIENT
Start: 2019-03-11 | End: 2019-03-20

## 2019-03-11 RX ORDER — PANTOPRAZOLE SODIUM 20 MG/1
40 TABLET, DELAYED RELEASE ORAL
Qty: 0 | Refills: 0 | Status: DISCONTINUED | OUTPATIENT
Start: 2019-03-11 | End: 2019-03-20

## 2019-03-11 RX ORDER — INSULIN GLARGINE 100 [IU]/ML
25 INJECTION, SOLUTION SUBCUTANEOUS AT BEDTIME
Qty: 0 | Refills: 0 | Status: DISCONTINUED | OUTPATIENT
Start: 2019-03-11 | End: 2019-03-13

## 2019-03-11 RX ORDER — FUROSEMIDE 40 MG
80 TABLET ORAL
Qty: 0 | Refills: 0 | Status: DISCONTINUED | OUTPATIENT
Start: 2019-03-11 | End: 2019-03-20

## 2019-03-11 RX ORDER — ZOLPIDEM TARTRATE 10 MG/1
5 TABLET ORAL AT BEDTIME
Qty: 0 | Refills: 0 | Status: DISCONTINUED | OUTPATIENT
Start: 2019-03-11 | End: 2019-03-18

## 2019-03-11 RX ORDER — METOPROLOL TARTRATE 50 MG
25 TABLET ORAL
Qty: 0 | Refills: 0 | Status: DISCONTINUED | OUTPATIENT
Start: 2019-03-11 | End: 2019-03-20

## 2019-03-11 RX ORDER — PANTOPRAZOLE SODIUM 20 MG/1
40 TABLET, DELAYED RELEASE ORAL
Qty: 0 | Refills: 0 | Status: DISCONTINUED | OUTPATIENT
Start: 2019-03-11 | End: 2019-03-11

## 2019-03-11 RX ORDER — SERTRALINE 25 MG/1
100 TABLET, FILM COATED ORAL DAILY
Qty: 0 | Refills: 0 | Status: DISCONTINUED | OUTPATIENT
Start: 2019-03-11 | End: 2019-03-20

## 2019-03-11 RX ORDER — SIMVASTATIN 20 MG/1
10 TABLET, FILM COATED ORAL AT BEDTIME
Qty: 0 | Refills: 0 | Status: DISCONTINUED | OUTPATIENT
Start: 2019-03-11 | End: 2019-03-20

## 2019-03-11 RX ORDER — OXYCODONE AND ACETAMINOPHEN 5; 325 MG/1; MG/1
1 TABLET ORAL EVERY 4 HOURS
Qty: 0 | Refills: 0 | Status: DISCONTINUED | OUTPATIENT
Start: 2019-03-11 | End: 2019-03-17

## 2019-03-11 RX ORDER — TIMOLOL 0.5 %
1 DROPS OPHTHALMIC (EYE)
Qty: 0 | Refills: 0 | Status: DISCONTINUED | OUTPATIENT
Start: 2019-03-11 | End: 2019-03-20

## 2019-03-11 RX ORDER — ASPIRIN/CALCIUM CARB/MAGNESIUM 324 MG
325 TABLET ORAL ONCE
Qty: 0 | Refills: 0 | Status: DISCONTINUED | OUTPATIENT
Start: 2019-03-11 | End: 2019-03-11

## 2019-03-11 RX ORDER — AMPICILLIN SODIUM AND SULBACTAM SODIUM 250; 125 MG/ML; MG/ML
3 INJECTION, POWDER, FOR SUSPENSION INTRAMUSCULAR; INTRAVENOUS ONCE
Qty: 0 | Refills: 0 | Status: DISCONTINUED | OUTPATIENT
Start: 2019-03-11 | End: 2019-03-11

## 2019-03-11 RX ORDER — TAMSULOSIN HYDROCHLORIDE 0.4 MG/1
0.4 CAPSULE ORAL AT BEDTIME
Qty: 0 | Refills: 0 | Status: DISCONTINUED | OUTPATIENT
Start: 2019-03-11 | End: 2019-03-20

## 2019-03-11 RX ORDER — INSULIN GLARGINE 100 [IU]/ML
40 INJECTION, SOLUTION SUBCUTANEOUS ONCE
Qty: 0 | Refills: 0 | Status: COMPLETED | OUTPATIENT
Start: 2019-03-11 | End: 2019-03-11

## 2019-03-11 RX ORDER — AMLODIPINE BESYLATE 2.5 MG/1
5 TABLET ORAL DAILY
Qty: 0 | Refills: 0 | Status: DISCONTINUED | OUTPATIENT
Start: 2019-03-11 | End: 2019-03-14

## 2019-03-11 RX ORDER — LANOLIN/MINERAL OIL
1 LOTION (ML) TOPICAL DAILY
Qty: 0 | Refills: 0 | Status: DISCONTINUED | OUTPATIENT
Start: 2019-03-11 | End: 2019-03-20

## 2019-03-11 RX ORDER — LACTULOSE 10 G/15ML
10 SOLUTION ORAL
Qty: 0 | Refills: 0 | Status: DISCONTINUED | OUTPATIENT
Start: 2019-03-11 | End: 2019-03-20

## 2019-03-11 RX ORDER — DORZOLAMIDE HYDROCHLORIDE 20 MG/ML
1 SOLUTION/ DROPS OPHTHALMIC THREE TIMES A DAY
Qty: 0 | Refills: 0 | Status: DISCONTINUED | OUTPATIENT
Start: 2019-03-11 | End: 2019-03-20

## 2019-03-11 RX ADMIN — INSULIN GLARGINE 40 UNIT(S): 100 INJECTION, SOLUTION SUBCUTANEOUS at 21:51

## 2019-03-11 RX ADMIN — LATANOPROST 1 DROP(S): 0.05 SOLUTION/ DROPS OPHTHALMIC; TOPICAL at 21:12

## 2019-03-11 RX ADMIN — SIMVASTATIN 10 MILLIGRAM(S): 20 TABLET, FILM COATED ORAL at 21:12

## 2019-03-11 RX ADMIN — Medication 40 MILLIGRAM(S): at 18:52

## 2019-03-11 RX ADMIN — Medication 243 MILLIGRAM(S): at 19:11

## 2019-03-11 RX ADMIN — TAMSULOSIN HYDROCHLORIDE 0.4 MILLIGRAM(S): 0.4 CAPSULE ORAL at 21:13

## 2019-03-11 RX ADMIN — SEVELAMER CARBONATE 800 MILLIGRAM(S): 2400 POWDER, FOR SUSPENSION ORAL at 21:13

## 2019-03-11 RX ADMIN — DORZOLAMIDE HYDROCHLORIDE 1 DROP(S): 20 SOLUTION/ DROPS OPHTHALMIC at 21:12

## 2019-03-11 NOTE — H&P ADULT - NEUROLOGICAL DETAILS
sensation intact/deep reflexes intact/no spontaneous movement/vision impaired  bilaterally/responds to verbal commands/normal strength/alert and oriented x 3

## 2019-03-11 NOTE — ED ADULT TRIAGE NOTE - CHIEF COMPLAINT QUOTE
kenna from [facility], sent for bilateral pleural effusions and CHF, c/o sob, uses oxygen 2-3 L nc at nursing home.

## 2019-03-11 NOTE — ED ADULT NURSE NOTE - PMH
Anemia, unspecified type    Anxiety disorder, unspecified type    Bilateral ocular hypertension    Bipolar affective disorder, remission status unspecified    Blind    Diabetes    End stage renal disease    HTN (hypertension)    Insomnia, unspecified type    Obesity, unspecified classification, unspecified obesity type, unspecified whether serious comorbidity present    Pain disorder Anemia, unspecified type    Anxiety disorder, unspecified type    Bilateral ocular hypertension    Bipolar affective disorder, remission status unspecified    Blind    Diabetes    Dialysis patient    End stage renal disease    HTN (hypertension)    Insomnia, unspecified type    Obesity, unspecified classification, unspecified obesity type, unspecified whether serious comorbidity present    Pain disorder

## 2019-03-11 NOTE — H&P ADULT - NSHPLABSRESULTS_GEN_ALL_CORE
8.5    10.67 )-----------( 263      ( 11 Mar 2019 17:50 )             31.3     03-11    139  |  99  |  54<H>  ----------------------------<  141<H>  5.2<H>   |  28  |  6.5<HH>    Ca    8.7      11 Mar 2019 17:50    TPro  7.0  /  Alb  4.0  /  TBili  0.4  /  DBili  x   /  AST  6   /  ALT  <5  /  AlkPhos  120<H>  03-11              Lactate Trend    CARDIAC MARKERS ( 11 Mar 2019 17:50 )  x     / 0.29 ng/mL / x     / x     / x          CAPILLARY BLOOD GLUCOSE

## 2019-03-11 NOTE — ED ADULT NURSE NOTE - NSIMPLEMENTINTERV_GEN_ALL_ED
Implemented All Fall with Harm Risk Interventions:  [place] to call system. Call bell, personal items and telephone within reach. Instruct patient to call for assistance. Room bathroom lighting operational. Non-slip footwear when patient is off stretcher. Physically safe environment: no spills, clutter or unnecessary equipment. Stretcher in lowest position, wheels locked, appropriate side rails in place. Provide visual cue, wrist band, yellow gown, etc. Monitor gait and stability. Monitor for mental status changes and reorient to person, place, and time. Review medications for side effects contributing to fall risk. Reinforce activity limits and safety measures with patient and family. Provide visual clues: red socks.

## 2019-03-11 NOTE — ED PROVIDER NOTE - OBJECTIVE STATEMENT
55yo M h/o ESRD last dialysis Saturday, here for SOB, reports x 2 days. Denies any cough, fever, chills, CP. Today CXR showed effusions so to ED. Pt also reports using  O2 at night but feels better using it at this time in the ER. 53yo M h/o ESRD last dialysis Saturday, here for SOB, reports x 2 days. Denies any cough, fever, chills, CP. Today CXR showed effusions so to ED. Pt also reports using  O2 at night but feels better using it at this time in the ER. pt hasn't missed dialysis. follows with Dr. Tejada

## 2019-03-11 NOTE — H&P ADULT - HISTORY OF PRESENT ILLNESS
· Chief Complaint: The patient is a 54y Male complaining of shortness of breath.	  · HPI Objective Statement: 53yo M h/o ESRD last dialysis Saturday, here for SOB, reports x 2 days. Denies any cough, fever, chills, CP. Today CXR showed effusions so to ED. Pt also reports using  O2 at night but feels better using it at this time in the ER.

## 2019-03-11 NOTE — H&P ADULT - GASTROINTESTINAL DETAILS
obese abdomen.. non tender obese abdomen.. non tender/bowel sounds normal/no rebound tenderness/no rigidity/no guarding

## 2019-03-11 NOTE — H&P ADULT - PMH
Anemia, unspecified type    Anxiety disorder, unspecified type    Bilateral ocular hypertension    Bipolar affective disorder, remission status unspecified    Blind    Diabetes    Dialysis patient    End stage renal disease    HTN (hypertension)    Insomnia, unspecified type    Obesity, unspecified classification, unspecified obesity type, unspecified whether serious comorbidity present    Pain disorder

## 2019-03-11 NOTE — ED ADULT NURSE NOTE - CHIEF COMPLAINT QUOTE
kenna from [facility], sent for bilateral pleural effusions and CHF, c/o sob, uses oxygen 2-3 L nc at nursing home.

## 2019-03-11 NOTE — ED PROVIDER NOTE - CLINICAL SUMMARY MEDICAL DECISION MAKING FREE TEXT BOX
pt with sob, bl pleural effusion. elevated trop noted. case discussed with intensivist. admit to low risk tele. pt makes urine, lasix given.

## 2019-03-11 NOTE — ED PROCEDURE NOTE - ATTENDING CONTRIBUTION TO CARE
. I was present for and supervised the key critical aspects of the procedures performed during the care of the patient.  pt with difficult access
. I was present for and supervised the key critical aspects of the procedures performed during the care of the patient.

## 2019-03-11 NOTE — H&P ADULT - ASSESSMENT
Patient is a 54y old  Male who presents with a chief complaint of                                                                                                                                                                                                                                                                                      HEALTH ISSUES - PROBLEM Dx: Patient is a 54y old  Male who presents with a chief complaint of  sob ---progressive   few days duration                                                                                                                                                                                                                                                                                    HEALTH ISSUES - PROBLEM Dx:

## 2019-03-11 NOTE — ED PROVIDER NOTE - NS ED ROS FT
Review of Systems    Constitutional: (-) fever/ chills (-) weight loss  Eyes/ENT: (-) blurry vision, (-) epistaxis (-) sore throat (-) ear pain  Cardiovascular: (-) chest pain, (-) syncope (-) palpitations  Respiratory: (-) cough, (+) shortness of breath  Gastrointestinal: (-) vomiting, (-) diarrhea (-) abdominal pain  Musculoskeletal: (-) neck pain, (-) back pain, (-) joint pain (-) pedal edema   Integumentary: (-) rash, (-) swelling  Neurological: (-) headache, (-) altered mental status

## 2019-03-11 NOTE — H&P ADULT - NSHPREVIEWOFSYSTEMS_GEN_ALL_CORE
· Review of Systems: Review of Systems    	Constitutional: (-) fever/ chills (-) weight loss  	Eyes/ENT: (-) blurry vision, (-) epistaxis (-) sore throat (-) ear pain  	Cardiovascular: (-) chest pain, (-) syncope (-) palpitations  	Respiratory: (-) cough, (+) shortness of breath  	Gastrointestinal: (-) vomiting, (-) diarrhea (-) abdominal pain  	Musculoskeletal: (-) neck pain, (-) back pain, (-) joint pain (-) pedal edema   	Integumentary: (-) rash, (-) swelling  Neurological: (-) headache, (-) altered mental status

## 2019-03-11 NOTE — ED PROVIDER NOTE - PHYSICAL EXAMINATION
VITAL SIGNS: I have reviewed nursing notes and confirm.  CONSTITUTIONAL: Well-developed; well-nourished; in no acute distress.  SKIN: Skin exam is warm and dry, no acute rash.  HEAD: Normocephalic; atraumatic.  EYES: PERRL, EOM intact; conjunctiva and sclera clear.  ENT: No nasal discharge; airway clear. TMs clear.  NECK: Supple; non tender.  CARD: S1, S2 normal; no murmurs, gallops, or rubs. Regular rate and rhythm.  RESP:  diminished breath sounds at bases, crackles at bases  ABD: Normal bowel sounds; soft; non-distended; non-tender; no hepatosplenomegaly.  EXT: Normal ROM. No clubbing, cyanosis or edema.  LYMPH: No acute cervical adenopathy.  NEURO: Alert, oriented. Grossly unremarkable. No focal deficits.  PSYCH: Cooperative, appropriate.

## 2019-03-12 DIAGNOSIS — Z02.9 ENCOUNTER FOR ADMINISTRATIVE EXAMINATIONS, UNSPECIFIED: ICD-10-CM

## 2019-03-12 LAB
ANION GAP SERPL CALC-SCNC: 16 MMOL/L — HIGH (ref 7–14)
BASE EXCESS BLDA CALC-SCNC: 2 MMOL/L — SIGNIFICANT CHANGE UP (ref -2–2)
BUN SERPL-MCNC: 70 MG/DL — CRITICAL HIGH (ref 10–20)
CALCIUM SERPL-MCNC: 8.3 MG/DL — LOW (ref 8.5–10.1)
CHLORIDE SERPL-SCNC: 103 MMOL/L — SIGNIFICANT CHANGE UP (ref 98–110)
CK MB CFR SERPL CALC: 4.4 NG/ML — SIGNIFICANT CHANGE UP (ref 0.6–6.3)
CK MB CFR SERPL CALC: 5.4 NG/ML — SIGNIFICANT CHANGE UP (ref 0.6–6.3)
CK SERPL-CCNC: 55 U/L — SIGNIFICANT CHANGE UP (ref 0–225)
CK SERPL-CCNC: 81 U/L — SIGNIFICANT CHANGE UP (ref 0–225)
CO2 SERPL-SCNC: 25 MMOL/L — SIGNIFICANT CHANGE UP (ref 17–32)
CREAT SERPL-MCNC: 7.7 MG/DL — CRITICAL HIGH (ref 0.7–1.5)
GAS PNL BLDA: SIGNIFICANT CHANGE UP
GAS PNL BLDA: SIGNIFICANT CHANGE UP
GLUCOSE BLDC GLUCOMTR-MCNC: 118 MG/DL — HIGH (ref 70–99)
GLUCOSE BLDC GLUCOMTR-MCNC: 118 MG/DL — HIGH (ref 70–99)
GLUCOSE BLDC GLUCOMTR-MCNC: 64 MG/DL — LOW (ref 70–99)
GLUCOSE BLDC GLUCOMTR-MCNC: 70 MG/DL — SIGNIFICANT CHANGE UP (ref 70–99)
GLUCOSE BLDC GLUCOMTR-MCNC: 71 MG/DL — SIGNIFICANT CHANGE UP (ref 70–99)
GLUCOSE BLDC GLUCOMTR-MCNC: 72 MG/DL — SIGNIFICANT CHANGE UP (ref 70–99)
GLUCOSE BLDC GLUCOMTR-MCNC: 94 MG/DL — SIGNIFICANT CHANGE UP (ref 70–99)
GLUCOSE SERPL-MCNC: 70 MG/DL — SIGNIFICANT CHANGE UP (ref 70–99)
HCO3 BLDA-SCNC: 29 MMOL/L — HIGH (ref 23–27)
HCT VFR BLD CALC: 29.5 % — LOW (ref 42–52)
HGB BLD-MCNC: 8.3 G/DL — LOW (ref 14–18)
HOROWITZ INDEX BLDA+IHG-RTO: 60 — SIGNIFICANT CHANGE UP
INR BLD: 1.3 RATIO — SIGNIFICANT CHANGE UP (ref 0.65–1.3)
MCHC RBC-ENTMCNC: 24.2 PG — LOW (ref 27–31)
MCHC RBC-ENTMCNC: 28.1 G/DL — LOW (ref 32–37)
MCV RBC AUTO: 86 FL — SIGNIFICANT CHANGE UP (ref 80–94)
NRBC # BLD: 0 /100 WBCS — SIGNIFICANT CHANGE UP (ref 0–0)
PCO2 BLDA: 57 MMHG — HIGH (ref 38–42)
PH BLDA: 7.31 — LOW (ref 7.38–7.42)
PLATELET # BLD AUTO: 243 K/UL — SIGNIFICANT CHANGE UP (ref 130–400)
PO2 BLDA: 114 MMHG — HIGH (ref 78–95)
POTASSIUM SERPL-MCNC: 5.6 MMOL/L — HIGH (ref 3.5–5)
POTASSIUM SERPL-SCNC: 5.6 MMOL/L — HIGH (ref 3.5–5)
PROTHROM AB SERPL-ACNC: 14.9 SEC — HIGH (ref 9.95–12.87)
RBC # BLD: 3.43 M/UL — LOW (ref 4.7–6.1)
RBC # FLD: SIGNIFICANT CHANGE UP % (ref 11.5–14.5)
SAO2 % BLDA: 98 % — SIGNIFICANT CHANGE UP (ref 94–98)
SODIUM SERPL-SCNC: 144 MMOL/L — SIGNIFICANT CHANGE UP (ref 135–146)
TROPONIN T SERPL-MCNC: 0.29 NG/ML — CRITICAL HIGH
TROPONIN T SERPL-MCNC: 0.31 NG/ML — CRITICAL HIGH
WBC # BLD: 12.63 K/UL — HIGH (ref 4.8–10.8)
WBC # FLD AUTO: 12.63 K/UL — HIGH (ref 4.8–10.8)

## 2019-03-12 RX ORDER — FUROSEMIDE 40 MG
60 TABLET ORAL ONCE
Qty: 0 | Refills: 0 | Status: COMPLETED | OUTPATIENT
Start: 2019-03-12 | End: 2019-03-12

## 2019-03-12 RX ORDER — ERYTHROPOIETIN 10000 [IU]/ML
10000 INJECTION, SOLUTION INTRAVENOUS; SUBCUTANEOUS
Qty: 0 | Refills: 0 | Status: DISCONTINUED | OUTPATIENT
Start: 2019-03-12 | End: 2019-03-20

## 2019-03-12 RX ORDER — HEPARIN SODIUM 5000 [USP'U]/ML
1000 INJECTION INTRAVENOUS; SUBCUTANEOUS ONCE
Qty: 0 | Refills: 0 | Status: DISCONTINUED | OUTPATIENT
Start: 2019-03-12 | End: 2019-03-20

## 2019-03-12 RX ORDER — DEXTROSE 50 % IN WATER 50 %
50 SYRINGE (ML) INTRAVENOUS ONCE
Qty: 0 | Refills: 0 | Status: COMPLETED | OUTPATIENT
Start: 2019-03-12 | End: 2019-03-12

## 2019-03-12 RX ORDER — FUROSEMIDE 40 MG
100 TABLET ORAL ONCE
Qty: 0 | Refills: 0 | Status: COMPLETED | OUTPATIENT
Start: 2019-03-12 | End: 2019-03-12

## 2019-03-12 RX ADMIN — ERYTHROPOIETIN 10000 UNIT(S): 10000 INJECTION, SOLUTION INTRAVENOUS; SUBCUTANEOUS at 14:39

## 2019-03-12 RX ADMIN — Medication 50 MILLILITER(S): at 22:38

## 2019-03-12 RX ADMIN — LATANOPROST 1 DROP(S): 0.05 SOLUTION/ DROPS OPHTHALMIC; TOPICAL at 21:02

## 2019-03-12 RX ADMIN — HEPARIN SODIUM 5000 UNIT(S): 5000 INJECTION INTRAVENOUS; SUBCUTANEOUS at 06:51

## 2019-03-12 RX ADMIN — SEVELAMER CARBONATE 800 MILLIGRAM(S): 2400 POWDER, FOR SUSPENSION ORAL at 06:38

## 2019-03-12 RX ADMIN — LACTULOSE 10 GRAM(S): 10 SOLUTION ORAL at 18:12

## 2019-03-12 RX ADMIN — Medication 80 MILLIGRAM(S): at 06:37

## 2019-03-12 RX ADMIN — DORZOLAMIDE HYDROCHLORIDE 1 DROP(S): 20 SOLUTION/ DROPS OPHTHALMIC at 21:01

## 2019-03-12 RX ADMIN — LACTULOSE 10 GRAM(S): 10 SOLUTION ORAL at 06:39

## 2019-03-12 RX ADMIN — Medication 100 MILLIGRAM(S): at 12:04

## 2019-03-12 RX ADMIN — Medication 1 DROP(S): at 18:13

## 2019-03-12 RX ADMIN — Medication 80 MILLIGRAM(S): at 18:11

## 2019-03-12 RX ADMIN — BRIMONIDINE TARTRATE 1 DROP(S): 2 SOLUTION/ DROPS OPHTHALMIC at 18:13

## 2019-03-12 RX ADMIN — Medication 25 MILLIGRAM(S): at 06:40

## 2019-03-12 RX ADMIN — PANTOPRAZOLE SODIUM 40 MILLIGRAM(S): 20 TABLET, DELAYED RELEASE ORAL at 06:41

## 2019-03-12 RX ADMIN — HEPARIN SODIUM 5000 UNIT(S): 5000 INJECTION INTRAVENOUS; SUBCUTANEOUS at 18:12

## 2019-03-12 RX ADMIN — DORZOLAMIDE HYDROCHLORIDE 1 DROP(S): 20 SOLUTION/ DROPS OPHTHALMIC at 06:39

## 2019-03-12 RX ADMIN — DORZOLAMIDE HYDROCHLORIDE 1 DROP(S): 20 SOLUTION/ DROPS OPHTHALMIC at 18:12

## 2019-03-12 RX ADMIN — Medication 1 DROP(S): at 06:40

## 2019-03-12 RX ADMIN — AMLODIPINE BESYLATE 5 MILLIGRAM(S): 2.5 TABLET ORAL at 06:37

## 2019-03-12 RX ADMIN — Medication 100 MILLIGRAM(S): at 06:37

## 2019-03-12 RX ADMIN — Medication 25 MILLIGRAM(S): at 18:11

## 2019-03-12 RX ADMIN — Medication 60 MILLIGRAM(S): at 00:39

## 2019-03-12 RX ADMIN — BRIMONIDINE TARTRATE 1 DROP(S): 2 SOLUTION/ DROPS OPHTHALMIC at 06:39

## 2019-03-12 RX ADMIN — Medication 100 MILLIGRAM(S): at 18:11

## 2019-03-12 NOTE — CONSULT NOTE ADULT - ASSESSMENT
Patient with esrd . He has long stading sob. He has kacie not use c-pap. Trop positive. Need repeat check ekg. No overt mi. Check echo

## 2019-03-12 NOTE — PROGRESS NOTE ADULT - ASSESSMENT
patient admitted for dyspnea    1) Acute resp failure with hypoxia/Pleural Effusion  -supplemental O2; bipap; impending resp failure; high risk of intubation  -Pulmonary/Critical care follow up in ICU  -echo; CXR; Chest CT as per pulm; and may need thoracentesis    2) ESRD; on HD  -IV lasix 100mg given this morning  -HD at bedside in ICU    3) HTN  -home meds     4) Depression/Anxiety  -home meds     5) BKA; would need rehab/pt     6) morbidly Obesity BMI > 41    7) Anemia of chronic illness   -iron studies; Epo and venofer as per Nephrology       # Progress Note Handoff  PENDING as follows  consults: Pulmonary and Nephrology noted   Test: CT chest   Other:  Family discussion: no family at bedside   Disposition:  Home ____ or SNF _likely____ Other _____ Unknown at this time ____

## 2019-03-12 NOTE — CONSULT NOTE ADULT - ASSESSMENT
1. SOB. Likely volume mediated. HD today to remove 3-4L. Lasix and metolazone.  2. ESRD on HD TTS. HD today: 3 hours, opti 160 dialyzer, 2K bath, 3-4L UF.  3. Anemia. Start EPO 10,000 units IV with HD.  4. Hyperphosphatemia. Renal diet. Sevelamer with meals.  5. Hyperkalemia. HD today. Renal diet.

## 2019-03-12 NOTE — CONSULT NOTE ADULT - ASSESSMENT
IMPRESSION:  ARF hypercapnia and hypoxic   fluid overload can not r/o PNA   ESRD       PLAN: transfer to ICU     CNS: no sedation     HEENT: oral care     PULMONARY: place on bipap stat 14/8 rate 20 abg in 40 min in no improvement will need intubation   need ct of chest to evaluate   stat INR  will do bed side US for thoracentesis     CARDIOVASCULAR: s/p lasix 100   check BNP and CE   echo     GI: GI prophylaxis.  Feeding     RENAL: stat HD renal was informed     INFECTIOUS DISEASE: pan cx doubt PNA     HEMATOLOGICAL:  DVT prophylaxis. heparin Sq     ENDOCRINE:  Follow up FS.  Insulin protocol if needed.    MUSCULOSKELETAL:    case discussed with hospitalist     CRITICAL CARE TIME SPENT: ***

## 2019-03-12 NOTE — PROGRESS NOTE ADULT - SUBJECTIVE AND OBJECTIVE BOX
[place] HAMM  54y  Male    Seen at 8am this morning; note written later in the day     Patient is a 54y old  Male who presents with a chief complaint of SOB (12 Mar 2019 12:57)      INTERVAL HPI/OVERNIGHT EVENTS:  pt seen and examined at bedside; dyspneic; needs emergent HD; rapid response called for Acute resp failure; on 100% FiO2 face make and switched to bipap; upgraded to ICU   -STAT labs ordered; IV lasix 100mg given stat during rapid response   -discussed with cardiologist, Nephrologist and Pulmonologist  -full code; prognosis guarded     Vital Signs Last 24 Hrs  T(C): 36.1 (12 Mar 2019 15:03), Max: 36.6 (11 Mar 2019 17:15)  T(F): 97 (12 Mar 2019 15:03), Max: 97.9 (11 Mar 2019 17:15)  HR: 54 (12 Mar 2019 16:23) (54 - 86)  BP: 160/75 (12 Mar 2019 15:30) (154/74 - 176/74)  RR: 13 (12 Mar 2019 15:03) (13 - 24)  SpO2: 100% (12 Mar 2019 16:23) (93% - 100%)    PHYSICAL EXAM:  GENERAL: Distressed   HEAD:  Atraumatic, Normocephalic  EYES: EOMI, PERRLA, conjunctiva and sclera clear  NERVOUS SYSTEM:  AAO 3  CHEST/LUNG: crackles b/l  HEART: Regular rate and rhythm; No murmurs, rubs, or gallops  ABDOMEN: Soft, Nontender, obese abdomen; Bowel sounds present  EXTREMITIES:  LE edema   SKIN: No rashes or lesions    LAB:                        8.3    12.63 )-----------( 243      ( 12 Mar 2019 09:19 )             29.5     03-12    144  |  103  |  70<HH>  ----------------------------<  70  5.6<H>   |  25  |  7.7<HH>    Ca    8.3<L>      12 Mar 2019 09:19    TPro  7.0  /  Alb  4.0  /  TBili  0.4  /  DBili  x   /  AST  6   /  ALT  <5  /  AlkPhos  120<H>  03-11    CARDIAC MARKERS ( 12 Mar 2019 09:19 )  x     / 0.29 ng/mL / 81 U/L / x     / 5.4 ng/mL  CARDIAC MARKERS ( 11 Mar 2019 17:50 )  x     / 0.29 ng/mL / x     / x     / x        Daily Height in cm: 185.42 (12 Mar 2019 12:30)    Daily Weight in k.9 (12 Mar 2019 06:19)  CAPILLARY BLOOD GLUCOSE    POCT Blood Glucose.: 94 mg/dL (12 Mar 2019 11:45)  POCT Blood Glucose.: 118 mg/dL (12 Mar 2019 09:26)  POCT Blood Glucose.: 70 mg/dL (12 Mar 2019 08:39)  POCT Blood Glucose.: 71 mg/dL (12 Mar 2019 07:43)  POCT Blood Glucose.: 108 mg/dL (11 Mar 2019 23:38)  POCT Blood Glucose.: 125 mg/dL (11 Mar 2019 21:00)    LIVER FUNCTIONS - ( 11 Mar 2019 17:50 )  Alb: 4.0 g/dL / Pro: 7.0 g/dL / ALK PHOS: 120 U/L / ALT: <5 U/L / AST: 6 U/L / GGT: x           RADIOLOGY:    Imaging Personally Reviewed:  [ Y ] YES  [ ] NO    HEALTH ISSUES - PROBLEM Dx:  Bipolar affective disorder, remission status unspecified: Bipolar affective disorder, remission status unspecified  Anxiety disorder, unspecified type: Anxiety disorder, unspecified type  Anemia, unspecified type: Anemia, unspecified type  HTN (hypertension): HTN (hypertension)  End stage renal disease: End stage renal disease  Diabetes: Diabetes  Dialysis patient: Dialysis patient  Elevated troponin: Elevated troponin  Pleural effusion: Pleural effusion    MEDS:  acetaminophen   Tablet .. 650 milliGRAM(s) Oral every 6 hours PRN  ALBUTerol    90 MICROgram(s) HFA Inhaler 2 Puff(s) Inhalation every 6 hours  amLODIPine   Tablet 5 milliGRAM(s) Oral daily  aspirin enteric coated 81 milliGRAM(s) Oral daily  brimonidine 0.2% Ophthalmic Solution 1 Drop(s) Both EYES two times a day  docusate sodium 100 milliGRAM(s) Oral two times a day  dorzolamide 2% Ophthalmic Solution 1 Drop(s) Both EYES three times a day  epoetin keagan Injectable 90676 Unit(s) IV Push <User Schedule>  furosemide    Tablet 80 milliGRAM(s) Oral two times a day  heparin  Injectable. 1000 Unit(s) Dialysis. once  heparin SubCutaneous Injection - Peds 5000 Unit(s) SubCutaneous every 12 hours  insulin glargine SubCutaneous Injection (LANTUS) - Peds 25 Unit(s) SubCutaneous at bedtime  lactulose Syrup 10 Gram(s) Oral two times a day  latanoprost 0.005% Ophthalmic Solution 1 Drop(s) Both EYES at bedtime  metolazone 5 milliGRAM(s) Oral daily  metoprolol tartrate 25 milliGRAM(s) Oral two times a day  mineral oil/petrolatum Hydrophilic Ointment 1 Application(s) Topical daily  oxyCODONE    5 mG/acetaminophen 325 mG 1 Tablet(s) Oral every 4 hours PRN  pantoprazole    Tablet 40 milliGRAM(s) Oral before breakfast  pregabalin 50 milliGRAM(s) Oral daily  sertraline 100 milliGRAM(s) Oral daily  sevelamer hydrochloride 800 milliGRAM(s) Oral three times a day  simvastatin 10 milliGRAM(s) Oral at bedtime  tamsulosin Oral Tab/Cap - Peds 0.4 milliGRAM(s) Oral at bedtime  timolol 0.5% Solution 1 Drop(s) Both EYES two times a day  zolpidem 5 milliGRAM(s) Oral at bedtime PRN

## 2019-03-12 NOTE — CHART NOTE - NSCHARTNOTEFT_GEN_A_CORE
Patient was placed on BPAP  initial ABG showed pH 7.24 and pCO2 58.8  repeat ABG after BPAP showed pH 7.27 and pCO2 52    Patient will now be switched to AVAPS with PIPmin 14 / PIPmax 25 / PEEP 8  Will repeat ABG in 1 hour

## 2019-03-13 LAB
ALBUMIN SERPL ELPH-MCNC: 3.7 G/DL — SIGNIFICANT CHANGE UP (ref 3.5–5.2)
ALP SERPL-CCNC: 112 U/L — SIGNIFICANT CHANGE UP (ref 30–115)
ALT FLD-CCNC: <5 U/L — SIGNIFICANT CHANGE UP (ref 0–41)
ANION GAP SERPL CALC-SCNC: 13 MMOL/L — SIGNIFICANT CHANGE UP (ref 7–14)
AST SERPL-CCNC: 5 U/L — SIGNIFICANT CHANGE UP (ref 0–41)
BASOPHILS # BLD AUTO: 0.1 K/UL — SIGNIFICANT CHANGE UP (ref 0–0.2)
BASOPHILS NFR BLD AUTO: 1.1 % — HIGH (ref 0–1)
BILIRUB SERPL-MCNC: 0.3 MG/DL — SIGNIFICANT CHANGE UP (ref 0.2–1.2)
BUN SERPL-MCNC: 54 MG/DL — HIGH (ref 10–20)
CALCIUM SERPL-MCNC: 8.4 MG/DL — LOW (ref 8.5–10.1)
CHLORIDE SERPL-SCNC: 103 MMOL/L — SIGNIFICANT CHANGE UP (ref 98–110)
CO2 SERPL-SCNC: 28 MMOL/L — SIGNIFICANT CHANGE UP (ref 17–32)
CREAT SERPL-MCNC: 6.3 MG/DL — CRITICAL HIGH (ref 0.7–1.5)
EOSINOPHIL # BLD AUTO: 0.28 K/UL — SIGNIFICANT CHANGE UP (ref 0–0.7)
EOSINOPHIL NFR BLD AUTO: 3.1 % — SIGNIFICANT CHANGE UP (ref 0–8)
GLUCOSE BLDC GLUCOMTR-MCNC: 170 MG/DL — HIGH (ref 70–99)
GLUCOSE BLDC GLUCOMTR-MCNC: 201 MG/DL — HIGH (ref 70–99)
GLUCOSE SERPL-MCNC: 80 MG/DL — SIGNIFICANT CHANGE UP (ref 70–99)
HCT VFR BLD CALC: 30.6 % — LOW (ref 42–52)
HGB BLD-MCNC: 8.5 G/DL — LOW (ref 14–18)
IMM GRANULOCYTES NFR BLD AUTO: 0.3 % — SIGNIFICANT CHANGE UP (ref 0.1–0.3)
LYMPHOCYTES # BLD AUTO: 1.15 K/UL — LOW (ref 1.2–3.4)
LYMPHOCYTES # BLD AUTO: 12.9 % — LOW (ref 20.5–51.1)
MAGNESIUM SERPL-MCNC: 2.3 MG/DL — SIGNIFICANT CHANGE UP (ref 1.8–2.4)
MCHC RBC-ENTMCNC: 24 PG — LOW (ref 27–31)
MCHC RBC-ENTMCNC: 27.8 G/DL — LOW (ref 32–37)
MCV RBC AUTO: 86.4 FL — SIGNIFICANT CHANGE UP (ref 80–94)
MONOCYTES # BLD AUTO: 0.67 K/UL — HIGH (ref 0.1–0.6)
MONOCYTES NFR BLD AUTO: 7.5 % — SIGNIFICANT CHANGE UP (ref 1.7–9.3)
NEUTROPHILS # BLD AUTO: 6.71 K/UL — HIGH (ref 1.4–6.5)
NEUTROPHILS NFR BLD AUTO: 75.1 % — SIGNIFICANT CHANGE UP (ref 42.2–75.2)
NRBC # BLD: 0 /100 WBCS — SIGNIFICANT CHANGE UP (ref 0–0)
PHOSPHATE SERPL-MCNC: 5.3 MG/DL — HIGH (ref 2.1–4.9)
PLATELET # BLD AUTO: 248 K/UL — SIGNIFICANT CHANGE UP (ref 130–400)
POTASSIUM SERPL-MCNC: 5.1 MMOL/L — HIGH (ref 3.5–5)
POTASSIUM SERPL-SCNC: 5.1 MMOL/L — HIGH (ref 3.5–5)
PROT SERPL-MCNC: 6.5 G/DL — SIGNIFICANT CHANGE UP (ref 6–8)
RBC # BLD: 3.54 M/UL — LOW (ref 4.7–6.1)
RBC # FLD: 17 % — HIGH (ref 11.5–14.5)
SODIUM SERPL-SCNC: 144 MMOL/L — SIGNIFICANT CHANGE UP (ref 135–146)
WBC # BLD: 8.94 K/UL — SIGNIFICANT CHANGE UP (ref 4.8–10.8)
WBC # FLD AUTO: 8.94 K/UL — SIGNIFICANT CHANGE UP (ref 4.8–10.8)

## 2019-03-13 RX ORDER — INSULIN LISPRO 100/ML
5 VIAL (ML) SUBCUTANEOUS
Qty: 0 | Refills: 0 | Status: DISCONTINUED | OUTPATIENT
Start: 2019-03-13 | End: 2019-03-14

## 2019-03-13 RX ORDER — DEXTROSE 50 % IN WATER 50 %
12.5 SYRINGE (ML) INTRAVENOUS ONCE
Qty: 0 | Refills: 0 | Status: DISCONTINUED | OUTPATIENT
Start: 2019-03-13 | End: 2019-03-20

## 2019-03-13 RX ORDER — SODIUM CHLORIDE 9 MG/ML
1000 INJECTION, SOLUTION INTRAVENOUS
Qty: 0 | Refills: 0 | Status: DISCONTINUED | OUTPATIENT
Start: 2019-03-13 | End: 2019-03-20

## 2019-03-13 RX ORDER — DEXTROSE 50 % IN WATER 50 %
25 SYRINGE (ML) INTRAVENOUS ONCE
Qty: 0 | Refills: 0 | Status: DISCONTINUED | OUTPATIENT
Start: 2019-03-13 | End: 2019-03-20

## 2019-03-13 RX ORDER — INSULIN GLARGINE 100 [IU]/ML
25 INJECTION, SOLUTION SUBCUTANEOUS AT BEDTIME
Qty: 0 | Refills: 0 | Status: DISCONTINUED | OUTPATIENT
Start: 2019-03-13 | End: 2019-03-14

## 2019-03-13 RX ORDER — IPRATROPIUM/ALBUTEROL SULFATE 18-103MCG
3 AEROSOL WITH ADAPTER (GRAM) INHALATION EVERY 6 HOURS
Qty: 0 | Refills: 0 | Status: DISCONTINUED | OUTPATIENT
Start: 2019-03-13 | End: 2019-03-20

## 2019-03-13 RX ORDER — DEXTROSE 50 % IN WATER 50 %
15 SYRINGE (ML) INTRAVENOUS ONCE
Qty: 0 | Refills: 0 | Status: DISCONTINUED | OUTPATIENT
Start: 2019-03-13 | End: 2019-03-20

## 2019-03-13 RX ORDER — GLUCAGON INJECTION, SOLUTION 0.5 MG/.1ML
1 INJECTION, SOLUTION SUBCUTANEOUS ONCE
Qty: 0 | Refills: 0 | Status: DISCONTINUED | OUTPATIENT
Start: 2019-03-13 | End: 2019-03-20

## 2019-03-13 RX ADMIN — Medication 100 MILLIGRAM(S): at 05:47

## 2019-03-13 RX ADMIN — Medication 50 MILLIGRAM(S): at 11:46

## 2019-03-13 RX ADMIN — OXYCODONE AND ACETAMINOPHEN 1 TABLET(S): 5; 325 TABLET ORAL at 16:36

## 2019-03-13 RX ADMIN — Medication 100 MILLIGRAM(S): at 17:41

## 2019-03-13 RX ADMIN — SEVELAMER CARBONATE 800 MILLIGRAM(S): 2400 POWDER, FOR SUSPENSION ORAL at 15:35

## 2019-03-13 RX ADMIN — BRIMONIDINE TARTRATE 1 DROP(S): 2 SOLUTION/ DROPS OPHTHALMIC at 17:42

## 2019-03-13 RX ADMIN — LATANOPROST 1 DROP(S): 0.05 SOLUTION/ DROPS OPHTHALMIC; TOPICAL at 21:31

## 2019-03-13 RX ADMIN — ALBUTEROL 2 PUFF(S): 90 AEROSOL, METERED ORAL at 15:53

## 2019-03-13 RX ADMIN — HEPARIN SODIUM 5000 UNIT(S): 5000 INJECTION INTRAVENOUS; SUBCUTANEOUS at 05:49

## 2019-03-13 RX ADMIN — Medication 650 MILLIGRAM(S): at 17:55

## 2019-03-13 RX ADMIN — HEPARIN SODIUM 5000 UNIT(S): 5000 INJECTION INTRAVENOUS; SUBCUTANEOUS at 17:42

## 2019-03-13 RX ADMIN — AMLODIPINE BESYLATE 5 MILLIGRAM(S): 2.5 TABLET ORAL at 05:49

## 2019-03-13 RX ADMIN — SEVELAMER CARBONATE 800 MILLIGRAM(S): 2400 POWDER, FOR SUSPENSION ORAL at 05:48

## 2019-03-13 RX ADMIN — DORZOLAMIDE HYDROCHLORIDE 1 DROP(S): 20 SOLUTION/ DROPS OPHTHALMIC at 17:42

## 2019-03-13 RX ADMIN — TAMSULOSIN HYDROCHLORIDE 0.4 MILLIGRAM(S): 0.4 CAPSULE ORAL at 21:30

## 2019-03-13 RX ADMIN — OXYCODONE AND ACETAMINOPHEN 1 TABLET(S): 5; 325 TABLET ORAL at 19:17

## 2019-03-13 RX ADMIN — SIMVASTATIN 10 MILLIGRAM(S): 20 TABLET, FILM COATED ORAL at 21:30

## 2019-03-13 RX ADMIN — Medication 81 MILLIGRAM(S): at 11:46

## 2019-03-13 RX ADMIN — PANTOPRAZOLE SODIUM 40 MILLIGRAM(S): 20 TABLET, DELAYED RELEASE ORAL at 05:48

## 2019-03-13 RX ADMIN — Medication 1 DROP(S): at 05:47

## 2019-03-13 RX ADMIN — INSULIN GLARGINE 25 UNIT(S): 100 INJECTION, SOLUTION SUBCUTANEOUS at 21:37

## 2019-03-13 RX ADMIN — Medication 1 DROP(S): at 18:21

## 2019-03-13 RX ADMIN — SERTRALINE 100 MILLIGRAM(S): 25 TABLET, FILM COATED ORAL at 11:46

## 2019-03-13 RX ADMIN — Medication 80 MILLIGRAM(S): at 05:48

## 2019-03-13 RX ADMIN — LACTULOSE 10 GRAM(S): 10 SOLUTION ORAL at 05:49

## 2019-03-13 RX ADMIN — Medication 25 MILLIGRAM(S): at 17:42

## 2019-03-13 RX ADMIN — BRIMONIDINE TARTRATE 1 DROP(S): 2 SOLUTION/ DROPS OPHTHALMIC at 05:47

## 2019-03-13 RX ADMIN — Medication 80 MILLIGRAM(S): at 17:41

## 2019-03-13 RX ADMIN — ZOLPIDEM TARTRATE 5 MILLIGRAM(S): 10 TABLET ORAL at 22:31

## 2019-03-13 RX ADMIN — SEVELAMER CARBONATE 800 MILLIGRAM(S): 2400 POWDER, FOR SUSPENSION ORAL at 21:30

## 2019-03-13 RX ADMIN — Medication 25 MILLIGRAM(S): at 05:47

## 2019-03-13 RX ADMIN — DORZOLAMIDE HYDROCHLORIDE 1 DROP(S): 20 SOLUTION/ DROPS OPHTHALMIC at 05:46

## 2019-03-13 RX ADMIN — Medication 650 MILLIGRAM(S): at 11:46

## 2019-03-13 RX ADMIN — DORZOLAMIDE HYDROCHLORIDE 1 DROP(S): 20 SOLUTION/ DROPS OPHTHALMIC at 21:32

## 2019-03-13 RX ADMIN — ALBUTEROL 2 PUFF(S): 90 AEROSOL, METERED ORAL at 10:11

## 2019-03-13 NOTE — PROGRESS NOTE ADULT - ASSESSMENT
IMPRESSION:  ARF hypercapnia and hypoxic   fluid overload can not r/o PNA   ESRD       PLAN: transfer to ICU     CNS: no sedation     HEENT: oral care     PULMONARY: place on bipap stat 14/8 rate 20 abg in 40 min in no improvement will need intubation    ct of chest no contrast to evaluate the left lung ?/ fat vs effusion     will do bed side US for thoracentesis     CARDIOVASCULAR continue  lasix   echo     GI: GI prophylaxis.  Feeding     RENAL: stat HD renal was informed   follow renal consider HD today cxr still congested elevated BP   INFECTIOUS DISEASE: pan cx doubt PNA     HEMATOLOGICAL:  DVT prophylaxis. heparin Sq     ENDOCRINE:  Follow up FS.  Insulin protocol if needed.    MUSCULOSKELETAL:    case discussed with hospitalist     CRITICAL CARE TIME SPENT: ***

## 2019-03-13 NOTE — PROGRESS NOTE ADULT - SUBJECTIVE AND OBJECTIVE BOX
Patient is a 54y old  Male who presents with a chief complaint of SOB (12 Mar 2019 12:57)      T(F): 97.7 (03-13-19 @ 03:00), Max: 97.8 (03-12-19 @ 23:00)  HR: 65 (03-13-19 @ 05:13)  BP: 171/73 (03-13-19 @ 05:13)  RR: 24 (03-13-19 @ 05:13)  SpO2: 98% (03-13-19 @ 03:14) (94% - 100%)    PHYSICAL EXAM:  GENERAL: NAD, well-groomed, well-developed  HEAD:  Atraumatic, Normocephalic  EYES: EOMI, PERRLA, conjunctiva and sclera clear  ENMT: No tonsillar erythema, exudates, or enlargement; Moist mucous membranes, Good dentition, No lesions  NECK: Supple, No JVD, Normal thyroid  NERVOUS SYSTEM:  Alert & Oriented X3,  Motor Strength 5/5 B/L upper and lower extremities  CHEST/LUNG: Clear to percussion bilaterally; No rales, rhonchi, wheezing, or rubs  HEART: Regular rate and rhythm; No murmurs, rubs, or gallops  ABDOMEN: Soft, Nontender, Nondistended; Bowel sounds present  EXTREMITIES:   No clubbing, cyanosis, or edema  LYMPH: No lymphadenopathy noted  SKIN: No rashes or lesions    labs  03-12    144  |  103  |  70<HH>  ----------------------------<  70  5.6<H>   |  25  |  7.7<HH>    Ca    8.3<L>      12 Mar 2019 09:19    TPro  7.0  /  Alb  4.0  /  TBili  0.4  /  DBili  x   /  AST  6   /  ALT  <5  /  AlkPhos  120<H>  03-11                          8.3    12.63 )-----------( 243      ( 12 Mar 2019 09:19 )             29.5       PT/INR - ( 12 Mar 2019 13:14 )   PT: 14.90 sec;   INR: 1.30 ratio             Creatine Kinase, Serum: 55 U/L (03-12-19 @ 19:32)  Troponin T, Serum: 0.31 ng/mL <HH> (03-12-19 @ 19:32)  Creatine Kinase, Serum: 81 U/L (03-12-19 @ 09:19)  Troponin T, Serum: 0.29 ng/mL <HH> (03-12-19 @ 09:19)      acetaminophen   Tablet .. 650 milliGRAM(s) Oral every 6 hours PRN  ALBUTerol    90 MICROgram(s) HFA Inhaler 2 Puff(s) Inhalation every 6 hours  ALBUTerol/ipratropium for Nebulization 3 milliLiter(s) Nebulizer every 6 hours PRN  amLODIPine   Tablet 5 milliGRAM(s) Oral daily  aspirin enteric coated 81 milliGRAM(s) Oral daily  brimonidine 0.2% Ophthalmic Solution 1 Drop(s) Both EYES two times a day  docusate sodium 100 milliGRAM(s) Oral two times a day  dorzolamide 2% Ophthalmic Solution 1 Drop(s) Both EYES three times a day  epoetin keagan Injectable 31402 Unit(s) IV Push <User Schedule>  furosemide    Tablet 80 milliGRAM(s) Oral two times a day  heparin  Injectable. 1000 Unit(s) Dialysis. once  heparin SubCutaneous Injection - Peds 5000 Unit(s) SubCutaneous every 12 hours  insulin glargine SubCutaneous Injection (LANTUS) - Peds 25 Unit(s) SubCutaneous at bedtime  lactulose Syrup 10 Gram(s) Oral two times a day  latanoprost 0.005% Ophthalmic Solution 1 Drop(s) Both EYES at bedtime  metolazone 5 milliGRAM(s) Oral daily  metoprolol tartrate 25 milliGRAM(s) Oral two times a day  mineral oil/petrolatum Hydrophilic Ointment 1 Application(s) Topical daily  oxyCODONE    5 mG/acetaminophen 325 mG 1 Tablet(s) Oral every 4 hours PRN  pantoprazole    Tablet 40 milliGRAM(s) Oral before breakfast  pregabalin 50 milliGRAM(s) Oral daily  sertraline 100 milliGRAM(s) Oral daily  sevelamer hydrochloride 800 milliGRAM(s) Oral three times a day  simvastatin 10 milliGRAM(s) Oral at bedtime  tamsulosin Oral Tab/Cap - Peds 0.4 milliGRAM(s) Oral at bedtime  timolol 0.5% Solution 1 Drop(s) Both EYES two times a day  zolpidem 5 milliGRAM(s) Oral at bedtime PRN

## 2019-03-13 NOTE — PROGRESS NOTE ADULT - SUBJECTIVE AND OBJECTIVE BOX
[facility] FOLLOW UP NOTE  --------------------------------------------------------------------------------  24 hour events/subjective: Patient examined in CCU. C/O SOB, though, better than yesterday.    PAST HISTORY  --------------------------------------------------------------------------------  No significant changes to PMH, PSH, FHx, SHx, unless otherwise noted    ALLERGIES & MEDICATIONS  --------------------------------------------------------------------------------  Allergies    No Known Allergies    Standing Inpatient Medications  ALBUTerol    90 MICROgram(s) HFA Inhaler 2 Puff(s) Inhalation every 6 hours  amLODIPine   Tablet 5 milliGRAM(s) Oral daily  aspirin enteric coated 81 milliGRAM(s) Oral daily  brimonidine 0.2% Ophthalmic Solution 1 Drop(s) Both EYES two times a day  docusate sodium 100 milliGRAM(s) Oral two times a day  dorzolamide 2% Ophthalmic Solution 1 Drop(s) Both EYES three times a day  epoetin keagan Injectable 90660 Unit(s) IV Push <User Schedule>  furosemide    Tablet 80 milliGRAM(s) Oral two times a day  heparin  Injectable. 1000 Unit(s) Dialysis. once  heparin SubCutaneous Injection - Peds 5000 Unit(s) SubCutaneous every 12 hours  insulin glargine SubCutaneous Injection (LANTUS) - Peds 25 Unit(s) SubCutaneous at bedtime  lactulose Syrup 10 Gram(s) Oral two times a day  latanoprost 0.005% Ophthalmic Solution 1 Drop(s) Both EYES at bedtime  metolazone 5 milliGRAM(s) Oral daily  metoprolol tartrate 25 milliGRAM(s) Oral two times a day  mineral oil/petrolatum Hydrophilic Ointment 1 Application(s) Topical daily  pantoprazole    Tablet 40 milliGRAM(s) Oral before breakfast  pregabalin 50 milliGRAM(s) Oral daily  sertraline 100 milliGRAM(s) Oral daily  sevelamer hydrochloride 800 milliGRAM(s) Oral three times a day  simvastatin 10 milliGRAM(s) Oral at bedtime  tamsulosin Oral Tab/Cap - Peds 0.4 milliGRAM(s) Oral at bedtime  timolol 0.5% Solution 1 Drop(s) Both EYES two times a day    PRN Inpatient Medications  acetaminophen   Tablet .. 650 milliGRAM(s) Oral every 6 hours PRN  ALBUTerol/ipratropium for Nebulization 3 milliLiter(s) Nebulizer every 6 hours PRN  oxyCODONE    5 mG/acetaminophen 325 mG 1 Tablet(s) Oral every 4 hours PRN  zolpidem 5 milliGRAM(s) Oral at bedtime PRN    VITALS/PHYSICAL EXAM  --------------------------------------------------------------------------------  T(C): 36.6 (03-13-19 @ 07:05), Max: 36.6 (03-12-19 @ 23:00)  HR: 66 (03-13-19 @ 09:07) (54 - 86)  BP: 171/73 (03-13-19 @ 05:13) (151/67 - 184/79)  RR: 24 (03-13-19 @ 09:07) (13 - 30)  SpO2: 98% (03-13-19 @ 03:14) (94% - 100%)  Height (cm): 185.42 (03-12-19 @ 12:30)  Weight (kg): 144.1 (03-12-19 @ 12:30)  BMI (kg/m2): 41.9 (03-12-19 @ 12:30)  BSA (m2): 2.62 (03-12-19 @ 12:30)    03-12-19 @ 07:01  -  03-13-19 @ 07:00  --------------------------------------------------------  IN: 0 mL / OUT: 5050 mL / NET: -5050 mL    03-13-19 @ 07:01  -  03-13-19 @ 10:54  --------------------------------------------------------  IN: 50 mL / OUT: 0 mL / NET: 50 mL    Physical Exam:  	Gen: NAD  	Pulm:  B/L rales  	CV: RRR, S1S2  	Abd: +BS, soft, nontender/nondistended  	: No suprapubic tenderness  	LE: Warm,  no edema  	Vascular access: AVF    LABS/STUDIES  --------------------------------------------------------------------------------              8.5    8.94  >-----------<  248      [03-13-19 @ 05:45]              30.6     144  |  103  |  54  ----------------------------<  80      [03-13-19 @ 05:45]  5.1   |  28  |  6.3        Ca     8.4     [03-13-19 @ 05:45]      Mg     2.3     [03-13-19 @ 05:45]      Phos  5.3     [03-13-19 @ 05:45]    TPro  6.5  /  Alb  3.7  /  TBili  0.3  /  DBili  x   /  AST  5   /  ALT  <5  /  AlkPhos  112  [03-13-19 @ 05:45]    PT/INR: PT 14.90, INR 1.30       [03-12-19 @ 13:14]    Troponin 0.31      [03-12-19 @ 19:32]  CK 55      [03-12-19 @ 19:32]    Creatinine Trend:  SCr 6.3 [03-13 @ 05:45]  SCr 7.7 [03-12 @ 09:19]  SCr 6.5 [03-11 @ 17:50]    HbA1c 8.6      [06-01-18 @ 07:22]

## 2019-03-13 NOTE — CONSULT NOTE ADULT - ASSESSMENT
IMPRESSION: Rehab of 53 y/o  m ptn  rehab  for  RT BKA GD ptn  from  [facility]  str      PRECAUTIONS: [  ] Cardiac  [  ] Respiratory  [  ] Seizures [  ] Contact Isolation  [  ] Droplet Isolation  [ FALL ] Other    Weight Bearing Status:     RECOMMENDATION:    Out of Bed to Chair     DVT/Decubiti Prophylaxis    REHAB PLAN:     [  xx ] Bedside P/T 3-5 times a week   [   ]   Bedside O/T  2-3 times a week             [   ] No Rehab Therapy Indicated                   [   ]  Speech Therapy   Conditioning/ROM                                    ADL  Bed Mobility                                               Conditioning/ROM  Transfers                                                     Bed Mobility  Sitting /Standing Balance                         Transfers                                        Gait Training                                               Sitting/Standing Balance  Stair Training [   ]Applicable                    Home equipment Eval                                                                        Splinting  [   ] Only      GOALS:   ADL   [  x ]   Independent                    Transfers  [ x  ] Independent                          Ambulation  [  x ] Independent     [ x   ] With device                            [   ]  CG                                                         [   ]  CG                                                                  [   ] CG                            [    ] Min A                                                   [   ] Min A                                                              [   ] Min  A          DISCHARGE PLAN:   [   ]  Good candidate for Intensive Rehabilitation/Hospital based-[facility]A [facility]                                             Will tolerate 3hrs Intensive Rehab Daily                                       [  x  ]  Short Term Rehab in Skilled Nursing Facility ptn wait  for rt le prothesis                                         [    ]  Home with Outpatient or VN services                                         [    ]  Possible Candidate for Intensive Hospital based Rehab

## 2019-03-13 NOTE — CONSULT NOTE ADULT - SUBJECTIVE AND OBJECTIVE BOX
HPI: The patient is a 54y Male complaining of shortness of breath.	   55yo M h/o ESRD last dialysis Saturday, here for SOB, reports x 2 days. Denies any cough, fever, chills, CP. Today CXR showed effusions so to ED. Pt also reports using  O2 at night but feels better using it at this time in the ER.     PTN  REFERRED TO ACUTE  REHAB  FOR  EVAL AND  TX   PAST MEDICAL & SURGICAL HISTORY:  Dialysis patient  Bilateral ocular hypertension  Insomnia, unspecified type  Bipolar affective disorder, remission status unspecified  Obesity, unspecified classification, unspecified obesity type, unspecified whether serious comorbidity present  Anxiety disorder, unspecified type  Pain disorder  Anemia, unspecified type  Blind  HTN (hypertension)  End stage renal disease  Diabetes  Status post glaucoma surgery: RIGHT EYE  S/P BKA (below knee amputation) unilateral: RIGHT  A-V fistula: LEFT ARM      Hospital Course:    TODAY'S SUBJECTIVE & REVIEW OF SYMPTOMS:     Constitutional WNL   Cardio WNL   Resp WNL   GI WNL  Heme WNL  Endo WNL  Skin WNL  MSK WNL  Neuro WNL  Cognitive WNL  Psych WNL      MEDICATIONS  (STANDING):  ALBUTerol    90 MICROgram(s) HFA Inhaler 2 Puff(s) Inhalation every 6 hours  amLODIPine   Tablet 5 milliGRAM(s) Oral daily  aspirin enteric coated 81 milliGRAM(s) Oral daily  brimonidine 0.2% Ophthalmic Solution 1 Drop(s) Both EYES two times a day  docusate sodium 100 milliGRAM(s) Oral two times a day  dorzolamide 2% Ophthalmic Solution 1 Drop(s) Both EYES three times a day  epoetin keagan Injectable 59915 Unit(s) IV Push <User Schedule>  furosemide    Tablet 80 milliGRAM(s) Oral two times a day  heparin  Injectable. 1000 Unit(s) Dialysis. once  heparin SubCutaneous Injection - Peds 5000 Unit(s) SubCutaneous every 12 hours  insulin glargine SubCutaneous Injection (LANTUS) - Peds 25 Unit(s) SubCutaneous at bedtime  lactulose Syrup 10 Gram(s) Oral two times a day  latanoprost 0.005% Ophthalmic Solution 1 Drop(s) Both EYES at bedtime  metolazone 5 milliGRAM(s) Oral daily  metoprolol tartrate 25 milliGRAM(s) Oral two times a day  mineral oil/petrolatum Hydrophilic Ointment 1 Application(s) Topical daily  pantoprazole    Tablet 40 milliGRAM(s) Oral before breakfast  pregabalin 50 milliGRAM(s) Oral daily  sertraline 100 milliGRAM(s) Oral daily  sevelamer hydrochloride 800 milliGRAM(s) Oral three times a day  simvastatin 10 milliGRAM(s) Oral at bedtime  tamsulosin Oral Tab/Cap - Peds 0.4 milliGRAM(s) Oral at bedtime  timolol 0.5% Solution 1 Drop(s) Both EYES two times a day    MEDICATIONS  (PRN):  acetaminophen   Tablet .. 650 milliGRAM(s) Oral every 6 hours PRN Temp greater or equal to 38C (100.4F), Moderate Pain (4 - 6)  ALBUTerol/ipratropium for Nebulization 3 milliLiter(s) Nebulizer every 6 hours PRN Wheezing  oxyCODONE    5 mG/acetaminophen 325 mG 1 Tablet(s) Oral every 4 hours PRN for moderate pain  zolpidem 5 milliGRAM(s) Oral at bedtime PRN Insomnia      FAMILY HISTORY:  No pertinent family history in first degree relatives      Allergies    No Known Allergies    Intolerances        SOCIAL HISTORY:    [  ] Etoh  [  ] Smoking  [  ] Substance abuse     Home Environment:  [  ] Home Alone  [ x ] Lives with Family  [  ] Home Health Aid    Dwelling:  [  ] Apartment  [x ] Private House  [  ] Adult Home  [  ] Skilled Nursing Facility      [  ] Short Term  [  ] Long Term  [  x] Stairs       Elevator [  ]    FUNCTIONAL STATUS PTA: (Check all that apply)  Ambulation: [ x  ]Independent    [  ] Dependent     [  ] Non-Ambulatory  Assistive Device: [  ] SA Cane  [  ]  Q Cane  [  ] Walker  [  ]  Wheelchair  ADL : [  x] Independent  [  ]  Dependent       Vital Signs Last 24 Hrs  T(C): 36.8 (13 Mar 2019 15:01), Max: 36.8 (13 Mar 2019 15:01)  T(F): 98.2 (13 Mar 2019 15:01), Max: 98.2 (13 Mar 2019 15:01)  HR: 66 (13 Mar 2019 15:40) (59 - 74)  BP: 173/75 (13 Mar 2019 15:40) (151/67 - 184/79)  BP(mean): 100 (13 Mar 2019 12:00) (97 - 113)  RR: 18 (13 Mar 2019 15:40) (13 - 32)  SpO2: 100% (13 Mar 2019 12:25) (94% - 100%)      PHYSICAL EXAM: Alert & Oriented X3  GENERAL: NAD, well-groomed, well-developed  HEAD:  Atraumatic, Normocephalic  EYES: EOMI, PERRLA, conjunctiva and sclera clear  NECK: Supple, No JVD, Normal thyroid  CHEST/LUNG: Clear to percussion bilaterally; No rales, rhonchi, wheezing, or rubs  HEART: Regular rate and rhythm; No murmurs, rubs, or gallops  ABDOMEN: Soft, Nontender, Nondistended; Bowel sounds present  EXTREMITIES:  2+ Peripheral Pulses, No clubbing, cyanosis, or edema    NERVOUS SYSTEM:  Cranial Nerves 2-12 intact [x  ] Abnormal  [  ]  ROM: WFL all extremities [  x]  Abnormal [  ]  Motor Strength: WFL all extremities  [  ]  Abnormal [x  ]  Sensation: intact to light touch [  ] Abnormal [x  ]  Reflexes: Symmetric [x  ]  Abnormal [  ]    FUNCTIONAL STATUS:  Bed Mobility: Independent [  ]  Supervision [  ]  Needs Assistance [x  ]  N/A [  ]  Transfers: Independent [  ]  Supervision [  ]  Needs Assistance [  x]  N/A [  ]   Ambulation: Independent [  ]  Supervision [  ]  Needs Assistance [x  ]  N/A [  ]  ADL: Independent [  ] Requires Assistance [  ] N/A [x  ]  SEE PT/OT IE NOTES    LABS:                        8.5    8.94  )-----------( 248      ( 13 Mar 2019 05:45 )             30.6     03-13    144  |  103  |  54<H>  ----------------------------<  80  5.1<H>   |  28  |  6.3<HH>    Ca    8.4<L>      13 Mar 2019 05:45  Phos  5.3     03-13  Mg     2.3     03-13    TPro  6.5  /  Alb  3.7  /  TBili  0.3  /  DBili  x   /  AST  5   /  ALT  <5  /  AlkPhos  112  03-13    PT/INR - ( 12 Mar 2019 13:14 )   PT: 14.90 sec;   INR: 1.30 ratio               RADIOLOGY & ADDITIONAL STUDIES:    Assesment:
[place] [facility] INITIAL CONSULT NOTE  --------------------------------------------------------------------------------  HPI:  55yo M h/o ESRD on HD at [facility] under care of Dr Tejada, last dialysis Saturday, here for SOB x 2 days. Patient was examined this morning, shortly after had rapid response. Given IV furosemide, placed on Bipap and moved to critical unit.    PAST HISTORY  --------------------------------------------------------------------------------  PAST MEDICAL & SURGICAL HISTORY:  Dialysis patient  Bilateral ocular hypertension  Insomnia, unspecified type  Bipolar affective disorder, remission status unspecified  Obesity, unspecified classification, unspecified obesity type, unspecified whether serious comorbidity present  Anxiety disorder, unspecified type  Pain disorder  Anemia, unspecified type  Blind  HTN (hypertension)  End stage renal disease  Diabetes  Status post glaucoma surgery: RIGHT EYE  S/P BKA (below knee amputation) unilateral: RIGHT  A-V fistula: LEFT ARM    FAMILY HISTORY:  No pertinent family history in first degree relatives    SOCIAL HISTORY:  No current ETOH, tobacco, or illicit drug use    ALLERGIES & MEDICATIONS  --------------------------------------------------------------------------------  Allergies    No Known Allergies    Standing Inpatient Medications  ALBUTerol    90 MICROgram(s) HFA Inhaler 2 Puff(s) Inhalation every 6 hours  amLODIPine   Tablet 5 milliGRAM(s) Oral daily  aspirin enteric coated 81 milliGRAM(s) Oral daily  brimonidine 0.2% Ophthalmic Solution 1 Drop(s) Both EYES two times a day  docusate sodium 100 milliGRAM(s) Oral two times a day  dorzolamide 2% Ophthalmic Solution 1 Drop(s) Both EYES three times a day  epoetin keagan Injectable 38943 Unit(s) IV Push <User Schedule>  furosemide    Tablet 80 milliGRAM(s) Oral two times a day  heparin  Injectable. 1000 Unit(s) Dialysis. once  heparin SubCutaneous Injection - Peds 5000 Unit(s) SubCutaneous every 12 hours  insulin glargine SubCutaneous Injection (LANTUS) - Peds 25 Unit(s) SubCutaneous at bedtime  lactulose Syrup 10 Gram(s) Oral two times a day  latanoprost 0.005% Ophthalmic Solution 1 Drop(s) Both EYES at bedtime  metolazone 5 milliGRAM(s) Oral daily  metoprolol tartrate 25 milliGRAM(s) Oral two times a day  mineral oil/petrolatum Hydrophilic Ointment 1 Application(s) Topical daily  pantoprazole    Tablet 40 milliGRAM(s) Oral before breakfast  pregabalin 50 milliGRAM(s) Oral daily  sertraline 100 milliGRAM(s) Oral daily  sevelamer hydrochloride 800 milliGRAM(s) Oral three times a day  simvastatin 10 milliGRAM(s) Oral at bedtime  tamsulosin Oral Tab/Cap - Peds 0.4 milliGRAM(s) Oral at bedtime  timolol 0.5% Solution 1 Drop(s) Both EYES two times a day    PRN Inpatient Medications  acetaminophen   Tablet .. 650 milliGRAM(s) Oral every 6 hours PRN  oxyCODONE    5 mG/acetaminophen 325 mG 1 Tablet(s) Oral every 4 hours PRN  zolpidem 5 milliGRAM(s) Oral at bedtime PRN    REVIEW OF SYSTEMS  --------------------------------------------------------------------------------  Gen: No  fevers/chills  Skin: No rashes  Head/Eyes/Ears/Mouth: No headache; Normal hearing; No sinus pain/discomfort, sore throat  Respiratory: No cough, wheezing, hemoptysis  CV: No chest pain, orthopnea  GI: No abdominal pain, diarrhea, constipation, nausea, vomiting, melena, hematochezia  : No increased frequency, dysuria, hematuria, nocturia  All other systems were reviewed and are negative, except as noted.    VITALS/PHYSICAL EXAM  --------------------------------------------------------------------------------  T(C): 36.2 (03-12-19 @ 12:30), Max: 36.6 (03-11-19 @ 17:15)  HR: 86 (03-12-19 @ 12:50) (55 - 86)  BP: 154/74 (03-12-19 @ 12:30) (154/74 - 176/74)  RR: 24 (03-12-19 @ 12:30) (18 - 24)  SpO2: 100% (03-12-19 @ 12:50) (93% - 100%)  Height (cm): 185.42 (03-12-19 @ 12:30)  Weight (kg): 136.1 (03-11-19 @ 17:15)  BMI (kg/m2): 39.6 (03-12-19 @ 12:30)  BSA (m2): 2.56 (03-12-19 @ 12:30)    03-11-19 @ 07:01  -  03-12-19 @ 07:00  --------------------------------------------------------  IN: 120 mL / OUT: 100 mL / NET: 20 mL    Physical Exam:  	Gen: NAD  	HEENT: Supple neck, clear oropharynx  	Pulm:  B/L rales  	CV: RRR, S1S2  	Back: No CVA tenderness; no sacral edema  	Abd: +BS, soft, nontender/nondistended  	: No suprapubic tenderness  	LE: Warm, no edema  	Vascular access: AVF    LABS/STUDIES  --------------------------------------------------------------------------------              8.3    12.63 >-----------<  243      [03-12-19 @ 09:19]              29.5     144  |  103  |  70  ----------------------------<  70      [03-12-19 @ 09:19]  5.6   |  25  |  7.7        Ca     8.3     [03-12-19 @ 09:19]    TPro  7.0  /  Alb  4.0  /  TBili  0.4  /  DBili  x   /  AST  6   /  ALT  <5  /  AlkPhos  120  [03-11-19 @ 17:50]    Troponin 0.29      [03-12-19 @ 09:19]  CK 81      [03-12-19 @ 09:19]    Creatinine Trend:  SCr 7.7 [03-12 @ 09:19]  SCr 6.5 [03-11 @ 17:50]    Urinalysis - [05-31-18 @ 16:45]      Color Yellow / Appearance Clear / SG 1.015 / pH 7.5      Gluc Negative / Ketone Negative  / Bili Negative / Urobili 0.2       Blood Trace-lysed / Protein 100 / Leuk Est Negative / Nitrite Negative      RBC 1-2 / WBC 1-2 / Hyaline  / Gran  / Sq Epi  / Non Sq Epi Occasional / Bacteria See Note    HbA1c 8.6      [06-01-18 @ 07:22]
CARDIOLOGY CONSULT NOTE     CHIEF COMPLAINT/REASON FOR CONSULT:    HPI:  · Chief Complaint: The patient is a 54y Male complaining of shortness of breath.	  · HPI Objective Statement: 53yo M h/o ESRD last dialysis Saturday, here for SOB, reports x 2 days. Denies any cough, fever, chills, CP. Today CXR showed effusions so to ED. Pt also reports using  O2 at night but feels better using it at this time in the ER. (11 Mar 2019 19:30)      PAST MEDICAL & SURGICAL HISTORY:  Dialysis patient  Bilateral ocular hypertension  Insomnia, unspecified type  Bipolar affective disorder, remission status unspecified  Obesity, unspecified classification, unspecified obesity type, unspecified whether serious comorbidity present  Anxiety disorder, unspecified type  Pain disorder  Anemia, unspecified type  Blind  HTN (hypertension)  End stage renal disease  Diabetes  Status post glaucoma surgery: RIGHT EYE  S/P BKA (below knee amputation) unilateral: RIGHT  A-V fistula: LEFT ARM      Cardiac Risks:   [ x]HTN, [ x] DM, [ ] Smoking, [ ] FH,  [x ] Lipids        MEDICATIONS:  MEDICATIONS  (STANDING):  ALBUTerol    90 MICROgram(s) HFA Inhaler 2 Puff(s) Inhalation every 6 hours  amLODIPine   Tablet 5 milliGRAM(s) Oral daily  aspirin enteric coated 81 milliGRAM(s) Oral daily  brimonidine 0.2% Ophthalmic Solution 1 Drop(s) Both EYES two times a day  docusate sodium 100 milliGRAM(s) Oral two times a day  dorzolamide 2% Ophthalmic Solution 1 Drop(s) Both EYES three times a day  epoetin keagan Injectable 20787 Unit(s) IV Push <User Schedule>  furosemide    Tablet 80 milliGRAM(s) Oral two times a day  heparin  Injectable. 1000 Unit(s) Dialysis. once  heparin SubCutaneous Injection - Peds 5000 Unit(s) SubCutaneous every 12 hours  insulin glargine SubCutaneous Injection (LANTUS) - Peds 25 Unit(s) SubCutaneous at bedtime  lactulose Syrup 10 Gram(s) Oral two times a day  latanoprost 0.005% Ophthalmic Solution 1 Drop(s) Both EYES at bedtime  metolazone 5 milliGRAM(s) Oral daily  metoprolol tartrate 25 milliGRAM(s) Oral two times a day  mineral oil/petrolatum Hydrophilic Ointment 1 Application(s) Topical daily  pantoprazole    Tablet 40 milliGRAM(s) Oral before breakfast  pregabalin 50 milliGRAM(s) Oral daily  sertraline 100 milliGRAM(s) Oral daily  sevelamer hydrochloride 800 milliGRAM(s) Oral three times a day  simvastatin 10 milliGRAM(s) Oral at bedtime  tamsulosin Oral Tab/Cap - Peds 0.4 milliGRAM(s) Oral at bedtime  timolol 0.5% Solution 1 Drop(s) Both EYES two times a day      FAMILY HISTORY:  No pertinent family history in first degree relatives      SOCIAL HISTORY:      [ ] Marital status single  Allergies    No Known Allergies        	    REVIEW OF SYSTEMS:  CONSTITUTIONAL: No fever, weight loss, or fatigue  EYES: No eye pain, visual disturbances, or discharge  ENMT:  No difficulty hearing, tinnitus, vertigo; No sinus or throat pain  NECK: No pain or stiffness  RESPIRATORY: See above  CARDIOVASCULAR: No chest pain, palpitations, passing out, dizziness, or leg swelling  GASTROINTESTINAL: No abdominal or epigastric pain. No nausea, vomiting, or hematemesis; No diarrhea or constipation. No melena or hematochezia.  GENITOURINARY: No dysuria, frequency, hematuria, or incontinence  NEUROLOGICAL: No headaches, memory loss, loss of strength, numbness, or tremors  SKIN: No itching, burning, rashes, or lesions   	    PHYSICAL EXAM:  T(C): 36.1 (03-12-19 @ 06:19), Max: 36.6 (03-11-19 @ 17:15)  HR: 75 (03-12-19 @ 06:19) (65 - 75)  BP: 156/71 (03-12-19 @ 06:19) (156/71 - 176/74)  RR: 18 (03-12-19 @ 06:19) (18 - 22)  SpO2: 95% (03-12-19 @ 08:57) (93% - 96%)  Wt(kg): --  I&O's Summary    11 Mar 2019 07:01  -  12 Mar 2019 07:00  --------------------------------------------------------  IN: 120 mL / OUT: 100 mL / NET: 20 mL        Appearance: Normal	  Psychiatry: A & O x 3, Mood & affect appropriate  HEENT:   Normal oral mucosa, PERRL, EOMI	  Lymphatic: No lymphadenopathy  Cardiovascular: Normal S1 S2,RRR, No JVD, No murmurs  Respiratory: Lungs clear to auscultation	decreased bs  Gastrointestinal:  Soft, Non-tender, + BS	  Skin: No rashes, No ecchymoses, No cyanosis	  Neurologic: Non-focal  Extremities: Normal range of motion, No clubbing, cyanosis rbka  Vascular: Peripheral pulses palpable 2+ bilaterally      ECG:  	pend    	  LABS:	 	    CARDIAC MARKERS:                                    8.3    12.63 )-----------( 243      ( 12 Mar 2019 09:19 )             29.5     03-11    139  |  99  |  54<H>  ----------------------------<  141<H>  5.2<H>   |  28  |  6.5<HH>    Ca    8.7      11 Mar 2019 17:50    TPro  7.0  /  Alb  4.0  /  TBili  0.4  /  DBili  x   /  AST  6   /  ALT  <5  /  AlkPhos  120<H>  03-11
Patient is a 54y old  Male who presents with a chief complaint of SOB     HPI:  · Chief Complaint: The patient is a 54y Male complaining of shortness of breath.	  · HPI Objective Statement: 55yo M h/o ESRD last dialysis Saturday, here for SOB, reports x 2 days. Denies any cough, fever, chills, CP. Today CXR showed effusions so to ED. Pt also reports using  O2 at night but feels better using it at this time in the ER. (11 Mar 2019 19:30)  patient seen and examined awake follow command stat abg showed acidosis hypercapnia     PAST MEDICAL & SURGICAL HISTORY:  Dialysis patient  Bilateral ocular hypertension  Insomnia, unspecified type  Bipolar affective disorder, remission status unspecified  Obesity, unspecified classification, unspecified obesity type, unspecified whether serious comorbidity present  Anxiety disorder, unspecified type  Pain disorder  Anemia, unspecified type  Blind  HTN (hypertension)  End stage renal disease  Diabetes  Status post glaucoma surgery: RIGHT EYE  S/P BKA (below knee amputation) unilateral: RIGHT  A-V fistula: LEFT ARM    Allergies    No Known Allergies    Intolerances      Family history : no cardiovscular family history   Home Medications:  acetaminophen 325 mg oral tablet: 2 tab(s) orally every 6 hours (11 Mar 2019 18:38)  Ambien 5 mg oral tablet: 1 tab(s) orally once a day (at bedtime) (11 Mar 2019 18:38)  amLODIPine 5 mg oral tablet: 1 tab(s) orally once a day (11 Mar 2019 18:38)  aspirin 81 mg oral delayed release tablet: 1 tab(s) orally once a day (11 Mar 2019 18:38)  brimonidine 0.2% ophthalmic solution: to each affected eye 2 times a day (11 Mar 2019 18:38)  Colace 100 mg oral capsule: 1 cap(s) orally 2 times a day (11 Mar 2019 18:38)  dorzolamide 2% ophthalmic solution: to each affected eye 2 times a day (11 Mar 2019 18:38)  DuoNeb 0.5 mg-2.5 mg/3 mL inhalation solution: 1 application inhaled 3 times a day, As Needed (11 Mar 2019 18:38)  furosemide 80 mg oral tablet: 1 tab(s) orally 2 times a day (11 Mar 2019 18:38)  HumaLOG 100 units/mL subcutaneous solution: 1 application subcutaneous 4 times a day (before meals and at bedtime) sliding scale 2-12 unit (11 Mar 2019 18:38)  ketorolac 0.4% ophthalmic solution: 1 drop(s) in the right eye 2 times a day (11 Mar 2019 18:38)  lactulose 20 g oral powder for reconstitution: orally 2 times a day (11 Mar 2019 18:38)  Lantus 100 units/mL subcutaneous solution: 25 unit(s) subcutaneous once a day (at bedtime) (11 Mar 2019 18:38)  latanoprost 0.005% ophthalmic solution: 1 drop(s) to each affected eye once a day (in the evening) (11 Mar 2019 18:38)  Lyrica 50 mg oral capsule: orally once a day (11 Mar 2019 18:38)  metOLazone 5 mg oral tablet: 1 tab(s) orally once a day (11 Mar 2019 18:38)  Metoprolol Tartrate 25 mg oral tablet: 1 tab(s) orally 2 times a day (11 Mar 2019 18:38)  Percocet 5/325 oral tablet: 1 tab(s) orally every 4 hours, As Needed - for moderate pain (11 Mar 2019 18:38)  pregabalin 50 mg oral capsule: 1 cap(s) orally once a day (11 Mar 2019 18:38)  Renvela 800 mg oral tablet: 1 tab(s) orally 3 times a day (with meals) (11 Mar 2019 18:38)  sertraline 100 mg oral tablet: 1 tab(s) orally once a day (11 Mar 2019 18:38)  simvastatin 10 mg oral tablet: 1 tab(s) orally once a day (at bedtime) (11 Mar 2019 18:38)  tamsulosin 0.4 mg oral capsule: 1 cap(s) orally once a day (at bedtime) (11 Mar 2019 18:38)  Timoptic 0.5% ophthalmic solution: 1 drop(s) to each affected eye 2 times a day (11 Mar 2019 18:38)  traZODone 100 mg oral tablet: 1 tab(s) orally once a day (at bedtime) (11 Mar 2019 18:38)  Triple topical cream: Apply topically to affected area 2 times a day (11 Mar 2019 18:38)  Vitamin C 250 mg oral tablet: 1 tab(s) orally once a day (11 Mar 2019 18:38)    Occupation:  Alochol: Denied  Smoking: Denied  Drug Use: Denied  Marital Status:         ROS: as in HPI; All other systems reviewed are negative    ICU Vital Signs Last 24 Hrs  T(C): 36.1 (12 Mar 2019 06:19), Max: 36.6 (11 Mar 2019 17:15)  T(F): 97 (12 Mar 2019 06:19), Max: 97.9 (11 Mar 2019 17:15)  HR: 55 (12 Mar 2019 11:33) (55 - 75)  BP: 156/71 (12 Mar 2019 06:19) (156/71 - 176/74)  BP(mean): --  ABP: --  ABP(mean): --  RR: 18 (12 Mar 2019 06:19) (18 - 22)  SpO2: 96% (12 Mar 2019 11:33) (93% - 96%)        Physical Examination:    General: awake follow command     HEENT: Pupils equal, reactive to light.  Symmetric.    PULM:  decrease left lower   CVS: Regular rate and rhythm, no murmurs, rubs, or gallops    ABD: Soft, nondistended, nontender, normoactive bowel sounds, no masses    EXT: 2 edema, nontender, no clubbing right BKA    SKIN: Warm and well perfused, no rashes noted.    Neurology : no motor or sensory deficit     Musculoskeletal : move all extremety     Lymphatic system: no Palpable node           ABG - ( 12 Mar 2019 11:27 )  pH, Arterial: 7.25  pH, Blood: x     /  pCO2: 58    /  pO2: 79    / HCO3: 25    / Base Excess: -2.1  /  SaO2: 91                  I&O's Detail    11 Mar 2019 07:01  -  12 Mar 2019 07:00  --------------------------------------------------------  IN:    Oral Fluid: 120 mL  Total IN: 120 mL    OUT:    Voided: 100 mL  Total OUT: 100 mL    Total NET: 20 mL            LABS:                        8.3    12.63 )-----------( 243      ( 12 Mar 2019 09:19 )             29.5     11 Mar 2019 17:50    139    |  99     |  54     ----------------------------<  141    5.2     |  28     |  6.5      Ca    8.7        11 Mar 2019 17:50    TPro  7.0    /  Alb  4.0    /  TBili  0.4    /  DBili  x      /  AST  6      /  ALT  <5     /  AlkPhos  120    11 Mar 2019 17:50  Amylase x     lipase x          CARDIAC MARKERS ( 12 Mar 2019 09:19 )  x     / 0.29 ng/mL / x     / x     / 5.4 ng/mL  CARDIAC MARKERS ( 11 Mar 2019 17:50 )  x     / 0.29 ng/mL / x     / x     / x          CAPILLARY BLOOD GLUCOSE      POCT Blood Glucose.: 118 mg/dL (12 Mar 2019 09:26)        Culture        MEDICATIONS  (STANDING):  ALBUTerol    90 MICROgram(s) HFA Inhaler 2 Puff(s) Inhalation every 6 hours  amLODIPine   Tablet 5 milliGRAM(s) Oral daily  aspirin enteric coated 81 milliGRAM(s) Oral daily  brimonidine 0.2% Ophthalmic Solution 1 Drop(s) Both EYES two times a day  docusate sodium 100 milliGRAM(s) Oral two times a day  dorzolamide 2% Ophthalmic Solution 1 Drop(s) Both EYES three times a day  epoetin keagan Injectable 21060 Unit(s) IV Push <User Schedule>  furosemide    Tablet 80 milliGRAM(s) Oral two times a day  furosemide   Injectable 100 milliGRAM(s) IV Push once  heparin  Injectable. 1000 Unit(s) Dialysis. once  heparin SubCutaneous Injection - Peds 5000 Unit(s) SubCutaneous every 12 hours  insulin glargine SubCutaneous Injection (LANTUS) - Peds 25 Unit(s) SubCutaneous at bedtime  lactulose Syrup 10 Gram(s) Oral two times a day  latanoprost 0.005% Ophthalmic Solution 1 Drop(s) Both EYES at bedtime  metolazone 5 milliGRAM(s) Oral daily  metoprolol tartrate 25 milliGRAM(s) Oral two times a day  mineral oil/petrolatum Hydrophilic Ointment 1 Application(s) Topical daily  pantoprazole    Tablet 40 milliGRAM(s) Oral before breakfast  pregabalin 50 milliGRAM(s) Oral daily  sertraline 100 milliGRAM(s) Oral daily  sevelamer hydrochloride 800 milliGRAM(s) Oral three times a day  simvastatin 10 milliGRAM(s) Oral at bedtime  tamsulosin Oral Tab/Cap - Peds 0.4 milliGRAM(s) Oral at bedtime  timolol 0.5% Solution 1 Drop(s) Both EYES two times a day    MEDICATIONS  (PRN):  acetaminophen   Tablet .. 650 milliGRAM(s) Oral every 6 hours PRN Temp greater or equal to 38C (100.4F), Moderate Pain (4 - 6)  oxyCODONE    5 mG/acetaminophen 325 mG 1 Tablet(s) Oral every 4 hours PRN for moderate pain  zolpidem 5 milliGRAM(s) Oral at bedtime PRN Insomnia        RADIOLOGY: ***     CXR: left effusion and b/l increase marking   TLC:  OG:  ET tube:          ECHO:

## 2019-03-13 NOTE — PROGRESS NOTE ADULT - SUBJECTIVE AND OBJECTIVE BOX
Patient is a 54y old  Male who presents with a chief complaint of Shortness of breath (13 Mar 2019 07:04)      Over Night Events:  Patient seen and examined s/p HD  much better today Awake eating in bed     ROS:  See HPI    PHYSICAL EXAM    ICU Vital Signs Last 24 Hrs  T(C): 36.6 (13 Mar 2019 07:05), Max: 36.6 (12 Mar 2019 23:00)  T(F): 97.8 (13 Mar 2019 07:05), Max: 97.8 (12 Mar 2019 23:00)  HR: 66 (13 Mar 2019 09:07) (54 - 86)  BP: 171/73 (13 Mar 2019 05:13) (151/67 - 184/79)  BP(mean): 105 (13 Mar 2019 05:13) (97 - 113)  ABP: --  ABP(mean): --  RR: 24 (13 Mar 2019 09:07) (13 - 30)  SpO2: 98% (13 Mar 2019 03:14) (94% - 100%)      General: Awake   HEENT:    rohit          Lymph Nodes: NO cervical LN   Lungs: Bilateral crackles   Cardiovascular: Regular   Abdomen: Soft, Positive BS  Extremities: No clubbing 2 edema   Skin: warm  Neurological: no focla deficit   Musculoskeletal: move all ext     I&O's Detail    12 Mar 2019 07:01  -  13 Mar 2019 07:00  --------------------------------------------------------  IN:  Total IN: 0 mL    OUT:    Other: 5000 mL    Voided: 50 mL  Total OUT: 5050 mL    Total NET: -5050 mL          LABS:                          8.5    8.94  )-----------( 248      ( 13 Mar 2019 05:45 )             30.6         13 Mar 2019 05:45    144    |  103    |  54     ----------------------------<  80     5.1     |  28     |  6.3      Ca    8.4        13 Mar 2019 05:45  Phos  5.3       13 Mar 2019 05:45  Mg     2.3       13 Mar 2019 05:45    TPro  6.5    /  Alb  3.7    /  TBili  0.3    /  DBili  x      /  AST  5      /  ALT  <5     /  AlkPhos  112    13 Mar 2019 05:45  Amylase x     lipase x                                                 PT/INR - ( 12 Mar 2019 13:14 )   PT: 14.90 sec;   INR: 1.30 ratio                                                      CARDIAC MARKERS ( 12 Mar 2019 19:32 )  x     / 0.31 ng/mL / 55 U/L / x     / 4.4 ng/mL  CARDIAC MARKERS ( 12 Mar 2019 09:19 )  x     / 0.29 ng/mL / 81 U/L / x     / 5.4 ng/mL  CARDIAC MARKERS ( 11 Mar 2019 17:50 )  x     / 0.29 ng/mL / x     / x     / x                                                                                                                                             ABG - ( 13 Mar 2019 08:43 )  pH, Arterial: 7.36  pH, Blood: x     /  pCO2: 49    /  pO2: 59    / HCO3: 27    / Base Excess: 1.2   /  SaO2: 88                  MEDICATIONS  (STANDING):  ALBUTerol    90 MICROgram(s) HFA Inhaler 2 Puff(s) Inhalation every 6 hours  amLODIPine   Tablet 5 milliGRAM(s) Oral daily  aspirin enteric coated 81 milliGRAM(s) Oral daily  brimonidine 0.2% Ophthalmic Solution 1 Drop(s) Both EYES two times a day  docusate sodium 100 milliGRAM(s) Oral two times a day  dorzolamide 2% Ophthalmic Solution 1 Drop(s) Both EYES three times a day  epoetin keagan Injectable 17632 Unit(s) IV Push <User Schedule>  furosemide    Tablet 80 milliGRAM(s) Oral two times a day  heparin  Injectable. 1000 Unit(s) Dialysis. once  heparin SubCutaneous Injection - Peds 5000 Unit(s) SubCutaneous every 12 hours  insulin glargine SubCutaneous Injection (LANTUS) - Peds 25 Unit(s) SubCutaneous at bedtime  lactulose Syrup 10 Gram(s) Oral two times a day  latanoprost 0.005% Ophthalmic Solution 1 Drop(s) Both EYES at bedtime  metolazone 5 milliGRAM(s) Oral daily  metoprolol tartrate 25 milliGRAM(s) Oral two times a day  mineral oil/petrolatum Hydrophilic Ointment 1 Application(s) Topical daily  pantoprazole    Tablet 40 milliGRAM(s) Oral before breakfast  pregabalin 50 milliGRAM(s) Oral daily  sertraline 100 milliGRAM(s) Oral daily  sevelamer hydrochloride 800 milliGRAM(s) Oral three times a day  simvastatin 10 milliGRAM(s) Oral at bedtime  tamsulosin Oral Tab/Cap - Peds 0.4 milliGRAM(s) Oral at bedtime  timolol 0.5% Solution 1 Drop(s) Both EYES two times a day    MEDICATIONS  (PRN):  acetaminophen   Tablet .. 650 milliGRAM(s) Oral every 6 hours PRN Temp greater or equal to 38C (100.4F), Moderate Pain (4 - 6)  ALBUTerol/ipratropium for Nebulization 3 milliLiter(s) Nebulizer every 6 hours PRN Wheezing  oxyCODONE    5 mG/acetaminophen 325 mG 1 Tablet(s) Oral every 4 hours PRN for moderate pain  zolpidem 5 milliGRAM(s) Oral at bedtime PRN Insomnia          Xrays:  TLC:  OG:  ET tube:                                                                                    rotated to left  left effusion ?? opacity    ECHO:

## 2019-03-13 NOTE — PROGRESS NOTE ADULT - SUBJECTIVE AND OBJECTIVE BOX
[facility]    Patient is a 54y old  Male who presents with a chief complaint of SOB (13 Mar 2019 09:20)      Interval History/Overnight events: no events overnight    T(C): 36.6 (03-13-19 @ 07:05), Max: 36.6 (03-12-19 @ 23:00)  HR: 66 (03-13-19 @ 09:07)  BP: 171/73 (03-13-19 @ 05:13)  RR: 24 (03-13-19 @ 09:07)  SpO2: 98% (03-13-19 @ 03:14)    PHYSICAL EXAM:    GENERAL: on oxygen, not in acute distress  CHEST/LUNG: Bilateral crackles heard  HEART: Regular rate and rhythm  ABDOMEN: Soft, Nontender, Nondistended, no guarding or rebuond tenderness  EXTREMITIES: Right BKA; 1+ LE pitting edema      LABS:                          8.5    8.94  )-----------( 248      ( 13 Mar 2019 05:45 )             30.6     03-13    144  |  103  |  54<H>  ----------------------------<  80  5.1<H>   |  28  |  6.3<HH>    Ca    8.4<L>      13 Mar 2019 05:45  Phos  5.3     03-13  Mg     2.3     03-13    TPro  6.5  /  Alb  3.7  /  TBili  0.3  /  DBili  x   /  AST  5   /  ALT  <5  /  AlkPhos  112  03-13    PT/INR - ( 12 Mar 2019 13:14 )   PT: 14.90 sec;   INR: 1.30 ratio        CARDIAC MARKERS ( 12 Mar 2019 19:32 )  x     / 0.31 ng/mL / 55 U/L / x     / 4.4 ng/mL  CARDIAC MARKERS ( 12 Mar 2019 09:19 )  x     / 0.29 ng/mL / 81 U/L / x     / 5.4 ng/mL  CARDIAC MARKERS ( 11 Mar 2019 17:50 )  x     / 0.29 ng/mL / x     / x     / x          LIVER FUNCTIONS - ( 13 Mar 2019 05:45 )  Alb: 3.7 g/dL / Pro: 6.5 g/dL / ALK PHOS: 112 U/L / ALT: <5 U/L / AST: 5 U/L / GGT: x           < from: Xray Chest 1 View- PORTABLE-Routine (03.13.19 @ 06:45) >  Impression:      Bilateral opacities and left pleural effusion, stable.    < end of copied text >      MEDICATIONS:    MEDICATIONS  (STANDING):  ALBUTerol    90 MICROgram(s) HFA Inhaler 2 Puff(s) Inhalation every 6 hours  amLODIPine   Tablet 5 milliGRAM(s) Oral daily  aspirin enteric coated 81 milliGRAM(s) Oral daily  brimonidine 0.2% Ophthalmic Solution 1 Drop(s) Both EYES two times a day  docusate sodium 100 milliGRAM(s) Oral two times a day  dorzolamide 2% Ophthalmic Solution 1 Drop(s) Both EYES three times a day  epoetin keagan Injectable 96821 Unit(s) IV Push <User Schedule>  furosemide    Tablet 80 milliGRAM(s) Oral two times a day  heparin  Injectable. 1000 Unit(s) Dialysis. once  heparin SubCutaneous Injection - Peds 5000 Unit(s) SubCutaneous every 12 hours  insulin glargine SubCutaneous Injection (LANTUS) - Peds 25 Unit(s) SubCutaneous at bedtime  lactulose Syrup 10 Gram(s) Oral two times a day  latanoprost 0.005% Ophthalmic Solution 1 Drop(s) Both EYES at bedtime  metolazone 5 milliGRAM(s) Oral daily  metoprolol tartrate 25 milliGRAM(s) Oral two times a day  mineral oil/petrolatum Hydrophilic Ointment 1 Application(s) Topical daily  pantoprazole    Tablet 40 milliGRAM(s) Oral before breakfast  pregabalin 50 milliGRAM(s) Oral daily  sertraline 100 milliGRAM(s) Oral daily  sevelamer hydrochloride 800 milliGRAM(s) Oral three times a day  simvastatin 10 milliGRAM(s) Oral at bedtime  tamsulosin Oral Tab/Cap - Peds 0.4 milliGRAM(s) Oral at bedtime  timolol 0.5% Solution 1 Drop(s) Both EYES two times a day      MEDICATIONS  (PRN):  acetaminophen   Tablet .. 650 milliGRAM(s) Oral every 6 hours PRN Temp greater or equal to 38C (100.4F), Moderate Pain (4 - 6)  ALBUTerol/ipratropium for Nebulization 3 milliLiter(s) Nebulizer every 6 hours PRN Wheezing  oxyCODONE    5 mG/acetaminophen 325 mG 1 Tablet(s) Oral every 4 hours PRN for moderate pain  zolpidem 5 milliGRAM(s) Oral at bedtime PRN Insomnia

## 2019-03-13 NOTE — PROGRESS NOTE ADULT - ASSESSMENT
54 y.o male patient with PMH of ESRD on HD, DM, HTN, bipolar disorder?, presented to the ED for shortness of breath attributed to fluid overload. While on medicine floor, patient developed acute hypercapnic and hypoxemic respiratory failure; he was placed on AVAP and upgraded to CCU.    # Respiratory failure  - s/p HD on 3/12/2019   - Placed on AVAP  - Significant improvement of symptoms  - Check ABG    # ESRD   - s/p HD on 3/12/2019  - Monitor renal function and electrolytes   - F/U with nephrology    # HTN  - On amlodipine and metoprolol  - Monitor BP and adjust medications accordingly     # DM  - Continue insulin  - Monitor FS and adjust insulin dose accordingly    # Depression / Anxiety / Bipolar disorder?  - Continue Sertraline (will need to clarify history of bipolar disorder)    # DVT prophylaxis: heparin SQ  # GI prophylaxis 54 y.o male patient with PMH of ESRD on HD, DM, HTN, bipolar disorder?, presented to the ED for shortness of breath attributed to fluid overload. While on medicine floor, patient developed acute hypercapnic and hypoxemic respiratory failure; he was placed on AVAP and upgraded to CCU.    # Respiratory failure  - s/p HD on 3/12/2019 with 5L removed  - Placed on AVAP; now patient on oxygen via nasal canula  - Significant improvement of symptoms but patient still reports some shortness of breath  - Will do HD again today (3/13/2019)  - Monitor VS    # ESRD   - s/p HD on 3/12/2019; will do HD again today  - Monitor renal function and electrolytes   - F/U with nephrology    # HTN  - On amlodipine and metoprolol  - Monitor BP and adjust medications accordingly   - After HD, if BP still elevated, can increase amlodipine to 10mg q24h    # DM  - Continue insulin  - Monitor FS and adjust insulin dose accordingly    # Anemia  - Stable   - Most likely secondary to kidney disease  -     # Depression / Anxiety / Bipolar disorder?  - Continue Sertraline (will need to clarify history of bipolar disorder)    # DVT prophylaxis: heparin SQ  # GI prophylaxis 54 y.o male patient with PMH of ESRD on HD, DM, HTN, bipolar disorder?, presented to the ED for shortness of breath attributed to fluid overload. While on medicine floor, patient developed acute hypercapnic and hypoxemic respiratory failure; he was placed on AVAP and upgraded to CCU.    # Respiratory failure  - s/p HD on 3/12/2019 with 5L removed  - Placed on AVAP; now patient on oxygen via nasal canula  - Significant improvement of symptoms but patient still reports some shortness of breath  - Will do HD again today (3/13/2019)  - ABG improved after AVAP and HD  - Monitor VS    # ESRD   - s/p HD on 3/12/2019; will do HD again today  - Monitor renal function and electrolytes   - F/U with nephrology    # HTN  - On amlodipine and metoprolol  - Monitor BP and adjust medications accordingly   - After HD, if BP still elevated, can increase amlodipine to 10mg q24h    # DM  - Continue insulin  - Monitor FS and adjust insulin dose accordingly    # Anemia  - Stable   - Most likely secondary to kidney disease  - Patient on epoetin   - No signs or symptoms of active bleeding  - Monitor cbc    # Elevated troponin  - Stable  - No chest pain  - Most likely secondary to renal disease  - Continue aspirin, beta-blockers and statin    # Depression / Anxiety / Bipolar disorder?  - Continue Sertraline (will need to clarify history of bipolar disorder)    # DVT prophylaxis: heparin SQ  # GI prophylaxis  # PT rehab

## 2019-03-13 NOTE — PROGRESS NOTE ADULT - ASSESSMENT
Patient feels better. But still sob. Echo lv normal  size function. He needs c-pap. Check cxr . Check labs today.OOB to chair. Beta asa statin

## 2019-03-13 NOTE — PROGRESS NOTE ADULT - ASSESSMENT
1. SOB. Likely volume mediated. HD yesterday removed: 5L. HD again today to remove 3L. Lasix and metolazone.  2. ESRD on HD TTS. HD today for volume control: 3 hours, opti 160 dialyzer, 2K bath, 3L UF.  3. Anemia. EPO with HD.  4. Hyperphosphatemia. Renal diet. Sevelamer with meals.  5. Hyperkalemia. HD today. Renal diet.

## 2019-03-14 LAB
ALBUMIN SERPL ELPH-MCNC: 3.7 G/DL — SIGNIFICANT CHANGE UP (ref 3.5–5.2)
ALP SERPL-CCNC: 121 U/L — HIGH (ref 30–115)
ALT FLD-CCNC: <5 U/L — SIGNIFICANT CHANGE UP (ref 0–41)
ANION GAP SERPL CALC-SCNC: 13 MMOL/L — SIGNIFICANT CHANGE UP (ref 7–14)
AST SERPL-CCNC: 6 U/L — SIGNIFICANT CHANGE UP (ref 0–41)
BILIRUB SERPL-MCNC: 0.3 MG/DL — SIGNIFICANT CHANGE UP (ref 0.2–1.2)
BUN SERPL-MCNC: 41 MG/DL — HIGH (ref 10–20)
CALCIUM SERPL-MCNC: 8.5 MG/DL — SIGNIFICANT CHANGE UP (ref 8.5–10.1)
CHLORIDE SERPL-SCNC: 100 MMOL/L — SIGNIFICANT CHANGE UP (ref 98–110)
CO2 SERPL-SCNC: 29 MMOL/L — SIGNIFICANT CHANGE UP (ref 17–32)
CREAT SERPL-MCNC: 5.5 MG/DL — CRITICAL HIGH (ref 0.7–1.5)
GLUCOSE BLDC GLUCOMTR-MCNC: 105 MG/DL — HIGH (ref 70–99)
GLUCOSE BLDC GLUCOMTR-MCNC: 119 MG/DL — HIGH (ref 70–99)
GLUCOSE BLDC GLUCOMTR-MCNC: 217 MG/DL — HIGH (ref 70–99)
GLUCOSE BLDC GLUCOMTR-MCNC: 277 MG/DL — HIGH (ref 70–99)
GLUCOSE SERPL-MCNC: 117 MG/DL — HIGH (ref 70–99)
HCT VFR BLD CALC: 31.8 % — LOW (ref 42–52)
HGB BLD-MCNC: 8.9 G/DL — LOW (ref 14–18)
MAGNESIUM SERPL-MCNC: 2.2 MG/DL — SIGNIFICANT CHANGE UP (ref 1.8–2.4)
MCHC RBC-ENTMCNC: 24.3 PG — LOW (ref 27–31)
MCHC RBC-ENTMCNC: 28 G/DL — LOW (ref 32–37)
MCV RBC AUTO: 86.9 FL — SIGNIFICANT CHANGE UP (ref 80–94)
NRBC # BLD: 0 /100 WBCS — SIGNIFICANT CHANGE UP (ref 0–0)
PHOSPHATE SERPL-MCNC: 5.1 MG/DL — HIGH (ref 2.1–4.9)
PLATELET # BLD AUTO: 234 K/UL — SIGNIFICANT CHANGE UP (ref 130–400)
POTASSIUM SERPL-MCNC: 4.5 MMOL/L — SIGNIFICANT CHANGE UP (ref 3.5–5)
POTASSIUM SERPL-SCNC: 4.5 MMOL/L — SIGNIFICANT CHANGE UP (ref 3.5–5)
PROT SERPL-MCNC: 6.6 G/DL — SIGNIFICANT CHANGE UP (ref 6–8)
RBC # BLD: 3.66 M/UL — LOW (ref 4.7–6.1)
RBC # FLD: 17.1 % — HIGH (ref 11.5–14.5)
SODIUM SERPL-SCNC: 142 MMOL/L — SIGNIFICANT CHANGE UP (ref 135–146)
WBC # BLD: 8.06 K/UL — SIGNIFICANT CHANGE UP (ref 4.8–10.8)
WBC # FLD AUTO: 8.06 K/UL — SIGNIFICANT CHANGE UP (ref 4.8–10.8)

## 2019-03-14 RX ORDER — HYDRALAZINE HCL 50 MG
25 TABLET ORAL EVERY 8 HOURS
Qty: 0 | Refills: 0 | Status: DISCONTINUED | OUTPATIENT
Start: 2019-03-14 | End: 2019-03-15

## 2019-03-14 RX ORDER — INSULIN GLARGINE 100 [IU]/ML
25 INJECTION, SOLUTION SUBCUTANEOUS AT BEDTIME
Qty: 0 | Refills: 0 | Status: DISCONTINUED | OUTPATIENT
Start: 2019-03-14 | End: 2019-03-20

## 2019-03-14 RX ORDER — INSULIN LISPRO 100/ML
5 VIAL (ML) SUBCUTANEOUS
Qty: 0 | Refills: 0 | Status: DISCONTINUED | OUTPATIENT
Start: 2019-03-14 | End: 2019-03-20

## 2019-03-14 RX ORDER — AMLODIPINE BESYLATE 2.5 MG/1
10 TABLET ORAL DAILY
Qty: 0 | Refills: 0 | Status: DISCONTINUED | OUTPATIENT
Start: 2019-03-14 | End: 2019-03-15

## 2019-03-14 RX ORDER — INSULIN LISPRO 100/ML
VIAL (ML) SUBCUTANEOUS
Qty: 0 | Refills: 0 | Status: DISCONTINUED | OUTPATIENT
Start: 2019-03-14 | End: 2019-03-20

## 2019-03-14 RX ADMIN — INSULIN GLARGINE 25 UNIT(S): 100 INJECTION, SOLUTION SUBCUTANEOUS at 21:11

## 2019-03-14 RX ADMIN — SERTRALINE 100 MILLIGRAM(S): 25 TABLET, FILM COATED ORAL at 11:24

## 2019-03-14 RX ADMIN — SEVELAMER CARBONATE 800 MILLIGRAM(S): 2400 POWDER, FOR SUSPENSION ORAL at 21:02

## 2019-03-14 RX ADMIN — DORZOLAMIDE HYDROCHLORIDE 1 DROP(S): 20 SOLUTION/ DROPS OPHTHALMIC at 13:53

## 2019-03-14 RX ADMIN — AMLODIPINE BESYLATE 10 MILLIGRAM(S): 2.5 TABLET ORAL at 09:22

## 2019-03-14 RX ADMIN — ERYTHROPOIETIN 10000 UNIT(S): 10000 INJECTION, SOLUTION INTRAVENOUS; SUBCUTANEOUS at 14:20

## 2019-03-14 RX ADMIN — SIMVASTATIN 10 MILLIGRAM(S): 20 TABLET, FILM COATED ORAL at 21:01

## 2019-03-14 RX ADMIN — ALBUTEROL 2 PUFF(S): 90 AEROSOL, METERED ORAL at 21:11

## 2019-03-14 RX ADMIN — SEVELAMER CARBONATE 800 MILLIGRAM(S): 2400 POWDER, FOR SUSPENSION ORAL at 05:20

## 2019-03-14 RX ADMIN — HEPARIN SODIUM 5000 UNIT(S): 5000 INJECTION INTRAVENOUS; SUBCUTANEOUS at 17:13

## 2019-03-14 RX ADMIN — Medication 650 MILLIGRAM(S): at 13:52

## 2019-03-14 RX ADMIN — Medication 25 MILLIGRAM(S): at 21:02

## 2019-03-14 RX ADMIN — AMLODIPINE BESYLATE 5 MILLIGRAM(S): 2.5 TABLET ORAL at 05:20

## 2019-03-14 RX ADMIN — Medication 100 MILLIGRAM(S): at 05:20

## 2019-03-14 RX ADMIN — SEVELAMER CARBONATE 800 MILLIGRAM(S): 2400 POWDER, FOR SUSPENSION ORAL at 16:06

## 2019-03-14 RX ADMIN — TAMSULOSIN HYDROCHLORIDE 0.4 MILLIGRAM(S): 0.4 CAPSULE ORAL at 21:02

## 2019-03-14 RX ADMIN — PANTOPRAZOLE SODIUM 40 MILLIGRAM(S): 20 TABLET, DELAYED RELEASE ORAL at 05:21

## 2019-03-14 RX ADMIN — Medication 1 APPLICATION(S): at 11:24

## 2019-03-14 RX ADMIN — ALBUTEROL 2 PUFF(S): 90 AEROSOL, METERED ORAL at 08:22

## 2019-03-14 RX ADMIN — Medication 50 MILLIGRAM(S): at 11:25

## 2019-03-14 RX ADMIN — Medication 100 MILLIGRAM(S): at 17:12

## 2019-03-14 RX ADMIN — LATANOPROST 1 DROP(S): 0.05 SOLUTION/ DROPS OPHTHALMIC; TOPICAL at 21:02

## 2019-03-14 RX ADMIN — LACTULOSE 10 GRAM(S): 10 SOLUTION ORAL at 17:13

## 2019-03-14 RX ADMIN — BRIMONIDINE TARTRATE 1 DROP(S): 2 SOLUTION/ DROPS OPHTHALMIC at 05:22

## 2019-03-14 RX ADMIN — HEPARIN SODIUM 5000 UNIT(S): 5000 INJECTION INTRAVENOUS; SUBCUTANEOUS at 05:23

## 2019-03-14 RX ADMIN — DORZOLAMIDE HYDROCHLORIDE 1 DROP(S): 20 SOLUTION/ DROPS OPHTHALMIC at 21:12

## 2019-03-14 RX ADMIN — Medication 25 MILLIGRAM(S): at 16:05

## 2019-03-14 RX ADMIN — Medication 4: at 16:46

## 2019-03-14 RX ADMIN — LACTULOSE 10 GRAM(S): 10 SOLUTION ORAL at 05:22

## 2019-03-14 RX ADMIN — BRIMONIDINE TARTRATE 1 DROP(S): 2 SOLUTION/ DROPS OPHTHALMIC at 17:12

## 2019-03-14 RX ADMIN — Medication 5 UNIT(S): at 16:46

## 2019-03-14 RX ADMIN — DORZOLAMIDE HYDROCHLORIDE 1 DROP(S): 20 SOLUTION/ DROPS OPHTHALMIC at 05:21

## 2019-03-14 RX ADMIN — Medication 1 DROP(S): at 17:14

## 2019-03-14 RX ADMIN — Medication 1 DROP(S): at 05:21

## 2019-03-14 RX ADMIN — Medication 80 MILLIGRAM(S): at 05:20

## 2019-03-14 RX ADMIN — Medication 25 MILLIGRAM(S): at 17:13

## 2019-03-14 RX ADMIN — Medication 25 MILLIGRAM(S): at 05:20

## 2019-03-14 RX ADMIN — Medication 81 MILLIGRAM(S): at 11:24

## 2019-03-14 RX ADMIN — Medication 80 MILLIGRAM(S): at 17:14

## 2019-03-14 NOTE — PROGRESS NOTE ADULT - SUBJECTIVE AND OBJECTIVE BOX
[facility]    Patient is a 54y old  Male who presents with a chief complaint of Shortness of breath / fluid overload (14 Mar 2019 07:25)      Interval History/Overnight events: no acute events    T(C): 36.6 (03-14-19 @ 07:10), Max: 36.8 (03-13-19 @ 15:01)  HR: 62 (03-14-19 @ 07:21)  BP: 170/75 (03-14-19 @ 07:21)  RR: 30 (03-14-19 @ 07:21)  SpO2: 100% (03-14-19 @ 07:21)    PHYSICAL EXAM:    GENERAL: on oxygen, not in acute distress  CHEST/LUNG: Bilateral crackles improved heard; reduced breath sounds on left  HEART: Regular rate and rhythm  ABDOMEN: Soft, Nontender, Nondistended, no guarding or rebound tenderness  EXTREMITIES: Right BKA; 1+ LE pitting edema      LABS:                          8.9    8.06  )-----------( 234      ( 14 Mar 2019 05:57 )             31.8     03-14    142  |  100  |  41<H>  ----------------------------<  117<H>  4.5   |  29  |  5.5<HH>    Ca    8.5      14 Mar 2019 05:57  Phos  5.1     03-14  Mg     2.2     03-14    TPro  6.6  /  Alb  3.7  /  TBili  0.3  /  DBili  x   /  AST  6   /  ALT  <5  /  AlkPhos  121<H>  03-14    PT/INR - ( 12 Mar 2019 13:14 )   PT: 14.90 sec;   INR: 1.30 ratio      CARDIAC MARKERS ( 12 Mar 2019 19:32 )  x     / 0.31 ng/mL / 55 U/L / x     / 4.4 ng/mL  CARDIAC MARKERS ( 12 Mar 2019 09:19 )  x     / 0.29 ng/mL / 81 U/L / x     / 5.4 ng/mL    LIVER FUNCTIONS - ( 14 Mar 2019 05:57 )  Alb: 3.7 g/dL / Pro: 6.6 g/dL / ALK PHOS: 121 U/L / ALT: <5 U/L / AST: 6 U/L / GGT: x           MEDICATIONS:    MEDICATIONS  (STANDING):  ALBUTerol    90 MICROgram(s) HFA Inhaler 2 Puff(s) Inhalation every 6 hours  amLODIPine   Tablet 10 milliGRAM(s) Oral daily  aspirin enteric coated 81 milliGRAM(s) Oral daily  brimonidine 0.2% Ophthalmic Solution 1 Drop(s) Both EYES two times a day  dextrose 5%. 1000 milliLiter(s) (50 mL/Hr) IV Continuous <Continuous>  dextrose 50% Injectable 12.5 Gram(s) IV Push once  dextrose 50% Injectable 25 Gram(s) IV Push once  dextrose 50% Injectable 25 Gram(s) IV Push once  docusate sodium 100 milliGRAM(s) Oral two times a day  dorzolamide 2% Ophthalmic Solution 1 Drop(s) Both EYES three times a day  epoetin keagan Injectable 00380 Unit(s) IV Push <User Schedule>  furosemide    Tablet 80 milliGRAM(s) Oral two times a day  heparin  Injectable. 1000 Unit(s) Dialysis. once  heparin SubCutaneous Injection - Peds 5000 Unit(s) SubCutaneous every 12 hours  insulin glargine Injectable (LANTUS) 25 Unit(s) SubCutaneous at bedtime  insulin lispro Injectable (HumaLOG) 5 Unit(s) SubCutaneous three times a day before meals  lactulose Syrup 10 Gram(s) Oral two times a day  latanoprost 0.005% Ophthalmic Solution 1 Drop(s) Both EYES at bedtime  metolazone 5 milliGRAM(s) Oral daily  metoprolol tartrate 25 milliGRAM(s) Oral two times a day  mineral oil/petrolatum Hydrophilic Ointment 1 Application(s) Topical daily  pantoprazole    Tablet 40 milliGRAM(s) Oral before breakfast  pregabalin 50 milliGRAM(s) Oral daily  sertraline 100 milliGRAM(s) Oral daily  sevelamer hydrochloride 800 milliGRAM(s) Oral three times a day  simvastatin 10 milliGRAM(s) Oral at bedtime  tamsulosin Oral Tab/Cap - Peds 0.4 milliGRAM(s) Oral at bedtime  timolol 0.5% Solution 1 Drop(s) Both EYES two times a day      MEDICATIONS  (PRN):  acetaminophen   Tablet .. 650 milliGRAM(s) Oral every 6 hours PRN Temp greater or equal to 38C (100.4F), Moderate Pain (4 - 6)  ALBUTerol/ipratropium for Nebulization 3 milliLiter(s) Nebulizer every 6 hours PRN Wheezing  dextrose 40% Gel 15 Gram(s) Oral once PRN Blood Glucose LESS THAN 70 milliGRAM(s)/deciliter  glucagon  Injectable 1 milliGRAM(s) IntraMuscular once PRN Glucose LESS THAN 70 milligrams/deciliter  oxyCODONE    5 mG/acetaminophen 325 mG 1 Tablet(s) Oral every 4 hours PRN for moderate pain  zolpidem 5 milliGRAM(s) Oral at bedtime PRN Insomnia

## 2019-03-14 NOTE — PROGRESS NOTE ADULT - ASSESSMENT
Patient feels better.  Echo lv normal  size function. He needs c-pap. Check cxr . Check labs today.OOB to chair. Beta asa statin. Ambulate if stable. He will need sleep study. Consider outpatient dobutamine SE. Daily weight

## 2019-03-14 NOTE — PROGRESS NOTE ADULT - ASSESSMENT
54 y.o male patient with PMH of ESRD on HD, DM, HTN, bipolar disorder?, presented to the ED for shortness of breath attributed to fluid overload. While on medicine floor, patient developed acute hypercapnic and hypoxemic respiratory failure; he was placed on AVAP and upgraded to CCU.    # Respiratory failure  - s/p HD on 3/12/2019 with 5L removed and on 3/13/2019 with 3L removed  - Significant improvement of symptoms but patient still reports some shortness of breath  - ABG improved after AVAP and HD  - Check CT chest  - Monitor VS  - F/U with pulm    # ESRD   - s/p HD on 3/12/2019 and on 3/13/2019  - Monitor renal function and electrolytes   - F/U with nephrology    # HTN  - Will increase Amlodipine to 10mg q24h  - Continue Metoprolol  - Monitor BP and adjust medications accordingly     # DM  - Continue insulin  - Monitor FS and adjust insulin dose accordingly    # Anemia  - Stable   - Most likely secondary to kidney disease  - Patient on epoetin   - No signs or symptoms of active bleeding  - Monitor cbc and VS    # Elevated troponin  - Stable  - No chest pain  - Most likely secondary to renal disease  - Continue aspirin, beta-blockers and statin  - Consider outpatient stress echo as per cardiology    # Depression / Anxiety / Bipolar disorder?  - Continue Sertraline (will need to clarify history of bipolar disorder)    # DVT prophylaxis: heparin SQ  # GI prophylaxis  # PT rehab 54 y.o male patient with PMH of ESRD on HD, DM, HTN, bipolar disorder?, presented to the ED for shortness of breath attributed to fluid overload. While on medicine floor, patient developed acute hypercapnic and hypoxemic respiratory failure; he was placed on AVAP and upgraded to CCU.    # Respiratory failure  - s/p HD on 3/12/2019 with 5L removed and on 3/13/2019 with 3L removed; will get HD today (3/14/2019) but patient refusing to go to HD stating that they "don't have TVs over there" and that he wants to stay in the CCU to get HD  - Significant improvement of symptoms but patient still reports some shortness of breath  - ABG improved after AVAP and HD  - Check CT chest (patient refusing CT chest)  - Monitor VS  - F/U with pulm    # ESRD   - s/p HD on 3/12/2019 and on 3/13/2019; HD to be done today (3/15/2019)  - Monitor renal function and electrolytes   - F/U with nephrology    # HTN  - Will increase Amlodipine to 10mg q24h  - Continue Metoprolol  - Monitor BP and adjust medications accordingly     # DM  - Continue insulin  - Monitor FS and adjust insulin dose accordingly    # Anemia  - Stable   - Most likely secondary to kidney disease  - Patient on epoetin   - No signs or symptoms of active bleeding  - Monitor cbc and VS    # Elevated troponin  - Stable  - No chest pain  - Most likely secondary to renal disease  - Continue aspirin, beta-blockers and statin  - Consider outpatient stress echo as per cardiology    # Depression / Anxiety / Bipolar disorder?  - Continue Sertraline (will need to clarify history of bipolar disorder)    # DVT prophylaxis: heparin SQ  # GI prophylaxis  # PT rehab    Patient can be downgraded to telemetry

## 2019-03-14 NOTE — PROGRESS NOTE ADULT - SUBJECTIVE AND OBJECTIVE BOX
Patient is a 54y old  Male who presents with a chief complaint of SOB (13 Mar 2019 09:20)      T(F): 98.1 (03-14-19 @ 03:00), Max: 98.2 (03-13-19 @ 15:01)  HR: 47 (03-14-19 @ 05:00)  BP: 145/67 (03-14-19 @ 05:00)  RR: 15 (03-14-19 @ 05:00)  SpO2: 95% (03-14-19 @ 05:00) (95% - 100%)    PHYSICAL EXAM:  GENERAL: NAD, well-groomed, well-developed  HEAD:  Atraumatic, Normocephalic  EYES: EOMI, PERRLA, conjunctiva and sclera clear  ENMT: No tonsillar erythema, exudates, or enlargement; Moist mucous membranes, Good dentition, No lesions  NECK: Supple, No JVD, Normal thyroid  NERVOUS SYSTEM:  Alert & Oriented X3,  Motor Strength 5/5 B/L upper and lower extremities  CHEST/LUNG: Clear to percussion bilaterally; No rales, rhonchi, wheezing, or rubs  HEART: Regular rate and rhythm; No murmurs, rubs, or gallops  ABDOMEN: Soft, Nontender, Nondistended; Bowel sounds present  EXTREMITIES:   No clubbing, cyanosis, or edema  LYMPH: No lymphadenopathy noted  SKIN: No rashes or lesions    labs  03-13    144  |  103  |  54<H>  ----------------------------<  80  5.1<H>   |  28  |  6.3<HH>    Ca    8.4<L>      13 Mar 2019 05:45  Phos  5.3     03-13  Mg     2.3     03-13    TPro  6.5  /  Alb  3.7  /  TBili  0.3  /  DBili  x   /  AST  5   /  ALT  <5  /  AlkPhos  112  03-13                          8.9    8.06  )-----------( 234      ( 14 Mar 2019 05:57 )             31.8       PT/INR - ( 12 Mar 2019 13:14 )   PT: 14.90 sec;   INR: 1.30 ratio                 acetaminophen   Tablet .. 650 milliGRAM(s) Oral every 6 hours PRN  ALBUTerol    90 MICROgram(s) HFA Inhaler 2 Puff(s) Inhalation every 6 hours  ALBUTerol/ipratropium for Nebulization 3 milliLiter(s) Nebulizer every 6 hours PRN  amLODIPine   Tablet 5 milliGRAM(s) Oral daily  aspirin enteric coated 81 milliGRAM(s) Oral daily  brimonidine 0.2% Ophthalmic Solution 1 Drop(s) Both EYES two times a day  dextrose 40% Gel 15 Gram(s) Oral once PRN  dextrose 5%. 1000 milliLiter(s) IV Continuous <Continuous>  dextrose 50% Injectable 12.5 Gram(s) IV Push once  dextrose 50% Injectable 25 Gram(s) IV Push once  dextrose 50% Injectable 25 Gram(s) IV Push once  docusate sodium 100 milliGRAM(s) Oral two times a day  dorzolamide 2% Ophthalmic Solution 1 Drop(s) Both EYES three times a day  epoetin keagan Injectable 42321 Unit(s) IV Push <User Schedule>  furosemide    Tablet 80 milliGRAM(s) Oral two times a day  glucagon  Injectable 1 milliGRAM(s) IntraMuscular once PRN  heparin  Injectable. 1000 Unit(s) Dialysis. once  heparin SubCutaneous Injection - Peds 5000 Unit(s) SubCutaneous every 12 hours  insulin glargine Injectable (LANTUS) 25 Unit(s) SubCutaneous at bedtime  insulin lispro Injectable (HumaLOG) 5 Unit(s) SubCutaneous three times a day before meals  lactulose Syrup 10 Gram(s) Oral two times a day  latanoprost 0.005% Ophthalmic Solution 1 Drop(s) Both EYES at bedtime  metolazone 5 milliGRAM(s) Oral daily  metoprolol tartrate 25 milliGRAM(s) Oral two times a day  mineral oil/petrolatum Hydrophilic Ointment 1 Application(s) Topical daily  oxyCODONE    5 mG/acetaminophen 325 mG 1 Tablet(s) Oral every 4 hours PRN  pantoprazole    Tablet 40 milliGRAM(s) Oral before breakfast  pregabalin 50 milliGRAM(s) Oral daily  sertraline 100 milliGRAM(s) Oral daily  sevelamer hydrochloride 800 milliGRAM(s) Oral three times a day  simvastatin 10 milliGRAM(s) Oral at bedtime  tamsulosin Oral Tab/Cap - Peds 0.4 milliGRAM(s) Oral at bedtime  timolol 0.5% Solution 1 Drop(s) Both EYES two times a day  zolpidem 5 milliGRAM(s) Oral at bedtime PRN

## 2019-03-14 NOTE — PROGRESS NOTE ADULT - ASSESSMENT
1. SOB. Likely volume mediated. HD Tuesday: removed: 5L. HD yesterday: removed 3L. HD again today to remove 3-4L. Lasix and metolazone.  2. ESRD on HD TTS. HD today for volume control: 3 hours, opti 160 dialyzer, 2K bath, 3L UF.  3. Anemia. EPO with HD.  4. Hyperphosphatemia. Renal diet. Sevelamer with meals.  5. Hyperkalemia. Resolved. Renal diet.

## 2019-03-14 NOTE — PROGRESS NOTE ADULT - SUBJECTIVE AND OBJECTIVE BOX
Patient is a 54y old  Male who presents with a chief complaint of Shortness of breath / fluid overload (14 Mar 2019 07:25)      Over Night Events:  Patient seen and examined s/p HD  clinically feel much better no distress sitting in chair eating   bed side US yesterday mild left effusion     ROS:  See HPI    PHYSICAL EXAM    ICU Vital Signs Last 24 Hrs  T(C): 36.6 (14 Mar 2019 07:10), Max: 36.8 (13 Mar 2019 15:01)  T(F): 97.8 (14 Mar 2019 07:10), Max: 98.2 (13 Mar 2019 15:01)  HR: 62 (14 Mar 2019 07:21) (47 - 74)  BP: 170/75 (14 Mar 2019 07:21) (145/67 - 195/81)  BP(mean): 108 (14 Mar 2019 07:21) (97 - 117)  ABP: --  ABP(mean): --  RR: 30 (14 Mar 2019 07:21) (14 - 37)  SpO2: 100% (14 Mar 2019 07:21) (95% - 100%)      General: Aox3  HEENT:   rohit             Lymph Nodes: NO cervical LN   Lungs: mild crackles bibasialr   Cardiovascular: Regular   Abdomen: Soft, Positive BS  Extremities: No clubbing 2 edema   Skin:   Neurological: no focla deficit   Musculoskeletal: move all ext     I&O's Detail    13 Mar 2019 07:01  -  14 Mar 2019 07:00  --------------------------------------------------------  IN:    Oral Fluid: 340 mL  Total IN: 340 mL    OUT:    Other: 3000 mL    Voided: 300 mL  Total OUT: 3300 mL    Total NET: -2960 mL          LABS:                          8.9    8.06  )-----------( 234      ( 14 Mar 2019 05:57 )             31.8         14 Mar 2019 05:57    142    |  100    |  41     ----------------------------<  117    4.5     |  29     |  5.5      Ca    8.5        14 Mar 2019 05:57  Phos  5.1       14 Mar 2019 05:57  Mg     2.2       14 Mar 2019 05:57    TPro  6.6    /  Alb  3.7    /  TBili  0.3    /  DBili  x      /  AST  6      /  ALT  <5     /  AlkPhos  121    14 Mar 2019 05:57  Amylase x     lipase x                                                 PT/INR - ( 12 Mar 2019 13:14 )   PT: 14.90 sec;   INR: 1.30 ratio                                                      CARDIAC MARKERS ( 12 Mar 2019 19:32 )  x     / 0.31 ng/mL / 55 U/L / x     / 4.4 ng/mL  CARDIAC MARKERS ( 12 Mar 2019 09:19 )  x     / 0.29 ng/mL / 81 U/L / x     / 5.4 ng/mL                                                                                                                                         ABG - ( 13 Mar 2019 08:43 )  pH, Arterial: 7.36  pH, Blood: x     /  pCO2: 49    /  pO2: 59    / HCO3: 27    / Base Excess: 1.2   /  SaO2: 88                  MEDICATIONS  (STANDING):  ALBUTerol    90 MICROgram(s) HFA Inhaler 2 Puff(s) Inhalation every 6 hours  amLODIPine   Tablet 10 milliGRAM(s) Oral daily  aspirin enteric coated 81 milliGRAM(s) Oral daily  brimonidine 0.2% Ophthalmic Solution 1 Drop(s) Both EYES two times a day  dextrose 5%. 1000 milliLiter(s) (50 mL/Hr) IV Continuous <Continuous>  dextrose 50% Injectable 12.5 Gram(s) IV Push once  dextrose 50% Injectable 25 Gram(s) IV Push once  dextrose 50% Injectable 25 Gram(s) IV Push once  docusate sodium 100 milliGRAM(s) Oral two times a day  dorzolamide 2% Ophthalmic Solution 1 Drop(s) Both EYES three times a day  epoetin keagan Injectable 17869 Unit(s) IV Push <User Schedule>  furosemide    Tablet 80 milliGRAM(s) Oral two times a day  heparin  Injectable. 1000 Unit(s) Dialysis. once  heparin SubCutaneous Injection - Peds 5000 Unit(s) SubCutaneous every 12 hours  insulin glargine Injectable (LANTUS) 25 Unit(s) SubCutaneous at bedtime  insulin lispro Injectable (HumaLOG) 5 Unit(s) SubCutaneous three times a day before meals  lactulose Syrup 10 Gram(s) Oral two times a day  latanoprost 0.005% Ophthalmic Solution 1 Drop(s) Both EYES at bedtime  metolazone 5 milliGRAM(s) Oral daily  metoprolol tartrate 25 milliGRAM(s) Oral two times a day  mineral oil/petrolatum Hydrophilic Ointment 1 Application(s) Topical daily  pantoprazole    Tablet 40 milliGRAM(s) Oral before breakfast  pregabalin 50 milliGRAM(s) Oral daily  sertraline 100 milliGRAM(s) Oral daily  sevelamer hydrochloride 800 milliGRAM(s) Oral three times a day  simvastatin 10 milliGRAM(s) Oral at bedtime  tamsulosin Oral Tab/Cap - Peds 0.4 milliGRAM(s) Oral at bedtime  timolol 0.5% Solution 1 Drop(s) Both EYES two times a day    MEDICATIONS  (PRN):  acetaminophen   Tablet .. 650 milliGRAM(s) Oral every 6 hours PRN Temp greater or equal to 38C (100.4F), Moderate Pain (4 - 6)  ALBUTerol/ipratropium for Nebulization 3 milliLiter(s) Nebulizer every 6 hours PRN Wheezing  dextrose 40% Gel 15 Gram(s) Oral once PRN Blood Glucose LESS THAN 70 milliGRAM(s)/deciliter  glucagon  Injectable 1 milliGRAM(s) IntraMuscular once PRN Glucose LESS THAN 70 milligrams/deciliter  oxyCODONE    5 mG/acetaminophen 325 mG 1 Tablet(s) Oral every 4 hours PRN for moderate pain  zolpidem 5 milliGRAM(s) Oral at bedtime PRN Insomnia          Xrays:  TLC:  OG:  ET tube:                                                                                    rotated to left    ECHO:

## 2019-03-14 NOTE — PROGRESS NOTE ADULT - SUBJECTIVE AND OBJECTIVE BOX
[facility] FOLLOW UP NOTE  --------------------------------------------------------------------------------  24 hour events/subjective: Patient examined. Appears comfortable.    PAST HISTORY  --------------------------------------------------------------------------------  No significant changes to PMH, PSH, FHx, SHx, unless otherwise noted    ALLERGIES & MEDICATIONS  --------------------------------------------------------------------------------  Allergies    No Known Allergies    Standing Inpatient Medications  ALBUTerol    90 MICROgram(s) HFA Inhaler 2 Puff(s) Inhalation every 6 hours  amLODIPine   Tablet 10 milliGRAM(s) Oral daily  aspirin enteric coated 81 milliGRAM(s) Oral daily  brimonidine 0.2% Ophthalmic Solution 1 Drop(s) Both EYES two times a day  dextrose 5%. 1000 milliLiter(s) IV Continuous <Continuous>  dextrose 50% Injectable 12.5 Gram(s) IV Push once  dextrose 50% Injectable 25 Gram(s) IV Push once  dextrose 50% Injectable 25 Gram(s) IV Push once  docusate sodium 100 milliGRAM(s) Oral two times a day  dorzolamide 2% Ophthalmic Solution 1 Drop(s) Both EYES three times a day  epoetin keagan Injectable 57879 Unit(s) IV Push <User Schedule>  furosemide    Tablet 80 milliGRAM(s) Oral two times a day  heparin  Injectable. 1000 Unit(s) Dialysis. once  heparin SubCutaneous Injection - Peds 5000 Unit(s) SubCutaneous every 12 hours  insulin glargine Injectable (LANTUS) 25 Unit(s) SubCutaneous at bedtime  insulin lispro (HumaLOG) corrective regimen sliding scale   SubCutaneous three times a day before meals  insulin lispro Injectable (HumaLOG) 5 Unit(s) SubCutaneous three times a day before meals  lactulose Syrup 10 Gram(s) Oral two times a day  latanoprost 0.005% Ophthalmic Solution 1 Drop(s) Both EYES at bedtime  metolazone 5 milliGRAM(s) Oral daily  metoprolol tartrate 25 milliGRAM(s) Oral two times a day  mineral oil/petrolatum Hydrophilic Ointment 1 Application(s) Topical daily  pantoprazole    Tablet 40 milliGRAM(s) Oral before breakfast  pregabalin 50 milliGRAM(s) Oral daily  sertraline 100 milliGRAM(s) Oral daily  sevelamer hydrochloride 800 milliGRAM(s) Oral three times a day  simvastatin 10 milliGRAM(s) Oral at bedtime  tamsulosin Oral Tab/Cap - Peds 0.4 milliGRAM(s) Oral at bedtime  timolol 0.5% Solution 1 Drop(s) Both EYES two times a day    PRN Inpatient Medications  acetaminophen   Tablet .. 650 milliGRAM(s) Oral every 6 hours PRN  ALBUTerol/ipratropium for Nebulization 3 milliLiter(s) Nebulizer every 6 hours PRN  dextrose 40% Gel 15 Gram(s) Oral once PRN  glucagon  Injectable 1 milliGRAM(s) IntraMuscular once PRN  oxyCODONE    5 mG/acetaminophen 325 mG 1 Tablet(s) Oral every 4 hours PRN  zolpidem 5 milliGRAM(s) Oral at bedtime PRN    VITALS/PHYSICAL EXAM  --------------------------------------------------------------------------------  T(C): 36.6 (03-14-19 @ 07:10), Max: 36.8 (03-13-19 @ 15:01)  HR: 66 (03-14-19 @ 10:58) (47 - 72)  BP: 182/79 (03-14-19 @ 10:58) (145/67 - 204/88)  RR: 27 (03-14-19 @ 10:58) (14 - 37)  SpO2: 99% (03-14-19 @ 10:58) (95% - 100%)    03-13-19 @ 07:01  -  03-14-19 @ 07:00  --------------------------------------------------------  IN: 340 mL / OUT: 3300 mL / NET: -2960 mL    Physical Exam:  	Gen: NAD  	Pulm: CTA B/L  	CV: RRR, S1S2  	Abd: +BS, soft, nontender/nondistended  	: No suprapubic tenderness  	LE: Warm, no edema  	Vascular access: AVF    LABS/STUDIES  --------------------------------------------------------------------------------              8.9    8.06  >-----------<  234      [03-14-19 @ 05:57]              31.8     142  |  100  |  41  ----------------------------<  117      [03-14-19 @ 05:57]  4.5   |  29  |  5.5        Ca     8.5     [03-14-19 @ 05:57]      Mg     2.2     [03-14-19 @ 05:57]      Phos  5.1     [03-14-19 @ 05:57]    TPro  6.6  /  Alb  3.7  /  TBili  0.3  /  DBili  x   /  AST  6   /  ALT  <5  /  AlkPhos  121  [03-14-19 @ 05:57]    PT/INR: PT 14.90, INR 1.30       [03-12-19 @ 13:14]    Troponin 0.31      [03-12-19 @ 19:32]  CK 55      [03-12-19 @ 19:32]    Creatinine Trend:  SCr 5.5 [03-14 @ 05:57]  SCr 6.3 [03-13 @ 05:45]  SCr 7.7 [03-12 @ 09:19]  SCr 6.5 [03-11 @ 17:50]    HbA1c 8.6      [06-01-18 @ 07:22]

## 2019-03-15 LAB
ALBUMIN SERPL ELPH-MCNC: 3.5 G/DL — SIGNIFICANT CHANGE UP (ref 3.5–5.2)
ALP SERPL-CCNC: 112 U/L — SIGNIFICANT CHANGE UP (ref 30–115)
ALT FLD-CCNC: <5 U/L — SIGNIFICANT CHANGE UP (ref 0–41)
ANION GAP SERPL CALC-SCNC: 12 MMOL/L — SIGNIFICANT CHANGE UP (ref 7–14)
ANION GAP SERPL CALC-SCNC: 9 MMOL/L — SIGNIFICANT CHANGE UP (ref 7–14)
AST SERPL-CCNC: 5 U/L — SIGNIFICANT CHANGE UP (ref 0–41)
BASOPHILS # BLD AUTO: 0.11 K/UL — SIGNIFICANT CHANGE UP (ref 0–0.2)
BASOPHILS NFR BLD AUTO: 1.4 % — HIGH (ref 0–1)
BILIRUB SERPL-MCNC: 0.3 MG/DL — SIGNIFICANT CHANGE UP (ref 0.2–1.2)
BUN SERPL-MCNC: 35 MG/DL — HIGH (ref 10–20)
BUN SERPL-MCNC: 40 MG/DL — HIGH (ref 10–20)
CALCIUM SERPL-MCNC: 8.2 MG/DL — LOW (ref 8.5–10.1)
CALCIUM SERPL-MCNC: 8.5 MG/DL — SIGNIFICANT CHANGE UP (ref 8.5–10.1)
CHLORIDE SERPL-SCNC: 100 MMOL/L — SIGNIFICANT CHANGE UP (ref 98–110)
CHLORIDE SERPL-SCNC: 89 MMOL/L — LOW (ref 98–110)
CO2 SERPL-SCNC: 30 MMOL/L — SIGNIFICANT CHANGE UP (ref 17–32)
CO2 SERPL-SCNC: 31 MMOL/L — SIGNIFICANT CHANGE UP (ref 17–32)
CREAT SERPL-MCNC: 5 MG/DL — CRITICAL HIGH (ref 0.7–1.5)
CREAT SERPL-MCNC: 5.9 MG/DL — CRITICAL HIGH (ref 0.7–1.5)
EOSINOPHIL # BLD AUTO: 0.21 K/UL — SIGNIFICANT CHANGE UP (ref 0–0.7)
EOSINOPHIL NFR BLD AUTO: 2.7 % — SIGNIFICANT CHANGE UP (ref 0–8)
GLUCOSE BLDC GLUCOMTR-MCNC: 171 MG/DL — HIGH (ref 70–99)
GLUCOSE BLDC GLUCOMTR-MCNC: 188 MG/DL — HIGH (ref 70–99)
GLUCOSE BLDC GLUCOMTR-MCNC: 214 MG/DL — HIGH (ref 70–99)
GLUCOSE BLDC GLUCOMTR-MCNC: 296 MG/DL — HIGH (ref 70–99)
GLUCOSE BLDC GLUCOMTR-MCNC: 99 MG/DL — SIGNIFICANT CHANGE UP (ref 70–99)
GLUCOSE SERPL-MCNC: 129 MG/DL — HIGH (ref 70–99)
GLUCOSE SERPL-MCNC: 222 MG/DL — HIGH (ref 70–99)
HCT VFR BLD CALC: 29.6 % — LOW (ref 42–52)
HGB BLD-MCNC: 8.4 G/DL — LOW (ref 14–18)
IMM GRANULOCYTES NFR BLD AUTO: 0.4 % — HIGH (ref 0.1–0.3)
LYMPHOCYTES # BLD AUTO: 1.01 K/UL — LOW (ref 1.2–3.4)
LYMPHOCYTES # BLD AUTO: 12.9 % — LOW (ref 20.5–51.1)
MAGNESIUM SERPL-MCNC: 2.1 MG/DL — SIGNIFICANT CHANGE UP (ref 1.8–2.4)
MCHC RBC-ENTMCNC: 24.4 PG — LOW (ref 27–31)
MCHC RBC-ENTMCNC: 28.4 G/DL — LOW (ref 32–37)
MCV RBC AUTO: 86 FL — SIGNIFICANT CHANGE UP (ref 80–94)
MONOCYTES # BLD AUTO: 0.71 K/UL — HIGH (ref 0.1–0.6)
MONOCYTES NFR BLD AUTO: 9.1 % — SIGNIFICANT CHANGE UP (ref 1.7–9.3)
NEUTROPHILS # BLD AUTO: 5.75 K/UL — SIGNIFICANT CHANGE UP (ref 1.4–6.5)
NEUTROPHILS NFR BLD AUTO: 73.5 % — SIGNIFICANT CHANGE UP (ref 42.2–75.2)
NRBC # BLD: 0 /100 WBCS — SIGNIFICANT CHANGE UP (ref 0–0)
PHOSPHATE SERPL-MCNC: 4.2 MG/DL — SIGNIFICANT CHANGE UP (ref 2.1–4.9)
PLATELET # BLD AUTO: 215 K/UL — SIGNIFICANT CHANGE UP (ref 130–400)
POTASSIUM SERPL-MCNC: 3.9 MMOL/L — SIGNIFICANT CHANGE UP (ref 3.5–5)
POTASSIUM SERPL-MCNC: 4.5 MMOL/L — SIGNIFICANT CHANGE UP (ref 3.5–5)
POTASSIUM SERPL-SCNC: 3.9 MMOL/L — SIGNIFICANT CHANGE UP (ref 3.5–5)
POTASSIUM SERPL-SCNC: 4.5 MMOL/L — SIGNIFICANT CHANGE UP (ref 3.5–5)
PROT SERPL-MCNC: 6.6 G/DL — SIGNIFICANT CHANGE UP (ref 6–8)
RBC # BLD: 3.44 M/UL — LOW (ref 4.7–6.1)
RBC # FLD: 16.9 % — HIGH (ref 11.5–14.5)
SODIUM SERPL-SCNC: 129 MMOL/L — LOW (ref 135–146)
SODIUM SERPL-SCNC: 142 MMOL/L — SIGNIFICANT CHANGE UP (ref 135–146)
WBC # BLD: 7.82 K/UL — SIGNIFICANT CHANGE UP (ref 4.8–10.8)
WBC # FLD AUTO: 7.82 K/UL — SIGNIFICANT CHANGE UP (ref 4.8–10.8)

## 2019-03-15 RX ORDER — NIFEDIPINE 30 MG
90 TABLET, EXTENDED RELEASE 24 HR ORAL DAILY
Qty: 0 | Refills: 0 | Status: DISCONTINUED | OUTPATIENT
Start: 2019-03-15 | End: 2019-03-20

## 2019-03-15 RX ORDER — ALPRAZOLAM 0.25 MG
0.25 TABLET ORAL ONCE
Qty: 0 | Refills: 0 | Status: DISCONTINUED | OUTPATIENT
Start: 2019-03-15 | End: 2019-03-15

## 2019-03-15 RX ADMIN — SERTRALINE 100 MILLIGRAM(S): 25 TABLET, FILM COATED ORAL at 11:34

## 2019-03-15 RX ADMIN — LACTULOSE 10 GRAM(S): 10 SOLUTION ORAL at 05:29

## 2019-03-15 RX ADMIN — HEPARIN SODIUM 5000 UNIT(S): 5000 INJECTION INTRAVENOUS; SUBCUTANEOUS at 18:15

## 2019-03-15 RX ADMIN — Medication 6: at 17:54

## 2019-03-15 RX ADMIN — ALBUTEROL 2 PUFF(S): 90 AEROSOL, METERED ORAL at 14:54

## 2019-03-15 RX ADMIN — AMLODIPINE BESYLATE 10 MILLIGRAM(S): 2.5 TABLET ORAL at 05:29

## 2019-03-15 RX ADMIN — ALBUTEROL 2 PUFF(S): 90 AEROSOL, METERED ORAL at 08:05

## 2019-03-15 RX ADMIN — OXYCODONE AND ACETAMINOPHEN 1 TABLET(S): 5; 325 TABLET ORAL at 03:42

## 2019-03-15 RX ADMIN — Medication 25 MILLIGRAM(S): at 18:14

## 2019-03-15 RX ADMIN — Medication 50 MILLIGRAM(S): at 11:34

## 2019-03-15 RX ADMIN — INSULIN GLARGINE 25 UNIT(S): 100 INJECTION, SOLUTION SUBCUTANEOUS at 21:09

## 2019-03-15 RX ADMIN — SEVELAMER CARBONATE 800 MILLIGRAM(S): 2400 POWDER, FOR SUSPENSION ORAL at 21:09

## 2019-03-15 RX ADMIN — Medication 1 DROP(S): at 05:33

## 2019-03-15 RX ADMIN — LATANOPROST 1 DROP(S): 0.05 SOLUTION/ DROPS OPHTHALMIC; TOPICAL at 21:11

## 2019-03-15 RX ADMIN — ALBUTEROL 2 PUFF(S): 90 AEROSOL, METERED ORAL at 01:14

## 2019-03-15 RX ADMIN — DORZOLAMIDE HYDROCHLORIDE 1 DROP(S): 20 SOLUTION/ DROPS OPHTHALMIC at 21:11

## 2019-03-15 RX ADMIN — Medication 25 MILLIGRAM(S): at 05:29

## 2019-03-15 RX ADMIN — Medication 100 MILLIGRAM(S): at 05:30

## 2019-03-15 RX ADMIN — Medication 80 MILLIGRAM(S): at 18:14

## 2019-03-15 RX ADMIN — SEVELAMER CARBONATE 800 MILLIGRAM(S): 2400 POWDER, FOR SUSPENSION ORAL at 05:33

## 2019-03-15 RX ADMIN — OXYCODONE AND ACETAMINOPHEN 1 TABLET(S): 5; 325 TABLET ORAL at 03:39

## 2019-03-15 RX ADMIN — SIMVASTATIN 10 MILLIGRAM(S): 20 TABLET, FILM COATED ORAL at 21:09

## 2019-03-15 RX ADMIN — Medication 0.25 MILLIGRAM(S): at 21:09

## 2019-03-15 RX ADMIN — SEVELAMER CARBONATE 800 MILLIGRAM(S): 2400 POWDER, FOR SUSPENSION ORAL at 14:53

## 2019-03-15 RX ADMIN — Medication 81 MILLIGRAM(S): at 11:34

## 2019-03-15 RX ADMIN — Medication 5 UNIT(S): at 08:04

## 2019-03-15 RX ADMIN — Medication 5 UNIT(S): at 17:54

## 2019-03-15 RX ADMIN — Medication 100 MILLIGRAM(S): at 18:14

## 2019-03-15 RX ADMIN — BRIMONIDINE TARTRATE 1 DROP(S): 2 SOLUTION/ DROPS OPHTHALMIC at 05:30

## 2019-03-15 RX ADMIN — Medication 90 MILLIGRAM(S): at 11:35

## 2019-03-15 RX ADMIN — Medication 1 DROP(S): at 18:13

## 2019-03-15 RX ADMIN — PANTOPRAZOLE SODIUM 40 MILLIGRAM(S): 20 TABLET, DELAYED RELEASE ORAL at 05:32

## 2019-03-15 RX ADMIN — BRIMONIDINE TARTRATE 1 DROP(S): 2 SOLUTION/ DROPS OPHTHALMIC at 18:13

## 2019-03-15 RX ADMIN — Medication 2: at 08:04

## 2019-03-15 RX ADMIN — HEPARIN SODIUM 5000 UNIT(S): 5000 INJECTION INTRAVENOUS; SUBCUTANEOUS at 05:29

## 2019-03-15 RX ADMIN — DORZOLAMIDE HYDROCHLORIDE 1 DROP(S): 20 SOLUTION/ DROPS OPHTHALMIC at 05:30

## 2019-03-15 RX ADMIN — DORZOLAMIDE HYDROCHLORIDE 1 DROP(S): 20 SOLUTION/ DROPS OPHTHALMIC at 14:53

## 2019-03-15 RX ADMIN — Medication 650 MILLIGRAM(S): at 01:39

## 2019-03-15 RX ADMIN — Medication 25 MILLIGRAM(S): at 05:32

## 2019-03-15 RX ADMIN — Medication 80 MILLIGRAM(S): at 05:29

## 2019-03-15 RX ADMIN — TAMSULOSIN HYDROCHLORIDE 0.4 MILLIGRAM(S): 0.4 CAPSULE ORAL at 21:09

## 2019-03-15 RX ADMIN — LACTULOSE 10 GRAM(S): 10 SOLUTION ORAL at 18:14

## 2019-03-15 NOTE — PROGRESS NOTE ADULT - ASSESSMENT
54 y.o male patient with PMH of ESRD on HD, DM, HTN, bipolar disorder?, presented to the ED for shortness of breath attributed to fluid overload. While on medicine floor, patient developed acute hypercapnic and hypoxemic respiratory failure; he was placed on AVAP and upgraded to CCU.    # Respiratory failure  - s/p HD on 3/12/2019 with 5L removed, on 3/13/2019 with 3L removed and 3/14/2019 with 4L removed; will do HD in AM (3/16/2019)  - Significant improvement of symptoms  - Check CT chest: done, awaiting results  - Monitor VS  - F/U with pulm: will need more diuresis and possible thoracentesis. Can be transferred to medicine floor  - Will need sleep study as outpatient    # ESRD   - s/p HD on 3/12/2019, 3/13/2019 and 3/14/2019  - Monitor renal function and electrolytes   - Continue Sevelamer at current doses for now  - F/U with nephrology    # Hyponatremia  - Hypervolemic  - Will repeat  - Discussed with nephrology; will do HD tomorrow (3/16/2019)    # HTN  - Still uncontrolled  - Will switch amlodipine to Nifedipine 90mg q24h  - If still not controlled, can add Lisinopril 10mg q24h  - Continue Metoprolol  - Monitor BP and adjust medications accordingly     # DM  - Continue insulin  - Monitor FS and adjust insulin dose accordingly    # Anemia  - Stable   - Most likely secondary to kidney disease  - Patient on epoetin   - No signs or symptoms of active bleeding  - Monitor cbc and VS    # Elevated troponin  - Stable  - No chest pain  - Most likely secondary to renal disease  - Continue aspirin, beta-blockers and statin  - Consider outpatient stress echo as per cardiology    # Depression / Anxiety / Bipolar disorder?  - Continue Sertraline     # DVT prophylaxis: heparin SQ  # GI prophylaxis  # PT rehab 54 y.o male patient with PMH of ESRD on HD, DM, HTN, bipolar disorder?, presented to the ED for shortness of breath attributed to fluid overload. While on medicine floor, patient developed acute hypercapnic and hypoxemic respiratory failure; he was placed on AVAP and upgraded to CCU.    # Respiratory failure  - s/p HD on 3/12/2019 with 5L removed, on 3/13/2019 with 3L removed and on 3/14/2019 with 4L removed; will do HD in AM (3/16/2019)  - Significant improvement of symptoms  - Check CT chest: done, awaiting results  - Monitor VS  - F/U with pulm: will need more diuresis and possible thoracentesis. Can be transferred to medicine floor  - Will need sleep study as outpatient    # ESRD   - s/p HD on 3/12/2019, 3/13/2019 and 3/14/2019  - HD on 3/16/2019  - Monitor renal function and electrolytes   - Continue Sevelamer at current doses for now  - F/U with nephrology    # Hyponatremia  - Hypervolemic  - Will repeat  - Discussed with nephrology; will do HD tomorrow (3/16/2019)    # HTN  - Still uncontrolled  - Will switch amlodipine to Nifedipine 90mg q24h  - If still not controlled, can add Lisinopril 10mg q24h  - Continue Metoprolol  - Monitor BP and adjust medications accordingly     # DM  - Continue insulin  - Monitor FS and adjust insulin dose accordingly    # Anemia  - Stable   - Most likely secondary to kidney disease  - Patient on epoetin   - No signs or symptoms of active bleeding  - Monitor cbc and VS    # Elevated troponin  - Stable  - No chest pain  - Most likely secondary to renal disease  - Continue aspirin, beta-blockers and statin  - Consider outpatient stress echo as per cardiology    # Depression / Anxiety / Bipolar disorder?  - Continue Sertraline     # DVT prophylaxis: heparin SQ  # GI prophylaxis  # PT rehab

## 2019-03-15 NOTE — PROGRESS NOTE ADULT - ASSESSMENT
IMPRESSION:  ARF hypercapnia and hypoxic   fluid overload can not r/o PNA   ESRD       PLAN:     CNS: no sedation     HEENT: oral care     PULMONARY: follow ct scan result   still need diuresis       CARDIOVASCULAR continue  lasix and metolazone   echo     GI: GI prophylaxis.  Feeding     RENAL: stat HD renal was informed   follow renal c    INFECTIOUS DISEASE:     HEMATOLOGICAL:  DVT prophylaxis. heparin Sq     ENDOCRINE:  Follow up FS.  Insulin protocol if needed.    MUSCULOSKELETAL:    can be transfer to floor   continue HD and diuresis if effusion persist will need thoracentesis if agree now in US minimal       CRITICAL CARE TIME SPENT: ***

## 2019-03-15 NOTE — PROGRESS NOTE ADULT - SUBJECTIVE AND OBJECTIVE BOX
[facility] FOLLOW UP NOTE  --------------------------------------------------------------------------------  24 hour events/subjective: Patient examined in CCU. Appears comfortable.    PAST HISTORY  --------------------------------------------------------------------------------  No significant changes to PMH, PSH, FHx, SHx, unless otherwise noted    ALLERGIES & MEDICATIONS  --------------------------------------------------------------------------------  Allergies    No Known Allergies      Standing Inpatient Medications  ALBUTerol    90 MICROgram(s) HFA Inhaler 2 Puff(s) Inhalation every 6 hours  aspirin enteric coated 81 milliGRAM(s) Oral daily  brimonidine 0.2% Ophthalmic Solution 1 Drop(s) Both EYES two times a day  dextrose 5%. 1000 milliLiter(s) IV Continuous <Continuous>  dextrose 50% Injectable 12.5 Gram(s) IV Push once  dextrose 50% Injectable 25 Gram(s) IV Push once  dextrose 50% Injectable 25 Gram(s) IV Push once  docusate sodium 100 milliGRAM(s) Oral two times a day  dorzolamide 2% Ophthalmic Solution 1 Drop(s) Both EYES three times a day  epoetin keagan Injectable 50625 Unit(s) IV Push <User Schedule>  furosemide    Tablet 80 milliGRAM(s) Oral two times a day  heparin  Injectable. 1000 Unit(s) Dialysis. once  heparin SubCutaneous Injection - Peds 5000 Unit(s) SubCutaneous every 12 hours  insulin glargine Injectable (LANTUS) 25 Unit(s) SubCutaneous at bedtime  insulin lispro (HumaLOG) corrective regimen sliding scale   SubCutaneous three times a day before meals  insulin lispro Injectable (HumaLOG) 5 Unit(s) SubCutaneous three times a day before meals  lactulose Syrup 10 Gram(s) Oral two times a day  latanoprost 0.005% Ophthalmic Solution 1 Drop(s) Both EYES at bedtime  metolazone 5 milliGRAM(s) Oral daily  metoprolol tartrate 25 milliGRAM(s) Oral two times a day  mineral oil/petrolatum Hydrophilic Ointment 1 Application(s) Topical daily  NIFEdipine XL 90 milliGRAM(s) Oral daily  pantoprazole    Tablet 40 milliGRAM(s) Oral before breakfast  pregabalin 50 milliGRAM(s) Oral daily  sertraline 100 milliGRAM(s) Oral daily  sevelamer hydrochloride 800 milliGRAM(s) Oral three times a day  simvastatin 10 milliGRAM(s) Oral at bedtime  tamsulosin Oral Tab/Cap - Peds 0.4 milliGRAM(s) Oral at bedtime  timolol 0.5% Solution 1 Drop(s) Both EYES two times a day    PRN Inpatient Medications  acetaminophen   Tablet .. 650 milliGRAM(s) Oral every 6 hours PRN  ALBUTerol/ipratropium for Nebulization 3 milliLiter(s) Nebulizer every 6 hours PRN  dextrose 40% Gel 15 Gram(s) Oral once PRN  glucagon  Injectable 1 milliGRAM(s) IntraMuscular once PRN  oxyCODONE    5 mG/acetaminophen 325 mG 1 Tablet(s) Oral every 4 hours PRN  zolpidem 5 milliGRAM(s) Oral at bedtime PRN    VITALS/PHYSICAL EXAM  --------------------------------------------------------------------------------  T(C): 36.8 (03-15-19 @ 07:05), Max: 36.8 (03-15-19 @ 07:05)  HR: 75 (03-15-19 @ 05:32) (61 - 75)  BP: 171/74 (03-15-19 @ 05:32) (169/75 - 199/84)  RR: 15 (03-15-19 @ 07:05) (14 - 28)  SpO2: 100% (03-15-19 @ 05:32) (96% - 100%)    03-14-19 @ 07:01  -  03-15-19 @ 07:00  --------------------------------------------------------  IN: 0 mL / OUT: 4000 mL / NET: -4000 mL    03-15-19 @ 07:01  -  03-15-19 @ 12:12  --------------------------------------------------------  IN: 0 mL / OUT: 50 mL / NET: -50 mL    Physical Exam:  	Gen: NAD  	Pulm: CTA B/L  	CV: RRR, S1S2  	Abd: +BS, soft, nontender/nondistended  	: No suprapubic tenderness  	LE: Warm, no edema  	Vascular access: AVF    LABS/STUDIES  --------------------------------------------------------------------------------              8.4    7.82  >-----------<  215      [03-15-19 @ 06:01]              29.6     129  |  89  |  35  ----------------------------<  129      [03-15-19 @ 06:01]  3.9   |  31  |  5.0        Ca     8.5     [03-15-19 @ 06:01]      Mg     2.1     [03-15-19 @ 06:01]      Phos  4.2     [03-15-19 @ 06:01]    TPro  6.6  /  Alb  3.5  /  TBili  0.3  /  DBili  x   /  AST  5   /  ALT  <5  /  AlkPhos  112  [03-15-19 @ 06:01]    Creatinine Trend:  SCr 5.0 [03-15 @ 06:01]  SCr 5.5 [03-14 @ 05:57]  SCr 6.3 [03-13 @ 05:45]  SCr 7.7 [03-12 @ 09:19]  SCr 6.5 [03-11 @ 17:50]    HbA1c 8.6      [06-01-18 @ 07:22]

## 2019-03-15 NOTE — PROGRESS NOTE ADULT - SUBJECTIVE AND OBJECTIVE BOX
Patient is a 54y old  Male who presents with a chief complaint of CHF (14 Mar 2019 08:09)      T(F): 98 (03-14-19 @ 20:34), Max: 98 (03-14-19 @ 20:34)  HR: 75 (03-15-19 @ 05:32)  BP: 171/74 (03-15-19 @ 05:32)  RR: 28 (03-15-19 @ 05:32)  SpO2: 100% (03-15-19 @ 05:32) (96% - 100%)    PHYSICAL EXAM:  GENERAL: NAD, well-groomed, well-developed  HEAD:  Atraumatic, Normocephalic  EYES: EOMI, PERRLA, conjunctiva and sclera clear  ENMT: No tonsillar erythema, exudates, or enlargement; Moist mucous membranes, Good dentition, No lesions  NECK: Supple, No JVD, Normal thyroid  NERVOUS SYSTEM:  Alert & Oriented X3,  Motor Strength 5/5 B/L upper and lower extremities  CHEST/LUNG: Clear to percussion bilaterally; No rales, rhonchi, wheezing, or rubs  HEART: Regular rate and rhythm; No murmurs, rubs, or gallops  ABDOMEN: Soft, Nontender, Nondistended; Bowel sounds present  EXTREMITIES:   No clubbing, cyanosis, or edema  LYMPH: No lymphadenopathy noted  SKIN: No rashes or lesions    labs  03-14    142  |  100  |  41<H>  ----------------------------<  117<H>  4.5   |  29  |  5.5<HH>    Ca    8.5      14 Mar 2019 05:57  Phos  5.1     03-14  Mg     2.2     03-14    TPro  6.6  /  Alb  3.7  /  TBili  0.3  /  DBili  x   /  AST  6   /  ALT  <5  /  AlkPhos  121<H>  03-14                          8.4    7.82  )-----------( 215      ( 15 Mar 2019 06:01 )             29.6               acetaminophen   Tablet .. 650 milliGRAM(s) Oral every 6 hours PRN  ALBUTerol    90 MICROgram(s) HFA Inhaler 2 Puff(s) Inhalation every 6 hours  ALBUTerol/ipratropium for Nebulization 3 milliLiter(s) Nebulizer every 6 hours PRN  amLODIPine   Tablet 10 milliGRAM(s) Oral daily  aspirin enteric coated 81 milliGRAM(s) Oral daily  brimonidine 0.2% Ophthalmic Solution 1 Drop(s) Both EYES two times a day  dextrose 40% Gel 15 Gram(s) Oral once PRN  dextrose 5%. 1000 milliLiter(s) IV Continuous <Continuous>  dextrose 50% Injectable 12.5 Gram(s) IV Push once  dextrose 50% Injectable 25 Gram(s) IV Push once  dextrose 50% Injectable 25 Gram(s) IV Push once  docusate sodium 100 milliGRAM(s) Oral two times a day  dorzolamide 2% Ophthalmic Solution 1 Drop(s) Both EYES three times a day  epoetin keagan Injectable 91373 Unit(s) IV Push <User Schedule>  furosemide    Tablet 80 milliGRAM(s) Oral two times a day  glucagon  Injectable 1 milliGRAM(s) IntraMuscular once PRN  heparin  Injectable. 1000 Unit(s) Dialysis. once  heparin SubCutaneous Injection - Peds 5000 Unit(s) SubCutaneous every 12 hours  hydrALAZINE 25 milliGRAM(s) Oral every 8 hours  insulin glargine Injectable (LANTUS) 25 Unit(s) SubCutaneous at bedtime  insulin lispro (HumaLOG) corrective regimen sliding scale   SubCutaneous three times a day before meals  insulin lispro Injectable (HumaLOG) 5 Unit(s) SubCutaneous three times a day before meals  lactulose Syrup 10 Gram(s) Oral two times a day  latanoprost 0.005% Ophthalmic Solution 1 Drop(s) Both EYES at bedtime  metolazone 5 milliGRAM(s) Oral daily  metoprolol tartrate 25 milliGRAM(s) Oral two times a day  mineral oil/petrolatum Hydrophilic Ointment 1 Application(s) Topical daily  oxyCODONE    5 mG/acetaminophen 325 mG 1 Tablet(s) Oral every 4 hours PRN  pantoprazole    Tablet 40 milliGRAM(s) Oral before breakfast  pregabalin 50 milliGRAM(s) Oral daily  sertraline 100 milliGRAM(s) Oral daily  sevelamer hydrochloride 800 milliGRAM(s) Oral three times a day  simvastatin 10 milliGRAM(s) Oral at bedtime  tamsulosin Oral Tab/Cap - Peds 0.4 milliGRAM(s) Oral at bedtime  timolol 0.5% Solution 1 Drop(s) Both EYES two times a day  zolpidem 5 milliGRAM(s) Oral at bedtime PRN

## 2019-03-15 NOTE — PROGRESS NOTE ADULT - SUBJECTIVE AND OBJECTIVE BOX
[facility]    Patient is a 54y old  Male who presents with a chief complaint of SOB (15 Mar 2019 09:12)      Interval History/Overnight events: no events overnight    T(C): 36.8 (03-15-19 @ 07:05), Max: 36.8 (03-15-19 @ 07:05)  HR: 75 (03-15-19 @ 05:32)  BP: 171/74 (03-15-19 @ 05:32)  RR: 15 (03-15-19 @ 07:05)  SpO2: 100% (03-15-19 @ 05:32)    PHYSICAL EXAM:    GENERAL: on oxygen, not in acute distress  CHEST/LUNG: Bilateral crackles improved heard; reduced breath sounds on left  HEART: Regular rate and rhythm  ABDOMEN: Soft, Nontender, Nondistended, no guarding or rebound tenderness  EXTREMITIES: Right BKA; 1+ LE pitting edema    LABS:                          8.4    7.82  )-----------( 215      ( 15 Mar 2019 06:01 )             29.6     03-15    129<L>  |  89<L>  |  35<H>  ----------------------------<  129<H>  3.9   |  31  |  5.0<HH>    Ca    8.5      15 Mar 2019 06:01  Phos  4.2     03-15  Mg     2.1     03-15    TPro  6.6  /  Alb  3.5  /  TBili  0.3  /  DBili  x   /  AST  5   /  ALT  <5  /  AlkPhos  112  03-15    LIVER FUNCTIONS - ( 15 Mar 2019 06:01 )  Alb: 3.5 g/dL / Pro: 6.6 g/dL / ALK PHOS: 112 U/L / ALT: <5 U/L / AST: 5 U/L / GGT: x             MEDICATIONS:    MEDICATIONS  (STANDING):  ALBUTerol    90 MICROgram(s) HFA Inhaler 2 Puff(s) Inhalation every 6 hours  aspirin enteric coated 81 milliGRAM(s) Oral daily  brimonidine 0.2% Ophthalmic Solution 1 Drop(s) Both EYES two times a day  dextrose 5%. 1000 milliLiter(s) (50 mL/Hr) IV Continuous <Continuous>  dextrose 50% Injectable 12.5 Gram(s) IV Push once  dextrose 50% Injectable 25 Gram(s) IV Push once  dextrose 50% Injectable 25 Gram(s) IV Push once  docusate sodium 100 milliGRAM(s) Oral two times a day  dorzolamide 2% Ophthalmic Solution 1 Drop(s) Both EYES three times a day  epoetin keagan Injectable 61510 Unit(s) IV Push <User Schedule>  furosemide    Tablet 80 milliGRAM(s) Oral two times a day  heparin  Injectable. 1000 Unit(s) Dialysis. once  heparin SubCutaneous Injection - Peds 5000 Unit(s) SubCutaneous every 12 hours  insulin glargine Injectable (LANTUS) 25 Unit(s) SubCutaneous at bedtime  insulin lispro (HumaLOG) corrective regimen sliding scale   SubCutaneous three times a day before meals  insulin lispro Injectable (HumaLOG) 5 Unit(s) SubCutaneous three times a day before meals  lactulose Syrup 10 Gram(s) Oral two times a day  latanoprost 0.005% Ophthalmic Solution 1 Drop(s) Both EYES at bedtime  metolazone 5 milliGRAM(s) Oral daily  metoprolol tartrate 25 milliGRAM(s) Oral two times a day  mineral oil/petrolatum Hydrophilic Ointment 1 Application(s) Topical daily  NIFEdipine XL 90 milliGRAM(s) Oral daily  pantoprazole    Tablet 40 milliGRAM(s) Oral before breakfast  pregabalin 50 milliGRAM(s) Oral daily  sertraline 100 milliGRAM(s) Oral daily  sevelamer hydrochloride 800 milliGRAM(s) Oral three times a day  simvastatin 10 milliGRAM(s) Oral at bedtime  tamsulosin Oral Tab/Cap - Peds 0.4 milliGRAM(s) Oral at bedtime  timolol 0.5% Solution 1 Drop(s) Both EYES two times a day      MEDICATIONS  (PRN):  acetaminophen   Tablet .. 650 milliGRAM(s) Oral every 6 hours PRN Temp greater or equal to 38C (100.4F), Moderate Pain (4 - 6)  ALBUTerol/ipratropium for Nebulization 3 milliLiter(s) Nebulizer every 6 hours PRN Wheezing  dextrose 40% Gel 15 Gram(s) Oral once PRN Blood Glucose LESS THAN 70 milliGRAM(s)/deciliter  glucagon  Injectable 1 milliGRAM(s) IntraMuscular once PRN Glucose LESS THAN 70 milligrams/deciliter  oxyCODONE    5 mG/acetaminophen 325 mG 1 Tablet(s) Oral every 4 hours PRN for moderate pain  zolpidem 5 milliGRAM(s) Oral at bedtime PRN Insomnia [facility]    Patient is a 54y old  Male who presents with a chief complaint of SOB (15 Mar 2019 09:12)      Interval History/Overnight events: no events overnight    T(C): 36.8 (03-15-19 @ 07:05), Max: 36.8 (03-15-19 @ 07:05)  HR: 75 (03-15-19 @ 05:32)  BP: 171/74 (03-15-19 @ 05:32)  RR: 15 (03-15-19 @ 07:05)  SpO2: 100% (03-15-19 @ 05:32)    PHYSICAL EXAM:    GENERAL: on oxygen, not in acute distress  CHEST/LUNG: Bilateral crackles improved; reduced breath sounds on left  HEART: Regular rate and rhythm  ABDOMEN: Soft, Nontender, Nondistended, no guarding or rebound tenderness  EXTREMITIES: Right BKA; 1+ LE pitting edema    LABS:                          8.4    7.82  )-----------( 215      ( 15 Mar 2019 06:01 )             29.6     03-15    129<L>  |  89<L>  |  35<H>  ----------------------------<  129<H>  3.9   |  31  |  5.0<HH>    Ca    8.5      15 Mar 2019 06:01  Phos  4.2     03-15  Mg     2.1     03-15    TPro  6.6  /  Alb  3.5  /  TBili  0.3  /  DBili  x   /  AST  5   /  ALT  <5  /  AlkPhos  112  03-15    LIVER FUNCTIONS - ( 15 Mar 2019 06:01 )  Alb: 3.5 g/dL / Pro: 6.6 g/dL / ALK PHOS: 112 U/L / ALT: <5 U/L / AST: 5 U/L / GGT: x             MEDICATIONS:    MEDICATIONS  (STANDING):  ALBUTerol    90 MICROgram(s) HFA Inhaler 2 Puff(s) Inhalation every 6 hours  aspirin enteric coated 81 milliGRAM(s) Oral daily  brimonidine 0.2% Ophthalmic Solution 1 Drop(s) Both EYES two times a day  dextrose 5%. 1000 milliLiter(s) (50 mL/Hr) IV Continuous <Continuous>  dextrose 50% Injectable 12.5 Gram(s) IV Push once  dextrose 50% Injectable 25 Gram(s) IV Push once  dextrose 50% Injectable 25 Gram(s) IV Push once  docusate sodium 100 milliGRAM(s) Oral two times a day  dorzolamide 2% Ophthalmic Solution 1 Drop(s) Both EYES three times a day  epoetin keagan Injectable 67319 Unit(s) IV Push <User Schedule>  furosemide    Tablet 80 milliGRAM(s) Oral two times a day  heparin  Injectable. 1000 Unit(s) Dialysis. once  heparin SubCutaneous Injection - Peds 5000 Unit(s) SubCutaneous every 12 hours  insulin glargine Injectable (LANTUS) 25 Unit(s) SubCutaneous at bedtime  insulin lispro (HumaLOG) corrective regimen sliding scale   SubCutaneous three times a day before meals  insulin lispro Injectable (HumaLOG) 5 Unit(s) SubCutaneous three times a day before meals  lactulose Syrup 10 Gram(s) Oral two times a day  latanoprost 0.005% Ophthalmic Solution 1 Drop(s) Both EYES at bedtime  metolazone 5 milliGRAM(s) Oral daily  metoprolol tartrate 25 milliGRAM(s) Oral two times a day  mineral oil/petrolatum Hydrophilic Ointment 1 Application(s) Topical daily  NIFEdipine XL 90 milliGRAM(s) Oral daily  pantoprazole    Tablet 40 milliGRAM(s) Oral before breakfast  pregabalin 50 milliGRAM(s) Oral daily  sertraline 100 milliGRAM(s) Oral daily  sevelamer hydrochloride 800 milliGRAM(s) Oral three times a day  simvastatin 10 milliGRAM(s) Oral at bedtime  tamsulosin Oral Tab/Cap - Peds 0.4 milliGRAM(s) Oral at bedtime  timolol 0.5% Solution 1 Drop(s) Both EYES two times a day      MEDICATIONS  (PRN):  acetaminophen   Tablet .. 650 milliGRAM(s) Oral every 6 hours PRN Temp greater or equal to 38C (100.4F), Moderate Pain (4 - 6)  ALBUTerol/ipratropium for Nebulization 3 milliLiter(s) Nebulizer every 6 hours PRN Wheezing  dextrose 40% Gel 15 Gram(s) Oral once PRN Blood Glucose LESS THAN 70 milliGRAM(s)/deciliter  glucagon  Injectable 1 milliGRAM(s) IntraMuscular once PRN Glucose LESS THAN 70 milligrams/deciliter  oxyCODONE    5 mG/acetaminophen 325 mG 1 Tablet(s) Oral every 4 hours PRN for moderate pain  zolpidem 5 milliGRAM(s) Oral at bedtime PRN Insomnia

## 2019-03-15 NOTE — PROGRESS NOTE ADULT - SUBJECTIVE AND OBJECTIVE BOX
Patient is a 54y old  Male who presents with a chief complaint of CHF (14 Mar 2019 08:09)      Over Night Events:  Patient seen and examined feel better s/p HD ct scan showed b/l effusion left more then right loculated   fluid in fissure       ROS:  See HPI    PHYSICAL EXAM    ICU Vital Signs Last 24 Hrs  T(C): 36.8 (15 Mar 2019 07:05), Max: 36.8 (15 Mar 2019 07:05)  T(F): 98.2 (15 Mar 2019 07:05), Max: 98.2 (15 Mar 2019 07:05)  HR: 75 (15 Mar 2019 05:32) (61 - 75)  BP: 171/74 (15 Mar 2019 05:32) (169/75 - 199/84)  BP(mean): 107 (15 Mar 2019 05:32) (107 - 115)  ABP: --  ABP(mean): --  RR: 15 (15 Mar 2019 07:05) (14 - 28)  SpO2: 100% (15 Mar 2019 05:32) (96% - 100%)      General:  Aox3   HEENT:             rohit   Lymph Nodes: NO cervical LN   Lungs: Bilateral BS  Cardiovascular: Regular   Abdomen: Soft, Positive BS  Extremities: 3 edema    Skin: warm   Neurological: no focal deficit   Musculoskeletal: move all ext     I&O's Detail    14 Mar 2019 07:01  -  15 Mar 2019 07:00  --------------------------------------------------------  IN:  Total IN: 0 mL    OUT:    Other: 4000 mL  Total OUT: 4000 mL    Total NET: -4000 mL          LABS:                          8.4    7.82  )-----------( 215      ( 15 Mar 2019 06:01 )             29.6         15 Mar 2019 06:01    129    |  89     |  35     ----------------------------<  129    3.9     |  31     |  5.0      Ca    8.5        15 Mar 2019 06:01  Phos  4.2       15 Mar 2019 06:01  Mg     2.1       15 Mar 2019 06:01    TPro  6.6    /  Alb  3.5    /  TBili  0.3    /  DBili  x      /  AST  5      /  ALT  <5     /  AlkPhos  112    15 Mar 2019 06:01  Amylase x     lipase x                                                                                                                                                                                                                                         MEDICATIONS  (STANDING):  ALBUTerol    90 MICROgram(s) HFA Inhaler 2 Puff(s) Inhalation every 6 hours  amLODIPine   Tablet 10 milliGRAM(s) Oral daily  aspirin enteric coated 81 milliGRAM(s) Oral daily  brimonidine 0.2% Ophthalmic Solution 1 Drop(s) Both EYES two times a day  dextrose 5%. 1000 milliLiter(s) (50 mL/Hr) IV Continuous <Continuous>  dextrose 50% Injectable 12.5 Gram(s) IV Push once  dextrose 50% Injectable 25 Gram(s) IV Push once  dextrose 50% Injectable 25 Gram(s) IV Push once  docusate sodium 100 milliGRAM(s) Oral two times a day  dorzolamide 2% Ophthalmic Solution 1 Drop(s) Both EYES three times a day  epoetin keagan Injectable 32114 Unit(s) IV Push <User Schedule>  furosemide    Tablet 80 milliGRAM(s) Oral two times a day  heparin  Injectable. 1000 Unit(s) Dialysis. once  heparin SubCutaneous Injection - Peds 5000 Unit(s) SubCutaneous every 12 hours  hydrALAZINE 25 milliGRAM(s) Oral every 8 hours  insulin glargine Injectable (LANTUS) 25 Unit(s) SubCutaneous at bedtime  insulin lispro (HumaLOG) corrective regimen sliding scale   SubCutaneous three times a day before meals  insulin lispro Injectable (HumaLOG) 5 Unit(s) SubCutaneous three times a day before meals  lactulose Syrup 10 Gram(s) Oral two times a day  latanoprost 0.005% Ophthalmic Solution 1 Drop(s) Both EYES at bedtime  metolazone 5 milliGRAM(s) Oral daily  metoprolol tartrate 25 milliGRAM(s) Oral two times a day  mineral oil/petrolatum Hydrophilic Ointment 1 Application(s) Topical daily  pantoprazole    Tablet 40 milliGRAM(s) Oral before breakfast  pregabalin 50 milliGRAM(s) Oral daily  sertraline 100 milliGRAM(s) Oral daily  sevelamer hydrochloride 800 milliGRAM(s) Oral three times a day  simvastatin 10 milliGRAM(s) Oral at bedtime  tamsulosin Oral Tab/Cap - Peds 0.4 milliGRAM(s) Oral at bedtime  timolol 0.5% Solution 1 Drop(s) Both EYES two times a day    MEDICATIONS  (PRN):  acetaminophen   Tablet .. 650 milliGRAM(s) Oral every 6 hours PRN Temp greater or equal to 38C (100.4F), Moderate Pain (4 - 6)  ALBUTerol/ipratropium for Nebulization 3 milliLiter(s) Nebulizer every 6 hours PRN Wheezing  dextrose 40% Gel 15 Gram(s) Oral once PRN Blood Glucose LESS THAN 70 milliGRAM(s)/deciliter  glucagon  Injectable 1 milliGRAM(s) IntraMuscular once PRN Glucose LESS THAN 70 milligrams/deciliter  oxyCODONE    5 mG/acetaminophen 325 mG 1 Tablet(s) Oral every 4 hours PRN for moderate pain  zolpidem 5 milliGRAM(s) Oral at bedtime PRN Insomnia          Xrays:  TLC:  OG:  ET tube:                                                                                       ECHO:

## 2019-03-15 NOTE — PROGRESS NOTE ADULT - ASSESSMENT
1. SOB. Likely volume mediated. HD Tuesday: removed 5L. HD Wednesday: removed 3L. HD Thursday: removed 4L. Lasix and metolazone.  2. ESRD on HD TTS. HD tomorrow: 3 hours, opti 160 dialyzer, 2K bath, 4.5L UF.  3. Anemia. EPO with HD.  4. Hyperphosphatemia. Renal diet. Sevelamer with meals.  5. Hyperkalemia. Resolved. Renal diet.

## 2019-03-15 NOTE — PROGRESS NOTE ADULT - ASSESSMENT
Patient feels better.  Echo lv normal  size function. He needs c-pap. Patient told to use c-pap.  OOB to chair. Beta asa statin. Ambulate if stable. He will need sleep study. Consider outpatient dobutamine SE. Daily weight. Rehab when stable.

## 2019-03-16 LAB
ALBUMIN SERPL ELPH-MCNC: 3.8 G/DL — SIGNIFICANT CHANGE UP (ref 3.5–5.2)
ALP SERPL-CCNC: 121 U/L — HIGH (ref 30–115)
ALT FLD-CCNC: <5 U/L — SIGNIFICANT CHANGE UP (ref 0–41)
ANION GAP SERPL CALC-SCNC: 13 MMOL/L — SIGNIFICANT CHANGE UP (ref 7–14)
AST SERPL-CCNC: 6 U/L — SIGNIFICANT CHANGE UP (ref 0–41)
BILIRUB SERPL-MCNC: 0.3 MG/DL — SIGNIFICANT CHANGE UP (ref 0.2–1.2)
BUN SERPL-MCNC: 52 MG/DL — HIGH (ref 10–20)
CALCIUM SERPL-MCNC: 8.7 MG/DL — SIGNIFICANT CHANGE UP (ref 8.5–10.1)
CHLORIDE SERPL-SCNC: 101 MMOL/L — SIGNIFICANT CHANGE UP (ref 98–110)
CO2 SERPL-SCNC: 28 MMOL/L — SIGNIFICANT CHANGE UP (ref 17–32)
CREAT SERPL-MCNC: 6.3 MG/DL — CRITICAL HIGH (ref 0.7–1.5)
GLUCOSE BLDC GLUCOMTR-MCNC: 125 MG/DL — HIGH (ref 70–99)
GLUCOSE BLDC GLUCOMTR-MCNC: 135 MG/DL — HIGH (ref 70–99)
GLUCOSE BLDC GLUCOMTR-MCNC: 242 MG/DL — HIGH (ref 70–99)
GLUCOSE BLDC GLUCOMTR-MCNC: 244 MG/DL — HIGH (ref 70–99)
GLUCOSE SERPL-MCNC: 149 MG/DL — HIGH (ref 70–99)
HCT VFR BLD CALC: 31.6 % — LOW (ref 42–52)
HGB BLD-MCNC: 8.9 G/DL — LOW (ref 14–18)
MAGNESIUM SERPL-MCNC: 2.3 MG/DL — SIGNIFICANT CHANGE UP (ref 1.8–2.4)
MCHC RBC-ENTMCNC: 24 PG — LOW (ref 27–31)
MCHC RBC-ENTMCNC: 28.2 G/DL — LOW (ref 32–37)
MCV RBC AUTO: 85.2 FL — SIGNIFICANT CHANGE UP (ref 80–94)
NRBC # BLD: 0 /100 WBCS — SIGNIFICANT CHANGE UP (ref 0–0)
PHOSPHATE SERPL-MCNC: 5.2 MG/DL — HIGH (ref 2.1–4.9)
PLATELET # BLD AUTO: 216 K/UL — SIGNIFICANT CHANGE UP (ref 130–400)
POTASSIUM SERPL-MCNC: 4.9 MMOL/L — SIGNIFICANT CHANGE UP (ref 3.5–5)
POTASSIUM SERPL-SCNC: 4.9 MMOL/L — SIGNIFICANT CHANGE UP (ref 3.5–5)
PROT SERPL-MCNC: 6.5 G/DL — SIGNIFICANT CHANGE UP (ref 6–8)
RBC # BLD: 3.71 M/UL — LOW (ref 4.7–6.1)
RBC # FLD: 17.1 % — HIGH (ref 11.5–14.5)
SODIUM SERPL-SCNC: 142 MMOL/L — SIGNIFICANT CHANGE UP (ref 135–146)
WBC # BLD: 8.68 K/UL — SIGNIFICANT CHANGE UP (ref 4.8–10.8)
WBC # FLD AUTO: 8.68 K/UL — SIGNIFICANT CHANGE UP (ref 4.8–10.8)

## 2019-03-16 RX ADMIN — HEPARIN SODIUM 5000 UNIT(S): 5000 INJECTION INTRAVENOUS; SUBCUTANEOUS at 17:48

## 2019-03-16 RX ADMIN — Medication 25 MILLIGRAM(S): at 17:48

## 2019-03-16 RX ADMIN — Medication 50 MILLIGRAM(S): at 13:23

## 2019-03-16 RX ADMIN — Medication 90 MILLIGRAM(S): at 05:13

## 2019-03-16 RX ADMIN — LATANOPROST 1 DROP(S): 0.05 SOLUTION/ DROPS OPHTHALMIC; TOPICAL at 21:34

## 2019-03-16 RX ADMIN — Medication 81 MILLIGRAM(S): at 13:24

## 2019-03-16 RX ADMIN — Medication 650 MILLIGRAM(S): at 14:03

## 2019-03-16 RX ADMIN — HEPARIN SODIUM 5000 UNIT(S): 5000 INJECTION INTRAVENOUS; SUBCUTANEOUS at 05:13

## 2019-03-16 RX ADMIN — Medication 650 MILLIGRAM(S): at 14:33

## 2019-03-16 RX ADMIN — BRIMONIDINE TARTRATE 1 DROP(S): 2 SOLUTION/ DROPS OPHTHALMIC at 17:47

## 2019-03-16 RX ADMIN — Medication 5 UNIT(S): at 13:28

## 2019-03-16 RX ADMIN — SEVELAMER CARBONATE 800 MILLIGRAM(S): 2400 POWDER, FOR SUSPENSION ORAL at 13:24

## 2019-03-16 RX ADMIN — LACTULOSE 10 GRAM(S): 10 SOLUTION ORAL at 05:13

## 2019-03-16 RX ADMIN — INSULIN GLARGINE 25 UNIT(S): 100 INJECTION, SOLUTION SUBCUTANEOUS at 22:45

## 2019-03-16 RX ADMIN — DORZOLAMIDE HYDROCHLORIDE 1 DROP(S): 20 SOLUTION/ DROPS OPHTHALMIC at 13:25

## 2019-03-16 RX ADMIN — SIMVASTATIN 10 MILLIGRAM(S): 20 TABLET, FILM COATED ORAL at 21:34

## 2019-03-16 RX ADMIN — ALBUTEROL 2 PUFF(S): 90 AEROSOL, METERED ORAL at 07:55

## 2019-03-16 RX ADMIN — Medication 4: at 17:30

## 2019-03-16 RX ADMIN — ALBUTEROL 2 PUFF(S): 90 AEROSOL, METERED ORAL at 13:19

## 2019-03-16 RX ADMIN — ERYTHROPOIETIN 10000 UNIT(S): 10000 INJECTION, SOLUTION INTRAVENOUS; SUBCUTANEOUS at 12:05

## 2019-03-16 RX ADMIN — SERTRALINE 100 MILLIGRAM(S): 25 TABLET, FILM COATED ORAL at 13:24

## 2019-03-16 RX ADMIN — Medication 80 MILLIGRAM(S): at 17:48

## 2019-03-16 RX ADMIN — Medication 5 UNIT(S): at 17:30

## 2019-03-16 RX ADMIN — Medication 1 DROP(S): at 05:13

## 2019-03-16 RX ADMIN — SEVELAMER CARBONATE 800 MILLIGRAM(S): 2400 POWDER, FOR SUSPENSION ORAL at 05:13

## 2019-03-16 RX ADMIN — PANTOPRAZOLE SODIUM 40 MILLIGRAM(S): 20 TABLET, DELAYED RELEASE ORAL at 08:13

## 2019-03-16 RX ADMIN — Medication 100 MILLIGRAM(S): at 05:11

## 2019-03-16 RX ADMIN — DORZOLAMIDE HYDROCHLORIDE 1 DROP(S): 20 SOLUTION/ DROPS OPHTHALMIC at 05:11

## 2019-03-16 RX ADMIN — TAMSULOSIN HYDROCHLORIDE 0.4 MILLIGRAM(S): 0.4 CAPSULE ORAL at 21:34

## 2019-03-16 RX ADMIN — DORZOLAMIDE HYDROCHLORIDE 1 DROP(S): 20 SOLUTION/ DROPS OPHTHALMIC at 21:33

## 2019-03-16 RX ADMIN — Medication 80 MILLIGRAM(S): at 05:12

## 2019-03-16 RX ADMIN — SEVELAMER CARBONATE 800 MILLIGRAM(S): 2400 POWDER, FOR SUSPENSION ORAL at 21:34

## 2019-03-16 RX ADMIN — Medication 1 DROP(S): at 17:47

## 2019-03-16 RX ADMIN — ALBUTEROL 2 PUFF(S): 90 AEROSOL, METERED ORAL at 19:46

## 2019-03-16 RX ADMIN — BRIMONIDINE TARTRATE 1 DROP(S): 2 SOLUTION/ DROPS OPHTHALMIC at 05:12

## 2019-03-16 RX ADMIN — Medication 100 MILLIGRAM(S): at 17:48

## 2019-03-16 RX ADMIN — Medication 25 MILLIGRAM(S): at 05:13

## 2019-03-16 RX ADMIN — Medication 1 APPLICATION(S): at 13:25

## 2019-03-16 RX ADMIN — LACTULOSE 10 GRAM(S): 10 SOLUTION ORAL at 17:49

## 2019-03-16 NOTE — PROGRESS NOTE ADULT - SUBJECTIVE AND OBJECTIVE BOX
[facility] FOLLOW UP NOTE  --------------------------------------------------------------------------------  Chief Complaint/24 hour events/subjective:    feel better, no SOB    PAST HISTORY  --------------------------------------------------------------------------------  No significant changes to PMH, PSH, FHx, SHx, unless otherwise noted    ALLERGIES & MEDICATIONS  --------------------------------------------------------------------------------  Allergies    No Known Allergies    Intolerances      Standing Inpatient Medications  ALBUTerol    90 MICROgram(s) HFA Inhaler 2 Puff(s) Inhalation every 6 hours  aspirin enteric coated 81 milliGRAM(s) Oral daily  brimonidine 0.2% Ophthalmic Solution 1 Drop(s) Both EYES two times a day  dextrose 5%. 1000 milliLiter(s) IV Continuous <Continuous>  dextrose 50% Injectable 12.5 Gram(s) IV Push once  dextrose 50% Injectable 25 Gram(s) IV Push once  dextrose 50% Injectable 25 Gram(s) IV Push once  docusate sodium 100 milliGRAM(s) Oral two times a day  dorzolamide 2% Ophthalmic Solution 1 Drop(s) Both EYES three times a day  epoetin keagan Injectable 80200 Unit(s) IV Push <User Schedule>  furosemide    Tablet 80 milliGRAM(s) Oral two times a day  heparin  Injectable. 1000 Unit(s) Dialysis. once  heparin SubCutaneous Injection - Peds 5000 Unit(s) SubCutaneous every 12 hours  insulin glargine Injectable (LANTUS) 25 Unit(s) SubCutaneous at bedtime  insulin lispro (HumaLOG) corrective regimen sliding scale   SubCutaneous three times a day before meals  insulin lispro Injectable (HumaLOG) 5 Unit(s) SubCutaneous three times a day before meals  lactulose Syrup 10 Gram(s) Oral two times a day  latanoprost 0.005% Ophthalmic Solution 1 Drop(s) Both EYES at bedtime  metolazone 5 milliGRAM(s) Oral daily  metoprolol tartrate 25 milliGRAM(s) Oral two times a day  mineral oil/petrolatum Hydrophilic Ointment 1 Application(s) Topical daily  NIFEdipine XL 90 milliGRAM(s) Oral daily  pantoprazole    Tablet 40 milliGRAM(s) Oral before breakfast  pregabalin 50 milliGRAM(s) Oral daily  sertraline 100 milliGRAM(s) Oral daily  sevelamer hydrochloride 800 milliGRAM(s) Oral three times a day  simvastatin 10 milliGRAM(s) Oral at bedtime  tamsulosin Oral Tab/Cap - Peds 0.4 milliGRAM(s) Oral at bedtime  timolol 0.5% Solution 1 Drop(s) Both EYES two times a day    PRN Inpatient Medications  acetaminophen   Tablet .. 650 milliGRAM(s) Oral every 6 hours PRN  ALBUTerol/ipratropium for Nebulization 3 milliLiter(s) Nebulizer every 6 hours PRN  dextrose 40% Gel 15 Gram(s) Oral once PRN  glucagon  Injectable 1 milliGRAM(s) IntraMuscular once PRN  oxyCODONE    5 mG/acetaminophen 325 mG 1 Tablet(s) Oral every 4 hours PRN  zolpidem 5 milliGRAM(s) Oral at bedtime PRN      REVIEW OF SYSTEMS  --------------------------------------------------------------------------------    All other systems were reviewed and are negative, except as noted.    VITALS/PHYSICAL EXAM  --------------------------------------------------------------------------------  T(C): 36.1 (03-16-19 @ 06:00), Max: 36.9 (03-15-19 @ 22:51)  HR: 68 (03-15-19 @ 22:51) (62 - 75)  BP: 131/55 (03-16-19 @ 06:00) (131/55 - 178/78)  RR: 18 (03-16-19 @ 06:00) (16 - 29)  SpO2: 99% (03-15-19 @ 13:48) (99% - 99%)  Wt(kg): --        03-15-19 @ 07:01  -  03-16-19 @ 07:00  --------------------------------------------------------  IN: 365 mL / OUT: 200 mL / NET: 165 mL      Physical Exam:    	Gen: no distress   	Pulm: CTA B/L  	CV: S1S2; no rub  	Abd: +BS, soft, nontender/nondistended  	LE:  edema, s/p AKA      LABS/STUDIES  --------------------------------------------------------------------------------              8.4    7.82  >-----------<  215      [03-15-19 @ 06:01]              29.6     142  |  100  |  40  ----------------------------<  222      [03-15-19 @ 15:13]  4.5   |  30  |  5.9        Ca     8.2     [03-15-19 @ 15:13]      Mg     2.1     [03-15-19 @ 06:01]      Phos  4.2     [03-15-19 @ 06:01]    TPro  6.6  /  Alb  3.5  /  TBili  0.3  /  DBili  x   /  AST  5   /  ALT  <5  /  AlkPhos  112  [03-15-19 @ 06:01]          Creatinine Trend:  SCr 5.9 [03-15 @ 15:13]  SCr 5.0 [03-15 @ 06:01]  SCr 5.5 [03-14 @ 05:57]  SCr 6.3 [03-13 @ 05:45]  SCr 7.7 [03-12 @ 09:19]        HbA1c 8.6      [06-01-18 @ 07:22]

## 2019-03-16 NOTE — PROGRESS NOTE ADULT - ASSESSMENT
54 y.o male patient with PMH of ESRD on HD, DM, HTN, bipolar disorder? presented to the ED for shortness of breath attributed to fluid overload. While on medicine floor, patient developed acute hypercapnic and hypoxemic respiratory failure; he was placed on AVAP and upgraded to CCU where he was stabilised and transferred to the floor on 3/16/19.    Assessment and Plan:    1. Acute failure with hypoxia: improving.  Due to Volume overload with Pleural Effusions.  Chest x-ray: Interval increase in left pleural effusion and mild pulmonary vascular congestion.   Continue HD and Diuretics. Fluid & salt restriction.   On supplemental Oxygen. BiPAP prn.  Pulm following: continue HD and diuresis if effusion persist will need thoracentesis.  Follow up repeat X-ray in AM.      2. ESRD:  Nephrology following: HD scheduled for 3/16/2019.  Monitor renal function and electrolytes   Continue Sevelamer at current doses for now      3. Hypertension:  Continue Nifedipine & Metoprolol.  Add Lisinopril or switch to Coreg if remains uncontrolled.   Monitor BP and adjust medications accordingly.      4. Morbidly Obesity:  BMI > 41.  Dietary and lifestyle modification.      5. DM: Uncontrolled.  Monitor FS with Insulin coverage.   Hemoglobin A1C, Whole Blood: 8.6 % (06.01.18 @ 07:22)      5. Depression/Anxiety:  Continue Sertraline.      6. Chronic Normocytic Anemia:  Most likely secondary to kidney disease.  Hgb Stable. Monitor. Follow up Iron studies.  Continue EPO.   Goal hgb > 8 g/dl.      7. Elevated Troponin:   Stable. No chest pain  Most likely secondary to renal disease  Continue aspirin, beta-blockers and statin. Consider outpatient stress echo as per cardiology.  ECHO: Echo LV size and function.        8. Pulmonary Hypertension/Suspected HOMAR:  Sleep study as outpatient.  Out Patient follow up.           DVT prophylaxis: Heparin SQ  Disposition: LTR at [facility] when stable.  Family discussion: None.

## 2019-03-16 NOTE — PROGRESS NOTE ADULT - ASSESSMENT
1. SOB. Likely volume mediated. noncompliance with fluid restriction, d/w pt again,  HD now: UF 4-5 kg as tolerated, Lasix and metolazone, fluid restriction, 2 k bath, Epogen 10k.  2. low Na/k diet.  3. Anemia. EPO with HD.  4. Hyperphosphatemia. Renal diet. Sevelamer with meals.  5, DM and HTN.

## 2019-03-17 LAB
ALBUMIN SERPL ELPH-MCNC: 3.9 G/DL — SIGNIFICANT CHANGE UP (ref 3.5–5.2)
ALP SERPL-CCNC: 121 U/L — HIGH (ref 30–115)
ALT FLD-CCNC: <5 U/L — SIGNIFICANT CHANGE UP (ref 0–41)
ANION GAP SERPL CALC-SCNC: 13 MMOL/L — SIGNIFICANT CHANGE UP (ref 7–14)
AST SERPL-CCNC: 6 U/L — SIGNIFICANT CHANGE UP (ref 0–41)
BASOPHILS # BLD AUTO: 0.13 K/UL — SIGNIFICANT CHANGE UP (ref 0–0.2)
BASOPHILS NFR BLD AUTO: 1.5 % — HIGH (ref 0–1)
BILIRUB SERPL-MCNC: 0.3 MG/DL — SIGNIFICANT CHANGE UP (ref 0.2–1.2)
BUN SERPL-MCNC: 44 MG/DL — HIGH (ref 10–20)
CALCIUM SERPL-MCNC: 8.7 MG/DL — SIGNIFICANT CHANGE UP (ref 8.5–10.1)
CHLORIDE SERPL-SCNC: 101 MMOL/L — SIGNIFICANT CHANGE UP (ref 98–110)
CO2 SERPL-SCNC: 28 MMOL/L — SIGNIFICANT CHANGE UP (ref 17–32)
CREAT SERPL-MCNC: 6.8 MG/DL — CRITICAL HIGH (ref 0.7–1.5)
EOSINOPHIL # BLD AUTO: 0.29 K/UL — SIGNIFICANT CHANGE UP (ref 0–0.7)
EOSINOPHIL NFR BLD AUTO: 3.5 % — SIGNIFICANT CHANGE UP (ref 0–8)
FERRITIN SERPL-MCNC: 554 NG/ML — HIGH (ref 30–400)
GLUCOSE BLDC GLUCOMTR-MCNC: 103 MG/DL — HIGH (ref 70–99)
GLUCOSE BLDC GLUCOMTR-MCNC: 139 MG/DL — HIGH (ref 70–99)
GLUCOSE BLDC GLUCOMTR-MCNC: 152 MG/DL — HIGH (ref 70–99)
GLUCOSE BLDC GLUCOMTR-MCNC: 206 MG/DL — HIGH (ref 70–99)
GLUCOSE SERPL-MCNC: 153 MG/DL — HIGH (ref 70–99)
HCT VFR BLD CALC: 32 % — LOW (ref 42–52)
HGB BLD-MCNC: 8.8 G/DL — LOW (ref 14–18)
IMM GRANULOCYTES NFR BLD AUTO: 0.6 % — HIGH (ref 0.1–0.3)
IRON SATN MFR SERPL: 41 % — SIGNIFICANT CHANGE UP (ref 15–50)
IRON SATN MFR SERPL: 70 UG/DL — SIGNIFICANT CHANGE UP (ref 35–150)
LYMPHOCYTES # BLD AUTO: 1.27 K/UL — SIGNIFICANT CHANGE UP (ref 1.2–3.4)
LYMPHOCYTES # BLD AUTO: 15.1 % — LOW (ref 20.5–51.1)
MAGNESIUM SERPL-MCNC: 2.3 MG/DL — SIGNIFICANT CHANGE UP (ref 1.8–2.4)
MCHC RBC-ENTMCNC: 24.1 PG — LOW (ref 27–31)
MCHC RBC-ENTMCNC: 27.5 G/DL — LOW (ref 32–37)
MCV RBC AUTO: 87.7 FL — SIGNIFICANT CHANGE UP (ref 80–94)
MONOCYTES # BLD AUTO: 0.85 K/UL — HIGH (ref 0.1–0.6)
MONOCYTES NFR BLD AUTO: 10.1 % — HIGH (ref 1.7–9.3)
NEUTROPHILS # BLD AUTO: 5.8 K/UL — SIGNIFICANT CHANGE UP (ref 1.4–6.5)
NEUTROPHILS NFR BLD AUTO: 69.2 % — SIGNIFICANT CHANGE UP (ref 42.2–75.2)
NRBC # BLD: 0 /100 WBCS — SIGNIFICANT CHANGE UP (ref 0–0)
PHOSPHATE SERPL-MCNC: 4.7 MG/DL — SIGNIFICANT CHANGE UP (ref 2.1–4.9)
PLATELET # BLD AUTO: 246 K/UL — SIGNIFICANT CHANGE UP (ref 130–400)
POTASSIUM SERPL-MCNC: 4.3 MMOL/L — SIGNIFICANT CHANGE UP (ref 3.5–5)
POTASSIUM SERPL-SCNC: 4.3 MMOL/L — SIGNIFICANT CHANGE UP (ref 3.5–5)
PROT SERPL-MCNC: 6.6 G/DL — SIGNIFICANT CHANGE UP (ref 6–8)
RBC # BLD: 3.65 M/UL — LOW (ref 4.7–6.1)
RBC # FLD: 17.2 % — HIGH (ref 11.5–14.5)
SODIUM SERPL-SCNC: 142 MMOL/L — SIGNIFICANT CHANGE UP (ref 135–146)
TIBC SERPL-MCNC: 170 UG/DL — LOW (ref 220–430)
UIBC SERPL-MCNC: 100 UG/DL — LOW (ref 110–370)
WBC # BLD: 8.39 K/UL — SIGNIFICANT CHANGE UP (ref 4.8–10.8)
WBC # FLD AUTO: 8.39 K/UL — SIGNIFICANT CHANGE UP (ref 4.8–10.8)

## 2019-03-17 RX ADMIN — ALBUTEROL 2 PUFF(S): 90 AEROSOL, METERED ORAL at 13:18

## 2019-03-17 RX ADMIN — Medication 90 MILLIGRAM(S): at 05:47

## 2019-03-17 RX ADMIN — Medication 650 MILLIGRAM(S): at 22:33

## 2019-03-17 RX ADMIN — Medication 5 UNIT(S): at 11:53

## 2019-03-17 RX ADMIN — Medication 5 UNIT(S): at 17:23

## 2019-03-17 RX ADMIN — Medication 100 MILLIGRAM(S): at 05:47

## 2019-03-17 RX ADMIN — TAMSULOSIN HYDROCHLORIDE 0.4 MILLIGRAM(S): 0.4 CAPSULE ORAL at 21:26

## 2019-03-17 RX ADMIN — DORZOLAMIDE HYDROCHLORIDE 1 DROP(S): 20 SOLUTION/ DROPS OPHTHALMIC at 15:08

## 2019-03-17 RX ADMIN — Medication 1 DROP(S): at 17:27

## 2019-03-17 RX ADMIN — Medication 50 MILLIGRAM(S): at 12:04

## 2019-03-17 RX ADMIN — Medication 80 MILLIGRAM(S): at 05:47

## 2019-03-17 RX ADMIN — OXYCODONE AND ACETAMINOPHEN 1 TABLET(S): 5; 325 TABLET ORAL at 06:06

## 2019-03-17 RX ADMIN — Medication 1 DROP(S): at 05:48

## 2019-03-17 RX ADMIN — DORZOLAMIDE HYDROCHLORIDE 1 DROP(S): 20 SOLUTION/ DROPS OPHTHALMIC at 05:49

## 2019-03-17 RX ADMIN — BRIMONIDINE TARTRATE 1 DROP(S): 2 SOLUTION/ DROPS OPHTHALMIC at 17:24

## 2019-03-17 RX ADMIN — SERTRALINE 100 MILLIGRAM(S): 25 TABLET, FILM COATED ORAL at 11:54

## 2019-03-17 RX ADMIN — ALBUTEROL 2 PUFF(S): 90 AEROSOL, METERED ORAL at 07:57

## 2019-03-17 RX ADMIN — Medication 80 MILLIGRAM(S): at 17:26

## 2019-03-17 RX ADMIN — LATANOPROST 1 DROP(S): 0.05 SOLUTION/ DROPS OPHTHALMIC; TOPICAL at 21:27

## 2019-03-17 RX ADMIN — Medication 25 MILLIGRAM(S): at 17:25

## 2019-03-17 RX ADMIN — Medication 650 MILLIGRAM(S): at 21:26

## 2019-03-17 RX ADMIN — LACTULOSE 10 GRAM(S): 10 SOLUTION ORAL at 17:26

## 2019-03-17 RX ADMIN — INSULIN GLARGINE 25 UNIT(S): 100 INJECTION, SOLUTION SUBCUTANEOUS at 21:26

## 2019-03-17 RX ADMIN — SEVELAMER CARBONATE 800 MILLIGRAM(S): 2400 POWDER, FOR SUSPENSION ORAL at 21:26

## 2019-03-17 RX ADMIN — BRIMONIDINE TARTRATE 1 DROP(S): 2 SOLUTION/ DROPS OPHTHALMIC at 05:48

## 2019-03-17 RX ADMIN — HEPARIN SODIUM 5000 UNIT(S): 5000 INJECTION INTRAVENOUS; SUBCUTANEOUS at 05:49

## 2019-03-17 RX ADMIN — PANTOPRAZOLE SODIUM 40 MILLIGRAM(S): 20 TABLET, DELAYED RELEASE ORAL at 05:47

## 2019-03-17 RX ADMIN — HEPARIN SODIUM 5000 UNIT(S): 5000 INJECTION INTRAVENOUS; SUBCUTANEOUS at 17:30

## 2019-03-17 RX ADMIN — Medication 25 MILLIGRAM(S): at 05:47

## 2019-03-17 RX ADMIN — Medication 81 MILLIGRAM(S): at 11:53

## 2019-03-17 RX ADMIN — SEVELAMER CARBONATE 800 MILLIGRAM(S): 2400 POWDER, FOR SUSPENSION ORAL at 05:48

## 2019-03-17 RX ADMIN — DORZOLAMIDE HYDROCHLORIDE 1 DROP(S): 20 SOLUTION/ DROPS OPHTHALMIC at 21:27

## 2019-03-17 RX ADMIN — Medication 650 MILLIGRAM(S): at 01:12

## 2019-03-17 RX ADMIN — LACTULOSE 10 GRAM(S): 10 SOLUTION ORAL at 05:53

## 2019-03-17 RX ADMIN — Medication 100 MILLIGRAM(S): at 17:25

## 2019-03-17 RX ADMIN — SIMVASTATIN 10 MILLIGRAM(S): 20 TABLET, FILM COATED ORAL at 21:26

## 2019-03-17 RX ADMIN — SEVELAMER CARBONATE 800 MILLIGRAM(S): 2400 POWDER, FOR SUSPENSION ORAL at 15:08

## 2019-03-17 RX ADMIN — Medication 1 APPLICATION(S): at 12:00

## 2019-03-17 RX ADMIN — Medication 2: at 08:09

## 2019-03-17 RX ADMIN — Medication 5 UNIT(S): at 08:10

## 2019-03-17 NOTE — PROGRESS NOTE ADULT - SUBJECTIVE AND OBJECTIVE BOX
IVANANTONYFLORIDA WEN  54y  Male    Patient is a 54y old  Male who presents with a chief complaint of Shortness of breath (15 Mar 2019 11:01)      INTERVAL HPI/OVERNIGHT EVENTS:  No interval events.   Patient has no new complaints. Dyspnea resolved.  Patient requesting to be discharged  Family at bedside: Plan of care discussed. Patient still on admission pending possible thoracentesis and Authorization.        REVIEW OF SYSTEMS:  CONSTITUTIONAL: No fever, weight loss, or fatigue  EYES: No eye pain, visual disturbances, or discharge  ENMT:  No difficulty hearing, tinnitus, vertigo; No sinus or throat pain  NECK: No pain or stiffness  BREASTS: No pain, masses, or nipple discharge  RESPIRATORY: No cough, wheezing, chills or hemoptysis; No shortness of breath  CARDIOVASCULAR: No chest pain, palpitations, dizziness, + leg swelling  GASTROINTESTINAL: No abdominal or epigastric pain. No nausea, vomiting, or hematemesis; No diarrhea or constipation. No melena or hematochezia.  GENITOURINARY: No dysuria, frequency, hematuria, or incontinence  NEUROLOGICAL: No headaches, memory loss, loss of strength, numbness, or tremors  SKIN: No itching, burning, rashes, or lesions   LYMPH NODES: No enlarged glands  ENDOCRINE: No heat or cold intolerance; No hair loss  MUSCULOSKELETAL: No joint pain or swelling; No muscle, back, or extremity pain  PSYCHIATRIC: No depression, anxiety, mood swings, or difficulty sleeping  HEME/LYMPH: No easy bruising, or bleeding gums  ALLERGY AND IMMUNOLOGIC: No hives or eczema      VITALS:  T(C): 36.6 (17 Mar 2019 05:15), Max: 36.6 (17 Mar 2019 05:15)  T(F): 97.8 (17 Mar 2019 05:15), Max: 97.8 (17 Mar 2019 05:15)  HR: 71 (17 Mar 2019 05:15) (68 - 71)  BP: 145/65 (17 Mar 2019 05:15) (127/59 - 147/79)  BP(mean): --  RR: 16 (17 Mar 2019 05:15) (16 - 18)  SpO2: 100% (16 Mar 2019 19:30) (98% - 100%)      CAPILLARY BLOOD GLUCOSE  POCT Blood Glucose.: 152 mg/dL (17 Mar 2019 07:33)  POCT Blood Glucose.: 242 mg/dL (16 Mar 2019 22:17)  POCT Blood Glucose.: 244 mg/dL (16 Mar 2019 16:32)  POCT Blood Glucose.: 135 mg/dL (16 Mar 2019 12:55)        PHYSICAL EXAM:  GENERAL: NAD, well-developed  HEAD:  Atraumatic, Normocephalic  EYES: conjunctiva and sclera clear, rt corneal opacity.  ENMT: Moist mucous membranes  NECK: Supple, Normal thyroid  NERVOUS SYSTEM:  Alert & Oriented x 3, Good concentration; Motor Strength 5/5 B/L upper and lower extremities  CHEST/LUNG: Decreased AE at the bases bilaterally; + few rales, No rhonchi, wheezing, or rubs  HEART: Regular rate and rhythm; No murmurs, rubs, or gallops  ABDOMEN: Soft, Nontender, obese; Bowel sounds present  EXTREMITIES:  2+ Peripheral Pulses, No clubbing, cyanosis, + Left LE edema. Right BKA. left UE AV fistula +Thrill  LYMPH: No lymphadenopathy noted  SKIN: Chronic stasis dermatitis of the left leg.    Consultant(s) Notes Reviewed:  [x ] YES  [ ] NO  Care Discussed with Consultants/Other Providers [ x] YES  [ ] NO    LABS:                        8.9    8.68  )-----------( 216      ( 16 Mar 2019 08:35 )             31.6       03-16    142  |  101  |  52<H>  ----------------------------<  149<H>  4.9   |  28  |  6.3<HH>    Ca    8.7      16 Mar 2019 08:35  Phos  5.2     03-16  Mg     2.3     03-16    TPro  6.5  /  Alb  3.8  /  TBili  0.3  /  DBili  x   /  AST  6   /  ALT  <5  /  AlkPhos  121<H>  03-16      MICROBIOLOGY: None      RADIOLOGY & ADDITIONAL TESTS:  CT Chest No Cont (03.14.19 @ 17:58)   Bilateral pleural effusions, left measuring greater than 1500 cc, right   measuring approximately 650 cc.    Multifocal airspace consolidations and atelectasis which may represent   underlying pneumonia in the appropriate clinical setting.    Dilated main pulmonary artery which may represent pulmonary hypertension.      Xray Chest 1 View- PORTABLE-Routine (03.17.19 @ 08:29)  Stable left basilar opacity and bilateral pleural effusions.        MEDICATIONS  (STANDING):  ALBUTerol    90 MICROgram(s) HFA Inhaler 2 Puff(s) Inhalation every 6 hours  aspirin enteric coated 81 milliGRAM(s) Oral daily  brimonidine 0.2% Ophthalmic Solution 1 Drop(s) Both EYES two times a day  dextrose 5%. 1000 milliLiter(s) (50 mL/Hr) IV Continuous <Continuous>  dextrose 50% Injectable 12.5 Gram(s) IV Push once  dextrose 50% Injectable 25 Gram(s) IV Push once  dextrose 50% Injectable 25 Gram(s) IV Push once  docusate sodium 100 milliGRAM(s) Oral two times a day  dorzolamide 2% Ophthalmic Solution 1 Drop(s) Both EYES three times a day  epoetin keagan Injectable 28927 Unit(s) IV Push <User Schedule>  furosemide    Tablet 80 milliGRAM(s) Oral two times a day  heparin  Injectable. 1000 Unit(s) Dialysis. once  heparin SubCutaneous Injection - Peds 5000 Unit(s) SubCutaneous every 12 hours  insulin glargine Injectable (LANTUS) 25 Unit(s) SubCutaneous at bedtime  insulin lispro (HumaLOG) corrective regimen sliding scale   SubCutaneous three times a day before meals  insulin lispro Injectable (HumaLOG) 5 Unit(s) SubCutaneous three times a day before meals  lactulose Syrup 10 Gram(s) Oral two times a day  latanoprost 0.005% Ophthalmic Solution 1 Drop(s) Both EYES at bedtime  metolazone 5 milliGRAM(s) Oral daily  metoprolol tartrate 25 milliGRAM(s) Oral two times a day  mineral oil/petrolatum Hydrophilic Ointment 1 Application(s) Topical daily  NIFEdipine XL 90 milliGRAM(s) Oral daily  pantoprazole    Tablet 40 milliGRAM(s) Oral before breakfast  pregabalin 50 milliGRAM(s) Oral daily  sertraline 100 milliGRAM(s) Oral daily  sevelamer hydrochloride 800 milliGRAM(s) Oral three times a day  simvastatin 10 milliGRAM(s) Oral at bedtime  tamsulosin Oral Tab/Cap - Peds 0.4 milliGRAM(s) Oral at bedtime  timolol 0.5% Solution 1 Drop(s) Both EYES two times a day    MEDICATIONS  (PRN):  acetaminophen   Tablet .. 650 milliGRAM(s) Oral every 6 hours PRN Temp greater or equal to 38C (100.4F), Moderate Pain (4 - 6)  ALBUTerol/ipratropium for Nebulization 3 milliLiter(s) Nebulizer every 6 hours PRN Wheezing  dextrose 40% Gel 15 Gram(s) Oral once PRN Blood Glucose LESS THAN 70 milliGRAM(s)/deciliter  glucagon  Injectable 1 milliGRAM(s) IntraMuscular once PRN Glucose LESS THAN 70 milligrams/deciliter  oxyCODONE    5 mG/acetaminophen 325 mG 1 Tablet(s) Oral every 4 hours PRN for moderate pain  zolpidem 5 milliGRAM(s) Oral at bedtime PRN Insomnia      Imaging Personally Reviewed:  [x] YES  [ ] NO      HEALTH ISSUES - PROBLEM Dx:  Bipolar affective disorder, remission status unspecified  Anxiety disorder, unspecified type  Anemia, unspecified type  HTN (hypertension)  End stage renal disease  Diabetes  Dialysis patient  Elevated troponin  Pleural effusion

## 2019-03-17 NOTE — PROGRESS NOTE ADULT - ASSESSMENT
54 y.o male patient with PMH of ESRD on HD, DM, HTN, bipolar disorder? presented to the ED for shortness of breath attributed to fluid overload. While on medicine floor, patient developed acute hypercapnic and hypoxemic respiratory failure; he was placed on AVAP and upgraded to CCU where he was stabilised and transferred to the floor on 3/16/19.    Assessment and Plan:    1. Acute failure with hypoxia: improving.  Due to Volume overload with Pleural Effusions.  Chest x-ray: Interval increase in left pleural effusion and mild pulmonary vascular congestion.   Continue HD and Diuretics. Fluid & salt restriction.   On supplemental Oxygen. BiPAP prn.  Pulm following: continue HD and diuresis if effusion persist will need thoracentesis. Dr. Banks to reevaluate tomorrow.  Follow up repeat X-ray in AM.      2. ESRD:  Nephrology following: last HD 3/16/2019.  Monitor renal function and electrolytes   Continue Sevelamer at current doses for now      3. Hypertension:  Continue Nifedipine & Metoprolol.  Add Lisinopril or switch to Coreg if remains uncontrolled.       4. Morbidly Obesity:  BMI > 41.  Dietary and lifestyle modification.      5. DM: Uncontrolled.  Monitor FS with Insulin coverage.   Hemoglobin A1C, Whole Blood: 8.6 % (06.01.18 @ 07:22)      5. Depression/Anxiety:  Continue Sertraline.      6. Chronic Normocytic Anemia:  Most likely secondary to kidney disease.  Hgb Stable. Monitor. Follow up Iron studies.  Continue EPO.   Goal hgb > 8 g/dl.      7. Elevated Troponin:   Stable. No chest pain  Most likely secondary to renal disease  Continue aspirin, beta-blockers and statin. Consider outpatient stress echo as per cardiology.  ECHO: Echo LV size and function.        8. Pulmonary Hypertension/Suspected HOMAR:  Sleep study as outpatient.  Out Patient follow up.            DVT prophylaxis: Heparin SQ  Disposition: LTR at [facility] when stable.  Family discussion: None.

## 2019-03-17 NOTE — PROGRESS NOTE ADULT - SUBJECTIVE AND OBJECTIVE BOX
SI FOLLOW UP NOTE  --------------------------------------------------------------------------------  Chief Complaint/24 hour events/subjective:    s/p HD on Sat, no SOB    PAST HISTORY  --------------------------------------------------------------------------------  No significant changes to PMH, PSH, FHx, SHx, unless otherwise noted    ALLERGIES & MEDICATIONS  --------------------------------------------------------------------------------  Allergies    No Known Allergies    Intolerances      Standing Inpatient Medications  ALBUTerol    90 MICROgram(s) HFA Inhaler 2 Puff(s) Inhalation every 6 hours  aspirin enteric coated 81 milliGRAM(s) Oral daily  brimonidine 0.2% Ophthalmic Solution 1 Drop(s) Both EYES two times a day  dextrose 5%. 1000 milliLiter(s) IV Continuous <Continuous>  dextrose 50% Injectable 12.5 Gram(s) IV Push once  dextrose 50% Injectable 25 Gram(s) IV Push once  dextrose 50% Injectable 25 Gram(s) IV Push once  docusate sodium 100 milliGRAM(s) Oral two times a day  dorzolamide 2% Ophthalmic Solution 1 Drop(s) Both EYES three times a day  epoetin keagan Injectable 39875 Unit(s) IV Push <User Schedule>  furosemide    Tablet 80 milliGRAM(s) Oral two times a day  heparin  Injectable. 1000 Unit(s) Dialysis. once  heparin SubCutaneous Injection - Peds 5000 Unit(s) SubCutaneous every 12 hours  insulin glargine Injectable (LANTUS) 25 Unit(s) SubCutaneous at bedtime  insulin lispro (HumaLOG) corrective regimen sliding scale   SubCutaneous three times a day before meals  insulin lispro Injectable (HumaLOG) 5 Unit(s) SubCutaneous three times a day before meals  lactulose Syrup 10 Gram(s) Oral two times a day  latanoprost 0.005% Ophthalmic Solution 1 Drop(s) Both EYES at bedtime  metolazone 5 milliGRAM(s) Oral daily  metoprolol tartrate 25 milliGRAM(s) Oral two times a day  mineral oil/petrolatum Hydrophilic Ointment 1 Application(s) Topical daily  NIFEdipine XL 90 milliGRAM(s) Oral daily  pantoprazole    Tablet 40 milliGRAM(s) Oral before breakfast  pregabalin 50 milliGRAM(s) Oral daily  sertraline 100 milliGRAM(s) Oral daily  sevelamer hydrochloride 800 milliGRAM(s) Oral three times a day  simvastatin 10 milliGRAM(s) Oral at bedtime  tamsulosin Oral Tab/Cap - Peds 0.4 milliGRAM(s) Oral at bedtime  timolol 0.5% Solution 1 Drop(s) Both EYES two times a day    PRN Inpatient Medications  acetaminophen   Tablet .. 650 milliGRAM(s) Oral every 6 hours PRN  ALBUTerol/ipratropium for Nebulization 3 milliLiter(s) Nebulizer every 6 hours PRN  dextrose 40% Gel 15 Gram(s) Oral once PRN  glucagon  Injectable 1 milliGRAM(s) IntraMuscular once PRN  oxyCODONE    5 mG/acetaminophen 325 mG 1 Tablet(s) Oral every 4 hours PRN  zolpidem 5 milliGRAM(s) Oral at bedtime PRN      REVIEW OF SYSTEMS  --------------------------------------------------------------------------------    All other systems were reviewed and are negative, except as noted.    VITALS/PHYSICAL EXAM  --------------------------------------------------------------------------------  T(C): 35.7 (03-17-19 @ 14:13), Max: 36.6 (03-17-19 @ 05:15)  HR: 63 (03-17-19 @ 14:13) (61 - 71)  BP: 121/58 (03-17-19 @ 14:13) (121/58 - 145/65)  RR: 16 (03-17-19 @ 14:13) (16 - 18)  SpO2: 99% (03-17-19 @ 12:02) (99% - 100%)  Wt(kg): --        03-16-19 @ 07:01  -  03-17-19 @ 07:00  --------------------------------------------------------  IN: 540 mL / OUT: 4650 mL / NET: -4110 mL    03-17-19 @ 07:01  -  03-17-19 @ 15:41  --------------------------------------------------------  IN: 480 mL / OUT: 0 mL / NET: 480 mL      Physical Exam:    	Gen: no distress   	Pulm: CTA B/L  	CV: S1S2; no rub  	Abd: +BS, soft, nontender/nondistended  	LE:  B/L leg  edema, s/p AKA      LABS/STUDIES  --------------------------------------------------------------------------------              8.8    8.39  >-----------<  246      [03-17-19 @ 07:50]              32.0     142  |  101  |  44  ----------------------------<  153      [03-17-19 @ 07:50]  4.3   |  28  |  6.8        Ca     8.7     [03-17-19 @ 07:50]      Mg     2.3     [03-17-19 @ 07:50]      Phos  4.7     [03-17-19 @ 07:50]    TPro  6.6  /  Alb  3.9  /  TBili  0.3  /  DBili  x   /  AST  6   /  ALT  <5  /  AlkPhos  121  [03-17-19 @ 07:50]          Creatinine Trend:  SCr 6.8 [03-17 @ 07:50]  SCr 6.3 [03-16 @ 08:35]  SCr 5.9 [03-15 @ 15:13]  SCr 5.0 [03-15 @ 06:01]  SCr 5.5 [03-14 @ 05:57]        Iron 70, TIBC 170, %sat 41      [03-17-19 @ 07:50]  HbA1c 8.6      [06-01-18 @ 07:22]

## 2019-03-18 ENCOUNTER — RESULT REVIEW (OUTPATIENT)
Age: 54
End: 2019-03-18

## 2019-03-18 LAB
ALBUMIN SERPL ELPH-MCNC: 3.8 G/DL — SIGNIFICANT CHANGE UP (ref 3.5–5.2)
ALP SERPL-CCNC: 125 U/L — HIGH (ref 30–115)
ALT FLD-CCNC: 6 U/L — SIGNIFICANT CHANGE UP (ref 0–41)
ANION GAP SERPL CALC-SCNC: 14 MMOL/L — SIGNIFICANT CHANGE UP (ref 7–14)
AST SERPL-CCNC: 6 U/L — SIGNIFICANT CHANGE UP (ref 0–41)
B PERT IGG+IGM PNL SER: ABNORMAL
BASOPHILS # BLD AUTO: 0.12 K/UL — SIGNIFICANT CHANGE UP (ref 0–0.2)
BASOPHILS NFR BLD AUTO: 1.2 % — HIGH (ref 0–1)
BILIRUB SERPL-MCNC: 0.3 MG/DL — SIGNIFICANT CHANGE UP (ref 0.2–1.2)
BUN SERPL-MCNC: 52 MG/DL — HIGH (ref 10–20)
CALCIUM SERPL-MCNC: 8.5 MG/DL — SIGNIFICANT CHANGE UP (ref 8.5–10.1)
CHLORIDE SERPL-SCNC: 98 MMOL/L — SIGNIFICANT CHANGE UP (ref 98–110)
CO2 SERPL-SCNC: 26 MMOL/L — SIGNIFICANT CHANGE UP (ref 17–32)
COLOR FLD: SIGNIFICANT CHANGE UP
CREAT SERPL-MCNC: 7.8 MG/DL — CRITICAL HIGH (ref 0.7–1.5)
EOSINOPHIL # BLD AUTO: 0.43 K/UL — SIGNIFICANT CHANGE UP (ref 0–0.7)
EOSINOPHIL NFR BLD AUTO: 4.2 % — SIGNIFICANT CHANGE UP (ref 0–8)
FLUID INTAKE SUBSTANCE CLASS: SIGNIFICANT CHANGE UP
FLUID SEGMENTED GRANULOCYTES: 2 % — SIGNIFICANT CHANGE UP
GLUCOSE BLDC GLUCOMTR-MCNC: 135 MG/DL — HIGH (ref 70–99)
GLUCOSE BLDC GLUCOMTR-MCNC: 184 MG/DL — HIGH (ref 70–99)
GLUCOSE BLDC GLUCOMTR-MCNC: 245 MG/DL — HIGH (ref 70–99)
GLUCOSE BLDC GLUCOMTR-MCNC: 278 MG/DL — HIGH (ref 70–99)
GLUCOSE SERPL-MCNC: 125 MG/DL — HIGH (ref 70–99)
HCT VFR BLD CALC: 30.4 % — LOW (ref 42–52)
HGB BLD-MCNC: 8.4 G/DL — LOW (ref 14–18)
IMM GRANULOCYTES NFR BLD AUTO: 0.4 % — HIGH (ref 0.1–0.3)
LYMPHOCYTES # BLD AUTO: 1.39 K/UL — SIGNIFICANT CHANGE UP (ref 1.2–3.4)
LYMPHOCYTES # BLD AUTO: 13.6 % — LOW (ref 20.5–51.1)
LYMPHOCYTES # FLD: 8 % — SIGNIFICANT CHANGE UP
MAGNESIUM SERPL-MCNC: 2.4 MG/DL — SIGNIFICANT CHANGE UP (ref 1.8–2.4)
MCHC RBC-ENTMCNC: 24 PG — LOW (ref 27–31)
MCHC RBC-ENTMCNC: 27.6 G/DL — LOW (ref 32–37)
MCV RBC AUTO: 86.9 FL — SIGNIFICANT CHANGE UP (ref 80–94)
MESOTHL CELL # FLD: 5 % — SIGNIFICANT CHANGE UP
MONOCYTES # BLD AUTO: 0.98 K/UL — HIGH (ref 0.1–0.6)
MONOCYTES NFR BLD AUTO: 9.6 % — HIGH (ref 1.7–9.3)
MONOS+MACROS # FLD: 10 % — SIGNIFICANT CHANGE UP
NEUTROPHILS # BLD AUTO: 7.26 K/UL — HIGH (ref 1.4–6.5)
NEUTROPHILS NFR BLD AUTO: 71 % — SIGNIFICANT CHANGE UP (ref 42.2–75.2)
NRBC # BLD: 0 /100 WBCS — SIGNIFICANT CHANGE UP (ref 0–0)
PHOSPHATE SERPL-MCNC: 5.1 MG/DL — HIGH (ref 2.1–4.9)
PLATELET # BLD AUTO: 235 K/UL — SIGNIFICANT CHANGE UP (ref 130–400)
POTASSIUM SERPL-MCNC: 4.8 MMOL/L — SIGNIFICANT CHANGE UP (ref 3.5–5)
POTASSIUM SERPL-SCNC: 4.8 MMOL/L — SIGNIFICANT CHANGE UP (ref 3.5–5)
PROT SERPL-MCNC: 6.5 G/DL — SIGNIFICANT CHANGE UP (ref 6–8)
RBC # BLD: 3.5 M/UL — LOW (ref 4.7–6.1)
RBC # FLD: 17.2 % — HIGH (ref 11.5–14.5)
RCV VOL RI: 4000 /UL — HIGH (ref 0–5)
SODIUM SERPL-SCNC: 138 MMOL/L — SIGNIFICANT CHANGE UP (ref 135–146)
TOTAL NUCLEATED CELL COUNT, BODY FLUID: 6 /UL — HIGH (ref 0–5)
TUBE TYPE: SIGNIFICANT CHANGE UP
WBC # BLD: 10.22 K/UL — SIGNIFICANT CHANGE UP (ref 4.8–10.8)
WBC # FLD AUTO: 10.22 K/UL — SIGNIFICANT CHANGE UP (ref 4.8–10.8)

## 2019-03-18 RX ADMIN — HEPARIN SODIUM 5000 UNIT(S): 5000 INJECTION INTRAVENOUS; SUBCUTANEOUS at 05:33

## 2019-03-18 RX ADMIN — SEVELAMER CARBONATE 800 MILLIGRAM(S): 2400 POWDER, FOR SUSPENSION ORAL at 21:58

## 2019-03-18 RX ADMIN — DORZOLAMIDE HYDROCHLORIDE 1 DROP(S): 20 SOLUTION/ DROPS OPHTHALMIC at 21:59

## 2019-03-18 RX ADMIN — Medication 5 UNIT(S): at 17:05

## 2019-03-18 RX ADMIN — LACTULOSE 10 GRAM(S): 10 SOLUTION ORAL at 05:31

## 2019-03-18 RX ADMIN — SERTRALINE 100 MILLIGRAM(S): 25 TABLET, FILM COATED ORAL at 11:36

## 2019-03-18 RX ADMIN — ZOLPIDEM TARTRATE 5 MILLIGRAM(S): 10 TABLET ORAL at 23:08

## 2019-03-18 RX ADMIN — BRIMONIDINE TARTRATE 1 DROP(S): 2 SOLUTION/ DROPS OPHTHALMIC at 17:07

## 2019-03-18 RX ADMIN — Medication 1 DROP(S): at 05:34

## 2019-03-18 RX ADMIN — Medication 25 MILLIGRAM(S): at 05:30

## 2019-03-18 RX ADMIN — Medication 5 UNIT(S): at 12:07

## 2019-03-18 RX ADMIN — Medication 100 MILLIGRAM(S): at 05:30

## 2019-03-18 RX ADMIN — Medication 1 APPLICATION(S): at 11:35

## 2019-03-18 RX ADMIN — BRIMONIDINE TARTRATE 1 DROP(S): 2 SOLUTION/ DROPS OPHTHALMIC at 05:34

## 2019-03-18 RX ADMIN — SEVELAMER CARBONATE 800 MILLIGRAM(S): 2400 POWDER, FOR SUSPENSION ORAL at 05:30

## 2019-03-18 RX ADMIN — ALBUTEROL 2 PUFF(S): 90 AEROSOL, METERED ORAL at 19:57

## 2019-03-18 RX ADMIN — Medication 5 UNIT(S): at 08:18

## 2019-03-18 RX ADMIN — Medication 100 MILLIGRAM(S): at 17:08

## 2019-03-18 RX ADMIN — Medication 25 MILLIGRAM(S): at 17:08

## 2019-03-18 RX ADMIN — ALBUTEROL 2 PUFF(S): 90 AEROSOL, METERED ORAL at 08:45

## 2019-03-18 RX ADMIN — Medication 50 MILLIGRAM(S): at 11:36

## 2019-03-18 RX ADMIN — DORZOLAMIDE HYDROCHLORIDE 1 DROP(S): 20 SOLUTION/ DROPS OPHTHALMIC at 14:45

## 2019-03-18 RX ADMIN — Medication 2: at 12:06

## 2019-03-18 RX ADMIN — Medication 650 MILLIGRAM(S): at 22:01

## 2019-03-18 RX ADMIN — Medication 6: at 17:05

## 2019-03-18 RX ADMIN — Medication 80 MILLIGRAM(S): at 05:30

## 2019-03-18 RX ADMIN — DORZOLAMIDE HYDROCHLORIDE 1 DROP(S): 20 SOLUTION/ DROPS OPHTHALMIC at 05:34

## 2019-03-18 RX ADMIN — LATANOPROST 1 DROP(S): 0.05 SOLUTION/ DROPS OPHTHALMIC; TOPICAL at 21:59

## 2019-03-18 RX ADMIN — INSULIN GLARGINE 25 UNIT(S): 100 INJECTION, SOLUTION SUBCUTANEOUS at 21:57

## 2019-03-18 RX ADMIN — PANTOPRAZOLE SODIUM 40 MILLIGRAM(S): 20 TABLET, DELAYED RELEASE ORAL at 05:30

## 2019-03-18 RX ADMIN — SIMVASTATIN 10 MILLIGRAM(S): 20 TABLET, FILM COATED ORAL at 21:58

## 2019-03-18 RX ADMIN — ALBUTEROL 2 PUFF(S): 90 AEROSOL, METERED ORAL at 14:05

## 2019-03-18 RX ADMIN — HEPARIN SODIUM 5000 UNIT(S): 5000 INJECTION INTRAVENOUS; SUBCUTANEOUS at 17:08

## 2019-03-18 RX ADMIN — Medication 650 MILLIGRAM(S): at 06:11

## 2019-03-18 RX ADMIN — Medication 90 MILLIGRAM(S): at 05:30

## 2019-03-18 RX ADMIN — Medication 650 MILLIGRAM(S): at 07:38

## 2019-03-18 RX ADMIN — Medication 1 DROP(S): at 17:08

## 2019-03-18 RX ADMIN — SEVELAMER CARBONATE 800 MILLIGRAM(S): 2400 POWDER, FOR SUSPENSION ORAL at 14:45

## 2019-03-18 RX ADMIN — TAMSULOSIN HYDROCHLORIDE 0.4 MILLIGRAM(S): 0.4 CAPSULE ORAL at 21:58

## 2019-03-18 RX ADMIN — Medication 80 MILLIGRAM(S): at 17:07

## 2019-03-18 NOTE — PROGRESS NOTE ADULT - ASSESSMENT
54 y.o male patient with PMH of ESRD on HD, DM, HTN, bipolar disorder? presented to the ED for shortness of breath attributed to fluid overload. While on medicine floor, patient developed acute hypercapnic and hypoxemic respiratory failure; he was placed on AVAP and upgraded to CCU where he was stabilised and transferred to the floor on 3/16/19.    Assessment and Plan:    1. Acute failure with hypoxia: improving.  Due to Volume overload with Pleural Effusions.  Chest x-ray: Interval increase in left pleural effusion and mild pulmonary vascular congestion.   Continue HD and Diuretics. Fluid & salt restriction.   On supplemental Oxygen. BiPAP prn.  Pulm following: Thoracentesis today.   Follow up repeat X-ray in AM.      2. ESRD:  Nephrology following: last HD 3/16/2019.  Monitor renal function and electrolytes   Continue Sevelamer at current doses for now      3. Hypertension:  Continue Nifedipine & Metoprolol.  Add Lisinopril or switch to Coreg if remains uncontrolled.       4. Morbidly Obesity:  BMI > 41.  Dietary and lifestyle modification.      5. DM: Uncontrolled.  Monitor FS with Insulin coverage.   Hemoglobin A1C, Whole Blood: 8.6 % (06.01.18 @ 07:22)      5. Depression/Anxiety:  Continue Sertraline.      6. Chronic Normocytic Anemia:  Most likely secondary to kidney disease.  Hgb Stable. Monitor. Follow up Iron studies.  Continue EPO.   Goal hgb > 8 g/dl.      7. Elevated Troponin:   Stable. No chest pain  Most likely secondary to renal disease  Continue aspirin, beta-blockers and statin. Consider outpatient stress echo as per cardiology.  ECHO: Echo LV size and function.        8. Pulmonary Hypertension/Suspected HOMAR:  Sleep study as outpatient.  Out Patient follow up.            DVT prophylaxis: Heparin SQ  Disposition: LTR at [facility] when stable.  Family discussion: None.

## 2019-03-18 NOTE — DIETITIAN INITIAL EVALUATION ADULT. - OTHER INFO
Reason for assessment: LOS. PMH: ESRD on HD, anemia, HTN, DM, pain disorder, s/p right BKA. Pt is admitted for acute hypoxic resp failure due to pulmonary edema/pleural effusion. Pt is receiving HD during admit. Pt is unable to tell me his dry wt. Pt denies wt loss. Last BM was yesterday 3/17. NKFA.

## 2019-03-18 NOTE — PROGRESS NOTE ADULT - ASSESSMENT
IMPRESSION:  ARF hypercapnia and hypoxic   fluid overload    loculated pleural effusion  ESRD           CNS: no sedation     HEENT: oral care     PULMONARY:     bedside  thoracentesis yields 1.5L serosanguinous fluid  pH 7.31    CARDIOVASCULAR: s/p lasix 100   check BNP and CE   echo     GI: GI prophylaxis.  Feeding     RENAL: stat HD renal was informed     INFECTIOUS DISEASE:doubt PNA     HEMATOLOGICAL:  DVT prophylaxis. heparin Sq     ENDOCRINE:  Follow up FS.  Insulin protocol if needed.    MUSCULOSKELETAL:    case discussed with hospitalist this AM

## 2019-03-18 NOTE — PROGRESS NOTE ADULT - ASSESSMENT
1. SOB. Likely volume mediated. Resolved.  2. ESRD on HD TTS. HD tomorrow: 3 hours, opti 160 dialyzer, 2K bath, 4.5L UF.  3. Anemia. EPO with HD.  4. Hyperphosphatemia. Renal diet. Sevelamer with meals.  5. Hyperkalemia. Resolved. Renal diet.

## 2019-03-18 NOTE — DIETITIAN INITIAL EVALUATION ADULT. - FACTORS AFF FOOD INTAKE
Pt reports a good appetite. EMR reports % intake. Pt has been very noncompliant w/ diet. Pt is on a renal carb consistent diet and is demanding we send him up bagels w/ cream cheese and fruit platters w/ melons. Diet was explained and handouts were provided. As of now, Pt is ok with receiving one bagel w/ butter and a fruit platter w/ pears and berries./none

## 2019-03-18 NOTE — PROGRESS NOTE ADULT - SUBJECTIVE AND OBJECTIVE BOX
IVANANTONYFLORIDA WEN  54y  Male    Patient is a 54y old  Male who presents with a chief complaint of Shortness of breath (15 Mar 2019 11:01)      INTERVAL HPI/OVERNIGHT EVENTS:  No interval events.   Patient has no new complaints. Dyspnea resolved.  Patient requesting to be discharged.  For possible thoracentesis with Pulm today.      REVIEW OF SYSTEMS:  CONSTITUTIONAL: No fever, weight loss, or fatigue  EYES: No eye pain, visual disturbances, or discharge  ENMT:  No difficulty hearing, tinnitus, vertigo; No sinus or throat pain  NECK: No pain or stiffness  BREASTS: No pain, masses, or nipple discharge  RESPIRATORY: No cough, wheezing, chills or hemoptysis; No shortness of breath  CARDIOVASCULAR: No chest pain, palpitations, dizziness, + leg swelling  GASTROINTESTINAL: No abdominal or epigastric pain. No nausea, vomiting, or hematemesis; No diarrhea or constipation. No melena or hematochezia.  GENITOURINARY: No dysuria, frequency, hematuria, or incontinence  NEUROLOGICAL: No headaches, memory loss, loss of strength, numbness, or tremors  SKIN: No itching, burning, rashes, or lesions   LYMPH NODES: No enlarged glands  ENDOCRINE: No heat or cold intolerance; No hair loss  MUSCULOSKELETAL: No joint pain or swelling; No muscle, back, or extremity pain, RT BKA  PSYCHIATRIC: No depression, anxiety, mood swings, or difficulty sleeping  HEME/LYMPH: No easy bruising, or bleeding gums  ALLERGY AND IMMUNOLOGIC: No hives or eczema      VITALS:  T(C): 36.6 (18 Mar 2019 05:29), Max: 37.3 (17 Mar 2019 22:11)  T(F): 97.9 (18 Mar 2019 05:29), Max: 99.1 (17 Mar 2019 22:11)  HR: 67 (18 Mar 2019 05:29) (63 - 72)  BP: 166/65 (18 Mar 2019 05:29) (121/58 - 188/92)  BP(mean): --  RR: 16 (18 Mar 2019 05:29) (16 - 18)  SpO2: 99% (17 Mar 2019 17:35) (99% - 99%)      CAPILLARY BLOOD GLUCOSE  POCT Blood Glucose.: 184 mg/dL (18 Mar 2019 11:52)  POCT Blood Glucose.: 135 mg/dL (18 Mar 2019 07:37)  POCT Blood Glucose.: 206 mg/dL (17 Mar 2019 20:46)  POCT Blood Glucose.: 139 mg/dL (17 Mar 2019 16:36)      PHYSICAL EXAM:  GENERAL: NAD, well-developed  HEAD:  Atraumatic, Normocephalic  EYES: conjunctiva and sclera clear, rt corneal opacity.  ENMT: Moist mucous membranes  NECK: Supple, Normal thyroid  NERVOUS SYSTEM:  Alert & Oriented x 3, Good concentration; Motor Strength 5/5 B/L upper and lower extremities  CHEST/LUNG: Decreased AE at the bases bilaterally; + few rales, No rhonchi, wheezing, or rubs  HEART: Regular rate and rhythm; No murmurs, rubs, or gallops  ABDOMEN: Soft, Nontender, obese; Bowel sounds present  EXTREMITIES:  2+ Peripheral Pulses, No clubbing, cyanosis, + Left LE edema. Right BKA. left UE AV fistula +Thrill  LYMPH: No lymphadenopathy noted  SKIN: Chronic stasis dermatitis of the left leg.    Consultant(s) Notes Reviewed:  [x ] YES  [ ] NO  Care Discussed with Consultants/Other Providers [ x] YES  [ ] NO    LABS:                        8.4    10.22 )-----------( 235      ( 18 Mar 2019 07:04 )             30.4     03-18    138  |  98  |  52<H>  ----------------------------<  125<H>  4.8   |  26  |  7.8<HH>    Ca    8.5      18 Mar 2019 07:04  Phos  5.1     03-18  Mg     2.4     03-18    TPro  6.5  /  Alb  3.8  /  TBili  0.3  /  DBili  x   /  AST  6   /  ALT  6   /  AlkPhos  125<H>  03-18               MICROBIOLOGY: None      RADIOLOGY & ADDITIONAL TESTS:  CT Chest No Cont (03.14.19 @ 17:58)   Bilateral pleural effusions, left measuring greater than 1500 cc, right   measuring approximately 650 cc.    Multifocal airspace consolidations and atelectasis which may represent   underlying pneumonia in the appropriate clinical setting.    Dilated main pulmonary artery which may represent pulmonary hypertension.      X-ray Chest 1 View- PORTABLE-Routine (03.17.19 @ 08:29)  Stable left basilar opacity and bilateral pleural effusions.      Xray Chest 1 View- PORTABLE-Routine (03.18.19 @ 06:59)   Left opacity/left pleural effusion, decreased. Right opacity, stable.        MEDICATIONS  (STANDING):  ALBUTerol    90 MICROgram(s) HFA Inhaler 2 Puff(s) Inhalation every 6 hours  aspirin enteric coated 81 milliGRAM(s) Oral daily  brimonidine 0.2% Ophthalmic Solution 1 Drop(s) Both EYES two times a day  dextrose 5%. 1000 milliLiter(s) (50 mL/Hr) IV Continuous <Continuous>  dextrose 50% Injectable 12.5 Gram(s) IV Push once  dextrose 50% Injectable 25 Gram(s) IV Push once  dextrose 50% Injectable 25 Gram(s) IV Push once  docusate sodium 100 milliGRAM(s) Oral two times a day  dorzolamide 2% Ophthalmic Solution 1 Drop(s) Both EYES three times a day  epoetin keagan Injectable 83296 Unit(s) IV Push <User Schedule>  furosemide    Tablet 80 milliGRAM(s) Oral two times a day  heparin  Injectable. 1000 Unit(s) Dialysis. once  heparin SubCutaneous Injection - Peds 5000 Unit(s) SubCutaneous every 12 hours  insulin glargine Injectable (LANTUS) 25 Unit(s) SubCutaneous at bedtime  insulin lispro (HumaLOG) corrective regimen sliding scale   SubCutaneous three times a day before meals  insulin lispro Injectable (HumaLOG) 5 Unit(s) SubCutaneous three times a day before meals  lactulose Syrup 10 Gram(s) Oral two times a day  latanoprost 0.005% Ophthalmic Solution 1 Drop(s) Both EYES at bedtime  metolazone 5 milliGRAM(s) Oral daily  metoprolol tartrate 25 milliGRAM(s) Oral two times a day  mineral oil/petrolatum Hydrophilic Ointment 1 Application(s) Topical daily  NIFEdipine XL 90 milliGRAM(s) Oral daily  pantoprazole    Tablet 40 milliGRAM(s) Oral before breakfast  pregabalin 50 milliGRAM(s) Oral daily  sertraline 100 milliGRAM(s) Oral daily  sevelamer hydrochloride 800 milliGRAM(s) Oral three times a day  simvastatin 10 milliGRAM(s) Oral at bedtime  tamsulosin Oral Tab/Cap - Peds 0.4 milliGRAM(s) Oral at bedtime  timolol 0.5% Solution 1 Drop(s) Both EYES two times a day    MEDICATIONS  (PRN):  acetaminophen   Tablet .. 650 milliGRAM(s) Oral every 6 hours PRN Temp greater or equal to 38C (100.4F), Moderate Pain (4 - 6)  ALBUTerol/ipratropium for Nebulization 3 milliLiter(s) Nebulizer every 6 hours PRN Wheezing  dextrose 40% Gel 15 Gram(s) Oral once PRN Blood Glucose LESS THAN 70 milliGRAM(s)/deciliter  glucagon  Injectable 1 milliGRAM(s) IntraMuscular once PRN Glucose LESS THAN 70 milligrams/deciliter  oxyCODONE    5 mG/acetaminophen 325 mG 1 Tablet(s) Oral every 4 hours PRN for moderate pain  zolpidem 5 milliGRAM(s) Oral at bedtime PRN Insomnia      Imaging Personally Reviewed:  [x] YES  [ ] NO      HEALTH ISSUES - PROBLEM Dx:  Bipolar affective disorder, remission status unspecified  Anxiety disorder, unspecified type  Anemia, unspecified type  HTN (hypertension)  End stage renal disease  Diabetes  Dialysis patient  Elevated troponin  Pleural effusion

## 2019-03-18 NOTE — PROGRESS NOTE ADULT - SUBJECTIVE AND OBJECTIVE BOX
[facility] NEPHROLOGY FOLLOW UP NOTE  --------------------------------------------------------------------------------  24 hour events/subjective: Patient examined. Appears comfortable.    PAST HISTORY  --------------------------------------------------------------------------------  No significant changes to PMH, PSH, FHx, SHx, unless otherwise noted    ALLERGIES & MEDICATIONS  --------------------------------------------------------------------------------  Allergies    No Known Allergies    Standing Inpatient Medications  ALBUTerol    90 MICROgram(s) HFA Inhaler 2 Puff(s) Inhalation every 6 hours  aspirin enteric coated 81 milliGRAM(s) Oral daily  brimonidine 0.2% Ophthalmic Solution 1 Drop(s) Both EYES two times a day  dextrose 5%. 1000 milliLiter(s) IV Continuous <Continuous>  dextrose 50% Injectable 12.5 Gram(s) IV Push once  dextrose 50% Injectable 25 Gram(s) IV Push once  dextrose 50% Injectable 25 Gram(s) IV Push once  docusate sodium 100 milliGRAM(s) Oral two times a day  dorzolamide 2% Ophthalmic Solution 1 Drop(s) Both EYES three times a day  epoetin keagan Injectable 06577 Unit(s) IV Push <User Schedule>  furosemide    Tablet 80 milliGRAM(s) Oral two times a day  heparin  Injectable. 1000 Unit(s) Dialysis. once  heparin SubCutaneous Injection - Peds 5000 Unit(s) SubCutaneous every 12 hours  insulin glargine Injectable (LANTUS) 25 Unit(s) SubCutaneous at bedtime  insulin lispro (HumaLOG) corrective regimen sliding scale   SubCutaneous three times a day before meals  insulin lispro Injectable (HumaLOG) 5 Unit(s) SubCutaneous three times a day before meals  lactulose Syrup 10 Gram(s) Oral two times a day  latanoprost 0.005% Ophthalmic Solution 1 Drop(s) Both EYES at bedtime  metolazone 5 milliGRAM(s) Oral daily  metoprolol tartrate 25 milliGRAM(s) Oral two times a day  mineral oil/petrolatum Hydrophilic Ointment 1 Application(s) Topical daily  NIFEdipine XL 90 milliGRAM(s) Oral daily  pantoprazole    Tablet 40 milliGRAM(s) Oral before breakfast  sertraline 100 milliGRAM(s) Oral daily  sevelamer hydrochloride 800 milliGRAM(s) Oral three times a day  simvastatin 10 milliGRAM(s) Oral at bedtime  tamsulosin Oral Tab/Cap - Peds 0.4 milliGRAM(s) Oral at bedtime  timolol 0.5% Solution 1 Drop(s) Both EYES two times a day    PRN Inpatient Medications  acetaminophen   Tablet .. 650 milliGRAM(s) Oral every 6 hours PRN  ALBUTerol/ipratropium for Nebulization 3 milliLiter(s) Nebulizer every 6 hours PRN  dextrose 40% Gel 15 Gram(s) Oral once PRN  glucagon  Injectable 1 milliGRAM(s) IntraMuscular once PRN  oxyCODONE    5 mG/acetaminophen 325 mG 1 Tablet(s) Oral every 4 hours PRN  zolpidem 5 milliGRAM(s) Oral at bedtime PRN    VITALS/PHYSICAL EXAM  --------------------------------------------------------------------------------  T(C): 36.6 (03-18-19 @ 05:29), Max: 37.3 (03-17-19 @ 22:11)  HR: 74 (03-18-19 @ 10:24) (63 - 74)  BP: 166/65 (03-18-19 @ 05:29) (121/58 - 188/92)  RR: 16 (03-18-19 @ 10:24) (16 - 18)  SpO2: 94% (03-18-19 @ 10:24) (94% - 99%)    03-17-19 @ 07:01  -  03-18-19 @ 07:00  --------------------------------------------------------  IN: 480 mL / OUT: 0 mL / NET: 480 mL    03-18-19 @ 07:01  -  03-18-19 @ 13:37  --------------------------------------------------------  IN: 480 mL / OUT: 0 mL / NET: 480 mL    Physical Exam:  	Gen: NAD  	Pulm: CTA B/L  	CV: RRR, S1S2  	Abd: +BS, soft, nontender/nondistended  	: No suprapubic tenderness  	LE: Warm,  no edema  	Vascular access: AVF    LABS/STUDIES  --------------------------------------------------------------------------------              8.4    10.22 >-----------<  235      [03-18-19 @ 07:04]              30.4     138  |  98  |  52  ----------------------------<  125      [03-18-19 @ 07:04]  4.8   |  26  |  7.8        Ca     8.5     [03-18-19 @ 07:04]      Mg     2.4     [03-18-19 @ 07:04]      Phos  5.1     [03-18-19 @ 07:04]    TPro  6.5  /  Alb  3.8  /  TBili  0.3  /  DBili  x   /  AST  6   /  ALT  6   /  AlkPhos  125  [03-18-19 @ 07:04]    Creatinine Trend:  SCr 7.8 [03-18 @ 07:04]  SCr 6.8 [03-17 @ 07:50]  SCr 6.3 [03-16 @ 08:35]  SCr 5.9 [03-15 @ 15:13]  SCr 5.0 [03-15 @ 06:01]    Iron 70, TIBC 170, %sat 41      [03-17-19 @ 07:50]  Ferritin 554      [03-17-19 @ 07:50]  HbA1c 8.6      [06-01-18 @ 07:22]

## 2019-03-18 NOTE — DIETITIAN INITIAL EVALUATION ADULT. - PHYSICAL APPEARANCE
BMI 41.9 (6'1, 317#). Elmer score 18. No PU. DFU located on left heel and a blister located on left lateral distal sole. Edema 1+ located on left leg./obese

## 2019-03-18 NOTE — DIETITIAN INITIAL EVALUATION ADULT. - ENERGY NEEDS
Calories: 2520-2940kcals/day (30-35kcals/IBW) aim for lower end of calorie range   Protein: 109-126g/day (1.3-1.5g/IBW)   Fluid: 1000ml + urine output

## 2019-03-18 NOTE — PROGRESS NOTE ADULT - SUBJECTIVE AND OBJECTIVE BOX
Patient is a 54y old  Male who presents with a chief complaint of Acute hypoxic Respiratory failure due to Pulmonary edema/Pleural effusion. (18 Mar 2019 12:03)        Interval Events: No overnight events. Repeat CXR shows loculated effusion L base    REVIEW OF SYSTEMS:  Constitutional: No fevers or chills. No weight loss. No fatigue or generalized malaise.  Eyes: No itching or discharge from the eyes  ENT: No ear pain. No ear discharge. No nasal congestion. No post nasal drip. No epistaxis. No throat pain. No sore throat. No difficulty swallowing.   CV: No chest pain. No palpitations. No lightheadedness or dizziness.   Resp: No dyspnea at rest. +dyspnea on exertion. No orthopnea. No wheezing. No cough. No stridor. No sputum production. No chest pain with respiration.  GI: No nausea. No vomiting. No diarrhea.  MSK: No joint pain or pain in any extremities  Integumentary: No skin lesions. No pedal edema.  Neurological: No gross motor weakness. No sensory changes.      OBJECTIVE:  ICU Vital Signs Last 24 Hrs  T(C): 36.6 (18 Mar 2019 05:29), Max: 37.3 (17 Mar 2019 22:11)  T(F): 97.9 (18 Mar 2019 05:29), Max: 99.1 (17 Mar 2019 22:11)  HR: 74 (18 Mar 2019 10:24) (63 - 74)  BP: 166/65 (18 Mar 2019 05:29) (121/58 - 188/92)    RR: 16 (18 Mar 2019 10:24) (16 - 18)  SpO2: 94% (18 Mar 2019 10:24) (94% - 99%)        03-17 @ 07:01  -  03-18 @ 07:00  --------------------------------------------------------  IN: 480 mL / OUT: 0 mL / NET: 480 mL    03-18 @ 07:01 - 03-18 @ 13:51  --------------------------------------------------------  IN: 480 mL / OUT: 0 mL / NET: 480 mL      CAPILLARY BLOOD GLUCOSE      POCT Blood Glucose.: 184 mg/dL (18 Mar 2019 11:52)      PHYSICAL EXAM:  General: Awake, alert, oriented X 3.   HEENT: Atraumatic, normocephalic.                 Mallampatti Grade                 No nasal congestion.                No tonsillar or pharyngeal exudates.  Lymph Nodes: No palpable lymphadenopathy neck, supraclavicular region  Neck: No JVD. No carotid bruit, no thyromegally  Respiratory: Normal chest expansion                         Normal percussion                         Normal and equal air entry                         No wheeze, rhonchi or rales.  Cardiovascular: S1 S2 normal. No murmurs, rubs or gallops.   Abdomen: Soft, non-tender, non-distended. No organomegaly.  Extremities: Warm to touch. Peripheral pulse palpable. No pedal edema.   Skin: No rashes or skin lesions, warm dry  Neurological: Motor and sensory examination equal and normal in all four extremities.  Psychiatry: Appropriate mood and affect.    HOSPITAL MEDICATIONS:  MEDICATIONS  (STANDING):  ALBUTerol    90 MICROgram(s) HFA Inhaler 2 Puff(s) Inhalation every 6 hours  aspirin enteric coated 81 milliGRAM(s) Oral daily  brimonidine 0.2% Ophthalmic Solution 1 Drop(s) Both EYES two times a day  dextrose 5%. 1000 milliLiter(s) (50 mL/Hr) IV Continuous <Continuous>  dextrose 50% Injectable 12.5 Gram(s) IV Push once  dextrose 50% Injectable 25 Gram(s) IV Push once  dextrose 50% Injectable 25 Gram(s) IV Push once  docusate sodium 100 milliGRAM(s) Oral two times a day  dorzolamide 2% Ophthalmic Solution 1 Drop(s) Both EYES three times a day  epoetin keagan Injectable 12991 Unit(s) IV Push <User Schedule>  furosemide    Tablet 80 milliGRAM(s) Oral two times a day  heparin  Injectable. 1000 Unit(s) Dialysis. once  heparin SubCutaneous Injection - Peds 5000 Unit(s) SubCutaneous every 12 hours  insulin glargine Injectable (LANTUS) 25 Unit(s) SubCutaneous at bedtime  insulin lispro (HumaLOG) corrective regimen sliding scale   SubCutaneous three times a day before meals  insulin lispro Injectable (HumaLOG) 5 Unit(s) SubCutaneous three times a day before meals  lactulose Syrup 10 Gram(s) Oral two times a day  latanoprost 0.005% Ophthalmic Solution 1 Drop(s) Both EYES at bedtime  metolazone 5 milliGRAM(s) Oral daily  metoprolol tartrate 25 milliGRAM(s) Oral two times a day  mineral oil/petrolatum Hydrophilic Ointment 1 Application(s) Topical daily  NIFEdipine XL 90 milliGRAM(s) Oral daily  pantoprazole    Tablet 40 milliGRAM(s) Oral before breakfast  sertraline 100 milliGRAM(s) Oral daily  sevelamer hydrochloride 800 milliGRAM(s) Oral three times a day  simvastatin 10 milliGRAM(s) Oral at bedtime  tamsulosin Oral Tab/Cap - Peds 0.4 milliGRAM(s) Oral at bedtime  timolol 0.5% Solution 1 Drop(s) Both EYES two times a day    MEDICATIONS  (PRN):  acetaminophen   Tablet .. 650 milliGRAM(s) Oral every 6 hours PRN Temp greater or equal to 38C (100.4F), Moderate Pain (4 - 6)  ALBUTerol/ipratropium for Nebulization 3 milliLiter(s) Nebulizer every 6 hours PRN Wheezing  dextrose 40% Gel 15 Gram(s) Oral once PRN Blood Glucose LESS THAN 70 milliGRAM(s)/deciliter  glucagon  Injectable 1 milliGRAM(s) IntraMuscular once PRN Glucose LESS THAN 70 milligrams/deciliter  oxyCODONE    5 mG/acetaminophen 325 mG 1 Tablet(s) Oral every 4 hours PRN for moderate pain  zolpidem 5 milliGRAM(s) Oral at bedtime PRN Insomnia      LABS:                        8.4    10.22 )-----------( 235      ( 18 Mar 2019 07:04 )             30.4     03-18    138  |  98  |  52<H>  ----------------------------<  125<H>  4.8   |  26  |  7.8<HH>    Ca    8.5      18 Mar 2019 07:04  Phos  5.1     03-18  Mg     2.4     03-18    TPro  6.5  /  Alb  3.8  /  TBili  0.3  /  DBili  x   /  AST  6   /  ALT  6   /  AlkPhos  125<H>  03-18                      RADIOLOGY:Radiology personally reviewed.

## 2019-03-18 NOTE — DIETITIAN INITIAL EVALUATION ADULT. - ADHERENCE
Pt reports he followed "the diet" When I asked him to specify he reports "you know the diet". Im assuming he is referring to the renal carb con but highly question is compliance.

## 2019-03-19 LAB
ALBUMIN FLD-MCNC: 1.9 G/DL — SIGNIFICANT CHANGE UP
GLUCOSE BLDC GLUCOMTR-MCNC: 166 MG/DL — HIGH (ref 70–99)
GLUCOSE BLDC GLUCOMTR-MCNC: 207 MG/DL — HIGH (ref 70–99)
GLUCOSE BLDC GLUCOMTR-MCNC: 232 MG/DL — HIGH (ref 70–99)
GLUCOSE BLDC GLUCOMTR-MCNC: 265 MG/DL — HIGH (ref 70–99)
GLUCOSE FLD-MCNC: 157 MG/DL — SIGNIFICANT CHANGE UP
GRAM STN FLD: SIGNIFICANT CHANGE UP
LDH SERPL L TO P-CCNC: 111 U/L — SIGNIFICANT CHANGE UP
NIGHT BLUE STAIN TISS: SIGNIFICANT CHANGE UP
PH FLD: 7.4 — SIGNIFICANT CHANGE UP
PROT FLD-MCNC: 3.2 G/DL — SIGNIFICANT CHANGE UP
SPECIMEN SOURCE: SIGNIFICANT CHANGE UP
SPECIMEN SOURCE: SIGNIFICANT CHANGE UP

## 2019-03-19 RX ORDER — OXYCODONE HYDROCHLORIDE 5 MG/1
5 TABLET ORAL AT BEDTIME
Qty: 0 | Refills: 0 | Status: DISCONTINUED | OUTPATIENT
Start: 2019-03-19 | End: 2019-03-20

## 2019-03-19 RX ORDER — ZOLPIDEM TARTRATE 10 MG/1
5 TABLET ORAL AT BEDTIME
Qty: 0 | Refills: 0 | Status: DISCONTINUED | OUTPATIENT
Start: 2019-03-19 | End: 2019-03-20

## 2019-03-19 RX ADMIN — Medication 25 MILLIGRAM(S): at 05:42

## 2019-03-19 RX ADMIN — BRIMONIDINE TARTRATE 1 DROP(S): 2 SOLUTION/ DROPS OPHTHALMIC at 05:40

## 2019-03-19 RX ADMIN — Medication 1 APPLICATION(S): at 12:54

## 2019-03-19 RX ADMIN — SEVELAMER CARBONATE 800 MILLIGRAM(S): 2400 POWDER, FOR SUSPENSION ORAL at 13:42

## 2019-03-19 RX ADMIN — Medication 100 MILLIGRAM(S): at 05:41

## 2019-03-19 RX ADMIN — ALBUTEROL 2 PUFF(S): 90 AEROSOL, METERED ORAL at 13:12

## 2019-03-19 RX ADMIN — Medication 80 MILLIGRAM(S): at 17:17

## 2019-03-19 RX ADMIN — PANTOPRAZOLE SODIUM 40 MILLIGRAM(S): 20 TABLET, DELAYED RELEASE ORAL at 05:43

## 2019-03-19 RX ADMIN — TAMSULOSIN HYDROCHLORIDE 0.4 MILLIGRAM(S): 0.4 CAPSULE ORAL at 21:23

## 2019-03-19 RX ADMIN — ALBUTEROL 2 PUFF(S): 90 AEROSOL, METERED ORAL at 07:36

## 2019-03-19 RX ADMIN — Medication 81 MILLIGRAM(S): at 12:54

## 2019-03-19 RX ADMIN — Medication 90 MILLIGRAM(S): at 05:42

## 2019-03-19 RX ADMIN — SERTRALINE 100 MILLIGRAM(S): 25 TABLET, FILM COATED ORAL at 12:54

## 2019-03-19 RX ADMIN — ZOLPIDEM TARTRATE 5 MILLIGRAM(S): 10 TABLET ORAL at 22:47

## 2019-03-19 RX ADMIN — Medication 80 MILLIGRAM(S): at 05:41

## 2019-03-19 RX ADMIN — BRIMONIDINE TARTRATE 1 DROP(S): 2 SOLUTION/ DROPS OPHTHALMIC at 17:17

## 2019-03-19 RX ADMIN — LACTULOSE 10 GRAM(S): 10 SOLUTION ORAL at 05:42

## 2019-03-19 RX ADMIN — ERYTHROPOIETIN 10000 UNIT(S): 10000 INJECTION, SOLUTION INTRAVENOUS; SUBCUTANEOUS at 09:28

## 2019-03-19 RX ADMIN — SEVELAMER CARBONATE 800 MILLIGRAM(S): 2400 POWDER, FOR SUSPENSION ORAL at 05:42

## 2019-03-19 RX ADMIN — Medication 25 MILLIGRAM(S): at 17:17

## 2019-03-19 RX ADMIN — Medication 5 UNIT(S): at 17:18

## 2019-03-19 RX ADMIN — Medication 1 DROP(S): at 05:43

## 2019-03-19 RX ADMIN — HEPARIN SODIUM 5000 UNIT(S): 5000 INJECTION INTRAVENOUS; SUBCUTANEOUS at 17:18

## 2019-03-19 RX ADMIN — Medication 100 MILLIGRAM(S): at 17:17

## 2019-03-19 RX ADMIN — INSULIN GLARGINE 25 UNIT(S): 100 INJECTION, SOLUTION SUBCUTANEOUS at 21:23

## 2019-03-19 RX ADMIN — OXYCODONE HYDROCHLORIDE 5 MILLIGRAM(S): 5 TABLET ORAL at 22:48

## 2019-03-19 RX ADMIN — HEPARIN SODIUM 5000 UNIT(S): 5000 INJECTION INTRAVENOUS; SUBCUTANEOUS at 05:42

## 2019-03-19 RX ADMIN — SEVELAMER CARBONATE 800 MILLIGRAM(S): 2400 POWDER, FOR SUSPENSION ORAL at 21:23

## 2019-03-19 RX ADMIN — Medication 4: at 17:19

## 2019-03-19 RX ADMIN — DORZOLAMIDE HYDROCHLORIDE 1 DROP(S): 20 SOLUTION/ DROPS OPHTHALMIC at 13:42

## 2019-03-19 RX ADMIN — Medication 2: at 13:41

## 2019-03-19 RX ADMIN — ALBUTEROL 2 PUFF(S): 90 AEROSOL, METERED ORAL at 21:08

## 2019-03-19 RX ADMIN — Medication 6: at 07:36

## 2019-03-19 RX ADMIN — Medication 5 UNIT(S): at 13:41

## 2019-03-19 RX ADMIN — Medication 5 UNIT(S): at 07:35

## 2019-03-19 RX ADMIN — SIMVASTATIN 10 MILLIGRAM(S): 20 TABLET, FILM COATED ORAL at 21:23

## 2019-03-19 RX ADMIN — DORZOLAMIDE HYDROCHLORIDE 1 DROP(S): 20 SOLUTION/ DROPS OPHTHALMIC at 05:41

## 2019-03-19 RX ADMIN — Medication 1 DROP(S): at 17:17

## 2019-03-19 RX ADMIN — LATANOPROST 1 DROP(S): 0.05 SOLUTION/ DROPS OPHTHALMIC; TOPICAL at 21:23

## 2019-03-19 RX ADMIN — DORZOLAMIDE HYDROCHLORIDE 1 DROP(S): 20 SOLUTION/ DROPS OPHTHALMIC at 21:23

## 2019-03-19 NOTE — PROGRESS NOTE ADULT - ASSESSMENT
1. SOB. Likely volume mediated. Resolved.  2. ESRD on HD TTS. HD today: 3 hours, opti 160 dialyzer, 2K bath, 5L UF.  3. Anemia. EPO with HD.  4. Hyperphosphatemia. Renal diet. Sevelamer with meals.  5. Hyperkalemia. Resolved. Renal diet.

## 2019-03-19 NOTE — PROGRESS NOTE ADULT - ASSESSMENT
54 y.o male patient with PMH of ESRD on HD, DM, HTN, bipolar disorder? presented to the ED for shortness of breath attributed to fluid overload. While on medicine floor, patient developed acute hypercapnic and hypoxemic respiratory failure; he was placed on AVAP and upgraded to CCU where he was stabilised and transferred to the floor on 3/16/19.    Assessment and Plan:    1. Acute failure with hypoxia: improving.  Due to Volume overload with Pleural Effusions with multiple HD sessions and Diuresis.  s/p Thoracentesis 3/18/19 with removal of 1.5L of serosanguinous fluid.    Follow up CT: Unchanged left loculated pleural effusion and smaller right pleural effusion with resolving consolidations.   Continue HD and Diuretics. Fluid & salt restriction.   On supplemental Oxygen. BiPAP prn.  Pulm following: Follow up with Dr. Banks for final recommendations in the AM.        2. ESRD:  Nephrology following: On TTS schedule. Last HD 3/19/2019.  Monitor renal function and electrolytes   Continue Sevelamer at current doses for now      3. Hypertension:  BP better controlled on Lasix Metolazone, Nifedipine & Metoprolol.  Add Lisinopril or switch to Coreg if remains uncontrolled.       4. Morbidly Obesity:  BMI > 41.  Dietary and lifestyle modification.      5. DM: Uncontrolled.  Monitor FS with Insulin coverage.   Hemoglobin A1C, Whole Blood: 8.6 % (06.01.18 @ 07:22)      5. Depression/Anxiety:  Continue Sertraline.      6. Chronic Normocytic Anemia:  Most likely secondary to kidney disease.  Hgb Stable. Monitor. Follow up Iron studies.  Continue EPO.   Goal hgb > 8 g/dl.      7. Elevated Troponin:   Stable. No chest pain  Most likely secondary to renal disease  Continue aspirin, beta-blockers and statin. Consider outpatient stress echo as per cardiology.  ECHO: Echo LV size and function.        8. Pulmonary Hypertension/Suspected HOMAR:  Sleep study as outpatient.  Out Patient follow up.            DVT prophylaxis: Heparin SQ  Disposition: LTR at [facility] if cleared by Pulm and Insurance Auth received.  Family discussion: None.

## 2019-03-19 NOTE — PROGRESS NOTE ADULT - SUBJECTIVE AND OBJECTIVE BOX
Patient is a 54y old  Male who presents with a chief complaint of dyspnea (18 Mar 2019 13:50)        Interval Events: No overnight events. S/P thoracentesis    REVIEW OF SYSTEMS:  Constitutional: No fevers or chills. No weight loss. No fatigue or generalized malaise.  Eyes: No itching or discharge from the eyes  ENT: No ear pain. No ear discharge. No nasal congestion. No post nasal drip. No epistaxis. No throat pain. No sore throat. No difficulty swallowing.   CV: No chest pain. No palpitations. No lightheadedness or dizziness.   Resp: No dyspnea at rest. No dyspnea on exertion. No orthopnea. No wheezing. No cough. No stridor. No sputum production. No chest pain with respiration.  GI: No nausea. No vomiting. No diarrhea.  MSK: No joint pain or pain in any extremities  Integumentary: No skin lesions. No pedal edema.  Neurological: No gross motor weakness. No sensory changes.      OBJECTIVE:  ICU Vital Signs Last 24 Hrs  T(C): 36.1 (19 Mar 2019 06:13), Max: 36.3 (18 Mar 2019 22:27)  T(F): 97 (19 Mar 2019 06:13), Max: 97.3 (18 Mar 2019 22:27)  HR: 75 (19 Mar 2019 08:25) (67 - 80)  BP: 164/76 (19 Mar 2019 08:25) (146/64 - 177/93)    RR: 18 (19 Mar 2019 08:25) (16 - 18)          03-18 @ 07:01  -  03-19 @ 07:00  --------------------------------------------------------  IN: 980 mL / OUT: 1500 mL / NET: -520 mL      CAPILLARY BLOOD GLUCOSE      POCT Blood Glucose.: 265 mg/dL (19 Mar 2019 07:27)      PHYSICAL EXAM:  General: Awake, alert, oriented X 3.   HEENT: Atraumatic, normocephalic.                 Mallampatti Grade                 No nasal congestion.                No tonsillar or pharyngeal exudates.  Lymph Nodes: No palpable lymphadenopathy neck, supraclavicular region  Neck: No JVD. No carotid bruit, no thyromegally  Respiratory: Normal chest expansion                         Normal percussion                         Normal and equal air entry                       ++ rales L base  Cardiovascular: S1 S2 normal. No murmurs, rubs or gallops.   Abdomen: Soft, non-tender, non-distended. No organomegaly.  Extremities: Warm to touch. Peripheral pulse palpable. No pedal edema.   Skin: No rashes or skin lesions, warm dry  Neurological: Motor and sensory examination equal and normal in all four extremities.  Psychiatry: Appropriate mood and affect.    HOSPITAL MEDICATIONS:  MEDICATIONS  (STANDING):  ALBUTerol    90 MICROgram(s) HFA Inhaler 2 Puff(s) Inhalation every 6 hours  aspirin enteric coated 81 milliGRAM(s) Oral daily  brimonidine 0.2% Ophthalmic Solution 1 Drop(s) Both EYES two times a day  dextrose 5%. 1000 milliLiter(s) (50 mL/Hr) IV Continuous <Continuous>  dextrose 50% Injectable 12.5 Gram(s) IV Push once  dextrose 50% Injectable 25 Gram(s) IV Push once  dextrose 50% Injectable 25 Gram(s) IV Push once  docusate sodium 100 milliGRAM(s) Oral two times a day  dorzolamide 2% Ophthalmic Solution 1 Drop(s) Both EYES three times a day  epoetin keagan Injectable 08726 Unit(s) IV Push <User Schedule>  furosemide    Tablet 80 milliGRAM(s) Oral two times a day  heparin  Injectable. 1000 Unit(s) Dialysis. once  heparin SubCutaneous Injection - Peds 5000 Unit(s) SubCutaneous every 12 hours  insulin glargine Injectable (LANTUS) 25 Unit(s) SubCutaneous at bedtime  insulin lispro (HumaLOG) corrective regimen sliding scale   SubCutaneous three times a day before meals  insulin lispro Injectable (HumaLOG) 5 Unit(s) SubCutaneous three times a day before meals  lactulose Syrup 10 Gram(s) Oral two times a day  latanoprost 0.005% Ophthalmic Solution 1 Drop(s) Both EYES at bedtime  metolazone 5 milliGRAM(s) Oral daily  metoprolol tartrate 25 milliGRAM(s) Oral two times a day  mineral oil/petrolatum Hydrophilic Ointment 1 Application(s) Topical daily  NIFEdipine XL 90 milliGRAM(s) Oral daily  pantoprazole    Tablet 40 milliGRAM(s) Oral before breakfast  sertraline 100 milliGRAM(s) Oral daily  sevelamer hydrochloride 800 milliGRAM(s) Oral three times a day  simvastatin 10 milliGRAM(s) Oral at bedtime  tamsulosin Oral Tab/Cap - Peds 0.4 milliGRAM(s) Oral at bedtime  timolol 0.5% Solution 1 Drop(s) Both EYES two times a day    MEDICATIONS  (PRN):  acetaminophen   Tablet .. 650 milliGRAM(s) Oral every 6 hours PRN Temp greater or equal to 38C (100.4F), Moderate Pain (4 - 6)  ALBUTerol/ipratropium for Nebulization 3 milliLiter(s) Nebulizer every 6 hours PRN Wheezing  dextrose 40% Gel 15 Gram(s) Oral once PRN Blood Glucose LESS THAN 70 milliGRAM(s)/deciliter  glucagon  Injectable 1 milliGRAM(s) IntraMuscular once PRN Glucose LESS THAN 70 milligrams/deciliter      LABS:                        8.4    10.22 )-----------( 235      ( 18 Mar 2019 07:04 )             30.4     03-18    138  |  98  |  52<H>  ----------------------------<  125<H>  4.8   |  26  |  7.8<HH>    Ca    8.5      18 Mar 2019 07:04  Phos  5.1     03-18  Mg     2.4     03-18    TPro  6.5  /  Alb  3.8  /  TBili  0.3  /  DBili  x   /  AST  6   /  ALT  6   /  AlkPhos  125<H>  03-18                      RADIOLOGY: Radiology personally reviewed. No Ptx., LLL mass like abnormalty behind the effusion

## 2019-03-19 NOTE — PROGRESS NOTE ADULT - SUBJECTIVE AND OBJECTIVE BOX
IVANANTONYFLORIDA WEN  54y  Male    Patient is a 54y old  Male who presents with a chief complaint of Shortness of breath (15 Mar 2019 11:01)      INTERVAL HPI/OVERNIGHT EVENTS:  No interval events.   Patient has no new complaints. Dyspnea resolved.  s/p thoracentesis with removal of 1.5 L.      REVIEW OF SYSTEMS:  CONSTITUTIONAL: No fever, weight loss, or fatigue  EYES: No eye pain, visual disturbances, or discharge  ENMT:  No difficulty hearing, tinnitus, vertigo; No sinus or throat pain  NECK: No pain or stiffness  BREASTS: No pain, masses, or nipple discharge  RESPIRATORY: No cough, wheezing, chills or hemoptysis; No shortness of breath  CARDIOVASCULAR: No chest pain, palpitations, dizziness, + leg swelling  GASTROINTESTINAL: No abdominal or epigastric pain. No nausea, vomiting, or hematemesis; No diarrhea or constipation. No melena or hematochezia.  GENITOURINARY: No dysuria, frequency, hematuria, or incontinence  NEUROLOGICAL: No headaches, memory loss, loss of strength, numbness, or tremors  SKIN: + left LE erythema, chronic.  LYMPH NODES: No enlarged glands  ENDOCRINE: No heat or cold intolerance; No hair loss  MUSCULOSKELETAL: No joint pain or swelling; No muscle, back, or extremity pain, RT BKA  PSYCHIATRIC: No depression, anxiety, mood swings, or difficulty sleeping  HEME/LYMPH: No easy bruising, or bleeding gums  ALLERGY AND IMMUNOLOGIC: No hives or eczema      VITALS:  T(C): 36.7 (19 Mar 2019 14:19), Max: 36.7 (19 Mar 2019 14:19)  T(F): 98.1 (19 Mar 2019 14:19), Max: 98.1 (19 Mar 2019 14:19)  HR: 70 (19 Mar 2019 14:19) (70 - 80)  BP: 142/65 (19 Mar 2019 14:19) (142/65 - 177/93)  BP(mean): --  RR: 16 (19 Mar 2019 14:19) (16 - 20)  SpO2: 95% (19 Mar 2019 12:40) (95% - 95%)      CAPILLARY BLOOD GLUCOSE  POCT Blood Glucose.: 232 mg/dL (19 Mar 2019 17:17)  POCT Blood Glucose.: 166 mg/dL (19 Mar 2019 12:13)  POCT Blood Glucose.: 265 mg/dL (19 Mar 2019 07:27)  POCT Blood Glucose.: 245 mg/dL (18 Mar 2019 21:01)        PHYSICAL EXAM:  GENERAL: NAD, well-developed  HEAD:  Atraumatic, Normocephalic  EYES: conjunctiva and sclera clear, rt corneal opacity.  ENMT: Moist mucous membranes  NECK: Supple, Normal thyroid  NERVOUS SYSTEM:  Alert & Oriented x 3, Good concentration; Motor Strength 5/5 B/L upper and lower extremities  CHEST/LUNG: Decreased AE at the bases bilaterally; + few rales, No rhonchi, wheezing, or rubs  HEART: Regular rate and rhythm; No murmurs, rubs, or gallops  ABDOMEN: Soft, Nontender, obese; Bowel sounds present  EXTREMITIES:  2+ Peripheral Pulses, No clubbing, cyanosis, + Left LE edema. Right BKA. left UE AV fistula +Thrill  LYMPH: No lymphadenopathy noted  SKIN: Chronic stasis dermatitis of the left leg.    Consultant(s) Notes Reviewed:  [x ] YES  [ ] NO  Care Discussed with Consultants/Other Providers [ x] YES  [ ] NO    LABS:                                   8.4    10.22 )-----------( 235      ( 18 Mar 2019 07:04 )             30.4     03-18    138  |  98  |  52<H>  ----------------------------<  125<H>  4.8   |  26  |  7.8<HH>    Ca    8.5      18 Mar 2019 07:04  Phos  5.1     03-18  Mg     2.4     03-18    TPro  6.5  /  Alb  3.8  /  TBili  0.3  /  DBili  x   /  AST  6   /  ALT  6   /  AlkPhos  125<H>  03-18               MICROBIOLOGY: None      RADIOLOGY & ADDITIONAL TESTS:  CT Chest No Cont (03.19.19 @ 15:59)   Breathing artifact limits evaluation.    Since March 14, 2019:    Unchanged left loculated pleural effusion and smaller right pleural   effusion.    Decreased multifocal airspace consolidations and atelectasis, now mildly   involving the left lower lobe and lingula. This may represent improving   pneumonia.    Dilated main pulmonary artery which represent pulmonary hypertension.      X-ray Chest 1 View- PORTABLE-Routine (03.19.19 @ 14:45)   Left opacity/left pleural effusion, decreased. No pneumothorax. Right   opacity, stable.         MEDICATIONS  (STANDING):  ALBUTerol    90 MICROgram(s) HFA Inhaler 2 Puff(s) Inhalation every 6 hours  aspirin enteric coated 81 milliGRAM(s) Oral daily  brimonidine 0.2% Ophthalmic Solution 1 Drop(s) Both EYES two times a day  dextrose 5%. 1000 milliLiter(s) (50 mL/Hr) IV Continuous <Continuous>  dextrose 50% Injectable 12.5 Gram(s) IV Push once  dextrose 50% Injectable 25 Gram(s) IV Push once  dextrose 50% Injectable 25 Gram(s) IV Push once  docusate sodium 100 milliGRAM(s) Oral two times a day  dorzolamide 2% Ophthalmic Solution 1 Drop(s) Both EYES three times a day  epoetin keagan Injectable 61600 Unit(s) IV Push <User Schedule>  furosemide    Tablet 80 milliGRAM(s) Oral two times a day  heparin  Injectable. 1000 Unit(s) Dialysis. once  heparin SubCutaneous Injection - Peds 5000 Unit(s) SubCutaneous every 12 hours  insulin glargine Injectable (LANTUS) 25 Unit(s) SubCutaneous at bedtime  insulin lispro (HumaLOG) corrective regimen sliding scale   SubCutaneous three times a day before meals  insulin lispro Injectable (HumaLOG) 5 Unit(s) SubCutaneous three times a day before meals  lactulose Syrup 10 Gram(s) Oral two times a day  latanoprost 0.005% Ophthalmic Solution 1 Drop(s) Both EYES at bedtime  metolazone 5 milliGRAM(s) Oral daily  metoprolol tartrate 25 milliGRAM(s) Oral two times a day  mineral oil/petrolatum Hydrophilic Ointment 1 Application(s) Topical daily  NIFEdipine XL 90 milliGRAM(s) Oral daily  pantoprazole    Tablet 40 milliGRAM(s) Oral before breakfast  sertraline 100 milliGRAM(s) Oral daily  sevelamer hydrochloride 800 milliGRAM(s) Oral three times a day  simvastatin 10 milliGRAM(s) Oral at bedtime  tamsulosin Oral Tab/Cap - Peds 0.4 milliGRAM(s) Oral at bedtime  timolol 0.5% Solution 1 Drop(s) Both EYES two times a day    MEDICATIONS  (PRN):  acetaminophen   Tablet .. 650 milliGRAM(s) Oral every 6 hours PRN Temp greater or equal to 38C (100.4F), Moderate Pain (4 - 6)  ALBUTerol/ipratropium for Nebulization 3 milliLiter(s) Nebulizer every 6 hours PRN Wheezing  dextrose 40% Gel 15 Gram(s) Oral once PRN Blood Glucose LESS THAN 70 milliGRAM(s)/deciliter  glucagon  Injectable 1 milliGRAM(s) IntraMuscular once PRN Glucose LESS THAN 70 milligrams/deciliter        Imaging Personally Reviewed:  [x] YES  [ ] NO      HEALTH ISSUES - PROBLEM Dx:  Bipolar affective disorder, remission status unspecified  Anxiety disorder, unspecified type  Anemia, unspecified type  HTN (hypertension)  End stage renal disease  Diabetes  Dialysis patient  Elevated troponin  Pleural effusion

## 2019-03-19 NOTE — PROGRESS NOTE ADULT - ASSESSMENT
IMPRESSION:  ARF hypercapnia and hypoxic   fluid overload    loculated pleural effusion  possible mass LLL  ESRD           CNS: no sedation     HEENT: oral care     PULMONARY:    check CT chest NC  await cytology results    CARDIOVASCULAR:   check BNP and CE   echo     GI: GI prophylaxis.  Feeding     RENAL: stat HD renal was informed     INFECTIOUS DISEASE:doubt PNA     HEMATOLOGICAL:  DVT prophylaxis. heparin Sq     ENDOCRINE:  Follow up FS.  Insulin protocol if needed.    MUSCULOSKELETAL:    case discussed with hospitalist this AM

## 2019-03-19 NOTE — PROGRESS NOTE ADULT - SUBJECTIVE AND OBJECTIVE BOX
Patient was examined during HD treatment.    Hemodialysis Prescription:  	Access: AVF  	Dialyzer: Opti 160  	Blood Flow (mL/Min): 400  	Dialysate Flow (mL/Min): 500  	Target UF (Liters): 5  	Treatment Time: 3 hours  	Potassium: 2K  	Calcium: 2.5

## 2019-03-19 NOTE — PROGRESS NOTE ADULT - REASON FOR ADMISSION
Shortness of breath
Shortness of breath / fluid overload
Acute hypoxic Respiratory failure due to Pulmonary edema/Pleural effusion.
CHF
SOB
SOB
Shortness of breath / fluid overload
dysonea
dyspnea

## 2019-03-20 ENCOUNTER — TRANSCRIPTION ENCOUNTER (OUTPATIENT)
Age: 54
End: 2019-03-20

## 2019-03-20 VITALS
DIASTOLIC BLOOD PRESSURE: 67 MMHG | TEMPERATURE: 98 F | SYSTOLIC BLOOD PRESSURE: 149 MMHG | HEART RATE: 78 BPM | RESPIRATION RATE: 16 BRPM

## 2019-03-20 LAB
GLUCOSE BLDC GLUCOMTR-MCNC: 231 MG/DL — HIGH (ref 70–99)
GLUCOSE BLDC GLUCOMTR-MCNC: 258 MG/DL — HIGH (ref 70–99)

## 2019-03-20 RX ORDER — LACTULOSE 10 G/15ML
30 SOLUTION ORAL
Qty: 0 | Refills: 0 | DISCHARGE
Start: 2019-03-20

## 2019-03-20 RX ORDER — LACTULOSE 10 G/15ML
0 SOLUTION ORAL
Qty: 0 | Refills: 0 | COMMUNITY

## 2019-03-20 RX ORDER — KETOROLAC TROMETHAMINE 0.5 %
1 DROPS OPHTHALMIC (EYE)
Qty: 0 | Refills: 0 | COMMUNITY

## 2019-03-20 RX ORDER — DOCUSATE SODIUM 100 MG
1 CAPSULE ORAL
Qty: 0 | Refills: 0 | COMMUNITY

## 2019-03-20 RX ORDER — DORZOLAMIDE HYDROCHLORIDE 20 MG/ML
1 SOLUTION/ DROPS OPHTHALMIC
Qty: 0 | Refills: 0 | DISCHARGE
Start: 2019-03-20

## 2019-03-20 RX ORDER — LACTULOSE 10 G/15ML
15 SOLUTION ORAL
Qty: 0 | Refills: 0 | DISCHARGE
Start: 2019-03-20

## 2019-03-20 RX ORDER — SEVELAMER CARBONATE 2400 MG/1
3 POWDER, FOR SUSPENSION ORAL
Qty: 0 | Refills: 0 | DISCHARGE
Start: 2019-03-20

## 2019-03-20 RX ORDER — BRIMONIDINE TARTRATE 2 MG/MG
0 SOLUTION/ DROPS OPHTHALMIC
Qty: 0 | Refills: 0 | COMMUNITY

## 2019-03-20 RX ORDER — SEVELAMER CARBONATE 2400 MG/1
1 POWDER, FOR SUSPENSION ORAL
Qty: 0 | Refills: 0 | DISCHARGE
Start: 2019-03-20

## 2019-03-20 RX ORDER — FUROSEMIDE 40 MG
1 TABLET ORAL
Qty: 0 | Refills: 0 | COMMUNITY

## 2019-03-20 RX ORDER — DORZOLAMIDE HYDROCHLORIDE 20 MG/ML
0 SOLUTION/ DROPS OPHTHALMIC
Qty: 0 | Refills: 0 | COMMUNITY

## 2019-03-20 RX ORDER — FUROSEMIDE 40 MG
1 TABLET ORAL
Qty: 0 | Refills: 0 | DISCHARGE
Start: 2019-03-20

## 2019-03-20 RX ORDER — TIMOLOL 0.5 %
1 DROPS OPHTHALMIC (EYE)
Qty: 0 | Refills: 0 | COMMUNITY

## 2019-03-20 RX ORDER — ASCORBIC ACID 60 MG
1 TABLET,CHEWABLE ORAL
Qty: 0 | Refills: 0 | COMMUNITY

## 2019-03-20 RX ORDER — BRIMONIDINE TARTRATE 2 MG/MG
1 SOLUTION/ DROPS OPHTHALMIC
Qty: 0 | Refills: 0 | DISCHARGE
Start: 2019-03-20

## 2019-03-20 RX ORDER — AMLODIPINE BESYLATE 2.5 MG/1
1 TABLET ORAL
Qty: 0 | Refills: 0 | COMMUNITY

## 2019-03-20 RX ORDER — NIFEDIPINE 30 MG
1 TABLET, EXTENDED RELEASE 24 HR ORAL
Qty: 0 | Refills: 0 | DISCHARGE
Start: 2019-03-20

## 2019-03-20 RX ORDER — LATANOPROST 0.05 MG/ML
1 SOLUTION/ DROPS OPHTHALMIC; TOPICAL
Qty: 0 | Refills: 0 | DISCHARGE
Start: 2019-03-20

## 2019-03-20 RX ORDER — LANOLIN/MINERAL OIL
1 LOTION (ML) TOPICAL
Qty: 0 | Refills: 0 | DISCHARGE
Start: 2019-03-20

## 2019-03-20 RX ORDER — SEVELAMER CARBONATE 2400 MG/1
1 POWDER, FOR SUSPENSION ORAL
Qty: 0 | Refills: 0 | COMMUNITY

## 2019-03-20 RX ORDER — TIMOLOL 0.5 %
1 DROPS OPHTHALMIC (EYE)
Qty: 0 | Refills: 0 | DISCHARGE
Start: 2019-03-20

## 2019-03-20 RX ORDER — LANOLIN/MINERAL OIL
1 LOTION (ML) TOPICAL
Qty: 0 | Refills: 0 | COMMUNITY

## 2019-03-20 RX ORDER — LATANOPROST 0.05 MG/ML
1 SOLUTION/ DROPS OPHTHALMIC; TOPICAL
Qty: 0 | Refills: 0 | COMMUNITY

## 2019-03-20 RX ORDER — DOCUSATE SODIUM 100 MG
1 CAPSULE ORAL
Qty: 0 | Refills: 0 | DISCHARGE
Start: 2019-03-20

## 2019-03-20 RX ADMIN — DORZOLAMIDE HYDROCHLORIDE 1 DROP(S): 20 SOLUTION/ DROPS OPHTHALMIC at 05:21

## 2019-03-20 RX ADMIN — LACTULOSE 10 GRAM(S): 10 SOLUTION ORAL at 05:22

## 2019-03-20 RX ADMIN — Medication 90 MILLIGRAM(S): at 05:22

## 2019-03-20 RX ADMIN — Medication 4: at 12:24

## 2019-03-20 RX ADMIN — Medication 25 MILLIGRAM(S): at 05:23

## 2019-03-20 RX ADMIN — HEPARIN SODIUM 5000 UNIT(S): 5000 INJECTION INTRAVENOUS; SUBCUTANEOUS at 05:21

## 2019-03-20 RX ADMIN — BRIMONIDINE TARTRATE 1 DROP(S): 2 SOLUTION/ DROPS OPHTHALMIC at 05:21

## 2019-03-20 RX ADMIN — Medication 81 MILLIGRAM(S): at 11:34

## 2019-03-20 RX ADMIN — ALBUTEROL 2 PUFF(S): 90 AEROSOL, METERED ORAL at 13:23

## 2019-03-20 RX ADMIN — Medication 5 UNIT(S): at 12:24

## 2019-03-20 RX ADMIN — Medication 100 MILLIGRAM(S): at 05:22

## 2019-03-20 RX ADMIN — Medication 1 APPLICATION(S): at 11:35

## 2019-03-20 RX ADMIN — ALBUTEROL 2 PUFF(S): 90 AEROSOL, METERED ORAL at 08:04

## 2019-03-20 RX ADMIN — Medication 80 MILLIGRAM(S): at 05:22

## 2019-03-20 RX ADMIN — SERTRALINE 100 MILLIGRAM(S): 25 TABLET, FILM COATED ORAL at 11:34

## 2019-03-20 RX ADMIN — SEVELAMER CARBONATE 800 MILLIGRAM(S): 2400 POWDER, FOR SUSPENSION ORAL at 05:23

## 2019-03-20 RX ADMIN — Medication 6: at 08:32

## 2019-03-20 RX ADMIN — SEVELAMER CARBONATE 800 MILLIGRAM(S): 2400 POWDER, FOR SUSPENSION ORAL at 13:23

## 2019-03-20 RX ADMIN — Medication 1 DROP(S): at 05:21

## 2019-03-20 RX ADMIN — PANTOPRAZOLE SODIUM 40 MILLIGRAM(S): 20 TABLET, DELAYED RELEASE ORAL at 05:22

## 2019-03-20 RX ADMIN — Medication 5 UNIT(S): at 08:31

## 2019-03-20 RX ADMIN — DORZOLAMIDE HYDROCHLORIDE 1 DROP(S): 20 SOLUTION/ DROPS OPHTHALMIC at 13:23

## 2019-03-20 NOTE — DISCHARGE NOTE PROVIDER - NSDCCPCAREPLAN_GEN_ALL_CORE_FT
PRINCIPAL DISCHARGE DIAGNOSIS  Diagnosis: Pleural effusion  Assessment and Plan of Treatment: Please follow up with Dr. Mcgraw      SECONDARY DISCHARGE DIAGNOSES  Diagnosis: Elevated troponin  Assessment and Plan of Treatment:

## 2019-03-20 NOTE — PROGRESS NOTE ADULT - PROVIDER SPECIALTY LIST ADULT
Cardiology
Cardiology
Hospitalist
Internal Medicine
Nephrology
Pulmonology
Cardiology
Hospitalist
Nephrology
Hospitalist

## 2019-03-20 NOTE — PROGRESS NOTE ADULT - SUBJECTIVE AND OBJECTIVE BOX
Patient is a 54y old  Male who presents with a chief complaint of Acute hypoxic Respiratory failure due to Pulmonary edema/Pleural effusion. (19 Mar 2019 18:38)      SUBJECTIVE / OVERNIGHT EVENTS:  no cp, sob, abd pain, fever    MEDICATIONS  (STANDING):  ALBUTerol    90 MICROgram(s) HFA Inhaler 2 Puff(s) Inhalation every 6 hours  aspirin enteric coated 81 milliGRAM(s) Oral daily  brimonidine 0.2% Ophthalmic Solution 1 Drop(s) Both EYES two times a day  dextrose 5%. 1000 milliLiter(s) (50 mL/Hr) IV Continuous <Continuous>  dextrose 50% Injectable 12.5 Gram(s) IV Push once  dextrose 50% Injectable 25 Gram(s) IV Push once  dextrose 50% Injectable 25 Gram(s) IV Push once  docusate sodium 100 milliGRAM(s) Oral two times a day  dorzolamide 2% Ophthalmic Solution 1 Drop(s) Both EYES three times a day  epoetin keagan Injectable 65227 Unit(s) IV Push <User Schedule>  furosemide    Tablet 80 milliGRAM(s) Oral two times a day  heparin  Injectable. 1000 Unit(s) Dialysis. once  heparin SubCutaneous Injection - Peds 5000 Unit(s) SubCutaneous every 12 hours  insulin glargine Injectable (LANTUS) 25 Unit(s) SubCutaneous at bedtime  insulin lispro (HumaLOG) corrective regimen sliding scale   SubCutaneous three times a day before meals  insulin lispro Injectable (HumaLOG) 5 Unit(s) SubCutaneous three times a day before meals  lactulose Syrup 10 Gram(s) Oral two times a day  latanoprost 0.005% Ophthalmic Solution 1 Drop(s) Both EYES at bedtime  metolazone 5 milliGRAM(s) Oral daily  metoprolol tartrate 25 milliGRAM(s) Oral two times a day  mineral oil/petrolatum Hydrophilic Ointment 1 Application(s) Topical daily  NIFEdipine XL 90 milliGRAM(s) Oral daily  oxyCODONE    IR 5 milliGRAM(s) Oral at bedtime  pantoprazole    Tablet 40 milliGRAM(s) Oral before breakfast  sertraline 100 milliGRAM(s) Oral daily  sevelamer hydrochloride 800 milliGRAM(s) Oral three times a day  simvastatin 10 milliGRAM(s) Oral at bedtime  tamsulosin Oral Tab/Cap - Peds 0.4 milliGRAM(s) Oral at bedtime  timolol 0.5% Solution 1 Drop(s) Both EYES two times a day    MEDICATIONS  (PRN):  acetaminophen   Tablet .. 650 milliGRAM(s) Oral every 6 hours PRN Temp greater or equal to 38C (100.4F), Moderate Pain (4 - 6)  ALBUTerol/ipratropium for Nebulization 3 milliLiter(s) Nebulizer every 6 hours PRN Wheezing  dextrose 40% Gel 15 Gram(s) Oral once PRN Blood Glucose LESS THAN 70 milliGRAM(s)/deciliter  glucagon  Injectable 1 milliGRAM(s) IntraMuscular once PRN Glucose LESS THAN 70 milligrams/deciliter  zolpidem 5 milliGRAM(s) Oral at bedtime PRN Insomnia        CAPILLARY BLOOD GLUCOSE      POCT Blood Glucose.: 231 mg/dL (20 Mar 2019 11:40)  POCT Blood Glucose.: 258 mg/dL (20 Mar 2019 07:33)  POCT Blood Glucose.: 207 mg/dL (19 Mar 2019 20:42)  POCT Blood Glucose.: 232 mg/dL (19 Mar 2019 17:17)  POCT Blood Glucose.: 166 mg/dL (19 Mar 2019 12:13)    I&O's Summary    19 Mar 2019 07:01  -  20 Mar 2019 07:00  --------------------------------------------------------  IN: 0 mL / OUT: 5300 mL / NET: -5300 mL        PHYSICAL EXAM:  GENERAL: nad  EYES:  NECK:   CHEST/LUNG: decreased bs at bases  HEART: rrr no m/g/r  ABDOMEN: soft nt nd  EXTREMITIES:    PSYCH:   NEUROLOGY:   SKIN:    LABS:                    RADIOLOGY & ADDITIONAL TESTS:    Imaging Personally Reviewed:  Yes    Consultant(s) Notes Reviewed:      Care Discussed with Consultants/Other Providers:    Assessment and Plan   PAST MEDICAL & SURGICAL HISTORY:  Dialysis patient  Bilateral ocular hypertension  Insomnia, unspecified type  Bipolar affective disorder, remission status unspecified  Obesity, unspecified classification, unspecified obesity type, unspecified whether serious comorbidity present  Anxiety disorder, unspecified type  Pain disorder  Anemia, unspecified type  Blind  HTN (hypertension)  End stage renal disease  Diabetes  Status post glaucoma surgery: RIGHT EYE  S/P BKA (below knee amputation) unilateral: RIGHT  A-V fistula: LEFT ARM

## 2019-03-20 NOTE — PROGRESS NOTE ADULT - SUBJECTIVE AND OBJECTIVE BOX
[place] [facility] FOLLOW UP NOTE  --------------------------------------------------------------------------------  24 hour events/subjective: Patient examined. Appears comfortable.    PAST HISTORY  --------------------------------------------------------------------------------  No significant changes to PMH, PSH, FHx, SHx, unless otherwise noted    ALLERGIES & MEDICATIONS  --------------------------------------------------------------------------------  Allergies    No Known Allergies    Standing Inpatient Medications  ALBUTerol    90 MICROgram(s) HFA Inhaler 2 Puff(s) Inhalation every 6 hours  aspirin enteric coated 81 milliGRAM(s) Oral daily  brimonidine 0.2% Ophthalmic Solution 1 Drop(s) Both EYES two times a day  dextrose 5%. 1000 milliLiter(s) IV Continuous <Continuous>  dextrose 50% Injectable 12.5 Gram(s) IV Push once  dextrose 50% Injectable 25 Gram(s) IV Push once  dextrose 50% Injectable 25 Gram(s) IV Push once  docusate sodium 100 milliGRAM(s) Oral two times a day  dorzolamide 2% Ophthalmic Solution 1 Drop(s) Both EYES three times a day  epoetin keagan Injectable 48177 Unit(s) IV Push <User Schedule>  furosemide    Tablet 80 milliGRAM(s) Oral two times a day  heparin  Injectable. 1000 Unit(s) Dialysis. once  heparin SubCutaneous Injection - Peds 5000 Unit(s) SubCutaneous every 12 hours  insulin glargine Injectable (LANTUS) 25 Unit(s) SubCutaneous at bedtime  insulin lispro (HumaLOG) corrective regimen sliding scale   SubCutaneous three times a day before meals  insulin lispro Injectable (HumaLOG) 5 Unit(s) SubCutaneous three times a day before meals  lactulose Syrup 10 Gram(s) Oral two times a day  latanoprost 0.005% Ophthalmic Solution 1 Drop(s) Both EYES at bedtime  metolazone 5 milliGRAM(s) Oral daily  metoprolol tartrate 25 milliGRAM(s) Oral two times a day  mineral oil/petrolatum Hydrophilic Ointment 1 Application(s) Topical daily  NIFEdipine XL 90 milliGRAM(s) Oral daily  oxyCODONE    IR 5 milliGRAM(s) Oral at bedtime  pantoprazole    Tablet 40 milliGRAM(s) Oral before breakfast  sertraline 100 milliGRAM(s) Oral daily  sevelamer hydrochloride 800 milliGRAM(s) Oral three times a day  simvastatin 10 milliGRAM(s) Oral at bedtime  tamsulosin Oral Tab/Cap - Peds 0.4 milliGRAM(s) Oral at bedtime  timolol 0.5% Solution 1 Drop(s) Both EYES two times a day    PRN Inpatient Medications  acetaminophen   Tablet .. 650 milliGRAM(s) Oral every 6 hours PRN  ALBUTerol/ipratropium for Nebulization 3 milliLiter(s) Nebulizer every 6 hours PRN  dextrose 40% Gel 15 Gram(s) Oral once PRN  glucagon  Injectable 1 milliGRAM(s) IntraMuscular once PRN  zolpidem 5 milliGRAM(s) Oral at bedtime PRN      VITALS/PHYSICAL EXAM  --------------------------------------------------------------------------------  T(C): 36.7 (03-20-19 @ 06:00), Max: 36.7 (03-19-19 @ 14:19)  HR: 78 (03-20-19 @ 06:00) (70 - 78)  BP: 171/73 (03-20-19 @ 06:00) (142/65 - 171/73)  RR: 16 (03-20-19 @ 06:00) (16 - 20)  SpO2: 95% (03-19-19 @ 12:40) (95% - 95%)  Wt(kg): --        03-19-19 @ 07:01  -  03-20-19 @ 07:00  --------------------------------------------------------  IN: 0 mL / OUT: 5300 mL / NET: -5300 mL      Physical Exam:  	Gen: NAD  	Pulm: CTA B/L  	CV: RRR, S1S2  	Abd: +BS, soft, nontender/nondistended  	: No suprapubic tenderness  	LE: Warm, no edema  	Vascular access: AVF    LABS/STUDIES  --------------------------------------------------------------------------------      Creatinine Trend:  SCr 7.8 [03-18 @ 07:04]  SCr 6.8 [03-17 @ 07:50]  SCr 6.3 [03-16 @ 08:35]  SCr 5.9 [03-15 @ 15:13]  SCr 5.0 [03-15 @ 06:01]        Iron 70, TIBC 170, %sat 41      [03-17-19 @ 07:50]  Ferritin 554      [03-17-19 @ 07:50]  HbA1c 8.6      [06-01-18 @ 07:22]

## 2019-03-20 NOTE — PROGRESS NOTE ADULT - ASSESSMENT
54 y.o male patient with PMH of ESRD on HD, DM, HTN, bipolar disorder? presented to the ED for shortness of breath attributed to fluid overload. While on medicine floor, patient developed acute hypercapnic and hypoxemic respiratory failure; he was placed on AVAP and upgraded to CCU where he was stabilised and transferred to the floor on 3/16/19.    Assessment and Plan:    1. Acute failure with hypoxia: improving.    s/p Thoracentesis 3/18/19 with removal of 1.5L of serosanguinous fluid.    Follow up CT: Unchanged left loculated pleural effusion and smaller right pleural effusion with resolving consolidations.   Continue HD and Diuretics. Fluid & salt restriction.   On supplemental Oxygen. BiPAP prn.  d/c today  f/u outpt pulm for cyto        2. ESRD:  Nephrology following: On TTS schedule. Last HD 3/19/2019.  Monitor renal function and electrolytes   Continue Sevelamer at current doses for now      3. Hypertension:  BP better controlled on Lasix Metolazone, Nifedipine & Metoprolol.  Add Lisinopril or switch to Coreg if remains uncontrolled.       4. Morbidly Obesity:  BMI > 41.  Dietary and lifestyle modification.      5. DM: Uncontrolled.  Monitor FS with Insulin coverage.   Hemoglobin A1C, Whole Blood: 8.6 % (06.01.18 @ 07:22)      5. Depression/Anxiety:  Continue Sertraline.      6. Chronic Normocytic Anemia:  Most likely secondary to kidney disease.  Hgb Stable. Monitor. Follow up Iron studies.  Continue EPO.   Goal hgb > 8 g/dl.      7. Elevated Troponin:   Stable. No chest pain  Most likely secondary to renal disease  Continue aspirin, beta-blockers and statin. Consider outpatient stress echo as per cardiology.  ECHO: Echo LV size and function.        8. Pulmonary Hypertension/Suspected HOMAR:  Sleep study as outpatient.  Out Patient follow up.            DVT prophylaxis: Heparin SQ  Disposition: LTR at [facility] if cleared by Pulm and Insurance Auth received.  Family discussion: None.

## 2019-03-20 NOTE — DISCHARGE NOTE NURSING/CASE MANAGEMENT/SOCIAL WORK - NSDCDPATPORTLINK_GEN_ALL_CORE
You can access the [facility] Patient Portal, offered by [facility], by registering with the following website: [email or web address]

## 2019-03-20 NOTE — PROGRESS NOTE ADULT - ASSESSMENT
1. SOB. Likely volume mediated. Resolved.  2. ESRD on HD TTS. HD tomorrow: 3 hours, opti 160 dialyzer, 2K bath, 4L UF.  3. Anemia. EPO with HD.  4. Hyperphosphatemia. Renal diet. Sevelamer with meals.  5. Hyperkalemia. Resolved. Renal diet.

## 2019-03-20 NOTE — PROGRESS NOTE ADULT - ATTENDING COMMENTS
Attending Statement: I have personally performed a face to face diagnostic evaluation on this patient. I have written the above note pertaining to the history, exam and plan of care.
Patient seen and evaluated independently medical resident note reviewed, I agree with plan and management, except as I have noted. Pt receiving HD in his room today. Anticipate for dc in am
Patient seen and evaluated independently medical resident note reviewed, I agree with plan and management, except as I have noted.
Patient seen and evaluated independently medical resident note reviewed, I agree with plan and management, except as I have noted.

## 2019-03-20 NOTE — DISCHARGE NOTE PROVIDER - CARE PROVIDER_API CALL
Blayne Banks (DO)  Critical Care Medicine; Pulmonary Disease; Sleep Medicine  [address] [place] [address], [place] [address]  Phone: [phone]  Fax: [phone]  Follow Up Time:

## 2019-03-20 NOTE — PROGRESS NOTE ADULT - ASSESSMENT
IMPRESSION:  ARF hypercapnia and hypoxic   fluid overload    loculated pleural effusion  possible mass LLL  ESRD           CNS: no sedation     HEENT: oral care     PULMONARY:     CT chest NC possible improving pneumonia? no definite mass, residual fluid  await cytology results  will need repeat CT 3-4 months    CARDIOVASCULAR:   check BNP and CE   echo     GI: GI prophylaxis.  Feeding     RENAL: stat HD renal was informed     INFECTIOUS DISEASE: doubt PNA     HEMATOLOGICAL:  DVT prophylaxis. heparin Sq     ENDOCRINE:  Follow up FS.  Insulin protocol if needed.    MUSCULOSKELETAL: IMPRESSION:  ARF hypercapnia and hypoxic   fluid overload    loculated pleural effusion  residual pneumonia v mass LLL  ESRD           CNS: no sedation     HEENT: oral care     PULMONARY:     CT chest NC possible improving pneumonia? no definite mass, residual fluid  await cytology results  will need repeat CT 3-4 months    CARDIOVASCULAR:   check BNP and CE   echo     GI: GI prophylaxis.  Feeding     RENAL: stat HD renal was informed     INFECTIOUS DISEASE: doubt PNA     HEMATOLOGICAL:  DVT prophylaxis. heparin Sq     ENDOCRINE:  Follow up FS.  Insulin protocol if needed.        OK TO D/C FROM PULM STANDPOINT  repeat CT 3-4 m

## 2019-03-20 NOTE — PROGRESS NOTE ADULT - SUBJECTIVE AND OBJECTIVE BOX
Patient is a 54y old  Male who presents with a chief complaint of Acute hypoxic Respiratory failure due to Pulmonary edema/Pleural effusion. (19 Mar 2019 18:38)        Interval Events: No overnight events.    REVIEW OF SYSTEMS:  Constitutional: No fevers or chills. No weight loss. No fatigue or generalized malaise.  Eyes: No itching or discharge from the eyes  ENT: No ear pain. No ear discharge. No nasal congestion. No post nasal drip. No epistaxis. No throat pain. No sore throat. No difficulty swallowing.   CV: No chest pain. No palpitations. No lightheadedness or dizziness.   Resp: No dyspnea at rest. No dyspnea on exertion. No orthopnea. No wheezing. No cough. No stridor. No sputum production. No chest pain with respiration.  GI: No nausea. No vomiting. No diarrhea.  MSK: No joint pain or pain in any extremities  Integumentary: No skin lesions. No pedal edema.  Neurological: No gross motor weakness. No sensory changes.      OBJECTIVE:  ICU Vital Signs Last 24 Hrs  T(C): 36.7 (20 Mar 2019 06:00), Max: 36.7 (19 Mar 2019 14:19)  T(F): 98 (20 Mar 2019 06:00), Max: 98.1 (19 Mar 2019 14:19)  HR: 78 (20 Mar 2019 06:00) (70 - 78)  BP: 171/73 (20 Mar 2019 06:00) (142/65 - 171/73)  RR: 16 (20 Mar 2019 06:00) (16 - 20)  SpO2: 95% (19 Mar 2019 12:40) (95% - 95%)        03-19 @ 07:01  -  03-20 @ 07:00  --------------------------------------------------------  IN: 0 mL / OUT: 5300 mL / NET: -5300 mL      CAPILLARY BLOOD GLUCOSE      POCT Blood Glucose.: 258 mg/dL (20 Mar 2019 07:33)      PHYSICAL EXAM:  General: Awake, alert, oriented X 3.   HEENT: Atraumatic, normocephalic.                 Mallampatti Grade                 No nasal congestion.                No tonsillar or pharyngeal exudates.  Lymph Nodes: No palpable lymphadenopathy neck, supraclavicular region  Neck: No JVD. No carotid bruit, no thyromegally  Respiratory: Normal chest expansion                         Normal percussion                         Normal and equal air entry                         No wheeze, rhonchi or rales.  Cardiovascular: S1 S2 normal. No murmurs, rubs or gallops.   Abdomen: Soft, non-tender, non-distended. No organomegaly.  Extremities: Warm to touch. Peripheral pulse palpable. No pedal edema.   Skin: No rashes or skin lesions, warm dry  Neurological: Motor and sensory examination equal and normal in all four extremities.  Psychiatry: Appropriate mood and affect.    HOSPITAL MEDICATIONS:  MEDICATIONS  (STANDING):  ALBUTerol    90 MICROgram(s) HFA Inhaler 2 Puff(s) Inhalation every 6 hours  aspirin enteric coated 81 milliGRAM(s) Oral daily  brimonidine 0.2% Ophthalmic Solution 1 Drop(s) Both EYES two times a day  dextrose 5%. 1000 milliLiter(s) (50 mL/Hr) IV Continuous <Continuous>  dextrose 50% Injectable 12.5 Gram(s) IV Push once  dextrose 50% Injectable 25 Gram(s) IV Push once  dextrose 50% Injectable 25 Gram(s) IV Push once  docusate sodium 100 milliGRAM(s) Oral two times a day  dorzolamide 2% Ophthalmic Solution 1 Drop(s) Both EYES three times a day  epoetin keagan Injectable 44908 Unit(s) IV Push <User Schedule>  furosemide    Tablet 80 milliGRAM(s) Oral two times a day  heparin  Injectable. 1000 Unit(s) Dialysis. once  heparin SubCutaneous Injection - Peds 5000 Unit(s) SubCutaneous every 12 hours  insulin glargine Injectable (LANTUS) 25 Unit(s) SubCutaneous at bedtime  insulin lispro (HumaLOG) corrective regimen sliding scale   SubCutaneous three times a day before meals  insulin lispro Injectable (HumaLOG) 5 Unit(s) SubCutaneous three times a day before meals  lactulose Syrup 10 Gram(s) Oral two times a day  latanoprost 0.005% Ophthalmic Solution 1 Drop(s) Both EYES at bedtime  metolazone 5 milliGRAM(s) Oral daily  metoprolol tartrate 25 milliGRAM(s) Oral two times a day  mineral oil/petrolatum Hydrophilic Ointment 1 Application(s) Topical daily  NIFEdipine XL 90 milliGRAM(s) Oral daily  oxyCODONE    IR 5 milliGRAM(s) Oral at bedtime  pantoprazole    Tablet 40 milliGRAM(s) Oral before breakfast  sertraline 100 milliGRAM(s) Oral daily  sevelamer hydrochloride 800 milliGRAM(s) Oral three times a day  simvastatin 10 milliGRAM(s) Oral at bedtime  tamsulosin Oral Tab/Cap - Peds 0.4 milliGRAM(s) Oral at bedtime  timolol 0.5% Solution 1 Drop(s) Both EYES two times a day    MEDICATIONS  (PRN):  acetaminophen   Tablet .. 650 milliGRAM(s) Oral every 6 hours PRN Temp greater or equal to 38C (100.4F), Moderate Pain (4 - 6)  ALBUTerol/ipratropium for Nebulization 3 milliLiter(s) Nebulizer every 6 hours PRN Wheezing  dextrose 40% Gel 15 Gram(s) Oral once PRN Blood Glucose LESS THAN 70 milliGRAM(s)/deciliter  glucagon  Injectable 1 milliGRAM(s) IntraMuscular once PRN Glucose LESS THAN 70 milligrams/deciliter  zolpidem 5 milliGRAM(s) Oral at bedtime PRN Insomnia      LABS:  cytology pending        RADIOLOGY:Radiology personally reviewed.

## 2019-03-21 LAB — NON-GYNECOLOGICAL CYTOLOGY STUDY: SIGNIFICANT CHANGE UP

## 2019-03-23 LAB
CULTURE RESULTS: SIGNIFICANT CHANGE UP
SPECIMEN SOURCE: SIGNIFICANT CHANGE UP

## 2019-03-28 DIAGNOSIS — Z79.4 LONG TERM (CURRENT) USE OF INSULIN: ICD-10-CM

## 2019-03-28 DIAGNOSIS — H54.7 UNSPECIFIED VISUAL LOSS: ICD-10-CM

## 2019-03-28 DIAGNOSIS — D63.1 ANEMIA IN CHRONIC KIDNEY DISEASE: ICD-10-CM

## 2019-03-28 DIAGNOSIS — J96.02 ACUTE RESPIRATORY FAILURE WITH HYPERCAPNIA: ICD-10-CM

## 2019-03-28 DIAGNOSIS — J96.01 ACUTE RESPIRATORY FAILURE WITH HYPOXIA: ICD-10-CM

## 2019-03-28 DIAGNOSIS — F31.70 BIPOLAR DISORDER, CURRENTLY IN REMISSION, MOST RECENT EPISODE UNSPECIFIED: ICD-10-CM

## 2019-03-28 DIAGNOSIS — Z91.19 PATIENT'S NONCOMPLIANCE WITH OTHER MEDICAL TREATMENT AND REGIMEN: ICD-10-CM

## 2019-03-28 DIAGNOSIS — E87.5 HYPERKALEMIA: ICD-10-CM

## 2019-03-28 DIAGNOSIS — G47.00 INSOMNIA, UNSPECIFIED: ICD-10-CM

## 2019-03-28 DIAGNOSIS — J98.11 ATELECTASIS: ICD-10-CM

## 2019-03-28 DIAGNOSIS — E87.1 HYPO-OSMOLALITY AND HYPONATREMIA: ICD-10-CM

## 2019-03-28 DIAGNOSIS — Z79.84 LONG TERM (CURRENT) USE OF ORAL HYPOGLYCEMIC DRUGS: ICD-10-CM

## 2019-03-28 DIAGNOSIS — N18.6 END STAGE RENAL DISEASE: ICD-10-CM

## 2019-03-28 DIAGNOSIS — R06.02 SHORTNESS OF BREATH: ICD-10-CM

## 2019-03-28 DIAGNOSIS — I12.0 HYPERTENSIVE CHRONIC KIDNEY DISEASE WITH STAGE 5 CHRONIC KIDNEY DISEASE OR END STAGE RENAL DISEASE: ICD-10-CM

## 2019-03-28 DIAGNOSIS — Z79.899 OTHER LONG TERM (CURRENT) DRUG THERAPY: ICD-10-CM

## 2019-03-28 DIAGNOSIS — J90 PLEURAL EFFUSION, NOT ELSEWHERE CLASSIFIED: ICD-10-CM

## 2019-03-28 DIAGNOSIS — E87.79 OTHER FLUID OVERLOAD: ICD-10-CM

## 2019-03-28 DIAGNOSIS — E87.2 ACIDOSIS: ICD-10-CM

## 2019-03-28 DIAGNOSIS — G47.33 OBSTRUCTIVE SLEEP APNEA (ADULT) (PEDIATRIC): ICD-10-CM

## 2019-03-28 DIAGNOSIS — E83.39 OTHER DISORDERS OF PHOSPHORUS METABOLISM: ICD-10-CM

## 2019-03-28 DIAGNOSIS — I27.29 OTHER SECONDARY PULMONARY HYPERTENSION: ICD-10-CM

## 2019-03-28 DIAGNOSIS — Z99.2 DEPENDENCE ON RENAL DIALYSIS: ICD-10-CM

## 2019-03-28 DIAGNOSIS — E66.01 MORBID (SEVERE) OBESITY DUE TO EXCESS CALORIES: ICD-10-CM

## 2019-03-28 DIAGNOSIS — Z79.82 LONG TERM (CURRENT) USE OF ASPIRIN: ICD-10-CM

## 2019-03-28 DIAGNOSIS — F41.9 ANXIETY DISORDER, UNSPECIFIED: ICD-10-CM

## 2019-03-28 DIAGNOSIS — Z79.891 LONG TERM (CURRENT) USE OF OPIATE ANALGESIC: ICD-10-CM

## 2019-03-28 DIAGNOSIS — E11.65 TYPE 2 DIABETES MELLITUS WITH HYPERGLYCEMIA: ICD-10-CM

## 2019-03-28 DIAGNOSIS — Z89.511 ACQUIRED ABSENCE OF RIGHT LEG BELOW KNEE: ICD-10-CM

## 2019-03-28 DIAGNOSIS — E11.22 TYPE 2 DIABETES MELLITUS WITH DIABETIC CHRONIC KIDNEY DISEASE: ICD-10-CM

## 2019-05-07 ENCOUNTER — OUTPATIENT (OUTPATIENT)
Dept: OUTPATIENT SERVICES | Facility: HOSPITAL | Age: 54
LOS: 1 days | Discharge: HOME | End: 2019-05-07

## 2019-05-07 DIAGNOSIS — Z89.519 ACQUIRED ABSENCE OF UNSPECIFIED LEG BELOW KNEE: Chronic | ICD-10-CM

## 2019-05-07 DIAGNOSIS — Z98.83 FILTERING (VITREOUS) BLEB AFTER GLAUCOMA SURGERY STATUS: Chronic | ICD-10-CM

## 2019-05-07 DIAGNOSIS — E78.00 PURE HYPERCHOLESTEROLEMIA, UNSPECIFIED: ICD-10-CM

## 2019-05-07 DIAGNOSIS — I77.0 ARTERIOVENOUS FISTULA, ACQUIRED: Chronic | ICD-10-CM

## 2019-05-07 DIAGNOSIS — E78.5 HYPERLIPIDEMIA, UNSPECIFIED: ICD-10-CM

## 2019-05-08 LAB
CULTURE RESULTS: SIGNIFICANT CHANGE UP
SPECIMEN SOURCE: SIGNIFICANT CHANGE UP

## 2019-06-01 ENCOUNTER — OUTPATIENT (OUTPATIENT)
Dept: OUTPATIENT SERVICES | Facility: HOSPITAL | Age: 54
LOS: 1 days | Discharge: HOME | End: 2019-06-01

## 2019-06-01 DIAGNOSIS — Z98.83 FILTERING (VITREOUS) BLEB AFTER GLAUCOMA SURGERY STATUS: Chronic | ICD-10-CM

## 2019-06-01 DIAGNOSIS — Z89.519 ACQUIRED ABSENCE OF UNSPECIFIED LEG BELOW KNEE: Chronic | ICD-10-CM

## 2019-06-01 DIAGNOSIS — Z79.899 OTHER LONG TERM (CURRENT) DRUG THERAPY: ICD-10-CM

## 2019-06-01 DIAGNOSIS — I77.0 ARTERIOVENOUS FISTULA, ACQUIRED: Chronic | ICD-10-CM

## 2019-09-18 ENCOUNTER — OUTPATIENT (OUTPATIENT)
Dept: OUTPATIENT SERVICES | Facility: HOSPITAL | Age: 54
LOS: 1 days | Discharge: HOME | End: 2019-09-18

## 2019-09-18 DIAGNOSIS — I77.0 ARTERIOVENOUS FISTULA, ACQUIRED: Chronic | ICD-10-CM

## 2019-09-18 DIAGNOSIS — Z89.519 ACQUIRED ABSENCE OF UNSPECIFIED LEG BELOW KNEE: Chronic | ICD-10-CM

## 2019-09-18 DIAGNOSIS — R10.9 UNSPECIFIED ABDOMINAL PAIN: ICD-10-CM

## 2019-09-18 DIAGNOSIS — Z98.83 FILTERING (VITREOUS) BLEB AFTER GLAUCOMA SURGERY STATUS: Chronic | ICD-10-CM

## 2019-10-02 PROBLEM — Z89.511 STATUS POST BELOW KNEE AMPUTATION OF RIGHT LOWER EXTREMITY: Status: ACTIVE | Noted: 2018-04-16

## 2020-01-23 NOTE — DISCHARGE NOTE ADULT - NS MD DC PLAN IMMU FLU REF OTH
Upon discharging patient, pt was in room getting dressed when her daughter came running out of the room asking for help. Pt was sitting on the edge of the bed and stated \"I cant move. \" instructed patient to lay back in the bed and she was able too. Notified MD who stated she would give more medication before discharged. Refused

## 2020-03-23 NOTE — PRE-ANESTHESIA EVALUATION ADULT - NSANTHDISPORD_GEN_ALL_CORE
Social Work Discharge Planning:  Patient will be leaving today going to [facility] at [address] [facility] via [facility] [employer]. SW informed the charge nurse and LVM for patient yakov Conway.    Electronically signed by JUAN Montes on 3/23/2020 at 3:53 PM
PACU

## 2020-07-18 ENCOUNTER — INPATIENT (INPATIENT)
Facility: HOSPITAL | Age: 55
LOS: 6 days | Discharge: NURSING, REHAB OR LONG-TERM CARE | End: 2020-07-25
Attending: INTERNAL MEDICINE | Admitting: INTERNAL MEDICINE
Payer: MEDICARE

## 2020-07-18 VITALS
SYSTOLIC BLOOD PRESSURE: 132 MMHG | WEIGHT: 315 LBS | OXYGEN SATURATION: 100 % | TEMPERATURE: 98 F | HEART RATE: 69 BPM | DIASTOLIC BLOOD PRESSURE: 62 MMHG | RESPIRATION RATE: 20 BRPM

## 2020-07-18 DIAGNOSIS — I77.0 ARTERIOVENOUS FISTULA, ACQUIRED: Chronic | ICD-10-CM

## 2020-07-18 DIAGNOSIS — Z98.83 FILTERING (VITREOUS) BLEB AFTER GLAUCOMA SURGERY STATUS: Chronic | ICD-10-CM

## 2020-07-18 DIAGNOSIS — Z89.519 ACQUIRED ABSENCE OF UNSPECIFIED LEG BELOW KNEE: Chronic | ICD-10-CM

## 2020-07-18 LAB
ALBUMIN SERPL ELPH-MCNC: 4.2 G/DL — SIGNIFICANT CHANGE UP (ref 3.5–5.2)
ALP SERPL-CCNC: 148 U/L — HIGH (ref 30–115)
ALT FLD-CCNC: 6 U/L — SIGNIFICANT CHANGE UP (ref 0–41)
ANION GAP SERPL CALC-SCNC: 14 MMOL/L — SIGNIFICANT CHANGE UP (ref 7–14)
APTT BLD: 34.9 SEC — SIGNIFICANT CHANGE UP (ref 27–39.2)
AST SERPL-CCNC: 8 U/L — SIGNIFICANT CHANGE UP (ref 0–41)
BASE EXCESS BLDV CALC-SCNC: 2.8 MMOL/L — HIGH (ref -2–2)
BASOPHILS # BLD AUTO: 0.12 K/UL — SIGNIFICANT CHANGE UP (ref 0–0.2)
BASOPHILS NFR BLD AUTO: 1 % — SIGNIFICANT CHANGE UP (ref 0–1)
BILIRUB SERPL-MCNC: 0.4 MG/DL — SIGNIFICANT CHANGE UP (ref 0.2–1.2)
BLD GP AB SCN SERPL QL: SIGNIFICANT CHANGE UP
BUN SERPL-MCNC: 37 MG/DL — HIGH (ref 10–20)
CA-I SERPL-SCNC: 1.09 MMOL/L — LOW (ref 1.12–1.3)
CALCIUM SERPL-MCNC: 8 MG/DL — LOW (ref 8.5–10.1)
CHLORIDE SERPL-SCNC: 96 MMOL/L — LOW (ref 98–110)
CO2 SERPL-SCNC: 25 MMOL/L — SIGNIFICANT CHANGE UP (ref 17–32)
CREAT SERPL-MCNC: 7.2 MG/DL — CRITICAL HIGH (ref 0.7–1.5)
EOSINOPHIL # BLD AUTO: 0.35 K/UL — SIGNIFICANT CHANGE UP (ref 0–0.7)
EOSINOPHIL NFR BLD AUTO: 2.9 % — SIGNIFICANT CHANGE UP (ref 0–8)
GAS PNL BLDV: 135 MMOL/L — LOW (ref 136–145)
GAS PNL BLDV: SIGNIFICANT CHANGE UP
GLUCOSE BLDC GLUCOMTR-MCNC: 150 MG/DL — HIGH (ref 70–99)
GLUCOSE SERPL-MCNC: 141 MG/DL — HIGH (ref 70–99)
HCO3 BLDV-SCNC: 28 MMOL/L — SIGNIFICANT CHANGE UP (ref 22–29)
HCT VFR BLD CALC: 26.8 % — LOW (ref 42–52)
HCT VFR BLDA CALC: 26.2 % — LOW (ref 34–44)
HGB BLD CALC-MCNC: 8.5 G/DL — LOW (ref 14–18)
HGB BLD-MCNC: 7.9 G/DL — LOW (ref 14–18)
IMM GRANULOCYTES NFR BLD AUTO: 0.3 % — SIGNIFICANT CHANGE UP (ref 0.1–0.3)
INR BLD: 1.35 RATIO — HIGH (ref 0.65–1.3)
LACTATE BLDV-MCNC: 0.7 MMOL/L — SIGNIFICANT CHANGE UP (ref 0.5–1.6)
LYMPHOCYTES # BLD AUTO: 0.92 K/UL — LOW (ref 1.2–3.4)
LYMPHOCYTES # BLD AUTO: 7.5 % — LOW (ref 20.5–51.1)
MCHC RBC-ENTMCNC: 25.5 PG — LOW (ref 27–31)
MCHC RBC-ENTMCNC: 29.5 G/DL — LOW (ref 32–37)
MCV RBC AUTO: 86.5 FL — SIGNIFICANT CHANGE UP (ref 80–94)
MONOCYTES # BLD AUTO: 0.61 K/UL — HIGH (ref 0.1–0.6)
MONOCYTES NFR BLD AUTO: 5 % — SIGNIFICANT CHANGE UP (ref 1.7–9.3)
NEUTROPHILS # BLD AUTO: 10.23 K/UL — HIGH (ref 1.4–6.5)
NEUTROPHILS NFR BLD AUTO: 83.3 % — HIGH (ref 42.2–75.2)
NRBC # BLD: 0 /100 WBCS — SIGNIFICANT CHANGE UP (ref 0–0)
PCO2 BLDV: 45 MMHG — SIGNIFICANT CHANGE UP (ref 41–51)
PH BLDV: 7.4 — SIGNIFICANT CHANGE UP (ref 7.26–7.43)
PLATELET # BLD AUTO: 228 K/UL — SIGNIFICANT CHANGE UP (ref 130–400)
PO2 BLDV: 37 MMHG — SIGNIFICANT CHANGE UP (ref 20–40)
POTASSIUM BLDV-SCNC: 4.6 MMOL/L — SIGNIFICANT CHANGE UP (ref 3.3–5.6)
POTASSIUM SERPL-MCNC: 4.6 MMOL/L — SIGNIFICANT CHANGE UP (ref 3.5–5)
POTASSIUM SERPL-SCNC: 4.6 MMOL/L — SIGNIFICANT CHANGE UP (ref 3.5–5)
PROT SERPL-MCNC: 6.6 G/DL — SIGNIFICANT CHANGE UP (ref 6–8)
PROTHROM AB SERPL-ACNC: 15.5 SEC — HIGH (ref 9.95–12.87)
RBC # BLD: 3.1 M/UL — LOW (ref 4.7–6.1)
RBC # FLD: 16.7 % — HIGH (ref 11.5–14.5)
SAO2 % BLDV: 70 % — SIGNIFICANT CHANGE UP
SARS-COV-2 RNA SPEC QL NAA+PROBE: SIGNIFICANT CHANGE UP
SODIUM SERPL-SCNC: 135 MMOL/L — SIGNIFICANT CHANGE UP (ref 135–146)
WBC # BLD: 12.27 K/UL — HIGH (ref 4.8–10.8)
WBC # FLD AUTO: 12.27 K/UL — HIGH (ref 4.8–10.8)

## 2020-07-18 PROCEDURE — 93010 ELECTROCARDIOGRAM REPORT: CPT

## 2020-07-18 PROCEDURE — 30903 CONTROL OF NOSEBLEED: CPT | Mod: GC

## 2020-07-18 PROCEDURE — 99285 EMERGENCY DEPT VISIT HI MDM: CPT | Mod: CS,25,GC

## 2020-07-18 PROCEDURE — 71045 X-RAY EXAM CHEST 1 VIEW: CPT | Mod: 26

## 2020-07-18 RX ORDER — SIMETHICONE 80 MG/1
80 TABLET, CHEWABLE ORAL THREE TIMES A DAY
Refills: 0 | Status: DISCONTINUED | OUTPATIENT
Start: 2020-07-18 | End: 2020-07-25

## 2020-07-18 RX ORDER — SEVELAMER CARBONATE 2400 MG/1
2400 POWDER, FOR SUSPENSION ORAL THREE TIMES A DAY
Refills: 0 | Status: DISCONTINUED | OUTPATIENT
Start: 2020-07-18 | End: 2020-07-25

## 2020-07-18 RX ORDER — METOPROLOL TARTRATE 50 MG
25 TABLET ORAL
Refills: 0 | Status: DISCONTINUED | OUTPATIENT
Start: 2020-07-18 | End: 2020-07-25

## 2020-07-18 RX ORDER — FUROSEMIDE 40 MG
80 TABLET ORAL ONCE
Refills: 0 | Status: COMPLETED | OUTPATIENT
Start: 2020-07-18 | End: 2020-07-18

## 2020-07-18 RX ORDER — ZOLPIDEM TARTRATE 10 MG/1
5 TABLET ORAL AT BEDTIME
Refills: 0 | Status: DISCONTINUED | OUTPATIENT
Start: 2020-07-18 | End: 2020-07-22

## 2020-07-18 RX ORDER — IPRATROPIUM/ALBUTEROL SULFATE 18-103MCG
3 AEROSOL WITH ADAPTER (GRAM) INHALATION EVERY 6 HOURS
Refills: 0 | Status: DISCONTINUED | OUTPATIENT
Start: 2020-07-18 | End: 2020-07-25

## 2020-07-18 RX ORDER — LACTULOSE 10 G/15ML
20 SOLUTION ORAL
Refills: 0 | Status: DISCONTINUED | OUTPATIENT
Start: 2020-07-18 | End: 2020-07-25

## 2020-07-18 RX ORDER — ALPRAZOLAM 0.25 MG
0.25 TABLET ORAL DAILY
Refills: 0 | Status: DISCONTINUED | OUTPATIENT
Start: 2020-07-18 | End: 2020-07-25

## 2020-07-18 RX ORDER — TRANEXAMIC ACID 100 MG/ML
10 INJECTION, SOLUTION INTRAVENOUS ONCE
Refills: 0 | Status: COMPLETED | OUTPATIENT
Start: 2020-07-18 | End: 2020-07-18

## 2020-07-18 RX ORDER — OXYCODONE AND ACETAMINOPHEN 5; 325 MG/1; MG/1
1 TABLET ORAL EVERY 4 HOURS
Refills: 0 | Status: DISCONTINUED | OUTPATIENT
Start: 2020-07-18 | End: 2020-07-25

## 2020-07-18 RX ORDER — DORZOLAMIDE HYDROCHLORIDE 20 MG/ML
1 SOLUTION/ DROPS OPHTHALMIC THREE TIMES A DAY
Refills: 0 | Status: DISCONTINUED | OUTPATIENT
Start: 2020-07-18 | End: 2020-07-25

## 2020-07-18 RX ORDER — TIMOLOL 0.5 %
1 DROPS OPHTHALMIC (EYE)
Refills: 0 | Status: DISCONTINUED | OUTPATIENT
Start: 2020-07-18 | End: 2020-07-25

## 2020-07-18 RX ORDER — POLYETHYLENE GLYCOL 3350 17 G/17G
17 POWDER, FOR SOLUTION ORAL DAILY
Refills: 0 | Status: DISCONTINUED | OUTPATIENT
Start: 2020-07-18 | End: 2020-07-25

## 2020-07-18 RX ORDER — NIFEDIPINE 30 MG
90 TABLET, EXTENDED RELEASE 24 HR ORAL DAILY
Refills: 0 | Status: DISCONTINUED | OUTPATIENT
Start: 2020-07-18 | End: 2020-07-25

## 2020-07-18 RX ORDER — ZOLPIDEM TARTRATE 10 MG/1
1 TABLET ORAL
Qty: 0 | Refills: 0 | DISCHARGE

## 2020-07-18 RX ORDER — SERTRALINE 25 MG/1
100 TABLET, FILM COATED ORAL DAILY
Refills: 0 | Status: DISCONTINUED | OUTPATIENT
Start: 2020-07-18 | End: 2020-07-25

## 2020-07-18 RX ORDER — BRIMONIDINE TARTRATE 2 MG/MG
1 SOLUTION/ DROPS OPHTHALMIC
Refills: 0 | Status: DISCONTINUED | OUTPATIENT
Start: 2020-07-18 | End: 2020-07-25

## 2020-07-18 RX ORDER — TRAZODONE HCL 50 MG
100 TABLET ORAL AT BEDTIME
Refills: 0 | Status: DISCONTINUED | OUTPATIENT
Start: 2020-07-18 | End: 2020-07-25

## 2020-07-18 RX ORDER — ACETAMINOPHEN 500 MG
650 TABLET ORAL EVERY 6 HOURS
Refills: 0 | Status: DISCONTINUED | OUTPATIENT
Start: 2020-07-18 | End: 2020-07-25

## 2020-07-18 RX ORDER — SIMVASTATIN 20 MG/1
10 TABLET, FILM COATED ORAL AT BEDTIME
Refills: 0 | Status: DISCONTINUED | OUTPATIENT
Start: 2020-07-18 | End: 2020-07-25

## 2020-07-18 RX ORDER — TAMSULOSIN HYDROCHLORIDE 0.4 MG/1
0.4 CAPSULE ORAL AT BEDTIME
Refills: 0 | Status: DISCONTINUED | OUTPATIENT
Start: 2020-07-18 | End: 2020-07-25

## 2020-07-18 RX ORDER — LATANOPROST 0.05 MG/ML
1 SOLUTION/ DROPS OPHTHALMIC; TOPICAL AT BEDTIME
Refills: 0 | Status: DISCONTINUED | OUTPATIENT
Start: 2020-07-18 | End: 2020-07-25

## 2020-07-18 RX ORDER — FUROSEMIDE 40 MG
80 TABLET ORAL
Refills: 0 | Status: DISCONTINUED | OUTPATIENT
Start: 2020-07-18 | End: 2020-07-19

## 2020-07-18 RX ADMIN — Medication 80 MILLIGRAM(S): at 22:23

## 2020-07-18 RX ADMIN — TRANEXAMIC ACID 10 MILLILITER(S): 100 INJECTION, SOLUTION INTRAVENOUS at 19:25

## 2020-07-18 NOTE — ED PROVIDER NOTE - CLINICAL SUMMARY MEDICAL DECISION MAKING FREE TEXT BOX
Pt presenting with epistaxis since 1am. L nare. No trauma. +dizziness, lightheadedness. No chest pain. Missed HD today 2/2 epistaxis. Epistaxis didn't resolve, presented for further eval. No anticoagulation. Baseline 3LNC home O2. +epistaxis from L nare. +crackles b/l. labs ekg iamging reviewed. nose packed with resolution. +congestion on XR. mild anemia on labs. will admit for further obs, potential HD tomorrow.

## 2020-07-18 NOTE — H&P ADULT - NSHPPHYSICALEXAM_GEN_ALL_CORE
ICU Vital Signs Last 24 Hrs  T(C): 36.9 (18 Jul 2020 18:01), Max: 36.9 (18 Jul 2020 18:01)  T(F): 98.5 (18 Jul 2020 18:01), Max: 98.5 (18 Jul 2020 18:01)  HR: 68 (18 Jul 2020 20:40) (68 - 69)  BP: 169/72 (18 Jul 2020 20:40) (132/62 - 169/72)  BP(mean): --  ABP: --  ABP(mean): --  RR: 18 (18 Jul 2020 20:40) (18 - 20)  SpO2: 96% (18 Jul 2020 20:40) (96% - 100%) ICU Vital Signs Last 24 Hrs  T(C): 36.9 (18 Jul 2020 18:01), Max: 36.9 (18 Jul 2020 18:01)  T(F): 98.5 (18 Jul 2020 18:01), Max: 98.5 (18 Jul 2020 18:01)  HR: 68 (18 Jul 2020 20:40) (68 - 69)  BP: 169/72 (18 Jul 2020 20:40) (132/62 - 169/72)  BP(mean): --  ABP: --  ABP(mean): --  RR: 18 (18 Jul 2020 20:40) (18 - 20)  SpO2: 96% (18 Jul 2020 20:40) (96% - 100%)    General : Alert in mild distress secondary to dyspnea, morbidly obese    ENT : Packing was removed by the patient, dry blood on the nare with no active epistaxis   Lungs : Mild bibasilar crackles, no intercostal muscle retractions, no wheezing or rhonchi   Cardiovascular : Regular S1S2   Abdomen : soft nontender nondistended   Extremities : Right BKA

## 2020-07-18 NOTE — H&P ADULT - NSICDXPASTSURGICALHX_GEN_ALL_CORE_FT
PAST SURGICAL HISTORY:  A-V fistula LEFT ARM    S/P BKA (below knee amputation) unilateral RIGHT    Status post glaucoma surgery RIGHT EYE

## 2020-07-18 NOTE — H&P ADULT - NSHPSOCIALHISTORY_GEN_ALL_CORE
Substance Use:  ()Denies   ()Former User   ()Active User, Substance(s)? Last Use?  Tobacco Usage:  ()Never Smoker   ()Former Smoker   ()Active Smoker, Pack Years ?  Alcohol Usage:  ()Never Drinker   ()Social Drinker   ()Former Abuser   ()Active Abuser, Type?  Amount? How Often? Last Drink?    Home Living Situation: From [facility] Marital Status:  (   )    ( x  ) Single    (   )    (  )   Lives with: (x  ) alone  (  ) children   (  ) spouse   (  ) parents  (  ) other: NH  Recent Travel: No recent travel  Substance Use (street drugs): ( x ) never used  (  ) other:  Tobacco Usage:  ( x  ) never smoked   (   ) former smoker   (   ) current smoker  (     ) pack year  Alcohol Usage: None

## 2020-07-18 NOTE — H&P ADULT - NSHPLABSRESULTS_GEN_ALL_CORE
Chemistry panel :    07-18    135  |  96<L>  |  37<H>  ----------------------------<  141<H>  4.6   |  25  |  7.2<HH>    Ca    8.0<L>      18 Jul 2020 19:10    TPro  6.6  /  Alb  4.2  /  TBili  0.4  /  DBili  x   /  AST  8   /  ALT  6   /  AlkPhos  148<H>  07-18    Complete blood count :                           7.9    12.27 )-----------( 228      ( 18 Jul 2020 19:10 )             26.8     VBG :     Blood Gas Profile - Venous (07.18.20 @ 19:49)    pH, Venous: 7.40    pCO2, Venous: 45 mmHg    pO2, Venous: 37 mmHg    HCO3, Venous: 28 mmoL/L    Base Excess, Venous: 2.8 mmoL/L    Oxygen Saturation, Venous: 70 %    Coagulation Panel   PT/INR - ( 18 Jul 2020 19:10 )   PT: 15.50 sec;   INR: 1.35 ratio    PTT - ( 18 Jul 2020 19:10 )  PTT:34.9 sec

## 2020-07-18 NOTE — ED PROVIDER NOTE - NS ED ROS FT
GEN:  no fever, no chills, no generalized weakness  NEURO:  no headache, no dizziness  ENT: + nosebleed  CV:  no chest pain, no palpitations  RESP:  no sob, no cough  GI:  no nausea, no vomiting, no abdominal pain, no diarrhea  :  no dysuria, no urinary frequency, no hematuria  MSK:  +LLE pain, +chronic edema  SKIN:  no rash, no bruising  HEME: no hematochezia, no melena GEN:  no fever, no chills, no generalized weakness  NEURO:  no headache, no dizziness  ENT: + nosebleed  CV:  no chest pain, no palpitations  RESP:  + sob, no cough  GI:  no nausea, no vomiting, no abdominal pain, no diarrhea  :  no dysuria, no urinary frequency, no hematuria  MSK:  +LLE pain, +chronic edema  SKIN:  no rash, no bruising  HEME: no hematochezia, no melena

## 2020-07-18 NOTE — H&P ADULT - ATTENDING COMMENTS
55 year old male pmhx hypertension, diabetes, ESRD on hemodialysis, COPD on home O2, bipolar disorder, transferred from [facility] for recurrent left epistaxis. As per patient, two days ago epistaxis started suddenly with no triggering event. Patient tried to apply pressure, but he continued to bleed prompting him to ER. He had 3 episodes of left sided epistaxis in the last month but none of them lasted that long. He missed dialysis session for nose bleed (due to use of heparin flush).    T(C): 37.2 (07-18-20 @ 22:06), Max: 37.2 (07-18-20 @ 22:06)  HR: 74 (07-18-20 @ 22:06) (68 - 74)  BP: 173/74 (07-18-20 @ 22:06) (132/62 - 173/74)  RR: 20 (07-18-20 @ 22:06) (18 - 20)  SpO2: 92% (07-18-20 @ 22:06) (92% - 100%)    GENERAL: NAD, well-developed  HEAD:  Atraumatic, Normocephalic  EYES: EOMI, PERRLA, conjunctiva and sclera clear  ENT: Normal tympanic membrane. No nasal obstruction or discharge. No tonsillar exudate, swelling or erythema. (+) left nasal packing  NECK: Supple, No JVD  CHEST/LUNG: decreased breath sounds bilaterally; No wheeze  HEART: Regular rate and rhythm; No murmurs, rubs, or gallops  ABDOMEN: Soft, Nontender, Nondistended; Bowel sounds present  EXTREMITIES:  2+ Peripheral Pulses, No clubbing, cyanosis, or edema  PSYCH: AAOx3  NEUROLOGY: non-focal  SKIN: No rashes or lesions    # Recurrent left epistaxis  - s/p TAX soaked packing in the ED   - hemodynamically stable  - Hb 7.9 (at baseline)  - outpt ENT f/u    # End stage renal disease on hemodialysis (TTS)  - missed HD session  - pulmonary vascular congestion with pleural effusion   - Nephrology consult   - Monitor BMP, Ca and Phosphorus levels   - c/w  Sevelamer 2400 mg TID   - c/w  Lasix 80 mg PO BID and Metolazone 5 mg PO BID    # Full code 55 year old male pmhx hypertension, diabetes, ESRD on hemodialysis, COPD on home O2, bipolar disorder, transferred from [facility] for recurrent left epistaxis. As per patient, two days ago epistaxis started suddenly with no triggering event. Patient tried to apply pressure, but he continued to bleed prompting him to ER. He had 3 episodes of left sided epistaxis in the last month but none of them lasted that long. He missed dialysis on sat for nose bleed (due to use of heparin flush). At baseline pt is wheel chair bound, social negx3.    T(C): 37.2 (07-18-20 @ 22:06), Max: 37.2 (07-18-20 @ 22:06)  HR: 74 (07-18-20 @ 22:06) (68 - 74)  BP: 173/74 (07-18-20 @ 22:06) (132/62 - 173/74)  RR: 20 (07-18-20 @ 22:06) (18 - 20)  SpO2: 92% (07-18-20 @ 22:06) (92% - 100%)    GENERAL: NAD, obese  HEAD:  Atraumatic, Normocephalic  EYES: EOMI, PERRLA, conjunctiva and sclera clear  ENT: Normal tympanic membrane. No nasal obstruction or discharge. No tonsillar exudate, swelling or erythema. (+) left nasal packing  NECK: Supple, No JVD  CHEST/LUNG: decreased breath sounds bilaterally; No wheeze  HEART: Regular rate and rhythm; No murmurs, rubs, or gallops  ABDOMEN: Soft, Nontender, Nondistended; Bowel sounds present  EXTREMITIES:  2+ Peripheral Pulses, No clubbing, cyanosis, (+) right BKA, left lower ext 2+ edema; fistula in left arm   PSYCH: AAOx3  NEUROLOGY: non-focal  SKIN: statis dermatitis of the left lower ext    # Recurrent left epistaxis  - s/p TAX soaked packing in the ED   - hemodynamically stable  - Hb 7.9 (at baseline)  - outpt ENT f/u    # End stage renal disease on hemodialysis (TTS)  - missed HD session on saturday  - pulmonary vascular congestion with pleural effusion   - Nephrology consult   - Monitor BMP, Ca and Phosphorus levels   - c/w  Sevelamer 2400 mg TID   - c/w  Lasix 80 mg PO BID and Metolazone 5 mg PO BID    # Full code

## 2020-07-18 NOTE — ED ADULT TRIAGE NOTE - CHIEF COMPLAINT QUOTE
nose bleed last night around 9pm and again this morning. missed hemo today. access in left arm. gauze in nose. bleeding at this time. pt normally wears 3lnc. now on 100% NRB. pt had clots coming out last night,. not on AC. no hx of nosebleeds.

## 2020-07-18 NOTE — ED PROVIDER NOTE - OBJECTIVE STATEMENT
54 yo M with PMHx of anemia, anxiety, bipolar, blind, DM, ESRD on HD (Tu/Thurs/Sat), HTN, insomnia, obesity, pain disorder, HOMAR who presents with persistent L nares nosebleed with passage of clots since 1am today associated with dizziness. No alleviating/aggravating factors. Was unable to go to HD today because of nosebleed. Has baseline sob on 3L home O2, no change in sob. No fever, chills, cough, cp, abd pain, hematochezia, melena. No blood thinners. No recent sneezing or nares trauma. No hx of easy nosebleed. 54 yo M with PMHx of anemia, anxiety, bipolar, blind R eye, DM, ESRD on HD (Tu/Thurs/Sat), HTN, insomnia, obesity, pain disorder, HOMAR who presents from [facility] with persistent L nares nosebleed with passage of clots since 1am today associated with dizziness. No alleviating/aggravating factors. Was unable to go to HD today because of nosebleed. Has baseline sob on 3L home O2, no change in sob. No fever, chills, cough, cp, abd pain, hematochezia, melena. No blood thinners. No recent sneezing or nares trauma. No hx of easy nosebleed.    PMD: Dr. Salazar

## 2020-07-18 NOTE — H&P ADULT - ASSESSMENT
55 year old male patient with past medical history of hypertension, diabetes, end stage renal disease on hemodialysis, obstructive sleep apnea, chronic pain, bipolar disorder, transferred from [facility] for recurrent left epistaxis.     # Recurrent left epistaxis in a patient not on anti-coagulation   - Patient hemodynamically stable, hb 7.9 close to baseline which is between 8 and 9   - s/p TAX soaked packing in the ED   - Will place ENT consult given recurrence of the bleed and absence of triggering factors, and patient is ESRD with increased risk of bleeding from platelet dysfunction   - Monitor CBC daily and keep active type and screen, transfuse if Hb < 7     # End stage renal disease on hemodialysis Tuesday / Thursday and Saturday now with pulmonary vascular congestion secondary to missed hemodialysis sessions   - Patient on non rebreather given epistaxis and intolerance of nasal cannula. SpO2 100 %   - Nephrology consult for hemodialysis tomorrow   - Monitor BMP, Ca and Phosphorus levels   - Renal restricted diet     # Left pleural effusion   Could be in the setting of volume overload. Last chest x ray done in the hospital in March 2019 showing also small left pleural effusion   - repeat Chest x ray after hemodialysis. If vascular congestion improved and pleural effusion still present consider pulmonary consult for thoracentesis     # Diabetes   - Oral medication on hold   - Check FS before meals and at bedtime and start insulin basal and bolus with sliding scale if FS consistently > 180   - Check hemoglobin A1C level     # Hypertension   - Controlled on admission   - Continue     # Bipolar disorder / Anxiety disorder   - Continue     # Obstructive sleep apnea     # Miscellaneous   - DVT prophylaxis : SCDs given presence of bleeding   - GI prophylaxis   - Diet : DASH TLC consistent carbohydrate renal restricted diet   - Activity : Increase as tolerated   - Code status 55 year old male patient with past medical history of hypertension, diabetes, end stage renal disease on hemodialysis, obstructive sleep apnea, chronic pain, bipolar disorder, transferred from [facility] for recurrent left epistaxis.     # Recurrent left epistaxis in a patient not on anti-coagulation   - Patient hemodynamically stable, hb 7.9 close to baseline which is between 8 and 9   - s/p TAX soaked packing in the ED   - Will place ENT consult given recurrence of the bleed and absence of triggering factors, and patient is ESRD with increased risk of bleeding from platelet dysfunction   - Monitor CBC daily and keep active type and screen, transfuse if Hb < 7     # End stage renal disease on hemodialysis Tuesday / Thursday and Saturday now with pulmonary vascular congestion secondary to missed hemodialysis sessions   - Patient on non rebreather given epistaxis and intolerance of nasal cannula. SpO2 100 %  - Nephrology consult for hemodialysis tomorrow   - Monitor BMP, Ca and Phosphorus levels   - Renal restricted diet   - Continue Sevelamer 2400 mg TID   - Continue Lasix 80 mg PO BID and Metolazone 5 mg PO BID. 1 dose Lasix 80 mg IV x 1 given in the ED (Patient states that he makes small amount of urine)    # Left pleural effusion   Could be in the setting of volume overload. Last chest x ray done in the hospital in March 2019 showing also small left pleural effusion   - repeat Chest x ray after hemodialysis. If vascular congestion improved and pleural effusion still present consider pulmonary consult for thoracentesis     # Diabetes   - On Lantus and sliding scale at the nursing home   - Check FS before meals and at bedtime and start insulin basal and bolus with sliding scale if FS consistently > 180   - Check hemoglobin A1C level     # Hypertension   - Controlled on admission   - Continue Metoprolol Tartrate 25 mg PO BID + Procardia 90 mg PO qd     # Bipolar disorder / Anxiety disorder   - Continue Xanax 0.25 mg PO qd PRN (end in 3 days) + Sertraline 100 mg PO qd + Trazodone 100 mg PO qhs     # Obstructive sleep apnea / COPD on 3L NC   - Continue Duoneb 3mL q6h   - CPAP 14/8 30% FiO2 at bedtime     # BPH   - Continue Tamsulosin 0.4 mg qhs     # Diabetic retinopathy / Glaucoma   - Continue Dorzolamide, Brimonidine, Latanoprost and Timolol drops     # Miscellaneous   - DVT prophylaxis : SCDs given presence of bleeding   - GI prophylaxis   - Diet : DASH TLC consistent carbohydrate renal restricted diet   - Activity : Increase as tolerated   - Code status : Full code     Patient on Aspirin, will hold overnight and if no further bleeding consider restarting in AM

## 2020-07-18 NOTE — H&P ADULT - NSICDXPASTMEDICALHX_GEN_ALL_CORE_FT
PAST MEDICAL HISTORY:  Anemia, unspecified type     Anxiety disorder, unspecified type     Bilateral ocular hypertension     Bipolar affective disorder, remission status unspecified     Blind     Diabetes     Dialysis patient Tuesday- Thursday-Saturday    End stage renal disease     HTN (hypertension)     Insomnia, unspecified type     Obesity, unspecified classification, unspecified obesity type, unspecified whether serious comorbidity present     Pain disorder

## 2020-07-18 NOTE — H&P ADULT - NSHPREVIEWOFSYSTEMS_GEN_ALL_CORE
Pulmonary : + dyspnea, no cough, no hemoptysis   Cardiovascular : No chest pain, no palpitations   GI : + nausea, + abdominal pain, no diarrhea, + bloating    : Patient makes very small amount of urine

## 2020-07-18 NOTE — ED PROVIDER NOTE - PROGRESS NOTE DETAILS
TC TC: 54 yo M with PMHx of anemia, anxiety, bipolar, blind, DM, ESRD on HD (Tu/Thurs/Sat), HTN, insomnia, obesity, pain disorder, HOMAR who presents with L nares epistaxis x 18 hrs today. No trauma. Endorses some symptomatic anemia. No blood thinners. Here in ED, vitals wnl. No airway compromise. Bleeding from L nares with no obvious source visualized. Placed TXA-soaked rhino rocket in L nares. Ordered labs, ekg, cxr. Will reassess. TC: Hgb 7.9 (previously 8.1 earlier today at 6:45am, before that 12.2 on 4/6/20 but pt has prior hgb baseline ~8-9 in past as well). TC: Rest of labs at baseline. Cxr with significant pulm vascular congestion. Given that pt missed HD and will not be able to have HD for another 3 days if he returns to NH and also since pt is O2 dependent but limited 2/2 L nares blocked, will admit. TC: 54 yo M with PMHx of anemia, anxiety, bipolar, blind, DM, ESRD on HD (Tu/Thurs/Sat), HTN, insomnia, obesity, pain disorder, HOMAR who presents with L nares epistaxis x 18 hrs today. No trauma. Endorses some symptomatic anemia. No blood thinners. Here in ED, vitals wnl, satting well on NRB. No airway compromise. Bleeding from L nares with no obvious source visualized. Placed TXA-soaked rhino rocket in L nares. Ordered labs, ekg, cxr. Will reassess. TC: Rest of labs at baseline. Cxr with significant pulm vascular congestion. Ekg without peaked T waves. Given that pt missed HD and will not be able to have HD for another 3 days if he returns to NH and also since pt is O2 dependent but limited 2/2 L nares blocked, will admit for nonemergent HD and ENT f/u. TC: Rest of labs at baseline. Cxr with significant pulm vascular congestion. Ekg without peaked T waves. Given that pt missed HD and will not be able to have HD for another 3 days if he returns to NH and also since pt is O2 dependent but cannot tolerate NC 2/2 L nares blocked, will admit for HD and ENT f/u.

## 2020-07-18 NOTE — ED PROVIDER NOTE - PHYSICAL EXAMINATION
CONSTITUTIONAL: well developed, nontoxic appearing, in no acute distress, speaking in full sentences  SKIN: warm, dry, no rash, cap refill < 2 seconds  HEENT: normocephalic, atraumatic, no conjunctival erythema, moist mucous membranes, patent airway, no bleeding from R nares, L nares with active bleeding but no obvious source visualized  NECK: supple  CV:  regular rate, regular rhythm, 2+ radial pulses bilaterally  RESP: no wheezes, no rales, no rhonchi, normal work of breathing  ABD: soft, nontender, nondistended, no rebound, no guarding  MSK: R AKA, LLE with chronic appearing erythema/swelling  NEURO: alert, oriented, grossly unremarkable  PSYCH: cooperative, appropriate CONSTITUTIONAL: well developed, nontoxic appearing, in no acute distress, speaking in full sentences  SKIN: warm, dry, no rash, cap refill < 2 seconds  HEENT: normocephalic, atraumatic, no conjunctival erythema, moist mucous membranes, patent airway, no bleeding from R nares, L nares with active bleeding but no obvious source visualized  NECK: supple  CV:  regular rate, regular rhythm  RESP: moderate air entry, mild tachypneic while talking, normal work of breathing  ABD: soft, nontender, nondistended, no rebound, no guarding  MSK: R BKA, LLE with chronic appearing erythema/swelling  NEURO: alert, oriented, grossly unremarkable  PSYCH: cooperative, appropriate

## 2020-07-18 NOTE — H&P ADULT - HISTORY OF PRESENT ILLNESS
55 year old male patient with past medical history of hypertension, diabetes, end stage renal disease on hemodialysis, obstructive sleep apnea, chronic pain, bipolar disorder, transferred from [facility] for recurrent left epistaxis.     In the ED : Patient hemodynamically stable /62, heart rate 69, RR 20/min, Temp 98.5, SpO2 100% on 15L non-rebreather (patient is on NC at baseline but non rebreather was placed given the epistaxis). Hb level 7.9 (baseline between 8 and 9). Thromboxane soaked renal rocket was placed with resolution of the bleeding. Chest x ray was done and showed significant pulmonary vascular congestion. The patient was admitted for hemodialysis (given that his next session is scheduled for Tuesday and he missed today's session) as well as ENT evaluation 55 year old male patient with past medical history of hypertension, diabetes, end stage renal disease on hemodialysis, obstructive sleep apnea, chronic pain, bipolar disorder, transferred from [facility] for recurrent left epistaxis.     The patient states that he had 3 episodes of left sided epistaxis in the last month but none of them lasted that long. He woke up at 1 AM to be have his pain medication and states that the epistaxis started suddenly with no triggering event and continued the whole next and the next day, the epistaxis was associated with blood clots coming out of the left nare.  The patient states that he does prick his nose frequently but doesn't recall pricking his nose immediately before the epistaxis started. Because of the bleeding and the contra-indication for heparin he didn't undergo hemodialysis. The patient denies any history of nose procedure and denies any other source of bleeding (denies hemoptysis, hematemesis or hematochezia but he did have an episode of bright red blood per rectum in the past).     In the ED : Patient hemodynamically stable /62, heart rate 69, RR 20/min, Temp 98.5, SpO2 100% on 15L non-rebreather (patient is on NC at baseline but non rebreather was placed given the epistaxis). Hb level 7.9 (baseline between 8 and 9). Thromboxane soaked renal rocket was placed with resolution of the bleeding. Chest x ray was done and showed significant pulmonary vascular congestion. The patient was admitted for hemodialysis (given that his next session is scheduled for Tuesday and he missed today's session) as well as ENT evaluation

## 2020-07-18 NOTE — ED ADULT NURSE NOTE - PMH
Anemia, unspecified type    Anxiety disorder, unspecified type    Bilateral ocular hypertension    Bipolar affective disorder, remission status unspecified    Blind    Diabetes    Dialysis patient  Tuesday- Thursday-Saturday  End stage renal disease    HTN (hypertension)    Insomnia, unspecified type    Obesity, unspecified classification, unspecified obesity type, unspecified whether serious comorbidity present    Pain disorder

## 2020-07-18 NOTE — ED ADULT NURSE NOTE - OBJECTIVE STATEMENT
Patient came in with complaints of nose bleed this morning. Patient supposed to go for hemodialysis today.

## 2020-07-19 LAB
ALBUMIN SERPL ELPH-MCNC: 3.9 G/DL — SIGNIFICANT CHANGE UP (ref 3.5–5.2)
ALP SERPL-CCNC: 141 U/L — HIGH (ref 30–115)
ALT FLD-CCNC: 5 U/L — SIGNIFICANT CHANGE UP (ref 0–41)
ANION GAP SERPL CALC-SCNC: 14 MMOL/L — SIGNIFICANT CHANGE UP (ref 7–14)
ANION GAP SERPL CALC-SCNC: 17 MMOL/L — HIGH (ref 7–14)
AST SERPL-CCNC: 6 U/L — SIGNIFICANT CHANGE UP (ref 0–41)
BASOPHILS # BLD AUTO: 0.11 K/UL — SIGNIFICANT CHANGE UP (ref 0–0.2)
BASOPHILS # BLD AUTO: 0.12 K/UL — SIGNIFICANT CHANGE UP (ref 0–0.2)
BASOPHILS NFR BLD AUTO: 1.2 % — HIGH (ref 0–1)
BASOPHILS NFR BLD AUTO: 1.2 % — HIGH (ref 0–1)
BILIRUB SERPL-MCNC: 0.4 MG/DL — SIGNIFICANT CHANGE UP (ref 0.2–1.2)
BUN SERPL-MCNC: 35 MG/DL — HIGH (ref 10–20)
BUN SERPL-MCNC: 42 MG/DL — HIGH (ref 10–20)
CALCIUM SERPL-MCNC: 8.4 MG/DL — LOW (ref 8.5–10.1)
CALCIUM SERPL-MCNC: 8.6 MG/DL — SIGNIFICANT CHANGE UP (ref 8.5–10.1)
CHLORIDE SERPL-SCNC: 94 MMOL/L — LOW (ref 98–110)
CHLORIDE SERPL-SCNC: 97 MMOL/L — LOW (ref 98–110)
CO2 SERPL-SCNC: 25 MMOL/L — SIGNIFICANT CHANGE UP (ref 17–32)
CO2 SERPL-SCNC: 26 MMOL/L — SIGNIFICANT CHANGE UP (ref 17–32)
CREAT SERPL-MCNC: 7 MG/DL — CRITICAL HIGH (ref 0.7–1.5)
CREAT SERPL-MCNC: 8 MG/DL — CRITICAL HIGH (ref 0.7–1.5)
EOSINOPHIL # BLD AUTO: 0.21 K/UL — SIGNIFICANT CHANGE UP (ref 0–0.7)
EOSINOPHIL # BLD AUTO: 0.33 K/UL — SIGNIFICANT CHANGE UP (ref 0–0.7)
EOSINOPHIL NFR BLD AUTO: 2.3 % — SIGNIFICANT CHANGE UP (ref 0–8)
EOSINOPHIL NFR BLD AUTO: 3.2 % — SIGNIFICANT CHANGE UP (ref 0–8)
GLUCOSE BLDC GLUCOMTR-MCNC: 109 MG/DL — HIGH (ref 70–99)
GLUCOSE BLDC GLUCOMTR-MCNC: 112 MG/DL — HIGH (ref 70–99)
GLUCOSE BLDC GLUCOMTR-MCNC: 137 MG/DL — HIGH (ref 70–99)
GLUCOSE SERPL-MCNC: 119 MG/DL — HIGH (ref 70–99)
GLUCOSE SERPL-MCNC: 94 MG/DL — SIGNIFICANT CHANGE UP (ref 70–99)
HCT VFR BLD CALC: 26 % — LOW (ref 42–52)
HCT VFR BLD CALC: 26.4 % — LOW (ref 42–52)
HGB BLD-MCNC: 7.8 G/DL — LOW (ref 14–18)
HGB BLD-MCNC: 7.8 G/DL — LOW (ref 14–18)
IMM GRANULOCYTES NFR BLD AUTO: 0.3 % — SIGNIFICANT CHANGE UP (ref 0.1–0.3)
IMM GRANULOCYTES NFR BLD AUTO: 0.5 % — HIGH (ref 0.1–0.3)
LYMPHOCYTES # BLD AUTO: 0.82 K/UL — LOW (ref 1.2–3.4)
LYMPHOCYTES # BLD AUTO: 0.92 K/UL — LOW (ref 1.2–3.4)
LYMPHOCYTES # BLD AUTO: 8.9 % — LOW (ref 20.5–51.1)
LYMPHOCYTES # BLD AUTO: 9 % — LOW (ref 20.5–51.1)
MAGNESIUM SERPL-MCNC: 2.6 MG/DL — HIGH (ref 1.8–2.4)
MCHC RBC-ENTMCNC: 25.8 PG — LOW (ref 27–31)
MCHC RBC-ENTMCNC: 26.2 PG — LOW (ref 27–31)
MCHC RBC-ENTMCNC: 29.5 G/DL — LOW (ref 32–37)
MCHC RBC-ENTMCNC: 30 G/DL — LOW (ref 32–37)
MCV RBC AUTO: 87.2 FL — SIGNIFICANT CHANGE UP (ref 80–94)
MCV RBC AUTO: 87.4 FL — SIGNIFICANT CHANGE UP (ref 80–94)
MONOCYTES # BLD AUTO: 0.49 K/UL — SIGNIFICANT CHANGE UP (ref 0.1–0.6)
MONOCYTES # BLD AUTO: 0.54 K/UL — SIGNIFICANT CHANGE UP (ref 0.1–0.6)
MONOCYTES NFR BLD AUTO: 5.2 % — SIGNIFICANT CHANGE UP (ref 1.7–9.3)
MONOCYTES NFR BLD AUTO: 5.4 % — SIGNIFICANT CHANGE UP (ref 1.7–9.3)
NEUTROPHILS # BLD AUTO: 7.47 K/UL — HIGH (ref 1.4–6.5)
NEUTROPHILS # BLD AUTO: 8.33 K/UL — HIGH (ref 1.4–6.5)
NEUTROPHILS NFR BLD AUTO: 81 % — HIGH (ref 42.2–75.2)
NEUTROPHILS NFR BLD AUTO: 81.8 % — HIGH (ref 42.2–75.2)
NRBC # BLD: 0 /100 WBCS — SIGNIFICANT CHANGE UP (ref 0–0)
NRBC # BLD: 0 /100 WBCS — SIGNIFICANT CHANGE UP (ref 0–0)
PHOSPHATE SERPL-MCNC: 4.1 MG/DL — SIGNIFICANT CHANGE UP (ref 2.1–4.9)
PLATELET # BLD AUTO: 210 K/UL — SIGNIFICANT CHANGE UP (ref 130–400)
PLATELET # BLD AUTO: 226 K/UL — SIGNIFICANT CHANGE UP (ref 130–400)
POTASSIUM SERPL-MCNC: 4.6 MMOL/L — SIGNIFICANT CHANGE UP (ref 3.5–5)
POTASSIUM SERPL-MCNC: 4.7 MMOL/L — SIGNIFICANT CHANGE UP (ref 3.5–5)
POTASSIUM SERPL-SCNC: 4.6 MMOL/L — SIGNIFICANT CHANGE UP (ref 3.5–5)
POTASSIUM SERPL-SCNC: 4.7 MMOL/L — SIGNIFICANT CHANGE UP (ref 3.5–5)
PROT SERPL-MCNC: 6.7 G/DL — SIGNIFICANT CHANGE UP (ref 6–8)
RBC # BLD: 2.98 M/UL — LOW (ref 4.7–6.1)
RBC # BLD: 3.02 M/UL — LOW (ref 4.7–6.1)
RBC # FLD: 16.7 % — HIGH (ref 11.5–14.5)
RBC # FLD: 16.7 % — HIGH (ref 11.5–14.5)
SODIUM SERPL-SCNC: 136 MMOL/L — SIGNIFICANT CHANGE UP (ref 135–146)
SODIUM SERPL-SCNC: 137 MMOL/L — SIGNIFICANT CHANGE UP (ref 135–146)
WBC # BLD: 10.29 K/UL — SIGNIFICANT CHANGE UP (ref 4.8–10.8)
WBC # BLD: 9.13 K/UL — SIGNIFICANT CHANGE UP (ref 4.8–10.8)
WBC # FLD AUTO: 10.29 K/UL — SIGNIFICANT CHANGE UP (ref 4.8–10.8)
WBC # FLD AUTO: 9.13 K/UL — SIGNIFICANT CHANGE UP (ref 4.8–10.8)

## 2020-07-19 PROCEDURE — 99223 1ST HOSP IP/OBS HIGH 75: CPT

## 2020-07-19 RX ORDER — ERYTHROPOIETIN 10000 [IU]/ML
10000 INJECTION, SOLUTION INTRAVENOUS; SUBCUTANEOUS
Refills: 0 | Status: DISCONTINUED | OUTPATIENT
Start: 2020-07-19 | End: 2020-07-25

## 2020-07-19 RX ORDER — MORPHINE SULFATE 50 MG/1
1 CAPSULE, EXTENDED RELEASE ORAL ONCE
Refills: 0 | Status: DISCONTINUED | OUTPATIENT
Start: 2020-07-19 | End: 2020-07-19

## 2020-07-19 RX ORDER — FUROSEMIDE 40 MG
40 TABLET ORAL
Refills: 0 | Status: COMPLETED | OUTPATIENT
Start: 2020-07-19 | End: 2020-07-21

## 2020-07-19 RX ORDER — GUAIFENESIN/DEXTROMETHORPHAN 600MG-30MG
10 TABLET, EXTENDED RELEASE 12 HR ORAL EVERY 4 HOURS
Refills: 0 | Status: DISCONTINUED | OUTPATIENT
Start: 2020-07-19 | End: 2020-07-25

## 2020-07-19 RX ADMIN — DORZOLAMIDE HYDROCHLORIDE 1 DROP(S): 20 SOLUTION/ DROPS OPHTHALMIC at 23:13

## 2020-07-19 RX ADMIN — Medication 1 DROP(S): at 07:27

## 2020-07-19 RX ADMIN — LATANOPROST 1 DROP(S): 0.05 SOLUTION/ DROPS OPHTHALMIC; TOPICAL at 23:59

## 2020-07-19 RX ADMIN — Medication 100 MILLIGRAM(S): at 22:34

## 2020-07-19 RX ADMIN — SIMETHICONE 80 MILLIGRAM(S): 80 TABLET, CHEWABLE ORAL at 23:08

## 2020-07-19 RX ADMIN — SEVELAMER CARBONATE 2400 MILLIGRAM(S): 2400 POWDER, FOR SUSPENSION ORAL at 22:34

## 2020-07-19 RX ADMIN — Medication 1 DROP(S): at 18:53

## 2020-07-19 RX ADMIN — BRIMONIDINE TARTRATE 1 DROP(S): 2 SOLUTION/ DROPS OPHTHALMIC at 18:52

## 2020-07-19 RX ADMIN — MORPHINE SULFATE 1 MILLIGRAM(S): 50 CAPSULE, EXTENDED RELEASE ORAL at 21:32

## 2020-07-19 RX ADMIN — TAMSULOSIN HYDROCHLORIDE 0.4 MILLIGRAM(S): 0.4 CAPSULE ORAL at 22:34

## 2020-07-19 RX ADMIN — Medication 25 MILLIGRAM(S): at 22:34

## 2020-07-19 RX ADMIN — SIMVASTATIN 10 MILLIGRAM(S): 20 TABLET, FILM COATED ORAL at 22:34

## 2020-07-19 RX ADMIN — BRIMONIDINE TARTRATE 1 DROP(S): 2 SOLUTION/ DROPS OPHTHALMIC at 07:25

## 2020-07-19 RX ADMIN — DORZOLAMIDE HYDROCHLORIDE 1 DROP(S): 20 SOLUTION/ DROPS OPHTHALMIC at 07:27

## 2020-07-19 RX ADMIN — Medication 40 MILLIGRAM(S): at 18:52

## 2020-07-19 NOTE — PROGRESS NOTE ADULT - SUBJECTIVE AND OBJECTIVE BOX
[facility]  INITIAL CONSULT NOTE  --------------------------------------------------------------------------------  HPI:    55 year old male patient with past medical history of hypertension, diabetes, end stage renal disease on hemodialysis, obstructive sleep apnea, chronic pain, bipolar disorder, transferred from [facility] for recurrent left epistaxis.  pt missed HD on Sat, chronic edema and overload, on BIPAP      PAST HISTORY  --------------------------------------------------------------------------------  PAST MEDICAL & SURGICAL HISTORY:  Dialysis patient: Tuesday- Thursday-Saturday  Bilateral ocular hypertension  Insomnia, unspecified type  Bipolar affective disorder, remission status unspecified  Obesity, unspecified classification, unspecified obesity type, unspecified whether serious comorbidity present  Anxiety disorder, unspecified type  Pain disorder  Anemia, unspecified type  Blind  HTN (hypertension)  End stage renal disease  Diabetes  Status post glaucoma surgery: RIGHT EYE  S/P BKA (below knee amputation) unilateral: RIGHT  A-V fistula: LEFT ARM    FAMILY HISTORY:    PAST SOCIAL HISTORY:    ALLERGIES & MEDICATIONS  --------------------------------------------------------------------------------  Allergies    No Known Allergies    Intolerances      Standing Inpatient Medications  albuterol/ipratropium for Nebulization 3 milliLiter(s) Nebulizer every 6 hours  brimonidine 0.2% Ophthalmic Solution 1 Drop(s) Both EYES two times a day  dorzolamide 2% Ophthalmic Solution 1 Drop(s) Both EYES three times a day  furosemide    Tablet 80 milliGRAM(s) Oral two times a day  guaiFENesin   Syrup  (Sugar-Free) 200 milliGRAM(s) Oral every 4 hours  lactulose Syrup 20 Gram(s) Oral two times a day  latanoprost 0.005% Ophthalmic Solution 1 Drop(s) Both EYES at bedtime  metolazone 5 milliGRAM(s) Oral <User Schedule>  metoprolol tartrate 25 milliGRAM(s) Oral two times a day  NIFEdipine XL 90 milliGRAM(s) Oral daily  polyethylene glycol 3350 17 Gram(s) Oral daily  pregabalin 50 milliGRAM(s) Oral daily  sertraline 100 milliGRAM(s) Oral daily  sevelamer carbonate 2400 milliGRAM(s) Oral three times a day  simethicone 80 milliGRAM(s) Chew three times a day  simvastatin 10 milliGRAM(s) Oral at bedtime  tamsulosin 0.4 milliGRAM(s) Oral at bedtime  timolol 0.5% Solution 1 Drop(s) Both EYES two times a day  traZODone 100 milliGRAM(s) Oral at bedtime    PRN Inpatient Medications  acetaminophen   Tablet .. 650 milliGRAM(s) Oral every 6 hours PRN  ALPRAZolam 0.25 milliGRAM(s) Oral daily PRN  oxycodone    5 mG/acetaminophen 325 mG 1 Tablet(s) Oral every 4 hours PRN  zolpidem 5 milliGRAM(s) Oral at bedtime PRN      REVIEW OF SYSTEMS  --------------------------------------------------------------------------------    Skin: No rashes  Respiratory: SOB, no cough, wheezing, hemoptysis  CV: No chest pain  GI: No abdominal pain, diarrhea, constipation, nausea, vomiting, melena, hematochezia  MSK:  edema  Neuro: No dizziness/lightheadedness, weakness, seizures,       All other systems were reviewed and are negative, except as noted.    VITALS/PHYSICAL EXAM  --------------------------------------------------------------------------------  T(C): 36.4 (07-19-20 @ 13:44), Max: 37.2 (07-18-20 @ 22:06)  HR: 74 (07-19-20 @ 13:44) (68 - 78)  BP: 161/71 (07-19-20 @ 13:44) (132/62 - 173/74)  RR: 20 (07-19-20 @ 13:44) (18 - 22)  SpO2: 92% (07-18-20 @ 22:06) (92% - 100%)  Wt(kg): --    Weight (kg): 156.6 (07-19-20 @ 06:15)      Physical Exam:    	Gen: on BIPAP  	HEENT: PERRL, supple neck,  	Pulm:  low  B/L BS  	CV: S1S2; no rub  	Abd: +BS, soft, nontender/nondistended  	LE:  edema  	  	    LABS/STUDIES  --------------------------------------------------------------------------------              7.8    10.29 >-----------<  226      [07-19-20 @ 07:50]              26.4     136  |  94  |  42  ----------------------------<  119      [07-19-20 @ 07:50]  4.7   |  25  |  8.0        Ca     8.4     [07-19-20 @ 07:50]      Mg     2.6     [07-19-20 @ 07:50]      Phos  4.1     [07-19-20 @ 07:50]    TPro  6.7  /  Alb  3.9  /  TBili  0.4  /  DBili  x   /  AST  6   /  ALT  5   /  AlkPhos  141  [07-19-20 @ 07:50]    PT/INR: PT 15.50, INR 1.35       [07-18-20 @ 19:10]  PTT: 34.9       [07-18-20 @ 19:10]      Creatinine Trend:  SCr 8.0 [07-19 @ 07:50]  SCr 7.2 [07-18 @ 19:10]    Urinalysis - [05-31-18 @ 16:45]      Color Yellow / Appearance Clear / SG 1.015 / pH 7.5      Gluc Negative / Ketone Negative  / Bili Negative / Urobili 0.2       Blood Trace-lysed / Protein 100 / Leuk Est Negative / Nitrite Negative      RBC 1-2 / WBC 1-2 / Hyaline  / Gran  / Sq Epi  / Non Sq Epi Occasional / Bacteria See Note      HbA1c 8.6      [06-01-18 @ 07:22]

## 2020-07-19 NOTE — PROGRESS NOTE ADULT - ASSESSMENT
# End stage renal disease on hemodialysis Tuesday / Thursday and Saturday , missed HD on sat, overload    - HD today for UF, 2 hours with 2-3 kg UF, than HD tomorrow for more UF  - fluid restriction, low Na diet  - Continue Lasix 80 mg PO BID and Metolazone 5 mg PO BID. got 1 dose Lasix 80 mg IV     # Recurrent left epistaxis in a patient not on anti-coagulation   - Epo in HD fo anemia  - s/p TAX soaked packing in the ED   - f/u CBC    # Diabetes   - On Lantus and sliding scale at the nursing home   - Check FS before meals and at bedtime and start insulin basal and bolus with sliding scale if FS consistently > 180   - Check hemoglobin A1C level     # Hypertension     - Continue Metoprolol Tartrate 25 mg PO BID + Procardia 90 mg PO qd     # Bipolar disorder / Anxiety disorder   - Continue Xanax 0.25 mg PO qd PRN (end in 3 days) + Sertraline 100 mg PO qd + Trazodone 100 mg PO qhs     # Obstructive sleep apnea / COPD on 3L NC   - Continue Duoneb 3mL q6h   - CPAP 14/8 30% FiO2 at bedtime

## 2020-07-19 NOTE — CHART NOTE - NSCHARTNOTEFT_GEN_A_CORE
Mr. Roth was asked to go to dialysis to help relieve pulmonary edema, however, he declined to go because the air conditioning is too cold at dialysis, they do not have TV, and he has not been able to eat since he continues to require BiPAP. This morning he attempted to sit at the side of the bed and "catch [his] breath" as he usually does at nursing home when he takes off CPAP so that he could eat, however he was not allowed to do this due to fall risk; at that time code grey was called. Patient was advised that dialysis would help him breathe and could help wean off BiPAP, however the patient stated he "would rather drop dead" than go to dialysis. Mr. Roth was asked to go to dialysis to help relieve pulmonary edema, however, he refused. Patient was advised that dialysis would help him breathe and could help wean off BiPAP, however the patient still declined.

## 2020-07-19 NOTE — PROGRESS NOTE ADULT - SUBJECTIVE AND OBJECTIVE BOX
SUBJECTIVE:    Patient is a 55y old Male who presents with a chief complaint of Epistaxis, pulmonary edema (19 Jul 2020 13:49). Today he is requiring BiPAP. Code grey was called because patient was falling out of bed during attempt to wean off BiPAP. Patient refusing HD.    HPI:  55 year old male patient with past medical history of hypertension, diabetes, end stage renal disease on hemodialysis, obstructive sleep apnea, chronic pain, bipolar disorder, transferred from [facility] for recurrent left epistaxis.     The patient states that he had 3 episodes of left sided epistaxis in the last month but none of them lasted that long. He woke up at 1 AM to be have his pain medication and states that the epistaxis started suddenly with no triggering event and continued the whole next and the next day, the epistaxis was associated with blood clots coming out of the left nare.  The patient states that he does prick his nose frequently but doesn't recall pricking his nose immediately before the epistaxis started. Because of the bleeding and the contra-indication for heparin he didn't undergo hemodialysis. The patient denies any history of nose procedure and denies any other source of bleeding (denies hemoptysis, hematemesis or hematochezia but he did have an episode of bright red blood per rectum in the past).     In the ED : Patient hemodynamically stable /62, heart rate 69, RR 20/min, Temp 98.5, SpO2 100% on 15L non-rebreather (patient is on NC at baseline but non rebreather was placed given the epistaxis). Hb level 7.9 (baseline between 8 and 9). Thromboxane soaked renal rocket was placed with resolution of the bleeding. Chest x ray was done and showed significant pulmonary vascular congestion. The patient was admitted for hemodialysis (given that his next session is scheduled for Tuesday and he missed today's session) as well as ENT evaluation (18 Jul 2020 20:41)    PAST MEDICAL & SURGICAL HISTORY  Dialysis patient: Tuesday- Thursday-Saturday  Bilateral ocular hypertension  Insomnia, unspecified type  Bipolar affective disorder, remission status unspecified  Obesity, unspecified classification, unspecified obesity type, unspecified whether serious comorbidity present  Anxiety disorder, unspecified type  Pain disorder  Anemia, unspecified type  Blind  HTN (hypertension)  End stage renal disease  Diabetes  Status post glaucoma surgery: RIGHT EYE  S/P BKA (below knee amputation) unilateral: RIGHT  A-V fistula: LEFT ARM    SOCIAL HISTORY:    ALLERGIES:  No Known Allergies    MEDICATIONS:  STANDING MEDICATIONS  albuterol/ipratropium for Nebulization 3 milliLiter(s) Nebulizer every 6 hours  brimonidine 0.2% Ophthalmic Solution 1 Drop(s) Both EYES two times a day  dorzolamide 2% Ophthalmic Solution 1 Drop(s) Both EYES three times a day  furosemide   Injectable 40 milliGRAM(s) IV Push two times a day  guaiFENesin   Syrup  (Sugar-Free) 200 milliGRAM(s) Oral every 4 hours  lactulose Syrup 20 Gram(s) Oral two times a day  latanoprost 0.005% Ophthalmic Solution 1 Drop(s) Both EYES at bedtime  metolazone 5 milliGRAM(s) Oral <User Schedule>  metoprolol tartrate 25 milliGRAM(s) Oral two times a day  NIFEdipine XL 90 milliGRAM(s) Oral daily  polyethylene glycol 3350 17 Gram(s) Oral daily  pregabalin 50 milliGRAM(s) Oral daily  sertraline 100 milliGRAM(s) Oral daily  sevelamer carbonate 2400 milliGRAM(s) Oral three times a day  simethicone 80 milliGRAM(s) Chew three times a day  simvastatin 10 milliGRAM(s) Oral at bedtime  tamsulosin 0.4 milliGRAM(s) Oral at bedtime  timolol 0.5% Solution 1 Drop(s) Both EYES two times a day  traZODone 100 milliGRAM(s) Oral at bedtime    PRN MEDICATIONS  acetaminophen   Tablet .. 650 milliGRAM(s) Oral every 6 hours PRN  ALPRAZolam 0.25 milliGRAM(s) Oral daily PRN  oxycodone    5 mG/acetaminophen 325 mG 1 Tablet(s) Oral every 4 hours PRN  zolpidem 5 milliGRAM(s) Oral at bedtime PRN    VITALS:   Vital Signs Last 24 Hrs  T(C): 36.4 (19 Jul 2020 13:44), Max: 37.2 (18 Jul 2020 22:06)  T(F): 97.5 (19 Jul 2020 13:44), Max: 98.9 (18 Jul 2020 22:06)  HR: 74 (19 Jul 2020 13:44) (68 - 78)  BP: 161/71 (19 Jul 2020 13:44) (132/62 - 173/74)  RR: 20 (19 Jul 2020 13:44) (18 - 22)  SpO2: 92% (18 Jul 2020 22:06) (92% - 100%)    LABS:                        7.8    10.29 )-----------( 226      ( 19 Jul 2020 07:50 )             26.4     07-19    136  |  94<L>  |  42<H>  ----------------------------<  119<H>  4.7   |  25  |  8.0<HH>    Ca    8.4<L>      19 Jul 2020 07:50  Phos  4.1     07-19  Mg     2.6     07-19    TPro  6.7  /  Alb  3.9  /  TBili  0.4  /  DBili  x   /  AST  6   /  ALT  5   /  AlkPhos  141<H>  07-19    PT/INR - ( 18 Jul 2020 19:10 )   PT: 15.50 sec;   INR: 1.35 ratio       PTT - ( 18 Jul 2020 19:10 )  PTT:34.9 sec    RADIOLOGY:    < from: Xray Chest 1 View AP/PA (07.18.20 @ 19:26) >  Impression:    Bilateral diffuse opacities.  < end of copied text >    PHYSICAL EXAM:  GEN: No acute distress  LUNGS: Clear to auscultation bilaterally   HEART: Regular  ABD: Soft, non-tender, non-distended.  EXT: NC/NC/NE/2+PP/VANN/Skin Intact.   NEURO: AAOX3. Conversant.

## 2020-07-19 NOTE — CHART NOTE - NSCHARTNOTEFT_GEN_A_CORE
day hospitalist follow up   Vital Signs Last 24 Hrs  T(C): 36.4 (19 Jul 2020 13:44), Max: 37.2 (18 Jul 2020 22:06)  T(F): 97.5 (19 Jul 2020 13:44), Max: 98.9 (18 Jul 2020 22:06)  HR: 72 (19 Jul 2020 15:45) (68 - 78)  BP: 179/85 (19 Jul 2020 15:45) (132/62 - 179/85)  BP(mean): --  RR: 20 (19 Jul 2020 15:45) (18 - 22)  SpO2: 98% (19 Jul 2020 15:45) (92% - 100%)  continue to monitor oxygen stauts secondary to fluid overload post hd

## 2020-07-19 NOTE — GOALS OF CARE CONVERSATION - ADVANCED CARE PLANNING - CONVERSATION DETAILS
Updated family/ patient  regarding  current condition and overall prognosis. Both able to express a reasonable understanding of his current medical condition.   Remains Full Code.

## 2020-07-19 NOTE — PROGRESS NOTE ADULT - ASSESSMENT
# Fluid overload  DDx: ESRD vs CHF less likely  - Pt refusing HD.  - Requiring BiPAP due to continued pulmonary edema.  - Holding oral meds until patient can tolerate being off BiPAP.    # Recurrent left epistaxis in a patient not on anti-coagulation   - Epo in HD for anemia.  - s/p TAX soaked packing in the ED.  - f/u CBC.    # Diabetes   - On Lantus and sliding scale at the nursing home.  - Check FS before meals and at bedtime and start insulin basal and bolus with sliding scale if FS consistently > 180   - Will f/u hemoglobin A1C level    # Hypertension   - Continue Metoprolol Tartrate 25 mg PO BID + Procardia 90 mg PO qd.    # Bipolar disorder / Anxiety disorder   - Continue Xanax 0.25 mg PO qd PRN (end in 3 days) + Sertraline 100 mg PO qd + Trazodone 100 mg PO qhs.    # Obstructive sleep apnea / COPD on 3L NC   - Continue Duoneb 3mL q6h  - CPAP 14/8 30% FiO2 at bedtime

## 2020-07-19 NOTE — PATIENT PROFILE ADULT - NURSING HOMES
[facility], [facility]

## 2020-07-20 LAB
A1C WITH ESTIMATED AVERAGE GLUCOSE RESULT: 6.9 % — HIGH (ref 4–5.6)
ALBUMIN SERPL ELPH-MCNC: 3.7 G/DL — SIGNIFICANT CHANGE UP (ref 3.5–5.2)
ALP SERPL-CCNC: 134 U/L — HIGH (ref 30–115)
ALT FLD-CCNC: 6 U/L — SIGNIFICANT CHANGE UP (ref 0–41)
ANION GAP SERPL CALC-SCNC: 15 MMOL/L — HIGH (ref 7–14)
AST SERPL-CCNC: 10 U/L — SIGNIFICANT CHANGE UP (ref 0–41)
BASOPHILS # BLD AUTO: 0.07 K/UL — SIGNIFICANT CHANGE UP (ref 0–0.2)
BASOPHILS NFR BLD AUTO: 0.9 % — SIGNIFICANT CHANGE UP (ref 0–1)
BILIRUB SERPL-MCNC: 0.4 MG/DL — SIGNIFICANT CHANGE UP (ref 0.2–1.2)
BUN SERPL-MCNC: 38 MG/DL — HIGH (ref 10–20)
CALCIUM SERPL-MCNC: 8.3 MG/DL — LOW (ref 8.5–10.1)
CHLORIDE SERPL-SCNC: 95 MMOL/L — LOW (ref 98–110)
CO2 SERPL-SCNC: 26 MMOL/L — SIGNIFICANT CHANGE UP (ref 17–32)
CREAT SERPL-MCNC: 7.7 MG/DL — CRITICAL HIGH (ref 0.7–1.5)
EOSINOPHIL # BLD AUTO: 0.26 K/UL — SIGNIFICANT CHANGE UP (ref 0–0.7)
EOSINOPHIL NFR BLD AUTO: 3.3 % — SIGNIFICANT CHANGE UP (ref 0–8)
ESTIMATED AVERAGE GLUCOSE: 151 MG/DL — HIGH (ref 68–114)
GLUCOSE BLDC GLUCOMTR-MCNC: 130 MG/DL — HIGH (ref 70–99)
GLUCOSE BLDC GLUCOMTR-MCNC: 236 MG/DL — HIGH (ref 70–99)
GLUCOSE SERPL-MCNC: 118 MG/DL — HIGH (ref 70–99)
HCT VFR BLD CALC: 25.8 % — LOW (ref 42–52)
HGB BLD-MCNC: 7.5 G/DL — LOW (ref 14–18)
IMM GRANULOCYTES NFR BLD AUTO: 0.5 % — HIGH (ref 0.1–0.3)
LYMPHOCYTES # BLD AUTO: 1.02 K/UL — LOW (ref 1.2–3.4)
LYMPHOCYTES # BLD AUTO: 13.1 % — LOW (ref 20.5–51.1)
MCHC RBC-ENTMCNC: 25.3 PG — LOW (ref 27–31)
MCHC RBC-ENTMCNC: 29.1 G/DL — LOW (ref 32–37)
MCV RBC AUTO: 86.9 FL — SIGNIFICANT CHANGE UP (ref 80–94)
MONOCYTES # BLD AUTO: 0.52 K/UL — SIGNIFICANT CHANGE UP (ref 0.1–0.6)
MONOCYTES NFR BLD AUTO: 6.7 % — SIGNIFICANT CHANGE UP (ref 1.7–9.3)
NEUTROPHILS # BLD AUTO: 5.89 K/UL — SIGNIFICANT CHANGE UP (ref 1.4–6.5)
NEUTROPHILS NFR BLD AUTO: 75.5 % — HIGH (ref 42.2–75.2)
NRBC # BLD: 0 /100 WBCS — SIGNIFICANT CHANGE UP (ref 0–0)
NT-PROBNP SERPL-SCNC: HIGH PG/ML (ref 0–300)
PLATELET # BLD AUTO: 213 K/UL — SIGNIFICANT CHANGE UP (ref 130–400)
POTASSIUM SERPL-MCNC: 4.7 MMOL/L — SIGNIFICANT CHANGE UP (ref 3.5–5)
POTASSIUM SERPL-SCNC: 4.7 MMOL/L — SIGNIFICANT CHANGE UP (ref 3.5–5)
PROT SERPL-MCNC: 6.4 G/DL — SIGNIFICANT CHANGE UP (ref 6–8)
RBC # BLD: 2.97 M/UL — LOW (ref 4.7–6.1)
RBC # FLD: 17 % — HIGH (ref 11.5–14.5)
SODIUM SERPL-SCNC: 136 MMOL/L — SIGNIFICANT CHANGE UP (ref 135–146)
WBC # BLD: 7.8 K/UL — SIGNIFICANT CHANGE UP (ref 4.8–10.8)
WBC # FLD AUTO: 7.8 K/UL — SIGNIFICANT CHANGE UP (ref 4.8–10.8)

## 2020-07-20 PROCEDURE — 99233 SBSQ HOSP IP/OBS HIGH 50: CPT

## 2020-07-20 PROCEDURE — 99223 1ST HOSP IP/OBS HIGH 75: CPT

## 2020-07-20 RX ORDER — HYDRALAZINE HCL 50 MG
25 TABLET ORAL THREE TIMES A DAY
Refills: 0 | Status: DISCONTINUED | OUTPATIENT
Start: 2020-07-20 | End: 2020-07-25

## 2020-07-20 RX ORDER — FUROSEMIDE 40 MG
40 TABLET ORAL ONCE
Refills: 0 | Status: COMPLETED | OUTPATIENT
Start: 2020-07-20 | End: 2020-07-20

## 2020-07-20 RX ORDER — MORPHINE SULFATE 50 MG/1
2 CAPSULE, EXTENDED RELEASE ORAL ONCE
Refills: 0 | Status: DISCONTINUED | OUTPATIENT
Start: 2020-07-20 | End: 2020-07-20

## 2020-07-20 RX ADMIN — Medication 25 MILLIGRAM(S): at 21:11

## 2020-07-20 RX ADMIN — SIMETHICONE 80 MILLIGRAM(S): 80 TABLET, CHEWABLE ORAL at 16:17

## 2020-07-20 RX ADMIN — Medication 90 MILLIGRAM(S): at 06:22

## 2020-07-20 RX ADMIN — BRIMONIDINE TARTRATE 1 DROP(S): 2 SOLUTION/ DROPS OPHTHALMIC at 16:26

## 2020-07-20 RX ADMIN — DORZOLAMIDE HYDROCHLORIDE 1 DROP(S): 20 SOLUTION/ DROPS OPHTHALMIC at 06:23

## 2020-07-20 RX ADMIN — DORZOLAMIDE HYDROCHLORIDE 1 DROP(S): 20 SOLUTION/ DROPS OPHTHALMIC at 16:26

## 2020-07-20 RX ADMIN — OXYCODONE AND ACETAMINOPHEN 1 TABLET(S): 5; 325 TABLET ORAL at 06:43

## 2020-07-20 RX ADMIN — Medication 0.25 MILLIGRAM(S): at 08:53

## 2020-07-20 RX ADMIN — SEVELAMER CARBONATE 2400 MILLIGRAM(S): 2400 POWDER, FOR SUSPENSION ORAL at 06:22

## 2020-07-20 RX ADMIN — OXYCODONE AND ACETAMINOPHEN 1 TABLET(S): 5; 325 TABLET ORAL at 17:16

## 2020-07-20 RX ADMIN — SEVELAMER CARBONATE 2400 MILLIGRAM(S): 2400 POWDER, FOR SUSPENSION ORAL at 16:17

## 2020-07-20 RX ADMIN — Medication 10 MILLILITER(S): at 06:22

## 2020-07-20 RX ADMIN — Medication 1 DROP(S): at 06:23

## 2020-07-20 RX ADMIN — Medication 3 MILLILITER(S): at 19:34

## 2020-07-20 RX ADMIN — Medication 50 MILLIGRAM(S): at 16:25

## 2020-07-20 RX ADMIN — Medication 40 MILLIGRAM(S): at 11:46

## 2020-07-20 RX ADMIN — TAMSULOSIN HYDROCHLORIDE 0.4 MILLIGRAM(S): 0.4 CAPSULE ORAL at 22:00

## 2020-07-20 RX ADMIN — Medication 1 DROP(S): at 16:26

## 2020-07-20 RX ADMIN — MORPHINE SULFATE 2 MILLIGRAM(S): 50 CAPSULE, EXTENDED RELEASE ORAL at 21:13

## 2020-07-20 RX ADMIN — SIMETHICONE 80 MILLIGRAM(S): 80 TABLET, CHEWABLE ORAL at 06:22

## 2020-07-20 RX ADMIN — Medication 25 MILLIGRAM(S): at 16:22

## 2020-07-20 RX ADMIN — LATANOPROST 1 DROP(S): 0.05 SOLUTION/ DROPS OPHTHALMIC; TOPICAL at 21:12

## 2020-07-20 RX ADMIN — SIMVASTATIN 10 MILLIGRAM(S): 20 TABLET, FILM COATED ORAL at 21:11

## 2020-07-20 RX ADMIN — Medication 40 MILLIGRAM(S): at 16:19

## 2020-07-20 RX ADMIN — SERTRALINE 100 MILLIGRAM(S): 25 TABLET, FILM COATED ORAL at 16:17

## 2020-07-20 RX ADMIN — Medication 25 MILLIGRAM(S): at 06:22

## 2020-07-20 RX ADMIN — Medication 40 MILLIGRAM(S): at 07:27

## 2020-07-20 RX ADMIN — LACTULOSE 20 GRAM(S): 10 SOLUTION ORAL at 16:21

## 2020-07-20 RX ADMIN — POLYETHYLENE GLYCOL 3350 17 GRAM(S): 17 POWDER, FOR SOLUTION ORAL at 16:18

## 2020-07-20 RX ADMIN — Medication 10 MILLILITER(S): at 21:10

## 2020-07-20 RX ADMIN — Medication 10 MILLILITER(S): at 16:20

## 2020-07-20 RX ADMIN — BRIMONIDINE TARTRATE 1 DROP(S): 2 SOLUTION/ DROPS OPHTHALMIC at 06:22

## 2020-07-20 RX ADMIN — LACTULOSE 20 GRAM(S): 10 SOLUTION ORAL at 06:22

## 2020-07-20 RX ADMIN — Medication 10 MILLILITER(S): at 08:29

## 2020-07-20 RX ADMIN — SIMETHICONE 80 MILLIGRAM(S): 80 TABLET, CHEWABLE ORAL at 21:12

## 2020-07-20 RX ADMIN — ERYTHROPOIETIN 10000 UNIT(S): 10000 INJECTION, SOLUTION INTRAVENOUS; SUBCUTANEOUS at 15:39

## 2020-07-20 RX ADMIN — Medication 100 MILLIGRAM(S): at 21:11

## 2020-07-20 RX ADMIN — DORZOLAMIDE HYDROCHLORIDE 1 DROP(S): 20 SOLUTION/ DROPS OPHTHALMIC at 21:10

## 2020-07-20 RX ADMIN — SEVELAMER CARBONATE 2400 MILLIGRAM(S): 2400 POWDER, FOR SUSPENSION ORAL at 21:12

## 2020-07-20 NOTE — PROGRESS NOTE ADULT - ATTENDING COMMENTS
Attending note  Patient was seen and examined.  Plan of care were discussed with resident  Patient is 56 yo male with hx of Anemia, Anxiety disorder, Bilateral ocular hypertension, Bipolar affective disorder, remission status unspecified   Blind, Diabetes, Dialysis patient Tuesday- Thursday-Saturday, End stage renal disease, HTN (hypertension), Insomnia, unspecified type, Obesity, unspecified classification, unspecified obesity type, unspecified whether serious comorbidity present and Pain disorder presenting with     # Fluid overload (DDx: ESRD vs CHF less likely)  - Pt has been refusing dialysis.  Now agreeable  -Continue with IVF lasix  -Dialysis today  -Wean off BIPAP as tolerated  -repeat CXR after dialysis and in the am  -Pulmonary, and nephrology to follow up     # Recurrent left epistaxis in a patient not on anti-coagulation   - EPO in HD for anemia  - s/p TAX soaked packing in the ED  - f/u CBC  -Outpatient follow up with ENT    # Bipolar disorder / Anxiety disorder   - Continue Xanax 0.25 mg PO qd PRN (end in 3 days) + Sertraline 100 mg PO qd + Trazodone 100 mg PO qhs.  - Psych consult placed to access decision making capacity    # Type 2 Diabetes Mellitus   - Continue with lantus, and ISS  -Monitor POC    # Hypertension (controlled)  - Continue Metoprolol Tartrate 25 mg PO BID + Procardia 90 mg PO qd    # Obstructive sleep apnea / COPD on 3L NC   - Continue Duoneb 3mL q6h  - CPAP 14/8 30% FiO2    #Progress Note Handoff  Pending (specify) hypoxemia    Family discussion:  plan of care was discussed with patient   in details.  all questions were answered.  seems to understand, and in agreement  Disposition:  unknown

## 2020-07-20 NOTE — CONSULT NOTE ADULT - SUBJECTIVE AND OBJECTIVE BOX
Patient is a 55y old  Male who presents with a chief complaint of Epistaxis, pulmonary edema (19 Jul 2020 15:48)      HPI:  55 year old male patient with past medical history of hypertension, diabetes, end stage renal disease on hemodialysis, obstructive sleep apnea, chronic pain, bipolar disorder, transferred from [facility] for recurrent left epistaxis.     The patient states that he had 3 episodes of left sided epistaxis in the last month but none of them lasted that long. He woke up at 1 AM to be have his pain medication and states that the epistaxis started suddenly with no triggering event and continued the whole next and the next day, the epistaxis was associated with blood clots coming out of the left nare.  The patient states that he does prick his nose frequently but doesn't recall pricking his nose immediately before the epistaxis started. Because of the bleeding and the contra-indication for heparin he didn't undergo hemodialysis. The patient denies any history of nose procedure and denies any other source of bleeding (denies hemoptysis, hematemesis or hematochezia but he did have an episode of bright red blood per rectum in the past).     In the ED : Patient hemodynamically stable /62, heart rate 69, RR 20/min, Temp 98.5, SpO2 100% on 15L non-rebreather (patient is on NC at baseline but non rebreather was placed given the epistaxis). Hb level 7.9 (baseline between 8 and 9). Thromboxane soaked renal rocket was placed with resolution of the bleeding. Chest x ray was done and showed significant pulmonary vascular congestion. The patient was admitted for hemodialysis (given that his next session is scheduled for Tuesday and he missed today's session) as well as ENT evaluation (18 Jul 2020 20:41)    ENT called to evaluate Pt. with left epistaxis, IN ED left nare packed with 5.5 rhino rocket on 7/18, Pt. was seen and examined at bedside in NAD (earlier today ) on BiPaP, packing removed by Pt. yesterday, currently no active bleeding. Pt. denies sensation of swallowing blood. Pt. denies  N/V, fever, chills.        PAST MEDICAL & SURGICAL HISTORY:  Dialysis patient: Tuesday- Thursday-Saturday  Bilateral ocular hypertension  Insomnia, unspecified type  Bipolar affective disorder, remission status unspecified  Obesity, unspecified classification, unspecified obesity type, unspecified whether serious comorbidity present  Anxiety disorder, unspecified type  Pain disorder  Anemia, unspecified type  Blind  HTN (hypertension)  End stage renal disease  Diabetes  Status post glaucoma surgery: RIGHT EYE  S/P BKA (below knee amputation) unilateral: RIGHT  A-V fistula: LEFT ARM      REVIEW OF SYSTEMS:    CONSTITUTIONAL:  fevers or chills  HEENT: No visual changes  ENDO: No sweating  NECK: No pain or stiffness  MUSCULOSKELETAL: No back pain, no joint pain  RESPIRATORY: No shortness of breath  CARDIOVASCULAR: No chest pain  GASTROINTESTINAL: No abdominal or epigastric pain. No nausea, vomiting,  No diarrhea or constipation.   NEUROLOGICAL: No mental status changes  PSYCH: No depression, no mood changes  SKIN: No itching      MEDICATIONS  (STANDING):  albuterol/ipratropium for Nebulization 3 milliLiter(s) Nebulizer every 6 hours  brimonidine 0.2% Ophthalmic Solution 1 Drop(s) Both EYES two times a day  dorzolamide 2% Ophthalmic Solution 1 Drop(s) Both EYES three times a day  epoetin keagan-epbx (RETACRIT) Injectable 74069 Unit(s) IV Push <User Schedule>  furosemide   Injectable 40 milliGRAM(s) IV Push two times a day  guaifenesin/dextromethorphan  Syrup 10 milliLiter(s) Oral every 4 hours  lactulose Syrup 20 Gram(s) Oral two times a day  latanoprost 0.005% Ophthalmic Solution 1 Drop(s) Both EYES at bedtime  metolazone 5 milliGRAM(s) Oral <User Schedule>  metoprolol tartrate 25 milliGRAM(s) Oral two times a day  NIFEdipine XL 90 milliGRAM(s) Oral daily  polyethylene glycol 3350 17 Gram(s) Oral daily  pregabalin 50 milliGRAM(s) Oral daily  sertraline 100 milliGRAM(s) Oral daily  sevelamer carbonate 2400 milliGRAM(s) Oral three times a day  simethicone 80 milliGRAM(s) Chew three times a day  simvastatin 10 milliGRAM(s) Oral at bedtime  tamsulosin 0.4 milliGRAM(s) Oral at bedtime  timolol 0.5% Solution 1 Drop(s) Both EYES two times a day  traZODone 100 milliGRAM(s) Oral at bedtime    MEDICATIONS  (PRN):  acetaminophen   Tablet .. 650 milliGRAM(s) Oral every 6 hours PRN Temp greater or equal to 38C (100.4F), Mild Pain (1 - 3)  ALPRAZolam 0.25 milliGRAM(s) Oral daily PRN anxiety  oxycodone    5 mG/acetaminophen 325 mG 1 Tablet(s) Oral every 4 hours PRN for moderate pain  zolpidem 5 milliGRAM(s) Oral at bedtime PRN Insomnia      Allergies: No Known Allergies      SOCIAL HISTORY: No illicit drug use      FAMILY HISTORY:  no pertinent family history     Vital Signs Last 24 Hrs  T(C): 36.6 (20 Jul 2020 06:23), Max: 36.6 (20 Jul 2020 06:23)  T(F): 97.9 (20 Jul 2020 06:23), Max: 97.9 (20 Jul 2020 06:23)  HR: 69 (20 Jul 2020 06:23) (69 - 76)  BP: 189/80 (20 Jul 2020 06:23) (161/71 - 199/81)  RR: 20 (20 Jul 2020 06:23) (20 - 20)  SpO2: 97% (19 Jul 2020 23:10) (94% - 98%)   [ ] yes [ ] no  PHYSICAL EXAM:    Constitutional: in NAD, obese, well-developed, well nourished   HEENT: NC/AT, EOMI  Nose: B/L nares patent no d/c , dry blood around both nares  Mouth: mucosa dry, tongue midline uvula midline no evidence of active bleeding over posterior oropharynx   Neck: no pain  Back: No CVA tenderness  Respiratory: No accessory respiratory muscle use  Abd: Soft, NT/ND     Extremities: no edema  Neurological: A/O x 3  Psychiatric: Normal mood, normal affect  Skin: No rashes     I&O's Detail    19 Jul 2020 07:01  -  20 Jul 2020 07:00  --------------------------------------------------------  IN:  Total IN: 0 mL    OUT:    Other: 3000 mL  Total OUT: 3000 mL    Total NET: -3000 mL      LABS:                        7.5    7.80  )-----------( 213      ( 20 Jul 2020 05:03 )             25.8     07-20    136  |  95<L>  |  38<H>  ----------------------------<  118<H>  4.7   |  26  |  7.7<HH>    Ca    8.3<L>      20 Jul 2020 05:03  Phos  4.1     07-19  Mg     2.6     07-19    TPro  6.4  /  Alb  3.7  /  TBili  0.4  /  DBili  x   /  AST  10  /  ALT  6   /  AlkPhos  134<H>  07-20    PT/INR - ( 18 Jul 2020 19:10 )   PT: 15.50 sec;   INR: 1.35 ratio         PTT - ( 18 Jul 2020 19:10 )  PTT:34.9 sec         RADIOLOGY & ADDITIONAL STUDIES:

## 2020-07-20 NOTE — CONSULT NOTE ADULT - ASSESSMENT
55 y.o M  with left epistaxis, s/p packed in ED with 5.5 rhino rocket on 7/18/20, packing removed by Pt. sometime yesterday. No evidence of active bleeding.        Plan:  Keep nares moist, improve hydration  Monitor for re-bleeding  Monitor VS  Control SBP   Nasal saline, 2 sprays to both nares 4 times a day  Avoid nasal trauma; no nose rubbing, blowing or manipulating nasal packing.  Sneeze with mouth open and pinching nares.  Avoid bending with head blow the waist.    No heavy lifting.  Recall ENT if Pt. re- bleeds   ENT attending will F/U

## 2020-07-20 NOTE — CONSULT NOTE ADULT - SUBJECTIVE AND OBJECTIVE BOX
Patient is a 55y old  Male who presents with a chief complaint of Epistaxis, pulmonary edema (20 Jul 2020 13:15)      HPI:  55 year old male patient with past medical history of hypertension, diabetes, end stage renal disease on hemodialysis, obstructive sleep apnea, chronic pain, bipolar disorder, transferred from [facility] for recurrent left epistaxis.     The patient states that he had 3 episodes of left sided epistaxis in the last month but none of them lasted that long. He woke up at 1 AM to be have his pain medication and states that the epistaxis started suddenly with no triggering event and continued the whole next and the next day, the epistaxis was associated with blood clots coming out of the left nare.  The patient states that he does prick his nose frequently but doesn't recall pricking his nose immediately before the epistaxis started. Because of the bleeding and the contra-indication for heparin he didn't undergo hemodialysis. The patient denies any history of nose procedure and denies any other source of bleeding (denies hemoptysis, hematemesis or hematochezia but he did have an episode of bright red blood per rectum in the past).     In the ED : Patient hemodynamically stable /62, heart rate 69, RR 20/min, Temp 98.5, SpO2 100% on 15L non-rebreather (patient is on NC at baseline but non rebreather was placed given the epistaxis). Hb level 7.9 (baseline between 8 and 9). Thromboxane soaked renal rocket was placed with resolution of the bleeding. Chest x ray was done and showed significant pulmonary vascular congestion. The patient was admitted for hemodialysis (given that his next session is scheduled for Tuesday and he missed today's session) as well as ENT evaluation (18 Jul 2020 20:41)      PAST MEDICAL & SURGICAL HISTORY:  Dialysis patient: Tuesday- Thursday-Saturday  Bilateral ocular hypertension  Insomnia, unspecified type  Bipolar affective disorder, remission status unspecified  Obesity, unspecified classification, unspecified obesity type, unspecified whether serious comorbidity present  Anxiety disorder, unspecified type  Pain disorder  Anemia, unspecified type  Blind  HTN (hypertension)  End stage renal disease  Diabetes  Status post glaucoma surgery: RIGHT EYE  S/P BKA (below knee amputation) unilateral: RIGHT  A-V fistula: LEFT ARM      SOCIAL HX:   Smoking  no                       ETOH       no                     Other NO illicit drug abuse    FAMILY HISTORY:  .  No cardiovascular or pulmonary family history     REVIEW OF SYSTEMS:    All ROS are negative exept per HPI       Allergies    No Known Allergies    Intolerances          PHYSICAL EXAM  Vital Signs Last 24 Hrs  T(C): 36.6 (20 Jul 2020 06:23), Max: 36.6 (20 Jul 2020 06:23)  T(F): 97.9 (20 Jul 2020 06:23), Max: 97.9 (20 Jul 2020 06:23)  HR: 69 (20 Jul 2020 06:23) (69 - 76)  BP: 189/80 (20 Jul 2020 06:23) (161/71 - 199/81)  BP(mean): --  RR: 20 (20 Jul 2020 06:23) (20 - 20)  SpO2: 97% (19 Jul 2020 23:10) (94% - 98%)    CONSTITUTIONAL:  Well nourished.  NAD    ENT:   Airway patent,   No thrush    EYES:   Clear bilaterally,   pupils equal,   round and reactive to light.    CARDIAC:   Normal rate,   regular rhythm.    no edema      RESPIRATORY:   b/l crackles    GASTROINTESTINAL:  Abdomen soft, non-tender,   No guarding,   Positive BS    MUSCULOSKELETAL:   Range of motion is not limited,  No clubbing, cyanosis    NEUROLOGICAL:   Alert and oriented   No motor deficits.    SKIN:   Skin normal color for race,   No evidence of rash.      HEME LYMPH:   No cervical  lymphadenopathy.  no inguinal lymphadenopathy          LABS:                          7.5    7.80  )-----------( 213      ( 20 Jul 2020 05:03 )             25.8                                               07-20    136  |  95<L>  |  38<H>  ----------------------------<  118<H>  4.7   |  26  |  7.7<HH>    Ca    8.3<L>      20 Jul 2020 05:03  Phos  4.1     07-19  Mg     2.6     07-19    TPro  6.4  /  Alb  3.7  /  TBili  0.4  /  DBili  x   /  AST  10  /  ALT  6   /  AlkPhos  134<H>  07-20      PT/INR - ( 18 Jul 2020 19:10 )   PT: 15.50 sec;   INR: 1.35 ratio         PTT - ( 18 Jul 2020 19:10 )  PTT:34.9 sec                                                                                     LIVER FUNCTIONS - ( 20 Jul 2020 05:03 )  Alb: 3.7 g/dL / Pro: 6.4 g/dL / ALK PHOS: 134 U/L / ALT: 6 U/L / AST: 10 U/L / GGT: x                                                                                                MEDICATIONS  (STANDING):  albuterol/ipratropium for Nebulization 3 milliLiter(s) Nebulizer every 6 hours  brimonidine 0.2% Ophthalmic Solution 1 Drop(s) Both EYES two times a day  dorzolamide 2% Ophthalmic Solution 1 Drop(s) Both EYES three times a day  epoetin keagan-epbx (RETACRIT) Injectable 19506 Unit(s) IV Push <User Schedule>  furosemide   Injectable 40 milliGRAM(s) IV Push two times a day  guaifenesin/dextromethorphan  Syrup 10 milliLiter(s) Oral every 4 hours  lactulose Syrup 20 Gram(s) Oral two times a day  latanoprost 0.005% Ophthalmic Solution 1 Drop(s) Both EYES at bedtime  metolazone 5 milliGRAM(s) Oral <User Schedule>  metoprolol tartrate 25 milliGRAM(s) Oral two times a day  NIFEdipine XL 90 milliGRAM(s) Oral daily  polyethylene glycol 3350 17 Gram(s) Oral daily  pregabalin 50 milliGRAM(s) Oral daily  sertraline 100 milliGRAM(s) Oral daily  sevelamer carbonate 2400 milliGRAM(s) Oral three times a day  simethicone 80 milliGRAM(s) Chew three times a day  simvastatin 10 milliGRAM(s) Oral at bedtime  tamsulosin 0.4 milliGRAM(s) Oral at bedtime  timolol 0.5% Solution 1 Drop(s) Both EYES two times a day  traZODone 100 milliGRAM(s) Oral at bedtime    MEDICATIONS  (PRN):  acetaminophen   Tablet .. 650 milliGRAM(s) Oral every 6 hours PRN Temp greater or equal to 38C (100.4F), Mild Pain (1 - 3)  ALPRAZolam 0.25 milliGRAM(s) Oral daily PRN anxiety  oxycodone    5 mG/acetaminophen 325 mG 1 Tablet(s) Oral every 4 hours PRN for moderate pain  zolpidem 5 milliGRAM(s) Oral at bedtime PRN Insomnia      X-Rays reviewed: c< from: Xray Chest 1 View AP/PA (07.18.20 @ 19:26) >  Impression:      Bilateral diffuse opacities.    < end of copied text >      CXR interpreted by me: vascular congestion with interstitial pulmonary edema Patient is a 55y old  Male who presents with a chief complaint of Epistaxis, pulmonary edema (20 Jul 2020 13:15)      HPI:  55 year old male patient with past medical history of hypertension, diabetes, end stage renal disease on hemodialysis, obstructive sleep apnea, chronic pain, bipolar disorder, transferred from [facility] for recurrent left epistaxis.     The patient states that he had 3 episodes of left sided epistaxis in the last month but none of them lasted that long. He woke up at 1 AM to be have his pain medication and states that the epistaxis started suddenly with no triggering event and continued the whole next and the next day, the epistaxis was associated with blood clots coming out of the left nare.  The patient states that he does prick his nose frequently but doesn't recall pricking his nose immediately before the epistaxis started. Because of the bleeding and the contra-indication for heparin he didn't undergo hemodialysis. The patient denies any history of nose procedure and denies any other source of bleeding (denies hemoptysis, hematemesis or hematochezia but he did have an episode of bright red blood per rectum in the past).     In the ED : Patient hemodynamically stable /62, heart rate 69, RR 20/min, Temp 98.5, SpO2 100% on 15L non-rebreather (patient is on NC at baseline but non rebreather was placed given the epistaxis). Hb level 7.9 (baseline between 8 and 9). Thromboxane soaked renal rocket was placed with resolution of the bleeding. Chest x ray was done and showed significant pulmonary vascular congestion. The patient was admitted for hemodialysis (given that his next session is scheduled for Tuesday and he missed today's session) as well as ENT evaluation (18 Jul 2020 20:41) , called to evaluate, patient had chest ct last year showed loculated l effusion ( ? etiology)      PAST MEDICAL & SURGICAL HISTORY:  Dialysis patient: Tuesday- Thursday-Saturday  Bilateral ocular hypertension  Insomnia, unspecified type  Bipolar affective disorder, remission status unspecified  Obesity, unspecified classification, unspecified obesity type, unspecified whether serious comorbidity present  Anxiety disorder, unspecified type  Pain disorder  Anemia, unspecified type  Blind  HTN (hypertension)  End stage renal disease  Diabetes  Status post glaucoma surgery: RIGHT EYE  S/P BKA (below knee amputation) unilateral: RIGHT  A-V fistula: LEFT ARM      SOCIAL HX:   Smoking  no                       ETOH       no                     Other NO illicit drug abuse    FAMILY HISTORY:  .  No cardiovascular or pulmonary family history     REVIEW OF SYSTEMS:    All ROS are negative exept per HPI       Allergies    No Known Allergies    Intolerances          PHYSICAL EXAM  Vital Signs Last 24 Hrs  T(C): 36.6 (20 Jul 2020 06:23), Max: 36.6 (20 Jul 2020 06:23)  T(F): 97.9 (20 Jul 2020 06:23), Max: 97.9 (20 Jul 2020 06:23)  HR: 69 (20 Jul 2020 06:23) (69 - 76)  BP: 189/80 (20 Jul 2020 06:23) (161/71 - 199/81)  RR: 20 (20 Jul 2020 06:23) (20 - 20)  SpO2: 97% (19 Jul 2020 23:10) (94% - 98%)    CONSTITUTIONAL:  Well nourished.  NAD    ENT:   Airway patent,   No thrush    EYES:   Clear bilaterally,   pupils equal,   round and reactive to light.    CARDIAC:   PEPPER 3/6    RESPIRATORY:   b/l crackles  DEC L BASE    GASTROINTESTINAL:  Abdomen soft, non-tender,   No guarding,   Positive BS    MUSCULOSKELETAL:   Range of motion is not limited,  No clubbing, cyanosis    NEUROLOGICAL:   Alert and oriented   No motor deficits.    SKIN:   Skin normal color for race,   No evidence of rash.              LABS:                          7.5    7.80  )-----------( 213      ( 20 Jul 2020 05:03 )             25.8                                               07-20    136  |  95<L>  |  38<H>  ----------------------------<  118<H>  4.7   |  26  |  7.7<HH>    Ca    8.3<L>      20 Jul 2020 05:03  Phos  4.1     07-19  Mg     2.6     07-19    TPro  6.4  /  Alb  3.7  /  TBili  0.4  /  DBili  x   /  AST  10  /  ALT  6   /  AlkPhos  134<H>  07-20      PT/INR - ( 18 Jul 2020 19:10 )   PT: 15.50 sec;   INR: 1.35 ratio         PTT - ( 18 Jul 2020 19:10 )  PTT:34.9 sec                                                                                     LIVER FUNCTIONS - ( 20 Jul 2020 05:03 )  Alb: 3.7 g/dL / Pro: 6.4 g/dL / ALK PHOS: 134 U/L / ALT: 6 U/L / AST: 10 U/L / GGT: x                                                                                                MEDICATIONS  (STANDING):  albuterol/ipratropium for Nebulization 3 milliLiter(s) Nebulizer every 6 hours  brimonidine 0.2% Ophthalmic Solution 1 Drop(s) Both EYES two times a day  dorzolamide 2% Ophthalmic Solution 1 Drop(s) Both EYES three times a day  epoetin keagan-epbx (RETACRIT) Injectable 72754 Unit(s) IV Push <User Schedule>  furosemide   Injectable 40 milliGRAM(s) IV Push two times a day  guaifenesin/dextromethorphan  Syrup 10 milliLiter(s) Oral every 4 hours  lactulose Syrup 20 Gram(s) Oral two times a day  latanoprost 0.005% Ophthalmic Solution 1 Drop(s) Both EYES at bedtime  metolazone 5 milliGRAM(s) Oral <User Schedule>  metoprolol tartrate 25 milliGRAM(s) Oral two times a day  NIFEdipine XL 90 milliGRAM(s) Oral daily  polyethylene glycol 3350 17 Gram(s) Oral daily  pregabalin 50 milliGRAM(s) Oral daily  sertraline 100 milliGRAM(s) Oral daily  sevelamer carbonate 2400 milliGRAM(s) Oral three times a day  simethicone 80 milliGRAM(s) Chew three times a day  simvastatin 10 milliGRAM(s) Oral at bedtime  tamsulosin 0.4 milliGRAM(s) Oral at bedtime  timolol 0.5% Solution 1 Drop(s) Both EYES two times a day  traZODone 100 milliGRAM(s) Oral at bedtime    MEDICATIONS  (PRN):  acetaminophen   Tablet .. 650 milliGRAM(s) Oral every 6 hours PRN Temp greater or equal to 38C (100.4F), Mild Pain (1 - 3)  ALPRAZolam 0.25 milliGRAM(s) Oral daily PRN anxiety  oxycodone    5 mG/acetaminophen 325 mG 1 Tablet(s) Oral every 4 hours PRN for moderate pain  zolpidem 5 milliGRAM(s) Oral at bedtime PRN Insomnia      X-Rays reviewed: c< from: Xray Chest 1 View AP/PA (07.18.20 @ 19:26) >  Impression:      Bilateral diffuse opacities.    < end of copied text >      CXR interpreted by me: vascular congestion with interstitial pulmonary edema

## 2020-07-20 NOTE — CONSULT NOTE ADULT - ASSESSMENT
55 year old male patient with past medical history of hypertension, diabetes, end stage renal disease on hemodialysis, obstructive sleep apnea, chronic pain, bipolar disorder, transferred from [facility] for recurrent left epistaxis. He missed his HD session on Saturday and had subsequent hypoxemia. CXR done showed vascular congestion with pulmonary edema s/p HD session on Sunday and today.    Impression:  Pulmonary edema with subsequent hypoxemia secondary to ESRD     Plan:   c/w HD as per nephro  c/w lasix and metalozone 55 year old male patient with past medical history of hypertension, diabetes, end stage renal disease on hemodialysis, obstructive sleep apnea, chronic pain, bipolar disorder, transferred from [facility] for recurrent left epistaxis. He missed his HD session on Saturday and had subsequent hypoxemia. CXR done showed vascular congestion with pulmonary edema s/p HD session on Sunday and today.    Impression:  Pulmonary edema with subsequent hypoxemia secondary to ESRD     Plan:   c/w HD as per nephro  c/w lasix and metalozone  Strict I/Os  Daily weight  Fluid restriction  Repeat CXR after HD session today  c/w BIPAP 4 hours 4 hours off and qhs  switch to NC 3 L when oxygen demands decrease 55 year old male patient with past medical history of hypertension, diabetes, end stage renal disease on hemodialysis, obstructive sleep apnea, chronic pain, bipolar disorder, transferred from [facility] for recurrent left epistaxis. He missed his HD session on Saturday and had subsequent hypoxemia. CXR done showed vascular congestion with pulmonary edema s/p HD session on Sunday and today.    Impression:  Pulmonary edema with subsequent hypoxemia secondary to ESRD   Loculated l effusion ( chronic)    Plan:     c/w HD as per nephro keep neg balance, repeat CXR  Strict I/Os  Daily weight  Fluid restriction  c/w BIPAP 4 hours 4 hours off and qhs  switch to NC 3 L keep Soa2 92 to 96%  LE doppler  will follow

## 2020-07-20 NOTE — PROGRESS NOTE ADULT - ASSESSMENT
1. End stage renal disease on hemodialysis Tuesday / Thursday and Saturday. Clinically overloaded. HD today: 3 hours, opti 160 dialyzer, 2K bath, 3L UF. Strict fluid restriction, low Na diet. Can try IV Lasix to drive urine output.  2. HTN. Continue metoprolol and nifedipine.   3. Hyperphosphatemia. Sevelamer with meals. Renal diet.  4. Anemia. EPO with HD.

## 2020-07-20 NOTE — PROGRESS NOTE ADULT - SUBJECTIVE AND OBJECTIVE BOX
[facility] FOLLOW UP NOTE  --------------------------------------------------------------------------------  24 hour events/subjective: Patient examined during HD. Appears comfortable.    PAST HISTORY  --------------------------------------------------------------------------------  No significant changes to PMH, PSH, FHx, SHx, unless otherwise noted    ALLERGIES & MEDICATIONS  --------------------------------------------------------------------------------  Allergies    No Known Allergies    Intolerances      Standing Inpatient Medications  albuterol/ipratropium for Nebulization 3 milliLiter(s) Nebulizer every 6 hours  brimonidine 0.2% Ophthalmic Solution 1 Drop(s) Both EYES two times a day  dorzolamide 2% Ophthalmic Solution 1 Drop(s) Both EYES three times a day  epoetin keagan-epbx (RETACRIT) Injectable 61126 Unit(s) IV Push <User Schedule>  furosemide   Injectable 40 milliGRAM(s) IV Push two times a day  guaifenesin/dextromethorphan  Syrup 10 milliLiter(s) Oral every 4 hours  lactulose Syrup 20 Gram(s) Oral two times a day  latanoprost 0.005% Ophthalmic Solution 1 Drop(s) Both EYES at bedtime  metolazone 5 milliGRAM(s) Oral <User Schedule>  metoprolol tartrate 25 milliGRAM(s) Oral two times a day  NIFEdipine XL 90 milliGRAM(s) Oral daily  polyethylene glycol 3350 17 Gram(s) Oral daily  pregabalin 50 milliGRAM(s) Oral daily  sertraline 100 milliGRAM(s) Oral daily  sevelamer carbonate 2400 milliGRAM(s) Oral three times a day  simethicone 80 milliGRAM(s) Chew three times a day  simvastatin 10 milliGRAM(s) Oral at bedtime  tamsulosin 0.4 milliGRAM(s) Oral at bedtime  timolol 0.5% Solution 1 Drop(s) Both EYES two times a day  traZODone 100 milliGRAM(s) Oral at bedtime    PRN Inpatient Medications  acetaminophen   Tablet .. 650 milliGRAM(s) Oral every 6 hours PRN  ALPRAZolam 0.25 milliGRAM(s) Oral daily PRN  oxycodone    5 mG/acetaminophen 325 mG 1 Tablet(s) Oral every 4 hours PRN  zolpidem 5 milliGRAM(s) Oral at bedtime PRN    VITALS/PHYSICAL EXAM  --------------------------------------------------------------------------------  T(C): 36.6 (07-20-20 @ 06:23), Max: 36.6 (07-20-20 @ 06:23)  HR: 69 (07-20-20 @ 06:23) (69 - 76)  BP: 189/80 (07-20-20 @ 06:23) (161/71 - 199/81)  RR: 20 (07-20-20 @ 06:23) (20 - 20)  SpO2: 97% (07-19-20 @ 23:10) (94% - 98%)  Weight (kg): 156.6 (07-19-20 @ 06:15)    07-19-20 @ 07:01  -  07-20-20 @ 07:00  --------------------------------------------------------  IN: 0 mL / OUT: 3000 mL / NET: -3000 mL      Physical Exam:  	Gen: NAD  	Pulm: CTA B/L  	CV: RRR, S1S2  	Abd: +BS, soft, nontender/nondistended  	: No suprapubic tenderness  	LE: Warm, edema  	Vascular access: AVF    LABS/STUDIES  --------------------------------------------------------------------------------              7.5    7.80  >-----------<  213      [07-20-20 @ 05:03]              25.8     136  |  95  |  38  ----------------------------<  118      [07-20-20 @ 05:03]  4.7   |  26  |  7.7        Ca     8.3     [07-20-20 @ 05:03]      Mg     2.6     [07-19-20 @ 07:50]      Phos  4.1     [07-19-20 @ 07:50]    TPro  6.4  /  Alb  3.7  /  TBili  0.4  /  DBili  x   /  AST  10  /  ALT  6   /  AlkPhos  134  [07-20-20 @ 05:03]    PT/INR: PT 15.50, INR 1.35       [07-18-20 @ 19:10]  PTT: 34.9       [07-18-20 @ 19:10]    Creatinine Trend:  SCr 7.7 [07-20 @ 05:03]  SCr 7.0 [07-19 @ 22:30]  SCr 8.0 [07-19 @ 07:50]  SCr 7.2 [07-18 @ 19:10]    HbA1c 8.6      [06-01-18 @ 07:22]

## 2020-07-20 NOTE — PROGRESS NOTE ADULT - SUBJECTIVE AND OBJECTIVE BOX
SUBJECTIVE:    Patient is a 55y old Male who presents with a chief complaint of Epistaxis, pulmonary edema (20 Jul 2020 13:33)    Currently admitted to medicine with the primary diagnosis of Epistaxis     Today is hospital day 2d. This morning he is resting comfortably in bed and reports no new issues or overnight events.       PAST MEDICAL & SURGICAL HISTORY  Dialysis patient: Tuesday- Thursday-Saturday  Bilateral ocular hypertension  Insomnia, unspecified type  Bipolar affective disorder, remission status unspecified  Obesity, unspecified classification, unspecified obesity type, unspecified whether serious comorbidity present  Anxiety disorder, unspecified type  Pain disorder  Anemia, unspecified type  Blind  HTN (hypertension)  End stage renal disease  Diabetes  Status post glaucoma surgery: RIGHT EYE  S/P BKA (below knee amputation) unilateral: RIGHT  A-V fistula: LEFT ARM      ALLERGIES:  No Known Allergies    MEDICATIONS:  STANDING MEDICATIONS  albuterol/ipratropium for Nebulization 3 milliLiter(s) Nebulizer every 6 hours  brimonidine 0.2% Ophthalmic Solution 1 Drop(s) Both EYES two times a day  dorzolamide 2% Ophthalmic Solution 1 Drop(s) Both EYES three times a day  epoetin keagan-epbx (RETACRIT) Injectable 93494 Unit(s) IV Push <User Schedule>  furosemide   Injectable 40 milliGRAM(s) IV Push two times a day  guaifenesin/dextromethorphan  Syrup 10 milliLiter(s) Oral every 4 hours  lactulose Syrup 20 Gram(s) Oral two times a day  latanoprost 0.005% Ophthalmic Solution 1 Drop(s) Both EYES at bedtime  metolazone 5 milliGRAM(s) Oral <User Schedule>  metoprolol tartrate 25 milliGRAM(s) Oral two times a day  NIFEdipine XL 90 milliGRAM(s) Oral daily  polyethylene glycol 3350 17 Gram(s) Oral daily  pregabalin 50 milliGRAM(s) Oral daily  sertraline 100 milliGRAM(s) Oral daily  sevelamer carbonate 2400 milliGRAM(s) Oral three times a day  simethicone 80 milliGRAM(s) Chew three times a day  simvastatin 10 milliGRAM(s) Oral at bedtime  tamsulosin 0.4 milliGRAM(s) Oral at bedtime  timolol 0.5% Solution 1 Drop(s) Both EYES two times a day  traZODone 100 milliGRAM(s) Oral at bedtime    PRN MEDICATIONS  acetaminophen   Tablet .. 650 milliGRAM(s) Oral every 6 hours PRN  ALPRAZolam 0.25 milliGRAM(s) Oral daily PRN  oxycodone    5 mG/acetaminophen 325 mG 1 Tablet(s) Oral every 4 hours PRN  zolpidem 5 milliGRAM(s) Oral at bedtime PRN    VITALS:   T(F): 97.9  HR: 69  BP: 189/80  RR: 20  SpO2: 97%    LABS:                        7.5    7.80  )-----------( 213      ( 20 Jul 2020 05:03 )             25.8     07-20    136  |  95<L>  |  38<H>  ----------------------------<  118<H>  4.7   |  26  |  7.7<HH>    Ca    8.3<L>      20 Jul 2020 05:03  Phos  4.1     07-19  Mg     2.6     07-19    TPro  6.4  /  Alb  3.7  /  TBili  0.4  /  DBili  x   /  AST  10  /  ALT  6   /  AlkPhos  134<H>  07-20    PT/INR - ( 18 Jul 2020 19:10 )   PT: 15.50 sec;   INR: 1.35 ratio         PTT - ( 18 Jul 2020 19:10 )  PTT:34.9 sec              RADIOLOGY:    PHYSICAL EXAM:  GEN: No acute distress  PULM/CHEST: Clear to auscultation bilaterally, no rales, rhonchi or wheezes   CVS: Regular rate and rhythm, S1-S2, no murmurs  ABD: Soft, non-tender, non-distended, +BS  EXT: No edema  NEURO: AAOx3    Hunter Catheter:       Assessment and Plan:   · Assessment	    # Fluid overload (DDx: ESRD vs CHF less likely)  - Pt refused previous hemodialysis, today he went for dialysis (07/20/2020)   - Requiring BiPAP due to continued pulmonary edema.  - Holding oral meds until patient can tolerate being off BiPAP  - Try to wean patient off BIPAP and access on 4L o2 NC  - f/u with repeat chest x -ray tomorrow    # Recurrent left epistaxis in a patient not on anti-coagulation   - EPO in HD for anemia  - s/p TAX soaked packing in the ED  - f/u CBC    # Bipolar disorder / Anxiety disorder   - Continue Xanax 0.25 mg PO qd PRN (end in 3 days) + Sertraline 100 mg PO qd + Trazodone 100 mg PO qhs.  - Psych consult placed to access decision making capacity    # Type 2 Diabetes Mellitus   - On Lantus and sliding scale at the nursing home.  - Check FS before meals and at bedtime and start insulin basal and bolus with sliding scale if FS consistently > 180   - Will f/u hemoglobin A1C level    # Hypertension (controlled)  - Continue Metoprolol Tartrate 25 mg PO BID + Procardia 90 mg PO qd    # Obstructive sleep apnea / COPD on 3L NC   - Continue Duoneb 3mL q6h  - CPAP 14/8 30% FiO2

## 2020-07-21 LAB
ANION GAP SERPL CALC-SCNC: 13 MMOL/L — SIGNIFICANT CHANGE UP (ref 7–14)
BUN SERPL-MCNC: 23 MG/DL — HIGH (ref 10–20)
CALCIUM SERPL-MCNC: 8.3 MG/DL — LOW (ref 8.5–10.1)
CHLORIDE SERPL-SCNC: 99 MMOL/L — SIGNIFICANT CHANGE UP (ref 98–110)
CO2 SERPL-SCNC: 30 MMOL/L — SIGNIFICANT CHANGE UP (ref 17–32)
CREAT SERPL-MCNC: 5.3 MG/DL — CRITICAL HIGH (ref 0.7–1.5)
GLUCOSE BLDC GLUCOMTR-MCNC: 203 MG/DL — HIGH (ref 70–99)
GLUCOSE BLDC GLUCOMTR-MCNC: 215 MG/DL — HIGH (ref 70–99)
GLUCOSE BLDC GLUCOMTR-MCNC: 216 MG/DL — HIGH (ref 70–99)
GLUCOSE BLDC GLUCOMTR-MCNC: 248 MG/DL — HIGH (ref 70–99)
GLUCOSE SERPL-MCNC: 253 MG/DL — HIGH (ref 70–99)
HCT VFR BLD CALC: 26.7 % — LOW (ref 42–52)
HGB BLD-MCNC: 7.8 G/DL — LOW (ref 14–18)
MCHC RBC-ENTMCNC: 26 PG — LOW (ref 27–31)
MCHC RBC-ENTMCNC: 29.2 G/DL — LOW (ref 32–37)
MCV RBC AUTO: 89 FL — SIGNIFICANT CHANGE UP (ref 80–94)
NRBC # BLD: 0 /100 WBCS — SIGNIFICANT CHANGE UP (ref 0–0)
PLATELET # BLD AUTO: 215 K/UL — SIGNIFICANT CHANGE UP (ref 130–400)
POTASSIUM SERPL-MCNC: 3.8 MMOL/L — SIGNIFICANT CHANGE UP (ref 3.5–5)
POTASSIUM SERPL-SCNC: 3.8 MMOL/L — SIGNIFICANT CHANGE UP (ref 3.5–5)
RBC # BLD: 3 M/UL — LOW (ref 4.7–6.1)
RBC # FLD: 17 % — HIGH (ref 11.5–14.5)
SODIUM SERPL-SCNC: 142 MMOL/L — SIGNIFICANT CHANGE UP (ref 135–146)
WBC # BLD: 10.26 K/UL — SIGNIFICANT CHANGE UP (ref 4.8–10.8)
WBC # FLD AUTO: 10.26 K/UL — SIGNIFICANT CHANGE UP (ref 4.8–10.8)

## 2020-07-21 PROCEDURE — 93970 EXTREMITY STUDY: CPT | Mod: 26

## 2020-07-21 PROCEDURE — 99233 SBSQ HOSP IP/OBS HIGH 50: CPT

## 2020-07-21 PROCEDURE — 71045 X-RAY EXAM CHEST 1 VIEW: CPT | Mod: 26

## 2020-07-21 RX ORDER — MORPHINE SULFATE 50 MG/1
2 CAPSULE, EXTENDED RELEASE ORAL ONCE
Refills: 0 | Status: DISCONTINUED | OUTPATIENT
Start: 2020-07-21 | End: 2020-07-21

## 2020-07-21 RX ORDER — SODIUM CHLORIDE 0.65 %
1 AEROSOL, SPRAY (ML) NASAL DAILY
Refills: 0 | Status: DISCONTINUED | OUTPATIENT
Start: 2020-07-21 | End: 2020-07-25

## 2020-07-21 RX ORDER — DEXTROSE 50 % IN WATER 50 %
12.5 SYRINGE (ML) INTRAVENOUS ONCE
Refills: 0 | Status: DISCONTINUED | OUTPATIENT
Start: 2020-07-21 | End: 2020-07-25

## 2020-07-21 RX ORDER — INSULIN LISPRO 100/ML
VIAL (ML) SUBCUTANEOUS
Refills: 0 | Status: DISCONTINUED | OUTPATIENT
Start: 2020-07-21 | End: 2020-07-25

## 2020-07-21 RX ORDER — HYDRALAZINE HCL 50 MG
10 TABLET ORAL ONCE
Refills: 0 | Status: COMPLETED | OUTPATIENT
Start: 2020-07-21 | End: 2020-07-21

## 2020-07-21 RX ORDER — DEXTROSE 50 % IN WATER 50 %
25 SYRINGE (ML) INTRAVENOUS ONCE
Refills: 0 | Status: DISCONTINUED | OUTPATIENT
Start: 2020-07-21 | End: 2020-07-25

## 2020-07-21 RX ORDER — FUROSEMIDE 40 MG
80 TABLET ORAL
Refills: 0 | Status: DISCONTINUED | OUTPATIENT
Start: 2020-07-22 | End: 2020-07-25

## 2020-07-21 RX ORDER — FUROSEMIDE 40 MG
80 TABLET ORAL DAILY
Refills: 0 | Status: DISCONTINUED | OUTPATIENT
Start: 2020-07-21 | End: 2020-07-21

## 2020-07-21 RX ORDER — DEXTROSE 50 % IN WATER 50 %
15 SYRINGE (ML) INTRAVENOUS ONCE
Refills: 0 | Status: DISCONTINUED | OUTPATIENT
Start: 2020-07-21 | End: 2020-07-25

## 2020-07-21 RX ORDER — GLUCAGON INJECTION, SOLUTION 0.5 MG/.1ML
1 INJECTION, SOLUTION SUBCUTANEOUS ONCE
Refills: 0 | Status: DISCONTINUED | OUTPATIENT
Start: 2020-07-21 | End: 2020-07-25

## 2020-07-21 RX ORDER — SODIUM CHLORIDE 9 MG/ML
1000 INJECTION, SOLUTION INTRAVENOUS
Refills: 0 | Status: DISCONTINUED | OUTPATIENT
Start: 2020-07-21 | End: 2020-07-25

## 2020-07-21 RX ORDER — FUROSEMIDE 40 MG
80 TABLET ORAL ONCE
Refills: 0 | Status: COMPLETED | OUTPATIENT
Start: 2020-07-21 | End: 2020-07-21

## 2020-07-21 RX ADMIN — POLYETHYLENE GLYCOL 3350 17 GRAM(S): 17 POWDER, FOR SOLUTION ORAL at 11:04

## 2020-07-21 RX ADMIN — Medication 0.25 MILLIGRAM(S): at 05:58

## 2020-07-21 RX ADMIN — SEVELAMER CARBONATE 2400 MILLIGRAM(S): 2400 POWDER, FOR SUSPENSION ORAL at 05:27

## 2020-07-21 RX ADMIN — Medication 25 MILLIGRAM(S): at 05:26

## 2020-07-21 RX ADMIN — Medication 1 DROP(S): at 05:28

## 2020-07-21 RX ADMIN — BRIMONIDINE TARTRATE 1 DROP(S): 2 SOLUTION/ DROPS OPHTHALMIC at 05:25

## 2020-07-21 RX ADMIN — SIMVASTATIN 10 MILLIGRAM(S): 20 TABLET, FILM COATED ORAL at 21:02

## 2020-07-21 RX ADMIN — Medication 50 MILLIGRAM(S): at 11:03

## 2020-07-21 RX ADMIN — Medication 25 MILLIGRAM(S): at 16:53

## 2020-07-21 RX ADMIN — Medication 80 MILLIGRAM(S): at 17:19

## 2020-07-21 RX ADMIN — SIMETHICONE 80 MILLIGRAM(S): 80 TABLET, CHEWABLE ORAL at 21:07

## 2020-07-21 RX ADMIN — MORPHINE SULFATE 2 MILLIGRAM(S): 50 CAPSULE, EXTENDED RELEASE ORAL at 17:01

## 2020-07-21 RX ADMIN — SIMETHICONE 80 MILLIGRAM(S): 80 TABLET, CHEWABLE ORAL at 05:27

## 2020-07-21 RX ADMIN — Medication 2: at 16:51

## 2020-07-21 RX ADMIN — OXYCODONE AND ACETAMINOPHEN 1 TABLET(S): 5; 325 TABLET ORAL at 05:36

## 2020-07-21 RX ADMIN — Medication 1 DROP(S): at 17:14

## 2020-07-21 RX ADMIN — Medication 3 MILLILITER(S): at 09:22

## 2020-07-21 RX ADMIN — SERTRALINE 100 MILLIGRAM(S): 25 TABLET, FILM COATED ORAL at 11:03

## 2020-07-21 RX ADMIN — MORPHINE SULFATE 2 MILLIGRAM(S): 50 CAPSULE, EXTENDED RELEASE ORAL at 00:27

## 2020-07-21 RX ADMIN — OXYCODONE AND ACETAMINOPHEN 1 TABLET(S): 5; 325 TABLET ORAL at 00:37

## 2020-07-21 RX ADMIN — OXYCODONE AND ACETAMINOPHEN 1 TABLET(S): 5; 325 TABLET ORAL at 00:28

## 2020-07-21 RX ADMIN — TAMSULOSIN HYDROCHLORIDE 0.4 MILLIGRAM(S): 0.4 CAPSULE ORAL at 21:02

## 2020-07-21 RX ADMIN — LACTULOSE 20 GRAM(S): 10 SOLUTION ORAL at 05:26

## 2020-07-21 RX ADMIN — Medication 10 MILLILITER(S): at 17:14

## 2020-07-21 RX ADMIN — Medication 90 MILLIGRAM(S): at 05:27

## 2020-07-21 RX ADMIN — SEVELAMER CARBONATE 2400 MILLIGRAM(S): 2400 POWDER, FOR SUSPENSION ORAL at 17:02

## 2020-07-21 RX ADMIN — Medication 10 MILLILITER(S): at 11:03

## 2020-07-21 RX ADMIN — SIMETHICONE 80 MILLIGRAM(S): 80 TABLET, CHEWABLE ORAL at 17:15

## 2020-07-21 RX ADMIN — Medication 40 MILLIGRAM(S): at 05:26

## 2020-07-21 RX ADMIN — BRIMONIDINE TARTRATE 1 DROP(S): 2 SOLUTION/ DROPS OPHTHALMIC at 17:26

## 2020-07-21 RX ADMIN — Medication 10 MILLILITER(S): at 21:02

## 2020-07-21 RX ADMIN — Medication 25 MILLIGRAM(S): at 17:16

## 2020-07-21 RX ADMIN — Medication 3 MILLILITER(S): at 21:03

## 2020-07-21 RX ADMIN — DORZOLAMIDE HYDROCHLORIDE 1 DROP(S): 20 SOLUTION/ DROPS OPHTHALMIC at 21:06

## 2020-07-21 RX ADMIN — Medication 25 MILLIGRAM(S): at 05:27

## 2020-07-21 RX ADMIN — Medication 25 MILLIGRAM(S): at 21:02

## 2020-07-21 RX ADMIN — Medication 100 MILLIGRAM(S): at 21:02

## 2020-07-21 RX ADMIN — DORZOLAMIDE HYDROCHLORIDE 1 DROP(S): 20 SOLUTION/ DROPS OPHTHALMIC at 17:16

## 2020-07-21 RX ADMIN — Medication 80 MILLIGRAM(S): at 08:30

## 2020-07-21 RX ADMIN — OXYCODONE AND ACETAMINOPHEN 1 TABLET(S): 5; 325 TABLET ORAL at 05:58

## 2020-07-21 RX ADMIN — DORZOLAMIDE HYDROCHLORIDE 1 DROP(S): 20 SOLUTION/ DROPS OPHTHALMIC at 05:25

## 2020-07-21 RX ADMIN — Medication 10 MILLILITER(S): at 05:24

## 2020-07-21 RX ADMIN — MORPHINE SULFATE 2 MILLIGRAM(S): 50 CAPSULE, EXTENDED RELEASE ORAL at 21:00

## 2020-07-21 RX ADMIN — LATANOPROST 1 DROP(S): 0.05 SOLUTION/ DROPS OPHTHALMIC; TOPICAL at 21:07

## 2020-07-21 NOTE — PROGRESS NOTE ADULT - SUBJECTIVE AND OBJECTIVE BOX
[facility]PHROLOGY FOLLOW UP NOTE  --------------------------------------------------------------------------------  24 hour events/subjective: Patient examined during HD. C/O SOB.    PAST HISTORY  --------------------------------------------------------------------------------  No significant changes to PMH, PSH, FHx, SHx, unless otherwise noted    ALLERGIES & MEDICATIONS  --------------------------------------------------------------------------------  Allergies    No Known Allergies    Intolerances      Standing Inpatient Medications  albuterol/ipratropium for Nebulization 3 milliLiter(s) Nebulizer every 6 hours  brimonidine 0.2% Ophthalmic Solution 1 Drop(s) Both EYES two times a day  dextrose 5%. 1000 milliLiter(s) IV Continuous <Continuous>  dextrose 50% Injectable 12.5 Gram(s) IV Push once  dextrose 50% Injectable 25 Gram(s) IV Push once  dextrose 50% Injectable 25 Gram(s) IV Push once  dorzolamide 2% Ophthalmic Solution 1 Drop(s) Both EYES three times a day  epoetin keagan-epbx (RETACRIT) Injectable 27024 Unit(s) IV Push <User Schedule>  furosemide   Injectable 80 milliGRAM(s) IV Push once  guaifenesin/dextromethorphan  Syrup 10 milliLiter(s) Oral every 4 hours  hydrALAZINE 25 milliGRAM(s) Oral three times a day  insulin lispro (HumaLOG) corrective regimen sliding scale   SubCutaneous three times a day before meals  lactulose Syrup 20 Gram(s) Oral two times a day  latanoprost 0.005% Ophthalmic Solution 1 Drop(s) Both EYES at bedtime  metolazone 5 milliGRAM(s) Oral <User Schedule>  metoprolol tartrate 25 milliGRAM(s) Oral two times a day  NIFEdipine XL 90 milliGRAM(s) Oral daily  polyethylene glycol 3350 17 Gram(s) Oral daily  pregabalin 50 milliGRAM(s) Oral daily  sertraline 100 milliGRAM(s) Oral daily  sevelamer carbonate 2400 milliGRAM(s) Oral three times a day  simethicone 80 milliGRAM(s) Chew three times a day  simvastatin 10 milliGRAM(s) Oral at bedtime  tamsulosin 0.4 milliGRAM(s) Oral at bedtime  timolol 0.5% Solution 1 Drop(s) Both EYES two times a day  traZODone 100 milliGRAM(s) Oral at bedtime    PRN Inpatient Medications  acetaminophen   Tablet .. 650 milliGRAM(s) Oral every 6 hours PRN  ALPRAZolam 0.25 milliGRAM(s) Oral daily PRN  dextrose 40% Gel 15 Gram(s) Oral once PRN  glucagon  Injectable 1 milliGRAM(s) IntraMuscular once PRN  oxycodone    5 mG/acetaminophen 325 mG 1 Tablet(s) Oral every 4 hours PRN  zolpidem 5 milliGRAM(s) Oral at bedtime PRN      VITALS/PHYSICAL EXAM  --------------------------------------------------------------------------------  T(C): 36.4 (07-21-20 @ 05:52), Max: 36.6 (07-20-20 @ 21:10)  HR: 65 (07-21-20 @ 13:15) (59 - 78)  BP: 166/70 (07-21-20 @ 13:15) (133/86 - 188/84)  RR: 19 (07-21-20 @ 13:15) (17 - 20)  SpO2: 90% (07-21-20 @ 12:15) (90% - 97%)  Wt(kg): --        07-20-20 @ 07:01  -  07-21-20 @ 07:00  --------------------------------------------------------  IN: 0 mL / OUT: 9000 mL / NET: -9000 mL      Physical Exam:  	Gen: NAD  	Pulm:  B/L crackles  	CV: RRR, S1S2  	Abd: +BS, soft, nontender/nondistended  	: No suprapubic tenderness  	LE: Warm,  edema  	Vascular access: AVF    LABS/STUDIES  --------------------------------------------------------------------------------              7.5    7.80  >-----------<  213      [07-20-20 @ 05:03]              25.8     136  |  95  |  38  ----------------------------<  118      [07-20-20 @ 05:03]  4.7   |  26  |  7.7        Ca     8.3     [07-20-20 @ 05:03]    TPro  6.4  /  Alb  3.7  /  TBili  0.4  /  DBili  x   /  AST  10  /  ALT  6   /  AlkPhos  134  [07-20-20 @ 05:03]    Creatinine Trend:  SCr 7.7 [07-20 @ 05:03]  SCr 7.0 [07-19 @ 22:30]  SCr 8.0 [07-19 @ 07:50]  SCr 7.2 [07-18 @ 19:10]    HbA1c 8.6      [06-01-18 @ 07:22]

## 2020-07-21 NOTE — PROGRESS NOTE ADULT - ATTENDING COMMENTS
Patient was seen and examined.  Plan of care were discussed with resident  Patient is 56 yo male with hx of Anemia, Anxiety disorder, Bilateral ocular hypertension, Bipolar affective disorder, remission status unspecified   Blind, Diabetes, Dialysis patient Tuesday- Thursday-Saturday, End stage renal disease, HTN (hypertension), Insomnia, unspecified type, Obesity, unspecified classification, unspecified obesity type, unspecified whether serious comorbidity present and Pain disorder presenting with     #Acute on Chronic respiratory failure  -Seems to be secondary to fluid overload due to missing dialysis and refusing dialysis  -Doubtful for CHF, but will obtain an Echo  - S/P dialysis yesterday   -Continue with IV lasix  -Off BIPAP SPO2 is 96% on 4 liter  -BIPAP PRN  -Repeat CXR today appears to be no changes  -Repeat CXR in am   -Pulmonary, and nephrology to follow up     # Recurrent left epistaxis in a patient not on anti-coagulation   - EPO in HD for anemia  - s/p TAX soaked packing in the ED  - f/u CBC  -Outpatient follow up with ENT    # Bipolar disorder / Anxiety disorder   - Continue Xanax 0.25 mg PO qd PRN (end in 3 days) + Sertraline 100 mg PO qd + Trazodone 100 mg PO qhs.  - Psych consult placed to access decision making capacity    # Type 2 Diabetes Mellitus   - Continue with lantus, and ISS  -Monitor POC    # Hypertension (controlled)  - Continue Metoprolol Tartrate 25 mg PO BID + Procardia 90 mg PO qd    # Obstructive sleep apnea / COPD on 3L NC   - Continue Duoneb 3mL q6h  - CPAP 14/8 30% FiO2  Patient is functional quadriplegia  #Progress Note Handoff  Pending (specify) hypoxemia    Family discussion:  plan of care was discussed with patient   in details.  all questions were answered.  seems to understand, and in agreement  Disposition:  SNF Patient was seen and examined.  Plan of care were discussed with resident  Patient is 56 yo male with hx of Anemia, Anxiety disorder, Bilateral ocular hypertension, Bipolar affective disorder, remission status unspecified   Blind, Diabetes, Dialysis patient Tuesday- Thursday-Saturday, End stage renal disease, HTN (hypertension), Insomnia, unspecified type, Obesity, unspecified classification, unspecified obesity type, unspecified whether serious comorbidity present and Pain disorder presenting with     #Acute on Chronic respiratory failure  -Seems to be secondary to fluid overload due to missing dialysis and refusing dialysis  -Doubtful for CHF, but will obtain an Echo  - S/P dialysis yesterday   -Continue with IV lasix  -Off BIPAP SPO2 is 96% on 4 liter  -BIPAP PRN  -Repeat CXR today appears to be no changes  -Repeat CXR in am   -Pulmonary, and nephrology to follow up   -US of LE to R/O DVT    # Recurrent left epistaxis in a patient not on anti-coagulation   - EPO in HD for anemia  - s/p TAX soaked packing in the ED  - f/u CBC  -Outpatient follow up with ENT    # Bipolar disorder / Anxiety disorder   - Continue Xanax 0.25 mg PO qd PRN (end in 3 days) + Sertraline 100 mg PO qd + Trazodone 100 mg PO qhs.  - Psych consult placed to access decision making capacity    # Type 2 Diabetes Mellitus   - Continue with lantus, and ISS  -Monitor POC    # Hypertension (controlled)  - Continue Metoprolol Tartrate 25 mg PO BID + Procardia 90 mg PO qd    # Obstructive sleep apnea / COPD on 3L NC   - Continue Duoneb 3mL q6h  - CPAP 14/8 30% FiO2  Patient is functional quadriplegia    # anemia  _Seems to be secondary to ESRD  -Asymptomatic  -Monitor H/H  -Epo    #Progress Note Handoff  Pending (specify) hypoxemia    Family discussion:  plan of care was discussed with patient   in details.  all questions were answered.  seems to understand, and in agreement  Disposition:  SNF Patient was seen and examined.  Plan of care were discussed with resident  Patient is 54 yo male with hx of Anemia, Anxiety disorder, Bilateral ocular hypertension, Bipolar affective disorder, remission status unspecified   Blind, Diabetes, Dialysis patient Tuesday- Thursday-Saturday, End stage renal disease, HTN (hypertension), Insomnia, unspecified type, Obesity, unspecified classification, unspecified obesity type, unspecified whether serious comorbidity present and Pain disorder presenting with     #Acute on Chronic respiratory failure  -Seems to be secondary to fluid overload due to missing dialysis and refusing dialysis  -Doubtful for CHF, but will obtain an Echo  - S/P dialysis yesterday   -Continue with IV lasix  -Off BIPAP SPO2 is 96% on 4 liter  -BIPAP PRN  -Repeat CXR today appears to be no changes  -Repeat CXR in am   -Pulmonary, and nephrology to follow up   -US of LE to R/O DVT    # Recurrent left epistaxis in a patient not on anti-coagulation   - EPO in HD for anemia  - s/p TAX soaked packing in the ED  - f/u CBC  -Outpatient follow up with ENT    # Bipolar disorder / Anxiety disorder   - Continue Xanax 0.25 mg PO qd PRN (end in 3 days) + Sertraline 100 mg PO qd + Trazodone 100 mg PO qhs.  - Psych consult placed to access decision making capacity    # Type 2 Diabetes Mellitus   - Continue with   ISS  -Monitor POC    # Hypertension (controlled)  - Continue Metoprolol Tartrate 25 mg PO BID + Procardia 90 mg PO qd    # Obstructive sleep apnea / COPD on 3L NC   - Continue Duoneb 3mL q6h  - CPAP 14/8 30% FiO2  Patient is functional quadriplegia    # anemia  _Seems to be secondary to ESRD  -Asymptomatic  -Monitor H/H  -Epo    #Progress Note Handoff  Pending (specify) hypoxemia    Family discussion:  plan of care was discussed with patient   in details.  all questions were answered.  seems to understand, and in agreement  Disposition:  SNF

## 2020-07-21 NOTE — PROGRESS NOTE ADULT - ASSESSMENT
Impression:  Pulmonary edema with subsequent hypoxemia secondary to ESRD   Loculated l effusion ( chronic)    Plan:     c/w HD as per nephro keep neg balance  Strict I/Os  Daily weight  Fluid restriction  c/w BIPAP 4 hours 4 hours off and qhs  switch to NC 3 L keep Soa2 92 to 96%  LE doppler  will follow

## 2020-07-21 NOTE — PROGRESS NOTE ADULT - SUBJECTIVE AND OBJECTIVE BOX
SUBJECTIVE:    Patient is a 55y old Male who presents with a chief complaint of Epistaxis, pulmonary edema (20 Jul 2020 14:28)    Currently admitted to medicine with the primary diagnosis of Epistaxis     Today is hospital day 3d. This morning he is resting comfortably in bed and reports no new issues or overnight events.     INTERVAL EVENTS:     PAST MEDICAL & SURGICAL HISTORY  Dialysis patient: Tuesday- Thursday-Saturday  Bilateral ocular hypertension  Insomnia, unspecified type  Bipolar affective disorder, remission status unspecified  Obesity, unspecified classification, unspecified obesity type, unspecified whether serious comorbidity present  Anxiety disorder, unspecified type  Pain disorder  Anemia, unspecified type  Blind  HTN (hypertension)  End stage renal disease  Diabetes  Status post glaucoma surgery: RIGHT EYE  S/P BKA (below knee amputation) unilateral: RIGHT  A-V fistula: LEFT ARM      ALLERGIES:  No Known Allergies    MEDICATIONS:  STANDING MEDICATIONS  albuterol/ipratropium for Nebulization 3 milliLiter(s) Nebulizer every 6 hours  brimonidine 0.2% Ophthalmic Solution 1 Drop(s) Both EYES two times a day  dorzolamide 2% Ophthalmic Solution 1 Drop(s) Both EYES three times a day  epoetin keagan-epbx (RETACRIT) Injectable 98805 Unit(s) IV Push <User Schedule>  furosemide   Injectable 80 milliGRAM(s) IV Push daily  guaifenesin/dextromethorphan  Syrup 10 milliLiter(s) Oral every 4 hours  hydrALAZINE 25 milliGRAM(s) Oral three times a day  lactulose Syrup 20 Gram(s) Oral two times a day  latanoprost 0.005% Ophthalmic Solution 1 Drop(s) Both EYES at bedtime  metolazone 5 milliGRAM(s) Oral <User Schedule>  metoprolol tartrate 25 milliGRAM(s) Oral two times a day  NIFEdipine XL 90 milliGRAM(s) Oral daily  polyethylene glycol 3350 17 Gram(s) Oral daily  pregabalin 50 milliGRAM(s) Oral daily  sertraline 100 milliGRAM(s) Oral daily  sevelamer carbonate 2400 milliGRAM(s) Oral three times a day  simethicone 80 milliGRAM(s) Chew three times a day  simvastatin 10 milliGRAM(s) Oral at bedtime  tamsulosin 0.4 milliGRAM(s) Oral at bedtime  timolol 0.5% Solution 1 Drop(s) Both EYES two times a day  traZODone 100 milliGRAM(s) Oral at bedtime    PRN MEDICATIONS  acetaminophen   Tablet .. 650 milliGRAM(s) Oral every 6 hours PRN  ALPRAZolam 0.25 milliGRAM(s) Oral daily PRN  oxycodone    5 mG/acetaminophen 325 mG 1 Tablet(s) Oral every 4 hours PRN  zolpidem 5 milliGRAM(s) Oral at bedtime PRN    VITALS:   T(F): 97.5  HR: 59  BP: 177/76  RR: 18  SpO2: 92%    LABS:                        7.5    7.80  )-----------( 213      ( 20 Jul 2020 05:03 )             25.8     07-20    136  |  95<L>  |  38<H>  ----------------------------<  118<H>  4.7   |  26  |  7.7<HH>    Ca    8.3<L>      20 Jul 2020 05:03    TPro  6.4  /  Alb  3.7  /  TBili  0.4  /  DBili  x   /  AST  10  /  ALT  6   /  AlkPhos  134<H>  07-20      PHYSICAL EXAM:  GEN: No acute distress  PULM/CHEST: Clear to auscultation bilaterally, no rales, rhonchi or wheezes   CVS: Regular rate and rhythm, S1-S2, no murmurs  ABD: Soft, non-tender, non-distended, +BS  EXT: No edema  NEURO: AAOx3      Assessment and Plan:   · Assessment	    # Fluid overload (DDx: ESRD vs CHF less likely)  - Pt refused previously refusing hemodialysis, he went for dialysis yesterday (07/20/2020)   - Holding oral meds until patient can tolerate being off BiPAP  - Follow up BMB   - Requiring BiPAP due to continued pulmonary edema and HOMAR  - Try to wean patient off BIPAP and access on 4L o2 NC  - Chest x-ray showing worsening fluid overload  - 80 mg of lasik given today morning   - f/u with pulmonary and nephrology recommendations       # Recurrent left epistaxis in a patient not on anti-coagulation   - EPO in HD for anemia  - s/p TAX soaked packing in the ED  - f/u CBC    # Bipolar disorder / Anxiety disorder   - Continue Xanax 0.25 mg PO qd PRN (end in 3 days) + Sertraline 100 mg PO qd + Trazodone 100 mg PO qhs.  - Psych consult to access patients decision making capacity cancelled as patient is now accepting treatment and has no acute mental status changes    # Type 2 Diabetes Mellitus   - On Lantus and sliding scale at the nursing home.  - Check FS before meals and at bedtime and start insulin basal and bolus with sliding scale if FS consistently > 180   - Will f/u hemoglobin A1C level    # Hypertension (controlled)  - Continue Metoprolol Tartrate 25 mg PO BID + Procardia 90 mg PO qd    # Obstructive sleep apnea / COPD on 3L NC   - Continue Duoneb 3mL q6h  - CPAP 14/8 30% FiO2 SUBJECTIVE:    Patient is a 55y old Male who presents with a chief complaint of Epistaxis, pulmonary edema (20 Jul 2020 14:28)    Currently admitted to medicine with the primary diagnosis of Epistaxis     Today is hospital day 3d. This morning he is resting comfortably in bed and reports no new issues or overnight events.     INTERVAL EVENTS:     PAST MEDICAL & SURGICAL HISTORY  Dialysis patient: Tuesday- Thursday-Saturday  Bilateral ocular hypertension  Insomnia, unspecified type  Bipolar affective disorder, remission status unspecified  Obesity, unspecified classification, unspecified obesity type, unspecified whether serious comorbidity present  Anxiety disorder, unspecified type  Pain disorder  Anemia, unspecified type  Blind  HTN (hypertension)  End stage renal disease  Diabetes  Status post glaucoma surgery: RIGHT EYE  S/P BKA (below knee amputation) unilateral: RIGHT  A-V fistula: LEFT ARM      ALLERGIES:  No Known Allergies    MEDICATIONS:  STANDING MEDICATIONS  albuterol/ipratropium for Nebulization 3 milliLiter(s) Nebulizer every 6 hours  brimonidine 0.2% Ophthalmic Solution 1 Drop(s) Both EYES two times a day  dorzolamide 2% Ophthalmic Solution 1 Drop(s) Both EYES three times a day  epoetin keagan-epbx (RETACRIT) Injectable 62497 Unit(s) IV Push <User Schedule>  furosemide   Injectable 80 milliGRAM(s) IV Push daily  guaifenesin/dextromethorphan  Syrup 10 milliLiter(s) Oral every 4 hours  hydrALAZINE 25 milliGRAM(s) Oral three times a day  lactulose Syrup 20 Gram(s) Oral two times a day  latanoprost 0.005% Ophthalmic Solution 1 Drop(s) Both EYES at bedtime  metolazone 5 milliGRAM(s) Oral <User Schedule>  metoprolol tartrate 25 milliGRAM(s) Oral two times a day  NIFEdipine XL 90 milliGRAM(s) Oral daily  polyethylene glycol 3350 17 Gram(s) Oral daily  pregabalin 50 milliGRAM(s) Oral daily  sertraline 100 milliGRAM(s) Oral daily  sevelamer carbonate 2400 milliGRAM(s) Oral three times a day  simethicone 80 milliGRAM(s) Chew three times a day  simvastatin 10 milliGRAM(s) Oral at bedtime  tamsulosin 0.4 milliGRAM(s) Oral at bedtime  timolol 0.5% Solution 1 Drop(s) Both EYES two times a day  traZODone 100 milliGRAM(s) Oral at bedtime    PRN MEDICATIONS  acetaminophen   Tablet .. 650 milliGRAM(s) Oral every 6 hours PRN  ALPRAZolam 0.25 milliGRAM(s) Oral daily PRN  oxycodone    5 mG/acetaminophen 325 mG 1 Tablet(s) Oral every 4 hours PRN  zolpidem 5 milliGRAM(s) Oral at bedtime PRN    VITALS:   T(F): 97.5  HR: 59  BP: 177/76  RR: 18  SpO2: 92%    LABS:                        7.5    7.80  )-----------( 213      ( 20 Jul 2020 05:03 )             25.8     07-20    136  |  95<L>  |  38<H>  ----------------------------<  118<H>  4.7   |  26  |  7.7<HH>    Ca    8.3<L>      20 Jul 2020 05:03    TPro  6.4  /  Alb  3.7  /  TBili  0.4  /  DBili  x   /  AST  10  /  ALT  6   /  AlkPhos  134<H>  07-20      PHYSICAL EXAM:  GEN: No acute distress  PULM/CHEST: Clear to auscultation bilaterally, no rales, rhonchi or wheezes   CVS: Regular rate and rhythm, S1-S2, no murmurs  ABD: Soft, non-tender, non-distended, +BS  EXT: No edema  NEURO: AAOx3      Assessment and Plan:   · Assessment	    # Fluid overload (DDx: ESRD vs CHF less likely)  - Pt refused previously refusing hemodialysis, he went for dialysis yesterday (07/20/2020)   - Holding oral meds until patient can tolerate being off BiPAP  - Follow up BMB   - Requiring BiPAP due to continued pulmonary edema and HOMAR  - Try to wean patient off BIPAP and access on 4L o2 NC  - Chest x-ray showing worsening fluid overload  - 80 mg of lasik given today morning   - Strict fluid restriction, low Na diet  - F/u with pulmonary and nephrology recommendations       # Recurrent left epistaxis in a patient not on anti-coagulation   - EPO in HD for anemia  - s/p TAX soaked packing in the ED  - f/u CBC    # Bipolar disorder / Anxiety disorder   - Continue Xanax 0.25 mg PO qd PRN (end in 3 days) + Sertraline 100 mg PO qd + Trazodone 100 mg PO qhs.  - Psych consult to access patients decision making capacity cancelled as patient is now accepting treatment and has no acute mental status changes    # Type 2 Diabetes Mellitus   - On Lantus and sliding scale at the nursing home.  - Check FS before meals and at bedtime and start insulin basal and bolus with sliding scale if FS consistently > 180   - Will f/u hemoglobin A1C level    # Hypertension (controlled)  - Continue Metoprolol Tartrate 25 mg PO BID + Procardia 90 mg PO qd    # Obstructive sleep apnea / COPD on 3L NC   - Continue Duoneb 3mL q6h  - CPAP 14/8 30% FiO2 SUBJECTIVE:    Patient is a 55y old Male who presents with a chief complaint of Epistaxis, pulmonary edema (20 Jul 2020 14:28)    Currently admitted to medicine with the primary diagnosis of Epistaxis     Today is hospital day 3d. This morning he is resting comfortably in bed and reports no new issues or overnight events.     INTERVAL EVENTS:     PAST MEDICAL & SURGICAL HISTORY  Dialysis patient: Tuesday- Thursday-Saturday  Bilateral ocular hypertension  Insomnia, unspecified type  Bipolar affective disorder, remission status unspecified  Obesity, unspecified classification, unspecified obesity type, unspecified whether serious comorbidity present  Anxiety disorder, unspecified type  Pain disorder  Anemia, unspecified type  Blind  HTN (hypertension)  End stage renal disease  Diabetes  Status post glaucoma surgery: RIGHT EYE  S/P BKA (below knee amputation) unilateral: RIGHT  A-V fistula: LEFT ARM      ALLERGIES:  No Known Allergies    MEDICATIONS:  STANDING MEDICATIONS  albuterol/ipratropium for Nebulization 3 milliLiter(s) Nebulizer every 6 hours  brimonidine 0.2% Ophthalmic Solution 1 Drop(s) Both EYES two times a day  dorzolamide 2% Ophthalmic Solution 1 Drop(s) Both EYES three times a day  epoetin keagan-epbx (RETACRIT) Injectable 98070 Unit(s) IV Push <User Schedule>  furosemide   Injectable 80 milliGRAM(s) IV Push daily  guaifenesin/dextromethorphan  Syrup 10 milliLiter(s) Oral every 4 hours  hydrALAZINE 25 milliGRAM(s) Oral three times a day  lactulose Syrup 20 Gram(s) Oral two times a day  latanoprost 0.005% Ophthalmic Solution 1 Drop(s) Both EYES at bedtime  metolazone 5 milliGRAM(s) Oral <User Schedule>  metoprolol tartrate 25 milliGRAM(s) Oral two times a day  NIFEdipine XL 90 milliGRAM(s) Oral daily  polyethylene glycol 3350 17 Gram(s) Oral daily  pregabalin 50 milliGRAM(s) Oral daily  sertraline 100 milliGRAM(s) Oral daily  sevelamer carbonate 2400 milliGRAM(s) Oral three times a day  simethicone 80 milliGRAM(s) Chew three times a day  simvastatin 10 milliGRAM(s) Oral at bedtime  tamsulosin 0.4 milliGRAM(s) Oral at bedtime  timolol 0.5% Solution 1 Drop(s) Both EYES two times a day  traZODone 100 milliGRAM(s) Oral at bedtime    PRN MEDICATIONS  acetaminophen   Tablet .. 650 milliGRAM(s) Oral every 6 hours PRN  ALPRAZolam 0.25 milliGRAM(s) Oral daily PRN  oxycodone    5 mG/acetaminophen 325 mG 1 Tablet(s) Oral every 4 hours PRN  zolpidem 5 milliGRAM(s) Oral at bedtime PRN    VITALS:   T(F): 97.5  HR: 59  BP: 177/76  RR: 18  SpO2: 92%    LABS:                        7.5    7.80  )-----------( 213      ( 20 Jul 2020 05:03 )             25.8     07-20    136  |  95<L>  |  38<H>  ----------------------------<  118<H>  4.7   |  26  |  7.7<HH>    Ca    8.3<L>      20 Jul 2020 05:03    TPro  6.4  /  Alb  3.7  /  TBili  0.4  /  DBili  x   /  AST  10  /  ALT  6   /  AlkPhos  134<H>  07-20      PHYSICAL EXAM:  GEN: No acute distress  PULM/CHEST: Clear to auscultation bilaterally, no rales, rhonchi or wheezes   CVS: Regular rate and rhythm, S1-S2, no murmurs  ABD: Soft, non-tender, non-distended, +BS  EXT: No edema  NEURO: AAOx3      Assessment and Plan:   · Assessment	    # Fluid overload (DDx: ESRD vs CHF less likely)  - Pt refused previously refusing hemodialysis, he went for dialysis yesterday (07/20/2020)   - Holding oral meds until patient can tolerate being off BiPAP  - Follow up BMB   - Requiring BiPAP due to continued pulmonary edema and HOMAR  - Continue with BIPAP 4 hours 4 hours off and qhs  - Switch to NC 3 L keep Soa2 92 to 96%  - Chest x-ray showing worsening fluid overload/pulmonary edema  - 80 mg of lasik given today morning   - Strict fluid restriction, low Na diet  - F/u with pulmonary and nephrology recommendations       # Recurrent left epistaxis in a patient not on anti-coagulation   - EPO in HD for anemia  - s/p TAX soaked packing in the ED  - f/u CBC    # Bipolar disorder / Anxiety disorder   - Continue Xanax 0.25 mg PO qd PRN (end in 3 days) + Sertraline 100 mg PO qd + Trazodone 100 mg PO qhs.  - Psych consult to access patients decision making capacity cancelled as patient is now accepting treatment and has no acute mental status changes    # Type 2 Diabetes Mellitus   - On Lantus and sliding scale at the nursing home.  - Check FS before meals and at bedtime and start insulin basal and bolus with sliding scale if FS consistently > 180   - Will f/u hemoglobin A1C level    # Hypertension (controlled)  - Continue Metoprolol Tartrate 25 mg PO BID + Procardia 90 mg PO qd    # Obstructive sleep apnea / COPD on 3L NC   - Continue Duoneb 3mL q6h  - CPAP 14/8 30% FiO2 SUBJECTIVE:    Patient is a 55y old Male who presents with a chief complaint of Epistaxis, pulmonary edema (20 Jul 2020 14:28)    Currently admitted to medicine with the primary diagnosis of Epistaxis     Today is hospital day 3d. This morning he is resting comfortably in bed and reports no new issues or overnight events.     INTERVAL EVENTS:     PAST MEDICAL & SURGICAL HISTORY  Dialysis patient: Tuesday- Thursday-Saturday  Bilateral ocular hypertension  Insomnia, unspecified type  Bipolar affective disorder, remission status unspecified  Obesity, unspecified classification, unspecified obesity type, unspecified whether serious comorbidity present  Anxiety disorder, unspecified type  Pain disorder  Anemia, unspecified type  Blind  HTN (hypertension)  End stage renal disease  Diabetes  Status post glaucoma surgery: RIGHT EYE  S/P BKA (below knee amputation) unilateral: RIGHT  A-V fistula: LEFT ARM      ALLERGIES:  No Known Allergies    MEDICATIONS:  STANDING MEDICATIONS  albuterol/ipratropium for Nebulization 3 milliLiter(s) Nebulizer every 6 hours  brimonidine 0.2% Ophthalmic Solution 1 Drop(s) Both EYES two times a day  dorzolamide 2% Ophthalmic Solution 1 Drop(s) Both EYES three times a day  epoetin keagan-epbx (RETACRIT) Injectable 83986 Unit(s) IV Push <User Schedule>  furosemide   Injectable 80 milliGRAM(s) IV Push daily  guaifenesin/dextromethorphan  Syrup 10 milliLiter(s) Oral every 4 hours  hydrALAZINE 25 milliGRAM(s) Oral three times a day  lactulose Syrup 20 Gram(s) Oral two times a day  latanoprost 0.005% Ophthalmic Solution 1 Drop(s) Both EYES at bedtime  metolazone 5 milliGRAM(s) Oral <User Schedule>  metoprolol tartrate 25 milliGRAM(s) Oral two times a day  NIFEdipine XL 90 milliGRAM(s) Oral daily  polyethylene glycol 3350 17 Gram(s) Oral daily  pregabalin 50 milliGRAM(s) Oral daily  sertraline 100 milliGRAM(s) Oral daily  sevelamer carbonate 2400 milliGRAM(s) Oral three times a day  simethicone 80 milliGRAM(s) Chew three times a day  simvastatin 10 milliGRAM(s) Oral at bedtime  tamsulosin 0.4 milliGRAM(s) Oral at bedtime  timolol 0.5% Solution 1 Drop(s) Both EYES two times a day  traZODone 100 milliGRAM(s) Oral at bedtime    PRN MEDICATIONS  acetaminophen   Tablet .. 650 milliGRAM(s) Oral every 6 hours PRN  ALPRAZolam 0.25 milliGRAM(s) Oral daily PRN  oxycodone    5 mG/acetaminophen 325 mG 1 Tablet(s) Oral every 4 hours PRN  zolpidem 5 milliGRAM(s) Oral at bedtime PRN    VITALS:   T(F): 97.5  HR: 59  BP: 177/76  RR: 18  SpO2: 92%    LABS:                        7.5    7.80  )-----------( 213      ( 20 Jul 2020 05:03 )             25.8     07-20    136  |  95<L>  |  38<H>  ----------------------------<  118<H>  4.7   |  26  |  7.7<HH>    Ca    8.3<L>      20 Jul 2020 05:03    TPro  6.4  /  Alb  3.7  /  TBili  0.4  /  DBili  x   /  AST  10  /  ALT  6   /  AlkPhos  134<H>  07-20      PHYSICAL EXAM:  GEN: No acute distress  PULM/CHEST: Clear to auscultation bilaterally, no rales, rhonchi or wheezes   CVS: Regular rate and rhythm, S1-S2, no murmurs  ABD: Soft, non-tender, non-distended, +BS  EXT: No edema  NEURO: AAOx3      Assessment and Plan:   · Assessment	    # Fluid overload (DDx: ESRD vs CHF less likely)  - Pt refused previously refusing hemodialysis, he went for dialysis yesterday (07/20/2020)   - Holding oral meds until patient can tolerate being off BiPAP  - Follow up BMB   - Requiring BiPAP due to continued pulmonary edema and HOMAR  - Continue with BIPAP 4 hours 4 hours off and qhs  - Switch to NC 3 L keep Soa2 92 to 96%  - Chest x-ray showing worsening fluid overload/pulmonary edema  - 80 mg of lasik given today morning   - Strict fluid restriction, low Na diet  - F/u with pulmonary and nephrology recommendations   - Patient went for hemodialysis today   - Follow up chest x- ray and echo tomorrow AM      # Recurrent left epistaxis in a patient not on anti-coagulation   - EPO in HD for anemia  - s/p TAX soaked packing in the ED  - f/u CBC    # Bipolar disorder / Anxiety disorder   - Continue Xanax 0.25 mg PO qd PRN (end in 3 days) + Sertraline 100 mg PO qd + Trazodone 100 mg PO qhs.  - Psych consult to access patients decision making capacity cancelled as patient is now accepting treatment and has no acute mental status changes    # Type 2 Diabetes Mellitus   - On Lantus and sliding scale at the nursing home.  - Check FS before meals and at bedtime and start insulin basal and bolus with sliding scale if FS consistently > 180   - Will f/u hemoglobin A1C level    # Hypertension (controlled)  - Continue Metoprolol Tartrate 25 mg PO BID + Procardia 90 mg PO qd    # Obstructive sleep apnea / COPD on 3L NC   - Continue Duoneb 3mL q6h  - CPAP 14/8 30% FiO2

## 2020-07-21 NOTE — PROGRESS NOTE ADULT - ASSESSMENT
1. End stage renal disease on hemodialysis Tuesday / Thursday and Saturday. Clinically overloaded. HD again today: 2 hours, opti 160 dialyzer, 2K bath, 3L UF. Strict fluid restriction, low Na diet. Increase Lasix to 80mg IV BID.  2. HTN. Continue metoprolol, hydralazine, and nifedipine.   3. Hyperphosphatemia. Sevelamer with meals. Renal diet.  4. Anemia. EPO with HD.

## 2020-07-21 NOTE — PROGRESS NOTE ADULT - SUBJECTIVE AND OBJECTIVE BOX
OVERNIGHT EVENTS: events noted, on BIPAP overnight, s/p HD    Vital Signs Last 24 Hrs  T(C): 35.5 (21 Jul 2020 16:06), Max: 36.6 (20 Jul 2020 21:10)  T(F): 95.9 (21 Jul 2020 16:06), Max: 97.8 (20 Jul 2020 21:10)  HR: 75 (21 Jul 2020 16:06) (59 - 75)  BP: 194/81 (21 Jul 2020 16:06) (133/86 - 194/81)  RR: 18 (21 Jul 2020 16:06) (17 - 20)  SpO2: 97% (21 Jul 2020 16:06) (90% - 97%)    PHYSICAL EXAMINATION:    GENERAL: The patient is awake and alert in no apparent distress.     HEENT: Head is normocephalic and atraumatic. Extraocular muscles are intacT    NECK: Supple.    LUNGS: dec BS l bases    HEART: Regular rate and rhythm without murmur.    ABDOMEN: Soft, nontender, and nondistended.      NEUROLOGIC: Grossly intact.    SKIN: No ulceration or induration present.      LABS:                        7.5    7.80  )-----------( 213      ( 20 Jul 2020 05:03 )             25.8     07-20    136  |  95<L>  |  38<H>  ----------------------------<  118<H>  4.7   |  26  |  7.7<HH>    Ca    8.3<L>      20 Jul 2020 05:03    TPro  6.4  /  Alb  3.7  /  TBili  0.4  /  DBili  x   /  AST  10  /  ALT  6   /  AlkPhos  134<H>  07-20                Serum Pro-Brain Natriuretic Peptide: 84047 pg/mL (07-20-20 @ 17:55)            07-20-20 @ 07:01  -  07-21-20 @ 07:00  --------------------------------------------------------  IN: 0 mL / OUT: 9000 mL / NET: -9000 mL    07-21-20 @ 07:01 - 07-21-20 @ 16:26  --------------------------------------------------------  IN: 0 mL / OUT: 3000 mL / NET: -3000 mL        MICROBIOLOGY:      MEDICATIONS  (STANDING):  albuterol/ipratropium for Nebulization 3 milliLiter(s) Nebulizer every 6 hours  brimonidine 0.2% Ophthalmic Solution 1 Drop(s) Both EYES two times a day  dextrose 5%. 1000 milliLiter(s) (50 mL/Hr) IV Continuous <Continuous>  dextrose 50% Injectable 12.5 Gram(s) IV Push once  dextrose 50% Injectable 25 Gram(s) IV Push once  dextrose 50% Injectable 25 Gram(s) IV Push once  dorzolamide 2% Ophthalmic Solution 1 Drop(s) Both EYES three times a day  epoetin keagan-epbx (RETACRIT) Injectable 59283 Unit(s) IV Push <User Schedule>  furosemide   Injectable 80 milliGRAM(s) IV Push once  guaifenesin/dextromethorphan  Syrup 10 milliLiter(s) Oral every 4 hours  hydrALAZINE 25 milliGRAM(s) Oral three times a day  insulin lispro (HumaLOG) corrective regimen sliding scale   SubCutaneous three times a day before meals  lactulose Syrup 20 Gram(s) Oral two times a day  latanoprost 0.005% Ophthalmic Solution 1 Drop(s) Both EYES at bedtime  metolazone 5 milliGRAM(s) Oral <User Schedule>  metoprolol tartrate 25 milliGRAM(s) Oral two times a day  NIFEdipine XL 90 milliGRAM(s) Oral daily  polyethylene glycol 3350 17 Gram(s) Oral daily  pregabalin 50 milliGRAM(s) Oral daily  sertraline 100 milliGRAM(s) Oral daily  sevelamer carbonate 2400 milliGRAM(s) Oral three times a day  simethicone 80 milliGRAM(s) Chew three times a day  simvastatin 10 milliGRAM(s) Oral at bedtime  tamsulosin 0.4 milliGRAM(s) Oral at bedtime  timolol 0.5% Solution 1 Drop(s) Both EYES two times a day  traZODone 100 milliGRAM(s) Oral at bedtime    MEDICATIONS  (PRN):  acetaminophen   Tablet .. 650 milliGRAM(s) Oral every 6 hours PRN Temp greater or equal to 38C (100.4F), Mild Pain (1 - 3)  ALPRAZolam 0.25 milliGRAM(s) Oral daily PRN anxiety  dextrose 40% Gel 15 Gram(s) Oral once PRN Blood Glucose LESS THAN 70 milliGRAM(s)/deciliter  glucagon  Injectable 1 milliGRAM(s) IntraMuscular once PRN Glucose LESS THAN 70 milligrams/deciliter  oxycodone    5 mG/acetaminophen 325 mG 1 Tablet(s) Oral every 4 hours PRN for moderate pain  zolpidem 5 milliGRAM(s) Oral at bedtime PRN Insomnia      RADIOLOGY & ADDITIONAL STUDIES:

## 2020-07-22 LAB
ALBUMIN SERPL ELPH-MCNC: 3.7 G/DL — SIGNIFICANT CHANGE UP (ref 3.5–5.2)
ALP SERPL-CCNC: 130 U/L — HIGH (ref 30–115)
ALT FLD-CCNC: 5 U/L — SIGNIFICANT CHANGE UP (ref 0–41)
ANION GAP SERPL CALC-SCNC: 12 MMOL/L — SIGNIFICANT CHANGE UP (ref 7–14)
AST SERPL-CCNC: 5 U/L — SIGNIFICANT CHANGE UP (ref 0–41)
BASOPHILS # BLD AUTO: 0.09 K/UL — SIGNIFICANT CHANGE UP (ref 0–0.2)
BASOPHILS NFR BLD AUTO: 0.9 % — SIGNIFICANT CHANGE UP (ref 0–1)
BILIRUB SERPL-MCNC: 0.4 MG/DL — SIGNIFICANT CHANGE UP (ref 0.2–1.2)
BUN SERPL-MCNC: 28 MG/DL — HIGH (ref 10–20)
CALCIUM SERPL-MCNC: 8.2 MG/DL — LOW (ref 8.5–10.1)
CHLORIDE SERPL-SCNC: 99 MMOL/L — SIGNIFICANT CHANGE UP (ref 98–110)
CO2 SERPL-SCNC: 30 MMOL/L — SIGNIFICANT CHANGE UP (ref 17–32)
CREAT SERPL-MCNC: 5.9 MG/DL — CRITICAL HIGH (ref 0.7–1.5)
EOSINOPHIL # BLD AUTO: 0.36 K/UL — SIGNIFICANT CHANGE UP (ref 0–0.7)
EOSINOPHIL NFR BLD AUTO: 3.6 % — SIGNIFICANT CHANGE UP (ref 0–8)
GLUCOSE BLDC GLUCOMTR-MCNC: 169 MG/DL — HIGH (ref 70–99)
GLUCOSE BLDC GLUCOMTR-MCNC: 261 MG/DL — HIGH (ref 70–99)
GLUCOSE BLDC GLUCOMTR-MCNC: 271 MG/DL — HIGH (ref 70–99)
GLUCOSE BLDC GLUCOMTR-MCNC: 291 MG/DL — HIGH (ref 70–99)
GLUCOSE SERPL-MCNC: 179 MG/DL — HIGH (ref 70–99)
HCT VFR BLD CALC: 27 % — LOW (ref 42–52)
HGB BLD-MCNC: 7.8 G/DL — LOW (ref 14–18)
IMM GRANULOCYTES NFR BLD AUTO: 0.6 % — HIGH (ref 0.1–0.3)
LYMPHOCYTES # BLD AUTO: 0.97 K/UL — LOW (ref 1.2–3.4)
LYMPHOCYTES # BLD AUTO: 9.7 % — LOW (ref 20.5–51.1)
MAGNESIUM SERPL-MCNC: 2.2 MG/DL — SIGNIFICANT CHANGE UP (ref 1.8–2.4)
MCHC RBC-ENTMCNC: 25.5 PG — LOW (ref 27–31)
MCHC RBC-ENTMCNC: 28.9 G/DL — LOW (ref 32–37)
MCV RBC AUTO: 88.2 FL — SIGNIFICANT CHANGE UP (ref 80–94)
MONOCYTES # BLD AUTO: 0.65 K/UL — HIGH (ref 0.1–0.6)
MONOCYTES NFR BLD AUTO: 6.5 % — SIGNIFICANT CHANGE UP (ref 1.7–9.3)
NEUTROPHILS # BLD AUTO: 7.86 K/UL — HIGH (ref 1.4–6.5)
NEUTROPHILS NFR BLD AUTO: 78.7 % — HIGH (ref 42.2–75.2)
NRBC # BLD: 0 /100 WBCS — SIGNIFICANT CHANGE UP (ref 0–0)
PLATELET # BLD AUTO: 208 K/UL — SIGNIFICANT CHANGE UP (ref 130–400)
POTASSIUM SERPL-MCNC: 4 MMOL/L — SIGNIFICANT CHANGE UP (ref 3.5–5)
POTASSIUM SERPL-SCNC: 4 MMOL/L — SIGNIFICANT CHANGE UP (ref 3.5–5)
PROT SERPL-MCNC: 6.3 G/DL — SIGNIFICANT CHANGE UP (ref 6–8)
RBC # BLD: 3.06 M/UL — LOW (ref 4.7–6.1)
RBC # FLD: 16.9 % — HIGH (ref 11.5–14.5)
SODIUM SERPL-SCNC: 141 MMOL/L — SIGNIFICANT CHANGE UP (ref 135–146)
WBC # BLD: 9.99 K/UL — SIGNIFICANT CHANGE UP (ref 4.8–10.8)
WBC # FLD AUTO: 9.99 K/UL — SIGNIFICANT CHANGE UP (ref 4.8–10.8)

## 2020-07-22 PROCEDURE — 71045 X-RAY EXAM CHEST 1 VIEW: CPT | Mod: 26

## 2020-07-22 PROCEDURE — 99233 SBSQ HOSP IP/OBS HIGH 50: CPT

## 2020-07-22 RX ORDER — MORPHINE SULFATE 50 MG/1
2 CAPSULE, EXTENDED RELEASE ORAL ONCE
Refills: 0 | Status: DISCONTINUED | OUTPATIENT
Start: 2020-07-22 | End: 2020-07-22

## 2020-07-22 RX ADMIN — Medication 80 MILLIGRAM(S): at 18:18

## 2020-07-22 RX ADMIN — SEVELAMER CARBONATE 2400 MILLIGRAM(S): 2400 POWDER, FOR SUSPENSION ORAL at 18:15

## 2020-07-22 RX ADMIN — ERYTHROPOIETIN 10000 UNIT(S): 10000 INJECTION, SOLUTION INTRAVENOUS; SUBCUTANEOUS at 14:49

## 2020-07-22 RX ADMIN — Medication 3: at 11:47

## 2020-07-22 RX ADMIN — Medication 1: at 08:03

## 2020-07-22 RX ADMIN — LACTULOSE 20 GRAM(S): 10 SOLUTION ORAL at 06:23

## 2020-07-22 RX ADMIN — SIMETHICONE 80 MILLIGRAM(S): 80 TABLET, CHEWABLE ORAL at 06:24

## 2020-07-22 RX ADMIN — Medication 1 DROP(S): at 18:15

## 2020-07-22 RX ADMIN — MORPHINE SULFATE 2 MILLIGRAM(S): 50 CAPSULE, EXTENDED RELEASE ORAL at 20:29

## 2020-07-22 RX ADMIN — BRIMONIDINE TARTRATE 1 DROP(S): 2 SOLUTION/ DROPS OPHTHALMIC at 18:17

## 2020-07-22 RX ADMIN — OXYCODONE AND ACETAMINOPHEN 1 TABLET(S): 5; 325 TABLET ORAL at 18:14

## 2020-07-22 RX ADMIN — Medication 25 MILLIGRAM(S): at 06:24

## 2020-07-22 RX ADMIN — LATANOPROST 1 DROP(S): 0.05 SOLUTION/ DROPS OPHTHALMIC; TOPICAL at 21:36

## 2020-07-22 RX ADMIN — SEVELAMER CARBONATE 2400 MILLIGRAM(S): 2400 POWDER, FOR SUSPENSION ORAL at 11:49

## 2020-07-22 RX ADMIN — POLYETHYLENE GLYCOL 3350 17 GRAM(S): 17 POWDER, FOR SOLUTION ORAL at 11:50

## 2020-07-22 RX ADMIN — LACTULOSE 20 GRAM(S): 10 SOLUTION ORAL at 18:16

## 2020-07-22 RX ADMIN — Medication 25 MILLIGRAM(S): at 06:23

## 2020-07-22 RX ADMIN — TAMSULOSIN HYDROCHLORIDE 0.4 MILLIGRAM(S): 0.4 CAPSULE ORAL at 21:35

## 2020-07-22 RX ADMIN — Medication 90 MILLIGRAM(S): at 06:23

## 2020-07-22 RX ADMIN — ZOLPIDEM TARTRATE 5 MILLIGRAM(S): 10 TABLET ORAL at 21:35

## 2020-07-22 RX ADMIN — BRIMONIDINE TARTRATE 1 DROP(S): 2 SOLUTION/ DROPS OPHTHALMIC at 06:23

## 2020-07-22 RX ADMIN — Medication 3: at 18:19

## 2020-07-22 RX ADMIN — SERTRALINE 100 MILLIGRAM(S): 25 TABLET, FILM COATED ORAL at 11:49

## 2020-07-22 RX ADMIN — Medication 3 MILLILITER(S): at 08:35

## 2020-07-22 RX ADMIN — Medication 80 MILLIGRAM(S): at 06:24

## 2020-07-22 RX ADMIN — Medication 50 MILLIGRAM(S): at 11:53

## 2020-07-22 RX ADMIN — OXYCODONE AND ACETAMINOPHEN 1 TABLET(S): 5; 325 TABLET ORAL at 03:45

## 2020-07-22 RX ADMIN — DORZOLAMIDE HYDROCHLORIDE 1 DROP(S): 20 SOLUTION/ DROPS OPHTHALMIC at 21:36

## 2020-07-22 RX ADMIN — Medication 10 MILLILITER(S): at 10:13

## 2020-07-22 RX ADMIN — Medication 25 MILLIGRAM(S): at 18:15

## 2020-07-22 RX ADMIN — Medication 10 MILLILITER(S): at 06:24

## 2020-07-22 RX ADMIN — SEVELAMER CARBONATE 2400 MILLIGRAM(S): 2400 POWDER, FOR SUSPENSION ORAL at 08:02

## 2020-07-22 RX ADMIN — Medication 0.25 MILLIGRAM(S): at 11:53

## 2020-07-22 RX ADMIN — Medication 100 MILLIGRAM(S): at 21:36

## 2020-07-22 RX ADMIN — SIMVASTATIN 10 MILLIGRAM(S): 20 TABLET, FILM COATED ORAL at 21:36

## 2020-07-22 RX ADMIN — Medication 1 DROP(S): at 06:23

## 2020-07-22 RX ADMIN — Medication 10 MILLILITER(S): at 18:18

## 2020-07-22 RX ADMIN — Medication 10 MILLILITER(S): at 21:36

## 2020-07-22 RX ADMIN — Medication 25 MILLIGRAM(S): at 21:36

## 2020-07-22 RX ADMIN — DORZOLAMIDE HYDROCHLORIDE 1 DROP(S): 20 SOLUTION/ DROPS OPHTHALMIC at 06:23

## 2020-07-22 NOTE — PROGRESS NOTE ADULT - ASSESSMENT
Impression:  Pulmonary edema with subsequent hypoxemia secondary to ESRD/ CXR STILL B/L INCREASE MARKINGS  Loculated l effusion ( chronic)    Plan:     c/w HD as per nephro keep neg balance  CHEST CT NC  PROCAL  Strict I/Os  Daily weight  Fluid restriction  c/w BIPAP 4 hours 4 hours off and qhs  NC 3 L keep Soa2 92 to 96%  will follow

## 2020-07-22 NOTE — PROGRESS NOTE ADULT - ASSESSMENT
1. End stage renal disease on hemodialysis Tuesday / Thursday and Saturday. Clinically overloaded. HD again today: 3 hours, opti 160 dialyzer, 2K bath, 4L UF. Strict fluid restriction, low Na diet. Lasix 80mg IV BID.  2. HTN. Continue metoprolol, hydralazine, and nifedipine.   3. Hyperphosphatemia. Sevelamer with meals. Renal diet.  4. Anemia. EPO with HD.

## 2020-07-22 NOTE — PROGRESS NOTE ADULT - SUBJECTIVE AND OBJECTIVE BOX
OVERNIGHT EVENTS: events noted, cp abd pain, no fever, cough    Vital Signs Last 24 Hrs  T(C): 36.7 (22 Jul 2020 04:24), Max: 36.7 (22 Jul 2020 04:24)  T(F): 98 (22 Jul 2020 04:24), Max: 98 (22 Jul 2020 04:24)  HR: 69 (22 Jul 2020 04:24) (65 - 77)  BP: 186/83 (22 Jul 2020 04:24) (162/73 - 194/81)  RR: 20 (22 Jul 2020 04:24) (18 - 20)  SpO2: 94% (21 Jul 2020 20:31) (90% - 97%)    PHYSICAL EXAMINATION:    GENERAL: The patient is awake and alert in no apparent distress.     HEENT: Head is normocephalic and atraumatic.    NECK: Supple.    LUNGS: dec bs l side    HEART: SM 3/6    ABDOMEN: Soft, nontender, and nondistended.      NEUROLOGIC: Grossly intact.    SKIN: No ulceration or induration present.      LABS:                        7.8    9.99  )-----------( 208      ( 22 Jul 2020 05:27 )             27.0     07-22    141  |  99  |  28<H>  ----------------------------<  179<H>  4.0   |  30  |  5.9<HH>    Ca    8.2<L>      22 Jul 2020 05:27  Mg     2.2     07-22    TPro  6.3  /  Alb  3.7  /  TBili  0.4  /  DBili  x   /  AST  5   /  ALT  5   /  AlkPhos  130<H>  07-22                Serum Pro-Brain Natriuretic Peptide: [id number] pg/mL (07-20-20 @ 17:55)            07-21-20 @ 07:01  -  07-22-20 @ 07:00  --------------------------------------------------------  IN: 0 mL / OUT: 3000 mL / NET: -3000 mL        MICROBIOLOGY:      MEDICATIONS  (STANDING):  albuterol/ipratropium for Nebulization 3 milliLiter(s) Nebulizer every 6 hours  brimonidine 0.2% Ophthalmic Solution 1 Drop(s) Both EYES two times a day  dextrose 5%. 1000 milliLiter(s) (50 mL/Hr) IV Continuous <Continuous>  dextrose 50% Injectable 12.5 Gram(s) IV Push once  dextrose 50% Injectable 25 Gram(s) IV Push once  dextrose 50% Injectable 25 Gram(s) IV Push once  dorzolamide 2% Ophthalmic Solution 1 Drop(s) Both EYES three times a day  epoetin keagan-epbx (RETACRIT) Injectable 49836 Unit(s) IV Push <User Schedule>  furosemide   Injectable 80 milliGRAM(s) IV Push two times a day  guaifenesin/dextromethorphan  Syrup 10 milliLiter(s) Oral every 4 hours  hydrALAZINE 25 milliGRAM(s) Oral three times a day  insulin lispro (HumaLOG) corrective regimen sliding scale   SubCutaneous three times a day before meals  lactulose Syrup 20 Gram(s) Oral two times a day  latanoprost 0.005% Ophthalmic Solution 1 Drop(s) Both EYES at bedtime  metolazone 5 milliGRAM(s) Oral <User Schedule>  metoprolol tartrate 25 milliGRAM(s) Oral two times a day  NIFEdipine XL 90 milliGRAM(s) Oral daily  polyethylene glycol 3350 17 Gram(s) Oral daily  pregabalin 50 milliGRAM(s) Oral daily  sertraline 100 milliGRAM(s) Oral daily  sevelamer carbonate 2400 milliGRAM(s) Oral three times a day  simethicone 80 milliGRAM(s) Chew three times a day  simvastatin 10 milliGRAM(s) Oral at bedtime  tamsulosin 0.4 milliGRAM(s) Oral at bedtime  timolol 0.5% Solution 1 Drop(s) Both EYES two times a day  traZODone 100 milliGRAM(s) Oral at bedtime    MEDICATIONS  (PRN):  acetaminophen   Tablet .. 650 milliGRAM(s) Oral every 6 hours PRN Temp greater or equal to 38C (100.4F), Mild Pain (1 - 3)  ALPRAZolam 0.25 milliGRAM(s) Oral daily PRN anxiety  dextrose 40% Gel 15 Gram(s) Oral once PRN Blood Glucose LESS THAN 70 milliGRAM(s)/deciliter  glucagon  Injectable 1 milliGRAM(s) IntraMuscular once PRN Glucose LESS THAN 70 milligrams/deciliter  oxycodone    5 mG/acetaminophen 325 mG 1 Tablet(s) Oral every 4 hours PRN for moderate pain  sodium chloride 0.65% Nasal 1 Spray(s) Both Nostrils daily PRN Nasal Congestion  zolpidem 5 milliGRAM(s) Oral at bedtime PRN Insomnia      RADIOLOGY & ADDITIONAL STUDIES:

## 2020-07-22 NOTE — PROGRESS NOTE ADULT - ASSESSMENT
54 y/o man with PMH of obesity, hypertension, diabetes with retinopathy, glaucoma, ESRD on HD, BPH, HOMAR, PVD, chronic pain, bipolar disorder and anxiety presented from [facility] for recurrent left epistaxis.     1. Recurrent left epistaxis - pt admits to nose picking that caused today's episode  pt counseled and bleeding now improved  humidified O2, saline nasal spray  s/p packing in ED that pt removed himself  seen by ENT  Hgb stable - has chronic anemia likely due to ESRD    2. Volume overload/pulmonary edema - pt missed HD sessions at [facility]  receiving multiple HD sessions here for fluid removal  had daily HD 7/19 - 7/22 - now for 4L fluid removal today  renal f/u appreciated  on IV lasix and metolazone  monitor I's and O's  low sodium diet  fluid restriction  pulm f/u appreciated - bipap q4hr, O2 3L NC, negative balance  CT scan of chest - noncontrast  check procalcitonin    3. ESRD - HD per renal  pt noncompliant at times at [facility]    4. HTN - uncontrolled likely due to volume overload  continue current meds and fluid removal and monitor    5. DM - uncontrolled here - increase insulin to keep FS < 180  A1C 6.9    6. Bipolar disorder / Anxiety disorder   Continue Xanax 0.25 mg PO qd PRN, Sertraline, Trazodone     7. Chronic anemia on Epogen - stable    8. HOMAR - on bipap QHS    9. COPD on 3L NC - chronic hypoxemic respiratory failure  - continue nebs    10. SCD of left LE for DVT prophylaxis      PROGRESS NOTE HANDOFF    Pending: HD today, improvement in volume overload    Disposition: [facility]

## 2020-07-22 NOTE — PROGRESS NOTE ADULT - SUBJECTIVE AND OBJECTIVE BOX
SUBJECTIVE:    Patient is a 55y old Male who presents with a chief complaint of Epistaxis, pulmonary edema (22 Jul 2020 14:00)    Currently admitted to medicine with the primary diagnosis of Epistaxis     Today is hospital day 4d. He complaints of shortness of breath when seen this morning and requested to be back on BiPAP.    INTERVAL EVENTS:     PAST MEDICAL & SURGICAL HISTORY  Dialysis patient: Tuesday- Thursday-Saturday  Bilateral ocular hypertension  Insomnia, unspecified type  Bipolar affective disorder, remission status unspecified  Obesity, unspecified classification, unspecified obesity type, unspecified whether serious comorbidity present  Anxiety disorder, unspecified type  Pain disorder  Anemia, unspecified type  Blind  HTN (hypertension)  End stage renal disease  Diabetes  Status post glaucoma surgery: RIGHT EYE  S/P BKA (below knee amputation) unilateral: RIGHT  A-V fistula: LEFT ARM      ALLERGIES:  No Known Allergies    MEDICATIONS:  STANDING MEDICATIONS  albuterol/ipratropium for Nebulization 3 milliLiter(s) Nebulizer every 6 hours  brimonidine 0.2% Ophthalmic Solution 1 Drop(s) Both EYES two times a day  dextrose 5%. 1000 milliLiter(s) IV Continuous <Continuous>  dextrose 50% Injectable 12.5 Gram(s) IV Push once  dextrose 50% Injectable 25 Gram(s) IV Push once  dextrose 50% Injectable 25 Gram(s) IV Push once  dorzolamide 2% Ophthalmic Solution 1 Drop(s) Both EYES three times a day  epoetin keagan-epbx (RETACRIT) Injectable 69639 Unit(s) IV Push <User Schedule>  furosemide   Injectable 80 milliGRAM(s) IV Push two times a day  guaifenesin/dextromethorphan  Syrup 10 milliLiter(s) Oral every 4 hours  hydrALAZINE 25 milliGRAM(s) Oral three times a day  insulin lispro (HumaLOG) corrective regimen sliding scale   SubCutaneous three times a day before meals  lactulose Syrup 20 Gram(s) Oral two times a day  latanoprost 0.005% Ophthalmic Solution 1 Drop(s) Both EYES at bedtime  metolazone 5 milliGRAM(s) Oral <User Schedule>  metoprolol tartrate 25 milliGRAM(s) Oral two times a day  NIFEdipine XL 90 milliGRAM(s) Oral daily  polyethylene glycol 3350 17 Gram(s) Oral daily  pregabalin 50 milliGRAM(s) Oral daily  sertraline 100 milliGRAM(s) Oral daily  sevelamer carbonate 2400 milliGRAM(s) Oral three times a day  simethicone 80 milliGRAM(s) Chew three times a day  simvastatin 10 milliGRAM(s) Oral at bedtime  tamsulosin 0.4 milliGRAM(s) Oral at bedtime  timolol 0.5% Solution 1 Drop(s) Both EYES two times a day  traZODone 100 milliGRAM(s) Oral at bedtime    PRN MEDICATIONS  acetaminophen   Tablet .. 650 milliGRAM(s) Oral every 6 hours PRN  ALPRAZolam 0.25 milliGRAM(s) Oral daily PRN  dextrose 40% Gel 15 Gram(s) Oral once PRN  glucagon  Injectable 1 milliGRAM(s) IntraMuscular once PRN  oxycodone    5 mG/acetaminophen 325 mG 1 Tablet(s) Oral every 4 hours PRN  sodium chloride 0.65% Nasal 1 Spray(s) Both Nostrils daily PRN  zolpidem 5 milliGRAM(s) Oral at bedtime PRN    VITALS:   T(F): 99.4  HR: 74  BP: 189/81  RR: 20  SpO2: 96%    LABS:                        7.8    9.99  )-----------( 208      ( 22 Jul 2020 05:27 )             27.0     07-22    141  |  99  |  28<H>  ----------------------------<  179<H>  4.0   |  30  |  5.9<HH>    Ca    8.2<L>      22 Jul 2020 05:27  Mg     2.2     07-22    TPro  6.3  /  Alb  3.7  /  TBili  0.4  /  DBili  x   /  AST  5   /  ALT  5   /  AlkPhos  130<H>  07-22                  RADIOLOGY:    PHYSICAL EXAM:  GEN: NAD in bed, c/o SOB, + anxiety  PULM/CHEST: Decreased breath sounds bilaterally  CVS: Regular rate and rhythm, S1-S2, no murmurs  ABD: Soft, Nontender, Nondistended; Bowel sounds present, obese  EXT: + left extremity edema  NEURO: AAOx3    Hunter Catheter:         Assessment and Plan:   · Assessment	    # Fluid overload (DDx: ESRD vs CHF less likely)  - Pt previously refused multiple sessions of hemodialysis, he went for his 4th dialysis today since admission(07/20/2020)   - Holding oral meds until patient can tolerate being off BiPAP  - Requiring BiPAP due to continued pulmonary edema and HOMAR  - Continue with BIPAP 4 hours 4 hours off and qhs  - Switch to NC 3 L keep Soa2 92 to 96%  - Chest x-ray showing worsening fluid overload/pulmonary edema  - Continue with 80 mg IV lasix bid  - Strict fluid restriction to 1 Liter, low Na/renal diet  - F/u with pulmonary and nephrology recommendations   - F/u with non-contrast CT Chest, chest x-ray,       # Recurrent left epistaxis in a patient not on anti-coagulation   - EPO in HD for anemia  - s/p TAX soaked packing in the ED  - f/u CBC    # Bipolar disorder / Anxiety disorder   - Continue Xanax 0.25 mg PO qd PRN (end in 3 days) + Sertraline 100 mg PO qd + Trazodone 100 mg PO qhs.  - Psych consult to access patients decision making capacity cancelled as patient is now accepting treatment and has no acute mental status changes    # Type 2 Diabetes Mellitus   - On Lantus and sliding scale at the nursing home.  - Check FS before meals and at bedtime and start insulin basal and bolus with sliding scale if FS consistently > 180   - Will f/u hemoglobin A1C level    # Hypertension (controlled)  - Continue Metoprolol Tartrate 25 mg PO BID + Procardia 90 mg PO qd    # Obstructive sleep apnea / COPD on 3L NC   - Continue Duoneb 3mL q6h  - CPAP 14/8 30% FiO2 SUBJECTIVE:    Patient is a 55y old Male who presents with a chief complaint of Epistaxis, pulmonary edema (22 Jul 2020 14:00)    Currently admitted to medicine with the primary diagnosis of Epistaxis     Today is hospital day 4d. He complaints of shortness of breath when seen this morning and requested to be back on BiPAP.      PAST MEDICAL & SURGICAL HISTORY  Dialysis patient: Tuesday- Thursday-Saturday  Bilateral ocular hypertension  Insomnia, unspecified type  Bipolar affective disorder, remission status unspecified  Obesity, unspecified classification, unspecified obesity type, unspecified whether serious comorbidity present  Anxiety disorder, unspecified type  Pain disorder  Anemia, unspecified type  Blind  HTN (hypertension)  End stage renal disease  Diabetes  Status post glaucoma surgery: RIGHT EYE  S/P BKA (below knee amputation) unilateral: RIGHT  A-V fistula: LEFT ARM      ALLERGIES:  No Known Allergies    MEDICATIONS:  STANDING MEDICATIONS  albuterol/ipratropium for Nebulization 3 milliLiter(s) Nebulizer every 6 hours  brimonidine 0.2% Ophthalmic Solution 1 Drop(s) Both EYES two times a day  dextrose 5%. 1000 milliLiter(s) IV Continuous <Continuous>  dextrose 50% Injectable 12.5 Gram(s) IV Push once  dextrose 50% Injectable 25 Gram(s) IV Push once  dextrose 50% Injectable 25 Gram(s) IV Push once  dorzolamide 2% Ophthalmic Solution 1 Drop(s) Both EYES three times a day  epoetin keagan-epbx (RETACRIT) Injectable 83657 Unit(s) IV Push <User Schedule>  furosemide   Injectable 80 milliGRAM(s) IV Push two times a day  guaifenesin/dextromethorphan  Syrup 10 milliLiter(s) Oral every 4 hours  hydrALAZINE 25 milliGRAM(s) Oral three times a day  insulin lispro (HumaLOG) corrective regimen sliding scale   SubCutaneous three times a day before meals  lactulose Syrup 20 Gram(s) Oral two times a day  latanoprost 0.005% Ophthalmic Solution 1 Drop(s) Both EYES at bedtime  metolazone 5 milliGRAM(s) Oral <User Schedule>  metoprolol tartrate 25 milliGRAM(s) Oral two times a day  NIFEdipine XL 90 milliGRAM(s) Oral daily  polyethylene glycol 3350 17 Gram(s) Oral daily  pregabalin 50 milliGRAM(s) Oral daily  sertraline 100 milliGRAM(s) Oral daily  sevelamer carbonate 2400 milliGRAM(s) Oral three times a day  simethicone 80 milliGRAM(s) Chew three times a day  simvastatin 10 milliGRAM(s) Oral at bedtime  tamsulosin 0.4 milliGRAM(s) Oral at bedtime  timolol 0.5% Solution 1 Drop(s) Both EYES two times a day  traZODone 100 milliGRAM(s) Oral at bedtime    PRN MEDICATIONS  acetaminophen   Tablet .. 650 milliGRAM(s) Oral every 6 hours PRN  ALPRAZolam 0.25 milliGRAM(s) Oral daily PRN  dextrose 40% Gel 15 Gram(s) Oral once PRN  glucagon  Injectable 1 milliGRAM(s) IntraMuscular once PRN  oxycodone    5 mG/acetaminophen 325 mG 1 Tablet(s) Oral every 4 hours PRN  sodium chloride 0.65% Nasal 1 Spray(s) Both Nostrils daily PRN  zolpidem 5 milliGRAM(s) Oral at bedtime PRN    VITALS:   T(F): 99.4  HR: 74  BP: 189/81  RR: 20  SpO2: 96%    LABS:                        7.8    9.99  )-----------( 208      ( 22 Jul 2020 05:27 )             27.0     07-22    141  |  99  |  28<H>  ----------------------------<  179<H>  4.0   |  30  |  5.9<HH>    Ca    8.2<L>      22 Jul 2020 05:27  Mg     2.2     07-22    TPro  6.3  /  Alb  3.7  /  TBili  0.4  /  DBili  x   /  AST  5   /  ALT  5   /  AlkPhos  130<H>  07-22          RADIOLOGY:    PHYSICAL EXAM:  GEN: NAD in bed, c/o SOB, + anxiety  PULM/CHEST: Decreased breath sounds bilaterally  CVS: Regular rate and rhythm, S1-S2, no murmurs  ABD: Soft, Nontender, Nondistended; Bowel sounds present, obese  EXT: + left extremity edema  NEURO: AAOx3    Hunter Catheter:         Assessment and Plan:   · Assessment	    # Fluid overload (DDx: ESRD vs CHF less likely)  - Pt previously refused multiple sessions of hemodialysis, he went for his 4th dialysis for removal of 4 L today (07/22/2020)   - Holding oral meds until patient can tolerate being off BiPAP  - Requiring BiPAP due to continued pulmonary edema and HOMAR  - Continue with BIPAP 4 hours 4 hours off and qhs  - Switch to NC 3 L keep Soa2 92 to 96%  - Chest x-ray showing worsening fluid overload/pulmonary edema  - Continue with 80 mg IV lasix bid and metazolone  - Strict fluid restriction to 1 Liter, low Na/renal diet  - F/u with pulmonary and nephrology recommendations   - F/u with non-contrast CT Chest, chest x-ray, procalctonin      # Recurrent left epistaxis in a patient not on anti-coagulation   - EPO in HD for chronic anemia  - s/p TAX soaked packing in the ED  - patient admits to nose picking that caused another episode of epistaxis which was stopped with nasal tamponade  - patient counseled and bleeding now improved  - f/u CBC    # Bipolar disorder / Anxiety disorder   - Continue Xanax 0.25 mg PO qd PRN (end in 3 days) + Sertraline 100 mg PO qd + Trazodone 100 mg PO qhs.  - Psych consult to access patients decision making capacity cancelled as patient is now accepting treatment and has no acute mental status changes    # Type 2 Diabetes Mellitus   - On Lantus and sliding scale at the nursing home.  - Check FS before meals and at bedtime and start insulin basal and bolus with sliding scale if FS consistently > 180   - Will f/u hemoglobin A1C level    # Hypertension   - Continue Metoprolol Tartrate 25 mg PO BID + Procardia 90 mg PO qd    # Obstructive sleep apnea / COPD on 3L NC   - Continue Duoneb 3mL q6h  - CPAP 14/8 30% FiO2

## 2020-07-22 NOTE — PROGRESS NOTE ADULT - SUBJECTIVE AND OBJECTIVE BOX
THEURER FLORIDA  55y Male    INTERVAL HPI/OVERNIGHT EVENTS:    Pt c/o SOB when seen this morning. requested to be placed back on bipap.  he then developed epistaxis of left nare - he admitted to picking his nose.  Bleeding improved with nasal tamponade.  Will request humidified O2.  Now at HD for fluid removal.    T(F): 99.4 (07-22-20 @ 13:31), Max: 99.4 (07-22-20 @ 13:31)  HR: 74 (07-22-20 @ 13:31) (66 - 77)  BP: 189/81 (07-22-20 @ 13:31) (162/73 - 194/81)  RR: 20 (07-22-20 @ 13:31) (18 - 20)  SpO2: 95% (07-22-20 @ 13:10) (94% - 97%) on 3L NC    I&O's Summary    21 Jul 2020 07:01  -  22 Jul 2020 07:00  --------------------------------------------------------  IN: 0 mL / OUT: 3000 mL / NET: -3000 mL      CAPILLARY BLOOD GLUCOSE      POCT Blood Glucose.: 261 mg/dL (22 Jul 2020 10:59)  POCT Blood Glucose.: 169 mg/dL (22 Jul 2020 07:21)  POCT Blood Glucose.: 248 mg/dL (21 Jul 2020 21:12)  POCT Blood Glucose.: 216 mg/dL (21 Jul 2020 16:35)        PHYSICAL EXAM:  GENERAL: NAD in bed, c/o SOB, + anxiety  HEAD:  Normocephalic  EYES:  conjunctiva and sclera clear  ENMT: Moist mucous membranes, left nare with mild bleeding  NECK: Supple  NERVOUS SYSTEM:  Alert, awake, Good concentration  CHEST/LUNG: decreased BS b/l  HEART: Regular rate and rhythm  ABDOMEN: Soft, Nontender, Nondistended; Bowel sounds present, obese  EXTREMITIES:   + left LE edema  s/p right BKA      Consultant(s) Notes Reviewed:  [x ] YES  [ ] NO  Care Discussed with Consultants/Other Providers [ x] YES  [ ] NO    MEDICATIONS  (STANDING):  albuterol/ipratropium for Nebulization 3 milliLiter(s) Nebulizer every 6 hours  brimonidine 0.2% Ophthalmic Solution 1 Drop(s) Both EYES two times a day  dextrose 5%. 1000 milliLiter(s) (50 mL/Hr) IV Continuous <Continuous>  dextrose 50% Injectable 12.5 Gram(s) IV Push once  dextrose 50% Injectable 25 Gram(s) IV Push once  dextrose 50% Injectable 25 Gram(s) IV Push once  dorzolamide 2% Ophthalmic Solution 1 Drop(s) Both EYES three times a day  epoetin keagan-epbx (RETACRIT) Injectable 02747 Unit(s) IV Push <User Schedule>  furosemide   Injectable 80 milliGRAM(s) IV Push two times a day  guaifenesin/dextromethorphan  Syrup 10 milliLiter(s) Oral every 4 hours  hydrALAZINE 25 milliGRAM(s) Oral three times a day  insulin lispro (HumaLOG) corrective regimen sliding scale   SubCutaneous three times a day before meals  lactulose Syrup 20 Gram(s) Oral two times a day  latanoprost 0.005% Ophthalmic Solution 1 Drop(s) Both EYES at bedtime  metolazone 5 milliGRAM(s) Oral <User Schedule>  metoprolol tartrate 25 milliGRAM(s) Oral two times a day  NIFEdipine XL 90 milliGRAM(s) Oral daily  polyethylene glycol 3350 17 Gram(s) Oral daily  pregabalin 50 milliGRAM(s) Oral daily  sertraline 100 milliGRAM(s) Oral daily  sevelamer carbonate 2400 milliGRAM(s) Oral three times a day  simethicone 80 milliGRAM(s) Chew three times a day  simvastatin 10 milliGRAM(s) Oral at bedtime  tamsulosin 0.4 milliGRAM(s) Oral at bedtime  timolol 0.5% Solution 1 Drop(s) Both EYES two times a day  traZODone 100 milliGRAM(s) Oral at bedtime    MEDICATIONS  (PRN):  acetaminophen   Tablet .. 650 milliGRAM(s) Oral every 6 hours PRN Temp greater or equal to 38C (100.4F), Mild Pain (1 - 3)  ALPRAZolam 0.25 milliGRAM(s) Oral daily PRN anxiety  dextrose 40% Gel 15 Gram(s) Oral once PRN Blood Glucose LESS THAN 70 milliGRAM(s)/deciliter  glucagon  Injectable 1 milliGRAM(s) IntraMuscular once PRN Glucose LESS THAN 70 milligrams/deciliter  oxycodone    5 mG/acetaminophen 325 mG 1 Tablet(s) Oral every 4 hours PRN for moderate pain  sodium chloride 0.65% Nasal 1 Spray(s) Both Nostrils daily PRN Nasal Congestion  zolpidem 5 milliGRAM(s) Oral at bedtime PRN Insomnia      LABS:                        7.8    9.99  )-----------( 208      ( 22 Jul 2020 05:27 )             27.0     07-22    141  |  99  |  28<H>  ----------------------------<  179<H>  4.0   |  30  |  5.9<HH>    Ca    8.2<L>      22 Jul 2020 05:27  Mg     2.2     07-22    TPro  6.3  /  Alb  3.7  /  TBili  0.4  /  DBili  x   /  AST  5   /  ALT  5   /  AlkPhos  130<H>  07-22          RADIOLOGY & ADDITIONAL TESTS:    Imaging or report Personally Reviewed:  [x ] YES  [ ] NO    < from: Xray Chest 1 View- PORTABLE-Routine (07.22.20 @ 06:46) >    Impression:      Increased bilateral opacities/effusions.    < end of copied text >      Case discussed with residents    Care discussed with pt

## 2020-07-22 NOTE — PROGRESS NOTE ADULT - SUBJECTIVE AND OBJECTIVE BOX
[facility] FOLLOW UP NOTE  --------------------------------------------------------------------------------  24 hour events/subjective: Patient examined. C/O SOB.    PAST HISTORY  --------------------------------------------------------------------------------  No significant changes to PMH, PSH, FHx, SHx, unless otherwise noted    ALLERGIES & MEDICATIONS  --------------------------------------------------------------------------------  Allergies    No Known Allergies    Intolerances      Standing Inpatient Medications  albuterol/ipratropium for Nebulization 3 milliLiter(s) Nebulizer every 6 hours  brimonidine 0.2% Ophthalmic Solution 1 Drop(s) Both EYES two times a day  dextrose 5%. 1000 milliLiter(s) IV Continuous <Continuous>  dextrose 50% Injectable 12.5 Gram(s) IV Push once  dextrose 50% Injectable 25 Gram(s) IV Push once  dextrose 50% Injectable 25 Gram(s) IV Push once  dorzolamide 2% Ophthalmic Solution 1 Drop(s) Both EYES three times a day  epoetin keagan-epbx (RETACRIT) Injectable 49838 Unit(s) IV Push <User Schedule>  furosemide   Injectable 80 milliGRAM(s) IV Push two times a day  guaifenesin/dextromethorphan  Syrup 10 milliLiter(s) Oral every 4 hours  hydrALAZINE 25 milliGRAM(s) Oral three times a day  insulin lispro (HumaLOG) corrective regimen sliding scale   SubCutaneous three times a day before meals  lactulose Syrup 20 Gram(s) Oral two times a day  latanoprost 0.005% Ophthalmic Solution 1 Drop(s) Both EYES at bedtime  metolazone 5 milliGRAM(s) Oral <User Schedule>  metoprolol tartrate 25 milliGRAM(s) Oral two times a day  NIFEdipine XL 90 milliGRAM(s) Oral daily  polyethylene glycol 3350 17 Gram(s) Oral daily  pregabalin 50 milliGRAM(s) Oral daily  sertraline 100 milliGRAM(s) Oral daily  sevelamer carbonate 2400 milliGRAM(s) Oral three times a day  simethicone 80 milliGRAM(s) Chew three times a day  simvastatin 10 milliGRAM(s) Oral at bedtime  tamsulosin 0.4 milliGRAM(s) Oral at bedtime  timolol 0.5% Solution 1 Drop(s) Both EYES two times a day  traZODone 100 milliGRAM(s) Oral at bedtime    PRN Inpatient Medications  acetaminophen   Tablet .. 650 milliGRAM(s) Oral every 6 hours PRN  ALPRAZolam 0.25 milliGRAM(s) Oral daily PRN  dextrose 40% Gel 15 Gram(s) Oral once PRN  glucagon  Injectable 1 milliGRAM(s) IntraMuscular once PRN  oxycodone    5 mG/acetaminophen 325 mG 1 Tablet(s) Oral every 4 hours PRN  sodium chloride 0.65% Nasal 1 Spray(s) Both Nostrils daily PRN  zolpidem 5 milliGRAM(s) Oral at bedtime PRN      VITALS/PHYSICAL EXAM  --------------------------------------------------------------------------------  T(C): 36.7 (07-22-20 @ 04:24), Max: 36.7 (07-22-20 @ 04:24)  HR: 69 (07-22-20 @ 04:24) (65 - 77)  BP: 186/83 (07-22-20 @ 04:24) (162/73 - 194/81)  RR: 20 (07-22-20 @ 04:24) (18 - 20)  SpO2: 94% (07-21-20 @ 20:31) (94% - 97%)  Wt(kg): --        07-21-20 @ 07:01  -  07-22-20 @ 07:00  --------------------------------------------------------  IN: 0 mL / OUT: 3000 mL / NET: -3000 mL      Physical Exam:  	Gen: NAD  	Pulm: CTA B/L  	CV: RRR, S1S2  	Abd: +BS, soft, nontender/nondistended  	: No suprapubic tenderness  	LE: Warm,  edema  	Vascular access: AVF    LABS/STUDIES  --------------------------------------------------------------------------------              7.8    9.99  >-----------<  208      [07-22-20 @ 05:27]              27.0     141  |  99  |  28  ----------------------------<  179      [07-22-20 @ 05:27]  4.0   |  30  |  5.9        Ca     8.2     [07-22-20 @ 05:27]      Mg     2.2     [07-22-20 @ 05:27]    TPro  6.3  /  Alb  3.7  /  TBili  0.4  /  DBili  x   /  AST  5   /  ALT  5   /  AlkPhos  130  [07-22-20 @ 05:27]    Creatinine Trend:  SCr 5.9 [07-22 @ 05:27]  SCr 5.3 [07-21 @ 19:08]  SCr 7.7 [07-20 @ 05:03]  SCr 7.0 [07-19 @ 22:30]  SCr 8.0 [07-19 @ 07:50]        HbA1c 8.6      [06-01-18 @ 07:22]

## 2020-07-23 ENCOUNTER — TRANSCRIPTION ENCOUNTER (OUTPATIENT)
Age: 55
End: 2020-07-23

## 2020-07-23 LAB
ALBUMIN SERPL ELPH-MCNC: 3.9 G/DL — SIGNIFICANT CHANGE UP (ref 3.5–5.2)
ALP SERPL-CCNC: 133 U/L — HIGH (ref 30–115)
ALT FLD-CCNC: 5 U/L — SIGNIFICANT CHANGE UP (ref 0–41)
ANION GAP SERPL CALC-SCNC: 12 MMOL/L — SIGNIFICANT CHANGE UP (ref 7–14)
AST SERPL-CCNC: 7 U/L — SIGNIFICANT CHANGE UP (ref 0–41)
BASOPHILS # BLD AUTO: 0.1 K/UL — SIGNIFICANT CHANGE UP (ref 0–0.2)
BASOPHILS NFR BLD AUTO: 0.9 % — SIGNIFICANT CHANGE UP (ref 0–1)
BILIRUB SERPL-MCNC: 0.5 MG/DL — SIGNIFICANT CHANGE UP (ref 0.2–1.2)
BUN SERPL-MCNC: 23 MG/DL — HIGH (ref 10–20)
CALCIUM SERPL-MCNC: 8.5 MG/DL — SIGNIFICANT CHANGE UP (ref 8.5–10.1)
CHLORIDE SERPL-SCNC: 98 MMOL/L — SIGNIFICANT CHANGE UP (ref 98–110)
CO2 SERPL-SCNC: 32 MMOL/L — SIGNIFICANT CHANGE UP (ref 17–32)
CREAT SERPL-MCNC: 5.4 MG/DL — CRITICAL HIGH (ref 0.7–1.5)
EOSINOPHIL # BLD AUTO: 0.37 K/UL — SIGNIFICANT CHANGE UP (ref 0–0.7)
EOSINOPHIL NFR BLD AUTO: 3.2 % — SIGNIFICANT CHANGE UP (ref 0–8)
GLUCOSE BLDC GLUCOMTR-MCNC: 215 MG/DL — HIGH (ref 70–99)
GLUCOSE BLDC GLUCOMTR-MCNC: 223 MG/DL — HIGH (ref 70–99)
GLUCOSE BLDC GLUCOMTR-MCNC: 266 MG/DL — HIGH (ref 70–99)
GLUCOSE BLDC GLUCOMTR-MCNC: 275 MG/DL — HIGH (ref 70–99)
GLUCOSE SERPL-MCNC: 219 MG/DL — HIGH (ref 70–99)
HCT VFR BLD CALC: 27.3 % — LOW (ref 42–52)
HGB BLD-MCNC: 7.9 G/DL — LOW (ref 14–18)
IMM GRANULOCYTES NFR BLD AUTO: 0.6 % — HIGH (ref 0.1–0.3)
LYMPHOCYTES # BLD AUTO: 0.91 K/UL — LOW (ref 1.2–3.4)
LYMPHOCYTES # BLD AUTO: 7.8 % — LOW (ref 20.5–51.1)
MAGNESIUM SERPL-MCNC: 2.2 MG/DL — SIGNIFICANT CHANGE UP (ref 1.8–2.4)
MCHC RBC-ENTMCNC: 25.5 PG — LOW (ref 27–31)
MCHC RBC-ENTMCNC: 28.9 G/DL — LOW (ref 32–37)
MCV RBC AUTO: 88.1 FL — SIGNIFICANT CHANGE UP (ref 80–94)
MONOCYTES # BLD AUTO: 0.82 K/UL — HIGH (ref 0.1–0.6)
MONOCYTES NFR BLD AUTO: 7.1 % — SIGNIFICANT CHANGE UP (ref 1.7–9.3)
NEUTROPHILS # BLD AUTO: 9.36 K/UL — HIGH (ref 1.4–6.5)
NEUTROPHILS NFR BLD AUTO: 80.4 % — HIGH (ref 42.2–75.2)
NRBC # BLD: 0 /100 WBCS — SIGNIFICANT CHANGE UP (ref 0–0)
PLATELET # BLD AUTO: 234 K/UL — SIGNIFICANT CHANGE UP (ref 130–400)
POTASSIUM SERPL-MCNC: 4.3 MMOL/L — SIGNIFICANT CHANGE UP (ref 3.5–5)
POTASSIUM SERPL-SCNC: 4.3 MMOL/L — SIGNIFICANT CHANGE UP (ref 3.5–5)
PROCALCITONIN SERPL-MCNC: 0.81 NG/ML — HIGH (ref 0.02–0.1)
PROCALCITONIN SERPL-MCNC: 1 NG/ML — HIGH (ref 0.02–0.1)
PROT SERPL-MCNC: 6.5 G/DL — SIGNIFICANT CHANGE UP (ref 6–8)
RBC # BLD: 3.1 M/UL — LOW (ref 4.7–6.1)
RBC # FLD: 17.2 % — HIGH (ref 11.5–14.5)
SODIUM SERPL-SCNC: 142 MMOL/L — SIGNIFICANT CHANGE UP (ref 135–146)
WBC # BLD: 11.63 K/UL — HIGH (ref 4.8–10.8)
WBC # FLD AUTO: 11.63 K/UL — HIGH (ref 4.8–10.8)

## 2020-07-23 PROCEDURE — 71045 X-RAY EXAM CHEST 1 VIEW: CPT | Mod: 26

## 2020-07-23 PROCEDURE — 99233 SBSQ HOSP IP/OBS HIGH 50: CPT

## 2020-07-23 RX ORDER — INSULIN LISPRO 100/ML
5 VIAL (ML) SUBCUTANEOUS ONCE
Refills: 0 | Status: COMPLETED | OUTPATIENT
Start: 2020-07-23 | End: 2020-07-23

## 2020-07-23 RX ORDER — LIDOCAINE 4 G/100G
1 CREAM TOPICAL DAILY
Refills: 0 | Status: DISCONTINUED | OUTPATIENT
Start: 2020-07-23 | End: 2020-07-25

## 2020-07-23 RX ORDER — LIDOCAINE 4 G/100G
2 CREAM TOPICAL DAILY
Refills: 0 | Status: DISCONTINUED | OUTPATIENT
Start: 2020-07-23 | End: 2020-07-25

## 2020-07-23 RX ADMIN — Medication 100 MILLIGRAM(S): at 21:02

## 2020-07-23 RX ADMIN — Medication 3 MILLILITER(S): at 13:31

## 2020-07-23 RX ADMIN — Medication 1 DROP(S): at 05:56

## 2020-07-23 RX ADMIN — SIMETHICONE 80 MILLIGRAM(S): 80 TABLET, CHEWABLE ORAL at 14:07

## 2020-07-23 RX ADMIN — LATANOPROST 1 DROP(S): 0.05 SOLUTION/ DROPS OPHTHALMIC; TOPICAL at 21:03

## 2020-07-23 RX ADMIN — Medication 80 MILLIGRAM(S): at 05:55

## 2020-07-23 RX ADMIN — Medication 2: at 11:01

## 2020-07-23 RX ADMIN — Medication 10 MILLILITER(S): at 05:57

## 2020-07-23 RX ADMIN — BRIMONIDINE TARTRATE 1 DROP(S): 2 SOLUTION/ DROPS OPHTHALMIC at 16:51

## 2020-07-23 RX ADMIN — Medication 3 MILLILITER(S): at 20:27

## 2020-07-23 RX ADMIN — LACTULOSE 20 GRAM(S): 10 SOLUTION ORAL at 16:52

## 2020-07-23 RX ADMIN — Medication 10 MILLILITER(S): at 14:07

## 2020-07-23 RX ADMIN — LIDOCAINE 2 PATCH: 4 CREAM TOPICAL at 16:48

## 2020-07-23 RX ADMIN — TAMSULOSIN HYDROCHLORIDE 0.4 MILLIGRAM(S): 0.4 CAPSULE ORAL at 21:02

## 2020-07-23 RX ADMIN — POLYETHYLENE GLYCOL 3350 17 GRAM(S): 17 POWDER, FOR SOLUTION ORAL at 11:02

## 2020-07-23 RX ADMIN — Medication 3: at 16:50

## 2020-07-23 RX ADMIN — Medication 25 MILLIGRAM(S): at 21:03

## 2020-07-23 RX ADMIN — BRIMONIDINE TARTRATE 1 DROP(S): 2 SOLUTION/ DROPS OPHTHALMIC at 05:56

## 2020-07-23 RX ADMIN — OXYCODONE AND ACETAMINOPHEN 1 TABLET(S): 5; 325 TABLET ORAL at 08:06

## 2020-07-23 RX ADMIN — Medication 2: at 07:46

## 2020-07-23 RX ADMIN — Medication 1 DROP(S): at 16:52

## 2020-07-23 RX ADMIN — SEVELAMER CARBONATE 2400 MILLIGRAM(S): 2400 POWDER, FOR SUSPENSION ORAL at 16:50

## 2020-07-23 RX ADMIN — SIMVASTATIN 10 MILLIGRAM(S): 20 TABLET, FILM COATED ORAL at 21:02

## 2020-07-23 RX ADMIN — SIMETHICONE 80 MILLIGRAM(S): 80 TABLET, CHEWABLE ORAL at 21:03

## 2020-07-23 RX ADMIN — LIDOCAINE 2 PATCH: 4 CREAM TOPICAL at 19:43

## 2020-07-23 RX ADMIN — Medication 5 UNIT(S): at 21:49

## 2020-07-23 RX ADMIN — LACTULOSE 20 GRAM(S): 10 SOLUTION ORAL at 05:56

## 2020-07-23 RX ADMIN — Medication 25 MILLIGRAM(S): at 16:52

## 2020-07-23 RX ADMIN — LIDOCAINE 1 PATCH: 4 CREAM TOPICAL at 15:54

## 2020-07-23 RX ADMIN — DORZOLAMIDE HYDROCHLORIDE 1 DROP(S): 20 SOLUTION/ DROPS OPHTHALMIC at 14:06

## 2020-07-23 RX ADMIN — LIDOCAINE 1 PATCH: 4 CREAM TOPICAL at 19:44

## 2020-07-23 RX ADMIN — Medication 90 MILLIGRAM(S): at 05:56

## 2020-07-23 RX ADMIN — OXYCODONE AND ACETAMINOPHEN 1 TABLET(S): 5; 325 TABLET ORAL at 03:16

## 2020-07-23 RX ADMIN — SERTRALINE 100 MILLIGRAM(S): 25 TABLET, FILM COATED ORAL at 11:04

## 2020-07-23 RX ADMIN — OXYCODONE AND ACETAMINOPHEN 1 TABLET(S): 5; 325 TABLET ORAL at 13:24

## 2020-07-23 RX ADMIN — SEVELAMER CARBONATE 2400 MILLIGRAM(S): 2400 POWDER, FOR SUSPENSION ORAL at 11:03

## 2020-07-23 RX ADMIN — Medication 50 MILLIGRAM(S): at 11:05

## 2020-07-23 RX ADMIN — OXYCODONE AND ACETAMINOPHEN 1 TABLET(S): 5; 325 TABLET ORAL at 08:35

## 2020-07-23 RX ADMIN — Medication 25 MILLIGRAM(S): at 05:56

## 2020-07-23 RX ADMIN — Medication 10 MILLILITER(S): at 21:03

## 2020-07-23 RX ADMIN — SEVELAMER CARBONATE 2400 MILLIGRAM(S): 2400 POWDER, FOR SUSPENSION ORAL at 07:54

## 2020-07-23 RX ADMIN — DORZOLAMIDE HYDROCHLORIDE 1 DROP(S): 20 SOLUTION/ DROPS OPHTHALMIC at 05:56

## 2020-07-23 RX ADMIN — Medication 25 MILLIGRAM(S): at 14:07

## 2020-07-23 RX ADMIN — Medication 3 MILLILITER(S): at 08:03

## 2020-07-23 RX ADMIN — Medication 80 MILLIGRAM(S): at 16:52

## 2020-07-23 RX ADMIN — DORZOLAMIDE HYDROCHLORIDE 1 DROP(S): 20 SOLUTION/ DROPS OPHTHALMIC at 21:04

## 2020-07-23 RX ADMIN — Medication 0.25 MILLIGRAM(S): at 03:16

## 2020-07-23 RX ADMIN — Medication 10 MILLILITER(S): at 16:51

## 2020-07-23 NOTE — DISCHARGE NOTE PROVIDER - NSDCMRMEDTOKEN_GEN_ALL_CORE_FT
acetaminophen 325 mg oral tablet: 2 tab(s) orally every 6 hours  ALPRAZolam 0.25 mg oral tablet: 1 tab(s) orally once a day  Ambien 5 mg oral tablet: 2 tab(s) orally once a day (at bedtime)  aspirin 81 mg oral delayed release tablet: 1 tab(s) orally once a day  Basaglar KwikPen 100 units/mL subcutaneous solution: 45 unit(s) subcutaneous once a day (at bedtime)  brimonidine 0.2% ophthalmic solution: 1 drop(s) to each affected eye 2 times a day  Colace 100 mg oral capsule: 1 cap(s) orally 2 times a day  dorzolamide 2% ophthalmic solution: 1 drop(s) to each affected eye 3 times a day  DuoNeb 0.5 mg-2.5 mg/3 mL inhalation solution: 3 milliliter(s) inhaled every 6 hours, As Needed  furosemide 80 mg oral tablet: 1 tab(s) orally 2 times a day  HumaLOG 100 units/mL subcutaneous solution: 1 application subcutaneous 4 times a day (before meals and at bedtime) sliding scale 2-12 unit  lactulose 10 g/15 mL oral syrup: 30 milliliter(s) orally 2 times a day  latanoprost 0.005% ophthalmic solution: 1 drop(s) to each affected eye once a day (at bedtime)  Lyrica 50 mg oral capsule: orally once a day  metOLazone 5 mg oral tablet: 1 tab(s) orally 2 times a day  Metoprolol Tartrate 25 mg oral tablet: 1 tab(s) orally 2 times a day  Mi-Acid Gas Relief 80 mg oral tablet, chewable: 1 tab(s) orally 3 times a day (with meals)  NIFEdipine 90 mg oral tablet, extended release: 1 tab(s) orally once a day  Percocet 5/325 oral tablet: 1 tab(s) orally every 4 hours, As Needed - for moderate pain  polyethylene glycol 3350 oral powder for reconstitution: 17 gram(s) orally once a day  pregabalin 50 mg oral capsule: 1 cap(s) orally once a day  Saline Mist 0.65% nasal spray (obsolete): 1 spray(s) nasal 4 times a day  sertraline 100 mg oral tablet: 1 tab(s) orally once a day  sevelamer hydrochloride 800 mg oral tablet: 3 tab(s) orally 3 times a day  Siltussin  mg/5 mL oral liquid: 10 milliliter(s) orally 2 times a day  simvastatin 10 mg oral tablet: 1 tab(s) orally once a day (at bedtime)  tamsulosin 0.4 mg oral capsule: 1 cap(s) orally once a day (at bedtime)  timolol maleate 0.5% ophthalmic solution: 1 drop(s) to each affected eye 2 times a day  traZODone 100 mg oral tablet: 1 tab(s) orally once a day (at bedtime) acetaminophen 325 mg oral tablet: 2 tab(s) orally every 6 hours, As needed, Temp greater or equal to 38C (100.4F), Mild Pain (1 - 3)  ALPRAZolam 0.25 mg oral tablet: 1 tab(s) orally once a day  Ambien 5 mg oral tablet: 2 tab(s) orally once a day (at bedtime)  aspirin 81 mg oral delayed release tablet: 1 tab(s) orally once a day  Basaglar KwikPen 100 units/mL subcutaneous solution: 45 unit(s) subcutaneous once a day (at bedtime)  brimonidine 0.2% ophthalmic solution: 1 drop(s) to each affected eye 2 times a day  Colace 100 mg oral capsule: 1 cap(s) orally 2 times a day  Dextrose 5% in Water intravenous solution: 1000 milliliter(s) intravenous   dorzolamide 2% ophthalmic solution: 1 drop(s) to each affected eye 3 times a day  DuoNeb 0.5 mg-2.5 mg/3 mL inhalation solution: 3 milliliter(s) inhaled every 6 hours, As Needed  epoetin keagan:   furosemide 80 mg oral tablet: 1 tab(s) orally 2 times a day  glucose 40% oral gel:  orally   glucose 50% intravenous solution: 25 milliliter(s) intravenous once  glucose 50% intravenous solution: 50 milliliter(s) intravenous once  glucose 50% intravenous solution: 50 milliliter(s) intravenous once  guaifenesin-dextromethorphan 100 mg-10 mg/5 mL oral liquid: 10 milliliter(s) orally every 4 hours  HumaLOG 100 units/mL subcutaneous solution: 1 application subcutaneous 4 times a day (before meals and at bedtime) sliding scale 2-12 unit  hydrALAZINE 25 mg oral tablet: 1 tab(s) orally 3 times a day  lactulose 10 g/15 mL oral syrup: 30 milliliter(s) orally 2 times a day  latanoprost 0.005% ophthalmic solution: 1 drop(s) to each affected eye once a day (at bedtime)  lidocaine 5% topical film:  topically   lidocaine 5% topical film:  topically   Lyrica 50 mg oral capsule: orally once a day  metOLazone 5 mg oral tablet: 1 tab(s) orally 2 times a day  Metoprolol Tartrate 25 mg oral tablet: 1 tab(s) orally 2 times a day  NIFEdipine 90 mg oral tablet, extended release: 1 tab(s) orally once a day  oxycodone-acetaminophen 5 mg-325 mg oral tablet: 1 tab(s) orally every 4 hours, As needed, for moderate pain  polyethylene glycol 3350 oral powder for reconstitution: 17 gram(s) orally once a day  pregabalin 50 mg oral capsule: 1 cap(s) orally once a day  sertraline 100 mg oral tablet: 1 tab(s) orally once a day  sevelamer carbonate 800 mg oral tablet: 3 tab(s) orally 3 times a day  Siltussin  mg/5 mL oral liquid: 10 milliliter(s) orally 2 times a day  simethicone 80 mg oral tablet, chewable: 1 tab(s) orally 3 times a day  simvastatin 10 mg oral tablet: 1 tab(s) orally once a day (at bedtime)  sodium chloride 0.65% nasal spray:  nasal   tamsulosin 0.4 mg oral capsule: 1 cap(s) orally once a day (at bedtime)  timolol maleate 0.5% ophthalmic solution: 1 drop(s) to each affected eye 2 times a day  traZODone 100 mg oral tablet: 1 tab(s) orally once a day (at bedtime)

## 2020-07-23 NOTE — PROGRESS NOTE ADULT - SUBJECTIVE AND OBJECTIVE BOX
[place]AN[place]ORIDA  55y Male    INTERVAL HPI/OVERNIGHT EVENTS:    Pt sitting in a chair and feels more comfortable today in terms of dyspnea.  Tolerating O2 NC.  No epistaxis    T(F): 98.7 (07-23-20 @ 04:58), Max: 99.4 (07-22-20 @ 13:31)  HR: 68 (07-23-20 @ 04:58) (62 - 90)  BP: 162/70 (07-23-20 @ 04:58) (147/67 - 189/81)  RR: 20 (07-23-20 @ 04:58) (20 - 20)  SpO2: 90% (07-22-20 @ 20:24) (90% - 98%)    I&O's Summary    22 Jul 2020 07:01  -  23 Jul 2020 07:00  --------------------------------------------------------  IN: 0 mL / OUT: 4500 mL / NET: -4500 mL    23 Jul 2020 07:01  -  23 Jul 2020 12:30  --------------------------------------------------------  IN: 200 mL / OUT: 0 mL / NET: 200 mL      CAPILLARY BLOOD GLUCOSE      POCT Blood Glucose.: 223 mg/dL (23 Jul 2020 10:57)  POCT Blood Glucose.: 215 mg/dL (23 Jul 2020 07:21)  POCT Blood Glucose.: 291 mg/dL (22 Jul 2020 21:18)  POCT Blood Glucose.: 271 mg/dL (22 Jul 2020 18:11)        PHYSICAL EXAM:  GENERAL: NAD in a chair  HEAD:  Normocephalic  EYES:  conjunctiva and sclera clear  ENMT: Moist mucous membranes  NERVOUS SYSTEM:  Alert, awake, conversing  CHEST/LUNG: decreased BS b/l  HEART: Regular rate and rhythm  ABDOMEN: Soft, Nontender, Nondistended; obese  EXTREMITIES:   + left LE edema, right BKA  SKIN: evidence of fluid loss     Consultant(s) Notes Reviewed:  [x ] YES  [ ] NO  Care Discussed with Consultants/Other Providers [ x] YES  [ ] NO    MEDICATIONS  (STANDING):  albuterol/ipratropium for Nebulization 3 milliLiter(s) Nebulizer every 6 hours  brimonidine 0.2% Ophthalmic Solution 1 Drop(s) Both EYES two times a day  dextrose 5%. 1000 milliLiter(s) (50 mL/Hr) IV Continuous <Continuous>  dextrose 50% Injectable 12.5 Gram(s) IV Push once  dextrose 50% Injectable 25 Gram(s) IV Push once  dextrose 50% Injectable 25 Gram(s) IV Push once  dorzolamide 2% Ophthalmic Solution 1 Drop(s) Both EYES three times a day  epoetin keagan-epbx (RETACRIT) Injectable 16380 Unit(s) IV Push <User Schedule>  furosemide   Injectable 80 milliGRAM(s) IV Push two times a day  guaifenesin/dextromethorphan  Syrup 10 milliLiter(s) Oral every 4 hours  hydrALAZINE 25 milliGRAM(s) Oral three times a day  insulin lispro (HumaLOG) corrective regimen sliding scale   SubCutaneous three times a day before meals  lactulose Syrup 20 Gram(s) Oral two times a day  latanoprost 0.005% Ophthalmic Solution 1 Drop(s) Both EYES at bedtime  metolazone 5 milliGRAM(s) Oral <User Schedule>  metoprolol tartrate 25 milliGRAM(s) Oral two times a day  NIFEdipine XL 90 milliGRAM(s) Oral daily  polyethylene glycol 3350 17 Gram(s) Oral daily  pregabalin 50 milliGRAM(s) Oral daily  sertraline 100 milliGRAM(s) Oral daily  sevelamer carbonate 2400 milliGRAM(s) Oral three times a day  simethicone 80 milliGRAM(s) Chew three times a day  simvastatin 10 milliGRAM(s) Oral at bedtime  tamsulosin 0.4 milliGRAM(s) Oral at bedtime  timolol 0.5% Solution 1 Drop(s) Both EYES two times a day  traZODone 100 milliGRAM(s) Oral at bedtime    MEDICATIONS  (PRN):  acetaminophen   Tablet .. 650 milliGRAM(s) Oral every 6 hours PRN Temp greater or equal to 38C (100.4F), Mild Pain (1 - 3)  ALPRAZolam 0.25 milliGRAM(s) Oral daily PRN anxiety  dextrose 40% Gel 15 Gram(s) Oral once PRN Blood Glucose LESS THAN 70 milliGRAM(s)/deciliter  glucagon  Injectable 1 milliGRAM(s) IntraMuscular once PRN Glucose LESS THAN 70 milligrams/deciliter  oxycodone    5 mG/acetaminophen 325 mG 1 Tablet(s) Oral every 4 hours PRN for moderate pain  sodium chloride 0.65% Nasal 1 Spray(s) Both Nostrils daily PRN Nasal Congestion  zolpidem 5 milliGRAM(s) Oral at bedtime PRN Insomnia      LABS:                        7.9    11.63 )-----------( 234      ( 23 Jul 2020 05:59 )             27.3     07-23    142  |  98  |  23<H>  ----------------------------<  219<H>  4.3   |  32  |  5.4<HH>    Ca    8.5      23 Jul 2020 05:59  Mg     2.2     07-23    TPro  6.5  /  Alb  3.9  /  TBili  0.5  /  DBili  x   /  AST  7   /  ALT  5   /  AlkPhos  133<H>  07-23          RADIOLOGY & ADDITIONAL TESTS:    Imaging or report Personally Reviewed:  [x ] YES  [ ] NO    < from: Xray Chest 1 View- PORTABLE-Routine (07.23.20 @ 05:44) >  Impression:      Unchanged cardiomegaly with bilateral opacities/effusions.      < end of copied text >      CXR - reviewed by me - decreasing PVC    Case discussed with residents    Care discussed with pt and family

## 2020-07-23 NOTE — DISCHARGE NOTE PROVIDER - CARE PROVIDER_API CALL
Ayaan Owens  INTERNAL MEDICINE  [address] [place] [address]  Phone: [phone]  Fax: [phone]  Follow Up Time:

## 2020-07-23 NOTE — PROGRESS NOTE ADULT - ASSESSMENT
1. End stage renal disease on hemodialysis Tuesday / Thursday and Saturday. Patient has been dialyzed the past 4 consecutive days with about 15L fluid removed. HD tomorrow: 3 hours, opti 160 dialyzer, 2K bath, 4L UF. Strict fluid restriction, low Na diet. Lasix 80mg IV BID, change to PO on discharge.  2. HTN. Continue metoprolol, hydralazine, and nifedipine.   3. Hyperphosphatemia. Sevelamer with meals. Renal diet.  4. Anemia. EPO with HD.

## 2020-07-23 NOTE — PROGRESS NOTE ADULT - SUBJECTIVE AND OBJECTIVE BOX
[facility] FOLLOW UP NOTE  --------------------------------------------------------------------------------  24 hour events/subjective: Patient examined. Appears comfortable.    PAST HISTORY  --------------------------------------------------------------------------------  No significant changes to PMH, PSH, FHx, SHx, unless otherwise noted    ALLERGIES & MEDICATIONS  --------------------------------------------------------------------------------  Allergies    No Known Allergies    Standing Inpatient Medications  albuterol/ipratropium for Nebulization 3 milliLiter(s) Nebulizer every 6 hours  brimonidine 0.2% Ophthalmic Solution 1 Drop(s) Both EYES two times a day  dextrose 5%. 1000 milliLiter(s) IV Continuous <Continuous>  dextrose 50% Injectable 12.5 Gram(s) IV Push once  dextrose 50% Injectable 25 Gram(s) IV Push once  dextrose 50% Injectable 25 Gram(s) IV Push once  dorzolamide 2% Ophthalmic Solution 1 Drop(s) Both EYES three times a day  epoetin keagan-epbx (RETACRIT) Injectable 00107 Unit(s) IV Push <User Schedule>  furosemide   Injectable 80 milliGRAM(s) IV Push two times a day  guaifenesin/dextromethorphan  Syrup 10 milliLiter(s) Oral every 4 hours  hydrALAZINE 25 milliGRAM(s) Oral three times a day  insulin lispro (HumaLOG) corrective regimen sliding scale   SubCutaneous three times a day before meals  lactulose Syrup 20 Gram(s) Oral two times a day  latanoprost 0.005% Ophthalmic Solution 1 Drop(s) Both EYES at bedtime  metolazone 5 milliGRAM(s) Oral <User Schedule>  metoprolol tartrate 25 milliGRAM(s) Oral two times a day  NIFEdipine XL 90 milliGRAM(s) Oral daily  polyethylene glycol 3350 17 Gram(s) Oral daily  pregabalin 50 milliGRAM(s) Oral daily  sertraline 100 milliGRAM(s) Oral daily  sevelamer carbonate 2400 milliGRAM(s) Oral three times a day  simethicone 80 milliGRAM(s) Chew three times a day  simvastatin 10 milliGRAM(s) Oral at bedtime  tamsulosin 0.4 milliGRAM(s) Oral at bedtime  timolol 0.5% Solution 1 Drop(s) Both EYES two times a day  traZODone 100 milliGRAM(s) Oral at bedtime    PRN Inpatient Medications  acetaminophen   Tablet .. 650 milliGRAM(s) Oral every 6 hours PRN  ALPRAZolam 0.25 milliGRAM(s) Oral daily PRN  dextrose 40% Gel 15 Gram(s) Oral once PRN  glucagon  Injectable 1 milliGRAM(s) IntraMuscular once PRN  oxycodone    5 mG/acetaminophen 325 mG 1 Tablet(s) Oral every 4 hours PRN  sodium chloride 0.65% Nasal 1 Spray(s) Both Nostrils daily PRN  zolpidem 5 milliGRAM(s) Oral at bedtime PRN    VITALS/PHYSICAL EXAM  --------------------------------------------------------------------------------  T(C): 37.1 (07-23-20 @ 04:58), Max: 37.4 (07-22-20 @ 13:31)  HR: 68 (07-23-20 @ 04:58) (62 - 90)  BP: 162/70 (07-23-20 @ 04:58) (147/67 - 189/81)  RR: 20 (07-23-20 @ 04:58) (20 - 20)  SpO2: 90% (07-22-20 @ 20:24) (90% - 98%)    07-22-20 @ 07:01  -  07-23-20 @ 07:00  --------------------------------------------------------  IN: 0 mL / OUT: 4500 mL / NET: -4500 mL    07-23-20 @ 07:01  -  07-23-20 @ 13:22  --------------------------------------------------------  IN: 200 mL / OUT: 0 mL / NET: 200 mL    Physical Exam:  	Gen: NAD  	Pulm: CTA B/L  	CV: RRR, S1S2  	Abd: +BS, soft, nontender/nondistended  	: No suprapubic tenderness  	LE: Warm, FROM, no clubbing, intact strength; no edema  	Vascular access: AVF    LABS/STUDIES  --------------------------------------------------------------------------------              7.9    11.63 >-----------<  234      [07-23-20 @ 05:59]              27.3     142  |  98  |  23  ----------------------------<  219      [07-23-20 @ 05:59]  4.3   |  32  |  5.4        Ca     8.5     [07-23-20 @ 05:59]      Mg     2.2     [07-23-20 @ 05:59]    TPro  6.5  /  Alb  3.9  /  TBili  0.5  /  DBili  x   /  AST  7   /  ALT  5   /  AlkPhos  133  [07-23-20 @ 05:59]    Creatinine Trend:  SCr 5.4 [07-23 @ 05:59]  SCr 5.9 [07-22 @ 05:27]  SCr 5.3 [07-21 @ 19:08]  SCr 7.7 [07-20 @ 05:03]  SCr 7.0 [07-19 @ 22:30    HbA1c 8.6      [06-01-18 @ 07:22]

## 2020-07-23 NOTE — DISCHARGE NOTE PROVIDER - HOSPITAL COURSE
55 year old male patient with past medical history of hypertension, diabetes, end stage renal disease on hemodialysis, obstructive sleep apnea, chronic pain, bipolar disorder, transferred from [facility] for recurrent left epistaxis.     The patient states that he had 3 episodes of left sided epistaxis in the last month but none of them lasted that long. He woke up at 1 AM to be have his pain medication and states that the epistaxis started suddenly with no triggering event and continued the whole next and the next day, the epistaxis was associated with blood clots coming out of the left nare.  The patient states that he does prick his nose frequently but doesn't recall pricking his nose immediately before the epistaxis started. Because of the bleeding and the contra-indication for heparin he didn't undergo hemodialysis. The patient denies any history of nose procedure and denies any other source of bleeding (denies hemoptysis, hematemesis or hematochezia but he did have an episode of bright red blood per rectum in the past). He was given a thromboxane pack to stop the bleed. Patient had 5 dialysis session while here 55 year old male patient with past medical history of hypertension, diabetes, end stage renal disease on hemodialysis, obstructive sleep apnea, chronic pain, bipolar disorder, transferred from [facility] for recurrent left epistaxis.     The patient states that he had 3 episodes of left sided epistaxis in the last month but none of them lasted that long. He woke up at 1 AM to be have his pain medication and states that the epistaxis started suddenly with no triggering event and continued the whole next and the next day, the epistaxis was associated with blood clots coming out of the left nare.  The patient states that he does prick his nose frequently but doesn't recall pricking his nose immediately before the epistaxis started. Because of the bleeding and the contra-indication for heparin he didn't undergo hemodialysis. The patient denies any history of nose procedure and denies any other source of bleeding (denies hemoptysis, hematemesis or hematochezia but he did have an episode of bright red blood per rectum in the past). Epistaxis was controlled with a thromboxane pack. This admission he had 5 back to back dialysis sessions in which ~18 L was removed.  He was placed on strict fluid restriction, low Na diet, Lasix 80mg IV BID.

## 2020-07-23 NOTE — DISCHARGE NOTE PROVIDER - NSDCCPCAREPLAN_GEN_ALL_CORE_FT
PRINCIPAL DISCHARGE DIAGNOSIS  Diagnosis: Epistaxis  Assessment and Plan of Treatment: you were here because your nose was bleeding and you were given a nose pack in the emergency department to control your bleeding which resolved. It is important that you dont pick your nose to prevent recurrent nose bleeds. PRINCIPAL DISCHARGE DIAGNOSIS  Diagnosis: Epistaxis  Assessment and Plan of Treatment: you were here because your nose was bleeding and you were given a nose pack in the emergency department to control your bleeding which resolved. It is important that you dont pick your nose to prevent recurrent nose bleeds.      SECONDARY DISCHARGE DIAGNOSES  Diagnosis: Fluid overload  Assessment and Plan of Treatment: You have a lot of fluid retention and while you were here you were given 80 mg of lasik and you were placed on bipap. Over the course of your stay we tried to wean you off the bipap and onto 4 L of oxygen nasal canula. Please continue using 4 L of oxygen through nasal canula.    Diagnosis: ESRD on dialysis  Assessment and Plan of Treatment: While you were here you had a total for 5 dialysis sessions and 15 liters of fluid was removed. It is important that you follow up with your kidney doctor and attend your scheduled dialysis sessions. PRINCIPAL DISCHARGE DIAGNOSIS  Diagnosis: Epistaxis  Assessment and Plan of Treatment: you were here because your nose was bleeding and you were given a nose pack in the emergency department to control your bleeding which resolved. It is important that you dont pick your nose to prevent recurrent nose bleeds.      SECONDARY DISCHARGE DIAGNOSES  Diagnosis: Fluid overload  Assessment and Plan of Treatment: You have a lot of fluid retention and while you were here you were given 80 mg of lasik and you were placed on bipap. Over the course of your stay we tried to wean you off the bipap and onto 4 L of oxygen nasal canula. Please continue using 4 L of oxygen through nasal canula.    Diagnosis: ESRD on dialysis  Assessment and Plan of Treatment: While you were here you had a total for 5 dialysis sessions and 15 liters of fluid was removed. It is important that you follow up with your kidney doctor and attend your scheduled dialysis sessions. It is important that you follow up with your kidney doctor out patient and you restrict fluid consumption and have a low salt diet. Please continue taking your prescribed medications. PRINCIPAL DISCHARGE DIAGNOSIS  Diagnosis: Epistaxis  Assessment and Plan of Treatment: you were here because your nose was bleeding and you were given a nose pack in the emergency department to control your bleeding which resolved. It is important that you dont pick your nose to prevent recurrent nose bleeds.      SECONDARY DISCHARGE DIAGNOSES  Diagnosis: Fluid overload  Assessment and Plan of Treatment: You have a lot of fluid retention and while you were here you were given 80 mg of lasik and you were placed on bipap. Over the course of your stay we tried to wean you off the bipap and onto 4 L of oxygen nasal canula. Please continue using 4 L of oxygen through nasal canula.    Diagnosis: ESRD on dialysis  Assessment and Plan of Treatment: While you were here you had a total for 5 dialysis sessions in which approximately 18 liters of fluid was removed. It is important that you follow up with your kidney doctor and attend your scheduled dialysis sessions. It is important that you follow up with your kidney doctor out patient and you restrict fluid consumption and have a low salt diet. Please continue taking your prescribed medications.

## 2020-07-23 NOTE — PROGRESS NOTE ADULT - ASSESSMENT
56 y/o man with PMH of obesity, hypertension, diabetes with retinopathy, glaucoma, ESRD on HD, BPH, HOMAR, PVD, chronic pain, bipolar disorder and anxiety presented from [facility] for recurrent left epistaxis.     1. Recurrent left epistaxis - now resolved  pt counseled to avoid nose picking  humidified O2, saline nasal spray  s/p packing in ED that pt removed himself  seen by ENT  Hgb stable - has chronic anemia likely due to ESRD    2. Volume overload/pulmonary edema - pt missed HD sessions at [facility]  receiving multiple HD sessions here for fluid removal  had daily HD 7/19 - 7/22  renal following  on IV lasix and metolazone - pt with minimal urine output  monitor I's and O's  low sodium diet  fluid restriction - discussed with pt  pulm f/u appreciated - bipap q4hr, O2 3L NC, negative balance  pt refused CT scan of chest - noncontrast  CXR looks improved today  check procalcitonin    3. ESRD - HD per renal  pt noncompliant at times at [facility] - importance of compliance stressed to the pt    4. HTN - uncontrolled likely due to volume overload  continue current meds and fluid removal and monitor    5. DM - uncontrolled here - increase insulin to keep FS < 180  A1C 6.9    6. Bipolar disorder / Anxiety disorder   Continue Xanax 0.25 mg PO qd PRN, Sertraline, Trazodone     7. Chronic anemia on Epogen - stable    8. HOMAR - on bipap QHS    9. COPD on 3L NC - chronic hypoxemic respiratory failure  - continue nebs    10. SCD of left LE for DVT prophylaxis      PROGRESS NOTE HANDOFF    Pending: HD per renal, improvement in volume overload    Family discussion: with pt's mother today    Disposition: [facility]

## 2020-07-23 NOTE — PROGRESS NOTE ADULT - ASSESSMENT
Assessment and Plan:   · Assessment	    # Fluid overload (DDx: ESRD vs CHF less likely)  - Pt previously refused multiple sessions of hemodialysis, he went for his 4th dialysis for removal of 4 L yesterday (07/22/2020); total of 11 L removed since admission  - Requiring BiPAP due to continued pulmonary edema and HOMAR  - Continue with BIPAP 4 hours 4 hours off and qhs  - Switch to NC 3 L keep Soa2 92 to 96%  - Patient refused non-contrast CT chest as he reports he cannot lay down and is caustrophobic; I suggested medications to relieve his anxiety during the ct scan but patient still refused  - Follow up chest x-ray demonstrated unchanged cardiomegaly and b/l opactice  - continue with 80 mg of laskik and metazolone  - Strict fluid restriction to 1 Liter, low Na/renal diet  - F/u with pulmonary and nephrology recommendations       # Recurrent left epistaxis in a patient not on anti-coagulation   - EPO in HD for chronic anemia  - s/p TAX soaked packing in the ED  - patient admits to nose picking that caused another episode of epistaxis which was stopped with nasal tamponade  - patient counseled and bleeding now improved  - f/u CBC    # Bipolar disorder / Anxiety disorder   - Continue Xanax 0.25 mg PO qd PRN (end in 3 days) + Sertraline 100 mg PO qd + Trazodone 100 mg PO qhs.  - Psych consult to access patients decision making capacity cancelled as patient is now accepting treatment and has no acute mental status changes    # Type 2 Diabetes Mellitus   - On Lantus and sliding scale at the nursing home.  - Check FS before meals and at bedtime and start insulin basal and bolus with sliding scale if FS consistently > 180   - Will f/u hemoglobin A1C level    # Hypertension   - Continue Metoprolol Tartrate 25 mg PO BID + Procardia 90 mg PO qd    # Obstructive sleep apnea / COPD on 3L NC   - Continue Duoneb 3mL q6h  - CPAP 14/8 30% FiO2    # Chronic b/l leg pain-  - continue with oxycodone 5 mg/acetaminophen 325 mg  - try to refrain from ordering morphine Assessment and Plan:   · Assessment	    # Fluid overload (DDx: ESRD vs CHF less likely)  - Pt previously refused multiple sessions of hemodialysis, he went for his 4th dialysis for removal of 4 L yesterday (07/22/2020); total of 11 L removed since admission  - Requiring BiPAP due to continued pulmonary edema and HOMAR  - Continue with BIPAP 4 hours 4 hours off and qhs  - Switch to NC 3 L keep Soa2 92 to 96%  - Patient refused non-contrast CT chest as he reports he cannot lay down and is caustrophobic; I suggested medications to relieve his anxiety during the ct scan but patient still refused  - Follow up chest x-ray demonstrated unchanged cardiomegaly and b/l opactice  - continue with 80 mg of laskik and metazolone  - Strict fluid restriction to 1 Liter, low Na/renal diet  - F/u with pulmonary and nephrology recommendations   - 5th dialysis planned for tomorrow - anticipating to remove 4 units      # Recurrent left epistaxis in a patient not on anti-coagulation   - EPO in HD for chronic anemia  - s/p TAX soaked packing in the ED  - patient admits to nose picking that caused another episode of epistaxis which was stopped with nasal tamponade  - patient counseled and bleeding now improved  - f/u CBC    # Bipolar disorder / Anxiety disorder   - Continue Xanax 0.25 mg PO qd PRN (end in 3 days) + Sertraline 100 mg PO qd + Trazodone 100 mg PO qhs.  - Psych consult to access patients decision making capacity cancelled as patient is now accepting treatment and has no acute mental status changes    # Type 2 Diabetes Mellitus   - On Lantus and sliding scale at the nursing home.  - Check FS before meals and at bedtime and start insulin basal and bolus with sliding scale if FS consistently > 180   - Will f/u hemoglobin A1C level    # Hypertension   - Continue Metoprolol Tartrate 25 mg PO BID + Procardia 90 mg PO qd    # Obstructive sleep apnea / COPD on 3L NC   - Continue Duoneb 3mL q6h  - CPAP 14/8 30% FiO2    # Chronic b/l leg pain-  - continue with oxycodone 5 mg/acetaminophen 325 mg q 4 hours  - apply lidocaine patch to back      dispo: [facility] Assessment and Plan:   · Assessment	    # Fluid overload (DDx: ESRD vs CHF less likely)  - Pt previously refused multiple sessions of hemodialysis, he went for his 4th dialysis for removal of 4 L yesterday (07/22/2020); total of 11 L removed since admission  - Requiring BiPAP due to continued pulmonary edema and HOMAR  - Continue with BIPAP 4 hours 4 hours off and qhs  - Switch to NC 3 L keep Soa2 92 to 96%  - Patient refused non-contrast CT chest as he reports he cannot lay down and is caustrophobic; I suggested medications to relieve his anxiety during the ct scan but patient still refused  - Follow up chest x-ray demonstrated unchanged cardiomegaly and b/l opacties  - continue with 80 mg of laskik and metazolone  - Strict fluid restriction to 1 Liter, low Na/renal diet  - F/u with pulmonary and nephrology recommendations   - 5th dialysis planned for tomorrow - anticipating to remove 4 units      # Recurrent left epistaxis in a patient not on anti-coagulation   - EPO in HD for chronic anemia  - s/p TAX soaked packing in the ED  - patient admits to nose picking that caused another episode of epistaxis which was stopped with nasal tamponade  - patient counseled and bleeding now improved  - f/u CBC    # Bipolar disorder / Anxiety disorder   - Continue Xanax 0.25 mg PO qd PRN (end in 3 days) + Sertraline 100 mg PO qd + Trazodone 100 mg PO qhs.  - Psych consult to access patients decision making capacity cancelled as patient is now accepting treatment and has no acute mental status changes    # Type 2 Diabetes Mellitus   - On Lantus and sliding scale at the nursing home.  - Check FS before meals and at bedtime and start insulin basal and bolus with sliding scale if FS consistently > 180   - Will f/u hemoglobin A1C level    # Hypertension   - Continue Metoprolol Tartrate 25 mg PO BID + Procardia 90 mg PO qd    # Obstructive sleep apnea / COPD on 3L NC   - Continue Duoneb 3mL q6h  - CPAP 14/8 30% FiO2    # Chronic b/l leg pain-  - continue with oxycodone 5 mg/acetaminophen 325 mg q 4 hours  - apply lidocaine patch to back      dispo: [facility]

## 2020-07-23 NOTE — PROGRESS NOTE ADULT - SUBJECTIVE AND OBJECTIVE BOX
SUBJECTIVE:    Patient is a 55y old Male who presents with a chief complaint of Epistaxis, pulmonary edema (22 Jul 2020 15:08)    Currently admitted to medicine with the primary diagnosis of Epistaxis     Today is hospital day 5d. Overnight he complaints of inability to sleep and requested to be back on the BIPAP.    INTERVAL EVENTS:     PAST MEDICAL & SURGICAL HISTORY  Dialysis patient: Tuesday- Thursday-Saturday  Bilateral ocular hypertension  Insomnia, unspecified type  Bipolar affective disorder, remission status unspecified  Obesity, unspecified classification, unspecified obesity type, unspecified whether serious comorbidity present  Anxiety disorder, unspecified type  Pain disorder  Anemia, unspecified type  Blind  HTN (hypertension)  End stage renal disease  Diabetes  Status post glaucoma surgery: RIGHT EYE  S/P BKA (below knee amputation) unilateral: RIGHT  A-V fistula: LEFT ARM      ALLERGIES:  No Known Allergies    MEDICATIONS:  STANDING MEDICATIONS  albuterol/ipratropium for Nebulization 3 milliLiter(s) Nebulizer every 6 hours  brimonidine 0.2% Ophthalmic Solution 1 Drop(s) Both EYES two times a day  dextrose 5%. 1000 milliLiter(s) IV Continuous <Continuous>  dextrose 50% Injectable 12.5 Gram(s) IV Push once  dextrose 50% Injectable 25 Gram(s) IV Push once  dextrose 50% Injectable 25 Gram(s) IV Push once  dorzolamide 2% Ophthalmic Solution 1 Drop(s) Both EYES three times a day  epoetin keagan-epbx (RETACRIT) Injectable 60197 Unit(s) IV Push <User Schedule>  furosemide   Injectable 80 milliGRAM(s) IV Push two times a day  guaifenesin/dextromethorphan  Syrup 10 milliLiter(s) Oral every 4 hours  hydrALAZINE 25 milliGRAM(s) Oral three times a day  insulin lispro (HumaLOG) corrective regimen sliding scale   SubCutaneous three times a day before meals  lactulose Syrup 20 Gram(s) Oral two times a day  latanoprost 0.005% Ophthalmic Solution 1 Drop(s) Both EYES at bedtime  metolazone 5 milliGRAM(s) Oral <User Schedule>  metoprolol tartrate 25 milliGRAM(s) Oral two times a day  NIFEdipine XL 90 milliGRAM(s) Oral daily  polyethylene glycol 3350 17 Gram(s) Oral daily  pregabalin 50 milliGRAM(s) Oral daily  sertraline 100 milliGRAM(s) Oral daily  sevelamer carbonate 2400 milliGRAM(s) Oral three times a day  simethicone 80 milliGRAM(s) Chew three times a day  simvastatin 10 milliGRAM(s) Oral at bedtime  tamsulosin 0.4 milliGRAM(s) Oral at bedtime  timolol 0.5% Solution 1 Drop(s) Both EYES two times a day  traZODone 100 milliGRAM(s) Oral at bedtime    PRN MEDICATIONS  acetaminophen   Tablet .. 650 milliGRAM(s) Oral every 6 hours PRN  ALPRAZolam 0.25 milliGRAM(s) Oral daily PRN  dextrose 40% Gel 15 Gram(s) Oral once PRN  glucagon  Injectable 1 milliGRAM(s) IntraMuscular once PRN  oxycodone    5 mG/acetaminophen 325 mG 1 Tablet(s) Oral every 4 hours PRN  sodium chloride 0.65% Nasal 1 Spray(s) Both Nostrils daily PRN  zolpidem 5 milliGRAM(s) Oral at bedtime PRN    VITALS:   T(F): 98.7  HR: 68  BP: 162/70  RR: 20  SpO2: 90%    LABS:                        7.9    11.63 )-----------( 234      ( 23 Jul 2020 05:59 )             27.3     07-23    142  |  98  |  23<H>  ----------------------------<  219<H>  4.3   |  32  |  5.4<HH>    Ca    8.5      23 Jul 2020 05:59  Mg     2.2     07-23    TPro  6.5  /  Alb  3.9  /  TBili  0.5  /  DBili  x   /  AST  7   /  ALT  5   /  AlkPhos  133<H>  07-23                  RADIOLOGY:    < from: Xray Chest 1 View- PORTABLE-Routine (07.23.20 @ 05:44) >    EXAM:  XR CHEST PORTABLE ROUTINE 1V            PROCEDURE DATE:  07/23/2020            INTERPRETATION:  Clinical History / Reason for exam: Assessment of fluid status.    Comparison : Chest radiograph 7/22/2020.    Technique/Positioning: Frontal portable, partial lordotic view..    Findings:    Support devices: None.    Cardiac/mediastinum/hilum: Unchanged cardiomegaly.    Lung parenchyma/Pleura: Unchanged bilateral opacities/effusions. No pneumothorax.    Skeleton/soft tissues: Stable.    Impression:      Unchanged cardiomegaly with bilateral opacities/effusions.          < end of copied text >        PHYSICAL EXAM:  GEN: NAD in bed, c/o SOB, + anxiety  PULM/CHEST: Decreased breath sounds bilaterally  CVS: Regular rate and rhythm, S1-S2, no murmurs  ABD: Soft, Nontender, Nondistended; Bowel sounds present, obese  EXT: + left extremity edema  NEURO: AAOx3    Hunter Catheter:

## 2020-07-24 LAB
ALBUMIN SERPL ELPH-MCNC: 3.6 G/DL — SIGNIFICANT CHANGE UP (ref 3.5–5.2)
ALP SERPL-CCNC: 129 U/L — HIGH (ref 30–115)
ALT FLD-CCNC: 5 U/L — SIGNIFICANT CHANGE UP (ref 0–41)
ANION GAP SERPL CALC-SCNC: 14 MMOL/L — SIGNIFICANT CHANGE UP (ref 7–14)
AST SERPL-CCNC: 5 U/L — SIGNIFICANT CHANGE UP (ref 0–41)
BASOPHILS # BLD AUTO: 0.1 K/UL — SIGNIFICANT CHANGE UP (ref 0–0.2)
BASOPHILS NFR BLD AUTO: 0.9 % — SIGNIFICANT CHANGE UP (ref 0–1)
BILIRUB SERPL-MCNC: 0.3 MG/DL — SIGNIFICANT CHANGE UP (ref 0.2–1.2)
BUN SERPL-MCNC: 33 MG/DL — HIGH (ref 10–20)
CALCIUM SERPL-MCNC: 8.6 MG/DL — SIGNIFICANT CHANGE UP (ref 8.5–10.1)
CHLORIDE SERPL-SCNC: 100 MMOL/L — SIGNIFICANT CHANGE UP (ref 98–110)
CO2 SERPL-SCNC: 29 MMOL/L — SIGNIFICANT CHANGE UP (ref 17–32)
CREAT SERPL-MCNC: 7.4 MG/DL — CRITICAL HIGH (ref 0.7–1.5)
EOSINOPHIL # BLD AUTO: 0.47 K/UL — SIGNIFICANT CHANGE UP (ref 0–0.7)
EOSINOPHIL NFR BLD AUTO: 4.4 % — SIGNIFICANT CHANGE UP (ref 0–8)
GLUCOSE BLDC GLUCOMTR-MCNC: 200 MG/DL — HIGH (ref 70–99)
GLUCOSE BLDC GLUCOMTR-MCNC: 209 MG/DL — HIGH (ref 70–99)
GLUCOSE BLDC GLUCOMTR-MCNC: 231 MG/DL — HIGH (ref 70–99)
GLUCOSE BLDC GLUCOMTR-MCNC: 287 MG/DL — HIGH (ref 70–99)
GLUCOSE SERPL-MCNC: 204 MG/DL — HIGH (ref 70–99)
HCT VFR BLD CALC: 27.2 % — LOW (ref 42–52)
HGB BLD-MCNC: 7.8 G/DL — LOW (ref 14–18)
IMM GRANULOCYTES NFR BLD AUTO: 0.5 % — HIGH (ref 0.1–0.3)
LYMPHOCYTES # BLD AUTO: 1.2 K/UL — SIGNIFICANT CHANGE UP (ref 1.2–3.4)
LYMPHOCYTES # BLD AUTO: 11.3 % — LOW (ref 20.5–51.1)
MAGNESIUM SERPL-MCNC: 2.3 MG/DL — SIGNIFICANT CHANGE UP (ref 1.8–2.4)
MCHC RBC-ENTMCNC: 26.2 PG — LOW (ref 27–31)
MCHC RBC-ENTMCNC: 28.7 G/DL — LOW (ref 32–37)
MCV RBC AUTO: 91.3 FL — SIGNIFICANT CHANGE UP (ref 80–94)
MONOCYTES # BLD AUTO: 0.81 K/UL — HIGH (ref 0.1–0.6)
MONOCYTES NFR BLD AUTO: 7.6 % — SIGNIFICANT CHANGE UP (ref 1.7–9.3)
NEUTROPHILS # BLD AUTO: 7.96 K/UL — HIGH (ref 1.4–6.5)
NEUTROPHILS NFR BLD AUTO: 75.3 % — HIGH (ref 42.2–75.2)
NRBC # BLD: 0 /100 WBCS — SIGNIFICANT CHANGE UP (ref 0–0)
PLATELET # BLD AUTO: 217 K/UL — SIGNIFICANT CHANGE UP (ref 130–400)
POTASSIUM SERPL-MCNC: 4.8 MMOL/L — SIGNIFICANT CHANGE UP (ref 3.5–5)
POTASSIUM SERPL-SCNC: 4.8 MMOL/L — SIGNIFICANT CHANGE UP (ref 3.5–5)
PROT SERPL-MCNC: 6.4 G/DL — SIGNIFICANT CHANGE UP (ref 6–8)
RBC # BLD: 2.98 M/UL — LOW (ref 4.7–6.1)
RBC # FLD: 17.5 % — HIGH (ref 11.5–14.5)
SARS-COV-2 RNA SPEC QL NAA+PROBE: SIGNIFICANT CHANGE UP
SODIUM SERPL-SCNC: 143 MMOL/L — SIGNIFICANT CHANGE UP (ref 135–146)
WBC # BLD: 10.59 K/UL — SIGNIFICANT CHANGE UP (ref 4.8–10.8)
WBC # FLD AUTO: 10.59 K/UL — SIGNIFICANT CHANGE UP (ref 4.8–10.8)

## 2020-07-24 PROCEDURE — 99233 SBSQ HOSP IP/OBS HIGH 50: CPT

## 2020-07-24 RX ORDER — INSULIN GLARGINE 100 [IU]/ML
15 INJECTION, SOLUTION SUBCUTANEOUS AT BEDTIME
Refills: 0 | Status: DISCONTINUED | OUTPATIENT
Start: 2020-07-24 | End: 2020-07-25

## 2020-07-24 RX ADMIN — Medication 100 MILLIGRAM(S): at 21:02

## 2020-07-24 RX ADMIN — OXYCODONE AND ACETAMINOPHEN 1 TABLET(S): 5; 325 TABLET ORAL at 06:47

## 2020-07-24 RX ADMIN — Medication 3 MILLILITER(S): at 20:29

## 2020-07-24 RX ADMIN — SEVELAMER CARBONATE 2400 MILLIGRAM(S): 2400 POWDER, FOR SUSPENSION ORAL at 13:26

## 2020-07-24 RX ADMIN — Medication 25 MILLIGRAM(S): at 17:11

## 2020-07-24 RX ADMIN — ERYTHROPOIETIN 10000 UNIT(S): 10000 INJECTION, SOLUTION INTRAVENOUS; SUBCUTANEOUS at 11:56

## 2020-07-24 RX ADMIN — LACTULOSE 20 GRAM(S): 10 SOLUTION ORAL at 06:33

## 2020-07-24 RX ADMIN — Medication 25 MILLIGRAM(S): at 06:02

## 2020-07-24 RX ADMIN — Medication 90 MILLIGRAM(S): at 06:02

## 2020-07-24 RX ADMIN — BRIMONIDINE TARTRATE 1 DROP(S): 2 SOLUTION/ DROPS OPHTHALMIC at 06:03

## 2020-07-24 RX ADMIN — Medication 1: at 13:28

## 2020-07-24 RX ADMIN — OXYCODONE AND ACETAMINOPHEN 1 TABLET(S): 5; 325 TABLET ORAL at 06:33

## 2020-07-24 RX ADMIN — Medication 10 MILLILITER(S): at 06:02

## 2020-07-24 RX ADMIN — LIDOCAINE 1 PATCH: 4 CREAM TOPICAL at 13:27

## 2020-07-24 RX ADMIN — LIDOCAINE 1 PATCH: 4 CREAM TOPICAL at 19:37

## 2020-07-24 RX ADMIN — SIMETHICONE 80 MILLIGRAM(S): 80 TABLET, CHEWABLE ORAL at 13:26

## 2020-07-24 RX ADMIN — Medication 2: at 07:37

## 2020-07-24 RX ADMIN — DORZOLAMIDE HYDROCHLORIDE 1 DROP(S): 20 SOLUTION/ DROPS OPHTHALMIC at 06:03

## 2020-07-24 RX ADMIN — LACTULOSE 20 GRAM(S): 10 SOLUTION ORAL at 17:10

## 2020-07-24 RX ADMIN — SIMVASTATIN 10 MILLIGRAM(S): 20 TABLET, FILM COATED ORAL at 21:01

## 2020-07-24 RX ADMIN — Medication 1 DROP(S): at 06:03

## 2020-07-24 RX ADMIN — LATANOPROST 1 DROP(S): 0.05 SOLUTION/ DROPS OPHTHALMIC; TOPICAL at 21:02

## 2020-07-24 RX ADMIN — Medication 2: at 17:07

## 2020-07-24 RX ADMIN — TAMSULOSIN HYDROCHLORIDE 0.4 MILLIGRAM(S): 0.4 CAPSULE ORAL at 21:03

## 2020-07-24 RX ADMIN — DORZOLAMIDE HYDROCHLORIDE 1 DROP(S): 20 SOLUTION/ DROPS OPHTHALMIC at 21:01

## 2020-07-24 RX ADMIN — SIMETHICONE 80 MILLIGRAM(S): 80 TABLET, CHEWABLE ORAL at 06:02

## 2020-07-24 RX ADMIN — SEVELAMER CARBONATE 2400 MILLIGRAM(S): 2400 POWDER, FOR SUSPENSION ORAL at 17:08

## 2020-07-24 RX ADMIN — SERTRALINE 100 MILLIGRAM(S): 25 TABLET, FILM COATED ORAL at 13:27

## 2020-07-24 RX ADMIN — BRIMONIDINE TARTRATE 1 DROP(S): 2 SOLUTION/ DROPS OPHTHALMIC at 17:09

## 2020-07-24 RX ADMIN — Medication 10 MILLILITER(S): at 13:26

## 2020-07-24 RX ADMIN — OXYCODONE AND ACETAMINOPHEN 1 TABLET(S): 5; 325 TABLET ORAL at 21:45

## 2020-07-24 RX ADMIN — POLYETHYLENE GLYCOL 3350 17 GRAM(S): 17 POWDER, FOR SOLUTION ORAL at 13:27

## 2020-07-24 RX ADMIN — Medication 3 MILLILITER(S): at 01:15

## 2020-07-24 RX ADMIN — Medication 80 MILLIGRAM(S): at 17:12

## 2020-07-24 RX ADMIN — INSULIN GLARGINE 15 UNIT(S): 100 INJECTION, SOLUTION SUBCUTANEOUS at 21:36

## 2020-07-24 RX ADMIN — LIDOCAINE 2 PATCH: 4 CREAM TOPICAL at 19:37

## 2020-07-24 RX ADMIN — Medication 80 MILLIGRAM(S): at 06:02

## 2020-07-24 RX ADMIN — SIMETHICONE 80 MILLIGRAM(S): 80 TABLET, CHEWABLE ORAL at 21:01

## 2020-07-24 RX ADMIN — Medication 25 MILLIGRAM(S): at 13:26

## 2020-07-24 RX ADMIN — SEVELAMER CARBONATE 2400 MILLIGRAM(S): 2400 POWDER, FOR SUSPENSION ORAL at 07:38

## 2020-07-24 RX ADMIN — DORZOLAMIDE HYDROCHLORIDE 1 DROP(S): 20 SOLUTION/ DROPS OPHTHALMIC at 13:29

## 2020-07-24 RX ADMIN — Medication 50 MILLIGRAM(S): at 13:26

## 2020-07-24 RX ADMIN — OXYCODONE AND ACETAMINOPHEN 1 TABLET(S): 5; 325 TABLET ORAL at 21:14

## 2020-07-24 RX ADMIN — Medication 10 MILLILITER(S): at 21:01

## 2020-07-24 RX ADMIN — Medication 1 DROP(S): at 17:11

## 2020-07-24 RX ADMIN — LIDOCAINE 2 PATCH: 4 CREAM TOPICAL at 04:00

## 2020-07-24 RX ADMIN — Medication 0.25 MILLIGRAM(S): at 21:14

## 2020-07-24 RX ADMIN — LIDOCAINE 2 PATCH: 4 CREAM TOPICAL at 13:28

## 2020-07-24 RX ADMIN — Medication 10 MILLILITER(S): at 17:10

## 2020-07-24 RX ADMIN — Medication 3 MILLILITER(S): at 13:45

## 2020-07-24 RX ADMIN — Medication 25 MILLIGRAM(S): at 21:03

## 2020-07-24 RX ADMIN — LIDOCAINE 1 PATCH: 4 CREAM TOPICAL at 05:51

## 2020-07-24 NOTE — DIETITIAN INITIAL EVALUATION ADULT. - DIET TYPE
Per [place] notes in chart- pt is on DASH/TLC diet, carbohydrate consistent, renal dialysis diet, thin liquids, and ProStat q12hr. NH noted height of 182.8cm, with stable weight of 157kg. NKFA. No other vitamin/supplement.

## 2020-07-24 NOTE — PROGRESS NOTE ADULT - ASSESSMENT
1. End stage renal disease on hemodialysis Tuesday / Thursday and Saturday. Patient has been dialyzed the past 4 out 5 days with about 15L fluid removed. HD today: 3 hours, opti 160 dialyzer, 2K bath, 3.5L UF. Strict fluid restriction, low Na diet. Lasix 80mg IV BID, change to PO on discharge.  2. HTN. Continue metoprolol, hydralazine, and nifedipine.   3. Hyperphosphatemia. Sevelamer with meals. Renal diet.  4. Anemia. EPO with HD.

## 2020-07-24 NOTE — DIETITIAN INITIAL EVALUATION ADULT. - CONTINUE CURRENT NUTRITION CARE PLAN
pt to consume and tolerate >75% of all meals and snacks upon f/u in 4 days. Meals and snacks. RD to monitor diet order, energy intake, body composition., NFPF (PO tolerance, appetite, renal profile)

## 2020-07-24 NOTE — CHART NOTE - NSCHARTNOTEFT_GEN_A_CORE
Upon Nutritional Assessment by the Registered Dietitian your patient was determined to meet criteria / has evidence of the following diagnosis/diagnoses:          [ ]  Mild Protein Calorie Malnutrition        [ ]  Moderate Protein Calorie Malnutrition        [ ] Severe Protein Calorie Malnutrition        [ ] Unspecified Protein Calorie Malnutrition        [ ] Underweight / BMI <19        [ x] Morbid Obesity / BMI > 40      Findings as based on:  •  Comprehensive nutrition assessment and consultation      Ht: 182.8cm  Wt: 156.6kg  BMI: 47        Treatment:    The following diet has been recommended:  (1) Continue current DASH/TLC, RENAL RESTRICTION, and 1000mL fluid restriction per LIP recs. Due to restriction, will hold adding supplement at this time. RD will f/u pt's PO trend.        PROVIDER Section:     By signing this assessment you are acknowledging and agree with the diagnosis/diagnoses assigned by the Registered Dietitian    Comments:

## 2020-07-24 NOTE — DIETITIAN INITIAL EVALUATION ADULT. - ENERGY NEEDS
8729-5559 kcal/day (25-30 kcal/kg of IBW) - for HD therapy  100-117g/day (1.2-1.4 g/kg of IBW)  Fluid per renal/LIP recs as pt is currently on 1L fluid restriction

## 2020-07-24 NOTE — PROGRESS NOTE ADULT - ASSESSMENT
# Fluid overload (DDx: ESRD vs CHF less likely)  - Pt previously refused multiple sessions of hemodialysis, he went for his 5th dialysis for removal of 3.5 L today (07/24/2020)  - Requiring BiPAP due to continued pulmonary edema and HOMAR  - Continue with BIPAP 4 hours 4 hours off and qhs  - Switch to NC 3 L keep Soa2 92 to 96%  - Patient refused non-contrast CT chest as he reports he cannot lay down and is caustrophobic; I suggested medications to relieve his anxiety during the ct scan but patient still refused  - Follow up chest x-ray demonstrated unchanged cardiomegaly and b/l opacties  - continue with 80 mg of laskik and metazolone  - Strict fluid restriction to 1 Liter, low Na/renal diet  - F/u with pulmonary and nephrology recommendations         # Recurrent left epistaxis in a patient not on anti-coagulation   - EPO in HD for chronic anemia  - s/p TAX soaked packing in the ED  - patient admits to nose picking that caused another episode of epistaxis which was stopped with nasal tamponade  - patient counseled and bleeding now improved  - f/u CBC    # Bipolar disorder / Anxiety disorder   - Continue Xanax 0.25 mg PO qd PRN (end in 3 days) + Sertraline 100 mg PO qd + Trazodone 100 mg PO qhs.  - Psych consult to access patients decision making capacity cancelled as patient is now accepting treatment and has no acute mental status changes    # Type 2 Diabetes Mellitus   - On Lantus and sliding scale at the nursing home.  - Check FS before meals and at bedtime and start insulin basal and bolus with sliding scale if FS consistently > 180   - Will f/u hemoglobin A1C level    # Hypertension   - Continue Metoprolol Tartrate 25 mg PO BID + Procardia 90 mg PO qd    # Obstructive sleep apnea / COPD on 3L NC   - Continue Duoneb 3mL q6h  - CPAP 14/8 30% FiO2    # Chronic b/l leg pain-  - continue with oxycodone 5 mg/acetaminophen 325 mg q 4 hours  - apply lidocaine patch to back      dispo: [facility]

## 2020-07-24 NOTE — DIETITIAN INITIAL EVALUATION ADULT. - ADD RECOMMEND
Continue current DASH/TLC, RENAL RESTRICTION, and 1000mL fluid restriction per LIP recs. Due to restriction, will hold adding supplement at this time. RD will f/u pt's PO trend.

## 2020-07-24 NOTE — DIETITIAN INITIAL EVALUATION ADULT. - OTHER INFO
Came here from [facility] for recurrent L epistaxis. Now resolved. Avoid nose picking. seen by ENT. Hgb stable. s/p HD here for fluid removal. renal to follow. on lasix. DM monitoring FS.

## 2020-07-24 NOTE — DIETITIAN INITIAL EVALUATION ADULT. - REASON INDICATOR FOR ASSESSMENT
LOS - visited pt at dialysis center and he is on bipap, resting, breathing. Hence did not speak with patient. LBM 7/23 x3 (loose), 3+ L foot edema, ecchymosis to skin.  Pt is on DASH/TLC, 1000mL fluid restriction, and Renal restriction diet, but NPO currently for HD. EMR noted pt ate % in the last few meals with some 50% previously. Unable to confirm PO trend with pt at this time. Pt possibly d/c after HD?

## 2020-07-24 NOTE — PROGRESS NOTE ADULT - SUBJECTIVE AND OBJECTIVE BOX
SUBJECTIVE:    Patient is a 55y old Male who presents with a chief complaint of Epistaxis, pulmonary edema (24 Jul 2020 15:09)    Currently admitted to medicine with the primary diagnosis of Epistaxis     Today is hospital day 6d. This morning he is resting comfortably in bed and reports no new issues or overnight events.     INTERVAL EVENTS:     PAST MEDICAL & SURGICAL HISTORY  Dialysis patient: Tuesday- Thursday-Saturday  Bilateral ocular hypertension  Insomnia, unspecified type  Bipolar affective disorder, remission status unspecified  Obesity, unspecified classification, unspecified obesity type, unspecified whether serious comorbidity present  Anxiety disorder, unspecified type  Pain disorder  Anemia, unspecified type  Blind  HTN (hypertension)  End stage renal disease  Diabetes  Status post glaucoma surgery: RIGHT EYE  S/P BKA (below knee amputation) unilateral: RIGHT  A-V fistula: LEFT ARM      ALLERGIES:  No Known Allergies    MEDICATIONS:  STANDING MEDICATIONS  albuterol/ipratropium for Nebulization 3 milliLiter(s) Nebulizer every 6 hours  brimonidine 0.2% Ophthalmic Solution 1 Drop(s) Both EYES two times a day  dextrose 5%. 1000 milliLiter(s) IV Continuous <Continuous>  dextrose 50% Injectable 12.5 Gram(s) IV Push once  dextrose 50% Injectable 25 Gram(s) IV Push once  dextrose 50% Injectable 25 Gram(s) IV Push once  dorzolamide 2% Ophthalmic Solution 1 Drop(s) Both EYES three times a day  epoetin keagan-epbx (RETACRIT) Injectable 22526 Unit(s) IV Push <User Schedule>  furosemide   Injectable 80 milliGRAM(s) IV Push two times a day  guaifenesin/dextromethorphan  Syrup 10 milliLiter(s) Oral every 4 hours  hydrALAZINE 25 milliGRAM(s) Oral three times a day  insulin glargine Injectable (LANTUS) 15 Unit(s) SubCutaneous at bedtime  insulin lispro (HumaLOG) corrective regimen sliding scale   SubCutaneous three times a day before meals  lactulose Syrup 20 Gram(s) Oral two times a day  latanoprost 0.005% Ophthalmic Solution 1 Drop(s) Both EYES at bedtime  lidocaine   Patch 1 Patch Transdermal daily  lidocaine   Patch 2 Patch Transdermal daily  metolazone 5 milliGRAM(s) Oral <User Schedule>  metoprolol tartrate 25 milliGRAM(s) Oral two times a day  NIFEdipine XL 90 milliGRAM(s) Oral daily  polyethylene glycol 3350 17 Gram(s) Oral daily  pregabalin 50 milliGRAM(s) Oral daily  sertraline 100 milliGRAM(s) Oral daily  sevelamer carbonate 2400 milliGRAM(s) Oral three times a day  simethicone 80 milliGRAM(s) Chew three times a day  simvastatin 10 milliGRAM(s) Oral at bedtime  tamsulosin 0.4 milliGRAM(s) Oral at bedtime  timolol 0.5% Solution 1 Drop(s) Both EYES two times a day  traZODone 100 milliGRAM(s) Oral at bedtime    PRN MEDICATIONS  acetaminophen   Tablet .. 650 milliGRAM(s) Oral every 6 hours PRN  ALPRAZolam 0.25 milliGRAM(s) Oral daily PRN  dextrose 40% Gel 15 Gram(s) Oral once PRN  glucagon  Injectable 1 milliGRAM(s) IntraMuscular once PRN  oxycodone    5 mG/acetaminophen 325 mG 1 Tablet(s) Oral every 4 hours PRN  sodium chloride 0.65% Nasal 1 Spray(s) Both Nostrils daily PRN  zolpidem 5 milliGRAM(s) Oral at bedtime PRN    VITALS:   T(F): 98.8  HR: 72  BP: 178/79  RR: 20  SpO2: 94%    LABS:                        7.8    10.59 )-----------( 217      ( 24 Jul 2020 06:49 )             27.2     07-24    143  |  100  |  33<H>  ----------------------------<  204<H>  4.8   |  29  |  7.4<HH>    Ca    8.6      24 Jul 2020 06:49  Mg     2.3     07-24    TPro  6.4  /  Alb  3.6  /  TBili  0.3  /  DBili  x   /  AST  5   /  ALT  5   /  AlkPhos  129<H>  07-24                  RADIOLOGY:    PHYSICAL EXAM:  GEN: No acute distress, on 4 L o2 NC, obese habitus  PULM/CHEST: Clear to auscultation bilaterally, no rales, rhonchi or wheezes   CVS: Regular rate and rhythm, S1-S2, no murmurs  ABD: Soft, non-tender, distended, +BS  EXT: No edema  NEURO: AAOx3    Hunter Catheter:

## 2020-07-24 NOTE — PROGRESS NOTE ADULT - SUBJECTIVE AND OBJECTIVE BOX
[place]  [facility] [address] ([facility] T8-3E Neuro)            Patient was evaluated and examined  by bedside, tolerating diet well, s/p HD session today, still reports not feeling well, no dyspnea at rest.         REVIEW OF SYSTEMS:  please see pertinent positives mentioned above, all other 12 ROS negative      T(C): , Max: 37.2 (07-23-20 @ 21:06)  HR: 72 (07-24-20 @ 14:18)  BP: 178/79 (07-24-20 @ 14:18)  RR: 20 (07-24-20 @ 11:54)  SpO2: 94% (07-23-20 @ 21:06)  CAPILLARY BLOOD GLUCOSE      POCT Blood Glucose.: 200 mg/dL (24 Jul 2020 13:09)  POCT Blood Glucose.: 209 mg/dL (24 Jul 2020 07:19)  POCT Blood Glucose.: 275 mg/dL (23 Jul 2020 21:00)  POCT Blood Glucose.: 266 mg/dL (23 Jul 2020 16:34)      PHYSICAL EXAM:  General: NAD, AAOX3, patient is laying comfortably in bed, obese  HEENT: AT, NC, Supple, NO JVD, NO CB, right eye cataract  Lungs: CTA B/L, no wheezing, no rhonchi  CVS: normal S1, S2, RRR, NO M/G/R  Abdomen: soft, bowel sounds present, non-tender, non-distended  Extremities: no edema, no clubbing, no cyanosis, positive peripheral pulses b/l  Neuro: no acute focal neurological deficits  Skin: no rash, no ecchymosis      LAB  CBC  Date: 07-24-20 @ 06:49  Mean cell Ssysokczhm15.2  Mean cell Hemoglobin Conc28.7  Mean cell Volum 91.3  Platelet count-Automate 217  RBC Count 2.98  Red Cell Distrib Width17.5  WBC Count10.59  % Albumin, Urine--  Hematocrit 27.2  Hemoglobin 7.8  CBC  Date: 07-23-20 @ 05:59  Mean cell Uzwcltqqlb48.5  Mean cell Hemoglobin Conc28.9  Mean cell Volum 88.1  Platelet count-Automate 234  RBC Count 3.10  Red Cell Distrib Width17.2  WBC Count11.63  % Albumin, Urine--  Hematocrit 27.3  Hemoglobin 7.9  CBC  Date: 07-22-20 @ 05:27  Mean cell Ijoutyocsc66.5  Mean cell Hemoglobin Conc28.9  Mean cell Volum 88.2  Platelet count-Automate 208  RBC Count 3.06  Red Cell Distrib Width16.9  WBC Count9.99  % Albumin, Urine--  Hematocrit 27.0  Hemoglobin 7.8  CBC  Date: 07-21-20 @ 19:08  Mean cell Vuitpxzxhq18.0  Mean cell Hemoglobin Conc29.2  Mean cell Volum 89.0  Platelet count-Automate 215  RBC Count 3.00  Red Cell Distrib Width17.0  WBC Count10.26  % Albumin, Urine--  Hematocrit 26.7  Hemoglobin 7.8  CBC  Date: 07-20-20 @ 05:03  Mean cell Phvnorwpqs68.3  Mean cell Hemoglobin Conc29.1  Mean cell Volum 86.9  Platelet count-Automate 213  RBC Count 2.97  Red Cell Distrib Width17.0  WBC Count7.80  % Albumin, Urine--  Hematocrit 25.8  Hemoglobin 7.5  CBC  Date: 07-19-20 @ 22:30  Mean cell Hyeuxcetil00.2  Mean cell Hemoglobin Conc30.0  Mean cell Volum 87.2  Platelet count-Automate 210  RBC Count 2.98  Red Cell Distrib Width16.7  WBC Count9.13  % Albumin, Urine--  Hematocrit 26.0  Hemoglobin 7.8  CBC  Date: 07-19-20 @ 07:50  Mean cell Rmqqixtppd37.8  Mean cell Hemoglobin Conc29.5  Mean cell Volum 87.4  Platelet count-Automate 226  RBC Count 3.02  Red Cell Distrib Width16.7  WBC Count10.29  % Albumin, Urine--  Hematocrit 26.4  Hemoglobin 7.8  CBC  Date: 07-18-20 @ 19:10  Mean cell Ilnbfnvptq15.5  Mean cell Hemoglobin Conc29.5  Mean cell Volum 86.5  Platelet count-Automate 228  RBC Count 3.10  Red Cell Distrib Width16.7  WBC Count12.27  % Albumin, Urine--  Hematocrit 26.8  Hemoglobin 7.9    BMP  07-24-20 @ 06:49  Blood Gas Arterial-Calcium,Ionized--  Blood Urea Nitrogen, Serum 33 mg/dL<H> [10 - 20]  Carbon Dioxide, Serum29 mmol/L [17 - 32]  Chloride, Rzwik350 mmol/L [98 - 110]  Creatinie, Serum7.4 mg/dL<HH> [0.7 - 1.5] [Critical value:]  Glucose, Oqcnk692 mg/dL<H> [70 - 99]  Potassium, Serum4.8 mmol/L [3.5 - 5.0]  Sodium, Serum 143 mmol/L [135 - 146]  [facility]  07-23-20 @ 05:59  Blood Gas Arterial-Calcium,Ionized--  Blood Urea Nitrogen, Serum 23 mg/dL<H> [10 - 20]  Carbon Dioxide, Serum32 mmol/L [17 - 32]  Chloride, Serum98 mmol/L [98 - 110]  Creatinie, Serum5.4 mg/dL<HH> [0.7 - 1.5] [Critical value:]  Glucose, Angit924 mg/dL<H> [70 - 99]  Potassium, Serum4.3 mmol/L [3.5 - 5.0]  Sodium, Serum 142 mmol/L [135 - 146]  [facility]  07-22-20 @ 05:27  Blood Gas Arterial-Calcium,Ionized--  Blood Urea Nitrogen, Serum 28 mg/dL<H> [10 - 20]  Carbon Dioxide, Serum30 mmol/L [17 - 32]  Chloride, Serum99 mmol/L [98 - 110]  Creatinie, Serum5.9 mg/dL<HH> [0.7 - 1.5] [Critical value:]  Glucose, Fyaug631 mg/dL<H> [70 - 99]  Potassium, Serum4.0 mmol/L [3.5 - 5.0]  Sodium, Serum 141 mmol/L [135 - 146]  [facility]  07-21-20 @ 19:08  Blood Gas Arterial-Calcium,Ionized--  Blood Urea Nitrogen, Serum 23 mg/dL<H> [10 - 20]  Carbon Dioxide, Serum30 mmol/L [17 - 32]  Chloride, Serum99 mmol/L [98 - 110]  Creatinie, Serum5.3 mg/dL<HH> [0.7 - 1.5] [Critical value:]  Glucose, Vmqdv921 mg/dL<H> [70 - 99]  Potassium, Serum3.8 mmol/L [3.5 - 5.0]  Sodium, Serum 142 mmol/L [135 - 146]  [facility]  07-20-20 @ 05:03  Blood Gas Arterial-Calcium,Ionized--  Blood Urea Nitrogen, Serum 38 mg/dL<H> [10 - 20]  Carbon Dioxide, Serum26 mmol/L [17 - 32]  Chloride, Serum95 mmol/L<L> [98 - 110]  Creatinie, Serum7.7 mg/dL<HH> [0.7 - 1.5] [Critical value:]  Glucose, Agzoh314 mg/dL<H> [70 - 99]  Potassium, Serum4.7 mmol/L [3.5 - 5.0]  Sodium, Serum 136 mmol/L [135 - 146]  [facility]  07-19-20 @ 22:30  Blood Gas Arterial-Calcium,Ionized--  Blood Urea Nitrogen, Serum 35 mg/dL<H> [10 - 20]  Carbon Dioxide, Serum26 mmol/L [17 - 32]  Chloride, Serum97 mmol/L<L> [98 - 110]  Creatinie, Serum7.0 mg/dL<HH> [0.7 - 1.5] [Critical value:]  Glucose, Serum94 mg/dL [70 - 99]  Potassium, Serum4.6 mmol/L [3.5 - 5.0]  Sodium, Serum 137 mmol/L [135 - 146]              Medications:  acetaminophen   Tablet .. 650 milliGRAM(s) Oral every 6 hours PRN  albuterol/ipratropium for Nebulization 3 milliLiter(s) Nebulizer every 6 hours  ALPRAZolam 0.25 milliGRAM(s) Oral daily PRN  brimonidine 0.2% Ophthalmic Solution 1 Drop(s) Both EYES two times a day  dextrose 40% Gel 15 Gram(s) Oral once PRN  dextrose 5%. 1000 milliLiter(s) IV Continuous <Continuous>  dextrose 50% Injectable 12.5 Gram(s) IV Push once  dextrose 50% Injectable 25 Gram(s) IV Push once  dextrose 50% Injectable 25 Gram(s) IV Push once  dorzolamide 2% Ophthalmic Solution 1 Drop(s) Both EYES three times a day  epoetin keagan-epbx (RETACRIT) Injectable 94653 Unit(s) IV Push <User Schedule>  furosemide   Injectable 80 milliGRAM(s) IV Push two times a day  glucagon  Injectable 1 milliGRAM(s) IntraMuscular once PRN  guaifenesin/dextromethorphan  Syrup 10 milliLiter(s) Oral every 4 hours  hydrALAZINE 25 milliGRAM(s) Oral three times a day  insulin glargine Injectable (LANTUS) 15 Unit(s) SubCutaneous at bedtime  insulin lispro (HumaLOG) corrective regimen sliding scale   SubCutaneous three times a day before meals  lactulose Syrup 20 Gram(s) Oral two times a day  latanoprost 0.005% Ophthalmic Solution 1 Drop(s) Both EYES at bedtime  lidocaine   Patch 1 Patch Transdermal daily  lidocaine   Patch 2 Patch Transdermal daily  metolazone 5 milliGRAM(s) Oral <User Schedule>  metoprolol tartrate 25 milliGRAM(s) Oral two times a day  NIFEdipine XL 90 milliGRAM(s) Oral daily  oxycodone    5 mG/acetaminophen 325 mG 1 Tablet(s) Oral every 4 hours PRN  polyethylene glycol 3350 17 Gram(s) Oral daily  pregabalin 50 milliGRAM(s) Oral daily  sertraline 100 milliGRAM(s) Oral daily  sevelamer carbonate 2400 milliGRAM(s) Oral three times a day  simethicone 80 milliGRAM(s) Chew three times a day  simvastatin 10 milliGRAM(s) Oral at bedtime  sodium chloride 0.65% Nasal 1 Spray(s) Both Nostrils daily PRN  tamsulosin 0.4 milliGRAM(s) Oral at bedtime  timolol 0.5% Solution 1 Drop(s) Both EYES two times a day  traZODone 100 milliGRAM(s) Oral at bedtime  zolpidem 5 milliGRAM(s) Oral at bedtime PRN        Assessment and Plan:  54 y/o man with PMH of obesity, hypertension, diabetes with retinopathy, glaucoma, ESRD on HD, BPH, HOMAR, PVD, chronic pain, bipolar disorder and anxiety presented from [facility] for recurrent left epistaxis.     1. Recurrent left epistaxis - now resolved  pt counseled to avoid nose picking  humidified O2, saline nasal spray  s/p packing in ED that pt removed himself  seen by ENT  Hgb stable - has chronic anemia likely due to ESRD    2. Volume overload/pulmonary edema - pt missed HD sessions at [facility]  receiving multiple HD sessions here for fluid removal  had daily HD 7/19 - 7/22  renal following  continued on diuretics tx.  monitor I's and O's  low sodium diet  fluid restriction - discussed with pt  pulm f/u appreciated - bipap q4hr, O2 3L NC, negative balance  pt refused CT scan of chest - noncontrast  CXR looks improved pulmonary congestion      3. ESRD - HD per renal  pt noncompliant at times at [facility] - importance of compliance stressed to the pt    4. HTN - uncontrolled likely due to volume overload  continue current meds and fluid removal and monitor    5. DM - uncontrolled here - restarted on lantus 15 units subc. once daily at bedtime, continue bsfs monitoring  A1C 6.9    6. Bipolar disorder / Anxiety disorder   Continue Xanax 0.25 mg PO qd PRN, Sertraline, Trazodone     7. Chronic anemia due to renal disease, on Epogen - stable    8. HOMAR - on bipap QHS    9. COPD on 3L NC - chronic hypoxemic respiratory failure  - continue nebs    10. SCD of left LE for DVT prophylaxis    #Progress Note Handoff: Pending d/c planning for tomorrow  Family discussion: patient by bedside Disposition: SNF tomorrow

## 2020-07-24 NOTE — PROGRESS NOTE ADULT - SUBJECTIVE AND OBJECTIVE BOX
[facility] FOLLOW UP NOTE  --------------------------------------------------------------------------------  24 hour events/subjective: Patient examined during HD. Appears comfortable.    PAST HISTORY  --------------------------------------------------------------------------------  No significant changes to PMH, PSH, FHx, SHx, unless otherwise noted    ALLERGIES & MEDICATIONS  --------------------------------------------------------------------------------  Allergies    No Known Allergies    Intolerances      Standing Inpatient Medications  albuterol/ipratropium for Nebulization 3 milliLiter(s) Nebulizer every 6 hours  brimonidine 0.2% Ophthalmic Solution 1 Drop(s) Both EYES two times a day  dextrose 5%. 1000 milliLiter(s) IV Continuous <Continuous>  dextrose 50% Injectable 12.5 Gram(s) IV Push once  dextrose 50% Injectable 25 Gram(s) IV Push once  dextrose 50% Injectable 25 Gram(s) IV Push once  dorzolamide 2% Ophthalmic Solution 1 Drop(s) Both EYES three times a day  epoetin keagan-epbx (RETACRIT) Injectable 13572 Unit(s) IV Push <User Schedule>  furosemide   Injectable 80 milliGRAM(s) IV Push two times a day  guaifenesin/dextromethorphan  Syrup 10 milliLiter(s) Oral every 4 hours  hydrALAZINE 25 milliGRAM(s) Oral three times a day  insulin lispro (HumaLOG) corrective regimen sliding scale   SubCutaneous three times a day before meals  lactulose Syrup 20 Gram(s) Oral two times a day  latanoprost 0.005% Ophthalmic Solution 1 Drop(s) Both EYES at bedtime  lidocaine   Patch 1 Patch Transdermal daily  lidocaine   Patch 2 Patch Transdermal daily  metolazone 5 milliGRAM(s) Oral <User Schedule>  metoprolol tartrate 25 milliGRAM(s) Oral two times a day  NIFEdipine XL 90 milliGRAM(s) Oral daily  polyethylene glycol 3350 17 Gram(s) Oral daily  pregabalin 50 milliGRAM(s) Oral daily  sertraline 100 milliGRAM(s) Oral daily  sevelamer carbonate 2400 milliGRAM(s) Oral three times a day  simethicone 80 milliGRAM(s) Chew three times a day  simvastatin 10 milliGRAM(s) Oral at bedtime  tamsulosin 0.4 milliGRAM(s) Oral at bedtime  timolol 0.5% Solution 1 Drop(s) Both EYES two times a day  traZODone 100 milliGRAM(s) Oral at bedtime    PRN Inpatient Medications  acetaminophen   Tablet .. 650 milliGRAM(s) Oral every 6 hours PRN  ALPRAZolam 0.25 milliGRAM(s) Oral daily PRN  dextrose 40% Gel 15 Gram(s) Oral once PRN  glucagon  Injectable 1 milliGRAM(s) IntraMuscular once PRN  oxycodone    5 mG/acetaminophen 325 mG 1 Tablet(s) Oral every 4 hours PRN  sodium chloride 0.65% Nasal 1 Spray(s) Both Nostrils daily PRN  zolpidem 5 milliGRAM(s) Oral at bedtime PRN      VITALS/PHYSICAL EXAM  --------------------------------------------------------------------------------  T(C): 36.8 (07-24-20 @ 05:35), Max: 37.2 (07-23-20 @ 21:06)  HR: 65 (07-24-20 @ 11:54) (65 - 72)  BP: 162/63 (07-24-20 @ 11:54) (153/64 - 179/78)  RR: 20 (07-24-20 @ 11:54) (20 - 20)  SpO2: 94% (07-23-20 @ 21:06) (93% - 94%)  Wt(kg): --        07-23-20 @ 07:01  -  07-24-20 @ 07:00  --------------------------------------------------------  IN: 680 mL / OUT: 0 mL / NET: 680 mL    07-24-20 @ 07:01  -  07-24-20 @ 12:12  --------------------------------------------------------  IN: 0 mL / OUT: 3500 mL / NET: -3500 mL      Physical Exam:  	Gen: NAD  	Pulm: CTA B/L  	CV: RRR, S1S2  	Abd: +BS, soft, nontender/nondistended  	: No suprapubic tenderness  	LE: Warm,  edema  	Vascular access: AVF    LABS/STUDIES  --------------------------------------------------------------------------------              7.8    10.59 >-----------<  217      [07-24-20 @ 06:49]              27.2     143  |  100  |  33  ----------------------------<  204      [07-24-20 @ 06:49]  4.8   |  29  |  7.4        Ca     8.6     [07-24-20 @ 06:49]      Mg     2.3     [07-24-20 @ 06:49]    TPro  6.4  /  Alb  3.6  /  TBili  0.3  /  DBili  x   /  AST  5   /  ALT  5   /  AlkPhos  129  [07-24-20 @ 06:49]          Creatinine Trend:  SCr 7.4 [07-24 @ 06:49]  SCr 5.4 [07-23 @ 05:59]  SCr 5.9 [07-22 @ 05:27]  SCr 5.3 [07-21 @ 19:08]  SCr 7.7 [07-20 @ 05:03]        HbA1c 8.6      [06-01-18 @ 07:22]

## 2020-07-25 ENCOUNTER — TRANSCRIPTION ENCOUNTER (OUTPATIENT)
Age: 55
End: 2020-07-25

## 2020-07-25 VITALS — HEIGHT: 73 IN

## 2020-07-25 LAB
ALBUMIN SERPL ELPH-MCNC: 3.6 G/DL — SIGNIFICANT CHANGE UP (ref 3.5–5.2)
ALP SERPL-CCNC: 133 U/L — HIGH (ref 30–115)
ALT FLD-CCNC: 5 U/L — SIGNIFICANT CHANGE UP (ref 0–41)
ANION GAP SERPL CALC-SCNC: 16 MMOL/L — HIGH (ref 7–14)
AST SERPL-CCNC: 5 U/L — SIGNIFICANT CHANGE UP (ref 0–41)
BASOPHILS # BLD AUTO: 0.11 K/UL — SIGNIFICANT CHANGE UP (ref 0–0.2)
BASOPHILS NFR BLD AUTO: 1.2 % — HIGH (ref 0–1)
BILIRUB SERPL-MCNC: 0.3 MG/DL — SIGNIFICANT CHANGE UP (ref 0.2–1.2)
BUN SERPL-MCNC: 30 MG/DL — HIGH (ref 10–20)
CALCIUM SERPL-MCNC: 8.7 MG/DL — SIGNIFICANT CHANGE UP (ref 8.5–10.1)
CHLORIDE SERPL-SCNC: 99 MMOL/L — SIGNIFICANT CHANGE UP (ref 98–110)
CO2 SERPL-SCNC: 27 MMOL/L — SIGNIFICANT CHANGE UP (ref 17–32)
CREAT SERPL-MCNC: 6.2 MG/DL — CRITICAL HIGH (ref 0.7–1.5)
EOSINOPHIL # BLD AUTO: 0.46 K/UL — SIGNIFICANT CHANGE UP (ref 0–0.7)
EOSINOPHIL NFR BLD AUTO: 5.1 % — SIGNIFICANT CHANGE UP (ref 0–8)
GLUCOSE BLDC GLUCOMTR-MCNC: 249 MG/DL — HIGH (ref 70–99)
GLUCOSE BLDC GLUCOMTR-MCNC: 250 MG/DL — HIGH (ref 70–99)
GLUCOSE BLDC GLUCOMTR-MCNC: 251 MG/DL — HIGH (ref 70–99)
GLUCOSE SERPL-MCNC: 236 MG/DL — HIGH (ref 70–99)
HCT VFR BLD CALC: 28.1 % — LOW (ref 42–52)
HGB BLD-MCNC: 8 G/DL — LOW (ref 14–18)
IMM GRANULOCYTES NFR BLD AUTO: 0.3 % — SIGNIFICANT CHANGE UP (ref 0.1–0.3)
LYMPHOCYTES # BLD AUTO: 1.34 K/UL — SIGNIFICANT CHANGE UP (ref 1.2–3.4)
LYMPHOCYTES # BLD AUTO: 14.7 % — LOW (ref 20.5–51.1)
MAGNESIUM SERPL-MCNC: 2.4 MG/DL — SIGNIFICANT CHANGE UP (ref 1.8–2.4)
MCHC RBC-ENTMCNC: 25.4 PG — LOW (ref 27–31)
MCHC RBC-ENTMCNC: 28.5 G/DL — LOW (ref 32–37)
MCV RBC AUTO: 89.2 FL — SIGNIFICANT CHANGE UP (ref 80–94)
MONOCYTES # BLD AUTO: 0.75 K/UL — HIGH (ref 0.1–0.6)
MONOCYTES NFR BLD AUTO: 8.3 % — SIGNIFICANT CHANGE UP (ref 1.7–9.3)
NEUTROPHILS # BLD AUTO: 6.4 K/UL — SIGNIFICANT CHANGE UP (ref 1.4–6.5)
NEUTROPHILS NFR BLD AUTO: 70.4 % — SIGNIFICANT CHANGE UP (ref 42.2–75.2)
NRBC # BLD: 0 /100 WBCS — SIGNIFICANT CHANGE UP (ref 0–0)
PLATELET # BLD AUTO: 230 K/UL — SIGNIFICANT CHANGE UP (ref 130–400)
POTASSIUM SERPL-MCNC: 4.7 MMOL/L — SIGNIFICANT CHANGE UP (ref 3.5–5)
POTASSIUM SERPL-SCNC: 4.7 MMOL/L — SIGNIFICANT CHANGE UP (ref 3.5–5)
PROT SERPL-MCNC: 6.5 G/DL — SIGNIFICANT CHANGE UP (ref 6–8)
RBC # BLD: 3.15 M/UL — LOW (ref 4.7–6.1)
RBC # FLD: 17.2 % — HIGH (ref 11.5–14.5)
SODIUM SERPL-SCNC: 142 MMOL/L — SIGNIFICANT CHANGE UP (ref 135–146)
WBC # BLD: 9.09 K/UL — SIGNIFICANT CHANGE UP (ref 4.8–10.8)
WBC # FLD AUTO: 9.09 K/UL — SIGNIFICANT CHANGE UP (ref 4.8–10.8)

## 2020-07-25 PROCEDURE — 99239 HOSP IP/OBS DSCHRG MGMT >30: CPT

## 2020-07-25 RX ORDER — POLYETHYLENE GLYCOL 3350 17 G/17G
17 POWDER, FOR SOLUTION ORAL
Qty: 0 | Refills: 0 | DISCHARGE
Start: 2020-07-25

## 2020-07-25 RX ORDER — ACETAMINOPHEN 500 MG
2 TABLET ORAL
Qty: 0 | Refills: 0 | DISCHARGE
Start: 2020-07-25

## 2020-07-25 RX ORDER — LACTULOSE 10 G/15ML
30 SOLUTION ORAL
Qty: 0 | Refills: 0 | DISCHARGE
Start: 2020-07-25

## 2020-07-25 RX ORDER — TAMSULOSIN HYDROCHLORIDE 0.4 MG/1
1 CAPSULE ORAL
Qty: 0 | Refills: 0 | DISCHARGE
Start: 2020-07-25

## 2020-07-25 RX ORDER — HYDRALAZINE HCL 50 MG
1 TABLET ORAL
Qty: 0 | Refills: 0 | DISCHARGE
Start: 2020-07-25

## 2020-07-25 RX ORDER — ACETAMINOPHEN 500 MG
2 TABLET ORAL
Qty: 0 | Refills: 0 | DISCHARGE

## 2020-07-25 RX ORDER — SIMETHICONE 80 MG/1
1 TABLET, CHEWABLE ORAL
Qty: 0 | Refills: 0 | DISCHARGE
Start: 2020-07-25

## 2020-07-25 RX ORDER — DEXTROSE 50 % IN WATER 50 %
0 SYRINGE (ML) INTRAVENOUS
Qty: 0 | Refills: 0 | DISCHARGE
Start: 2020-07-25

## 2020-07-25 RX ORDER — SEVELAMER CARBONATE 2400 MG/1
3 POWDER, FOR SUSPENSION ORAL
Qty: 0 | Refills: 0 | DISCHARGE
Start: 2020-07-25

## 2020-07-25 RX ORDER — DEXTROSE 50 % IN WATER 50 %
50 SYRINGE (ML) INTRAVENOUS
Qty: 0 | Refills: 0 | DISCHARGE
Start: 2020-07-25

## 2020-07-25 RX ORDER — TIMOLOL 0.5 %
1 DROPS OPHTHALMIC (EYE)
Qty: 0 | Refills: 0 | DISCHARGE
Start: 2020-07-25

## 2020-07-25 RX ORDER — SODIUM CHLORIDE 9 MG/ML
1000 INJECTION, SOLUTION INTRAVENOUS
Qty: 0 | Refills: 0 | DISCHARGE
Start: 2020-07-25

## 2020-07-25 RX ORDER — ERYTHROPOIETIN 10000 [IU]/ML
0 INJECTION, SOLUTION INTRAVENOUS; SUBCUTANEOUS
Qty: 0 | Refills: 0 | DISCHARGE
Start: 2020-07-25

## 2020-07-25 RX ORDER — GUAIFENESIN/DEXTROMETHORPHAN 600MG-30MG
10 TABLET, EXTENDED RELEASE 12 HR ORAL
Qty: 0 | Refills: 0 | DISCHARGE
Start: 2020-07-25

## 2020-07-25 RX ORDER — BRIMONIDINE TARTRATE 2 MG/MG
1 SOLUTION/ DROPS OPHTHALMIC
Qty: 0 | Refills: 0 | DISCHARGE
Start: 2020-07-25

## 2020-07-25 RX ORDER — LIDOCAINE 4 G/100G
0 CREAM TOPICAL
Qty: 0 | Refills: 0 | DISCHARGE
Start: 2020-07-25

## 2020-07-25 RX ORDER — DORZOLAMIDE HYDROCHLORIDE 20 MG/ML
1 SOLUTION/ DROPS OPHTHALMIC
Qty: 0 | Refills: 0 | DISCHARGE
Start: 2020-07-25

## 2020-07-25 RX ORDER — DEXTROSE 50 % IN WATER 50 %
25 SYRINGE (ML) INTRAVENOUS
Qty: 0 | Refills: 0 | DISCHARGE
Start: 2020-07-25

## 2020-07-25 RX ORDER — LATANOPROST 0.05 MG/ML
1 SOLUTION/ DROPS OPHTHALMIC; TOPICAL
Qty: 0 | Refills: 0 | DISCHARGE
Start: 2020-07-25

## 2020-07-25 RX ORDER — SODIUM CHLORIDE 0.65 %
1 AEROSOL, SPRAY (ML) NASAL
Qty: 0 | Refills: 0 | DISCHARGE

## 2020-07-25 RX ORDER — POLYETHYLENE GLYCOL 3350 17 G/17G
17 POWDER, FOR SOLUTION ORAL
Qty: 0 | Refills: 0 | DISCHARGE

## 2020-07-25 RX ORDER — SODIUM CHLORIDE 0.65 %
0 AEROSOL, SPRAY (ML) NASAL
Qty: 0 | Refills: 0 | DISCHARGE
Start: 2020-07-25

## 2020-07-25 RX ORDER — SIMETHICONE 80 MG/1
1 TABLET, CHEWABLE ORAL
Qty: 0 | Refills: 0 | DISCHARGE

## 2020-07-25 RX ORDER — FUROSEMIDE 40 MG
80 TABLET ORAL
Refills: 0 | Status: DISCONTINUED | OUTPATIENT
Start: 2020-07-25 | End: 2020-07-25

## 2020-07-25 RX ADMIN — Medication 1 DROP(S): at 17:41

## 2020-07-25 RX ADMIN — LIDOCAINE 1 PATCH: 4 CREAM TOPICAL at 12:28

## 2020-07-25 RX ADMIN — Medication 1 DROP(S): at 05:35

## 2020-07-25 RX ADMIN — LIDOCAINE 2 PATCH: 4 CREAM TOPICAL at 19:20

## 2020-07-25 RX ADMIN — Medication 25 MILLIGRAM(S): at 13:59

## 2020-07-25 RX ADMIN — SIMETHICONE 80 MILLIGRAM(S): 80 TABLET, CHEWABLE ORAL at 05:35

## 2020-07-25 RX ADMIN — LIDOCAINE 2 PATCH: 4 CREAM TOPICAL at 00:46

## 2020-07-25 RX ADMIN — Medication 50 MILLIGRAM(S): at 12:28

## 2020-07-25 RX ADMIN — Medication 2: at 12:44

## 2020-07-25 RX ADMIN — OXYCODONE AND ACETAMINOPHEN 1 TABLET(S): 5; 325 TABLET ORAL at 06:15

## 2020-07-25 RX ADMIN — Medication 3: at 17:38

## 2020-07-25 RX ADMIN — OXYCODONE AND ACETAMINOPHEN 1 TABLET(S): 5; 325 TABLET ORAL at 12:46

## 2020-07-25 RX ADMIN — Medication 0.25 MILLIGRAM(S): at 12:46

## 2020-07-25 RX ADMIN — Medication 80 MILLIGRAM(S): at 17:40

## 2020-07-25 RX ADMIN — DORZOLAMIDE HYDROCHLORIDE 1 DROP(S): 20 SOLUTION/ DROPS OPHTHALMIC at 05:34

## 2020-07-25 RX ADMIN — BRIMONIDINE TARTRATE 1 DROP(S): 2 SOLUTION/ DROPS OPHTHALMIC at 05:35

## 2020-07-25 RX ADMIN — LACTULOSE 20 GRAM(S): 10 SOLUTION ORAL at 17:39

## 2020-07-25 RX ADMIN — Medication 10 MILLILITER(S): at 17:39

## 2020-07-25 RX ADMIN — Medication 25 MILLIGRAM(S): at 05:34

## 2020-07-25 RX ADMIN — Medication 10 MILLILITER(S): at 13:59

## 2020-07-25 RX ADMIN — POLYETHYLENE GLYCOL 3350 17 GRAM(S): 17 POWDER, FOR SOLUTION ORAL at 12:26

## 2020-07-25 RX ADMIN — Medication 25 MILLIGRAM(S): at 17:41

## 2020-07-25 RX ADMIN — SEVELAMER CARBONATE 2400 MILLIGRAM(S): 2400 POWDER, FOR SUSPENSION ORAL at 12:24

## 2020-07-25 RX ADMIN — SERTRALINE 100 MILLIGRAM(S): 25 TABLET, FILM COATED ORAL at 12:25

## 2020-07-25 RX ADMIN — SIMETHICONE 80 MILLIGRAM(S): 80 TABLET, CHEWABLE ORAL at 13:59

## 2020-07-25 RX ADMIN — Medication 90 MILLIGRAM(S): at 05:33

## 2020-07-25 RX ADMIN — Medication 3 MILLILITER(S): at 13:08

## 2020-07-25 RX ADMIN — SEVELAMER CARBONATE 2400 MILLIGRAM(S): 2400 POWDER, FOR SUSPENSION ORAL at 07:49

## 2020-07-25 RX ADMIN — Medication 3 MILLILITER(S): at 07:53

## 2020-07-25 RX ADMIN — DORZOLAMIDE HYDROCHLORIDE 1 DROP(S): 20 SOLUTION/ DROPS OPHTHALMIC at 13:58

## 2020-07-25 RX ADMIN — Medication 2: at 07:48

## 2020-07-25 RX ADMIN — Medication 10 MILLILITER(S): at 05:36

## 2020-07-25 RX ADMIN — LIDOCAINE 1 PATCH: 4 CREAM TOPICAL at 00:46

## 2020-07-25 RX ADMIN — Medication 80 MILLIGRAM(S): at 05:34

## 2020-07-25 RX ADMIN — BRIMONIDINE TARTRATE 1 DROP(S): 2 SOLUTION/ DROPS OPHTHALMIC at 17:41

## 2020-07-25 RX ADMIN — SEVELAMER CARBONATE 2400 MILLIGRAM(S): 2400 POWDER, FOR SUSPENSION ORAL at 17:38

## 2020-07-25 RX ADMIN — LACTULOSE 20 GRAM(S): 10 SOLUTION ORAL at 05:35

## 2020-07-25 RX ADMIN — OXYCODONE AND ACETAMINOPHEN 1 TABLET(S): 5; 325 TABLET ORAL at 05:33

## 2020-07-25 RX ADMIN — LIDOCAINE 1 PATCH: 4 CREAM TOPICAL at 12:29

## 2020-07-25 NOTE — PROGRESS NOTE ADULT - SUBJECTIVE AND OBJECTIVE BOX
[facility] FOLLOW UP NOTE  --------------------------------------------------------------------------------  24 hour events/subjective: Patient examined during HD. Appears comfortable.    PAST HISTORY  --------------------------------------------------------------------------------  No significant changes to PMH, PSH, FHx, SHx, unless otherwise noted    ALLERGIES & MEDICATIONS  --------------------------------------------------------------------------------  Allergies    No Known Allergies    Intolerances      Standing Inpatient Medications  albuterol/ipratropium for Nebulization 3 milliLiter(s) Nebulizer every 6 hours  brimonidine 0.2% Ophthalmic Solution 1 Drop(s) Both EYES two times a day  dextrose 5%. 1000 milliLiter(s) IV Continuous <Continuous>  dextrose 50% Injectable 12.5 Gram(s) IV Push once  dextrose 50% Injectable 25 Gram(s) IV Push once  dextrose 50% Injectable 25 Gram(s) IV Push once  dorzolamide 2% Ophthalmic Solution 1 Drop(s) Both EYES three times a day  epoetin keagan-epbx (RETACRIT) Injectable 06750 Unit(s) IV Push <User Schedule>  furosemide   Injectable 80 milliGRAM(s) IV Push two times a day  guaifenesin/dextromethorphan  Syrup 10 milliLiter(s) Oral every 4 hours  hydrALAZINE 25 milliGRAM(s) Oral three times a day  insulin glargine Injectable (LANTUS) 15 Unit(s) SubCutaneous at bedtime  insulin lispro (HumaLOG) corrective regimen sliding scale   SubCutaneous three times a day before meals  lactulose Syrup 20 Gram(s) Oral two times a day  latanoprost 0.005% Ophthalmic Solution 1 Drop(s) Both EYES at bedtime  lidocaine   Patch 1 Patch Transdermal daily  lidocaine   Patch 2 Patch Transdermal daily  metolazone 5 milliGRAM(s) Oral <User Schedule>  metoprolol tartrate 25 milliGRAM(s) Oral two times a day  NIFEdipine XL 90 milliGRAM(s) Oral daily  polyethylene glycol 3350 17 Gram(s) Oral daily  pregabalin 50 milliGRAM(s) Oral daily  sertraline 100 milliGRAM(s) Oral daily  sevelamer carbonate 2400 milliGRAM(s) Oral three times a day  simethicone 80 milliGRAM(s) Chew three times a day  simvastatin 10 milliGRAM(s) Oral at bedtime  tamsulosin 0.4 milliGRAM(s) Oral at bedtime  timolol 0.5% Solution 1 Drop(s) Both EYES two times a day  traZODone 100 milliGRAM(s) Oral at bedtime    PRN Inpatient Medications  acetaminophen   Tablet .. 650 milliGRAM(s) Oral every 6 hours PRN  ALPRAZolam 0.25 milliGRAM(s) Oral daily PRN  dextrose 40% Gel 15 Gram(s) Oral once PRN  glucagon  Injectable 1 milliGRAM(s) IntraMuscular once PRN  oxycodone    5 mG/acetaminophen 325 mG 1 Tablet(s) Oral every 4 hours PRN  sodium chloride 0.65% Nasal 1 Spray(s) Both Nostrils daily PRN  zolpidem 5 milliGRAM(s) Oral at bedtime PRN      VITALS/PHYSICAL EXAM  --------------------------------------------------------------------------------  T(C): 36.1 (07-25-20 @ 09:14), Max: 37.1 (07-24-20 @ 14:18)  HR: 61 (07-25-20 @ 05:34) (61 - 72)  BP: 150/72 (07-25-20 @ 09:14) (150/72 - 178/79)  RR: 18 (07-25-20 @ 09:14) (18 - 20)  SpO2: 94% (07-24-20 @ 22:18) (93% - 94%)  Wt(kg): --        07-24-20 @ 07:01  -  07-25-20 @ 07:00  --------------------------------------------------------  IN: 0 mL / OUT: 3500 mL / NET: -3500 mL      Physical Exam:  	Gen: NAD  	Pulm: CTA B/L  	CV: RRR, S1S2  	Abd: +BS, soft, nontender/nondistended  	: No suprapubic tenderness  	LE: Warm,  edema  	Vascular access: AVF    LABS/STUDIES  --------------------------------------------------------------------------------              8.0    9.09  >-----------<  230      [07-25-20 @ 05:52]              28.1     142  |  99  |  30  ----------------------------<  236      [07-25-20 @ 05:52]  4.7   |  27  |  6.2        Ca     8.7     [07-25-20 @ 05:52]      Mg     2.4     [07-25-20 @ 05:52]    TPro  6.5  /  Alb  3.6  /  TBili  0.3  /  DBili  x   /  AST  5   /  ALT  5   /  AlkPhos  133  [07-25-20 @ 05:52]    Creatinine Trend:  SCr 6.2 [07-25 @ 05:52]  SCr 7.4 [07-24 @ 06:49]  SCr 5.4 [07-23 @ 05:59]  SCr 5.9 [07-22 @ 05:27]  SCr 5.3 [07-21 @ 19:08]    HbA1c 8.6      [06-01-18 @ 07:22]

## 2020-07-25 NOTE — PROGRESS NOTE ADULT - ASSESSMENT
1. End stage renal disease on hemodialysis Tuesday / Thursday and Saturday. Patient has been dialyzed the past 5 out 6 days with about 19L fluid removed. HD today: 3 hours, opti 160 dialyzer, 2K bath, 4.5L UF. Strict fluid restriction, low Na diet. Lasix 80mg IV BID, change to PO on discharge.  2. HTN. Continue metoprolol, hydralazine, and nifedipine.   3. Hyperphosphatemia. Sevelamer with meals. Renal diet.  4. Anemia. EPO with HD.

## 2020-07-25 NOTE — PROGRESS NOTE ADULT - PROVIDER SPECIALTY LIST ADULT
Hospitalist
Internal Medicine
Nephrology
Pulmonology
Hospitalist
Pulmonology

## 2020-07-25 NOTE — DISCHARGE NOTE NURSING/CASE MANAGEMENT/SOCIAL WORK - PATIENT PORTAL LINK FT
You can access the FollowMyHealth Patient Portal offered by [facility] by registering at the following website: [email or web address]. By joining [employer]’s FollowMyHealth portal, you will also be able to view your health information using other applications (apps) compatible with our system.

## 2020-07-25 NOTE — PROGRESS NOTE ADULT - SUBJECTIVE AND OBJECTIVE BOX
[place]  [facility] [address] ([facility] T8-3E Neuro)            Patient was evaluated and examined  by bedside, s/p HD today, patient has no dyspnea, tolerating diet well.          REVIEW OF SYSTEMS:  please see pertinent positives mentioned above, all other 12 ROS negative      T(C): , Max: 37.1 (07-24-20 @ 14:18)  HR: 53 (07-25-20 @ 11:29)  BP: 143/59 (07-25-20 @ 11:29)  RR: 18 (07-25-20 @ 09:14)  SpO2: 94% (07-24-20 @ 22:18)  CAPILLARY BLOOD GLUCOSE      POCT Blood Glucose.: 250 mg/dL (25 Jul 2020 12:33)  POCT Blood Glucose.: 249 mg/dL (25 Jul 2020 07:20)  POCT Blood Glucose.: 287 mg/dL (24 Jul 2020 21:28)  POCT Blood Glucose.: 231 mg/dL (24 Jul 2020 16:41)  POCT Blood Glucose.: 200 mg/dL (24 Jul 2020 13:09)      PHYSICAL EXAM:  General: NAD, AAOX3, patient is laying comfortably in bed, obese  HEENT: AT, NC, Supple, NO JVD, NO CB  Lungs: CTA B/L, no wheezing, no rhonchi  CVS: normal S1, S2, RRR, NO M/G/R  Abdomen: soft, bowel sounds present, non-tender, non-distended  Extremities: right BKA status, no edema, no clubbing, no cyanosis, positive peripheral pulses b/l  Neuro: no acute focal neurological deficits  Skin: no rash, no ecchymosis      LAB  CBC  Date: 07-25-20 @ 05:52  Mean cell Twymkstflq93.4  Mean cell Hemoglobin Conc28.5  Mean cell Volum 89.2  Platelet count-Automate 230  RBC Count 3.15  Red Cell Distrib Width17.2  WBC Count9.09  % Albumin, Urine--  Hematocrit 28.1  Hemoglobin 8.0  CBC  Date: 07-24-20 @ 06:49  Mean cell Laqypzwhts81.2  Mean cell Hemoglobin Conc28.7  Mean cell Volum 91.3  Platelet count-Automate 217  RBC Count 2.98  Red Cell Distrib Width17.5  WBC Count10.59  % Albumin, Urine--  Hematocrit 27.2  Hemoglobin 7.8  CBC  Date: 07-23-20 @ 05:59  Mean cell Hoolexuypz72.5  Mean cell Hemoglobin Conc28.9  Mean cell Volum 88.1  Platelet count-Automate 234  RBC Count 3.10  Red Cell Distrib Width17.2  WBC Count11.63  % Albumin, Urine--  Hematocrit 27.3  Hemoglobin 7.9  CBC  Date: 07-22-20 @ 05:27  Mean cell Gqxujnkwma79.5  Mean cell Hemoglobin Conc28.9  Mean cell Volum 88.2  Platelet count-Automate 208  RBC Count 3.06  Red Cell Distrib Width16.9  WBC Count9.99  % Albumin, Urine--  Hematocrit 27.0  Hemoglobin 7.8  CBC  Date: 07-21-20 @ 19:08  Mean cell Mpgmiaxudt69.0  Mean cell Hemoglobin Conc29.2  Mean cell Volum 89.0  Platelet count-Automate 215  RBC Count 3.00  Red Cell Distrib Width17.0  WBC Count10.26  % Albumin, Urine--  Hematocrit 26.7  Hemoglobin 7.8  CBC  Date: 07-20-20 @ 05:03  Mean cell Nlsxozyied23.3  Mean cell Hemoglobin Conc29.1  Mean cell Volum 86.9  Platelet count-Automate 213  RBC Count 2.97  Red Cell Distrib Width17.0  WBC Count7.80  % Albumin, Urine--  Hematocrit 25.8  Hemoglobin 7.5  CBC  Date: 07-19-20 @ 22:30  Mean cell Nwbofzhauf74.2  Mean cell Hemoglobin Conc30.0  Mean cell Volum 87.2  Platelet count-Automate 210  RBC Count 2.98  Red Cell Distrib Width16.7  WBC Count9.13  % Albumin, Urine--  Hematocrit 26.0  Hemoglobin 7.8  CBC  Date: 07-19-20 @ 07:50  Mean cell Unfthzdjlg11.8  Mean cell Hemoglobin Conc29.5  Mean cell Volum 87.4  Platelet count-Automate 226  RBC Count 3.02  Red Cell Distrib Width16.7  WBC Count10.29  % Albumin, Urine--  Hematocrit 26.4  Hemoglobin 7.8  CBC  Date: 07-18-20 @ 19:10  Mean cell Konovtrgso45.5  Mean cell Hemoglobin Conc29.5  Mean cell Volum 86.5  Platelet count-Automate 228  RBC Count 3.10  Red Cell Distrib Width16.7  WBC Count12.27  % Albumin, Urine--  Hematocrit 26.8  Hemoglobin 7.9    BMP  07-25-20 @ 05:52  Blood Gas Arterial-Calcium,Ionized--  Blood Urea Nitrogen, Serum 30 mg/dL<H> [10 - 20]  Carbon Dioxide, Serum27 mmol/L [17 - 32]  Chloride, Serum99 mmol/L [98 - 110]  Creatinie, Serum6.2 mg/dL<HH> [0.7 - 1.5] [Critical value:]  Glucose, Ixbhb972 mg/dL<H> [70 - 99]  Potassium, Serum4.7 mmol/L [3.5 - 5.0]  Sodium, Serum 142 mmol/L [135 - 146]  [facility]  07-24-20 @ 06:49  Blood Gas Arterial-Calcium,Ionized--  Blood Urea Nitrogen, Serum 33 mg/dL<H> [10 - 20]  Carbon Dioxide, Serum29 mmol/L [17 - 32]  Chloride, Nsxji085 mmol/L [98 - 110]  Creatinie, Serum7.4 mg/dL<HH> [0.7 - 1.5] [Critical value:]  Glucose, Nrpvw477 mg/dL<H> [70 - 99]  Potassium, Serum4.8 mmol/L [3.5 - 5.0]  Sodium, Serum 143 mmol/L [135 - 146]  [facility]  07-23-20 @ 05:59  Blood Gas Arterial-Calcium,Ionized--  Blood Urea Nitrogen, Serum 23 mg/dL<H> [10 - 20]  Carbon Dioxide, Serum32 mmol/L [17 - 32]  Chloride, Serum98 mmol/L [98 - 110]  Creatinie, Serum5.4 mg/dL<HH> [0.7 - 1.5] [Critical value:]  Glucose, Zsits319 mg/dL<H> [70 - 99]  Potassium, Serum4.3 mmol/L [3.5 - 5.0]  Sodium, Serum 142 mmol/L [135 - 146]  [facility]  07-22-20 @ 05:27  Blood Gas Arterial-Calcium,Ionized--  Blood Urea Nitrogen, Serum 28 mg/dL<H> [10 - 20]  Carbon Dioxide, Serum30 mmol/L [17 - 32]  Chloride, Serum99 mmol/L [98 - 110]  Creatinie, Serum5.9 mg/dL<HH> [0.7 - 1.5] [Critical value:]  Glucose, Qcmbi990 mg/dL<H> [70 - 99]  Potassium, Serum4.0 mmol/L [3.5 - 5.0]  Sodium, Serum 141 mmol/L [135 - 146]  [facility]  07-21-20 @ 19:08  Blood Gas Arterial-Calcium,Ionized--  Blood Urea Nitrogen, Serum 23 mg/dL<H> [10 - 20]  Carbon Dioxide, Serum30 mmol/L [17 - 32]  Chloride, Serum99 mmol/L [98 - 110]  Creatinie, Serum5.3 mg/dL<HH> [0.7 - 1.5] [Critical value:]  Glucose, Maeba888 mg/dL<H> [70 - 99]  Potassium, Serum3.8 mmol/L [3.5 - 5.0]  Sodium, Serum 142 mmol/L [135 - 146]            Medications:  acetaminophen   Tablet .. 650 milliGRAM(s) Oral every 6 hours PRN  albuterol/ipratropium for Nebulization 3 milliLiter(s) Nebulizer every 6 hours  brimonidine 0.2% Ophthalmic Solution 1 Drop(s) Both EYES two times a day  dextrose 40% Gel 15 Gram(s) Oral once PRN  dextrose 5%. 1000 milliLiter(s) IV Continuous <Continuous>  dextrose 50% Injectable 12.5 Gram(s) IV Push once  dextrose 50% Injectable 25 Gram(s) IV Push once  dextrose 50% Injectable 25 Gram(s) IV Push once  dorzolamide 2% Ophthalmic Solution 1 Drop(s) Both EYES three times a day  epoetin keagan-epbx (RETACRIT) Injectable [id number] Unit(s) IV Push <User Schedule>  furosemide    Tablet 80 milliGRAM(s) Oral two times a day  glucagon  Injectable 1 milliGRAM(s) IntraMuscular once PRN  guaifenesin/dextromethorphan  Syrup 10 milliLiter(s) Oral every 4 hours  hydrALAZINE 25 milliGRAM(s) Oral three times a day  insulin glargine Injectable (LANTUS) 15 Unit(s) SubCutaneous at bedtime  insulin lispro (HumaLOG) corrective regimen sliding scale   SubCutaneous three times a day before meals  lactulose Syrup 20 Gram(s) Oral two times a day  latanoprost 0.005% Ophthalmic Solution 1 Drop(s) Both EYES at bedtime  lidocaine   Patch 1 Patch Transdermal daily  lidocaine   Patch 2 Patch Transdermal daily  metolazone 5 milliGRAM(s) Oral <User Schedule>  metoprolol tartrate 25 milliGRAM(s) Oral two times a day  NIFEdipine XL 90 milliGRAM(s) Oral daily  oxycodone    5 mG/acetaminophen 325 mG 1 Tablet(s) Oral every 4 hours PRN  polyethylene glycol 3350 17 Gram(s) Oral daily  sertraline 100 milliGRAM(s) Oral daily  sevelamer carbonate 2400 milliGRAM(s) Oral three times a day  simethicone 80 milliGRAM(s) Chew three times a day  simvastatin 10 milliGRAM(s) Oral at bedtime  sodium chloride 0.65% Nasal 1 Spray(s) Both Nostrils daily PRN  tamsulosin 0.4 milliGRAM(s) Oral at bedtime  timolol 0.5% Solution 1 Drop(s) Both EYES two times a day  traZODone 100 milliGRAM(s) Oral at bedtime  zolpidem 5 milliGRAM(s) Oral at bedtime PRN        Assessment and Plan:  54 y/o man with PMH of obesity, hypertension, diabetes with retinopathy, glaucoma, ESRD on HD, BPH, HOMAR, PVD, chronic pain, bipolar disorder and anxiety presented from [facility] for recurrent left epistaxis.     1. Recurrent left epistaxis - now resolved  pt counseled to avoid nose picking  humidified O2, saline nasal spray  s/p packing in ED that pt removed himself  seen by ENT  Hgb stable - has chronic anemia likely due to ESRD    2. Volume overload/pulmonary edema - pt missed HD sessions at [facility]  receiving multiple HD sessions here for fluid removal  had daily HD 7/19 - 7/25  renal following  continued on diuretics tx.  monitor I's and O's  low sodium diet  fluid restriction - discussed with pt  pulm f/u appreciated - bipap q4hr, O2 3L NC, negative balance  pt refused CT scan of chest - noncontrast  CXR - decreased pulmonary congestion      3. ESRD - HD per renal  pt noncompliant at times at [facility] - importance of compliance stressed to the pt    4. HTN - uncontrolled likely due to volume overload  continue current meds and fluid removal and monitor    5. DM - uncontrolled here - restarted on lantus 20 units subc. once daily at bedtime, continue bsfs monitoring  A1C 6.9    6. Bipolar disorder / Anxiety disorder   Continue Xanax 0.25 mg PO qd PRN, Sertraline, Trazodone     7. Chronic anemia due to renal disease, on Epogen - stable    8. HOMAR - on bipap QHS    9. COPD on 3L NC - chronic hypoxemic respiratory failure  - continue nebs    10. SCD of left LE for DVT prophylaxis    #Progress Note Handoff: Pending d/c planning today to SNF  Family discussion: patient by bedside Disposition: SNF today

## 2020-07-25 NOTE — PROGRESS NOTE ADULT - REASON FOR ADMISSION
Epistaxis, pulmonary edema

## 2020-07-28 DIAGNOSIS — Z99.2 DEPENDENCE ON RENAL DIALYSIS: ICD-10-CM

## 2020-07-28 DIAGNOSIS — N18.6 END STAGE RENAL DISEASE: ICD-10-CM

## 2020-07-28 DIAGNOSIS — J90 PLEURAL EFFUSION, NOT ELSEWHERE CLASSIFIED: ICD-10-CM

## 2020-07-28 DIAGNOSIS — G47.33 OBSTRUCTIVE SLEEP APNEA (ADULT) (PEDIATRIC): ICD-10-CM

## 2020-07-28 DIAGNOSIS — F41.9 ANXIETY DISORDER, UNSPECIFIED: ICD-10-CM

## 2020-07-28 DIAGNOSIS — G89.29 OTHER CHRONIC PAIN: ICD-10-CM

## 2020-07-28 DIAGNOSIS — E66.9 OBESITY, UNSPECIFIED: ICD-10-CM

## 2020-07-28 DIAGNOSIS — H54.61 UNQUALIFIED VISUAL LOSS, RIGHT EYE, NORMAL VISION LEFT EYE: ICD-10-CM

## 2020-07-28 DIAGNOSIS — F31.9 BIPOLAR DISORDER, UNSPECIFIED: ICD-10-CM

## 2020-07-28 DIAGNOSIS — Z79.4 LONG TERM (CURRENT) USE OF INSULIN: ICD-10-CM

## 2020-07-28 DIAGNOSIS — R04.0 EPISTAXIS: ICD-10-CM

## 2020-07-28 DIAGNOSIS — Z99.81 DEPENDENCE ON SUPPLEMENTAL OXYGEN: ICD-10-CM

## 2020-07-28 DIAGNOSIS — Z79.82 LONG TERM (CURRENT) USE OF ASPIRIN: ICD-10-CM

## 2020-07-28 DIAGNOSIS — J81.1 CHRONIC PULMONARY EDEMA: ICD-10-CM

## 2020-07-28 DIAGNOSIS — E83.39 OTHER DISORDERS OF PHOSPHORUS METABOLISM: ICD-10-CM

## 2020-07-28 DIAGNOSIS — I12.0 HYPERTENSIVE CHRONIC KIDNEY DISEASE WITH STAGE 5 CHRONIC KIDNEY DISEASE OR END STAGE RENAL DISEASE: ICD-10-CM

## 2020-07-28 DIAGNOSIS — J44.9 CHRONIC OBSTRUCTIVE PULMONARY DISEASE, UNSPECIFIED: ICD-10-CM

## 2020-07-28 DIAGNOSIS — J96.21 ACUTE AND CHRONIC RESPIRATORY FAILURE WITH HYPOXIA: ICD-10-CM

## 2020-07-28 DIAGNOSIS — E11.319 TYPE 2 DIABETES MELLITUS WITH UNSPECIFIED DIABETIC RETINOPATHY WITHOUT MACULAR EDEMA: ICD-10-CM

## 2020-07-28 DIAGNOSIS — E87.79 OTHER FLUID OVERLOAD: ICD-10-CM

## 2020-07-28 DIAGNOSIS — Z98.890 OTHER SPECIFIED POSTPROCEDURAL STATES: ICD-10-CM

## 2020-07-28 DIAGNOSIS — Z79.891 LONG TERM (CURRENT) USE OF OPIATE ANALGESIC: ICD-10-CM

## 2020-07-28 DIAGNOSIS — D63.1 ANEMIA IN CHRONIC KIDNEY DISEASE: ICD-10-CM

## 2020-07-28 DIAGNOSIS — Z89.511 ACQUIRED ABSENCE OF RIGHT LEG BELOW KNEE: ICD-10-CM

## 2020-07-28 DIAGNOSIS — E11.22 TYPE 2 DIABETES MELLITUS WITH DIABETIC CHRONIC KIDNEY DISEASE: ICD-10-CM

## 2020-07-28 DIAGNOSIS — Z79.899 OTHER LONG TERM (CURRENT) DRUG THERAPY: ICD-10-CM

## 2020-07-28 DIAGNOSIS — Z91.15 PATIENT'S NONCOMPLIANCE WITH RENAL DIALYSIS: ICD-10-CM

## 2020-09-14 NOTE — PHYSICAL THERAPY INITIAL EVALUATION ADULT - REHAB POTENTIAL, PT EVAL
The pt was notified, and the order was faxed to IR. IR was notified. good, to achieve stated therapy goals

## 2020-10-26 NOTE — PROGRESS NOTE ADULT - ASSESSMENT
Lewis Leon MD  Division of Hospital Medicine  Pager: [phone]  If no response or off-hours, page [phone]  -------------------------------------    Patient is a 61y old  Male who presents with a chief complaint of hepatic encephalopathy (26 Oct 2020 11:41)      SUBJECTIVE / OVERNIGHT EVENTS: none acute  ADDITIONAL REVIEW OF SYSTEMS: pt seen s/p colonoscopy- now has video capsule vest. Denies any further bloody bms, no aches/pains, no fevers/chills, ros otherwise negative.     MEDICATIONS  (STANDING):  albumin human 25% IVPB 100 milliLiter(s) IV Intermittent every 6 hours  cefTRIAXone   IVPB 1000 milliGRAM(s) IV Intermittent every 24 hours  lactulose Syrup 20 Gram(s) Oral every 8 hours  melatonin 3 milliGRAM(s) Oral at bedtime  nystatin Powder 1 Application(s) Topical every 12 hours  pantoprazole  Injectable 40 milliGRAM(s) IV Push every 12 hours  rifAXIMin 550 milliGRAM(s) Oral two times a day  spironolactone 12.5 milliGRAM(s) Oral daily  sucralfate 1 Gram(s) Oral four times a day    MEDICATIONS  (PRN):  acetaminophen   Tablet .. 650 milliGRAM(s) Oral every 6 hours PRN Mild Pain (1 - 3), Moderate Pain (4 - 6)      CAPILLARY BLOOD GLUCOSE        I&O's Summary    25 Oct 2020 07:01  -  26 Oct 2020 07:00  --------------------------------------------------------  IN: 650 mL / OUT: 915 mL / NET: -265 mL    26 Oct 2020 07:01  -  26 Oct 2020 14:58  --------------------------------------------------------  IN: 0 mL / OUT: 300 mL / NET: -300 mL        PHYSICAL EXAM:  Vital Signs Last 24 Hrs  T(C): 36.6 (26 Oct 2020 13:27), Max: 36.9 (26 Oct 2020 05:08)  T(F): 97.9 (26 Oct 2020 13:27), Max: 98.4 (26 Oct 2020 05:08)  HR: 70 (26 Oct 2020 13:27) (63 - 87)  BP: 109/65 (26 Oct 2020 13:27) (100/50 - 126/49)  BP(mean): --  RR: 18 (26 Oct 2020 13:27) (12 - 21)  SpO2: 95% (26 Oct 2020 13:27) (95% - 99%)  CONSTITUTIONAL: NAD, well-developed, well-groomed  EYES: PERRLA; conjunctiva and sclera clear  ENMT: Moist oral mucosa, no pharyngeal injection or exudates; normal dentition  NECK: Supple, no palpable masses; no thyromegaly  RESPIRATORY: Normal respiratory effort; lungs are clear to auscultation bilaterally  CARDIOVASCULAR: Regular rate and rhythm, normal S1 and S2, no murmur/rub/gallop; +2pitting edema, Peripheral pulses are 2+ bilaterally  ABDOMEN: +softly distended, Nontender to palpation, normoactive bowel sounds, no rebound/guarding; No hepatosplenomegaly  MUSCLOSKELETAL:  Normal gait; no clubbing or cyanosis of digits; no joint swelling or tenderness to palpation  PSYCH: A+O to person, place, and time; affect appropriate  NEUROLOGY: CN 2-12 are intact and symmetric; no gross sensory deficits;   SKIN: No rashes; no palpable lesions    LABS:                        7.9    3.35  )-----------( 55       ( 26 Oct 2020 08:28 )             24.5     10-26    133<L>  |  100  |  19  ----------------------------<  77  4.7   |  23  |  1.63<H>    Ca    8.5      26 Oct 2020 08:28  Phos  2.2     10-26  Mg     1.8     10-26    TPro  6.3  /  Alb  2.7<L>  /  TBili  2.9<H>  /  DBili  x   /  AST  32  /  ALT  20  /  AlkPhos  83  10-26    PT/INR - ( 26 Oct 2020 10:37 )   PT: 22.6 sec;   INR: 1.98 ratio                     RADIOLOGY & ADDITIONAL TESTS:  Results Reviewed:   Imaging Personally Reviewed:  Electrocardiogram Personally Reviewed:    COORDINATION OF CARE:  Care Discussed with Consultants/Other Providers [Y/N]:  Prior or Outpatient Records Reviewed [Y/N]:   1. R foot necrotizing fasciitis. s/p BKA. On abx.  2. ESRD on HD TTS (MWF in-hospital). HD today: 3 hours, opti 160 dialyzer, 2K bath, 3L UF.   3. Anemia. EPO 10,000 units IV with HD.  4. Hyperphosphatemia. Renal diet.  Sevelamer with meals.  5. HTN. Continue enalapril, metoprolol, furosemide, and metolazone.

## 2021-01-01 ENCOUNTER — TRANSCRIPTION ENCOUNTER (OUTPATIENT)
Age: 56
End: 2021-01-01

## 2021-01-01 ENCOUNTER — INPATIENT (INPATIENT)
Facility: HOSPITAL | Age: 56
LOS: 4 days | Discharge: NURSING, REHAB OR LONG-TERM CARE | End: 2021-11-22
Attending: HOSPITALIST | Admitting: HOSPITALIST
Payer: MEDICARE

## 2021-01-01 VITALS
SYSTOLIC BLOOD PRESSURE: 144 MMHG | DIASTOLIC BLOOD PRESSURE: 66 MMHG | TEMPERATURE: 98 F | HEART RATE: 63 BPM | RESPIRATION RATE: 18 BRPM

## 2021-01-01 VITALS
DIASTOLIC BLOOD PRESSURE: 58 MMHG | OXYGEN SATURATION: 82 % | TEMPERATURE: 97 F | HEIGHT: 73 IN | SYSTOLIC BLOOD PRESSURE: 142 MMHG | HEART RATE: 60 BPM | RESPIRATION RATE: 20 BRPM

## 2021-01-01 DIAGNOSIS — E83.39 OTHER DISORDERS OF PHOSPHORUS METABOLISM: ICD-10-CM

## 2021-01-01 DIAGNOSIS — E87.5 HYPERKALEMIA: ICD-10-CM

## 2021-01-01 DIAGNOSIS — Z79.4 LONG TERM (CURRENT) USE OF INSULIN: ICD-10-CM

## 2021-01-01 DIAGNOSIS — N40.0 BENIGN PROSTATIC HYPERPLASIA WITHOUT LOWER URINARY TRACT SYMPTOMS: ICD-10-CM

## 2021-01-01 DIAGNOSIS — F31.9 BIPOLAR DISORDER, UNSPECIFIED: ICD-10-CM

## 2021-01-01 DIAGNOSIS — J96.02 ACUTE RESPIRATORY FAILURE WITH HYPERCAPNIA: ICD-10-CM

## 2021-01-01 DIAGNOSIS — G47.00 INSOMNIA, UNSPECIFIED: ICD-10-CM

## 2021-01-01 DIAGNOSIS — Z91.15 PATIENT'S NONCOMPLIANCE WITH RENAL DIALYSIS: ICD-10-CM

## 2021-01-01 DIAGNOSIS — Z99.2 DEPENDENCE ON RENAL DIALYSIS: ICD-10-CM

## 2021-01-01 DIAGNOSIS — Z98.83 FILTERING (VITREOUS) BLEB AFTER GLAUCOMA SURGERY STATUS: Chronic | ICD-10-CM

## 2021-01-01 DIAGNOSIS — E87.70 FLUID OVERLOAD, UNSPECIFIED: ICD-10-CM

## 2021-01-01 DIAGNOSIS — E87.79 OTHER FLUID OVERLOAD: ICD-10-CM

## 2021-01-01 DIAGNOSIS — E11.22 TYPE 2 DIABETES MELLITUS WITH DIABETIC CHRONIC KIDNEY DISEASE: ICD-10-CM

## 2021-01-01 DIAGNOSIS — I77.0 ARTERIOVENOUS FISTULA, ACQUIRED: Chronic | ICD-10-CM

## 2021-01-01 DIAGNOSIS — D63.1 ANEMIA IN CHRONIC KIDNEY DISEASE: ICD-10-CM

## 2021-01-01 DIAGNOSIS — J96.01 ACUTE RESPIRATORY FAILURE WITH HYPOXIA: ICD-10-CM

## 2021-01-01 DIAGNOSIS — E78.5 HYPERLIPIDEMIA, UNSPECIFIED: ICD-10-CM

## 2021-01-01 DIAGNOSIS — H40.9 UNSPECIFIED GLAUCOMA: ICD-10-CM

## 2021-01-01 DIAGNOSIS — I12.0 HYPERTENSIVE CHRONIC KIDNEY DISEASE WITH STAGE 5 CHRONIC KIDNEY DISEASE OR END STAGE RENAL DISEASE: ICD-10-CM

## 2021-01-01 DIAGNOSIS — E11.649 TYPE 2 DIABETES MELLITUS WITH HYPOGLYCEMIA WITHOUT COMA: ICD-10-CM

## 2021-01-01 DIAGNOSIS — Z89.511 ACQUIRED ABSENCE OF RIGHT LEG BELOW KNEE: ICD-10-CM

## 2021-01-01 DIAGNOSIS — G89.4 CHRONIC PAIN SYNDROME: ICD-10-CM

## 2021-01-01 DIAGNOSIS — Z89.519 ACQUIRED ABSENCE OF UNSPECIFIED LEG BELOW KNEE: Chronic | ICD-10-CM

## 2021-01-01 DIAGNOSIS — E66.9 OBESITY, UNSPECIFIED: ICD-10-CM

## 2021-01-01 DIAGNOSIS — F41.9 ANXIETY DISORDER, UNSPECIFIED: ICD-10-CM

## 2021-01-01 DIAGNOSIS — F39 UNSPECIFIED MOOD [AFFECTIVE] DISORDER: ICD-10-CM

## 2021-01-01 DIAGNOSIS — E11.319 TYPE 2 DIABETES MELLITUS WITH UNSPECIFIED DIABETIC RETINOPATHY WITHOUT MACULAR EDEMA: ICD-10-CM

## 2021-01-01 DIAGNOSIS — G47.33 OBSTRUCTIVE SLEEP APNEA (ADULT) (PEDIATRIC): ICD-10-CM

## 2021-01-01 DIAGNOSIS — G57.50 TARSAL TUNNEL SYNDROME, UNSPECIFIED LOWER LIMB: ICD-10-CM

## 2021-01-01 DIAGNOSIS — R79.89 OTHER SPECIFIED ABNORMAL FINDINGS OF BLOOD CHEMISTRY: ICD-10-CM

## 2021-01-01 DIAGNOSIS — E11.51 TYPE 2 DIABETES MELLITUS WITH DIABETIC PERIPHERAL ANGIOPATHY WITHOUT GANGRENE: ICD-10-CM

## 2021-01-01 DIAGNOSIS — N18.6 END STAGE RENAL DISEASE: ICD-10-CM

## 2021-01-01 DIAGNOSIS — H40.053 OCULAR HYPERTENSION, BILATERAL: ICD-10-CM

## 2021-01-01 DIAGNOSIS — B97.10 UNSPECIFIED ENTEROVIRUS AS THE CAUSE OF DISEASES CLASSIFIED ELSEWHERE: ICD-10-CM

## 2021-01-01 LAB
A1C WITH ESTIMATED AVERAGE GLUCOSE RESULT: 5.1 % — SIGNIFICANT CHANGE UP (ref 4–5.6)
ALBUMIN SERPL ELPH-MCNC: 3 G/DL — LOW (ref 3.5–5.2)
ALBUMIN SERPL ELPH-MCNC: 3.1 G/DL — LOW (ref 3.5–5.2)
ALBUMIN SERPL ELPH-MCNC: 3.1 G/DL — LOW (ref 3.5–5.2)
ALBUMIN SERPL ELPH-MCNC: 3.4 G/DL — LOW (ref 3.5–5.2)
ALBUMIN SERPL ELPH-MCNC: 3.5 G/DL — SIGNIFICANT CHANGE UP (ref 3.5–5.2)
ALBUMIN SERPL ELPH-MCNC: 3.5 G/DL — SIGNIFICANT CHANGE UP (ref 3.5–5.2)
ALBUMIN SERPL ELPH-MCNC: 3.6 G/DL — SIGNIFICANT CHANGE UP (ref 3.5–5.2)
ALBUMIN SERPL ELPH-MCNC: 3.7 G/DL — SIGNIFICANT CHANGE UP (ref 3.5–5.2)
ALP SERPL-CCNC: 128 U/L — HIGH (ref 30–115)
ALP SERPL-CCNC: 134 U/L — HIGH (ref 30–115)
ALP SERPL-CCNC: 136 U/L — HIGH (ref 30–115)
ALP SERPL-CCNC: 148 U/L — HIGH (ref 30–115)
ALP SERPL-CCNC: 152 U/L — HIGH (ref 30–115)
ALP SERPL-CCNC: 153 U/L — HIGH (ref 30–115)
ALP SERPL-CCNC: 155 U/L — HIGH (ref 30–115)
ALP SERPL-CCNC: 164 U/L — HIGH (ref 30–115)
ALT FLD-CCNC: 6 U/L — SIGNIFICANT CHANGE UP (ref 0–41)
ALT FLD-CCNC: 7 U/L — SIGNIFICANT CHANGE UP (ref 0–41)
ALT FLD-CCNC: 8 U/L — SIGNIFICANT CHANGE UP (ref 0–41)
ALT FLD-CCNC: 8 U/L — SIGNIFICANT CHANGE UP (ref 0–41)
ALT FLD-CCNC: <5 U/L — SIGNIFICANT CHANGE UP (ref 0–41)
ALT FLD-CCNC: <5 U/L — SIGNIFICANT CHANGE UP (ref 0–41)
ANION GAP SERPL CALC-SCNC: 16 MMOL/L — HIGH (ref 7–14)
ANION GAP SERPL CALC-SCNC: 16 MMOL/L — HIGH (ref 7–14)
ANION GAP SERPL CALC-SCNC: 17 MMOL/L — HIGH (ref 7–14)
ANION GAP SERPL CALC-SCNC: 19 MMOL/L — HIGH (ref 7–14)
ANION GAP SERPL CALC-SCNC: 20 MMOL/L — HIGH (ref 7–14)
ANION GAP SERPL CALC-SCNC: 20 MMOL/L — HIGH (ref 7–14)
APTT BLD: 38.8 SEC — SIGNIFICANT CHANGE UP (ref 27–39.2)
AST SERPL-CCNC: 10 U/L — SIGNIFICANT CHANGE UP (ref 0–41)
AST SERPL-CCNC: 6 U/L — SIGNIFICANT CHANGE UP (ref 0–41)
AST SERPL-CCNC: 7 U/L — SIGNIFICANT CHANGE UP (ref 0–41)
AST SERPL-CCNC: 7 U/L — SIGNIFICANT CHANGE UP (ref 0–41)
AST SERPL-CCNC: 8 U/L — SIGNIFICANT CHANGE UP (ref 0–41)
AST SERPL-CCNC: 9 U/L — SIGNIFICANT CHANGE UP (ref 0–41)
BASE EXCESS BLDV CALC-SCNC: -3 MMOL/L — LOW (ref -2–3)
BASE EXCESS BLDV CALC-SCNC: 0.8 MMOL/L — SIGNIFICANT CHANGE UP (ref -2–3)
BASOPHILS # BLD AUTO: 0.06 K/UL — SIGNIFICANT CHANGE UP (ref 0–0.2)
BASOPHILS # BLD AUTO: 0.07 K/UL — SIGNIFICANT CHANGE UP (ref 0–0.2)
BASOPHILS # BLD AUTO: 0.08 K/UL — SIGNIFICANT CHANGE UP (ref 0–0.2)
BASOPHILS # BLD AUTO: 0.09 K/UL — SIGNIFICANT CHANGE UP (ref 0–0.2)
BASOPHILS # BLD AUTO: 0.1 K/UL — SIGNIFICANT CHANGE UP (ref 0–0.2)
BASOPHILS # BLD AUTO: 0.13 K/UL — SIGNIFICANT CHANGE UP (ref 0–0.2)
BASOPHILS NFR BLD AUTO: 0.7 % — SIGNIFICANT CHANGE UP (ref 0–1)
BASOPHILS NFR BLD AUTO: 0.8 % — SIGNIFICANT CHANGE UP (ref 0–1)
BASOPHILS NFR BLD AUTO: 0.9 % — SIGNIFICANT CHANGE UP (ref 0–1)
BASOPHILS NFR BLD AUTO: 1.3 % — HIGH (ref 0–1)
BILIRUB SERPL-MCNC: 0.3 MG/DL — SIGNIFICANT CHANGE UP (ref 0.2–1.2)
BUN SERPL-MCNC: 39 MG/DL — HIGH (ref 10–20)
BUN SERPL-MCNC: 42 MG/DL — HIGH (ref 10–20)
BUN SERPL-MCNC: 47 MG/DL — HIGH (ref 10–20)
BUN SERPL-MCNC: 50 MG/DL — HIGH (ref 10–20)
BUN SERPL-MCNC: 54 MG/DL — HIGH (ref 10–20)
BUN SERPL-MCNC: 57 MG/DL — HIGH (ref 10–20)
BUN SERPL-MCNC: 70 MG/DL — CRITICAL HIGH (ref 10–20)
BUN SERPL-MCNC: 73 MG/DL — CRITICAL HIGH (ref 10–20)
CA-I SERPL-SCNC: 1.04 MMOL/L — LOW (ref 1.15–1.33)
CA-I SERPL-SCNC: 1.15 MMOL/L — SIGNIFICANT CHANGE UP (ref 1.15–1.33)
CALCIUM SERPL-MCNC: 7.4 MG/DL — LOW (ref 8.5–10.1)
CALCIUM SERPL-MCNC: 7.5 MG/DL — LOW (ref 8.5–10.1)
CALCIUM SERPL-MCNC: 7.6 MG/DL — LOW (ref 8.5–10.1)
CALCIUM SERPL-MCNC: 7.8 MG/DL — LOW (ref 8.5–10.1)
CALCIUM SERPL-MCNC: 7.9 MG/DL — LOW (ref 8.5–10.1)
CALCIUM SERPL-MCNC: 8 MG/DL — LOW (ref 8.5–10.1)
CHLORIDE SERPL-SCNC: 97 MMOL/L — LOW (ref 98–110)
CHLORIDE SERPL-SCNC: 98 MMOL/L — SIGNIFICANT CHANGE UP (ref 98–110)
CHLORIDE SERPL-SCNC: 99 MMOL/L — SIGNIFICANT CHANGE UP (ref 98–110)
CHOLEST SERPL-MCNC: 82 MG/DL — SIGNIFICANT CHANGE UP
CO2 SERPL-SCNC: 19 MMOL/L — SIGNIFICANT CHANGE UP (ref 17–32)
CO2 SERPL-SCNC: 21 MMOL/L — SIGNIFICANT CHANGE UP (ref 17–32)
CO2 SERPL-SCNC: 23 MMOL/L — SIGNIFICANT CHANGE UP (ref 17–32)
CO2 SERPL-SCNC: 24 MMOL/L — SIGNIFICANT CHANGE UP (ref 17–32)
CO2 SERPL-SCNC: 25 MMOL/L — SIGNIFICANT CHANGE UP (ref 17–32)
CO2 SERPL-SCNC: 25 MMOL/L — SIGNIFICANT CHANGE UP (ref 17–32)
CREAT SERPL-MCNC: 6.1 MG/DL — CRITICAL HIGH (ref 0.7–1.5)
CREAT SERPL-MCNC: 6.2 MG/DL — CRITICAL HIGH (ref 0.7–1.5)
CREAT SERPL-MCNC: 6.2 MG/DL — CRITICAL HIGH (ref 0.7–1.5)
CREAT SERPL-MCNC: 6.5 MG/DL — CRITICAL HIGH (ref 0.7–1.5)
CREAT SERPL-MCNC: 7 MG/DL — CRITICAL HIGH (ref 0.7–1.5)
CREAT SERPL-MCNC: 7.2 MG/DL — CRITICAL HIGH (ref 0.7–1.5)
CREAT SERPL-MCNC: 7.8 MG/DL — CRITICAL HIGH (ref 0.7–1.5)
CREAT SERPL-MCNC: 8 MG/DL — CRITICAL HIGH (ref 0.7–1.5)
CULTURE RESULTS: SIGNIFICANT CHANGE UP
CULTURE RESULTS: SIGNIFICANT CHANGE UP
EOSINOPHIL # BLD AUTO: 0.23 K/UL — SIGNIFICANT CHANGE UP (ref 0–0.7)
EOSINOPHIL # BLD AUTO: 0.36 K/UL — SIGNIFICANT CHANGE UP (ref 0–0.7)
EOSINOPHIL # BLD AUTO: 0.37 K/UL — SIGNIFICANT CHANGE UP (ref 0–0.7)
EOSINOPHIL # BLD AUTO: 0.4 K/UL — SIGNIFICANT CHANGE UP (ref 0–0.7)
EOSINOPHIL # BLD AUTO: 0.43 K/UL — SIGNIFICANT CHANGE UP (ref 0–0.7)
EOSINOPHIL # BLD AUTO: 0.51 K/UL — SIGNIFICANT CHANGE UP (ref 0–0.7)
EOSINOPHIL NFR BLD AUTO: 1.7 % — SIGNIFICANT CHANGE UP (ref 0–8)
EOSINOPHIL NFR BLD AUTO: 4.3 % — SIGNIFICANT CHANGE UP (ref 0–8)
EOSINOPHIL NFR BLD AUTO: 4.4 % — SIGNIFICANT CHANGE UP (ref 0–8)
EOSINOPHIL NFR BLD AUTO: 4.6 % — SIGNIFICANT CHANGE UP (ref 0–8)
EOSINOPHIL NFR BLD AUTO: 4.6 % — SIGNIFICANT CHANGE UP (ref 0–8)
EOSINOPHIL NFR BLD AUTO: 5.1 % — SIGNIFICANT CHANGE UP (ref 0–8)
ESTIMATED AVERAGE GLUCOSE: 100 MG/DL — SIGNIFICANT CHANGE UP (ref 68–114)
GAS PNL BLDA: SIGNIFICANT CHANGE UP
GAS PNL BLDV: 134 MMOL/L — LOW (ref 136–145)
GAS PNL BLDV: 134 MMOL/L — LOW (ref 136–145)
GAS PNL BLDV: SIGNIFICANT CHANGE UP
GAS PNL BLDV: SIGNIFICANT CHANGE UP
GLUCOSE BLDC GLUCOMTR-MCNC: 108 MG/DL — HIGH (ref 70–99)
GLUCOSE BLDC GLUCOMTR-MCNC: 109 MG/DL — HIGH (ref 70–99)
GLUCOSE BLDC GLUCOMTR-MCNC: 128 MG/DL — HIGH (ref 70–99)
GLUCOSE BLDC GLUCOMTR-MCNC: 128 MG/DL — HIGH (ref 70–99)
GLUCOSE BLDC GLUCOMTR-MCNC: 130 MG/DL — HIGH (ref 70–99)
GLUCOSE BLDC GLUCOMTR-MCNC: 130 MG/DL — HIGH (ref 70–99)
GLUCOSE BLDC GLUCOMTR-MCNC: 133 MG/DL — HIGH (ref 70–99)
GLUCOSE BLDC GLUCOMTR-MCNC: 134 MG/DL — HIGH (ref 70–99)
GLUCOSE BLDC GLUCOMTR-MCNC: 134 MG/DL — HIGH (ref 70–99)
GLUCOSE BLDC GLUCOMTR-MCNC: 143 MG/DL — HIGH (ref 70–99)
GLUCOSE BLDC GLUCOMTR-MCNC: 149 MG/DL — HIGH (ref 70–99)
GLUCOSE BLDC GLUCOMTR-MCNC: 151 MG/DL — HIGH (ref 70–99)
GLUCOSE BLDC GLUCOMTR-MCNC: 165 MG/DL — HIGH (ref 70–99)
GLUCOSE BLDC GLUCOMTR-MCNC: 182 MG/DL — HIGH (ref 70–99)
GLUCOSE BLDC GLUCOMTR-MCNC: 197 MG/DL — HIGH (ref 70–99)
GLUCOSE BLDC GLUCOMTR-MCNC: 199 MG/DL — HIGH (ref 70–99)
GLUCOSE BLDC GLUCOMTR-MCNC: 213 MG/DL — HIGH (ref 70–99)
GLUCOSE BLDC GLUCOMTR-MCNC: 58 MG/DL — LOW (ref 70–99)
GLUCOSE BLDC GLUCOMTR-MCNC: 74 MG/DL — SIGNIFICANT CHANGE UP (ref 70–99)
GLUCOSE BLDC GLUCOMTR-MCNC: 80 MG/DL — SIGNIFICANT CHANGE UP (ref 70–99)
GLUCOSE BLDC GLUCOMTR-MCNC: 92 MG/DL — SIGNIFICANT CHANGE UP (ref 70–99)
GLUCOSE BLDC GLUCOMTR-MCNC: 97 MG/DL — SIGNIFICANT CHANGE UP (ref 70–99)
GLUCOSE BLDC GLUCOMTR-MCNC: 98 MG/DL — SIGNIFICANT CHANGE UP (ref 70–99)
GLUCOSE SERPL-MCNC: 101 MG/DL — HIGH (ref 70–99)
GLUCOSE SERPL-MCNC: 101 MG/DL — HIGH (ref 70–99)
GLUCOSE SERPL-MCNC: 61 MG/DL — LOW (ref 70–99)
GLUCOSE SERPL-MCNC: 62 MG/DL — LOW (ref 70–99)
GLUCOSE SERPL-MCNC: 64 MG/DL — LOW (ref 70–99)
GLUCOSE SERPL-MCNC: 82 MG/DL — SIGNIFICANT CHANGE UP (ref 70–99)
GLUCOSE SERPL-MCNC: 85 MG/DL — SIGNIFICANT CHANGE UP (ref 70–99)
GLUCOSE SERPL-MCNC: 99 MG/DL — SIGNIFICANT CHANGE UP (ref 70–99)
HCO3 BLDV-SCNC: 26 MMOL/L — SIGNIFICANT CHANGE UP (ref 22–29)
HCO3 BLDV-SCNC: 29 MMOL/L — SIGNIFICANT CHANGE UP (ref 22–29)
HCT VFR BLD CALC: 28.7 % — LOW (ref 42–52)
HCT VFR BLD CALC: 29.6 % — LOW (ref 42–52)
HCT VFR BLD CALC: 31.1 % — LOW (ref 42–52)
HCT VFR BLD CALC: 32.3 % — LOW (ref 42–52)
HCT VFR BLD CALC: 33.5 % — LOW (ref 42–52)
HCT VFR BLD CALC: 34.1 % — LOW (ref 42–52)
HCT VFR BLDA CALC: 28 % — LOW (ref 39–51)
HCT VFR BLDA CALC: 31 % — LOW (ref 39–51)
HDLC SERPL-MCNC: 33 MG/DL — LOW
HGB BLD CALC-MCNC: 10.3 G/DL — LOW (ref 12.6–17.4)
HGB BLD CALC-MCNC: 9.3 G/DL — LOW (ref 12.6–17.4)
HGB BLD-MCNC: 10.1 G/DL — LOW (ref 14–18)
HGB BLD-MCNC: 8.4 G/DL — LOW (ref 14–18)
HGB BLD-MCNC: 8.6 G/DL — LOW (ref 14–18)
HGB BLD-MCNC: 9.1 G/DL — LOW (ref 14–18)
HGB BLD-MCNC: 9.3 G/DL — LOW (ref 14–18)
HGB BLD-MCNC: 9.9 G/DL — LOW (ref 14–18)
IMM GRANULOCYTES NFR BLD AUTO: 0.4 % — HIGH (ref 0.1–0.3)
IMM GRANULOCYTES NFR BLD AUTO: 0.4 % — HIGH (ref 0.1–0.3)
IMM GRANULOCYTES NFR BLD AUTO: 0.5 % — HIGH (ref 0.1–0.3)
IMM GRANULOCYTES NFR BLD AUTO: 0.6 % — HIGH (ref 0.1–0.3)
INR BLD: 1.19 RATIO — SIGNIFICANT CHANGE UP (ref 0.65–1.3)
LACTATE BLDV-MCNC: 0.8 MMOL/L — SIGNIFICANT CHANGE UP (ref 0.5–2)
LACTATE BLDV-MCNC: 0.9 MMOL/L — SIGNIFICANT CHANGE UP (ref 0.5–2)
LIPID PNL WITH DIRECT LDL SERPL: 24 MG/DL — SIGNIFICANT CHANGE UP
LYMPHOCYTES # BLD AUTO: 0.66 K/UL — LOW (ref 1.2–3.4)
LYMPHOCYTES # BLD AUTO: 0.75 K/UL — LOW (ref 1.2–3.4)
LYMPHOCYTES # BLD AUTO: 0.93 K/UL — LOW (ref 1.2–3.4)
LYMPHOCYTES # BLD AUTO: 1.1 K/UL — LOW (ref 1.2–3.4)
LYMPHOCYTES # BLD AUTO: 1.15 K/UL — LOW (ref 1.2–3.4)
LYMPHOCYTES # BLD AUTO: 1.68 K/UL — SIGNIFICANT CHANGE UP (ref 1.2–3.4)
LYMPHOCYTES # BLD AUTO: 10.9 % — LOW (ref 20.5–51.1)
LYMPHOCYTES # BLD AUTO: 11.8 % — LOW (ref 20.5–51.1)
LYMPHOCYTES # BLD AUTO: 13.1 % — LOW (ref 20.5–51.1)
LYMPHOCYTES # BLD AUTO: 17.2 % — LOW (ref 20.5–51.1)
LYMPHOCYTES # BLD AUTO: 5 % — LOW (ref 20.5–51.1)
LYMPHOCYTES # BLD AUTO: 8.8 % — LOW (ref 20.5–51.1)
MAGNESIUM SERPL-MCNC: 2.3 MG/DL — SIGNIFICANT CHANGE UP (ref 1.8–2.4)
MAGNESIUM SERPL-MCNC: 2.3 MG/DL — SIGNIFICANT CHANGE UP (ref 1.8–2.4)
MAGNESIUM SERPL-MCNC: 2.5 MG/DL — HIGH (ref 1.8–2.4)
MAGNESIUM SERPL-MCNC: 2.6 MG/DL — HIGH (ref 1.8–2.4)
MAGNESIUM SERPL-MCNC: 2.6 MG/DL — HIGH (ref 1.8–2.4)
MCHC RBC-ENTMCNC: 28 PG — SIGNIFICANT CHANGE UP (ref 27–31)
MCHC RBC-ENTMCNC: 28.1 PG — SIGNIFICANT CHANGE UP (ref 27–31)
MCHC RBC-ENTMCNC: 28.1 PG — SIGNIFICANT CHANGE UP (ref 27–31)
MCHC RBC-ENTMCNC: 28.3 PG — SIGNIFICANT CHANGE UP (ref 27–31)
MCHC RBC-ENTMCNC: 28.8 G/DL — LOW (ref 32–37)
MCHC RBC-ENTMCNC: 29.1 G/DL — LOW (ref 32–37)
MCHC RBC-ENTMCNC: 29.3 G/DL — LOW (ref 32–37)
MCHC RBC-ENTMCNC: 29.3 G/DL — LOW (ref 32–37)
MCHC RBC-ENTMCNC: 29.6 G/DL — LOW (ref 32–37)
MCHC RBC-ENTMCNC: 29.6 G/DL — LOW (ref 32–37)
MCV RBC AUTO: 95.5 FL — HIGH (ref 80–94)
MCV RBC AUTO: 95.7 FL — HIGH (ref 80–94)
MCV RBC AUTO: 95.7 FL — HIGH (ref 80–94)
MCV RBC AUTO: 96 FL — HIGH (ref 80–94)
MCV RBC AUTO: 97.4 FL — HIGH (ref 80–94)
MCV RBC AUTO: 97.6 FL — HIGH (ref 80–94)
MONOCYTES # BLD AUTO: 0.51 K/UL — SIGNIFICANT CHANGE UP (ref 0.1–0.6)
MONOCYTES # BLD AUTO: 0.62 K/UL — HIGH (ref 0.1–0.6)
MONOCYTES # BLD AUTO: 0.63 K/UL — HIGH (ref 0.1–0.6)
MONOCYTES # BLD AUTO: 0.67 K/UL — HIGH (ref 0.1–0.6)
MONOCYTES # BLD AUTO: 0.74 K/UL — HIGH (ref 0.1–0.6)
MONOCYTES # BLD AUTO: 0.74 K/UL — HIGH (ref 0.1–0.6)
MONOCYTES NFR BLD AUTO: 4.7 % — SIGNIFICANT CHANGE UP (ref 1.7–9.3)
MONOCYTES NFR BLD AUTO: 5.2 % — SIGNIFICANT CHANGE UP (ref 1.7–9.3)
MONOCYTES NFR BLD AUTO: 6.7 % — SIGNIFICANT CHANGE UP (ref 1.7–9.3)
MONOCYTES NFR BLD AUTO: 7.2 % — SIGNIFICANT CHANGE UP (ref 1.7–9.3)
MONOCYTES NFR BLD AUTO: 8.7 % — SIGNIFICANT CHANGE UP (ref 1.7–9.3)
MONOCYTES NFR BLD AUTO: 9.4 % — HIGH (ref 1.7–9.3)
MRSA PCR RESULT.: POSITIVE
NEUTROPHILS # BLD AUTO: 11.51 K/UL — HIGH (ref 1.4–6.5)
NEUTROPHILS # BLD AUTO: 5.78 K/UL — SIGNIFICANT CHANGE UP (ref 1.4–6.5)
NEUTROPHILS # BLD AUTO: 6.47 K/UL — SIGNIFICANT CHANGE UP (ref 1.4–6.5)
NEUTROPHILS # BLD AUTO: 6.57 K/UL — HIGH (ref 1.4–6.5)
NEUTROPHILS # BLD AUTO: 7.01 K/UL — HIGH (ref 1.4–6.5)
NEUTROPHILS # BLD AUTO: 7.61 K/UL — HIGH (ref 1.4–6.5)
NEUTROPHILS NFR BLD AUTO: 71.7 % — SIGNIFICANT CHANGE UP (ref 42.2–75.2)
NEUTROPHILS NFR BLD AUTO: 72.9 % — SIGNIFICANT CHANGE UP (ref 42.2–75.2)
NEUTROPHILS NFR BLD AUTO: 73.9 % — SIGNIFICANT CHANGE UP (ref 42.2–75.2)
NEUTROPHILS NFR BLD AUTO: 75.6 % — HIGH (ref 42.2–75.2)
NEUTROPHILS NFR BLD AUTO: 76.8 % — HIGH (ref 42.2–75.2)
NEUTROPHILS NFR BLD AUTO: 87.3 % — HIGH (ref 42.2–75.2)
NON HDL CHOLESTEROL: 49 MG/DL — SIGNIFICANT CHANGE UP
NRBC # BLD: 0 /100 WBCS — SIGNIFICANT CHANGE UP (ref 0–0)
NT-PROBNP SERPL-SCNC: HIGH PG/ML (ref 0–300)
PCO2 BLDV: 68 MMHG — HIGH (ref 42–55)
PCO2 BLDV: 68 MMHG — HIGH (ref 42–55)
PH BLDV: 7.19 — LOW (ref 7.32–7.43)
PH BLDV: 7.24 — LOW (ref 7.32–7.43)
PHOSPHATE SERPL-MCNC: 3.7 MG/DL — SIGNIFICANT CHANGE UP (ref 2.1–4.9)
PHOSPHATE SERPL-MCNC: 3.9 MG/DL — SIGNIFICANT CHANGE UP (ref 2.1–4.9)
PHOSPHATE SERPL-MCNC: 4 MG/DL — SIGNIFICANT CHANGE UP (ref 2.1–4.9)
PHOSPHATE SERPL-MCNC: 5.1 MG/DL — HIGH (ref 2.1–4.9)
PLATELET # BLD AUTO: 156 K/UL — SIGNIFICANT CHANGE UP (ref 130–400)
PLATELET # BLD AUTO: 170 K/UL — SIGNIFICANT CHANGE UP (ref 130–400)
PLATELET # BLD AUTO: 178 K/UL — SIGNIFICANT CHANGE UP (ref 130–400)
PLATELET # BLD AUTO: 193 K/UL — SIGNIFICANT CHANGE UP (ref 130–400)
PLATELET # BLD AUTO: 206 K/UL — SIGNIFICANT CHANGE UP (ref 130–400)
PLATELET # BLD AUTO: 236 K/UL — SIGNIFICANT CHANGE UP (ref 130–400)
PO2 BLDV: 36 MMHG — SIGNIFICANT CHANGE UP
PO2 BLDV: 39 MMHG — SIGNIFICANT CHANGE UP
POTASSIUM BLDV-SCNC: 5.5 MMOL/L — HIGH (ref 3.5–5.1)
POTASSIUM BLDV-SCNC: 6.9 MMOL/L — CRITICAL HIGH (ref 3.5–5.1)
POTASSIUM SERPL-MCNC: 4.6 MMOL/L — SIGNIFICANT CHANGE UP (ref 3.5–5)
POTASSIUM SERPL-MCNC: 4.8 MMOL/L — SIGNIFICANT CHANGE UP (ref 3.5–5)
POTASSIUM SERPL-MCNC: 4.9 MMOL/L — SIGNIFICANT CHANGE UP (ref 3.5–5)
POTASSIUM SERPL-MCNC: 5.1 MMOL/L — HIGH (ref 3.5–5)
POTASSIUM SERPL-MCNC: 5.2 MMOL/L — HIGH (ref 3.5–5)
POTASSIUM SERPL-MCNC: 5.4 MMOL/L — HIGH (ref 3.5–5)
POTASSIUM SERPL-MCNC: 6.2 MMOL/L — CRITICAL HIGH (ref 3.5–5)
POTASSIUM SERPL-MCNC: 6.9 MMOL/L — CRITICAL HIGH (ref 3.5–5)
POTASSIUM SERPL-SCNC: 4.6 MMOL/L — SIGNIFICANT CHANGE UP (ref 3.5–5)
POTASSIUM SERPL-SCNC: 4.8 MMOL/L — SIGNIFICANT CHANGE UP (ref 3.5–5)
POTASSIUM SERPL-SCNC: 4.9 MMOL/L — SIGNIFICANT CHANGE UP (ref 3.5–5)
POTASSIUM SERPL-SCNC: 5.1 MMOL/L — HIGH (ref 3.5–5)
POTASSIUM SERPL-SCNC: 5.2 MMOL/L — HIGH (ref 3.5–5)
POTASSIUM SERPL-SCNC: 5.4 MMOL/L — HIGH (ref 3.5–5)
POTASSIUM SERPL-SCNC: 6.2 MMOL/L — CRITICAL HIGH (ref 3.5–5)
POTASSIUM SERPL-SCNC: 6.9 MMOL/L — CRITICAL HIGH (ref 3.5–5)
PROT SERPL-MCNC: 5.6 G/DL — LOW (ref 6–8)
PROT SERPL-MCNC: 5.6 G/DL — LOW (ref 6–8)
PROT SERPL-MCNC: 5.8 G/DL — LOW (ref 6–8)
PROT SERPL-MCNC: 5.9 G/DL — LOW (ref 6–8)
PROT SERPL-MCNC: 6.1 G/DL — SIGNIFICANT CHANGE UP (ref 6–8)
PROT SERPL-MCNC: 6.2 G/DL — SIGNIFICANT CHANGE UP (ref 6–8)
PROT SERPL-MCNC: 6.4 G/DL — SIGNIFICANT CHANGE UP (ref 6–8)
PROT SERPL-MCNC: 6.7 G/DL — SIGNIFICANT CHANGE UP (ref 6–8)
PROTHROM AB SERPL-ACNC: 13.7 SEC — HIGH (ref 9.95–12.87)
RAPID RVP RESULT: DETECTED
RBC # BLD: 3 M/UL — LOW (ref 4.7–6.1)
RBC # BLD: 3.04 M/UL — LOW (ref 4.7–6.1)
RBC # BLD: 3.24 M/UL — LOW (ref 4.7–6.1)
RBC # BLD: 3.31 M/UL — LOW (ref 4.7–6.1)
RBC # BLD: 3.5 M/UL — LOW (ref 4.7–6.1)
RBC # BLD: 3.57 M/UL — LOW (ref 4.7–6.1)
RBC # FLD: 14.4 % — SIGNIFICANT CHANGE UP (ref 11.5–14.5)
RBC # FLD: 14.5 % — SIGNIFICANT CHANGE UP (ref 11.5–14.5)
RBC # FLD: 14.7 % — HIGH (ref 11.5–14.5)
RBC # FLD: 14.8 % — HIGH (ref 11.5–14.5)
RBC # FLD: 15 % — HIGH (ref 11.5–14.5)
RBC # FLD: 15.1 % — HIGH (ref 11.5–14.5)
RV+EV RNA SPEC QL NAA+PROBE: DETECTED
SAO2 % BLDV: 58.9 % — SIGNIFICANT CHANGE UP
SARS-COV-2 RNA SPEC QL NAA+PROBE: SIGNIFICANT CHANGE UP
SARS-COV-2 RNA SPEC QL NAA+PROBE: SIGNIFICANT CHANGE UP
SODIUM SERPL-SCNC: 137 MMOL/L — SIGNIFICANT CHANGE UP (ref 135–146)
SODIUM SERPL-SCNC: 138 MMOL/L — SIGNIFICANT CHANGE UP (ref 135–146)
SODIUM SERPL-SCNC: 139 MMOL/L — SIGNIFICANT CHANGE UP (ref 135–146)
SODIUM SERPL-SCNC: 139 MMOL/L — SIGNIFICANT CHANGE UP (ref 135–146)
SODIUM SERPL-SCNC: 140 MMOL/L — SIGNIFICANT CHANGE UP (ref 135–146)
SPECIMEN SOURCE: SIGNIFICANT CHANGE UP
SPECIMEN SOURCE: SIGNIFICANT CHANGE UP
TRIGL SERPL-MCNC: 110 MG/DL — SIGNIFICANT CHANGE UP
TROPONIN T SERPL-MCNC: 0.21 NG/ML — CRITICAL HIGH
TROPONIN T SERPL-MCNC: 0.23 NG/ML — CRITICAL HIGH
TROPONIN T SERPL-MCNC: 0.24 NG/ML — CRITICAL HIGH
TROPONIN T SERPL-MCNC: 0.26 NG/ML — CRITICAL HIGH
TSH SERPL-MCNC: 1.87 UIU/ML — SIGNIFICANT CHANGE UP (ref 0.27–4.2)
WBC # BLD: 10.06 K/UL — SIGNIFICANT CHANGE UP (ref 4.8–10.8)
WBC # BLD: 13.18 K/UL — HIGH (ref 4.8–10.8)
WBC # BLD: 7.91 K/UL — SIGNIFICANT CHANGE UP (ref 4.8–10.8)
WBC # BLD: 8.55 K/UL — SIGNIFICANT CHANGE UP (ref 4.8–10.8)
WBC # BLD: 8.75 K/UL — SIGNIFICANT CHANGE UP (ref 4.8–10.8)
WBC # BLD: 9.78 K/UL — SIGNIFICANT CHANGE UP (ref 4.8–10.8)
WBC # FLD AUTO: 10.06 K/UL — SIGNIFICANT CHANGE UP (ref 4.8–10.8)
WBC # FLD AUTO: 13.18 K/UL — HIGH (ref 4.8–10.8)
WBC # FLD AUTO: 7.91 K/UL — SIGNIFICANT CHANGE UP (ref 4.8–10.8)
WBC # FLD AUTO: 8.55 K/UL — SIGNIFICANT CHANGE UP (ref 4.8–10.8)
WBC # FLD AUTO: 8.75 K/UL — SIGNIFICANT CHANGE UP (ref 4.8–10.8)
WBC # FLD AUTO: 9.78 K/UL — SIGNIFICANT CHANGE UP (ref 4.8–10.8)

## 2021-01-01 PROCEDURE — 99291 CRITICAL CARE FIRST HOUR: CPT

## 2021-01-01 PROCEDURE — 93306 TTE W/DOPPLER COMPLETE: CPT | Mod: 26

## 2021-01-01 PROCEDURE — 93010 ELECTROCARDIOGRAM REPORT: CPT

## 2021-01-01 PROCEDURE — 71045 X-RAY EXAM CHEST 1 VIEW: CPT | Mod: 26

## 2021-01-01 PROCEDURE — 99291 CRITICAL CARE FIRST HOUR: CPT | Mod: CS

## 2021-01-01 PROCEDURE — 99239 HOSP IP/OBS DSCHRG MGMT >30: CPT

## 2021-01-01 PROCEDURE — 99232 SBSQ HOSP IP/OBS MODERATE 35: CPT

## 2021-01-01 PROCEDURE — 99233 SBSQ HOSP IP/OBS HIGH 50: CPT

## 2021-01-01 RX ORDER — CALCIUM GLUCONATE 100 MG/ML
2 VIAL (ML) INTRAVENOUS ONCE
Refills: 0 | Status: COMPLETED | OUTPATIENT
Start: 2021-01-01 | End: 2021-01-01

## 2021-01-01 RX ORDER — SIMETHICONE 80 MG/1
80 TABLET, CHEWABLE ORAL THREE TIMES A DAY
Refills: 0 | Status: DISCONTINUED | OUTPATIENT
Start: 2021-01-01 | End: 2021-01-01

## 2021-01-01 RX ORDER — IPRATROPIUM/ALBUTEROL SULFATE 18-103MCG
3 AEROSOL WITH ADAPTER (GRAM) INHALATION EVERY 6 HOURS
Refills: 0 | Status: DISCONTINUED | OUTPATIENT
Start: 2021-01-01 | End: 2021-01-01

## 2021-01-01 RX ORDER — HEPARIN SODIUM 5000 [USP'U]/ML
5000 INJECTION INTRAVENOUS; SUBCUTANEOUS EVERY 8 HOURS
Refills: 0 | Status: DISCONTINUED | OUTPATIENT
Start: 2021-01-01 | End: 2021-01-01

## 2021-01-01 RX ORDER — INSULIN GLARGINE 100 [IU]/ML
5 INJECTION, SOLUTION SUBCUTANEOUS
Qty: 0 | Refills: 0 | DISCHARGE
Start: 2021-01-01

## 2021-01-01 RX ORDER — INSULIN GLARGINE 100 [IU]/ML
10 INJECTION, SOLUTION SUBCUTANEOUS AT BEDTIME
Refills: 0 | Status: DISCONTINUED | OUTPATIENT
Start: 2021-01-01 | End: 2021-01-01

## 2021-01-01 RX ORDER — ZOLPIDEM TARTRATE 10 MG/1
5 TABLET ORAL AT BEDTIME
Refills: 0 | Status: DISCONTINUED | OUTPATIENT
Start: 2021-01-01 | End: 2021-01-01

## 2021-01-01 RX ORDER — LATANOPROST 0.05 MG/ML
1 SOLUTION/ DROPS OPHTHALMIC; TOPICAL AT BEDTIME
Refills: 0 | Status: DISCONTINUED | OUTPATIENT
Start: 2021-01-01 | End: 2021-01-01

## 2021-01-01 RX ORDER — SODIUM CHLORIDE 9 MG/ML
1000 INJECTION, SOLUTION INTRAVENOUS
Refills: 0 | Status: DISCONTINUED | OUTPATIENT
Start: 2021-01-01 | End: 2021-01-01

## 2021-01-01 RX ORDER — CHLORHEXIDINE GLUCONATE 213 G/1000ML
1 SOLUTION TOPICAL
Refills: 0 | Status: DISCONTINUED | OUTPATIENT
Start: 2021-01-01 | End: 2021-01-01

## 2021-01-01 RX ORDER — ACETAMINOPHEN 500 MG
650 TABLET ORAL EVERY 6 HOURS
Refills: 0 | Status: DISCONTINUED | OUTPATIENT
Start: 2021-01-01 | End: 2021-01-01

## 2021-01-01 RX ORDER — GABAPENTIN 400 MG/1
1 CAPSULE ORAL
Qty: 0 | Refills: 0 | DISCHARGE
Start: 2021-01-01

## 2021-01-01 RX ORDER — SODIUM ZIRCONIUM CYCLOSILICATE 10 G/10G
10 POWDER, FOR SUSPENSION ORAL ONCE
Refills: 0 | Status: COMPLETED | OUTPATIENT
Start: 2021-01-01 | End: 2021-01-01

## 2021-01-01 RX ORDER — LANOLIN ALCOHOL/MO/W.PET/CERES
5 CREAM (GRAM) TOPICAL AT BEDTIME
Refills: 0 | Status: DISCONTINUED | OUTPATIENT
Start: 2021-01-01 | End: 2021-01-01

## 2021-01-01 RX ORDER — ALBUTEROL 90 UG/1
2 AEROSOL, METERED ORAL EVERY 6 HOURS
Refills: 0 | Status: DISCONTINUED | OUTPATIENT
Start: 2021-01-01 | End: 2021-01-01

## 2021-01-01 RX ORDER — GLUCAGON INJECTION, SOLUTION 0.5 MG/.1ML
1 INJECTION, SOLUTION SUBCUTANEOUS ONCE
Refills: 0 | Status: DISCONTINUED | OUTPATIENT
Start: 2021-01-01 | End: 2021-01-01

## 2021-01-01 RX ORDER — IPRATROPIUM/ALBUTEROL SULFATE 18-103MCG
3 AEROSOL WITH ADAPTER (GRAM) INHALATION
Refills: 0 | Status: COMPLETED | OUTPATIENT
Start: 2021-01-01 | End: 2021-01-01

## 2021-01-01 RX ORDER — GABAPENTIN 400 MG/1
600 CAPSULE ORAL THREE TIMES A DAY
Refills: 0 | Status: DISCONTINUED | OUTPATIENT
Start: 2021-01-01 | End: 2021-01-01

## 2021-01-01 RX ORDER — DORZOLAMIDE HYDROCHLORIDE 20 MG/ML
1 SOLUTION/ DROPS OPHTHALMIC THREE TIMES A DAY
Refills: 0 | Status: DISCONTINUED | OUTPATIENT
Start: 2021-01-01 | End: 2021-01-01

## 2021-01-01 RX ORDER — ASPIRIN/CALCIUM CARB/MAGNESIUM 324 MG
81 TABLET ORAL DAILY
Refills: 0 | Status: DISCONTINUED | OUTPATIENT
Start: 2021-01-01 | End: 2021-01-01

## 2021-01-01 RX ORDER — INSULIN LISPRO 100/ML
VIAL (ML) SUBCUTANEOUS
Refills: 0 | Status: DISCONTINUED | OUTPATIENT
Start: 2021-01-01 | End: 2021-01-01

## 2021-01-01 RX ORDER — DEXTROSE 50 % IN WATER 50 %
25 SYRINGE (ML) INTRAVENOUS ONCE
Refills: 0 | Status: DISCONTINUED | OUTPATIENT
Start: 2021-01-01 | End: 2021-01-01

## 2021-01-01 RX ORDER — PANTOPRAZOLE SODIUM 20 MG/1
40 TABLET, DELAYED RELEASE ORAL
Refills: 0 | Status: DISCONTINUED | OUTPATIENT
Start: 2021-01-01 | End: 2021-01-01

## 2021-01-01 RX ORDER — DOCUSATE SODIUM 100 MG
1 CAPSULE ORAL
Qty: 0 | Refills: 0 | DISCHARGE

## 2021-01-01 RX ORDER — ERYTHROPOIETIN 10000 [IU]/ML
10000 INJECTION, SOLUTION INTRAVENOUS; SUBCUTANEOUS
Refills: 0 | Status: DISCONTINUED | OUTPATIENT
Start: 2021-01-01 | End: 2021-01-01

## 2021-01-01 RX ORDER — DEXTROSE 50 % IN WATER 50 %
15 SYRINGE (ML) INTRAVENOUS ONCE
Refills: 0 | Status: DISCONTINUED | OUTPATIENT
Start: 2021-01-01 | End: 2021-01-01

## 2021-01-01 RX ORDER — INSULIN GLARGINE 100 [IU]/ML
30 INJECTION, SOLUTION SUBCUTANEOUS
Qty: 0 | Refills: 0 | DISCHARGE

## 2021-01-01 RX ORDER — SEVELAMER CARBONATE 2400 MG/1
800 POWDER, FOR SUSPENSION ORAL
Refills: 0 | Status: DISCONTINUED | OUTPATIENT
Start: 2021-01-01 | End: 2021-01-01

## 2021-01-01 RX ORDER — ERYTHROPOIETIN 10000 [IU]/ML
10000 INJECTION, SOLUTION INTRAVENOUS; SUBCUTANEOUS
Qty: 0 | Refills: 0 | DISCHARGE
Start: 2021-01-01

## 2021-01-01 RX ORDER — OXYCODONE AND ACETAMINOPHEN 5; 325 MG/1; MG/1
1 TABLET ORAL EVERY 6 HOURS
Refills: 0 | Status: DISCONTINUED | OUTPATIENT
Start: 2021-01-01 | End: 2021-01-01

## 2021-01-01 RX ORDER — PANTOPRAZOLE SODIUM 20 MG/1
40 TABLET, DELAYED RELEASE ORAL DAILY
Refills: 0 | Status: DISCONTINUED | OUTPATIENT
Start: 2021-01-01 | End: 2021-01-01

## 2021-01-01 RX ORDER — GABAPENTIN 400 MG/1
300 CAPSULE ORAL
Refills: 0 | Status: DISCONTINUED | OUTPATIENT
Start: 2021-01-01 | End: 2021-01-01

## 2021-01-01 RX ORDER — DEXTROSE 50 % IN WATER 50 %
12.5 SYRINGE (ML) INTRAVENOUS ONCE
Refills: 0 | Status: DISCONTINUED | OUTPATIENT
Start: 2021-01-01 | End: 2021-01-01

## 2021-01-01 RX ORDER — DEXTROSE 50 % IN WATER 50 %
50 SYRINGE (ML) INTRAVENOUS ONCE
Refills: 0 | Status: COMPLETED | OUTPATIENT
Start: 2021-01-01 | End: 2021-01-01

## 2021-01-01 RX ORDER — TAMSULOSIN HYDROCHLORIDE 0.4 MG/1
0.4 CAPSULE ORAL AT BEDTIME
Refills: 0 | Status: DISCONTINUED | OUTPATIENT
Start: 2021-01-01 | End: 2021-01-01

## 2021-01-01 RX ORDER — POLYETHYLENE GLYCOL 3350 17 G/17G
17 POWDER, FOR SOLUTION ORAL ONCE
Refills: 0 | Status: COMPLETED | OUTPATIENT
Start: 2021-01-01 | End: 2021-01-01

## 2021-01-01 RX ORDER — SENNA PLUS 8.6 MG/1
2 TABLET ORAL AT BEDTIME
Refills: 0 | Status: DISCONTINUED | OUTPATIENT
Start: 2021-01-01 | End: 2021-01-01

## 2021-01-01 RX ORDER — METOPROLOL TARTRATE 50 MG
50 TABLET ORAL
Refills: 0 | Status: DISCONTINUED | OUTPATIENT
Start: 2021-01-01 | End: 2021-01-01

## 2021-01-01 RX ORDER — POLYETHYLENE GLYCOL 3350 17 G/17G
17 POWDER, FOR SOLUTION ORAL DAILY
Refills: 0 | Status: DISCONTINUED | OUTPATIENT
Start: 2021-01-01 | End: 2021-01-01

## 2021-01-01 RX ORDER — INSULIN HUMAN 100 [IU]/ML
10 INJECTION, SOLUTION SUBCUTANEOUS ONCE
Refills: 0 | Status: COMPLETED | OUTPATIENT
Start: 2021-01-01 | End: 2021-01-01

## 2021-01-01 RX ORDER — BRIMONIDINE TARTRATE 2 MG/MG
1 SOLUTION/ DROPS OPHTHALMIC
Refills: 0 | Status: DISCONTINUED | OUTPATIENT
Start: 2021-01-01 | End: 2021-01-01

## 2021-01-01 RX ORDER — INSULIN LISPRO 100/ML
1 VIAL (ML) SUBCUTANEOUS
Qty: 0 | Refills: 0 | DISCHARGE

## 2021-01-01 RX ORDER — SERTRALINE 25 MG/1
100 TABLET, FILM COATED ORAL DAILY
Refills: 0 | Status: DISCONTINUED | OUTPATIENT
Start: 2021-01-01 | End: 2021-01-01

## 2021-01-01 RX ORDER — ZOLPIDEM TARTRATE 10 MG/1
2 TABLET ORAL
Qty: 0 | Refills: 0 | DISCHARGE

## 2021-01-01 RX ORDER — MUPIROCIN 20 MG/G
1 OINTMENT TOPICAL
Refills: 0 | Status: DISCONTINUED | OUTPATIENT
Start: 2021-01-01 | End: 2021-01-01

## 2021-01-01 RX ORDER — ALPRAZOLAM 0.25 MG
1 TABLET ORAL
Qty: 0 | Refills: 0 | DISCHARGE
End: 2020-07-21

## 2021-01-01 RX ORDER — TIMOLOL 0.5 %
1 DROPS OPHTHALMIC (EYE)
Refills: 0 | Status: DISCONTINUED | OUTPATIENT
Start: 2021-01-01 | End: 2021-01-01

## 2021-01-01 RX ORDER — SEVELAMER CARBONATE 2400 MG/1
2400 POWDER, FOR SUSPENSION ORAL
Refills: 0 | Status: DISCONTINUED | OUTPATIENT
Start: 2021-01-01 | End: 2021-01-01

## 2021-01-01 RX ORDER — GABAPENTIN 400 MG/1
1 CAPSULE ORAL
Qty: 0 | Refills: 0 | DISCHARGE

## 2021-01-01 RX ORDER — INSULIN GLARGINE 100 [IU]/ML
15 INJECTION, SOLUTION SUBCUTANEOUS AT BEDTIME
Refills: 0 | Status: DISCONTINUED | OUTPATIENT
Start: 2021-01-01 | End: 2021-01-01

## 2021-01-01 RX ORDER — TRAZODONE HCL 50 MG
50 TABLET ORAL AT BEDTIME
Refills: 0 | Status: DISCONTINUED | OUTPATIENT
Start: 2021-01-01 | End: 2021-01-01

## 2021-01-01 RX ORDER — INSULIN GLARGINE 100 [IU]/ML
30 INJECTION, SOLUTION SUBCUTANEOUS
Qty: 0 | Refills: 0 | DISCHARGE
Start: 2021-01-01

## 2021-01-01 RX ORDER — METOPROLOL TARTRATE 50 MG
1 TABLET ORAL
Qty: 0 | Refills: 0 | DISCHARGE

## 2021-01-01 RX ORDER — INSULIN GLARGINE 100 [IU]/ML
45 INJECTION, SOLUTION SUBCUTANEOUS
Qty: 0 | Refills: 0 | DISCHARGE

## 2021-01-01 RX ORDER — IPRATROPIUM/ALBUTEROL SULFATE 18-103MCG
3 AEROSOL WITH ADAPTER (GRAM) INHALATION
Qty: 0 | Refills: 0 | DISCHARGE

## 2021-01-01 RX ORDER — INSULIN GLARGINE 100 [IU]/ML
5 INJECTION, SOLUTION SUBCUTANEOUS AT BEDTIME
Refills: 0 | Status: DISCONTINUED | OUTPATIENT
Start: 2021-01-01 | End: 2021-01-01

## 2021-01-01 RX ORDER — FUROSEMIDE 40 MG
80 TABLET ORAL
Refills: 0 | Status: DISCONTINUED | OUTPATIENT
Start: 2021-01-01 | End: 2021-01-01

## 2021-01-01 RX ORDER — SIMVASTATIN 20 MG/1
10 TABLET, FILM COATED ORAL AT BEDTIME
Refills: 0 | Status: DISCONTINUED | OUTPATIENT
Start: 2021-01-01 | End: 2021-01-01

## 2021-01-01 RX ORDER — OXYCODONE AND ACETAMINOPHEN 5; 325 MG/1; MG/1
2 TABLET ORAL EVERY 6 HOURS
Refills: 0 | Status: DISCONTINUED | OUTPATIENT
Start: 2021-01-01 | End: 2021-01-01

## 2021-01-01 RX ORDER — OXYCODONE HYDROCHLORIDE 5 MG/1
10 TABLET ORAL ONCE
Refills: 0 | Status: DISCONTINUED | OUTPATIENT
Start: 2021-01-01 | End: 2021-01-01

## 2021-01-01 RX ORDER — INFLUENZA VIRUS VACCINE 15; 15; 15; 15 UG/.5ML; UG/.5ML; UG/.5ML; UG/.5ML
0.5 SUSPENSION INTRAMUSCULAR ONCE
Refills: 0 | Status: DISCONTINUED | OUTPATIENT
Start: 2021-01-01 | End: 2021-01-01

## 2021-01-01 RX ORDER — NIFEDIPINE 30 MG
90 TABLET, EXTENDED RELEASE 24 HR ORAL DAILY
Refills: 0 | Status: DISCONTINUED | OUTPATIENT
Start: 2021-01-01 | End: 2021-01-01

## 2021-01-01 RX ORDER — HYDRALAZINE HCL 50 MG
25 TABLET ORAL THREE TIMES A DAY
Refills: 0 | Status: DISCONTINUED | OUTPATIENT
Start: 2021-01-01 | End: 2021-01-01

## 2021-01-01 RX ADMIN — Medication 81 MILLIGRAM(S): at 11:55

## 2021-01-01 RX ADMIN — GABAPENTIN 600 MILLIGRAM(S): 400 CAPSULE ORAL at 06:51

## 2021-01-01 RX ADMIN — OXYCODONE AND ACETAMINOPHEN 2 TABLET(S): 5; 325 TABLET ORAL at 18:26

## 2021-01-01 RX ADMIN — DORZOLAMIDE HYDROCHLORIDE 1 DROP(S): 20 SOLUTION/ DROPS OPHTHALMIC at 15:01

## 2021-01-01 RX ADMIN — MUPIROCIN 1 APPLICATION(S): 20 OINTMENT TOPICAL at 06:54

## 2021-01-01 RX ADMIN — INSULIN GLARGINE 15 UNIT(S): 100 INJECTION, SOLUTION SUBCUTANEOUS at 22:06

## 2021-01-01 RX ADMIN — BRIMONIDINE TARTRATE 1 DROP(S): 2 SOLUTION/ DROPS OPHTHALMIC at 17:23

## 2021-01-01 RX ADMIN — SEVELAMER CARBONATE 2400 MILLIGRAM(S): 2400 POWDER, FOR SUSPENSION ORAL at 08:23

## 2021-01-01 RX ADMIN — SIMETHICONE 80 MILLIGRAM(S): 80 TABLET, CHEWABLE ORAL at 18:24

## 2021-01-01 RX ADMIN — DORZOLAMIDE HYDROCHLORIDE 1 DROP(S): 20 SOLUTION/ DROPS OPHTHALMIC at 21:31

## 2021-01-01 RX ADMIN — Medication 600 MILLIGRAM(S): at 17:24

## 2021-01-01 RX ADMIN — BRIMONIDINE TARTRATE 1 DROP(S): 2 SOLUTION/ DROPS OPHTHALMIC at 06:54

## 2021-01-01 RX ADMIN — DORZOLAMIDE HYDROCHLORIDE 1 DROP(S): 20 SOLUTION/ DROPS OPHTHALMIC at 06:27

## 2021-01-01 RX ADMIN — Medication 50 MILLIGRAM(S): at 21:55

## 2021-01-01 RX ADMIN — Medication 1 DROP(S): at 13:36

## 2021-01-01 RX ADMIN — PANTOPRAZOLE SODIUM 40 MILLIGRAM(S): 20 TABLET, DELAYED RELEASE ORAL at 06:27

## 2021-01-01 RX ADMIN — GABAPENTIN 600 MILLIGRAM(S): 400 CAPSULE ORAL at 14:24

## 2021-01-01 RX ADMIN — Medication 3 MILLILITER(S): at 14:26

## 2021-01-01 RX ADMIN — HEPARIN SODIUM 5000 UNIT(S): 5000 INJECTION INTRAVENOUS; SUBCUTANEOUS at 13:37

## 2021-01-01 RX ADMIN — Medication 90 MILLIGRAM(S): at 07:20

## 2021-01-01 RX ADMIN — MUPIROCIN 1 APPLICATION(S): 20 OINTMENT TOPICAL at 06:41

## 2021-01-01 RX ADMIN — Medication 25 MILLIGRAM(S): at 06:51

## 2021-01-01 RX ADMIN — GABAPENTIN 600 MILLIGRAM(S): 400 CAPSULE ORAL at 13:50

## 2021-01-01 RX ADMIN — Medication 80 MILLIGRAM(S): at 05:57

## 2021-01-01 RX ADMIN — HEPARIN SODIUM 5000 UNIT(S): 5000 INJECTION INTRAVENOUS; SUBCUTANEOUS at 14:24

## 2021-01-01 RX ADMIN — Medication 50 MILLIGRAM(S): at 22:20

## 2021-01-01 RX ADMIN — SEVELAMER CARBONATE 2400 MILLIGRAM(S): 2400 POWDER, FOR SUSPENSION ORAL at 08:43

## 2021-01-01 RX ADMIN — Medication 50 MILLIGRAM(S): at 17:39

## 2021-01-01 RX ADMIN — CHLORHEXIDINE GLUCONATE 1 APPLICATION(S): 213 SOLUTION TOPICAL at 06:30

## 2021-01-01 RX ADMIN — TAMSULOSIN HYDROCHLORIDE 0.4 MILLIGRAM(S): 0.4 CAPSULE ORAL at 21:26

## 2021-01-01 RX ADMIN — BRIMONIDINE TARTRATE 1 DROP(S): 2 SOLUTION/ DROPS OPHTHALMIC at 06:26

## 2021-01-01 RX ADMIN — Medication 1 APPLICATION(S): at 05:59

## 2021-01-01 RX ADMIN — Medication 1 DROP(S): at 07:24

## 2021-01-01 RX ADMIN — MUPIROCIN 1 APPLICATION(S): 20 OINTMENT TOPICAL at 17:22

## 2021-01-01 RX ADMIN — Medication 1 APPLICATION(S): at 06:53

## 2021-01-01 RX ADMIN — Medication 600 MILLIGRAM(S): at 07:20

## 2021-01-01 RX ADMIN — Medication 600 MILLIGRAM(S): at 06:28

## 2021-01-01 RX ADMIN — TAMSULOSIN HYDROCHLORIDE 0.4 MILLIGRAM(S): 0.4 CAPSULE ORAL at 21:52

## 2021-01-01 RX ADMIN — Medication 1 DROP(S): at 05:57

## 2021-01-01 RX ADMIN — Medication 1: at 17:41

## 2021-01-01 RX ADMIN — DORZOLAMIDE HYDROCHLORIDE 1 DROP(S): 20 SOLUTION/ DROPS OPHTHALMIC at 06:52

## 2021-01-01 RX ADMIN — CHLORHEXIDINE GLUCONATE 1 APPLICATION(S): 213 SOLUTION TOPICAL at 06:41

## 2021-01-01 RX ADMIN — Medication 1 DROP(S): at 21:30

## 2021-01-01 RX ADMIN — Medication 1 DROP(S): at 13:47

## 2021-01-01 RX ADMIN — MUPIROCIN 1 APPLICATION(S): 20 OINTMENT TOPICAL at 17:53

## 2021-01-01 RX ADMIN — Medication 25 MILLIGRAM(S): at 13:46

## 2021-01-01 RX ADMIN — Medication 25 MILLIGRAM(S): at 06:35

## 2021-01-01 RX ADMIN — Medication 81 MILLIGRAM(S): at 12:43

## 2021-01-01 RX ADMIN — Medication 1 DROP(S): at 12:31

## 2021-01-01 RX ADMIN — SEVELAMER CARBONATE 2400 MILLIGRAM(S): 2400 POWDER, FOR SUSPENSION ORAL at 12:31

## 2021-01-01 RX ADMIN — Medication 25 MILLIGRAM(S): at 07:19

## 2021-01-01 RX ADMIN — HEPARIN SODIUM 5000 UNIT(S): 5000 INJECTION INTRAVENOUS; SUBCUTANEOUS at 06:35

## 2021-01-01 RX ADMIN — Medication 50 MILLIGRAM(S): at 17:40

## 2021-01-01 RX ADMIN — DORZOLAMIDE HYDROCHLORIDE 1 DROP(S): 20 SOLUTION/ DROPS OPHTHALMIC at 06:38

## 2021-01-01 RX ADMIN — Medication 1 DROP(S): at 13:51

## 2021-01-01 RX ADMIN — Medication 1 DROP(S): at 22:16

## 2021-01-01 RX ADMIN — SIMVASTATIN 10 MILLIGRAM(S): 20 TABLET, FILM COATED ORAL at 21:58

## 2021-01-01 RX ADMIN — HEPARIN SODIUM 5000 UNIT(S): 5000 INJECTION INTRAVENOUS; SUBCUTANEOUS at 21:25

## 2021-01-01 RX ADMIN — Medication 25 MILLIGRAM(S): at 13:37

## 2021-01-01 RX ADMIN — Medication 50 MILLIGRAM(S): at 06:51

## 2021-01-01 RX ADMIN — Medication 90 MILLIGRAM(S): at 07:01

## 2021-01-01 RX ADMIN — Medication 600 MILLIGRAM(S): at 06:35

## 2021-01-01 RX ADMIN — HEPARIN SODIUM 5000 UNIT(S): 5000 INJECTION INTRAVENOUS; SUBCUTANEOUS at 21:52

## 2021-01-01 RX ADMIN — ALBUTEROL 2 PUFF(S): 90 AEROSOL, METERED ORAL at 09:07

## 2021-01-01 RX ADMIN — MUPIROCIN 1 APPLICATION(S): 20 OINTMENT TOPICAL at 17:39

## 2021-01-01 RX ADMIN — BRIMONIDINE TARTRATE 1 DROP(S): 2 SOLUTION/ DROPS OPHTHALMIC at 17:40

## 2021-01-01 RX ADMIN — INSULIN GLARGINE 10 UNIT(S): 100 INJECTION, SOLUTION SUBCUTANEOUS at 21:33

## 2021-01-01 RX ADMIN — Medication 90 MILLIGRAM(S): at 06:28

## 2021-01-01 RX ADMIN — HEPARIN SODIUM 5000 UNIT(S): 5000 INJECTION INTRAVENOUS; SUBCUTANEOUS at 22:17

## 2021-01-01 RX ADMIN — HEPARIN SODIUM 5000 UNIT(S): 5000 INJECTION INTRAVENOUS; SUBCUTANEOUS at 06:50

## 2021-01-01 RX ADMIN — SEVELAMER CARBONATE 2400 MILLIGRAM(S): 2400 POWDER, FOR SUSPENSION ORAL at 17:39

## 2021-01-01 RX ADMIN — PANTOPRAZOLE SODIUM 40 MILLIGRAM(S): 20 TABLET, DELAYED RELEASE ORAL at 06:51

## 2021-01-01 RX ADMIN — BRIMONIDINE TARTRATE 1 DROP(S): 2 SOLUTION/ DROPS OPHTHALMIC at 17:41

## 2021-01-01 RX ADMIN — SERTRALINE 100 MILLIGRAM(S): 25 TABLET, FILM COATED ORAL at 13:46

## 2021-01-01 RX ADMIN — SEVELAMER CARBONATE 2400 MILLIGRAM(S): 2400 POWDER, FOR SUSPENSION ORAL at 17:24

## 2021-01-01 RX ADMIN — GABAPENTIN 600 MILLIGRAM(S): 400 CAPSULE ORAL at 21:32

## 2021-01-01 RX ADMIN — Medication 1: at 17:48

## 2021-01-01 RX ADMIN — Medication 1 APPLICATION(S): at 07:24

## 2021-01-01 RX ADMIN — Medication 25 MILLIGRAM(S): at 06:28

## 2021-01-01 RX ADMIN — Medication 1 APPLICATION(S): at 06:29

## 2021-01-01 RX ADMIN — Medication 1 APPLICATION(S): at 17:48

## 2021-01-01 RX ADMIN — Medication 1 ENEMA: at 08:43

## 2021-01-01 RX ADMIN — Medication 600 MILLIGRAM(S): at 06:50

## 2021-01-01 RX ADMIN — HEPARIN SODIUM 5000 UNIT(S): 5000 INJECTION INTRAVENOUS; SUBCUTANEOUS at 06:29

## 2021-01-01 RX ADMIN — Medication 25 MILLIGRAM(S): at 21:32

## 2021-01-01 RX ADMIN — SENNA PLUS 2 TABLET(S): 8.6 TABLET ORAL at 21:28

## 2021-01-01 RX ADMIN — DORZOLAMIDE HYDROCHLORIDE 1 DROP(S): 20 SOLUTION/ DROPS OPHTHALMIC at 22:06

## 2021-01-01 RX ADMIN — HEPARIN SODIUM 5000 UNIT(S): 5000 INJECTION INTRAVENOUS; SUBCUTANEOUS at 13:46

## 2021-01-01 RX ADMIN — GABAPENTIN 600 MILLIGRAM(S): 400 CAPSULE ORAL at 15:01

## 2021-01-01 RX ADMIN — Medication 1 DROP(S): at 06:37

## 2021-01-01 RX ADMIN — TAMSULOSIN HYDROCHLORIDE 0.4 MILLIGRAM(S): 0.4 CAPSULE ORAL at 21:32

## 2021-01-01 RX ADMIN — CHLORHEXIDINE GLUCONATE 1 APPLICATION(S): 213 SOLUTION TOPICAL at 06:54

## 2021-01-01 RX ADMIN — DORZOLAMIDE HYDROCHLORIDE 1 DROP(S): 20 SOLUTION/ DROPS OPHTHALMIC at 07:23

## 2021-01-01 RX ADMIN — Medication 1 DROP(S): at 06:53

## 2021-01-01 RX ADMIN — Medication 25 MILLIGRAM(S): at 21:26

## 2021-01-01 RX ADMIN — GABAPENTIN 600 MILLIGRAM(S): 400 CAPSULE ORAL at 21:29

## 2021-01-01 RX ADMIN — Medication 5 MILLIGRAM(S): at 22:00

## 2021-01-01 RX ADMIN — Medication 1 DROP(S): at 21:29

## 2021-01-01 RX ADMIN — DORZOLAMIDE HYDROCHLORIDE 1 DROP(S): 20 SOLUTION/ DROPS OPHTHALMIC at 13:56

## 2021-01-01 RX ADMIN — GABAPENTIN 600 MILLIGRAM(S): 400 CAPSULE ORAL at 06:37

## 2021-01-01 RX ADMIN — Medication 25 MILLIGRAM(S): at 13:48

## 2021-01-01 RX ADMIN — SIMVASTATIN 10 MILLIGRAM(S): 20 TABLET, FILM COATED ORAL at 21:35

## 2021-01-01 RX ADMIN — HEPARIN SODIUM 5000 UNIT(S): 5000 INJECTION INTRAVENOUS; SUBCUTANEOUS at 21:31

## 2021-01-01 RX ADMIN — SEVELAMER CARBONATE 2400 MILLIGRAM(S): 2400 POWDER, FOR SUSPENSION ORAL at 08:45

## 2021-01-01 RX ADMIN — SEVELAMER CARBONATE 2400 MILLIGRAM(S): 2400 POWDER, FOR SUSPENSION ORAL at 13:47

## 2021-01-01 RX ADMIN — HEPARIN SODIUM 5000 UNIT(S): 5000 INJECTION INTRAVENOUS; SUBCUTANEOUS at 05:57

## 2021-01-01 RX ADMIN — Medication 600 MILLIGRAM(S): at 17:40

## 2021-01-01 RX ADMIN — BRIMONIDINE TARTRATE 1 DROP(S): 2 SOLUTION/ DROPS OPHTHALMIC at 06:39

## 2021-01-01 RX ADMIN — ZOLPIDEM TARTRATE 5 MILLIGRAM(S): 10 TABLET ORAL at 21:32

## 2021-01-01 RX ADMIN — SEVELAMER CARBONATE 2400 MILLIGRAM(S): 2400 POWDER, FOR SUSPENSION ORAL at 13:43

## 2021-01-01 RX ADMIN — Medication 50 MILLILITER(S): at 09:35

## 2021-01-01 RX ADMIN — Medication 50 MILLIGRAM(S): at 07:19

## 2021-01-01 RX ADMIN — Medication 5 MILLIGRAM(S): at 21:31

## 2021-01-01 RX ADMIN — Medication 50 MILLIGRAM(S): at 17:23

## 2021-01-01 RX ADMIN — Medication 90 MILLIGRAM(S): at 06:51

## 2021-01-01 RX ADMIN — SEVELAMER CARBONATE 2400 MILLIGRAM(S): 2400 POWDER, FOR SUSPENSION ORAL at 12:43

## 2021-01-01 RX ADMIN — Medication 80 MILLIGRAM(S): at 18:31

## 2021-01-01 RX ADMIN — INSULIN GLARGINE 5 UNIT(S): 100 INJECTION, SOLUTION SUBCUTANEOUS at 21:24

## 2021-01-01 RX ADMIN — Medication 25 MILLIGRAM(S): at 15:05

## 2021-01-01 RX ADMIN — SERTRALINE 100 MILLIGRAM(S): 25 TABLET, FILM COATED ORAL at 15:06

## 2021-01-01 RX ADMIN — DORZOLAMIDE HYDROCHLORIDE 1 DROP(S): 20 SOLUTION/ DROPS OPHTHALMIC at 21:35

## 2021-01-01 RX ADMIN — PANTOPRAZOLE SODIUM 40 MILLIGRAM(S): 20 TABLET, DELAYED RELEASE ORAL at 06:41

## 2021-01-01 RX ADMIN — Medication 1 DROP(S): at 21:55

## 2021-01-01 RX ADMIN — SENNA PLUS 2 TABLET(S): 8.6 TABLET ORAL at 21:53

## 2021-01-01 RX ADMIN — DORZOLAMIDE HYDROCHLORIDE 1 DROP(S): 20 SOLUTION/ DROPS OPHTHALMIC at 14:30

## 2021-01-01 RX ADMIN — SEVELAMER CARBONATE 2400 MILLIGRAM(S): 2400 POWDER, FOR SUSPENSION ORAL at 08:12

## 2021-01-01 RX ADMIN — PANTOPRAZOLE SODIUM 40 MILLIGRAM(S): 20 TABLET, DELAYED RELEASE ORAL at 07:20

## 2021-01-01 RX ADMIN — OXYCODONE HYDROCHLORIDE 10 MILLIGRAM(S): 5 TABLET ORAL at 15:30

## 2021-01-01 RX ADMIN — Medication 2: at 17:53

## 2021-01-01 RX ADMIN — SERTRALINE 100 MILLIGRAM(S): 25 TABLET, FILM COATED ORAL at 13:48

## 2021-01-01 RX ADMIN — Medication 81 MILLIGRAM(S): at 12:32

## 2021-01-01 RX ADMIN — Medication 1 DROP(S): at 15:06

## 2021-01-01 RX ADMIN — Medication 1 APPLICATION(S): at 18:14

## 2021-01-01 RX ADMIN — Medication 50 MILLIGRAM(S): at 06:28

## 2021-01-01 RX ADMIN — Medication 50 MILLIGRAM(S): at 17:50

## 2021-01-01 RX ADMIN — Medication 25 MILLIGRAM(S): at 07:01

## 2021-01-01 RX ADMIN — Medication 90 MILLIGRAM(S): at 06:40

## 2021-01-01 RX ADMIN — HEPARIN SODIUM 5000 UNIT(S): 5000 INJECTION INTRAVENOUS; SUBCUTANEOUS at 15:05

## 2021-01-01 RX ADMIN — Medication 600 MILLIGRAM(S): at 17:46

## 2021-01-01 RX ADMIN — BRIMONIDINE TARTRATE 1 DROP(S): 2 SOLUTION/ DROPS OPHTHALMIC at 17:46

## 2021-01-01 RX ADMIN — Medication 1 APPLICATION(S): at 06:39

## 2021-01-01 RX ADMIN — BRIMONIDINE TARTRATE 1 DROP(S): 2 SOLUTION/ DROPS OPHTHALMIC at 18:45

## 2021-01-01 RX ADMIN — SENNA PLUS 2 TABLET(S): 8.6 TABLET ORAL at 21:35

## 2021-01-01 RX ADMIN — ERYTHROPOIETIN 10000 UNIT(S): 10000 INJECTION, SOLUTION INTRAVENOUS; SUBCUTANEOUS at 11:00

## 2021-01-01 RX ADMIN — SEVELAMER CARBONATE 2400 MILLIGRAM(S): 2400 POWDER, FOR SUSPENSION ORAL at 11:55

## 2021-01-01 RX ADMIN — BRIMONIDINE TARTRATE 1 DROP(S): 2 SOLUTION/ DROPS OPHTHALMIC at 05:58

## 2021-01-01 RX ADMIN — Medication 50 GRAM(S): at 06:31

## 2021-01-01 RX ADMIN — Medication 600 MILLIGRAM(S): at 17:47

## 2021-01-01 RX ADMIN — Medication 3 MILLILITER(S): at 09:36

## 2021-01-01 RX ADMIN — SIMVASTATIN 10 MILLIGRAM(S): 20 TABLET, FILM COATED ORAL at 21:32

## 2021-01-01 RX ADMIN — Medication 25 MILLIGRAM(S): at 21:30

## 2021-01-01 RX ADMIN — Medication 50 MILLIGRAM(S): at 06:36

## 2021-01-01 RX ADMIN — INSULIN GLARGINE 15 UNIT(S): 100 INJECTION, SOLUTION SUBCUTANEOUS at 21:33

## 2021-01-01 RX ADMIN — BRIMONIDINE TARTRATE 1 DROP(S): 2 SOLUTION/ DROPS OPHTHALMIC at 17:49

## 2021-01-01 RX ADMIN — GABAPENTIN 600 MILLIGRAM(S): 400 CAPSULE ORAL at 21:27

## 2021-01-01 RX ADMIN — OXYCODONE HYDROCHLORIDE 10 MILLIGRAM(S): 5 TABLET ORAL at 14:35

## 2021-01-01 RX ADMIN — SEVELAMER CARBONATE 2400 MILLIGRAM(S): 2400 POWDER, FOR SUSPENSION ORAL at 17:51

## 2021-01-01 RX ADMIN — SERTRALINE 100 MILLIGRAM(S): 25 TABLET, FILM COATED ORAL at 11:54

## 2021-01-01 RX ADMIN — Medication 25 MILLIGRAM(S): at 14:24

## 2021-01-01 RX ADMIN — Medication 50 MILLIGRAM(S): at 21:28

## 2021-01-01 RX ADMIN — Medication 81 MILLIGRAM(S): at 13:44

## 2021-01-01 RX ADMIN — HEPARIN SODIUM 5000 UNIT(S): 5000 INJECTION INTRAVENOUS; SUBCUTANEOUS at 21:30

## 2021-01-01 RX ADMIN — PANTOPRAZOLE SODIUM 40 MILLIGRAM(S): 20 TABLET, DELAYED RELEASE ORAL at 13:45

## 2021-01-01 RX ADMIN — HEPARIN SODIUM 5000 UNIT(S): 5000 INJECTION INTRAVENOUS; SUBCUTANEOUS at 07:21

## 2021-01-01 RX ADMIN — SEVELAMER CARBONATE 2400 MILLIGRAM(S): 2400 POWDER, FOR SUSPENSION ORAL at 17:40

## 2021-01-01 RX ADMIN — Medication 1 APPLICATION(S): at 17:23

## 2021-01-01 RX ADMIN — Medication 81 MILLIGRAM(S): at 13:50

## 2021-01-01 RX ADMIN — DORZOLAMIDE HYDROCHLORIDE 1 DROP(S): 20 SOLUTION/ DROPS OPHTHALMIC at 05:58

## 2021-01-01 RX ADMIN — DORZOLAMIDE HYDROCHLORIDE 1 DROP(S): 20 SOLUTION/ DROPS OPHTHALMIC at 22:16

## 2021-01-01 RX ADMIN — DORZOLAMIDE HYDROCHLORIDE 1 DROP(S): 20 SOLUTION/ DROPS OPHTHALMIC at 16:13

## 2021-01-01 RX ADMIN — GABAPENTIN 600 MILLIGRAM(S): 400 CAPSULE ORAL at 07:20

## 2021-01-01 RX ADMIN — GABAPENTIN 600 MILLIGRAM(S): 400 CAPSULE ORAL at 13:45

## 2021-01-01 RX ADMIN — POLYETHYLENE GLYCOL 3350 17 GRAM(S): 17 POWDER, FOR SOLUTION ORAL at 21:51

## 2021-01-01 RX ADMIN — TAMSULOSIN HYDROCHLORIDE 0.4 MILLIGRAM(S): 0.4 CAPSULE ORAL at 21:27

## 2021-01-01 RX ADMIN — Medication 25 MILLIGRAM(S): at 21:52

## 2021-01-01 RX ADMIN — MUPIROCIN 1 APPLICATION(S): 20 OINTMENT TOPICAL at 17:47

## 2021-01-01 RX ADMIN — BRIMONIDINE TARTRATE 1 DROP(S): 2 SOLUTION/ DROPS OPHTHALMIC at 07:23

## 2021-01-01 RX ADMIN — Medication 3 MILLILITER(S): at 10:32

## 2021-01-01 RX ADMIN — INSULIN HUMAN 10 UNIT(S): 100 INJECTION, SOLUTION SUBCUTANEOUS at 09:35

## 2021-01-01 RX ADMIN — Medication 5 MILLIGRAM(S): at 21:30

## 2021-01-01 RX ADMIN — Medication 50 MILLIGRAM(S): at 17:47

## 2021-01-01 RX ADMIN — POLYETHYLENE GLYCOL 3350 17 GRAM(S): 17 POWDER, FOR SOLUTION ORAL at 11:53

## 2021-01-01 RX ADMIN — Medication 200 GRAM(S): at 09:36

## 2021-01-01 RX ADMIN — Medication 600 MILLIGRAM(S): at 14:35

## 2021-01-01 RX ADMIN — MUPIROCIN 1 APPLICATION(S): 20 OINTMENT TOPICAL at 07:21

## 2021-01-01 RX ADMIN — DORZOLAMIDE HYDROCHLORIDE 1 DROP(S): 20 SOLUTION/ DROPS OPHTHALMIC at 13:47

## 2021-01-01 RX ADMIN — ZOLPIDEM TARTRATE 5 MILLIGRAM(S): 10 TABLET ORAL at 21:39

## 2021-01-01 RX ADMIN — SIMVASTATIN 10 MILLIGRAM(S): 20 TABLET, FILM COATED ORAL at 21:26

## 2021-01-01 RX ADMIN — Medication 1 APPLICATION(S): at 17:39

## 2021-01-01 RX ADMIN — Medication 3 MILLILITER(S): at 09:37

## 2021-01-01 RX ADMIN — HEPARIN SODIUM 5000 UNIT(S): 5000 INJECTION INTRAVENOUS; SUBCUTANEOUS at 13:49

## 2021-01-01 RX ADMIN — Medication 1 DROP(S): at 06:26

## 2021-01-01 RX ADMIN — GABAPENTIN 600 MILLIGRAM(S): 400 CAPSULE ORAL at 07:01

## 2021-01-01 RX ADMIN — LATANOPROST 1 DROP(S): 0.05 SOLUTION/ DROPS OPHTHALMIC; TOPICAL at 21:52

## 2021-01-01 RX ADMIN — GABAPENTIN 600 MILLIGRAM(S): 400 CAPSULE ORAL at 21:52

## 2021-01-01 RX ADMIN — Medication 1: at 13:47

## 2021-01-01 RX ADMIN — SERTRALINE 100 MILLIGRAM(S): 25 TABLET, FILM COATED ORAL at 12:31

## 2021-01-01 RX ADMIN — LATANOPROST 1 DROP(S): 0.05 SOLUTION/ DROPS OPHTHALMIC; TOPICAL at 21:25

## 2021-01-01 RX ADMIN — GABAPENTIN 600 MILLIGRAM(S): 400 CAPSULE ORAL at 06:28

## 2021-01-01 RX ADMIN — Medication 50 MILLILITER(S): at 09:36

## 2021-01-01 RX ADMIN — ERYTHROPOIETIN 10000 UNIT(S): 10000 INJECTION, SOLUTION INTRAVENOUS; SUBCUTANEOUS at 11:42

## 2021-01-01 RX ADMIN — SEVELAMER CARBONATE 2400 MILLIGRAM(S): 2400 POWDER, FOR SUSPENSION ORAL at 17:49

## 2021-01-01 RX ADMIN — Medication 1 APPLICATION(S): at 17:51

## 2021-01-01 RX ADMIN — LATANOPROST 1 DROP(S): 0.05 SOLUTION/ DROPS OPHTHALMIC; TOPICAL at 21:29

## 2021-02-16 NOTE — DIETITIAN INITIAL EVALUATION ADULT. - DOB: +DATEOFBIRTH
Referred by: Ranjith Ruiz DPM; Medical Diagnosis (from order):    Diagnosis Information      Diagnosis    V45.89 (ICD-9-CM) - Z98.890 (ICD-10-CM) - Status post left foot surgery    729.5 (ICD-9-CM) - M79.672 (ICD-10-CM) - Left foot pain                Physical Therapy -  Daily Treatment Note    Visit:  9     SUBJECTIVE                                                                                                             Pt feels really good today.  Minimal to no pain.     Pain / Symptoms:  Pain rating (out of 10): Current: 1     OBJECTIVE                                                                                                                       Palpation:   Comments / Details: Impaired sensation lateral left foot      TREATMENT                                                                                                                  Therapeutic Exercise:  PROM left ankle.   Closed chain mulligan self ankle DF ( 2 x 10 )       Manual Therapy:  Low dye arch taping   Gentle dorsal/plantar glides of midfoot  Talocrural distraction  Gentle subtalar glides     Manual Therapy/Intermuscular Mobilization:  Integrative Dry Needling implemented after risks/ benefits were explained and patient provided verbal consent to proceed with the intervention.     Size of needle used: 30mm; Needle count prior to treatment: 4; Needle count after treatment: 4;  Needle manipulation: twisting ; Duration of treatment: 3 minutes.   Needling location(s):  Peroneal brevis and peroneal longus         Skilled input: verbal instruction/cues    Writer verbally educated and received verbal consent for hand placement, positioning of patient, and techniques to be performed today from patient for hand placement and palpation for techniques, therapist position for techniques and clothing adjustments for techniques as described above and how they are pertinent to the patient's plan of care.    Home Exercise Program: Access Code:  [email or web address]   URL: [email or web address]   Date: 01/08/2021   Prepared by: Eleuterio Lovell     Exercises  Seated Ankle Alphabet - 1 reps - 3 sets - 3x daily - 7x weekly  Isometric Ankle Eversion at Wall - 10-15 reps - 2 sets - 5 sec holds hold - 3x daily - 7x weekly    Attended Electrical-Stimulation ([id number])     Estim applied to needle shafts across left peroneals . Microcurrent at 3Hz, intensity to tolerance x 15 min    ASSESSMENT                                                                                                             Pt's has noticeable reduced swelling along peroneal brevis tendon insertion. Continueto have tightness with ankle DF with reduced motion.   Pain/symptoms after session: 0    Patient Education:   Results of above outlined education: Verbalizes understanding      PLAN                                                                                                                           Suggestions for next session as indicated: Progress ankle mobility and range of motion; dry needling with estim, taping,          Procedures and total treatment time documented Time Entry flowsheet.   Statement Selected

## 2021-11-17 PROBLEM — Z99.2 DEPENDENCE ON RENAL DIALYSIS: Chronic | Status: ACTIVE | Noted: 2019-03-11

## 2021-11-17 NOTE — CONSULT NOTE ADULT - SUBJECTIVE AND OBJECTIVE BOX
Patient is a 56y old  Male who presents with a chief complaint of     HPI:  55 yo M with PMHx of HTN, HLD, DM II, ESRD on HD (noncomplaint), Anxiety, Bipolar, PAD s/p Right BKA was found to be hypoxic to 65% on room air, patient complaining of SOB for few days duration.       PAST MEDICAL & SURGICAL HISTORY:  Diabetes    End stage renal disease    HTN (hypertension)    Blind    Anemia, unspecified type    Pain disorder    Anxiety disorder, unspecified type    Obesity, unspecified classification, unspecified obesity type, unspecified whether serious comorbidity present    Bipolar affective disorder, remission status unspecified    Insomnia, unspecified type    Bilateral ocular hypertension    Dialysis patient  Tuesday- Thursday-Saturday    A-V fistula  LEFT ARM    S/P BKA (below knee amputation) unilateral  RIGHT    Status post glaucoma surgery  RIGHT EYE        SOCIAL HX:   Smoking                         ETOH                            Other    FAMILY HISTORY:  :  No known cardiovascular family history     Review Of Systems:     All ROS are negative except per HPI       Allergies    No Known Allergies    Intolerances      PHYSICAL EXAM    ICU Vital Signs Last 24 Hrs  T(C): 36 (17 Nov 2021 06:50), Max: 36 (17 Nov 2021 06:50)  T(F): 96.8 (17 Nov 2021 06:50), Max: 96.8 (17 Nov 2021 06:50)  HR: 60 (17 Nov 2021 06:50) (60 - 60)  BP: 142/58 (17 Nov 2021 06:50) (142/58 - 142/58)  BP(mean): --  ABP: --  ABP(mean): --  RR: 20 (17 Nov 2021 06:50) (20 - 20)  SpO2: 98% (17 Nov 2021 07:07) (82% - 98%)      CONSTITUTIONAL:  NAD    ENT:   Airway patent,   Mouth with normal mucosa.   No thrush      CARDIAC:   Normal rate,   Regular rhythm.    No edema      Vascular:   normal systolic impulse  no bruits    RESPIRATORY:   No wheezing  Bilateral BS   Not tachypneic,  No use of accessory muscles    GASTROINTESTINAL:  Abdomen soft,   Non-tender,   No guarding,   + BS      NEUROLOGICAL:   Alert and oriented   No motor deficits.    SKIN:   Skin normal color for race,   No evidence of rash.      HEME LYMPH: .  No cervical  lymphadenopathy.  No inguinal lymphadenopathy              LABS:                          9.3    13.18 )-----------( 178      ( 17 Nov 2021 07:08 )             32.3                                               11-17    139  |  98  |  70<HH>  ----------------------------<  61<L>  6.9<HH>   |  21  |  8.0<HH>    Ca    7.8<L>      17 Nov 2021 07:08    TPro  6.4  /  Alb  3.5  /  TBili  0.3  /  DBili  x   /  AST  10  /  ALT  8   /  AlkPhos  164<H>  11-17      PT/INR - ( 17 Nov 2021 07:08 )   PT: 13.70 sec;   INR: 1.19 ratio         PTT - ( 17 Nov 2021 07:08 )  PTT:38.8 sec                                           CARDIAC MARKERS ( 17 Nov 2021 08:28 )  x     / 0.23 ng/mL / x     / x     / x                                                LIVER FUNCTIONS - ( 17 Nov 2021 07:08 )  Alb: 3.5 g/dL / Pro: 6.4 g/dL / ALK PHOS: 164 U/L / ALT: 8 U/L / AST: 10 U/L / GGT: x                                                                                              MEDICATIONS  (STANDING):  albuterol/ipratropium for Nebulization.. 3 milliLiter(s) Nebulizer every 20 minutes  dextrose 50% Injectable 50 milliLiter(s) IV Push Once  insulin regular  human recombinant 10 Unit(s) IV Push once  sodium zirconium cyclosilicate 10 Gram(s) Oral Once    MEDICATIONS  (PRN):    CXR reviewed: bilateral congestion, no focal consolidation          Patient is a 56y old  Male who presents with a chief complaint of     HPI:  55 yo M with PMHx of HTN, HLD, DM II, ESRD on HD (noncomplaint), Anxiety, Bipolar, PAD s/p Right BKA was found to be hypoxic to 65% on room air, patient complaining of SOB for few days duration.       PAST MEDICAL & SURGICAL HISTORY:  Diabetes    End stage renal disease    HTN (hypertension)    Blind    Anemia, unspecified type    Pain disorder    Anxiety disorder, unspecified type    Obesity, unspecified classification, unspecified obesity type, unspecified whether serious comorbidity present    Bipolar affective disorder, remission status unspecified    Insomnia, unspecified type    Bilateral ocular hypertension    Dialysis patient  Tuesday- Thursday-Saturday    A-V fistula  LEFT ARM    S/P BKA (below knee amputation) unilateral  RIGHT    Status post glaucoma surgery  RIGHT EYE        SOCIAL HX:   Unable to obtain    FAMILY HISTORY:  :  No known cardiovascular family history     Review Of Systems:     All ROS are negative except per HPI       Allergies    No Known Allergies    Intolerances      PHYSICAL EXAM    ICU Vital Signs Last 24 Hrs  T(C): 36 (17 Nov 2021 06:50), Max: 36 (17 Nov 2021 06:50)  T(F): 96.8 (17 Nov 2021 06:50), Max: 96.8 (17 Nov 2021 06:50)  HR: 60 (17 Nov 2021 06:50) (60 - 60)  BP: 142/58 (17 Nov 2021 06:50) (142/58 - 142/58)  BP(mean): --  ABP: --  ABP(mean): --  RR: 20 (17 Nov 2021 06:50) (20 - 20)  SpO2: 98% (17 Nov 2021 07:07) (82% - 98%)      CONSTITUTIONAL:  Obese  NAD    ENT:   Right Eye Cataract  Airway patent,   Mouth with normal mucosa.   No thrush      CARDIAC:   Normal rate,   Regular rhythm.    LLE edema, Right BKA      Vascular:   normal systolic impulse  no bruits    RESPIRATORY:   No wheezing  Bilateral BS   Not tachypneic,  No use of accessory muscles    GASTROINTESTINAL:  Abdomen soft,   Non-tender,   No guarding,   + BS      NEUROLOGICAL:   Arousable, somnolent   No motor deficits.    SKIN:   Skin normal color for race,   No evidence of rash.      HEME LYMPH: .  No cervical  lymphadenopathy.  No inguinal lymphadenopathy              LABS:                          9.3    13.18 )-----------( 178      ( 17 Nov 2021 07:08 )             32.3                                               11-17    139  |  98  |  70<HH>  ----------------------------<  61<L>  6.9<HH>   |  21  |  8.0<HH>    Ca    7.8<L>      17 Nov 2021 07:08    TPro  6.4  /  Alb  3.5  /  TBili  0.3  /  DBili  x   /  AST  10  /  ALT  8   /  AlkPhos  164<H>  11-17      PT/INR - ( 17 Nov 2021 07:08 )   PT: 13.70 sec;   INR: 1.19 ratio         PTT - ( 17 Nov 2021 07:08 )  PTT:38.8 sec                                           CARDIAC MARKERS ( 17 Nov 2021 08:28 )  x     / 0.23 ng/mL / x     / x     / x                                                LIVER FUNCTIONS - ( 17 Nov 2021 07:08 )  Alb: 3.5 g/dL / Pro: 6.4 g/dL / ALK PHOS: 164 U/L / ALT: 8 U/L / AST: 10 U/L / GGT: x                                                                                              MEDICATIONS  (STANDING):  albuterol/ipratropium for Nebulization.. 3 milliLiter(s) Nebulizer every 20 minutes  dextrose 50% Injectable 50 milliLiter(s) IV Push Once  insulin regular  human recombinant 10 Unit(s) IV Push once  sodium zirconium cyclosilicate 10 Gram(s) Oral Once    MEDICATIONS  (PRN):    CXR reviewed: bilateral congestion, no focal consolidation          Patient is a 56y old  Male who presents with a chief complaint of     HPI:  57 yo M with PMHx of HTN, HLD, DM II, ESRD on HD (noncomplaint), Anxiety, Bipolar, PAD s/p Right BKA was found to be hypoxic to 65% on room air, patient complaining of SOB for few days duration.       PAST MEDICAL & SURGICAL HISTORY:  Diabetes    End stage renal disease    HTN (hypertension)    Blind    Anemia, unspecified type    Pain disorder    Anxiety disorder, unspecified type    Obesity, unspecified classification, unspecified obesity type, unspecified whether serious comorbidity present    Bipolar affective disorder, remission status unspecified    Insomnia, unspecified type    Bilateral ocular hypertension    Dialysis patient  Tuesday- Thursday-Saturday    A-V fistula  LEFT ARM    S/P BKA (below knee amputation) unilateral  RIGHT    Status post glaucoma surgery  RIGHT EYE        SOCIAL HX:   Unable to obtain    FAMILY HISTORY:  :  No known cardiovascular family history     Review Of Systems:     All ROS are negative except per HPI       Allergies    No Known Allergies    Intolerances      PHYSICAL EXAM    ICU Vital Signs Last 24 Hrs  T(C): 36 (17 Nov 2021 06:50), Max: 36 (17 Nov 2021 06:50)  T(F): 96.8 (17 Nov 2021 06:50), Max: 96.8 (17 Nov 2021 06:50)  HR: 60 (17 Nov 2021 06:50) (60 - 60)  BP: 142/58 (17 Nov 2021 06:50) (142/58 - 142/58)  BP(mean): --  ABP: --  ABP(mean): --  RR: 20 (17 Nov 2021 06:50) (20 - 20)  SpO2: 98% (17 Nov 2021 07:07) (82% - 98%)      CONSTITUTIONAL:  Obese  NAD    ENT:   Right Eye Cataract  Airway patent,   Mouth with normal mucosa.   No thrush      CARDIAC:   Normal rate,   Regular rhythm.    LLE edema, Right BKA      Vascular:   normal systolic impulse  no bruits    RESPIRATORY:   No wheezing  Bilateral BS   Not tachypneic,  No use of accessory muscles    GASTROINTESTINAL:  Abdomen soft,   Non-tender,   No guarding,   + BS      NEUROLOGICAL:   Arousable, somnolent   No motor deficits.    SKIN:   Skin normal color for race,   No evidence of rash.  Chronic changes LE    HEME LYMPH: .  No cervical  lymphadenopathy.  No inguinal lymphadenopathy              LABS:                          9.3    13.18 )-----------( 178      ( 17 Nov 2021 07:08 )             32.3                                               11-17    139  |  98  |  70<HH>  ----------------------------<  61<L>  6.9<HH>   |  21  |  8.0<HH>    Ca    7.8<L>      17 Nov 2021 07:08    TPro  6.4  /  Alb  3.5  /  TBili  0.3  /  DBili  x   /  AST  10  /  ALT  8   /  AlkPhos  164<H>  11-17      PT/INR - ( 17 Nov 2021 07:08 )   PT: 13.70 sec;   INR: 1.19 ratio         PTT - ( 17 Nov 2021 07:08 )  PTT:38.8 sec                                           CARDIAC MARKERS ( 17 Nov 2021 08:28 )  x     / 0.23 ng/mL / x     / x     / x                                                LIVER FUNCTIONS - ( 17 Nov 2021 07:08 )  Alb: 3.5 g/dL / Pro: 6.4 g/dL / ALK PHOS: 164 U/L / ALT: 8 U/L / AST: 10 U/L / GGT: x                                                                                              MEDICATIONS  (STANDING):  albuterol/ipratropium for Nebulization.. 3 milliLiter(s) Nebulizer every 20 minutes  dextrose 50% Injectable 50 milliLiter(s) IV Push Once  insulin regular  human recombinant 10 Unit(s) IV Push once  sodium zirconium cyclosilicate 10 Gram(s) Oral Once    MEDICATIONS  (PRN):    CXR reviewed: bilateral congestion, no focal consolidation

## 2021-11-17 NOTE — CHART NOTE - NSCHARTNOTEFT_GEN_A_CORE
Pt decided to rescind DNR/I order. Order discontinued. Pt decided to rescind DNR/I order. Order discontinued, molst voided

## 2021-11-17 NOTE — H&P ADULT - NSHPLABSRESULTS_GEN_ALL_CORE
LABS:  cret                        9.3    13.18 )-----------( 178      ( 17 Nov 2021 07:08 )             32.3     11-17    139  |  98  |  70<HH>  ----------------------------<  61<L>  6.9<HH>   |  21  |  8.0<HH>    Ca    7.8<L>      17 Nov 2021 07:08    TPro  6.4  /  Alb  3.5  /  TBili  0.3  /  DBili  x   /  AST  10  /  ALT  8   /  AlkPhos  164<H>  11-17    PT/INR - ( 17 Nov 2021 07:08 )   PT: 13.70 sec;   INR: 1.19 ratio         PTT - ( 17 Nov 2021 07:08 )  PTT:38.8 sec        cxr cardiomegaly and b/l pleural eff  ecg sinus ton no ischemic changes

## 2021-11-17 NOTE — CONSULT NOTE ADULT - ASSESSMENT
IMPRESSION:    Acute Hypoxemic Respiratory Failure  Positive Entero/Rhinovirus Infection  Volume Overload  HO ESRD noncompliant with HD    PLAN:    CNS: avoid depressant    HEENT: Oral care    PULMONARY:  HOB @ 45 degrees.  Aspiration precautions     CARDIOVASCULAR:    GI: GI prophylaxis.  Feeding.  Bowel regimen     RENAL:  Follow up lytes.  Correct as needed    INFECTIOUS DISEASE: Follow up cultures    HEMATOLOGICAL:  DVT prophylaxis.    ENDOCRINE:  Follow up FS.  Insulin protocol if needed.    MUSCULOSKELETAL:           IMPRESSION:    Acute Hypoxemic Respiratory Failure  Positive Entero/Rhinovirus Infection  Volume Overload  Hyperkalemia  NSTEMI  HO Left Loculated Pleural Effusion  HO ESRD noncompliant with HD    PLAN:    CNS: avoid depressant    HEENT: Oral care    PULMONARY:  HOB @ 45 degrees.  Aspiration precautions. target SaO2 92-96%, wean off NIV. Pulmonary toilet. Incentive Spirometry    CARDIOVASCULAR: avoid volume overload, 2D-Echo, Trend CE, Cardiology evaluation    GI: GI prophylaxis.  Feeding.  Bowel regimen     RENAL:  Follow up lytes.  Correct as needed. Repeat BMP, HD per Nephrology     INFECTIOUS DISEASE: Follow up cultures, monitor off antibiotics     HEMATOLOGICAL:  DVT prophylaxis.    ENDOCRINE:  Follow up FS.  Insulin protocol if needed.    MUSCULOSKELETAL: OOB to chair     SDU monitoring           IMPRESSION:    Acute Hypoxemic Respiratory Failure  Positive Entero/Rhinovirus Infection  Volume Overload  Probable HOMAR  Hyperkalemia  NSTEMI  Obesity  HO Left Loculated Pleural Effusion  HO ESRD noncompliant with HD    PLAN:    CNS: avoid depressant    HEENT: Oral care    PULMONARY:  HOB @ 45 degrees.  Aspiration precautions. target SaO2 92-96%, wean off NIV. Pulmonary toilet. Incentive Spirometry, repeat ABG now    CARDIOVASCULAR: avoid volume overload, 2D-Echo, Trend CE, Cardiology evaluation    GI: GI prophylaxis.  Feeding.  Bowel regimen     RENAL:  Follow up lytes.  Correct as needed. Repeat BMP, HD per Nephrology     INFECTIOUS DISEASE: Follow up cultures, monitor off antibiotics     HEMATOLOGICAL:  DVT prophylaxis.    ENDOCRINE:  Follow up FS.  Insulin protocol if needed.    MUSCULOSKELETAL: OOB to chair     SDU monitoring           IMPRESSION:    Acute Hypoxemic Respiratory Failure  Positive Entero/Rhinovirus Infection  Volume Overload  Probable HOMAR/OHS  Hyperkalemia  NSTEMI  Obesity  HO Left Loculated Pleural Effusion  HO ESRD noncompliant with HD    PLAN:    CNS: avoid depressant    HEENT: Oral care    PULMONARY:  HOB @ 45 degrees.  Aspiration precautions. target SaO2 92-96%, NIV 4 hours on and off and at bedtime. Pulmonary toilet. Incentive Spirometry, repeat ABG now    CARDIOVASCULAR: avoid volume overload, 2D-Echo, Trend CE, Cardiology evaluation    GI: GI prophylaxis.  Feeding.  Bowel regimen     RENAL:  Follow up lytes.  Correct as needed. Repeat BMP, HD per Nephrology     INFECTIOUS DISEASE: Follow up cultures, monitor off antibiotics     HEMATOLOGICAL:  DVT prophylaxis.    ENDOCRINE:  Follow up FS.  Insulin protocol if needed.    MUSCULOSKELETAL: OOB to chair     SDU monitoring           IMPRESSION:    Acute Hypoxemic Respiratory Failure  Acute hypercapnic respiratory failure resolved   Positive Entero/Rhinovirus Infection  Volume Overload  Probable HOMAR  Hyperkalemia  NSTEMI  Obesity  HO Left Loculated Pleural Effusion  HO ESRD noncompliant with HD    PLAN:    CNS: avoid depressant    HEENT: Oral care    PULMONARY:  HOB @ 45 degrees.  Aspiration precautions. target SaO2 92-96%, NIV 4 hours on and off and at bedtime. Pulmonary toilet. Incentive Spirometry, repeat ABG reviewed     CARDIOVASCULAR: avoid volume overload, 2D-Echo, Trend CE, Cardiology evaluation    GI: GI prophylaxis.  Feeding.  Bowel regimen     RENAL:  Follow up lytes.  Correct as needed. Repeat BMP, HD per Nephrology.  SP K therapy     INFECTIOUS DISEASE: Follow up cultures, monitor off antibiotics.  Procal.      HEMATOLOGICAL:  DVT prophylaxis.    ENDOCRINE:  Follow up FS.  Insulin protocol if needed.    MUSCULOSKELETAL: Bed cahir position     ICU monitoring

## 2021-11-17 NOTE — PATIENT PROFILE ADULT - VISION (WITH CORRECTIVE LENSES IF THE PATIENT USUALLY WEARS THEM):
Legally Blind/Severely impaired: cannot locate objects without hearing or touching them or patient nonresponsive.

## 2021-11-17 NOTE — ED ADULT NURSE REASSESSMENT NOTE - NS ED NURSE REASSESS COMMENT FT1
pt is aaox3, nad, respirations easy and regular on BIPAP, skin is warm, dry, and normal in color, pt repositioned for comfort

## 2021-11-17 NOTE — ED ADULT NURSE REASSESSMENT NOTE - NS ED NURSE REASSESS COMMENT FT1
MD [id number] made aware that pt would like to eat, MD states that pt still needs to be on the BIPAP at this time and will notify myself when he may transition to HF or other means of oxygen delivery, notified MD of recent FS, states to repeat in one hour, pt is stable, nad

## 2021-11-17 NOTE — H&P ADULT - HISTORY OF PRESENT ILLNESS
55yo M w/ pmhx of HTN, DM, ESRD on HD, obstructive sleep apnea, chronic pain, bipolar disorder,    R BKA presents for eval of sob. Pt has worsening sob x2 days with associated cough, fever and chills, no aggravating or relieving factors. This morning pt was found to have spO2 65% on ra, placed on NRB with improvement to 80%.    ED course:   vitals: /58, HR 60, T 96.8F, O2 82% RA   labs: WBC 13.18, hgb 9.3, K 6.9, trop 0.23, enterovirus +,   imaging:   - CXR: cardiomegaly w/ b/l pleural eff   given: calcium gluconate 2g IV, albuterol,        57yo M w/ pmhx of HTN, HLD, DM, ESRD on HD TTS, HOMAR, BPH, PVD s/p R BKA, glaucoma presents to ED from [facility] for SOB for two days. Patient has worsening SOB for two days with associated cough/fever/chills. Patient felt flu-like symptoms which made him miss dialysis yesterday (tuesday). Patient then desaturated to 65% on 3L NC which prompted nursing home to send him to the ED. Denies any chest pain or palpitations.     ED course:   vitals: /58, HR 60, T 96.8F, O2 82% RA   labs: WBC 13.18, hgb 9.3, K 6.9, trop 0.23, enterovirus +,   imaging:   - CXR: cardiomegaly w/ b/l pleural eff   given: calcium gluconate 2g IV, albuterol, insulin, D50 x 2  progress: O2 99% on bipap 12/6 FIO2 100%. ABG wnl. Patient has poor mental status but wakes up to deep sternal rub. He states he wants to be DNR/DNI. However, patient upgraded to ICU. Spoke to Dr. Owens (nephro) and he will see the patient and likely dialyze him today.

## 2021-11-17 NOTE — CONSULT NOTE ADULT - ASSESSMENT
1. SOB. Likely volume mediated. HD today to remove 3-4L.   2. ESRD on HD TTS. Missed HD yesterday. HD today: 3 hours, opti 160 dialyzer, 2K bath, 3-4L UF.  3. Anemia. Start EPO 10,000 units IV with HD.  4. Hyperphosphatemia. Renal diet. Increase sevelamer to 2400mg TID with meals.  5. Hyperkalemia. HD today. Renal diet.

## 2021-11-17 NOTE — ED ADULT NURSE NOTE - CAS TRG GEN SKIN CONDITION
- counseling on current recommendations for breast cancer screening. Pt normal risk and recommend start 45-49 yo  - counseling on current recommendations for cervical cancer screening. Pt up to date*     Warm

## 2021-11-17 NOTE — H&P ADULT - NSHPPHYSICALEXAM_GEN_ALL_CORE
PHYSICAL EXAM:  GENERAL: NAD, lying in bed comfortably  HEAD:  Atraumatic, Normocephalic  EYES: EOMI, PERRLA, conjunctiva and sclera clear  ENT: Moist mucous membranes  NECK: Supple, No JVD  CHEST/LUNG: Clear to auscultation bilaterally; No rales, rhonchi, wheezing, or rubs. Unlabored respirations  HEART: Regular rate and rhythm; No murmurs, rubs, or gallops  ABDOMEN: Bowel sounds present; Soft, Nontender, Nondistended. No hepatomegally  EXTREMITIES:  R BKA; LLE 1+ pitting edema   NERVOUS SYSTEM:  arousable to deep sternal rub; oriented x 3 when aroused   SKIN: No rashes or lesions

## 2021-11-17 NOTE — ED PROVIDER NOTE - CLINICAL SUMMARY MEDICAL DECISION MAKING FREE TEXT BOX
Patient presented with acute respiratory distress, hx ESRD. On arrival (+) tachypneic with increased WOB, hypoxic on RA to 60s and only improved to 80s on NRB. Seen immediately on arrival and placed on bipap with improvement of breathing. EKG obtained and non-specific but no evidence of STEMI or signs of hyperkalemia. Obtained labs which showed (+) known ESRD but with (+) mixed respiratory and metabolic acidosis as well as hyperkalemia which was tx in ED. CXR with (+) signs of fluid overload. Patient remained stable on bipap in ED but given the above will require admission for further management. Per ICU - recommending SDU for admission. HD stable at time of admission.

## 2021-11-17 NOTE — ED PROVIDER NOTE - CRITICAL CARE ATTENDING CONTRIBUTION TO CARE
56 year old male, pmhx as documented presenting with acute onset of worsening dyspnea x 2 days associated with fever and cough. History limited as patient required my immediate attention and intervention. In the field patient with O2 saturation 65% on RA which only improved to 80% on NRB.     Vital Signs: I have reviewed the initial vital signs.  Constitutional: Well-nourished, appears stated age, (+) severe respiratory distress  HEENT: Airway patent, moist MM, no erythema/swelling/deformity of oral structures. EOMI, PERRLA.  CV: regular rate, regular rhythm, well-perfused extremities, 2+ b/l DP and radial pulses equal.  Lungs: (+) b/l rhonchi and rales with (+) increased WOB  ABD: NTND, no guarding or rebound, no pulsatile mass, no hernias.   MSK: Neck supple, nontender, nl ROM, no stepoff. Chest nontender. Back nontender in TLS spine or to b/l bony structures or flanks. Ext nontender, nl rom, no deformity.   INTEG: Skin warm, dry, no rash.  NEURO: A&Ox3, normal strength, nl sensation throughout, normal speech.   PSYCH: Calm, cooperative, normal affect and interaction.    Patient grossly fluid overloaded on exam. Started on bipap with improvement of breathing. Will obtain EKG, CXR, labs, re-eval.

## 2021-11-17 NOTE — ED PROVIDER NOTE - OBJECTIVE STATEMENT
56y M pmh HTN, HLD, DM, ESRD, Anxiety, Bipolar, R BKA presents for eval of sob. Pt has worsening sob x2 days with associated cough, fever and chills, no aggravating or relieving factors. This morning pt was found to have spO2 65% on ra, placed on NRB with improvement to 80%.

## 2021-11-17 NOTE — ED ADULT NURSE NOTE - NS ED NURSE LEVEL OF CONSCIOUSNESS AFFECT
Detail Level: Simple Comment: 8 mm superficial infantile hemangioma without high risk features. Discussed with mother the natural course of infantile hemangiomas. Recommend active non intervention. Followup in 1 month or sooner, should it grow significantly. No need for timolol at this time. Calm

## 2021-11-17 NOTE — ED PROVIDER NOTE - CARE PLAN
Principal Discharge DX:	Acute respiratory failure with hypoxia  Secondary Diagnosis:	Hyperkalemia  Secondary Diagnosis:	Rhinovirus  Secondary Diagnosis:	End-stage renal disease (ESRD)   1

## 2021-11-17 NOTE — ED PROVIDER NOTE - PHYSICAL EXAMINATION
CONST: NAD  EYES: Blind in R eye.   ENT: No nasal discharge. Oropharynx normal appearing, no erythema or exudates. No abscess or swelling. Uvula midline.   NECK: Non-tender, no meningeal signs. normal ROM. supple   CARD: S1 S2; No jvd  RESP: Diffuse crackles, spO2 80% on NRB, placed on Bipap  GI: Soft, non-tender, non-distended. normal BS  MS: R BKA. pulses equal. no calf tenderness   SKIN: Warm, dry, no acute rashes. Good turgor  NEURO: A&Ox3, No focal deficits. Strength 5/5 with no sensory deficits. CONST: NAD  EYES: Blind in R eye.   ENT: No nasal discharge. Oropharynx normal appearing, no erythema or exudates. No abscess or swelling. Uvula midline.   NECK: Non-tender, no meningeal signs. normal ROM. supple   CARD: S1 S2; No jvd  RESP: Diffuse crackles, spO2 80% on NRB, placed on Bipap  GI: Soft, non-tender, non-distended. normal BS  MS: R BKA. pulses equal. no calf tenderness   SKIN: Bullae to L anterior LE. Warm, dry, no acute rashes. Good turgor  NEURO: A&Ox3, No focal deficits. Strength 5/5 with no sensory deficits.

## 2021-11-17 NOTE — CONSULT NOTE ADULT - ATTENDING COMMENTS
IMPRESSION:    Acute Hypoxemic Respiratory Failure  Acute hypercapnic respiratory failure resolved   Positive Entero/Rhinovirus Infection  Volume Overload  Probable HOMAR  Hyperkalemia  NSTEMI  Obesity  HO Left Loculated Pleural Effusion  HO ESRD noncompliant with HD    Plan as outlined above

## 2021-11-17 NOTE — CONSULT NOTE ADULT - SUBJECTIVE AND OBJECTIVE BOX
[place] [facility] INITIAL CONSULT NOTE  --------------------------------------------------------------------------------  HPI:  57yo M w/ pmhx of HTN, DM, ESRD on HD TTS at [facility] under the care of Dr Tejada, obstructive sleep apnea, chronic pain, bipolar disorder,R BKA presents for eval of sob. Pt has worsening sob x2 days with associated cough, fever and chills, no aggravating or relieving factors. This morning pt was found to have spO2 65% on ra, placed on NRB with improvement to 80%. In ER CXR shows B/L pleural effusions and potassium 6.9.    PAST HISTORY  --------------------------------------------------------------------------------  PAST MEDICAL & SURGICAL HISTORY:  Diabetes  End stage renal disease  HTN (hypertension)  Blind  Anemia, unspecified type  Pain disorder  Anxiety disorder, unspecified type  Obesity, unspecified classification, unspecified obesity type, unspecified whether serious comorbidity present  Bipolar affective disorder, remission status unspecified  Insomnia, unspecified type  Bilateral ocular hypertension  A-V fistula  LEFT ARM  S/P BKA (below knee amputation) unilateral  RIGHT  Status post glaucoma surgery  RIGHT EYE    FAMILY HISTORY:  Negative for kidney disease    SOCIAL HISTORY:  No current ETOH, tobacco, or illicit drug use  ALLERGIES & MEDICATIONS  --------------------------------------------------------------------------------  Allergies    No Known Allergies    Standing Inpatient Medications  albuterol/ipratropium for Nebulization.. 3 milliLiter(s) Nebulizer every 20 minutes  aspirin enteric coated 81 milliGRAM(s) Oral daily  brimonidine 0.2% Ophthalmic Solution 1 Drop(s) Both EYES two times a day  dorzolamide 2% Ophthalmic Solution 1 Drop(s) Both EYES three times a day  epoetin keagan-epbx (RETACRIT) Injectable 87855 Unit(s) IV Push <User Schedule>  furosemide   Injectable 80 milliGRAM(s) IV Push two times a day  gabapentin 600 milliGRAM(s) Oral three times a day  hydrALAZINE 25 milliGRAM(s) Oral three times a day  latanoprost 0.005% Ophthalmic Solution 1 Drop(s) Both EYES at bedtime  metoprolol tartrate 50 milliGRAM(s) Oral two times a day  NIFEdipine XL 90 milliGRAM(s) Oral daily  sertraline 100 milliGRAM(s) Oral daily  sevelamer carbonate 800 milliGRAM(s) Oral three times a day with meals  silver sulfADIAZINE 1% Cream 1 Application(s) Topical two times a day  simvastatin 10 milliGRAM(s) Oral at bedtime  tamsulosin 0.4 milliGRAM(s) Oral at bedtime  timolol 0.5% Solution 1 Drop(s) Both EYES <User Schedule>  traZODone 50 milliGRAM(s) Oral at bedtime    PRN Inpatient Medications  acetaminophen     Tablet .. 650 milliGRAM(s) Oral every 6 hours PRN  ALBUTerol    90 MICROgram(s) HFA Inhaler 2 Puff(s) Inhalation every 6 hours PRN  simethicone 80 milliGRAM(s) Chew three times a day PRN    REVIEW OF SYSTEMS  --------------------------------------------------------------------------------  Skin: No rashes  Head/Eyes/Ears/Mouth: No headache; No sinus pain/discomfort, sore throat  Respiratory: No wheezing, hemoptysis  CV: No chest pain  GI: No abdominal pain, diarrhea, constipation, nausea, vomiting, melena, hematochezia  : No increased frequency, dysuria, hematuria, nocturia  All other systems were reviewed and are negative, except as noted.    VITALS/PHYSICAL EXAM  --------------------------------------------------------------------------------  T(C): 36 (11-17-21 @ 06:50), Max: 36 (11-17-21 @ 06:50)  HR: 60 (11-17-21 @ 06:50) (60 - 60)  BP: 142/58 (11-17-21 @ 06:50) (142/58 - 142/58)  RR: 20 (11-17-21 @ 06:50) (20 - 20)  SpO2: 98% (11-17-21 @ 07:07) (82% - 98%)  Height (cm): 185.4 (11-17-21 @ 06:50)    Physical Exam:  	Gen: NAD on bipap  	Pulm:  B/L rales  	CV: RRR, S1S2  	Back: No CVA tenderness; no sacral edema  	Abd: +BS, soft, nontender/nondistended  	: No suprapubic tenderness  	LE: Warm, edema  	Neuro: AAO x3  	Vascular access: AVF    LABS/STUDIES  --------------------------------------------------------------------------------              9.3    13.18 >-----------<  178      [11-17-21 @ 07:08]              32.3     139  |  98  |  70  ----------------------------<  61      [11-17-21 @ 07:08]  6.9   |  21  |  8.0        Ca     7.8     [11-17-21 @ 07:08]    TPro  6.4  /  Alb  3.5  /  TBili  0.3  /  DBili  x   /  AST  10  /  ALT  8   /  AlkPhos  164  [11-17-21 @ 07:08]    PT/INR: PT 13.70, INR 1.19       [11-17-21 @ 07:08]  PTT: 38.8       [11-17-21 @ 07:08]    Troponin 0.23      [11-17-21 @ 08:28]    Creatinine Trend:  SCr 8.0 [11-17 @ 07:08]    Urinalysis - [05-31-18 @ 16:45]      Color Yellow / Appearance Clear / SG 1.015 / pH 7.5      Gluc Negative / Ketone Negative  / Bili Negative / Urobili 0.2       Blood Trace-lysed / Protein 100 / Leuk Est Negative / Nitrite Negative      RBC 1-2 / WBC 1-2 / Hyaline  / Gran  / Sq Epi  / Non Sq Epi Occasional / Bacteria See Note    HbA1c 8.6      [06-01-18 @ 07:22]

## 2021-11-17 NOTE — H&P ADULT - ASSESSMENT
57yo M w/ pmhx of HTN, HLD, DM, ESRD on HD TTS, HOMAR, BPH, PVD s/p R BKA, glaucoma presents to ED from [facility] for SOB for two days. Admitted and upgraded to ICU for AHRF secondary to fluid overload from missed dialysis session. Patient wishes to be DNR/DNI.     #acute hypoxemic respiratory failure secondary to fluid overload from missed dialysis session   #acute hypercapnic respiratory failure resolved   #ESRD on HD TTS   - O2 82% RA; improved to 100% on bipap 12/6 FIO2 100%  - initial vbg CO2 68; repeat ABG on bipap wnl: pH 7.39, pCO2 37, HCO3 22, pO2 171  - CXR wnl   - c/w bipap and wean; may need HFNC   - repeat CXR in am; get echo   - start lasix 80mg IV bid (on 80mg po bid at NH)   - upgraded to ICU   - patient wishes to be DNR/DNI (molst filled)   - nephro consult; spoke to Dr. Owens, will see the patient and likely dialyze today     #enterovirus infection   - WBC 13, RVP + for enterovirus   - caused him to miss his dialysis session Tuesday 11/16  - get procal   - supportive care     #elevated troponemia likely secondary to ESRD  - trop 0.23; this is around his baseline (0.29-0.31)   - EKG: sinus ton, no ischemic changes   - trend trops     #hyperkalemia secondary to missed dialysis session  - K 6.9   - given calcium gluconate, insulin, D50 x 2, albuterol   - repeat bmp 11am and 8pm   - f/u nephro for dialysis    #HTN  - on procardia 90mg daily, lopressor 50mg bid, hydralazine 25mg TID  - c/w meds on holding parameters (including lopressor for sinus ton)    #PVD s/p R BKA   #HLD  - c/w simvastatin 10mg hs     #HOMAR  - unclear if on cpap  - c/w bipap here for now     #HLD  - c/w simvastatin    #glaucoma: c/w quadruple therapy eye drops     #mood disorder  #insomnia  - hold home CNS depressants ambien 10mg and melatonin 10mg until patient's respiratory status is more stable  - c/w trazodone and sertraline     DVT ppx: hep subq   GI ppx: protonix IV due to critical care   Diet: DASH fluid restriction   Code Status: DNR/DNI  [facility]; upgrade/admit to ICU; f/u nephro, trend K and trops, echo    57yo M w/ pmhx of HTN, HLD, DM, ESRD on HD TTS, HOMAR, BPH, PVD s/p R BKA, glaucoma presents to ED from [facility] for SOB for two days. Admitted and upgraded to ICU for AHRF secondary to fluid overload from missed dialysis session. Patient wishes to be DNR/DNI.     #acute hypoxemic respiratory failure secondary to fluid overload from missed dialysis session   #acute hypercapnic respiratory failure resolved   #ESRD on HD TTS   - O2 82% RA; improved to 100% on bipap 12/6 FIO2 100%  - initial vbg CO2 68; repeat ABG on bipap wnl: pH 7.39, pCO2 37, HCO3 22, pO2 171  - CXR wnl   - c/w bipap and wean; may need HFNC   - repeat CXR in am; get echo   - on home lasix 80mg po bid; hold and c/w dialysis as per nephro   - upgraded to ICU   - patient wishes to be DNR/DNI (molst filled)   - nephro consult; spoke to Dr. Owens, will see the patient and likely dialyze today   - strict i/o, daily weights, fluid restriction     #enterovirus infection   - WBC 13, RVP + for enterovirus   - caused him to miss his dialysis session Tuesday 11/16  - get procal   - supportive care     #elevated troponemia likely secondary to ESRD  - trop 0.23; this is around his baseline (0.29-0.31)   - EKG: sinus ton, no ischemic changes   - trend trops     #hyperkalemia secondary to missed dialysis session  - K 6.9   - given calcium gluconate, insulin, D50 x 2, albuterol   - repeat bmp 11am and 8pm   - f/u nephro for dialysis    #HTN  - on procardia 90mg daily, lopressor 50mg bid, hydralazine 25mg TID  - c/w meds on holding parameters (including lopressor for sinus ton)    #T2DM  - on home basaglar 30u hs  - FS AC/HS, get a1c  - due to hypoglycemia due to poor po intake and now requiring intermittent bipap, start lantus 15u hs and titrate up if needed     #PVD s/p R BKA   #HLD  - c/w simvastatin 10mg hs     #HOMAR  - unclear if on cpap  - c/w bipap here for now     #HLD  - c/w simvastatin    #glaucoma: c/w quadruple therapy eye drops     #mood disorder  #insomnia  - hold home CNS depressants ambien 10mg and melatonin 10mg until patient's respiratory status is more stable  - c/w trazodone and sertraline     DVT ppx: hep subq   GI ppx: protonix IV due to critical care   Diet: DASH/renal/CC/fluid restriction   Code Status: DNR/DNI  [facility]; upgrade/admit to ICU; f/u nephro, trend K and trops, echo    57yo M w/ pmhx of HTN, HLD, DM, ESRD on HD TTS, HOMAR, BPH, PVD s/p R BKA, glaucoma presents to ED from [facility] for SOB for two days. Admitted and upgraded to ICU for AHRF secondary to fluid overload from missed dialysis session. Patient wishes to be DNR/DNI.     #acute hypoxemic respiratory failure secondary to fluid overload from missed dialysis session   #acute hypercapnic respiratory failure resolved   #ESRD on HD TTS   - O2 82% RA; improved to 100% on bipap 12/6 FIO2 100%  - initial vbg CO2 68; repeat ABG on bipap wnl: pH 7.39, pCO2 37, HCO3 22, pO2 171  - CXR wnl   - repeat CXR in am; get echo   - c/w bipap and wean; may need HFNC   - upgraded to ICU; patient wishes to be DNR/DNI (molst filled)   - nephro consult; spoke to Dr. Owens, will see the patient and likely dialyze today   - on home lasix 80mg po bid and metolazone 5mg daily; hold and c/w dialysis as per nephro   - strict i/o, daily weights, fluid restriction     #enterovirus infection   - WBC 13, RVP + for enterovirus   - caused him to miss his dialysis session Tuesday 11/16  - get procal   - supportive care     #elevated troponemia likely secondary to ESRD  - trop 0.23; this is around his baseline (0.29-0.31)   - EKG: sinus ton, no ischemic changes   - trend trops     #hyperkalemia secondary to missed dialysis session  - K 6.9   - given calcium gluconate, insulin, D50 x 2, albuterol   - repeat bmp 11am and 8pm   - f/u nephro for dialysis    #HTN  - on procardia 90mg daily, lopressor 50mg bid, hydralazine 25mg TID  - c/w meds on holding parameters (including lopressor for sinus ton)    #T2DM  - on home basaglar 30u hs  - FS AC/HS, get a1c  - due to hypoglycemia due to poor po intake and now requiring intermittent bipap, start lantus 15u hs and titrate up if needed     #PVD s/p R BKA   #HLD  - c/w simvastatin 10mg hs     #HOMAR  - unclear if on cpap  - c/w bipap here for now     #HLD  - c/w simvastatin    #glaucoma: c/w quadruple therapy eye drops     #mood disorder  #insomnia  - hold home CNS depressants ambien 10mg and melatonin 10mg until patient's respiratory status is more stable  - c/w trazodone and sertraline     DVT ppx: hep subq   GI ppx: protonix IV due to critical care   Diet: DASH/renal/CC/fluid restriction   Code Status: DNR/DNI  [facility]: [facility]; upgrade/admit to ICU; f/u nephro, trend K and trops, echo

## 2021-11-17 NOTE — ED ADULT NURSE NOTE - NSFALLRSKASSESSDT_ED_ALL_ED
Procedure: Lumbar Medial Branch Block under Fluoroscopic Guidance    Side: bilateral     Level:  Sacral ala (Corresponding to the L5 dorsal ramus), L5 transverse process (Corresponding to the L4 medial branch) and L4 transverse process (Corresponding to the L3 medial branch)     PROCEDURE DATE: 10/29/2019    Pre-operative Diagnosis: Lumbar Spondylosis  Post-operative Diagnosis: Lumbar Spondylosis    Provider: GARY Allen MD  Assistant(s): none    Anesthesia: Local, IV Sedation    >> 1 mg of VERSED    >> 25 mcg of FENTANYL     Indication: Low back pain without radiculopathy. Symptoms unresponsive to conservative treatments. Fluoroscopy was used to optimize visualization of needle placement and to maximize safety.     Procedure Description / Technique:  The patient was seen and identified in the preoperative area. Risks, benefits, complications, and alternatives were discussed with the patient. The patient agreed to proceed with the procedure and signed the consent. The site and side of the procedure was identified and marked. An iv was started.     The patient was taken to the procedural suite and positioned in prone orientation on the procedure table. A pillow was placed under the abdomen to reduce lumbar lordosis. A time out was performed. The procedure, site, side, and allergies were stated and agreed to by all present. The lumbosacral area was widely prepped with ChloraPrep. The procedural site was draped in usual sterile fashion. Vital signs were closely monitored throughout this procedure. Conscious sedation was used for this procedure to decrease patient anxiety.    The Left sacral ala and superior articular process was identified and marked on AP fluoroscopic imaging. Subcutaneous tissues were localized using 1% PF Lidocaine to improve patient comfort. A 25 gauge 3.5 inch spinal needle was advance until the needle rested on OS at the interface of the sacral ala and the base of the sacral superior  articular process. After negative aspiration, 1ml of 2% lidocaine was injected. No pain or paresthesia was noted on injection. After bilateral injection, the fluoroscope was rotated into the oblique view and the spinal needle was advanced towards the eye of the kamille dog until os was contacted. 1ml of 2% lidocaine was injected after negative aspiration. No pain or paresthesia was noted. This technique was repeated at each of the above noted levels. The spinal needle was removed intact following injection at each targeted site. The stylet was replaced prior to needle removal at each site.    Description of Findings: Not applicable    Prosthetic devices, grafts, tissues, or devices implanted: None    Specimen Removed: No    ESTIMATED BLOOD LOSS: minimal    COMPLICATIONS: None    DISPOSITION / PLANS: The patient was transferred to the recovery area in a stable condition for observation. The patient was reexamined prior to discharge. There was no evidence of acute neurologic injury following the procedure.  Patient was discharged from the recovery room after meeting discharge criteria. Home discharge instructions were given to the patient by the staff.     17-Nov-2021 07:11

## 2021-11-18 NOTE — PROGRESS NOTE ADULT - ASSESSMENT
1. SOB. Likely volume mediated. HD today to remove 3-4L.   2. ESRD on HD TTS. HD today: 3 hours, opti 160 dialyzer, 2K bath, 3-4L UF.  3. Anemia. EPO with HD.  4. Hyperphosphatemia. Renal diet. Sevelamer with meals.  5. Hyperkalemia. HD today. Renal diet.

## 2021-11-18 NOTE — CHART NOTE - NSCHARTNOTEFT_GEN_A_CORE
Transfer Note    Transfer from: ICU  Transfer to: SDU      HOSPITAL COURSE:  55yo M w/ pmhx of HTN, HLD, DM, ESRD on HD TTS, HOMAR, BPH, PVD s/p R BKA, glaucoma presents to ED from [facility] for SOB for two days. Patient has worsening SOB for two days with associated cough/fever/chills. Patient felt flu-like symptoms which made him miss dialysis yesterday (tuesday). Patient then desaturated to 65% on 3L NC which prompted nursing home to send him to the ED. Denies any chest pain or palpitations.     ED course:   vitals: /58, HR 60, T 96.8F, O2 82% RA   labs: WBC 13.18, hgb 9.3, K 6.9, trop 0.23, enterovirus +,   imaging:   - CXR: cardiomegaly w/ b/l pleural eff   given: calcium gluconate 2g IV, albuterol, insulin, D50 x 2  progress: O2 99% on bipap 12/6 FIO2 100%. ABG wnl. Patient has poor mental status but wakes up to deep sternal rub. He states he wants to be DNR/DNI. However, patient upgraded to ICU. Spoke to Dr. Owens (nephro) and he will see the patient and dialyze him .     ASSESSMENT & PLAN:     55yo M w/ pmhx of HTN, HLD, DM, ESRD on HD TTS, HOMAR, BPH, PVD s/p R BKA, glaucoma presents to ED from [facility] for SOB for two days. Admitted and upgraded to ICU for AHRF secondary to fluid overload from missed dialysis session. Patient wishes to be DNR/DNI.     #acute hypoxemic respiratory failure secondary to fluid overload from missed dialysis session   #acute hypercapnic respiratory failure resolved   #ESRD on HD TTS   - O2 82% RA; improved to 100% on bipap 12/6 FIO2 100% ,currently on 3L NC sat 98%  - initial vbg CO2 68; repeat ABG on bipap wnl: pH 7.39, pCO2 37, HCO3 22, pO2 171  - CXR wnl   - repeat CXR in am; get echo   - c/w bipap and wean; may need HFNC   - upgraded to ICU; patient wishes to be DNR/DNI (powerst filled) ,then patient changed his mind ,molst voided.  - nephro consult; f/u with Dr. Owens, was dialyzed yesterday.  - on home lasix 80mg po bid and metolazone 5mg daily; hold and c/w dialysis as per nephro   - strict i/o, daily weights, fluid restriction     #enterovirus infection   - WBC 13, RVP + for enterovirus   - caused him to miss his dialysis session Tuesday 11/16  - f/u procal   - supportive care     #elevated troponemia likely secondary to ESRD  - trop 0.23 >0.21>0.24>0.26 ; this is around his baseline (0.29-0.31)   - EKG: sinus ton, no ischemic changes   - trend trops     #hyperkalemia secondary to missed dialysis session  - K 6.9 on admission>>5.4 on last read  - underwent HD yesterday 3-4L removed  - given calcium gluconate, insulin, D50 x 2, albuterol    - f/u nephro for dialysis    #HTN  - on procardia 90mg daily, lopressor 50mg bid, hydralazine 25mg TID  - c/w meds on holding parameters (including lopressor for sinus ton)    #T2DM  - on home basaglar 30u hs  - FS AC/HS, get a1c  - due to hypoglycemia due to poor po intake and now requiring intermittent bipap, start lantus 15u hs and titrate up if needed     #PVD s/p R BKA   #HLD  - c/w simvastatin 10mg hs     #HOMAR  - unclear if on cpap  - c/w bipap here for now     #HLD  - c/w simvastatin    #glaucoma: c/w quadruple therapy eye drops     #mood disorder  #insomnia  - hold home CNS depressants ambien 10mg and melatonin 10mg until patient's respiratory status is more stable  - c/w trazodone and sertraline     DVT ppx: hep subq   GI ppx: protonix IV due to critical care   Diet: DASH/renal/CC/fluid restriction   Code Status: full code  [facility]; upgrade/admit to ICU; f/u nephro, trend K and trops, echo         For Follow-Up:  nephro for HD ,procal ,blood cx

## 2021-11-18 NOTE — PROGRESS NOTE ADULT - ASSESSMENT
IMPRESSION:    Acute Hypoxemic Respiratory Failure SP hd  Positive Entero/Rhinovirus Infection  Volume Overload  Probable HOMAR  Hyperkalemia  NSTEMI  Obesity  HO Left Loculated Pleural Effusion  HO ESRD noncompliant with HD    PLAN:    CNS: avoid depressant    HEENT: Oral care    PULMONARY:  HOB @ 45 degrees.  Aspiration precautions. target SaO2 92-96%, NIV 4 hours on and off and at bedtime. Pulmonary toilet. Incentive Spirometry,    CARDIOVASCULAR: avoid volume overload, 2D-Echo, Trend CE, Cardiology evaluation    GI: GI prophylaxis.  Feeding.  Bowel regimen     RENAL:  Follow up lytes.  Correct as needed. Repeat BMP, HD per Nephrology.  SP K therapy     INFECTIOUS DISEASE: Follow up cultures, monitor off antibiotics.  Procal.      HEMATOLOGICAL:  DVT prophylaxis.    ENDOCRINE:  Follow up FS.  Insulin protocol if needed.    MUSCULOSKELETAL: Bed chair     ICU monitoring

## 2021-11-19 NOTE — DIETITIAN INITIAL EVALUATION ADULT. - PHYSCIAL ASSESSMENT
Cognition: AAOx4  Skin: stg 3 to sacrum, stg 2 to L big toe, stg 3 to R butt  GI: abdomen obese; no BM recorded since admission

## 2021-11-19 NOTE — ADVANCED PRACTICE NURSE CONSULT - ASSESSMENT
Patient is a 57yo M w/ pmhx of HTN, HLD, DM, ESRD on HD TTS, HOMAR, BPH, PVD s/p R BKA, glaucoma presents to ED from [facility] for SOB for two days. Patient has worsening SOB for two days with associated cough/fever/chills. Patient felt flu-like symptoms which made him miss dialysis yesterday (tuesday). Patient then desaturated to 65% on 3L NC which prompted nursing home to send him to the ED. Denies any chest pain or palpitations.   ED course:   vitals: /58, HR 60, T 96.8F, O2 82% RA   labs: WBC 13.18, hgb 9.3, K 6.9, trop 0.23, enterovirus +,   imaging:   - CXR: cardiomegaly w/ b/l pleural eff   given: calcium gluconate 2g IV, albuterol, insulin, D50 x 2  progress: O2 99% on bipap 12/6 FIO2 100%. ABG wnl. Patient has poor mental status but wakes up to deep sternal rub. He states he wants to be DNR/DNI. However, patient upgraded to ICU. Spoke to Dr. Owens (nephro) and he will see the patient and likely dialyze him today.     PAST MEDICAL & SURGICAL HISTORY:  Diabetes  End stage renal disease  HTN (hypertension)  Blind  Anemia, unspecified type  Pain disorder  Anxiety disorder, unspecified type  Obesity, unspecified classification, unspecified obesity type, unspecified whether serious comorbidity present  Bipolar affective disorder, remission status unspecified  Insomnia, unspecified type  Bilateral ocular hypertension  Dialysis patient  Tuesday- Thursday-Saturday  A-V fistula  LEFT ARM  S/P BKA (below knee amputation) unilateral  RIGHT  Status post glaucoma surgery  RIGHT EYE    Assessment:  Patient received in bed  ,  A/O responds appropriately   to verbal command                       Skin assessed- B/l  buttock  healed ulceration  with epithelium present                                            L shin and big toe  opened blister                                            No pressure injury noted at time of assessment     Wound #1  Location: ****  Size: Length: **** Width: **** Depth: ****    Tissue Description (Place "x" if applicable/present):   [ ] Necrotic   [ ] Slough   [ ] Infected   [ ] Granulation (firm, beefy red tissue)   [ ] Hypergranulation (soft, gelatinous)  [ ] Poor-Quality Granulation (pale, grey/brown/red granulation tissue)   [ ] Epithelium (pink/mauve at wound edges)  [ ] Macerated  [ ] Other: _______  Wound Exudate :   Wound Edge):   [ ] Epithelisation [ ] Maceration [ ] Dehydration [ ] Undermining (use clock position) [ ] Rolled Edges [ ] Other: _____  Periwound Condition (area that extends 4cm from the edge of the wound):   [ ] Maceration [ ] Excoriation [ ] Dry skin [ ] Hyperkeratosis [ ] Callus [ ] Eczema [ ] Other: _______    Pressure due to: [ ] Immobility [ ] Medical Device   [ ] Stage I  [ ]  Stage II  [ ]  Stage III  [ ]  Stage VI  [ ]  Suspected Deep Tissue Injury (DTI)  [ ]  Unstageable    Other Etiology:  [ ] Aterial  [ ] Venous   [ ] Surgical Incision  [ ] Other: ________

## 2021-11-19 NOTE — DIETITIAN INITIAL EVALUATION ADULT. - OTHER CALCULATIONS
Est kcal needs based on MSJ with no stress factor to aid weight management. Est protein needs based on obesity, also equivalent to 2g/kg IBW. Pt on 1.2l fluid restriction.

## 2021-11-19 NOTE — PROGRESS NOTE ADULT - ATTENDING COMMENTS
57yo M w/ pmhx of HTN, HLD, DM, ESRD on HD TTS, HOMAR, BPH, PVD s/p R BKA, glaucoma presents to ED from [facility] for SOB for two days.      #Acute hypoxemic respiratory failure secondary to fluid overload from missed dialysis session   #Acute hypercapnic respiratory failure resolved   #ESRD on HD TTS   - c/w HD as tolerated, so far 7L removed, plan for 3-4L removal in AM   - c/w supplemental oxygen     #enterovirus infection   - RVP + for enterovirus   - supportive care     #elevated troponemia likely secondary to ESRD  - no chest pain   - EKG: sinus ton, no ischemic changes     #hyperkalemia secondary to missed dialysis session  - renal diet     #HTN  - on procardia 90mg daily, lopressor 50mg bid, hydralazine 25mg TID  - c/w meds on holding parameters (including lopressor for sinus ton)    #T2DM  - monitor     #PVD s/p R BKA   #HLD  - c/w simvastatin 10mg hs     #HOMAR  - unclear if on cpap  - c/w bipap here for now     #HLD  - c/w simvastatin    #mood disorder  #insomnia  - Resume ambien 10mg and melatonin 10mg   - c/w trazodone and sertraline     DVT ppx: hep subq      Pending: fluid overload improvement

## 2021-11-19 NOTE — PROGRESS NOTE ADULT - ASSESSMENT
55yo M w/ pmhx of HTN, HLD, DM, ESRD on HD TTS, HOMAR, BPH, PVD s/p R BKA, glaucoma presents to ED from [facility] for SOB for two days. Admitted and upgraded to ICU for AHRF secondary to fluid overload from missed dialysis session. Patient wishes to be DNR/DNI.     #Acute hypoxemic respiratory failure secondary to fluid overload from missed dialysis session   #Acute hypercapnic respiratory failure resolved   #ESRD on HD TTS   - initial vbg CO2 68; repeat ABG on bipap wnl: pH 7.39, pCO2 37, HCO3 22, pO2 171  - CXR - diffuse b/l opacities; BCx neg  - f/u echo  - currently on BiPAP overnight; 4L HFNC O2 during day within sat goal of 88-92  - on home lasix 80mg po bid and metolazone 5mg daily; hold and c/w dialysis as per nephro   - strict i/o, daily weights, fluid restriction   - MRSA nares +; started on mupirocin    #enterovirus infection   - WBC 13, RVP + for enterovirus   - caused him to miss his dialysis session Tuesday 11/16  - supportive care     #elevated troponemia likely secondary to ESRD  - trop 0.23 >0.21>0.24>0.26 ; this is around his baseline (0.29-0.31)   - EKG: sinus ton, no ischemic changes     #hyperkalemia secondary to missed dialysis session  - K 6.9 on admission>>5.4 on last read  - underwent HD w/ 3-4L removed  - given calcium gluconate, insulin, D50 x 2, albuterol    - following up with nephro for dialysis    #HTN  - on procardia 90mg daily, lopressor 50mg bid, hydralazine 25mg TID  - c/w meds on holding parameters (including lopressor for sinus ton)    #T2DM  - on home basaglar 30u hs  - FS AC/HS,  a1c 5.1  - due to hypoglycemia due to poor po intake and now requiring intermittent bipap, started on lantus 15u hs and titrate up if needed     #PVD s/p R BKA   #HLD  - c/w simvastatin 10mg hs     #HOMAR  - unclear if on cpap  - c/w bipap here for now     #HLD  - c/w simvastatin    #glaucoma: c/w quadruple therapy eye drops     #mood disorder  #insomnia  - hold home CNS depressants ambien 10mg and melatonin 10mg until patient's respiratory status is more stable  - c/w trazodone and sertraline     DVT ppx: hep subq   GI ppx: protonix   Diet: DASH/renal/CC/fluid restriction   Code Status: full code  [facility]: [facility]       57yo M w/ pmhx of HTN, HLD, DM, ESRD on HD TTS, HOMAR, BPH, PVD s/p R BKA, glaucoma presents to ED from [facility] for SOB for two days. Admitted and upgraded to ICU for AHRF secondary to fluid overload from missed dialysis session. Patient wishes to be DNR/DNI.     #Acute hypoxemic respiratory failure secondary to fluid overload from missed dialysis session   #Acute hypercapnic respiratory failure resolved   #ESRD on HD TTS   - initial vbg CO2 68; repeat ABG on bipap wnl: pH 7.39, pCO2 37, HCO3 22, pO2 171  - due to missed HD session, have had to HD on 11/17, 11/18  - CXR - diffuse b/l opacities; BCx neg  - f/u echo  - currently on BiPAP overnight; 4L HFNC O2 during day within sat goal of 88-92  - on home lasix 80mg po bid and metolazone 5mg daily; hold and c/w dialysis as per nephro   - strict i/o, daily weights, fluid restriction   - MRSA nares +; started on mupirocin    #enterovirus infection   - WBC 13, RVP + for enterovirus   - caused him to miss his dialysis session Tuesday 11/16  - supportive care     #elevated troponemia likely secondary to ESRD  - trop 0.23 >0.21>0.24>0.26 ; this is around his baseline (0.29-0.31)   - EKG: sinus ton, no ischemic changes     #hyperkalemia secondary to missed dialysis session  - K 6.9 on admission>>5.4 on last read  - underwent HD w/ 3-4L removed  - given calcium gluconate, insulin, D50 x 2, albuterol    - following up with nephro for dialysis    #HTN  - on procardia 90mg daily, lopressor 50mg bid, hydralazine 25mg TID  - c/w meds on holding parameters (including lopressor for sinus ton)    #T2DM  - on home basaglar 30u hs  - FS AC/HS,  a1c 5.1  - due to hypoglycemia due to poor po intake and now requiring intermittent bipap, started on lantus 15u hs and titrate up if needed     #PVD s/p R BKA   #HLD  - c/w simvastatin 10mg hs     #HOMAR  - unclear if on cpap  - c/w bipap here for now     #HLD  - c/w simvastatin    #glaucoma: c/w quadruple therapy eye drops     #mood disorder  #insomnia  - hold home CNS depressants ambien 10mg and melatonin 10mg until patient's respiratory status is more stable  - c/w trazodone and sertraline     DVT ppx: hep subq   GI ppx: protonix   Diet: DASH/renal/CC/fluid restriction   Code Status: full code  [facility]po: [facility]

## 2021-11-19 NOTE — DIETITIAN INITIAL EVALUATION ADULT. - OTHER INFO
Pertinent Medical Interventions  #acute hypoxemic respiratory failure secondary to fluid overload from missed dialysis session   #acute hypercapnic respiratory failure resolved   #ESRD on HD TTS   - c/w bipap and wean; may need HFNC   - on home lasix 80mg po bid and metolazone 5mg daily; hold and c/w dialysis as per nephro   - strict i/o, daily weights, fluid restriction   #enterovirus infection   #elevated troponemia likely secondary to ESRD  #hyperkalemia secondary to missed dialysis session  - K 6.9 on admission>>5.4 on last read  - underwent HD yesterday 3-4L removed  #T2DM  - on home basaglar 30u hs  - FS AC/HS, get a1c  - due to hypoglycemia due to poor po intake and now requiring intermittent bipap, start lantus 15u hs and titrate up if needed     Pertinent nutrition information  States he was on Zofran at NH and not at [facility]. He endorses vomiting but also states he did not have a vomiting episode in-house. Also endorses constipation and reports last BM was 3-4 days ago. States he had 50% intake from last meal. C/o discrepancies in renal diet at different facilities, especially with potato provision. RD explained renal diet is needed to recover from his condition and ordering takeouts like what he did at NH is his personal choice.

## 2021-11-19 NOTE — DIETITIAN INITIAL EVALUATION ADULT. - ORAL INTAKE PTA/DIET HISTORY
Pt seen at bedside. Reports normal intake PTA at NH, would order takeouts. He was receiving potato dishes/sides at NH and states he had never threatened in order to get them, but implies he has requested for them. Wt has been fluctuating. Reports his dry weight is 137kg. No food allergy/intolerance. No supplement use. No chewing/swallowing issues.

## 2021-11-19 NOTE — DIETITIAN INITIAL EVALUATION ADULT. - ADD RECOMMEND
lyte corrections and RRT per team;  obtain and record pre and post HD weight;  insulin for BG target 140-180

## 2021-11-19 NOTE — ADVANCED PRACTICE NURSE CONSULT - RECOMMEDATIONS
Plan: Apply Xeroform with non adhering dressing to open blister   Continue Pressure ulcer preventive  measures  Continue skin care   Assess wound and inform primary provider of any changes   Case discussed with primary Rn  Wound/ ostomy specialist  to f/u as needed     Offloading: [x ] Frequent position changes [ ] Devices/Equipment  Cleansing: [ ] Saline [x ] Soap/Water [ ] Other: ______  Topicals: [x ] Barrier Cream [ ] Antimicrobial [ ] Enzymatic Wound Debridement  Dressings: [ ] Dry, sterile [ ] Foam [ ] Absorbant Pads [ ] Collagenase

## 2021-11-19 NOTE — PROGRESS NOTE ADULT - ASSESSMENT
1. SOB. Likely volume mediated. HD tomorrow to remove 3-4L.   2. ESRD on HD TTS. HD tomorrow: 3 hours, opti 160 dialyzer, 2K bath, 3-4L UF.  3. Anemia. EPO with HD.  4. Hyperphosphatemia. Renal diet. Sevelamer with meals.  5. Hyperkalemia. Resolved. Renal diet.

## 2021-11-20 NOTE — PROGRESS NOTE ADULT - ATTENDING COMMENTS
55yo M w/ pmhx of HTN, HLD, DM, ESRD on HD TTS, HOMAR, BPH, PVD s/p R BKA, glaucoma [facility]      #Acute hypoxemic respiratory failure secondary to fluid overload from missed dialysis session   #Acute hypercapnic respiratory failure resolved   #ESRD on HD TTS   - c/w HD as tolerated, plan for HD today   - c/w supplemental oxygen     #enterovirus infection   - RVP + for enterovirus   - supportive care     #elevated troponemia likely secondary to ESRD  - no chest pain   - EKG: sinus ton, no ischemic changes     #hyperkalemia secondary to missed dialysis session  - renal diet     #HTN  - on procardia 90mg daily, lopressor 50mg bid, hydralazine 25mg TID  - c/w meds on holding parameters (including lopressor for sinus ton)    #T2DM  - monitor     #PVD s/p R BKA   #HLD  - c/w simvastatin 10mg hs     #HOMAR  - unclear if on cpap  - c/w bipap here for now     #HLD  - c/w simvastatin    #mood disorder  #insomnia  - Resume ambien 10mg and melatonin 10mg   - c/w trazodone and sertraline     DVT ppx: hep subq      Pending: fluid overload improvement

## 2021-11-20 NOTE — PHYSICAL THERAPY INITIAL EVALUATION ADULT - GENERAL OBSERVATIONS, REHAB EVAL
Patient encountered sitting on edge of bed. Agreed to participate in therapy evaluation Patient encountered sitting on edge of bed. Agreed to participate in therapy evaluation. Patient on O2 via NC 3 LPM. No SOB noted.

## 2021-11-20 NOTE — PROGRESS NOTE ADULT - ASSESSMENT
1. SOB. Likely volume mediated. HD today to remove 4L.   2. ESRD on HD TTS. HD today: 3 hours, opti 160 dialyzer, 2K bath, 4L UF.  3. Anemia. EPO with HD.  4. Hyperphosphatemia. Renal diet. Sevelamer with meals.  5. Hyperkalemia. Resolved. Renal diet.

## 2021-11-20 NOTE — PROGRESS NOTE ADULT - ASSESSMENT
55yo M w/ pmhx of HTN, HLD, DM, ESRD on HD TTS, HOMAR, BPH, PVD s/p R BKA, glaucoma presents to ED from [facility] for SOB for two days. Admitted and upgraded to ICU for AHRF secondary to fluid overload from missed dialysis session. Patient wishes to be DNR/DNI.     #Acute hypoxemic respiratory failure secondary to fluid overload from missed dialysis session   #Acute hypercapnic respiratory failure resolved   #ESRD on HD TTS   - initial vbg CO2 68; repeat ABG on bipap wnl: pH 7.39, pCO2 37, HCO3 22, pO2 171  - due to missed HD session, have had to HD on 11/17, 11/18  - CXR - diffuse b/l opacities; BCx neg  - f/u echo  - currently on BiPAP overnight; 4L HFNC O2 during day within sat goal of 88-92  - on home lasix 80mg po bid and metolazone 5mg daily; hold and c/w dialysis as per nephro   - strict i/o, daily weights, fluid restriction   - MRSA nares +; started on mupirocin    #enterovirus infection   - WBC 13, RVP + for enterovirus   - caused him to miss his dialysis session Tuesday 11/16  - supportive care     #elevated troponemia likely secondary to ESRD  - trop 0.23 >0.21>0.24>0.26 ; this is around his baseline (0.29-0.31)   - EKG: sinus ton, no ischemic changes     #hyperkalemia secondary to missed dialysis session  - K 6.9 on admission>>5.4 on last read  - underwent HD w/ 3-4L removed  - given calcium gluconate, insulin, D50 x 2, albuterol    - following up with nephro for dialysis    #HTN  - on procardia 90mg daily, lopressor 50mg bid, hydralazine 25mg TID  - c/w meds on holding parameters (including lopressor for sinus ton)    #T2DM  - on home basaglar 30u hs  - FS AC/HS,  a1c 5.1  - due to hypoglycemia due to poor po intake and now requiring intermittent bipap, started on lantus 15u hs and titrate up if needed     #PVD s/p R BKA   #HLD  - c/w simvastatin 10mg hs     #HOMAR  - unclear if on cpap  - c/w bipap here for now     #HLD  - c/w simvastatin    #glaucoma: c/w quadruple therapy eye drops     #mood disorder  #insomnia  - will start home med ambien after checking pt's physical charts - went to dialysis this morning  - c/w melatonin  - c/w trazodone and sertraline     DVT ppx: hep subq   GI ppx: protonix   Diet: DASH/renal/CC/fluid restriction   Code Status: full code  [facility]po: [facility]

## 2021-11-20 NOTE — PHYSICAL THERAPY INITIAL EVALUATION ADULT - PERTINENT HX OF CURRENT PROBLEM, REHAB EVAL
55yo M w/ pmhx of HTN, HLD, DM, ESRD on HD TTS, HOMAR, BPH, PVD s/p R BKA, glaucoma presents to ED from [facility] for SOB for two days. Patient has worsening SOB for two days with associated cough/fever/chills. Patient admitted for acute hypoxemic respiratory failure

## 2021-11-20 NOTE — PHYSICAL THERAPY INITIAL EVALUATION ADULT - ADDITIONAL COMMENTS
Patient is a long term resident of [facility]. As per patient his prosthesis is not fitting him and is with prosthetist for size change. Patient claims he has not used prosthesis for 2 months. Patient was assisted by NH staff in ADL and transfers to wheelchair.

## 2021-11-21 NOTE — PROGRESS NOTE ADULT - ASSESSMENT
1. SOB. Resolved.   2. ESRD on HD TTS. HD Tuesday: 3 hours, opti 160 dialyzer, 2K bath, 4L UF.  3. Anemia. EPO with HD.  4. Hyperphosphatemia. Renal diet. Sevelamer with meals.  5. Hyperkalemia. Resolved. Renal diet.

## 2021-11-21 NOTE — PROGRESS NOTE ADULT - ASSESSMENT
57yo M w/ pmhx of HTN, HLD, DM, ESRD on HD TTS, HOMAR, BPH, PVD s/p R BKA, glaucoma [facility]        #Acute hypoxemic respiratory failure secondary to fluid overload from missed dialysis session   #Acute hypercapnic respiratory failure resolved   #ESRD on HD TTS   - c/w HD as tolerated, s/p 3 sessions thus far   - c/w supplemental oxygen     #enterovirus infection   - RVP + for enterovirus   - supportive care     #elevated troponemia likely secondary to ESRD  - no chest pain   - EKG: sinus ton, no ischemic changes     #hyperkalemia secondary to missed dialysis session  - renal diet     #HTN  - on procardia 90mg daily, lopressor 50mg bid, hydralazine 25mg TID  - c/w meds on holding parameters (including lopressor for sinus ton)    #T2DM  - monitor     #PVD s/p R BKA   #HLD  - c/w ASA and simvastatin 10mg hs     #HOMAR  - unclear if on cpap  - c/w bipap here for now     #HLD  - c/w simvastatin    #mood disorder  #insomnia  - c/w ambien 10mg and melatonin 10mg   - c/w trazodone and sertraline     DVT ppx: hep subq          Pending: fluid overload, wean off oxygen   dispo: SNF

## 2021-11-22 NOTE — PROGRESS NOTE ADULT - ASSESSMENT
57yo M w/ pmhx of HTN, HLD, DM, ESRD on HD TTS, HOMAR, BPH, PVD s/p R BKA, glaucoma presents to ED from [facility] for SOB for two days.        #Acute hypoxemic respiratory failure secondary to fluid overload from missed dialysis session   #Acute hypercapnic respiratory failure resolved   #ESRD on HD TTS   - resolved      #enterovirus infection   - RVP + for enterovirus   - supportive care     #elevated troponemia likely secondary to ESRD  - no chest pain   - EKG: sinus tno, no ischemic changes     #hyperkalemia secondary to missed dialysis session  - renal diet     #HTN  - on procardia 90mg daily, lopressor 50mg bid, hydralazine 25mg TID  - c/w meds on holding parameters (including lopressor for sinus ton)    #T2DM  - monitor     #PVD s/p R BKA   #HLD  - c/w ASA and simvastatin 10mg hs     #HOMAR  - c/w cpap    #HLD  - c/w simvastatin    #mood disorder  #insomnia  - c/w ambien 10mg and melatonin 10mg   - c/w trazodone and sertraline     DVT ppx: hep subq          Pending: placement to [facility]   dispo: SNF

## 2021-11-22 NOTE — DISCHARGE NOTE NURSING/CASE MANAGEMENT/SOCIAL WORK - PATIENT PORTAL LINK FT
You can access the FollowMyHealth Patient Portal offered by [facility] by registering at the following website: [email or web address]. By joining [employer]’s FollowMyHealth portal, you will also be able to view your health information using other applications (apps) compatible with our system.

## 2021-11-22 NOTE — DISCHARGE NOTE PROVIDER - NSDCCPCAREPLAN_GEN_ALL_CORE_FT
PRINCIPAL DISCHARGE DIAGNOSIS  Diagnosis: Acute respiratory failure with hypoxia  Assessment and Plan of Treatment: Congestive Heart Failure (CHF)  Congestive heart failure is a chronic condition in which the heart has trouble pumping blood. In some cases of heart failure, fluid may back up into your lungs or you may have swelling (edema) in your lower legs. There are many causes of heart failure including high blood pressure, coronary artery disease, abnormal heart valves, heart muscle disease, lung disease, diabetes, etc. Symptoms include shortness of breath with activity or when lying flat, cough, swelling of the legs, fatigue, or increased urination during the night.   Treatment is aimed at managing the symptoms of heart failure and may include lifestyle changes, medications, or surgical procedures. Take medicines only as directed by your health care provider and do not stop unless instructed to do so. Eat heart-healthy foods with low or no trans/saturated fats, cholesterol and salt. Weigh yourself every day for early recognition of fluid accumulation.  SEEK IMMEDIATE MEDICAL CARE IF YOU HAVE ANY OF THE FOLLOWING SYMPTOMS: shortness of breath, change in mental status, chest pain, lightheadedness/dizziness/fainting, or worsening of symptoms including not being able to conduct normal physical activity.      SECONDARY DISCHARGE DIAGNOSES  Diagnosis: Rhinovirus  Assessment and Plan of Treatment: Congestive Heart Failure (CHF)  Congestive heart failure is a chronic condition in which the heart has trouble pumping blood. In some cases of heart failure, fluid may back up into your lungs or you may have swelling (edema) in your lower legs. There are many causes of heart failure including high blood pressure, coronary artery disease, abnormal heart valves, heart muscle disease, lung disease, diabetes, etc. Symptoms include shortness of breath with activity or when lying flat, cough, swelling of the legs, fatigue, or increased urination during the night.   Treatment is aimed at managing the symptoms of heart failure and may include lifestyle changes, medications, or surgical procedures. Take medicines only as directed by your health care provider and do not stop unless instructed to do so. Eat heart-healthy foods with low or no trans/saturated fats, cholesterol and salt. Weigh yourself every day for early recognition of fluid accumulation.  SEEK IMMEDIATE MEDICAL CARE IF YOU HAVE ANY OF THE FOLLOWING SYMPTOMS: shortness of breath, change in mental status, chest pain, lightheadedness/dizziness/fainting, or worsening of symptoms including not being able to conduct normal physical activity.    Diagnosis: End-stage renal disease (ESRD)  Assessment and Plan of Treatment: End-stage kidney disease (ESRD) is when your kidney function is so poor that you need dialysis treatments or a kidney transplant to survive. ESRD usually occurs after long-term kidney disease.  Seek care immediately if:  You have shortness of breath, chest pain, a rash, new painful wound, Muscle pain, cramps, palpitations, decreased urination, weight loss or gain, Fatigue, Nausea, vomiting,   Medicines are given to decrease blood pressure, pain, or itching. You may also need medicine to decrease nausea, or to treat or prevent anemia.  Take your medicine as directed.   You will need to return for more tests. You may also need to return for routine dialysis treatments.   Manage ESRD:  Protect your dialysis access site. Do not let anyone take blood or blood pressure readings on the arm where you have your arteriovenous fistula or graft. Cover your peritoneal catheter with a bandage. Do not touch the catheter.  Limit fluids to 1 liter a day (about 34 ounces).  Weigh yourself and record weight at the same time every day.   Do not use NSAIDs or aspirin.  Self-care:  Manage other health conditions, such as high blood pressure, diabetes, and heart disease.   Eat foods low in sodium, phosphorus, and potassium as directed. You may also need to eat foods high in protein.   Maintain a healthy weight.   Exercise as directed.   Limit alcohol. Ask how much alcohol is safe for you to drink. A drink of alcohol is 12 ounces of beer, 5 ounces of wine, or 1½ ounces of liquor.  Do not smoke. Nicotine and other chemicals in cigarettes and cigars can cause lung and kidney damage.   Ask your healthcare provider if you need vaccines. Pneumonia, influenza, and hepatitis can be more harmful or more likely to occur when you have ESRD. Vaccines reduce your risk of infection with these viruses.    Diagnosis: CHF exacerbation  Assessment and Plan of Treatment: Congestive Heart Failure (CHF)  Congestive heart failure is a chronic condition in which the heart has trouble pumping blood. In some cases of heart failure, fluid may back up into your lungs or you may have swelling (edema) in your lower legs. There are many causes of heart failure including high blood pressure, coronary artery disease, abnormal heart valves, heart muscle disease, lung disease, diabetes, etc. Symptoms include shortness of breath with activity or when lying flat, cough, swelling of the legs, fatigue, or increased urination during the night.   Treatment is aimed at managing the symptoms of heart failure and may include lifestyle changes, medications, or surgical procedures. Take medicines only as directed by your health care provider and do not stop unless instructed to do so. Eat heart-healthy foods with low or no trans/saturated fats, cholesterol and salt. Weigh yourself every day for early recognition of fluid accumulation.  SEEK IMMEDIATE MEDICAL CARE IF YOU HAVE ANY OF THE FOLLOWING SYMPTOMS: shortness of breath, change in mental status, chest pain, lightheadedness/dizziness/fainting, or worsening of symptoms including not being able to conduct normal physical activity.

## 2021-11-22 NOTE — DISCHARGE NOTE PROVIDER - CARE PROVIDER_API CALL
Ayaan Owens (MD)  Internal Medicine; Nephrology  [address] [place] [address]  Phone: [phone]  Fax: [phone]  Follow Up Time:

## 2021-11-22 NOTE — DISCHARGE NOTE PROVIDER - NSDCMRMEDTOKEN_GEN_ALL_CORE_FT
acetaminophen 325 mg oral tablet: 2 tab(s) orally every 6 hours, As needed, Temp greater or equal to 38C (100.4F), Mild Pain (1 - 3)  Ambien 10 mg oral tablet: 1 tab(s) orally once a day (at bedtime)  aspirin 81 mg oral delayed release tablet: 1 tab(s) orally once a day  brimonidine 0.2% ophthalmic solution: 1 drop(s) to each affected eye 2 times a day  dorzolamide 2% ophthalmic solution: 1 drop(s) to each affected eye 3 times a day  gabapentin 300 mg oral capsule: 1 cap(s) orally   hydrALAZINE 25 mg oral tablet: 1 tab(s) orally 3 times a day  lactulose 10 g/15 mL oral syrup: 15 milliliter(s) orally every 6 hours, As Needed for constipation  Lantus 100 units/mL subcutaneous solution: 30 unit(s) subcutaneous once a day (at bedtime)  Lasix 80 mg oral tablet: 1 tab(s) orally 2 times a day  latanoprost 0.005% ophthalmic solution: 1 drop(s) to each affected eye once a day (in the evening)  Lopressor 50 mg oral tablet: 1 tab(s) orally 2 times a day  melatonin 10 mg oral capsule: 1 cap(s) orally once a day (at bedtime)  metOLazone 5 mg oral tablet: 1 tab(s) orally once a day  MiraLax oral powder for reconstitution: 1 dose(s) orally 2 times a day  NIFEdipine (Eqv-Procardia XL) 90 mg oral tablet, extended release: 1 tab(s) orally once a day  Percocet 10 mg-325 mg oral tablet: 1 tab(s) orally every 6 hours, As Needed for pain  Renvela 800 mg oral tablet: 3 tab(s) orally 3 times a day (with meals)  sertraline 100 mg oral tablet: 1 tab(s) orally once a day  silver sulfADIAZINE 1% topical cream: Apply topically to affected area 2 times a day to sacrum and R buttock  simethicone 125 mg oral tablet, chewable: 2 tab(s) orally 2 times a day (with meals), As Needed for indigestion   simvastatin 10 mg oral tablet: 1 tab(s) orally once a day (at bedtime)  tamsulosin 0.4 mg oral capsule: 1 cap(s) orally once a day  Timoptic 0.5% ophthalmic solution: 1 drop(s) to each affected eye 3 times a day  traZODone 50 mg oral tablet: 1 tab(s) orally once a day (at bedtime)  Ventolin HFA 90 mcg/inh inhalation aerosol: 2 puff(s) inhaled every 6 hours   acetaminophen 325 mg oral tablet: 2 tab(s) orally every 6 hours, As needed, Temp greater or equal to 38C (100.4F), Mild Pain (1 - 3)  Ambien 10 mg oral tablet: 1 tab(s) orally once a day (at bedtime)  aspirin 81 mg oral delayed release tablet: 1 tab(s) orally once a day  brimonidine 0.2% ophthalmic solution: 1 drop(s) to each affected eye 2 times a day  dorzolamide 2% ophthalmic solution: 1 drop(s) to each affected eye 3 times a day  epoetin keagan: [id number] unit(s) subcutaneous 3 times a day during dialysis  gabapentin 300 mg oral capsule: 1 cap(s) orally   guaiFENesin 600 mg oral tablet, extended release: 1 tab(s) orally every 12 hours  hydrALAZINE 25 mg oral tablet: 1 tab(s) orally 3 times a day  insulin glargine: 5 unit(s) subcutaneous once a day (at bedtime)  lactulose 10 g/15 mL oral syrup: 15 milliliter(s) orally every 6 hours, As Needed for constipation  Lasix 80 mg oral tablet: 1 tab(s) orally 2 times a day  latanoprost 0.005% ophthalmic solution: 1 drop(s) to each affected eye once a day (in the evening)  Lopressor 50 mg oral tablet: 1 tab(s) orally 2 times a day  melatonin 10 mg oral capsule: 1 cap(s) orally once a day (at bedtime)  metOLazone 5 mg oral tablet: 1 tab(s) orally once a day  MiraLax oral powder for reconstitution: 1 dose(s) orally 2 times a day  NIFEdipine (Eqv-Procardia XL) 90 mg oral tablet, extended release: 1 tab(s) orally once a day  Percocet 10 mg-325 mg oral tablet: 1 tab(s) orally every 6 hours, As Needed for pain  Renvela 800 mg oral tablet: 3 tab(s) orally 3 times a day (with meals)  sertraline 100 mg oral tablet: 1 tab(s) orally once a day  silver sulfADIAZINE 1% topical cream: Apply topically to affected area 2 times a day to sacrum and R buttock  simethicone 125 mg oral tablet, chewable: 2 tab(s) orally 2 times a day (with meals), As Needed for indigestion   simvastatin 10 mg oral tablet: 1 tab(s) orally once a day (at bedtime)  tamsulosin 0.4 mg oral capsule: 1 cap(s) orally once a day  Timoptic 0.5% ophthalmic solution: 1 drop(s) to each affected eye 3 times a day  traZODone 50 mg oral tablet: 1 tab(s) orally once a day (at bedtime)  Ventolin HFA 90 mcg/inh inhalation aerosol: 2 puff(s) inhaled every 6 hours

## 2021-11-22 NOTE — PROGRESS NOTE ADULT - SUBJECTIVE AND OBJECTIVE BOX
Pt seen and examined at bedside. Feels better. Dyspnea resolving.     VITAL SIGNS (Last 24 hrs):  T(C): 36.9 (11-20-21 @ 20:35), Max: 36.9 (11-20-21 @ 20:35)  HR: 60 (11-21-21 @ 08:12) (56 - 70)  BP: 115/57 (11-21-21 @ 05:05) (114/50 - 131/56)  RR: 18 (11-21-21 @ 05:05) (18 - 19)  SpO2: 92% (11-21-21 @ 08:12) (92% - 92%)  Wt(kg): --  Daily     Daily     I&O's Summary    20 Nov 2021 07:01  -  21 Nov 2021 07:00  --------------------------------------------------------  IN: 0 mL / OUT: 4000 mL / NET: -4000 mL        PHYSICAL EXAM:  GENERAL: NAD   HEAD:  Atraumatic, Normocephalic  EYES:  conjunctiva and sclera clear  NECK: Supple, No JVD  CHEST/LUNG: scattered rhonchi b/l   HEART: Regular rate and rhythm; No murmurs, rubs, or gallops  ABDOMEN: Soft, Nontender, Nondistended; Bowel sounds present  EXTREMITIES:  r BKA   PSYCH: AAOx3  NEUROLOGY: non-focal  SKIN: No rashes or lesions    Labs Reviewed  Spoke to patient in regards to abnormal labs.    CBC Full  -  ( 21 Nov 2021 07:57 )  WBC Count : 8.75 K/uL  Hemoglobin : 9.1 g/dL  Hematocrit : 31.1 %  Platelet Count - Automated : 193 K/uL  Mean Cell Volume : 96.0 fL  Mean Cell Hemoglobin : 28.1 pg  Mean Cell Hemoglobin Concentration : 29.3 g/dL  Auto Neutrophil # : 6.47 K/uL  Auto Lymphocyte # : 1.15 K/uL  Auto Monocyte # : 0.63 K/uL  Auto Eosinophil # : 0.40 K/uL  Auto Basophil # : 0.06 K/uL  Auto Neutrophil % : 73.9 %  Auto Lymphocyte % : 13.1 %  Auto Monocyte % : 7.2 %  Auto Eosinophil % : 4.6 %  Auto Basophil % : 0.7 %    BMP:    11-21 @ 07:57    Blood Urea Nitrogen - 42  Calcium - 7.6  Carbond Dioxide - 25  Chloride - 98  Creatinine - 6.2  Glucose - 85  Potassium - 4.6  Sodium - 140         Urine Culture:  11-17 @ 08:04 Urine culture: --    Culture Results:   No growth to date.  Method Type: --  Organism: --  Organism Identification: --  Specimen Source: .Blood Blood-Peripheral         MEDICATIONS  (STANDING):  aspirin enteric coated 81 milliGRAM(s) Oral daily  brimonidine 0.2% Ophthalmic Solution 1 Drop(s) Both EYES two times a day  chlorhexidine 4% Liquid 1 Application(s) Topical <User Schedule>  dorzolamide 2% Ophthalmic Solution 1 Drop(s) Both EYES three times a day  epoetin keagan-epbx (RETACRIT) Injectable 34034 Unit(s) IV Push <User Schedule>  gabapentin 600 milliGRAM(s) Oral three times a day  guaiFENesin  milliGRAM(s) Oral every 12 hours  heparin   Injectable 5000 Unit(s) SubCutaneous every 8 hours  hydrALAZINE 25 milliGRAM(s) Oral three times a day  influenza   Vaccine 0.5 milliLiter(s) IntraMuscular once  insulin glargine Injectable (LANTUS) 5 Unit(s) SubCutaneous at bedtime  insulin lispro (ADMELOG) corrective regimen sliding scale   SubCutaneous three times a day before meals  latanoprost 0.005% Ophthalmic Solution 1 Drop(s) Both EYES at bedtime  melatonin 5 milliGRAM(s) Oral at bedtime  metoprolol tartrate 50 milliGRAM(s) Oral two times a day  mupirocin 2% Ointment 1 Application(s) Both Nostrils two times a day  NIFEdipine XL 90 milliGRAM(s) Oral daily  pantoprazole    Tablet 40 milliGRAM(s) Oral before breakfast  senna 2 Tablet(s) Oral at bedtime  sertraline 100 milliGRAM(s) Oral daily  sevelamer carbonate 2400 milliGRAM(s) Oral three times a day with meals  silver sulfADIAZINE 1% Cream 1 Application(s) Topical two times a day  simvastatin 10 milliGRAM(s) Oral at bedtime  tamsulosin 0.4 milliGRAM(s) Oral at bedtime  timolol 0.5% Solution 1 Drop(s) Both EYES <User Schedule>  traZODone 50 milliGRAM(s) Oral at bedtime    MEDICATIONS  (PRN):  acetaminophen     Tablet .. 650 milliGRAM(s) Oral every 6 hours PRN Temp greater or equal to 38C (100.4F), Mild Pain (1 - 3)  ALBUTerol    90 MICROgram(s) HFA Inhaler 2 Puff(s) Inhalation every 6 hours PRN Shortness of Breath and/or Wheezing  oxycodone    5 mG/acetaminophen 325 mG 2 Tablet(s) Oral every 6 hours PRN Moderate Pain (4 - 6)  simethicone 80 milliGRAM(s) Chew three times a day PRN Indigestion  zolpidem 5 milliGRAM(s) Oral at bedtime PRN Insomnia  zolpidem 5 milliGRAM(s) Oral at bedtime PRN Insomnia  
SUBJECTIVE:    Patient is a 56y old Male who presents with a chief complaint of acute hypoxemic respiratory failure (19 Nov 2021 15:13)    Currently admitted to medicine with the primary diagnosis of Acute respiratory failure with hypoxia       Today is hospital day 2d. This morning he is resting comfortably in bed and reports no new issues or overnight events.     PAST MEDICAL & SURGICAL HISTORY  Diabetes    End stage renal disease    HTN (hypertension)    Blind    Anemia, unspecified type    Pain disorder    Anxiety disorder, unspecified type    Obesity, unspecified classification, unspecified obesity type, unspecified whether serious comorbidity present    Bipolar affective disorder, remission status unspecified    Insomnia, unspecified type    Bilateral ocular hypertension    Dialysis patient  Tuesday- Thursday-Saturday    A-V fistula  LEFT ARM    S/P BKA (below knee amputation) unilateral  RIGHT    Status post glaucoma surgery  RIGHT EYE      SOCIAL HISTORY:  Negative for smoking/alcohol/drug use.     ALLERGIES:  No Known Allergies    MEDICATIONS:  STANDING MEDICATIONS  albuterol/ipratropium for Nebulization 3 milliLiter(s) Nebulizer every 6 hours  aspirin enteric coated 81 milliGRAM(s) Oral daily  brimonidine 0.2% Ophthalmic Solution 1 Drop(s) Both EYES two times a day  chlorhexidine 4% Liquid 1 Application(s) Topical <User Schedule>  dorzolamide 2% Ophthalmic Solution 1 Drop(s) Both EYES three times a day  epoetin keagan-epbx (RETACRIT) Injectable 73632 Unit(s) IV Push <User Schedule>  gabapentin 600 milliGRAM(s) Oral three times a day  guaiFENesin  milliGRAM(s) Oral every 12 hours  heparin   Injectable 5000 Unit(s) SubCutaneous every 8 hours  hydrALAZINE 25 milliGRAM(s) Oral three times a day  influenza   Vaccine 0.5 milliLiter(s) IntraMuscular once  insulin glargine Injectable (LANTUS) 15 Unit(s) SubCutaneous at bedtime  insulin lispro (ADMELOG) corrective regimen sliding scale   SubCutaneous three times a day before meals  latanoprost 0.005% Ophthalmic Solution 1 Drop(s) Both EYES at bedtime  metoprolol tartrate 50 milliGRAM(s) Oral two times a day  mupirocin 2% Ointment 1 Application(s) Both Nostrils two times a day  NIFEdipine XL 90 milliGRAM(s) Oral daily  pantoprazole    Tablet 40 milliGRAM(s) Oral before breakfast  sertraline 100 milliGRAM(s) Oral daily  sevelamer carbonate 2400 milliGRAM(s) Oral three times a day with meals  silver sulfADIAZINE 1% Cream 1 Application(s) Topical two times a day  simvastatin 10 milliGRAM(s) Oral at bedtime  tamsulosin 0.4 milliGRAM(s) Oral at bedtime  timolol 0.5% Solution 1 Drop(s) Both EYES <User Schedule>  traZODone 50 milliGRAM(s) Oral at bedtime    PRN MEDICATIONS  acetaminophen     Tablet .. 650 milliGRAM(s) Oral every 6 hours PRN  ALBUTerol    90 MICROgram(s) HFA Inhaler 2 Puff(s) Inhalation every 6 hours PRN  oxycodone    5 mG/acetaminophen 325 mG 2 Tablet(s) Oral every 6 hours PRN  simethicone 80 milliGRAM(s) Chew three times a day PRN    VITALS:   T(F): 96.4  HR: 59  BP: 123/60  RR: 22  SpO2: 96%    LABS:                        8.6    7.91  )-----------( 170      ( 19 Nov 2021 06:20 )             29.6     11-19    140  |  99  |  39<H>  ----------------------------<  101<H>  4.8   |  25  |  6.1<HH>    Ca    7.5<L>      19 Nov 2021 06:20  Phos  5.1     11-18  Mg     2.3     11-19    TPro  5.6<L>  /  Alb  3.0<L>  /  TBili  0.3  /  DBili  x   /  AST  6   /  ALT  <5  /  AlkPhos  134<H>  11-19              Culture - Blood (collected 17 Nov 2021 08:04)  Source: .Blood Blood-Peripheral  Preliminary Report (18 Nov 2021 14:01):    No growth to date.    Culture - Blood (collected 17 Nov 2021 08:04)  Source: .Blood Blood-Peripheral  Preliminary Report (18 Nov 2021 14:01):    No growth to date.      CARDIAC MARKERS ( 18 Nov 2021 04:50 )  x     / 0.26 ng/mL / x     / x     / x      CARDIAC MARKERS ( 18 Nov 2021 01:00 )  x     / 0.24 ng/mL / x     / x     / x          RADIOLOGY:    PHYSICAL EXAM:  GEN: No acute distress  LUNGS: Crackles b/l  HEART: Regular  ABD: Soft, non-tender, non-distended.  EXT: No edema  NEURO: AAOX3    Intravenous access:   NG tube:   Hunter Catheter:       
SUBJECTIVE:    Patient is a 56y old Male who presents with a chief complaint of acute hypoxemic respiratory failure (19 Nov 2021 16:18)    Currently admitted to medicine with the primary diagnosis of Acute respiratory failure with hypoxia       Today is hospital day 3d. This morning he is resting comfortably in bed and reports no new issues or overnight events.   Was asking enemas overnight for constipation. Was coughing more on my encounter this morning.    PAST MEDICAL & SURGICAL HISTORY  Diabetes    End stage renal disease    HTN (hypertension)    Blind    Anemia, unspecified type    Pain disorder    Anxiety disorder, unspecified type    Obesity, unspecified classification, unspecified obesity type, unspecified whether serious comorbidity present    Bipolar affective disorder, remission status unspecified    Insomnia, unspecified type    Bilateral ocular hypertension    Dialysis patient  Tuesday- Thursday-Saturday    A-V fistula  LEFT ARM    S/P BKA (below knee amputation) unilateral  RIGHT    Status post glaucoma surgery  RIGHT EYE      SOCIAL HISTORY:  Negative for smoking/alcohol/drug use.     ALLERGIES:  No Known Allergies    MEDICATIONS:  STANDING MEDICATIONS  aspirin enteric coated 81 milliGRAM(s) Oral daily  brimonidine 0.2% Ophthalmic Solution 1 Drop(s) Both EYES two times a day  chlorhexidine 4% Liquid 1 Application(s) Topical <User Schedule>  dorzolamide 2% Ophthalmic Solution 1 Drop(s) Both EYES three times a day  epoetin keagan-epbx (RETACRIT) Injectable 69946 Unit(s) IV Push <User Schedule>  gabapentin 600 milliGRAM(s) Oral three times a day  guaiFENesin  milliGRAM(s) Oral every 12 hours  heparin   Injectable 5000 Unit(s) SubCutaneous every 8 hours  hydrALAZINE 25 milliGRAM(s) Oral three times a day  influenza   Vaccine 0.5 milliLiter(s) IntraMuscular once  insulin glargine Injectable (LANTUS) 15 Unit(s) SubCutaneous at bedtime  insulin lispro (ADMELOG) corrective regimen sliding scale   SubCutaneous three times a day before meals  latanoprost 0.005% Ophthalmic Solution 1 Drop(s) Both EYES at bedtime  melatonin 5 milliGRAM(s) Oral at bedtime  metoprolol tartrate 50 milliGRAM(s) Oral two times a day  mupirocin 2% Ointment 1 Application(s) Both Nostrils two times a day  NIFEdipine XL 90 milliGRAM(s) Oral daily  pantoprazole    Tablet 40 milliGRAM(s) Oral before breakfast  senna 2 Tablet(s) Oral at bedtime  sertraline 100 milliGRAM(s) Oral daily  sevelamer carbonate 2400 milliGRAM(s) Oral three times a day with meals  silver sulfADIAZINE 1% Cream 1 Application(s) Topical two times a day  simvastatin 10 milliGRAM(s) Oral at bedtime  tamsulosin 0.4 milliGRAM(s) Oral at bedtime  timolol 0.5% Solution 1 Drop(s) Both EYES <User Schedule>  traZODone 50 milliGRAM(s) Oral at bedtime    PRN MEDICATIONS  acetaminophen     Tablet .. 650 milliGRAM(s) Oral every 6 hours PRN  ALBUTerol    90 MICROgram(s) HFA Inhaler 2 Puff(s) Inhalation every 6 hours PRN  oxycodone    5 mG/acetaminophen 325 mG 2 Tablet(s) Oral every 6 hours PRN  simethicone 80 milliGRAM(s) Chew three times a day PRN    VITALS:   T(F): 96.5  HR: 60  BP: 132/58  RR: 18  SpO2: --    LABS:                        10.1   10.06 )-----------( 206      ( 20 Nov 2021 04:30 )             34.1     11-20    139  |  97<L>  |  54<H>  ----------------------------<  82  4.9   |  23  |  7.0<HH>    Ca    8.0<L>      20 Nov 2021 04:30  Phos  4.0     11-20  Mg     2.6     11-20    TPro  6.7  /  Alb  3.7  /  TBili  0.3  /  DBili  x   /  AST  8   /  ALT  7   /  AlkPhos  152<H>  11-20                  RADIOLOGY:    PHYSICAL EXAM:  GEN: No acute distress  LUNGS: Decreased BS b/l  HEART: Regular  ABD: Soft, non-tender, non-distended.  EXT: No edema  NEURO: AAOX3    Intravenous access:   NG tube:   Hunter Catheter:       
[place] [facility] FOLLOW UP NOTE  --------------------------------------------------------------------------------  24 hour events/subjective: Patient examined during HD. Appears comfortable.    PAST HISTORY  --------------------------------------------------------------------------------  No significant changes to PMH, PSH, FHx, SHx, unless otherwise noted    ALLERGIES & MEDICATIONS  --------------------------------------------------------------------------------  Allergies    No Known Allergies    Standing Inpatient Medications  aspirin enteric coated 81 milliGRAM(s) Oral daily  brimonidine 0.2% Ophthalmic Solution 1 Drop(s) Both EYES two times a day  chlorhexidine 4% Liquid 1 Application(s) Topical <User Schedule>  dorzolamide 2% Ophthalmic Solution 1 Drop(s) Both EYES three times a day  epoetin keagan-epbx (RETACRIT) Injectable 63370 Unit(s) IV Push <User Schedule>  gabapentin 600 milliGRAM(s) Oral three times a day  guaiFENesin  milliGRAM(s) Oral every 12 hours  heparin   Injectable 5000 Unit(s) SubCutaneous every 8 hours  hydrALAZINE 25 milliGRAM(s) Oral three times a day  influenza   Vaccine 0.5 milliLiter(s) IntraMuscular once  insulin glargine Injectable (LANTUS) 15 Unit(s) SubCutaneous at bedtime  insulin lispro (ADMELOG) corrective regimen sliding scale   SubCutaneous three times a day before meals  latanoprost 0.005% Ophthalmic Solution 1 Drop(s) Both EYES at bedtime  melatonin 5 milliGRAM(s) Oral at bedtime  metoprolol tartrate 50 milliGRAM(s) Oral two times a day  mupirocin 2% Ointment 1 Application(s) Both Nostrils two times a day  NIFEdipine XL 90 milliGRAM(s) Oral daily  pantoprazole    Tablet 40 milliGRAM(s) Oral before breakfast  senna 2 Tablet(s) Oral at bedtime  sertraline 100 milliGRAM(s) Oral daily  sevelamer carbonate 2400 milliGRAM(s) Oral three times a day with meals  silver sulfADIAZINE 1% Cream 1 Application(s) Topical two times a day  simvastatin 10 milliGRAM(s) Oral at bedtime  tamsulosin 0.4 milliGRAM(s) Oral at bedtime  timolol 0.5% Solution 1 Drop(s) Both EYES <User Schedule>  traZODone 50 milliGRAM(s) Oral at bedtime    PRN Inpatient Medications  acetaminophen     Tablet .. 650 milliGRAM(s) Oral every 6 hours PRN  ALBUTerol    90 MICROgram(s) HFA Inhaler 2 Puff(s) Inhalation every 6 hours PRN  oxycodone    5 mG/acetaminophen 325 mG 2 Tablet(s) Oral every 6 hours PRN  simethicone 80 milliGRAM(s) Chew three times a day PRN    VITALS/PHYSICAL EXAM  --------------------------------------------------------------------------------  T(C): 35.8 (11-20-21 @ 05:36), Max: 36.7 (11-19-21 @ 22:31)  HR: 60 (11-20-21 @ 12:00) (56 - 64)  BP: 114/50 (11-20-21 @ 12:00) (114/50 - 148/76)  RR: 18 (11-20-21 @ 05:36) (18 - 22)  SpO2: 93% (11-20-21 @ 08:00) (93% - 93%)  Height (cm): 185.4 (11-19-21 @ 15:13)    11-20-21 @ 07:01  -  11-20-21 @ 12:30  --------------------------------------------------------  IN: 0 mL / OUT: 4000 mL / NET: -4000 mL    Physical Exam:  	Gen: NAD  	Pulm: B/L scant rales  	CV: RRR, S1S2  	Abd: +BS, soft, nontender/nondistended  	: No suprapubic tenderness  	LE: Warm,  edema  	Vascular access: AVF    LABS/STUDIES  --------------------------------------------------------------------------------              10.1   10.06 >-----------<  206      [11-20-21 @ 04:30]              34.1     139  |  97  |  54  ----------------------------<  82      [11-20-21 @ 04:30]  4.9   |  23  |  7.0        Ca     8.0     [11-20-21 @ 04:30]      Mg     2.6     [11-20-21 @ 04:30]      Phos  4.0     [11-20-21 @ 04:30]    TPro  6.7  /  Alb  3.7  /  TBili  0.3  /  DBili  x   /  AST  8   /  ALT  7   /  AlkPhos  152  [11-20-21 @ 04:30]    Creatinine Trend:  SCr 7.0 [11-20 @ 04:30]  SCr 6.1 [11-19 @ 06:20]  SCr 6.5 [11-18 @ 04:50]  SCr 6.2 [11-18 @ 01:00]  SCr 7.8 [11-17 @ 12:00]    HbA1c 8.6      [06-01-18 @ 07:22]  TSH 1.87      [11-18-21 @ 04:50]  Lipid: chol 82, , HDL 33, LDL --      [11-18-21 @ 04:50]
   Pt seen and examined at bedside. No SOB.     VITAL SIGNS (Last 24 hrs):  T(C): 36.4 (11-22-21 @ 14:25), Max: 36.6 (11-21-21 @ 15:13)  HR: 67 (11-22-21 @ 14:25) (57 - 68)  BP: 161/70 (11-22-21 @ 14:25) (127/59 - 161/70)  RR: 18 (11-22-21 @ 14:25) (18 - 22)  SpO2: 94% (11-21-21 @ 23:58) (94% - 94%)  Wt(kg): --  Daily     Daily     I&O's Summary      PHYSICAL EXAM:  GENERAL: NAD, obese    HEAD:  Atraumatic, Normocephalic  EYES: EOMI, PERRLA, conjunctiva and sclera clear  NECK: Supple, No JVD  CHEST/LUNG: Clear to auscultation bilaterally; No wheeze  HEART: Regular rate and rhythm; No murmurs, rubs, or gallops  ABDOMEN: Soft, Nontender, Nondistended; Bowel sounds present  EXTREMITIES:  right BKA   PSYCH: AAOx3  NEUROLOGY: non-focal  SKIN: No rashes or lesions    Labs Reviewed  Spoke to patient in regards to abnormal labs.    CBC Full  -  ( 22 Nov 2021 04:30 )  WBC Count : 9.78 K/uL  Hemoglobin : 9.9 g/dL  Hematocrit : 33.5 %  Platelet Count - Automated : 236 K/uL  Mean Cell Volume : 95.7 fL  Mean Cell Hemoglobin : 28.3 pg  Mean Cell Hemoglobin Concentration : 29.6 g/dL  Auto Neutrophil # : 7.01 K/uL  Auto Lymphocyte # : 1.68 K/uL  Auto Monocyte # : 0.51 K/uL  Auto Eosinophil # : 0.43 K/uL  Auto Basophil # : 0.09 K/uL  Auto Neutrophil % : 71.7 %  Auto Lymphocyte % : 17.2 %  Auto Monocyte % : 5.2 %  Auto Eosinophil % : 4.4 %  Auto Basophil % : 0.9 %    BMP:    11-22 @ 04:30    Blood Urea Nitrogen - 57  Calcium - 7.9  Carbond Dioxide - 24  Chloride - 97  Creatinine - 7.2  Glucose - 101  Potassium - 5.2  Sodium - 140         MEDICATIONS  (STANDING):  aspirin enteric coated 81 milliGRAM(s) Oral daily  brimonidine 0.2% Ophthalmic Solution 1 Drop(s) Both EYES two times a day  chlorhexidine 4% Liquid 1 Application(s) Topical <User Schedule>  dorzolamide 2% Ophthalmic Solution 1 Drop(s) Both EYES three times a day  epoetin keagan-epbx (RETACRIT) Injectable 25838 Unit(s) IV Push <User Schedule>  gabapentin 300 milliGRAM(s) Oral <User Schedule>  guaiFENesin  milliGRAM(s) Oral every 12 hours  heparin   Injectable 5000 Unit(s) SubCutaneous every 8 hours  hydrALAZINE 25 milliGRAM(s) Oral three times a day  influenza   Vaccine 0.5 milliLiter(s) IntraMuscular once  insulin glargine Injectable (LANTUS) 5 Unit(s) SubCutaneous at bedtime  insulin lispro (ADMELOG) corrective regimen sliding scale   SubCutaneous three times a day before meals  latanoprost 0.005% Ophthalmic Solution 1 Drop(s) Both EYES at bedtime  melatonin 5 milliGRAM(s) Oral at bedtime  metoprolol tartrate 50 milliGRAM(s) Oral two times a day  mupirocin 2% Ointment 1 Application(s) Both Nostrils two times a day  NIFEdipine XL 90 milliGRAM(s) Oral daily  pantoprazole    Tablet 40 milliGRAM(s) Oral before breakfast  polyethylene glycol 3350 17 Gram(s) Oral daily  senna 2 Tablet(s) Oral at bedtime  sertraline 100 milliGRAM(s) Oral daily  sevelamer carbonate 2400 milliGRAM(s) Oral three times a day with meals  silver sulfADIAZINE 1% Cream 1 Application(s) Topical two times a day  simvastatin 10 milliGRAM(s) Oral at bedtime  tamsulosin 0.4 milliGRAM(s) Oral at bedtime  timolol 0.5% Solution 1 Drop(s) Both EYES <User Schedule>  traZODone 50 milliGRAM(s) Oral at bedtime    MEDICATIONS  (PRN):  acetaminophen     Tablet .. 650 milliGRAM(s) Oral every 6 hours PRN Temp greater or equal to 38C (100.4F), Mild Pain (1 - 3)  ALBUTerol    90 MICROgram(s) HFA Inhaler 2 Puff(s) Inhalation every 6 hours PRN Shortness of Breath and/or Wheezing  oxycodone    5 mG/acetaminophen 325 mG 2 Tablet(s) Oral every 6 hours PRN Moderate Pain (4 - 6)  simethicone 80 milliGRAM(s) Chew three times a day PRN Indigestion  zolpidem 5 milliGRAM(s) Oral at bedtime PRN Insomnia  zolpidem 5 milliGRAM(s) Oral at bedtime PRN Insomnia  
[place] [facility] FOLLOW UP NOTE  --------------------------------------------------------------------------------  24 hour events/subjective: Patient examined. Appears comfortable.    PAST HISTORY  --------------------------------------------------------------------------------  No significant changes to PMH, PSH, FHx, SHx, unless otherwise noted    ALLERGIES & MEDICATIONS  --------------------------------------------------------------------------------  Allergies    No Known Allergies    Standing Inpatient Medications  aspirin enteric coated 81 milliGRAM(s) Oral daily  brimonidine 0.2% Ophthalmic Solution 1 Drop(s) Both EYES two times a day  chlorhexidine 4% Liquid 1 Application(s) Topical <User Schedule>  dorzolamide 2% Ophthalmic Solution 1 Drop(s) Both EYES three times a day  epoetin keagan-epbx (RETACRIT) Injectable 90272 Unit(s) IV Push <User Schedule>  gabapentin 600 milliGRAM(s) Oral three times a day  guaiFENesin  milliGRAM(s) Oral every 12 hours  heparin   Injectable 5000 Unit(s) SubCutaneous every 8 hours  hydrALAZINE 25 milliGRAM(s) Oral three times a day  influenza   Vaccine 0.5 milliLiter(s) IntraMuscular once  insulin glargine Injectable (LANTUS) 5 Unit(s) SubCutaneous at bedtime  insulin lispro (ADMELOG) corrective regimen sliding scale   SubCutaneous three times a day before meals  latanoprost 0.005% Ophthalmic Solution 1 Drop(s) Both EYES at bedtime  melatonin 5 milliGRAM(s) Oral at bedtime  metoprolol tartrate 50 milliGRAM(s) Oral two times a day  mupirocin 2% Ointment 1 Application(s) Both Nostrils two times a day  NIFEdipine XL 90 milliGRAM(s) Oral daily  pantoprazole    Tablet 40 milliGRAM(s) Oral before breakfast  senna 2 Tablet(s) Oral at bedtime  sertraline 100 milliGRAM(s) Oral daily  sevelamer carbonate 2400 milliGRAM(s) Oral three times a day with meals  silver sulfADIAZINE 1% Cream 1 Application(s) Topical two times a day  simvastatin 10 milliGRAM(s) Oral at bedtime  tamsulosin 0.4 milliGRAM(s) Oral at bedtime  timolol 0.5% Solution 1 Drop(s) Both EYES <User Schedule>  traZODone 50 milliGRAM(s) Oral at bedtime    PRN Inpatient Medications  acetaminophen     Tablet .. 650 milliGRAM(s) Oral every 6 hours PRN  ALBUTerol    90 MICROgram(s) HFA Inhaler 2 Puff(s) Inhalation every 6 hours PRN  oxycodone    5 mG/acetaminophen 325 mG 2 Tablet(s) Oral every 6 hours PRN  simethicone 80 milliGRAM(s) Chew three times a day PRN  zolpidem 5 milliGRAM(s) Oral at bedtime PRN  zolpidem 5 milliGRAM(s) Oral at bedtime PRN    VITALS/PHYSICAL EXAM  --------------------------------------------------------------------------------  T(C): 36.9 (11-20-21 @ 20:35), Max: 36.9 (11-20-21 @ 20:35)  HR: 60 (11-21-21 @ 08:12) (56 - 70)  BP: 115/57 (11-21-21 @ 05:05) (115/57 - 131/56)  RR: 18 (11-21-21 @ 05:05) (18 - 19)  SpO2: 92% (11-21-21 @ 08:12) (92% - 92%)  Height (cm): 185.4 (11-19-21 @ 15:13)    11-20-21 @ 07:01  -  11-21-21 @ 07:00  --------------------------------------------------------  IN: 0 mL / OUT: 4000 mL / NET: -4000 mL    Physical Exam:  	Gen: NAD  	Pulm: CTA B/L  	CV: RRR, S1S2  	Abd: +BS, soft, nontender/nondistended  	: No suprapubic tenderness  	LE: Warm,  edema  	Vascular access: AVF    LABS/STUDIES  --------------------------------------------------------------------------------              9.1    8.75  >-----------<  193      [11-21-21 @ 07:57]              31.1     140  |  98  |  42  ----------------------------<  85      [11-21-21 @ 07:57]  4.6   |  25  |  6.2        Ca     7.6     [11-21-21 @ 07:57]      Mg     2.3     [11-21-21 @ 07:57]      Phos  3.9     [11-21-21 @ 07:57]    TPro  5.8  /  Alb  3.1  /  TBili  0.3  /  DBili  x   /  AST  7   /  ALT  <5  /  AlkPhos  128  [11-21-21 @ 07:57]    Creatinine Trend:  SCr 6.2 [11-21 @ 07:57]  SCr 7.0 [11-20 @ 04:30]  SCr 6.1 [11-19 @ 06:20]  SCr 6.5 [11-18 @ 04:50]  SCr 6.2 [11-18 @ 01:00]    HbA1c 8.6      [06-01-18 @ 07:22]  TSH 1.87      [11-18-21 @ 04:50]  Lipid: chol 82, , HDL 33, LDL --      [11-18-21 @ 04:50]
[place] [facility] FOLLOW UP NOTE  --------------------------------------------------------------------------------  24 hour events/subjective: Patient examined. Appears comfortable.    PAST HISTORY  --------------------------------------------------------------------------------  No significant changes to PMH, PSH, FHx, SHx, unless otherwise noted    ALLERGIES & MEDICATIONS  --------------------------------------------------------------------------------  Allergies    No Known Allergies    Standing Inpatient Medications  aspirin enteric coated 81 milliGRAM(s) Oral daily  brimonidine 0.2% Ophthalmic Solution 1 Drop(s) Both EYES two times a day  chlorhexidine 4% Liquid 1 Application(s) Topical <User Schedule>  dorzolamide 2% Ophthalmic Solution 1 Drop(s) Both EYES three times a day  epoetin keagan-epbx (RETACRIT) Injectable 88745 Unit(s) IV Push <User Schedule>  gabapentin 300 milliGRAM(s) Oral <User Schedule>  guaiFENesin  milliGRAM(s) Oral every 12 hours  heparin   Injectable 5000 Unit(s) SubCutaneous every 8 hours  hydrALAZINE 25 milliGRAM(s) Oral three times a day  influenza   Vaccine 0.5 milliLiter(s) IntraMuscular once  insulin glargine Injectable (LANTUS) 5 Unit(s) SubCutaneous at bedtime  insulin lispro (ADMELOG) corrective regimen sliding scale   SubCutaneous three times a day before meals  latanoprost 0.005% Ophthalmic Solution 1 Drop(s) Both EYES at bedtime  melatonin 5 milliGRAM(s) Oral at bedtime  metoprolol tartrate 50 milliGRAM(s) Oral two times a day  mupirocin 2% Ointment 1 Application(s) Both Nostrils two times a day  NIFEdipine XL 90 milliGRAM(s) Oral daily  pantoprazole    Tablet 40 milliGRAM(s) Oral before breakfast  polyethylene glycol 3350 17 Gram(s) Oral daily  senna 2 Tablet(s) Oral at bedtime  sertraline 100 milliGRAM(s) Oral daily  sevelamer carbonate 2400 milliGRAM(s) Oral three times a day with meals  silver sulfADIAZINE 1% Cream 1 Application(s) Topical two times a day  simvastatin 10 milliGRAM(s) Oral at bedtime  tamsulosin 0.4 milliGRAM(s) Oral at bedtime  timolol 0.5% Solution 1 Drop(s) Both EYES <User Schedule>  traZODone 50 milliGRAM(s) Oral at bedtime    PRN Inpatient Medications  acetaminophen     Tablet .. 650 milliGRAM(s) Oral every 6 hours PRN  ALBUTerol    90 MICROgram(s) HFA Inhaler 2 Puff(s) Inhalation every 6 hours PRN  oxycodone    5 mG/acetaminophen 325 mG 2 Tablet(s) Oral every 6 hours PRN  simethicone 80 milliGRAM(s) Chew three times a day PRN  zolpidem 5 milliGRAM(s) Oral at bedtime PRN  zolpidem 5 milliGRAM(s) Oral at bedtime PRN    VITALS/PHYSICAL EXAM  --------------------------------------------------------------------------------  T(C): 35.9 (11-21-21 @ 19:30), Max: 36.6 (11-21-21 @ 15:13)  HR: 57 (11-22-21 @ 05:20) (57 - 68)  BP: 135/64 (11-22-21 @ 05:20) (127/59 - 140/63)  RR: 18 (11-22-21 @ 05:20) (18 - 22)  SpO2: 94% (11-21-21 @ 23:58) (94% - 94%)    Physical Exam:  	Gen: NAD  	Pulm: CTA B/L  	CV: RRR, S1S2  	Abd: +BS, soft, nontender/nondistended  	: No suprapubic tenderness  	LE: Warm,  edema  	Vascular access: AVF    LABS/STUDIES  --------------------------------------------------------------------------------              9.9    9.78  >-----------<  236      [11-22-21 @ 04:30]              33.5     140  |  97  |  57  ----------------------------<  101      [11-22-21 @ 04:30]  5.2   |  24  |  7.2        Ca     7.9     [11-22-21 @ 04:30]      Mg     2.6     [11-22-21 @ 04:30]      Phos  3.7     [11-22-21 @ 04:30]    TPro  6.2  /  Alb  3.5  /  TBili  0.3  /  DBili  x   /  AST  10  /  ALT  8   /  AlkPhos  153  [11-22-21 @ 04:30]    Creatinine Trend:  SCr 7.2 [11-22 @ 04:30]  SCr 6.2 [11-21 @ 07:57]  SCr 7.0 [11-20 @ 04:30]  SCr 6.1 [11-19 @ 06:20]  SCr 6.5 [11-18 @ 04:50]    HbA1c 8.6      [06-01-18 @ 07:22]  TSH 1.87      [11-18-21 @ 04:50]  Lipid: chol 82, , HDL 33, LDL --      [11-18-21 @ 04:50]
Over Night Events: events noted, sp HD 3 l out, NC 3 L NC, afebrile, bipap ocernight      PHYSICAL EXAM    ICU Vital Signs Last 24 Hrs  T(C): 36.9 (18 Nov 2021 04:00), Max: 37.5 (17 Nov 2021 23:00)  T(F): 98.5 (18 Nov 2021 04:00), Max: 99.5 (17 Nov 2021 23:00)  HR: 58 (18 Nov 2021 07:00) (56 - 81)  BP: 131/75 (18 Nov 2021 07:00) (107/59 - 169/51)  BP(mean): 99 (18 Nov 2021 07:00) (67 - 99)  RR: 15 (18 Nov 2021 07:00) (10 - 20)  SpO2: 98% (18 Nov 2021 07:00) (98% - 100%)      General: ill looking  HEENT: ILEANA             Lymph Nodes: No cervical LN   Lungs: Bilateral crackles  Cardiovascular: PEPPER 2/6  Abdomen: Soft, Positive BS  Extremities R BKA  Neurological: Non focal       11-17-21 @ 07:01  -  11-18-21 @ 07:00  --------------------------------------------------------  IN:    Oral Fluid: 120 mL  Total IN: 120 mL    OUT:    Other (mL): 5000 mL    Voided (mL): 0 mL  Total OUT: 5000 mL    Total NET: -4880 mL          LABS:                          8.4    8.55  )-----------( 156      ( 18 Nov 2021 04:50 )             28.7                                               11-18    138  |  98  |  50<H>  ----------------------------<  64<L>  5.4<H>   |  24  |  6.5<HH>    Ca    7.4<L>      18 Nov 2021 04:50  Phos  5.1     11-18  Mg     2.5     11-18    TPro  5.6<L>  /  Alb  3.1<L>  /  TBili  0.3  /  DBili  x   /  AST  7   /  ALT  6   /  AlkPhos  136<H>  11-18      PT/INR - ( 17 Nov 2021 07:08 )   PT: 13.70 sec;   INR: 1.19 ratio         PTT - ( 17 Nov 2021 07:08 )  PTT:38.8 sec                                           CARDIAC MARKERS ( 18 Nov 2021 04:50 )  x     / 0.26 ng/mL / x     / x     / x      CARDIAC MARKERS ( 18 Nov 2021 01:00 )  x     / 0.24 ng/mL / x     / x     / x      CARDIAC MARKERS ( 17 Nov 2021 12:00 )  x     / 0.21 ng/mL / x     / x     / x      CARDIAC MARKERS ( 17 Nov 2021 08:28 )  x     / 0.23 ng/mL / x     / x     / x                                                LIVER FUNCTIONS - ( 18 Nov 2021 04:50 )  Alb: 3.1 g/dL / Pro: 5.6 g/dL / ALK PHOS: 136 U/L / ALT: 6 U/L / AST: 7 U/L / GGT: x                                                                                                                                   ABG - ( 17 Nov 2021 10:25 )  pH, Arterial: 7.39  pH, Blood: x     /  pCO2: 37    /  pO2: 171   / HCO3: 22    / Base Excess: -2.2  /  SaO2: 99.0                MEDICATIONS  (STANDING):  aspirin enteric coated 81 milliGRAM(s) Oral daily  brimonidine 0.2% Ophthalmic Solution 1 Drop(s) Both EYES two times a day  chlorhexidine 4% Liquid 1 Application(s) Topical <User Schedule>  dextrose 40% Gel 15 Gram(s) Oral once  dextrose 5%. 1000 milliLiter(s) (50 mL/Hr) IV Continuous <Continuous>  dextrose 5%. 1000 milliLiter(s) (100 mL/Hr) IV Continuous <Continuous>  dextrose 50% Injectable 25 Gram(s) IV Push once  dextrose 50% Injectable 12.5 Gram(s) IV Push once  dextrose 50% Injectable 25 Gram(s) IV Push once  dorzolamide 2% Ophthalmic Solution 1 Drop(s) Both EYES three times a day  epoetin keagan-epbx (RETACRIT) Injectable 37912 Unit(s) IV Push <User Schedule>  furosemide   Injectable 80 milliGRAM(s) IV Push two times a day  gabapentin 600 milliGRAM(s) Oral three times a day  glucagon  Injectable 1 milliGRAM(s) IntraMuscular once  heparin   Injectable 5000 Unit(s) SubCutaneous every 8 hours  hydrALAZINE 25 milliGRAM(s) Oral three times a day  influenza   Vaccine 0.5 milliLiter(s) IntraMuscular once  insulin glargine Injectable (LANTUS) 15 Unit(s) SubCutaneous at bedtime  insulin lispro (ADMELOG) corrective regimen sliding scale   SubCutaneous three times a day before meals  latanoprost 0.005% Ophthalmic Solution 1 Drop(s) Both EYES at bedtime  metoprolol tartrate 50 milliGRAM(s) Oral two times a day  NIFEdipine XL 90 milliGRAM(s) Oral daily  pantoprazole  Injectable 40 milliGRAM(s) IV Push daily  sertraline 100 milliGRAM(s) Oral daily  sevelamer carbonate 2400 milliGRAM(s) Oral three times a day with meals  silver sulfADIAZINE 1% Cream 1 Application(s) Topical two times a day  simvastatin 10 milliGRAM(s) Oral at bedtime  tamsulosin 0.4 milliGRAM(s) Oral at bedtime  timolol 0.5% Solution 1 Drop(s) Both EYES <User Schedule>  traZODone 50 milliGRAM(s) Oral at bedtime    MEDICATIONS  (PRN):  acetaminophen     Tablet .. 650 milliGRAM(s) Oral every 6 hours PRN Temp greater or equal to 38C (100.4F), Mild Pain (1 - 3)  ALBUTerol    90 MICROgram(s) HFA Inhaler 2 Puff(s) Inhalation every 6 hours PRN Shortness of Breath and/or Wheezing  simethicone 80 milliGRAM(s) Chew three times a day PRN Indigestion      Xrays:                                                                                     ECHO    
[place] NEPHROLOGY FOLLOW UP NOTE  --------------------------------------------------------------------------------  24 hour events/subjective: Patient examined. Appears comfortable.    PAST HISTORY  --------------------------------------------------------------------------------  No significant changes to PMH, PSH, FHx, SHx, unless otherwise noted    ALLERGIES & MEDICATIONS  --------------------------------------------------------------------------------  Allergies    No Known Allergies    Standing Inpatient Medications  albuterol/ipratropium for Nebulization 3 milliLiter(s) Nebulizer every 6 hours  aspirin enteric coated 81 milliGRAM(s) Oral daily  brimonidine 0.2% Ophthalmic Solution 1 Drop(s) Both EYES two times a day  chlorhexidine 4% Liquid 1 Application(s) Topical <User Schedule>  dextrose 40% Gel 15 Gram(s) Oral once  dextrose 5%. 1000 milliLiter(s) IV Continuous <Continuous>  dextrose 5%. 1000 milliLiter(s) IV Continuous <Continuous>  dextrose 50% Injectable 25 Gram(s) IV Push once  dextrose 50% Injectable 12.5 Gram(s) IV Push once  dextrose 50% Injectable 25 Gram(s) IV Push once  dorzolamide 2% Ophthalmic Solution 1 Drop(s) Both EYES three times a day  epoetin keagan-epbx (RETACRIT) Injectable 19653 Unit(s) IV Push <User Schedule>  gabapentin 600 milliGRAM(s) Oral three times a day  glucagon  Injectable 1 milliGRAM(s) IntraMuscular once  guaiFENesin  milliGRAM(s) Oral every 12 hours  heparin   Injectable 5000 Unit(s) SubCutaneous every 8 hours  hydrALAZINE 25 milliGRAM(s) Oral three times a day  influenza   Vaccine 0.5 milliLiter(s) IntraMuscular once  insulin glargine Injectable (LANTUS) 15 Unit(s) SubCutaneous at bedtime  insulin lispro (ADMELOG) corrective regimen sliding scale   SubCutaneous three times a day before meals  latanoprost 0.005% Ophthalmic Solution 1 Drop(s) Both EYES at bedtime  metoprolol tartrate 50 milliGRAM(s) Oral two times a day  mupirocin 2% Ointment 1 Application(s) Both Nostrils two times a day  NIFEdipine XL 90 milliGRAM(s) Oral daily  pantoprazole    Tablet 40 milliGRAM(s) Oral before breakfast  sertraline 100 milliGRAM(s) Oral daily  sevelamer carbonate 2400 milliGRAM(s) Oral three times a day with meals  silver sulfADIAZINE 1% Cream 1 Application(s) Topical two times a day  simvastatin 10 milliGRAM(s) Oral at bedtime  tamsulosin 0.4 milliGRAM(s) Oral at bedtime  timolol 0.5% Solution 1 Drop(s) Both EYES <User Schedule>  traZODone 50 milliGRAM(s) Oral at bedtime    PRN Inpatient Medications  acetaminophen     Tablet .. 650 milliGRAM(s) Oral every 6 hours PRN  ALBUTerol    90 MICROgram(s) HFA Inhaler 2 Puff(s) Inhalation every 6 hours PRN  oxycodone    5 mG/acetaminophen 325 mG 2 Tablet(s) Oral every 6 hours PRN  simethicone 80 milliGRAM(s) Chew three times a day PRN    VITALS/PHYSICAL EXAM  --------------------------------------------------------------------------------  T(C): 35.6 (11-19-21 @ 06:01), Max: 37.4 (11-18-21 @ 21:32)  HR: 56 (11-19-21 @ 06:01) (56 - 76)  BP: 135/63 (11-19-21 @ 06:01) (118/41 - 147/63)  RR: 20 (11-19-21 @ 06:01) (14 - 30)  SpO2: 96% (11-18-21 @ 21:32) (78% - 100%)  Height (cm): 185.4 (11-17-21 @ 22:50)  Weight (kg): 134.8 (11-17-21 @ 22:50)  BMI (kg/m2): 39.2 (11-17-21 @ 22:50)  BSA (m2): 2.55 (11-17-21 @ 22:50)    11-18-21 @ 07:01  -  11-19-21 @ 07:00  --------------------------------------------------------  IN: 480 mL / OUT: 4030 mL / NET: -3550 mL      Physical Exam:  	Gen: NAD  	Pulm: B/L scant rales  	CV: RRR, S1S2  	Abd: +BS, soft, nontender/nondistended  	: No suprapubic tenderness  	LE: Warm, edema  	Vascular access: AVF    LABS/STUDIES  --------------------------------------------------------------------------------              8.6    7.91  >-----------<  170      [11-19-21 @ 06:20]              29.6     140  |  99  |  39  ----------------------------<  101      [11-19-21 @ 06:20]  4.8   |  25  |  6.1        Ca     7.5     [11-19-21 @ 06:20]      Mg     2.3     [11-19-21 @ 06:20]      Phos  5.1     [11-18-21 @ 04:50]    TPro  5.6  /  Alb  3.0  /  TBili  0.3  /  DBili  x   /  AST  6   /  ALT  <5  /  AlkPhos  134  [11-19-21 @ 06:20]    Troponin 0.26      [11-18-21 @ 04:50]    Creatinine Trend:  SCr 6.1 [11-19 @ 06:20]  SCr 6.5 [11-18 @ 04:50]  SCr 6.2 [11-18 @ 01:00]  SCr 7.8 [11-17 @ 12:00]  SCr 8.0 [11-17 @ 07:08]    HbA1c 8.6      [06-01-18 @ 07:22]  TSH 1.87      [11-18-21 @ 04:50]  Lipid: chol 82, , HDL 33, LDL --      [11-18-21 @ 04:50]
[place] NEPHROLOGY FOLLOW UP NOTE  --------------------------------------------------------------------------------  24 hour events/subjective: Patient examined during HD. Appears comfortable.    PAST HISTORY  --------------------------------------------------------------------------------  No significant changes to PMH, PSH, FHx, SHx, unless otherwise noted    ALLERGIES & MEDICATIONS  --------------------------------------------------------------------------------  Allergies    No Known Allergies    Standing Inpatient Medications  aspirin enteric coated 81 milliGRAM(s) Oral daily  brimonidine 0.2% Ophthalmic Solution 1 Drop(s) Both EYES two times a day  chlorhexidine 4% Liquid 1 Application(s) Topical <User Schedule>  dextrose 40% Gel 15 Gram(s) Oral once  dextrose 5%. 1000 milliLiter(s) IV Continuous <Continuous>  dextrose 5%. 1000 milliLiter(s) IV Continuous <Continuous>  dextrose 50% Injectable 25 Gram(s) IV Push once  dextrose 50% Injectable 12.5 Gram(s) IV Push once  dextrose 50% Injectable 25 Gram(s) IV Push once  dorzolamide 2% Ophthalmic Solution 1 Drop(s) Both EYES three times a day  epoetin keagan-epbx (RETACRIT) Injectable 44916 Unit(s) IV Push <User Schedule>  gabapentin 600 milliGRAM(s) Oral three times a day  glucagon  Injectable 1 milliGRAM(s) IntraMuscular once  heparin   Injectable 5000 Unit(s) SubCutaneous every 8 hours  hydrALAZINE 25 milliGRAM(s) Oral three times a day  influenza   Vaccine 0.5 milliLiter(s) IntraMuscular once  insulin glargine Injectable (LANTUS) 15 Unit(s) SubCutaneous at bedtime  insulin lispro (ADMELOG) corrective regimen sliding scale   SubCutaneous three times a day before meals  latanoprost 0.005% Ophthalmic Solution 1 Drop(s) Both EYES at bedtime  metoprolol tartrate 50 milliGRAM(s) Oral two times a day  NIFEdipine XL 90 milliGRAM(s) Oral daily  pantoprazole  Injectable 40 milliGRAM(s) IV Push daily  sertraline 100 milliGRAM(s) Oral daily  sevelamer carbonate 2400 milliGRAM(s) Oral three times a day with meals  silver sulfADIAZINE 1% Cream 1 Application(s) Topical two times a day  simvastatin 10 milliGRAM(s) Oral at bedtime  tamsulosin 0.4 milliGRAM(s) Oral at bedtime  timolol 0.5% Solution 1 Drop(s) Both EYES <User Schedule>  traZODone 50 milliGRAM(s) Oral at bedtime    PRN Inpatient Medications  acetaminophen     Tablet .. 650 milliGRAM(s) Oral every 6 hours PRN  ALBUTerol    90 MICROgram(s) HFA Inhaler 2 Puff(s) Inhalation every 6 hours PRN  simethicone 80 milliGRAM(s) Chew three times a day PRN    VITALS/PHYSICAL EXAM  --------------------------------------------------------------------------------  T(C): 37.1 (11-18-21 @ 09:00), Max: 37.5 (11-17-21 @ 23:00)  HR: 58 (11-18-21 @ 11:45) (54 - 81)  BP: 121/57 (11-18-21 @ 11:45) (105/48 - 169/51)  RR: 15 (11-18-21 @ 11:45) (7 - 20)  SpO2: 100% (11-18-21 @ 11:45) (98% - 100%)  Height (cm): 185.4 (11-17-21 @ 22:50)  Weight (kg): 134.8 (11-17-21 @ 22:50)  BMI (kg/m2): 39.2 (11-17-21 @ 22:50)  BSA (m2): 2.55 (11-17-21 @ 22:50)    11-17-21 @ 07:01  -  11-18-21 @ 07:00  --------------------------------------------------------  IN: 120 mL / OUT: 5000 mL / NET: -4880 mL    11-18-21 @ 07:01  -  11-18-21 @ 11:58  --------------------------------------------------------  IN: 120 mL / OUT: 0 mL / NET: 120 mL    Physical Exam:  	Gen: NAD  	Pulm:  B/L rales  	CV: RRR, S1S2  	Abd: +BS, soft, nontender/nondistended  	: No suprapubic tenderness  	LE: Warm,  edema  	Vascular access: AVF    LABS/STUDIES  --------------------------------------------------------------------------------              8.4    8.55  >-----------<  156      [11-18-21 @ 04:50]              28.7     138  |  98  |  50  ----------------------------<  64      [11-18-21 @ 04:50]  5.4   |  24  |  6.5        Ca     7.4     [11-18-21 @ 04:50]      Mg     2.5     [11-18-21 @ 04:50]      Phos  5.1     [11-18-21 @ 04:50]    TPro  5.6  /  Alb  3.1  /  TBili  0.3  /  DBili  x   /  AST  7   /  ALT  6   /  AlkPhos  136  [11-18-21 @ 04:50]    PT/INR: PT 13.70, INR 1.19       [11-17-21 @ 07:08]  PTT: 38.8       [11-17-21 @ 07:08]    Troponin 0.26      [11-18-21 @ 04:50]    Creatinine Trend:  SCr 6.5 [11-18 @ 04:50]  SCr 6.2 [11-18 @ 01:00]  SCr 7.8 [11-17 @ 12:00]  SCr 8.0 [11-17 @ 07:08]    HbA1c 8.6      [06-01-18 @ 07:22]  Lipid: chol 82, , HDL 33, LDL --      [11-18-21 @ 04:50]

## 2021-11-22 NOTE — PROGRESS NOTE ADULT - ASSESSMENT
1. SOB. Resolved.   2. ESRD on HD TTS. HD tomorrw: 3 hours, opti 160 dialyzer, 2K bath, 4L UF.  3. Anemia. EPO with HD.  4. Hyperphosphatemia. Renal diet. Sevelamer with meals.  5. Hyperkalemia. Resolved. Renal diet.

## 2021-11-22 NOTE — PROGRESS NOTE ADULT - PROVIDER SPECIALTY LIST ADULT
Nephrology
Critical Care
Internal Medicine
Nephrology
Internal Medicine

## 2021-11-22 NOTE — DISCHARGE NOTE PROVIDER - DISCHARGE DIET
DASH Diet/Low Sodium Diet/Consistent Carbohydrate Diabetic Diets/Renal Diets (for dialysis)/Other Diet Instructions

## 2021-11-22 NOTE — DISCHARGE NOTE PROVIDER - HOSPITAL COURSE
55yo M w/ pmhx of HTN, HLD, DM, ESRD on HD TTS, HOMAR, BPH, PVD s/p R BKA, glaucoma presents to ED from [facility] for SOB for two days.      #Acute hypoxemic respiratory failure secondary to fluid overload from missed dialysis session   #Acute hypercapnic respiratory failure resolved   #ESRD on HD TTS   - c/w HD as tolerated, s/p 3 sessions thus far   - c/w supplemental oxygen, patient reports to need 3L NC @ home, currently on 3L NC  - No shortness of breath     #enterovirus infection   - RVP + for enterovirus   - supportive care     #elevated troponemia likely secondary to ESRD  - no chest pain   - EKG: sinus ton, no ischemic changes     #hyperkalemia secondary to missed dialysis session  - renal diet   - Sevelamer tid premeals    #HTN  - on procardia 90mg daily, lopressor 50mg bid, hydralazine 25mg TID  - c/w meds on holding parameters (including lopressor for sinus ton)    #T2DM  - monitor     #PVD s/p R BKA   #HLD  - c/w ASA and simvastatin 10mg hs     #HOMAR  - unclear if on cpap  - c/w bipap here for now     #HLD  - c/w simvastatin    #mood disorder  #insomnia  - c/w ambien 10mg and melatonin 10mg   - c/w trazodone and sertraline    57yo M w/ pmhx of HTN, HLD, DM, ESRD on HD TTS, HOMAR, BPH, PVD s/p R BKA, glaucoma [facility]      #Acute hypoxemic respiratory failure secondary to fluid overload from missed dialysis session   #Acute hypercapnic respiratory failure resolved   #ESRD on HD TTS   - c/w HD as tolerated, s/p 3 sessions thus far   - c/w supplemental oxygen, patient reports to need 3L NC @ home, currently on 3L NC  - No shortness of breath     #enterovirus infection   - RVP + for enterovirus   - supportive care     #elevated troponemia likely secondary to ESRD  - no chest pain   - EKG: sinus ton, no ischemic changes     #hyperkalemia secondary to missed dialysis session  - renal diet   - Sevelamer tid premeals    #HTN  - on procardia 90mg daily, lopressor 50mg bid, hydralazine 25mg TID  - c/w meds on holding parameters (including lopressor for sinus ton)    #T2DM  - monitor     #PVD s/p R BKA   #HLD  - c/w ASA and simvastatin 10mg hs     #HOMAR  - unclear if on cpap  - c/w bipap here for now     #HLD  - c/w simvastatin    #mood disorder  #insomnia  - c/w ambien 10mg and melatonin 10mg   - c/w trazodone and sertraline       Attending addendum: Patient seen and examined. Patient is stable for discharge. Med rec reviewed.

## 2021-11-22 NOTE — PROGRESS NOTE ADULT - REASON FOR ADMISSION
acute hypoxemic respiratory failure

## 2021-12-02 NOTE — CDI QUERY NOTE - NSCDIOTHERTXTBX_GEN_ALL_CORE_HH
CLINICAL INDICATORS:  11/17 Consult Adult CC: Volume Overload. NSTEMI. HO Left Loculated Pleural Effusion.  HO ESRD noncompliant with HD    11/18 ProBNP- 94925    11/18 CXR:  Impression: Diffuse bilateral pulmonary opacities similar to prior    11/18 ECHO: Summary:  1. LV Ejection Fraction by Diez's Method with a biplane EF of 63 %.  2. Mildly increased LV wall thickness.  3. Spectral Doppler shows impaired relaxation pattern of left ventricular myocardial filling (Grade I  diastolic dysfunction).  4. Mildly enlarged left atrium.  5. Normal right atrial size.  6. Mild thickening of the anterior and posterior mitral valve leaflets.  7. Trace mitral valve regurgitation.    11/22, 11/21, 11/20, 11/19, Progress Note Adult-Internal Medicine Attending: #Acute hypoxemic respiratory failure secondary to fluid overload from missed dialysis session    DC Note: 57yo M w/ pmhx of HTN... presents to ED for SOB for two days.    Diagnosis: Acute respiratory failure with hypoxia.  Assessment and Plan of Treatment: Congestive Heart Failure (CHF).  Diagnosis: CHF exacerbation  #Acute hypoxemic respiratory failure secondary to fluid overload from missed dialysis session  #Acute hypercapnic respiratory failure resolved   #ESRD on HD TTS   - c/w HD as tolerated, s/p 3 sessions thus far  - c/w supplemental oxygen, patient reports to need 3L NC @ home, currently on 3L NC    Based on your medical judgment, can you further clarify in the progress notes the type and severity of heart failure and its relationship to the volume overload.  • CHF (please specify type/severity) was POA and resolved at time of discharge.  • CHF (please specify type/severity) was evaluated and ruled out.  • Other (please specify)  • Clinically Unable to determine      Thank you,    TDrake Cid RN, BSN, CCDS  [email or web address]  [phone]

## 2022-01-01 ENCOUNTER — TRANSCRIPTION ENCOUNTER (OUTPATIENT)
Age: 57
End: 2022-01-01

## 2022-01-01 ENCOUNTER — INPATIENT (INPATIENT)
Facility: HOSPITAL | Age: 57
LOS: 5 days | Discharge: NURSING, REHAB OR LONG-TERM CARE | End: 2022-04-18
Attending: INTERNAL MEDICINE | Admitting: INTERNAL MEDICINE
Payer: MEDICARE

## 2022-01-01 ENCOUNTER — EMERGENCY (EMERGENCY)
Facility: HOSPITAL | Age: 57
LOS: 0 days | End: 2022-09-19
Attending: STUDENT IN AN ORGANIZED HEALTH CARE EDUCATION/TRAINING PROGRAM | Admitting: STUDENT IN AN ORGANIZED HEALTH CARE EDUCATION/TRAINING PROGRAM

## 2022-01-01 VITALS
HEART RATE: 85 BPM | TEMPERATURE: 98 F | SYSTOLIC BLOOD PRESSURE: 142 MMHG | DIASTOLIC BLOOD PRESSURE: 78 MMHG | HEIGHT: 73 IN | WEIGHT: 250 LBS | OXYGEN SATURATION: 95 % | RESPIRATION RATE: 17 BRPM

## 2022-01-01 VITALS — HEIGHT: 72 IN | OXYGEN SATURATION: 98 %

## 2022-01-01 VITALS — DIASTOLIC BLOOD PRESSURE: 77 MMHG | TEMPERATURE: 98 F | HEART RATE: 67 BPM | SYSTOLIC BLOOD PRESSURE: 185 MMHG

## 2022-01-01 DIAGNOSIS — E11.51 TYPE 2 DIABETES MELLITUS WITH DIABETIC PERIPHERAL ANGIOPATHY WITHOUT GANGRENE: ICD-10-CM

## 2022-01-01 DIAGNOSIS — Z99.2 DEPENDENCE ON RENAL DIALYSIS: ICD-10-CM

## 2022-01-01 DIAGNOSIS — E11.649 TYPE 2 DIABETES MELLITUS WITH HYPOGLYCEMIA WITHOUT COMA: ICD-10-CM

## 2022-01-01 DIAGNOSIS — I95.3 HYPOTENSION OF HEMODIALYSIS: ICD-10-CM

## 2022-01-01 DIAGNOSIS — I77.0 ARTERIOVENOUS FISTULA, ACQUIRED: Chronic | ICD-10-CM

## 2022-01-01 DIAGNOSIS — F41.9 ANXIETY DISORDER, UNSPECIFIED: ICD-10-CM
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
## 2022-01-01 DIAGNOSIS — I46.9 CARDIAC ARREST, CAUSE UNSPECIFIED: ICD-10-CM

## 2022-01-01 DIAGNOSIS — Z89.511 ACQUIRED ABSENCE OF RIGHT LEG BELOW KNEE: ICD-10-CM

## 2022-01-01 DIAGNOSIS — J98.11 ATELECTASIS: ICD-10-CM

## 2022-01-01 DIAGNOSIS — I12.0 HYPERTENSIVE CHRONIC KIDNEY DISEASE WITH STAGE 5 CHRONIC KIDNEY DISEASE OR END STAGE RENAL DISEASE: ICD-10-CM

## 2022-01-01 DIAGNOSIS — F31.9 BIPOLAR DISORDER, UNSPECIFIED: ICD-10-CM

## 2022-01-01 DIAGNOSIS — T38.3X1A POISONING BY INSULIN AND ORAL HYPOGLYCEMIC [ANTIDIABETIC] DRUGS, ACCIDENTAL (UNINTENTIONAL), INITIAL ENCOUNTER: ICD-10-CM

## 2022-01-01 DIAGNOSIS — E66.01 MORBID (SEVERE) OBESITY DUE TO EXCESS CALORIES: ICD-10-CM

## 2022-01-01 DIAGNOSIS — E11.22 TYPE 2 DIABETES MELLITUS WITH DIABETIC CHRONIC KIDNEY DISEASE: ICD-10-CM

## 2022-01-01 DIAGNOSIS — N39.0 URINARY TRACT INFECTION, SITE NOT SPECIFIED: ICD-10-CM

## 2022-01-01 DIAGNOSIS — H54.8 LEGAL BLINDNESS, AS DEFINED IN USA: ICD-10-CM

## 2022-01-01 DIAGNOSIS — Z89.519 ACQUIRED ABSENCE OF UNSPECIFIED LEG BELOW KNEE: Chronic | ICD-10-CM

## 2022-01-01 DIAGNOSIS — N18.6 END STAGE RENAL DISEASE: ICD-10-CM

## 2022-01-01 DIAGNOSIS — R09.02 HYPOXEMIA: ICD-10-CM

## 2022-01-01 DIAGNOSIS — G47.33 OBSTRUCTIVE SLEEP APNEA (ADULT) (PEDIATRIC): ICD-10-CM

## 2022-01-01 DIAGNOSIS — E87.5 HYPERKALEMIA: ICD-10-CM

## 2022-01-01 DIAGNOSIS — Z98.83 FILTERING (VITREOUS) BLEB AFTER GLAUCOMA SURGERY STATUS: Chronic | ICD-10-CM

## 2022-01-01 DIAGNOSIS — N40.0 BENIGN PROSTATIC HYPERPLASIA WITHOUT LOWER URINARY TRACT SYMPTOMS: ICD-10-CM

## 2022-01-01 DIAGNOSIS — Z79.4 LONG TERM (CURRENT) USE OF INSULIN: ICD-10-CM

## 2022-01-01 DIAGNOSIS — Z91.15 PATIENT'S NONCOMPLIANCE WITH RENAL DIALYSIS: ICD-10-CM

## 2022-01-01 DIAGNOSIS — Z20.822 CONTACT WITH AND (SUSPECTED) EXPOSURE TO COVID-19: ICD-10-CM

## 2022-01-01 DIAGNOSIS — D64.9 ANEMIA, UNSPECIFIED: ICD-10-CM

## 2022-01-01 DIAGNOSIS — E87.79 OTHER FLUID OVERLOAD: ICD-10-CM

## 2022-01-01 DIAGNOSIS — J96.01 ACUTE RESPIRATORY FAILURE WITH HYPOXIA: ICD-10-CM

## 2022-01-01 LAB
A1C WITH ESTIMATED AVERAGE GLUCOSE RESULT: 4.7 % — SIGNIFICANT CHANGE UP (ref 4–5.6)
ALBUMIN SERPL ELPH-MCNC: 3 G/DL — LOW (ref 3.5–5.2)
ALBUMIN SERPL ELPH-MCNC: 3.1 G/DL — LOW (ref 3.5–5.2)
ALBUMIN SERPL ELPH-MCNC: 3.1 G/DL — LOW (ref 3.5–5.2)
ALBUMIN SERPL ELPH-MCNC: 3.5 G/DL — SIGNIFICANT CHANGE UP (ref 3.5–5.2)
ALBUMIN SERPL ELPH-MCNC: 3.7 G/DL — SIGNIFICANT CHANGE UP (ref 3.5–5.2)
ALP SERPL-CCNC: 165 U/L — HIGH (ref 30–115)
ALP SERPL-CCNC: 170 U/L — HIGH (ref 30–115)
ALP SERPL-CCNC: 170 U/L — HIGH (ref 30–115)
ALP SERPL-CCNC: 174 U/L — HIGH (ref 30–115)
ALP SERPL-CCNC: 188 U/L — HIGH (ref 30–115)
ALP SERPL-CCNC: 197 U/L — HIGH (ref 30–115)
ALP SERPL-CCNC: 218 U/L — HIGH (ref 30–115)
ALT FLD-CCNC: 5 U/L — SIGNIFICANT CHANGE UP (ref 0–41)
ALT FLD-CCNC: 6 U/L — SIGNIFICANT CHANGE UP (ref 0–41)
ALT FLD-CCNC: 7 U/L — SIGNIFICANT CHANGE UP (ref 0–41)
ALT FLD-CCNC: <5 U/L — SIGNIFICANT CHANGE UP (ref 0–41)
ALT FLD-CCNC: <5 U/L — SIGNIFICANT CHANGE UP (ref 0–41)
ANION GAP SERPL CALC-SCNC: 10 MMOL/L — SIGNIFICANT CHANGE UP (ref 7–14)
ANION GAP SERPL CALC-SCNC: 11 MMOL/L — SIGNIFICANT CHANGE UP (ref 7–14)
ANION GAP SERPL CALC-SCNC: 12 MMOL/L — SIGNIFICANT CHANGE UP (ref 7–14)
ANION GAP SERPL CALC-SCNC: 13 MMOL/L — SIGNIFICANT CHANGE UP (ref 7–14)
ANION GAP SERPL CALC-SCNC: 13 MMOL/L — SIGNIFICANT CHANGE UP (ref 7–14)
ANION GAP SERPL CALC-SCNC: 15 MMOL/L — HIGH (ref 7–14)
APPEARANCE UR: CLEAR — SIGNIFICANT CHANGE UP
APTT BLD: 43.5 SEC — HIGH (ref 27–39.2)
AST SERPL-CCNC: 10 U/L — SIGNIFICANT CHANGE UP (ref 0–41)
AST SERPL-CCNC: 6 U/L — SIGNIFICANT CHANGE UP (ref 0–41)
AST SERPL-CCNC: 6 U/L — SIGNIFICANT CHANGE UP (ref 0–41)
AST SERPL-CCNC: 7 U/L — SIGNIFICANT CHANGE UP (ref 0–41)
AST SERPL-CCNC: <5 U/L — SIGNIFICANT CHANGE UP (ref 0–41)
BACTERIA # UR AUTO: ABNORMAL
BASE EXCESS BLDV CALC-SCNC: -2.3 MMOL/L — LOW (ref -2–3)
BASOPHILS # BLD AUTO: 0.06 K/UL — SIGNIFICANT CHANGE UP (ref 0–0.2)
BASOPHILS # BLD AUTO: 0.07 K/UL — SIGNIFICANT CHANGE UP (ref 0–0.2)
BASOPHILS # BLD AUTO: 0.08 K/UL — SIGNIFICANT CHANGE UP (ref 0–0.2)
BASOPHILS # BLD AUTO: 0.09 K/UL — SIGNIFICANT CHANGE UP (ref 0–0.2)
BASOPHILS # BLD AUTO: 0.1 K/UL — SIGNIFICANT CHANGE UP (ref 0–0.2)
BASOPHILS # BLD AUTO: 0.1 K/UL — SIGNIFICANT CHANGE UP (ref 0–0.2)
BASOPHILS # BLD AUTO: 0.11 K/UL — SIGNIFICANT CHANGE UP (ref 0–0.2)
BASOPHILS NFR BLD AUTO: 0.4 % — SIGNIFICANT CHANGE UP (ref 0–1)
BASOPHILS NFR BLD AUTO: 0.5 % — SIGNIFICANT CHANGE UP (ref 0–1)
BASOPHILS NFR BLD AUTO: 0.7 % — SIGNIFICANT CHANGE UP (ref 0–1)
BASOPHILS NFR BLD AUTO: 0.9 % — SIGNIFICANT CHANGE UP (ref 0–1)
BASOPHILS NFR BLD AUTO: 0.9 % — SIGNIFICANT CHANGE UP (ref 0–1)
BASOPHILS NFR BLD AUTO: 1 % — SIGNIFICANT CHANGE UP (ref 0–1)
BASOPHILS NFR BLD AUTO: 1.2 % — HIGH (ref 0–1)
BILIRUB SERPL-MCNC: 0.3 MG/DL — SIGNIFICANT CHANGE UP (ref 0.2–1.2)
BILIRUB SERPL-MCNC: 0.4 MG/DL — SIGNIFICANT CHANGE UP (ref 0.2–1.2)
BILIRUB SERPL-MCNC: 0.4 MG/DL — SIGNIFICANT CHANGE UP (ref 0.2–1.2)
BILIRUB UR-MCNC: NEGATIVE — SIGNIFICANT CHANGE UP
BLD GP AB SCN SERPL QL: SIGNIFICANT CHANGE UP
BUN SERPL-MCNC: 34 MG/DL — HIGH (ref 10–20)
BUN SERPL-MCNC: 39 MG/DL — HIGH (ref 10–20)
BUN SERPL-MCNC: 47 MG/DL — HIGH (ref 10–20)
BUN SERPL-MCNC: 48 MG/DL — HIGH (ref 10–20)
BUN SERPL-MCNC: 50 MG/DL — HIGH (ref 10–20)
BUN SERPL-MCNC: 53 MG/DL — HIGH (ref 10–20)
BUN SERPL-MCNC: 54 MG/DL — HIGH (ref 10–20)
BUN SERPL-MCNC: 62 MG/DL — CRITICAL HIGH (ref 10–20)
BUN SERPL-MCNC: 86 MG/DL — CRITICAL HIGH (ref 10–20)
BUN SERPL-MCNC: 90 MG/DL — CRITICAL HIGH (ref 10–20)
CA-I SERPL-SCNC: 1.06 MMOL/L — LOW (ref 1.15–1.33)
CALCIUM SERPL-MCNC: 7.6 MG/DL — LOW (ref 8.5–10.1)
CALCIUM SERPL-MCNC: 7.7 MG/DL — LOW (ref 8.5–10.1)
CALCIUM SERPL-MCNC: 7.8 MG/DL — LOW (ref 8.5–10.1)
CALCIUM SERPL-MCNC: 7.9 MG/DL — LOW (ref 8.5–10.1)
CALCIUM SERPL-MCNC: 7.9 MG/DL — LOW (ref 8.5–10.1)
CALCIUM SERPL-MCNC: 8.2 MG/DL — LOW (ref 8.5–10.1)
CALCIUM SERPL-MCNC: 8.4 MG/DL — LOW (ref 8.5–10.1)
CALCIUM SERPL-MCNC: 8.7 MG/DL — SIGNIFICANT CHANGE UP (ref 8.5–10.1)
CHLORIDE SERPL-SCNC: 100 MMOL/L — SIGNIFICANT CHANGE UP (ref 98–110)
CHLORIDE SERPL-SCNC: 101 MMOL/L — SIGNIFICANT CHANGE UP (ref 98–110)
CHLORIDE SERPL-SCNC: 101 MMOL/L — SIGNIFICANT CHANGE UP (ref 98–110)
CHLORIDE SERPL-SCNC: 99 MMOL/L — SIGNIFICANT CHANGE UP (ref 98–110)
CO2 SERPL-SCNC: 25 MMOL/L — SIGNIFICANT CHANGE UP (ref 17–32)
CO2 SERPL-SCNC: 26 MMOL/L — SIGNIFICANT CHANGE UP (ref 17–32)
CO2 SERPL-SCNC: 27 MMOL/L — SIGNIFICANT CHANGE UP (ref 17–32)
CO2 SERPL-SCNC: 28 MMOL/L — SIGNIFICANT CHANGE UP (ref 17–32)
CO2 SERPL-SCNC: 28 MMOL/L — SIGNIFICANT CHANGE UP (ref 17–32)
CO2 SERPL-SCNC: 29 MMOL/L — SIGNIFICANT CHANGE UP (ref 17–32)
CO2 SERPL-SCNC: 30 MMOL/L — SIGNIFICANT CHANGE UP (ref 17–32)
COLOR SPEC: YELLOW — SIGNIFICANT CHANGE UP
CREAT SERPL-MCNC: 10.6 MG/DL — CRITICAL HIGH (ref 0.7–1.5)
CREAT SERPL-MCNC: 11.4 MG/DL — CRITICAL HIGH (ref 0.7–1.5)
CREAT SERPL-MCNC: 5.6 MG/DL — CRITICAL HIGH (ref 0.7–1.5)
CREAT SERPL-MCNC: 6.1 MG/DL — CRITICAL HIGH (ref 0.7–1.5)
CREAT SERPL-MCNC: 7 MG/DL — CRITICAL HIGH (ref 0.7–1.5)
CREAT SERPL-MCNC: 7.1 MG/DL — CRITICAL HIGH (ref 0.7–1.5)
CREAT SERPL-MCNC: 7.3 MG/DL — CRITICAL HIGH (ref 0.7–1.5)
CREAT SERPL-MCNC: 7.6 MG/DL — CRITICAL HIGH (ref 0.7–1.5)
CREAT SERPL-MCNC: 7.8 MG/DL — CRITICAL HIGH (ref 0.7–1.5)
CREAT SERPL-MCNC: 8.2 MG/DL — CRITICAL HIGH (ref 0.7–1.5)
CULTURE RESULTS: SIGNIFICANT CHANGE UP
DIFF PNL FLD: ABNORMAL
EGFR: 10 ML/MIN/1.73M2 — LOW
EGFR: 11 ML/MIN/1.73M2 — LOW
EGFR: 5 ML/MIN/1.73M2 — LOW
EGFR: 5 ML/MIN/1.73M2 — LOW
EGFR: 7 ML/MIN/1.73M2 — LOW
EGFR: 8 ML/MIN/1.73M2 — LOW
EGFR: 9 ML/MIN/1.73M2 — LOW
EOSINOPHIL # BLD AUTO: 0.01 K/UL — SIGNIFICANT CHANGE UP (ref 0–0.7)
EOSINOPHIL # BLD AUTO: 0.03 K/UL — SIGNIFICANT CHANGE UP (ref 0–0.7)
EOSINOPHIL # BLD AUTO: 0.17 K/UL — SIGNIFICANT CHANGE UP (ref 0–0.7)
EOSINOPHIL # BLD AUTO: 0.29 K/UL — SIGNIFICANT CHANGE UP (ref 0–0.7)
EOSINOPHIL # BLD AUTO: 0.32 K/UL — SIGNIFICANT CHANGE UP (ref 0–0.7)
EOSINOPHIL # BLD AUTO: 0.34 K/UL — SIGNIFICANT CHANGE UP (ref 0–0.7)
EOSINOPHIL # BLD AUTO: 0.35 K/UL — SIGNIFICANT CHANGE UP (ref 0–0.7)
EOSINOPHIL NFR BLD AUTO: 0 % — SIGNIFICANT CHANGE UP (ref 0–8)
EOSINOPHIL NFR BLD AUTO: 0.2 % — SIGNIFICANT CHANGE UP (ref 0–8)
EOSINOPHIL NFR BLD AUTO: 2.3 % — SIGNIFICANT CHANGE UP (ref 0–8)
EOSINOPHIL NFR BLD AUTO: 2.5 % — SIGNIFICANT CHANGE UP (ref 0–8)
EOSINOPHIL NFR BLD AUTO: 3.2 % — SIGNIFICANT CHANGE UP (ref 0–8)
EOSINOPHIL NFR BLD AUTO: 3.7 % — SIGNIFICANT CHANGE UP (ref 0–8)
EOSINOPHIL NFR BLD AUTO: 3.9 % — SIGNIFICANT CHANGE UP (ref 0–8)
ESTIMATED AVERAGE GLUCOSE: 88 MG/DL — SIGNIFICANT CHANGE UP (ref 68–114)
FLUAV AG NPH QL: SIGNIFICANT CHANGE UP
FLUBV AG NPH QL: SIGNIFICANT CHANGE UP
GAS PNL BLDV: 133 MMOL/L — LOW (ref 136–145)
GAS PNL BLDV: SIGNIFICANT CHANGE UP
GLUCOSE BLDC GLUCOMTR-MCNC: 100 MG/DL — HIGH (ref 70–99)
GLUCOSE BLDC GLUCOMTR-MCNC: 102 MG/DL — HIGH (ref 70–99)
GLUCOSE BLDC GLUCOMTR-MCNC: 102 MG/DL — HIGH (ref 70–99)
GLUCOSE BLDC GLUCOMTR-MCNC: 115 MG/DL — HIGH (ref 70–99)
GLUCOSE BLDC GLUCOMTR-MCNC: 116 MG/DL — HIGH (ref 70–99)
GLUCOSE BLDC GLUCOMTR-MCNC: 117 MG/DL — HIGH (ref 70–99)
GLUCOSE BLDC GLUCOMTR-MCNC: 118 MG/DL — HIGH (ref 70–99)
GLUCOSE BLDC GLUCOMTR-MCNC: 119 MG/DL — HIGH (ref 70–99)
GLUCOSE BLDC GLUCOMTR-MCNC: 120 MG/DL — HIGH (ref 70–99)
GLUCOSE BLDC GLUCOMTR-MCNC: 121 MG/DL — HIGH (ref 70–99)
GLUCOSE BLDC GLUCOMTR-MCNC: 123 MG/DL — HIGH (ref 70–99)
GLUCOSE BLDC GLUCOMTR-MCNC: 130 MG/DL — HIGH (ref 70–99)
GLUCOSE BLDC GLUCOMTR-MCNC: 133 MG/DL — HIGH (ref 70–99)
GLUCOSE BLDC GLUCOMTR-MCNC: 135 MG/DL — HIGH (ref 70–99)
GLUCOSE BLDC GLUCOMTR-MCNC: 145 MG/DL — HIGH (ref 70–99)
GLUCOSE BLDC GLUCOMTR-MCNC: 153 MG/DL — HIGH (ref 70–99)
GLUCOSE BLDC GLUCOMTR-MCNC: 153 MG/DL — HIGH (ref 70–99)
GLUCOSE BLDC GLUCOMTR-MCNC: 170 MG/DL — HIGH (ref 70–99)
GLUCOSE BLDC GLUCOMTR-MCNC: 170 MG/DL — HIGH (ref 70–99)
GLUCOSE BLDC GLUCOMTR-MCNC: 176 MG/DL — HIGH (ref 70–99)
GLUCOSE BLDC GLUCOMTR-MCNC: 180 MG/DL — HIGH (ref 70–99)
GLUCOSE BLDC GLUCOMTR-MCNC: 188 MG/DL — HIGH (ref 70–99)
GLUCOSE BLDC GLUCOMTR-MCNC: 53 MG/DL — CRITICAL LOW (ref 70–99)
GLUCOSE BLDC GLUCOMTR-MCNC: 67 MG/DL — LOW (ref 70–99)
GLUCOSE BLDC GLUCOMTR-MCNC: 70 MG/DL — SIGNIFICANT CHANGE UP (ref 70–99)
GLUCOSE BLDC GLUCOMTR-MCNC: 76 MG/DL — SIGNIFICANT CHANGE UP (ref 70–99)
GLUCOSE BLDC GLUCOMTR-MCNC: 77 MG/DL — SIGNIFICANT CHANGE UP (ref 70–99)
GLUCOSE BLDC GLUCOMTR-MCNC: 83 MG/DL — SIGNIFICANT CHANGE UP (ref 70–99)
GLUCOSE BLDC GLUCOMTR-MCNC: 89 MG/DL — SIGNIFICANT CHANGE UP (ref 70–99)
GLUCOSE BLDC GLUCOMTR-MCNC: 90 MG/DL — SIGNIFICANT CHANGE UP (ref 70–99)
GLUCOSE BLDC GLUCOMTR-MCNC: 90 MG/DL — SIGNIFICANT CHANGE UP (ref 70–99)
GLUCOSE BLDC GLUCOMTR-MCNC: 95 MG/DL — SIGNIFICANT CHANGE UP (ref 70–99)
GLUCOSE BLDC GLUCOMTR-MCNC: 96 MG/DL — SIGNIFICANT CHANGE UP (ref 70–99)
GLUCOSE BLDC GLUCOMTR-MCNC: 98 MG/DL — SIGNIFICANT CHANGE UP (ref 70–99)
GLUCOSE SERPL-MCNC: 119 MG/DL — HIGH (ref 70–99)
GLUCOSE SERPL-MCNC: 131 MG/DL — HIGH (ref 70–99)
GLUCOSE SERPL-MCNC: 133 MG/DL — HIGH (ref 70–99)
GLUCOSE SERPL-MCNC: 153 MG/DL — HIGH (ref 70–99)
GLUCOSE SERPL-MCNC: 25 MG/DL — CRITICAL LOW (ref 70–99)
GLUCOSE SERPL-MCNC: 50 MG/DL — CRITICAL LOW (ref 70–99)
GLUCOSE SERPL-MCNC: 88 MG/DL — SIGNIFICANT CHANGE UP (ref 70–99)
GLUCOSE SERPL-MCNC: 92 MG/DL — SIGNIFICANT CHANGE UP (ref 70–99)
GLUCOSE SERPL-MCNC: 96 MG/DL — SIGNIFICANT CHANGE UP (ref 70–99)
GLUCOSE SERPL-MCNC: 98 MG/DL — SIGNIFICANT CHANGE UP (ref 70–99)
GLUCOSE UR QL: NEGATIVE MG/DL — SIGNIFICANT CHANGE UP
HCO3 BLDV-SCNC: 26 MMOL/L — SIGNIFICANT CHANGE UP (ref 22–29)
HCT VFR BLD CALC: 25.3 % — LOW (ref 42–52)
HCT VFR BLD CALC: 27.7 % — LOW (ref 42–52)
HCT VFR BLD CALC: 27.8 % — LOW (ref 42–52)
HCT VFR BLD CALC: 28 % — LOW (ref 42–52)
HCT VFR BLD CALC: 28 % — LOW (ref 42–52)
HCT VFR BLD CALC: 30.3 % — LOW (ref 42–52)
HCT VFR BLD CALC: 33.7 % — LOW (ref 42–52)
HCT VFR BLDA CALC: 30 % — LOW (ref 39–51)
HGB BLD CALC-MCNC: 10.1 G/DL — LOW (ref 12.6–17.4)
HGB BLD-MCNC: 10.1 G/DL — LOW (ref 14–18)
HGB BLD-MCNC: 7.5 G/DL — LOW (ref 14–18)
HGB BLD-MCNC: 7.9 G/DL — LOW (ref 14–18)
HGB BLD-MCNC: 7.9 G/DL — LOW (ref 14–18)
HGB BLD-MCNC: 8.3 G/DL — LOW (ref 14–18)
HGB BLD-MCNC: 8.3 G/DL — LOW (ref 14–18)
HGB BLD-MCNC: 9 G/DL — LOW (ref 14–18)
IMM GRANULOCYTES NFR BLD AUTO: 0.2 % — SIGNIFICANT CHANGE UP (ref 0.1–0.3)
IMM GRANULOCYTES NFR BLD AUTO: 0.5 % — HIGH (ref 0.1–0.3)
IMM GRANULOCYTES NFR BLD AUTO: 0.6 % — HIGH (ref 0.1–0.3)
IMM GRANULOCYTES NFR BLD AUTO: 0.6 % — HIGH (ref 0.1–0.3)
IMM GRANULOCYTES NFR BLD AUTO: 0.8 % — HIGH (ref 0.1–0.3)
IMM GRANULOCYTES NFR BLD AUTO: 0.9 % — HIGH (ref 0.1–0.3)
IMM GRANULOCYTES NFR BLD AUTO: 1 % — HIGH (ref 0.1–0.3)
INR BLD: 1.47 RATIO — HIGH (ref 0.65–1.3)
KETONES UR-MCNC: NEGATIVE — SIGNIFICANT CHANGE UP
LACTATE BLDV-MCNC: 1 MMOL/L — SIGNIFICANT CHANGE UP (ref 0.5–2)
LEUKOCYTE ESTERASE UR-ACNC: ABNORMAL
LYMPHOCYTES # BLD AUTO: 0.62 K/UL — LOW (ref 1.2–3.4)
LYMPHOCYTES # BLD AUTO: 0.85 K/UL — LOW (ref 1.2–3.4)
LYMPHOCYTES # BLD AUTO: 0.92 K/UL — LOW (ref 1.2–3.4)
LYMPHOCYTES # BLD AUTO: 0.93 K/UL — LOW (ref 1.2–3.4)
LYMPHOCYTES # BLD AUTO: 1.05 K/UL — LOW (ref 1.2–3.4)
LYMPHOCYTES # BLD AUTO: 1.06 K/UL — LOW (ref 1.2–3.4)
LYMPHOCYTES # BLD AUTO: 1.34 K/UL — SIGNIFICANT CHANGE UP (ref 1.2–3.4)
LYMPHOCYTES # BLD AUTO: 11.9 % — LOW (ref 20.5–51.1)
LYMPHOCYTES # BLD AUTO: 12.2 % — LOW (ref 20.5–51.1)
LYMPHOCYTES # BLD AUTO: 13.3 % — LOW (ref 20.5–51.1)
LYMPHOCYTES # BLD AUTO: 14.7 % — LOW (ref 20.5–51.1)
LYMPHOCYTES # BLD AUTO: 2.5 % — LOW (ref 20.5–51.1)
LYMPHOCYTES # BLD AUTO: 5.6 % — LOW (ref 20.5–51.1)
LYMPHOCYTES # BLD AUTO: 6.1 % — LOW (ref 20.5–51.1)
MAGNESIUM SERPL-MCNC: 2.1 MG/DL — SIGNIFICANT CHANGE UP (ref 1.8–2.4)
MAGNESIUM SERPL-MCNC: 2.3 MG/DL — SIGNIFICANT CHANGE UP (ref 1.8–2.4)
MAGNESIUM SERPL-MCNC: 2.4 MG/DL — SIGNIFICANT CHANGE UP (ref 1.8–2.4)
MAGNESIUM SERPL-MCNC: 2.5 MG/DL — HIGH (ref 1.8–2.4)
MCHC RBC-ENTMCNC: 27.1 PG — SIGNIFICANT CHANGE UP (ref 27–31)
MCHC RBC-ENTMCNC: 27.4 PG — SIGNIFICANT CHANGE UP (ref 27–31)
MCHC RBC-ENTMCNC: 28.2 G/DL — LOW (ref 32–37)
MCHC RBC-ENTMCNC: 28.2 G/DL — LOW (ref 32–37)
MCHC RBC-ENTMCNC: 28.3 PG — SIGNIFICANT CHANGE UP (ref 27–31)
MCHC RBC-ENTMCNC: 28.5 PG — SIGNIFICANT CHANGE UP (ref 27–31)
MCHC RBC-ENTMCNC: 28.8 PG — SIGNIFICANT CHANGE UP (ref 27–31)
MCHC RBC-ENTMCNC: 29 PG — SIGNIFICANT CHANGE UP (ref 27–31)
MCHC RBC-ENTMCNC: 29.1 PG — SIGNIFICANT CHANGE UP (ref 27–31)
MCHC RBC-ENTMCNC: 29.6 G/DL — LOW (ref 32–37)
MCHC RBC-ENTMCNC: 29.7 G/DL — LOW (ref 32–37)
MCHC RBC-ENTMCNC: 29.9 G/DL — LOW (ref 32–37)
MCHC RBC-ENTMCNC: 30 G/DL — LOW (ref 32–37)
MCHC RBC-ENTMCNC: 30 G/DL — LOW (ref 32–37)
MCV RBC AUTO: 94.4 FL — HIGH (ref 80–94)
MCV RBC AUTO: 95.2 FL — HIGH (ref 80–94)
MCV RBC AUTO: 95.9 FL — HIGH (ref 80–94)
MCV RBC AUTO: 97.1 FL — HIGH (ref 80–94)
MCV RBC AUTO: 97.2 FL — HIGH (ref 80–94)
MCV RBC AUTO: 97.2 FL — HIGH (ref 80–94)
MCV RBC AUTO: 98.1 FL — HIGH (ref 80–94)
MONOCYTES # BLD AUTO: 0.61 K/UL — HIGH (ref 0.1–0.6)
MONOCYTES # BLD AUTO: 0.62 K/UL — HIGH (ref 0.1–0.6)
MONOCYTES # BLD AUTO: 0.66 K/UL — HIGH (ref 0.1–0.6)
MONOCYTES # BLD AUTO: 0.81 K/UL — HIGH (ref 0.1–0.6)
MONOCYTES # BLD AUTO: 0.82 K/UL — HIGH (ref 0.1–0.6)
MONOCYTES # BLD AUTO: 0.92 K/UL — HIGH (ref 0.1–0.6)
MONOCYTES # BLD AUTO: 0.98 K/UL — HIGH (ref 0.1–0.6)
MONOCYTES NFR BLD AUTO: 3.8 % — SIGNIFICANT CHANGE UP (ref 1.7–9.3)
MONOCYTES NFR BLD AUTO: 5.4 % — SIGNIFICANT CHANGE UP (ref 1.7–9.3)
MONOCYTES NFR BLD AUTO: 6.4 % — SIGNIFICANT CHANGE UP (ref 1.7–9.3)
MONOCYTES NFR BLD AUTO: 6.9 % — SIGNIFICANT CHANGE UP (ref 1.7–9.3)
MONOCYTES NFR BLD AUTO: 7.6 % — SIGNIFICANT CHANGE UP (ref 1.7–9.3)
MONOCYTES NFR BLD AUTO: 8.9 % — SIGNIFICANT CHANGE UP (ref 1.7–9.3)
MONOCYTES NFR BLD AUTO: 8.9 % — SIGNIFICANT CHANGE UP (ref 1.7–9.3)
MRSA PCR RESULT.: POSITIVE
NEUTROPHILS # BLD AUTO: 12.72 K/UL — HIGH (ref 1.4–6.5)
NEUTROPHILS # BLD AUTO: 13.4 K/UL — HIGH (ref 1.4–6.5)
NEUTROPHILS # BLD AUTO: 22.47 K/UL — HIGH (ref 1.4–6.5)
NEUTROPHILS # BLD AUTO: 5.12 K/UL — SIGNIFICANT CHANGE UP (ref 1.4–6.5)
NEUTROPHILS # BLD AUTO: 6.49 K/UL — SIGNIFICANT CHANGE UP (ref 1.4–6.5)
NEUTROPHILS # BLD AUTO: 6.51 K/UL — HIGH (ref 1.4–6.5)
NEUTROPHILS # BLD AUTO: 6.69 K/UL — HIGH (ref 1.4–6.5)
NEUTROPHILS NFR BLD AUTO: 71.4 % — SIGNIFICANT CHANGE UP (ref 42.2–75.2)
NEUTROPHILS NFR BLD AUTO: 73.8 % — SIGNIFICANT CHANGE UP (ref 42.2–75.2)
NEUTROPHILS NFR BLD AUTO: 75 % — SIGNIFICANT CHANGE UP (ref 42.2–75.2)
NEUTROPHILS NFR BLD AUTO: 76.2 % — HIGH (ref 42.2–75.2)
NEUTROPHILS NFR BLD AUTO: 83.7 % — HIGH (ref 42.2–75.2)
NEUTROPHILS NFR BLD AUTO: 87.4 % — HIGH (ref 42.2–75.2)
NEUTROPHILS NFR BLD AUTO: 92.3 % — HIGH (ref 42.2–75.2)
NITRITE UR-MCNC: NEGATIVE — SIGNIFICANT CHANGE UP
NRBC # BLD: 0 /100 WBCS — SIGNIFICANT CHANGE UP (ref 0–0)
PCO2 BLDV: 60 MMHG — HIGH (ref 42–55)
PH BLDV: 7.24 — LOW (ref 7.32–7.43)
PH UR: 6 — SIGNIFICANT CHANGE UP (ref 5–8)
PHOSPHATE SERPL-MCNC: 4.2 MG/DL — SIGNIFICANT CHANGE UP (ref 2.1–4.9)
PLATELET # BLD AUTO: 134 K/UL — SIGNIFICANT CHANGE UP (ref 130–400)
PLATELET # BLD AUTO: 139 K/UL — SIGNIFICANT CHANGE UP (ref 130–400)
PLATELET # BLD AUTO: 152 K/UL — SIGNIFICANT CHANGE UP (ref 130–400)
PLATELET # BLD AUTO: 165 K/UL — SIGNIFICANT CHANGE UP (ref 130–400)
PLATELET # BLD AUTO: 187 K/UL — SIGNIFICANT CHANGE UP (ref 130–400)
PLATELET # BLD AUTO: 195 K/UL — SIGNIFICANT CHANGE UP (ref 130–400)
PLATELET # BLD AUTO: 202 K/UL — SIGNIFICANT CHANGE UP (ref 130–400)
PO2 BLDV: 45 MMHG — SIGNIFICANT CHANGE UP
POTASSIUM BLDV-SCNC: 7.1 MMOL/L — CRITICAL HIGH (ref 3.5–5.1)
POTASSIUM SERPL-MCNC: 4.1 MMOL/L — SIGNIFICANT CHANGE UP (ref 3.5–5)
POTASSIUM SERPL-MCNC: 4.4 MMOL/L — SIGNIFICANT CHANGE UP (ref 3.5–5)
POTASSIUM SERPL-MCNC: 4.5 MMOL/L — SIGNIFICANT CHANGE UP (ref 3.5–5)
POTASSIUM SERPL-MCNC: 4.6 MMOL/L — SIGNIFICANT CHANGE UP (ref 3.5–5)
POTASSIUM SERPL-MCNC: 4.7 MMOL/L — SIGNIFICANT CHANGE UP (ref 3.5–5)
POTASSIUM SERPL-MCNC: 4.8 MMOL/L — SIGNIFICANT CHANGE UP (ref 3.5–5)
POTASSIUM SERPL-MCNC: 4.9 MMOL/L — SIGNIFICANT CHANGE UP (ref 3.5–5)
POTASSIUM SERPL-MCNC: 5.5 MMOL/L — HIGH (ref 3.5–5)
POTASSIUM SERPL-MCNC: 6.3 MMOL/L — CRITICAL HIGH (ref 3.5–5)
POTASSIUM SERPL-MCNC: 7 MMOL/L — CRITICAL HIGH (ref 3.5–5)
POTASSIUM SERPL-SCNC: 4.1 MMOL/L — SIGNIFICANT CHANGE UP (ref 3.5–5)
POTASSIUM SERPL-SCNC: 4.4 MMOL/L — SIGNIFICANT CHANGE UP (ref 3.5–5)
POTASSIUM SERPL-SCNC: 4.5 MMOL/L — SIGNIFICANT CHANGE UP (ref 3.5–5)
POTASSIUM SERPL-SCNC: 4.6 MMOL/L — SIGNIFICANT CHANGE UP (ref 3.5–5)
POTASSIUM SERPL-SCNC: 4.7 MMOL/L — SIGNIFICANT CHANGE UP (ref 3.5–5)
POTASSIUM SERPL-SCNC: 4.8 MMOL/L — SIGNIFICANT CHANGE UP (ref 3.5–5)
POTASSIUM SERPL-SCNC: 4.9 MMOL/L — SIGNIFICANT CHANGE UP (ref 3.5–5)
POTASSIUM SERPL-SCNC: 5.5 MMOL/L — HIGH (ref 3.5–5)
POTASSIUM SERPL-SCNC: 6.3 MMOL/L — CRITICAL HIGH (ref 3.5–5)
POTASSIUM SERPL-SCNC: 7 MMOL/L — CRITICAL HIGH (ref 3.5–5)
PROCALCITONIN SERPL-MCNC: 11.7 NG/ML — HIGH (ref 0.02–0.1)
PROT SERPL-MCNC: 5.2 G/DL — LOW (ref 6–8)
PROT SERPL-MCNC: 5.2 G/DL — LOW (ref 6–8)
PROT SERPL-MCNC: 5.3 G/DL — LOW (ref 6–8)
PROT SERPL-MCNC: 5.5 G/DL — LOW (ref 6–8)
PROT SERPL-MCNC: 5.6 G/DL — LOW (ref 6–8)
PROT SERPL-MCNC: 5.9 G/DL — LOW (ref 6–8)
PROT SERPL-MCNC: 6.3 G/DL — SIGNIFICANT CHANGE UP (ref 6–8)
PROT UR-MCNC: 100 MG/DL
PROTHROM AB SERPL-ACNC: 16.8 SEC — HIGH (ref 9.95–12.87)
RBC # BLD: 2.58 M/UL — LOW (ref 4.7–6.1)
RBC # BLD: 2.86 M/UL — LOW (ref 4.7–6.1)
RBC # BLD: 2.88 M/UL — LOW (ref 4.7–6.1)
RBC # BLD: 2.91 M/UL — LOW (ref 4.7–6.1)
RBC # BLD: 2.92 M/UL — LOW (ref 4.7–6.1)
RBC # BLD: 3.12 M/UL — LOW (ref 4.7–6.1)
RBC # BLD: 3.57 M/UL — LOW (ref 4.7–6.1)
RBC # FLD: 14 % — SIGNIFICANT CHANGE UP (ref 11.5–14.5)
RBC # FLD: 14.1 % — SIGNIFICANT CHANGE UP (ref 11.5–14.5)
RBC # FLD: 14.2 % — SIGNIFICANT CHANGE UP (ref 11.5–14.5)
RBC # FLD: 14.2 % — SIGNIFICANT CHANGE UP (ref 11.5–14.5)
RBC # FLD: 14.3 % — SIGNIFICANT CHANGE UP (ref 11.5–14.5)
RBC # FLD: 14.3 % — SIGNIFICANT CHANGE UP (ref 11.5–14.5)
RBC # FLD: 14.4 % — SIGNIFICANT CHANGE UP (ref 11.5–14.5)
RBC CASTS # UR COMP ASSIST: ABNORMAL /HPF
RSV RNA NPH QL NAA+NON-PROBE: SIGNIFICANT CHANGE UP
SAO2 % BLDV: 74 % — SIGNIFICANT CHANGE UP
SARS-COV-2 RNA SPEC QL NAA+PROBE: SIGNIFICANT CHANGE UP
SODIUM SERPL-SCNC: 136 MMOL/L — SIGNIFICANT CHANGE UP (ref 135–146)
SODIUM SERPL-SCNC: 138 MMOL/L — SIGNIFICANT CHANGE UP (ref 135–146)
SODIUM SERPL-SCNC: 139 MMOL/L — SIGNIFICANT CHANGE UP (ref 135–146)
SODIUM SERPL-SCNC: 140 MMOL/L — SIGNIFICANT CHANGE UP (ref 135–146)
SODIUM SERPL-SCNC: 141 MMOL/L — SIGNIFICANT CHANGE UP (ref 135–146)
SODIUM SERPL-SCNC: 141 MMOL/L — SIGNIFICANT CHANGE UP (ref 135–146)
SODIUM SERPL-SCNC: 143 MMOL/L — SIGNIFICANT CHANGE UP (ref 135–146)
SP GR SPEC: 1.02 — SIGNIFICANT CHANGE UP (ref 1.01–1.03)
SPECIMEN SOURCE: SIGNIFICANT CHANGE UP
UROBILINOGEN FLD QL: 0.2 MG/DL — SIGNIFICANT CHANGE UP
WBC # BLD: 15.2 K/UL — HIGH (ref 4.8–10.8)
WBC # BLD: 15.31 K/UL — HIGH (ref 4.8–10.8)
WBC # BLD: 24.36 K/UL — HIGH (ref 4.8–10.8)
WBC # BLD: 6.93 K/UL — SIGNIFICANT CHANGE UP (ref 4.8–10.8)
WBC # BLD: 8.68 K/UL — SIGNIFICANT CHANGE UP (ref 4.8–10.8)
WBC # BLD: 8.79 K/UL — SIGNIFICANT CHANGE UP (ref 4.8–10.8)
WBC # BLD: 9.09 K/UL — SIGNIFICANT CHANGE UP (ref 4.8–10.8)
WBC # FLD AUTO: 15.2 K/UL — HIGH (ref 4.8–10.8)
WBC # FLD AUTO: 15.31 K/UL — HIGH (ref 4.8–10.8)
WBC # FLD AUTO: 24.36 K/UL — HIGH (ref 4.8–10.8)
WBC # FLD AUTO: 6.93 K/UL — SIGNIFICANT CHANGE UP (ref 4.8–10.8)
WBC # FLD AUTO: 8.68 K/UL — SIGNIFICANT CHANGE UP (ref 4.8–10.8)
WBC # FLD AUTO: 8.79 K/UL — SIGNIFICANT CHANGE UP (ref 4.8–10.8)
WBC # FLD AUTO: 9.09 K/UL — SIGNIFICANT CHANGE UP (ref 4.8–10.8)
WBC UR QL: >50 /HPF

## 2022-01-01 PROCEDURE — 71045 X-RAY EXAM CHEST 1 VIEW: CPT | Mod: 26

## 2022-01-01 PROCEDURE — 93010 ELECTROCARDIOGRAM REPORT: CPT

## 2022-01-01 PROCEDURE — 99232 SBSQ HOSP IP/OBS MODERATE 35: CPT

## 2022-01-01 PROCEDURE — 99285 EMERGENCY DEPT VISIT HI MDM: CPT | Mod: FS

## 2022-01-01 PROCEDURE — 99222 1ST HOSP IP/OBS MODERATE 55: CPT

## 2022-01-01 PROCEDURE — 99221 1ST HOSP IP/OBS SF/LOW 40: CPT

## 2022-01-01 PROCEDURE — 36556 INSERT NON-TUNNEL CV CATH: CPT | Mod: GC

## 2022-01-01 PROCEDURE — 99233 SBSQ HOSP IP/OBS HIGH 50: CPT

## 2022-01-01 PROCEDURE — 99239 HOSP IP/OBS DSCHRG MGMT >30: CPT

## 2022-01-01 PROCEDURE — 99285 EMERGENCY DEPT VISIT HI MDM: CPT | Mod: 25,GC

## 2022-01-01 PROCEDURE — 76857 US EXAM PELVIC LIMITED: CPT | Mod: 26

## 2022-01-01 PROCEDURE — 31500 INSERT EMERGENCY AIRWAY: CPT | Mod: GC

## 2022-01-01 RX ORDER — MINERAL OIL
133 OIL (ML) MISCELLANEOUS ONCE
Refills: 0 | Status: COMPLETED | OUTPATIENT
Start: 2022-01-01 | End: 2022-01-01

## 2022-01-01 RX ORDER — METOPROLOL TARTRATE 50 MG
1 TABLET ORAL
Qty: 0 | Refills: 0 | DISCHARGE

## 2022-01-01 RX ORDER — SERTRALINE 25 MG/1
150 TABLET, FILM COATED ORAL DAILY
Refills: 0 | Status: DISCONTINUED | OUTPATIENT
Start: 2022-01-01 | End: 2022-01-01

## 2022-01-01 RX ORDER — GLUCAGON INJECTION, SOLUTION 0.5 MG/.1ML
1 INJECTION, SOLUTION SUBCUTANEOUS ONCE
Refills: 0 | Status: DISCONTINUED | OUTPATIENT
Start: 2022-01-01 | End: 2022-01-01

## 2022-01-01 RX ORDER — ACETAMINOPHEN 500 MG
650 TABLET ORAL EVERY 6 HOURS
Refills: 0 | Status: DISCONTINUED | OUTPATIENT
Start: 2022-01-01 | End: 2022-01-01

## 2022-01-01 RX ORDER — LATANOPROST 0.05 MG/ML
1 SOLUTION/ DROPS OPHTHALMIC; TOPICAL AT BEDTIME
Refills: 0 | Status: DISCONTINUED | OUTPATIENT
Start: 2022-01-01 | End: 2022-01-01

## 2022-01-01 RX ORDER — DEXTROSE 50 % IN WATER 50 %
50 SYRINGE (ML) INTRAVENOUS ONCE
Refills: 0 | Status: COMPLETED | OUTPATIENT
Start: 2022-01-01 | End: 2022-01-01

## 2022-01-01 RX ORDER — INSULIN GLARGINE 100 [IU]/ML
40 INJECTION, SOLUTION SUBCUTANEOUS
Qty: 0 | Refills: 0 | DISCHARGE

## 2022-01-01 RX ORDER — LACTULOSE 10 G/15ML
10 SOLUTION ORAL EVERY 6 HOURS
Refills: 0 | Status: DISCONTINUED | OUTPATIENT
Start: 2022-01-01 | End: 2022-01-01

## 2022-01-01 RX ORDER — CEFTRIAXONE 500 MG/1
1000 INJECTION, POWDER, FOR SOLUTION INTRAMUSCULAR; INTRAVENOUS EVERY 24 HOURS
Refills: 0 | Status: COMPLETED | OUTPATIENT
Start: 2022-01-01 | End: 2022-01-01

## 2022-01-01 RX ORDER — OXYCODONE AND ACETAMINOPHEN 5; 325 MG/1; MG/1
1 TABLET ORAL
Qty: 0 | Refills: 0 | DISCHARGE

## 2022-01-01 RX ORDER — TAMSULOSIN HYDROCHLORIDE 0.4 MG/1
0.4 CAPSULE ORAL AT BEDTIME
Refills: 0 | Status: DISCONTINUED | OUTPATIENT
Start: 2022-01-01 | End: 2022-01-01

## 2022-01-01 RX ORDER — DEXTROSE 50 % IN WATER 50 %
25 SYRINGE (ML) INTRAVENOUS ONCE
Refills: 0 | Status: DISCONTINUED | OUTPATIENT
Start: 2022-01-01 | End: 2022-01-01

## 2022-01-01 RX ORDER — BRIMONIDINE TARTRATE 2 MG/MG
1 SOLUTION/ DROPS OPHTHALMIC
Refills: 0 | Status: DISCONTINUED | OUTPATIENT
Start: 2022-01-01 | End: 2022-01-01

## 2022-01-01 RX ORDER — SENNA PLUS 8.6 MG/1
2 TABLET ORAL AT BEDTIME
Refills: 0 | Status: DISCONTINUED | OUTPATIENT
Start: 2022-01-01 | End: 2022-01-01

## 2022-01-01 RX ORDER — FUROSEMIDE 40 MG
80 TABLET ORAL
Refills: 0 | Status: DISCONTINUED | OUTPATIENT
Start: 2022-01-01 | End: 2022-01-01

## 2022-01-01 RX ORDER — DEXTROSE 10 % IN WATER 10 %
250 INTRAVENOUS SOLUTION INTRAVENOUS ONCE
Refills: 0 | Status: COMPLETED | OUTPATIENT
Start: 2022-01-01 | End: 2022-01-01

## 2022-01-01 RX ORDER — TIMOLOL 0.5 %
1 DROPS OPHTHALMIC (EYE)
Qty: 0 | Refills: 0 | DISCHARGE

## 2022-01-01 RX ORDER — GLUCAGON INJECTION, SOLUTION 0.5 MG/.1ML
1 INJECTION, SOLUTION SUBCUTANEOUS ONCE
Refills: 0 | Status: COMPLETED | OUTPATIENT
Start: 2022-01-01 | End: 2022-01-01

## 2022-01-01 RX ORDER — SEVELAMER CARBONATE 2400 MG/1
2400 POWDER, FOR SUSPENSION ORAL
Refills: 0 | Status: DISCONTINUED | OUTPATIENT
Start: 2022-01-01 | End: 2022-01-01

## 2022-01-01 RX ORDER — OXYCODONE AND ACETAMINOPHEN 5; 325 MG/1; MG/1
2 TABLET ORAL EVERY 4 HOURS
Refills: 0 | Status: DISCONTINUED | OUTPATIENT
Start: 2022-01-01 | End: 2022-01-01

## 2022-01-01 RX ORDER — FUROSEMIDE 40 MG
1 TABLET ORAL
Qty: 0 | Refills: 0 | DISCHARGE

## 2022-01-01 RX ORDER — ASPIRIN/CALCIUM CARB/MAGNESIUM 324 MG
81 TABLET ORAL DAILY
Refills: 0 | Status: DISCONTINUED | OUTPATIENT
Start: 2022-01-01 | End: 2022-01-01

## 2022-01-01 RX ORDER — HYDROCORTISONE 1 %
1 OINTMENT (GRAM) TOPICAL
Qty: 0 | Refills: 0 | DISCHARGE

## 2022-01-01 RX ORDER — ALBUTEROL 90 UG/1
2 AEROSOL, METERED ORAL EVERY 6 HOURS
Refills: 0 | Status: DISCONTINUED | OUTPATIENT
Start: 2022-01-01 | End: 2022-01-01

## 2022-01-01 RX ORDER — SEVELAMER CARBONATE 2400 MG/1
3 POWDER, FOR SUSPENSION ORAL
Qty: 0 | Refills: 0 | DISCHARGE

## 2022-01-01 RX ORDER — INSULIN LISPRO 100/ML
VIAL (ML) SUBCUTANEOUS
Refills: 0 | Status: DISCONTINUED | OUTPATIENT
Start: 2022-01-01 | End: 2022-01-01

## 2022-01-01 RX ORDER — ALBUTEROL 90 UG/1
2 AEROSOL, METERED ORAL
Qty: 0 | Refills: 0 | DISCHARGE

## 2022-01-01 RX ORDER — FUROSEMIDE 40 MG
80 TABLET ORAL EVERY 12 HOURS
Refills: 0 | Status: DISCONTINUED | OUTPATIENT
Start: 2022-01-01 | End: 2022-01-01

## 2022-01-01 RX ORDER — SIMVASTATIN 20 MG/1
10 TABLET, FILM COATED ORAL AT BEDTIME
Refills: 0 | Status: DISCONTINUED | OUTPATIENT
Start: 2022-01-01 | End: 2022-01-01

## 2022-01-01 RX ORDER — ALBUTEROL 90 UG/1
1.25 AEROSOL, METERED ORAL EVERY 6 HOURS
Refills: 0 | Status: DISCONTINUED | OUTPATIENT
Start: 2022-01-01 | End: 2022-01-01

## 2022-01-01 RX ORDER — ERYTHROPOIETIN 10000 [IU]/ML
10000 INJECTION, SOLUTION INTRAVENOUS; SUBCUTANEOUS
Refills: 0 | Status: DISCONTINUED | OUTPATIENT
Start: 2022-01-01 | End: 2022-01-01

## 2022-01-01 RX ORDER — METOPROLOL TARTRATE 50 MG
50 TABLET ORAL
Refills: 0 | Status: DISCONTINUED | OUTPATIENT
Start: 2022-01-01 | End: 2022-01-01

## 2022-01-01 RX ORDER — SODIUM CHLORIDE 9 MG/ML
4 INJECTION INTRAMUSCULAR; INTRAVENOUS; SUBCUTANEOUS EVERY 6 HOURS
Refills: 0 | Status: DISCONTINUED | OUTPATIENT
Start: 2022-01-01 | End: 2022-01-01

## 2022-01-01 RX ORDER — SIMVASTATIN 20 MG/1
1 TABLET, FILM COATED ORAL
Qty: 0 | Refills: 0 | DISCHARGE

## 2022-01-01 RX ORDER — SODIUM ZIRCONIUM CYCLOSILICATE 10 G/10G
10 POWDER, FOR SUSPENSION ORAL EVERY 8 HOURS
Refills: 0 | Status: DISCONTINUED | OUTPATIENT
Start: 2022-01-01 | End: 2022-01-01

## 2022-01-01 RX ORDER — SODIUM ZIRCONIUM CYCLOSILICATE 10 G/10G
10 POWDER, FOR SUSPENSION ORAL ONCE
Refills: 0 | Status: COMPLETED | OUTPATIENT
Start: 2022-01-01 | End: 2022-01-01

## 2022-01-01 RX ORDER — ERYTHROPOIETIN 10000 [IU]/ML
10000 INJECTION, SOLUTION INTRAVENOUS; SUBCUTANEOUS
Qty: 0 | Refills: 0 | DISCHARGE

## 2022-01-01 RX ORDER — SERTRALINE 25 MG/1
1 TABLET, FILM COATED ORAL
Qty: 0 | Refills: 0 | DISCHARGE

## 2022-01-01 RX ORDER — SENNA PLUS 8.6 MG/1
2 TABLET ORAL
Qty: 0 | Refills: 0 | DISCHARGE
Start: 2022-01-01

## 2022-01-01 RX ORDER — KETOCONAZOLE 20 MG/G
1 AEROSOL, FOAM TOPICAL
Qty: 0 | Refills: 0 | DISCHARGE

## 2022-01-01 RX ORDER — ALBUTEROL 90 UG/1
2.5 AEROSOL, METERED ORAL EVERY 6 HOURS
Refills: 0 | Status: DISCONTINUED | OUTPATIENT
Start: 2022-01-01 | End: 2022-01-01

## 2022-01-01 RX ORDER — HYDRALAZINE HCL 50 MG
25 TABLET ORAL THREE TIMES A DAY
Refills: 0 | Status: DISCONTINUED | OUTPATIENT
Start: 2022-01-01 | End: 2022-01-01

## 2022-01-01 RX ORDER — ZOLPIDEM TARTRATE 10 MG/1
1 TABLET ORAL
Qty: 0 | Refills: 0 | DISCHARGE

## 2022-01-01 RX ORDER — POLYETHYLENE GLYCOL 3350 17 G/17G
1 POWDER, FOR SOLUTION ORAL
Qty: 0 | Refills: 0 | DISCHARGE

## 2022-01-01 RX ORDER — IPRATROPIUM/ALBUTEROL SULFATE 18-103MCG
1 AEROSOL WITH ADAPTER (GRAM) INHALATION
Qty: 0 | Refills: 0 | DISCHARGE

## 2022-01-01 RX ORDER — GABAPENTIN 400 MG/1
300 CAPSULE ORAL ONCE
Refills: 0 | Status: COMPLETED | OUTPATIENT
Start: 2022-01-01 | End: 2022-01-01

## 2022-01-01 RX ORDER — CHLORHEXIDINE GLUCONATE 213 G/1000ML
1 SOLUTION TOPICAL DAILY
Refills: 0 | Status: DISCONTINUED | OUTPATIENT
Start: 2022-01-01 | End: 2022-01-01

## 2022-01-01 RX ORDER — GABAPENTIN 400 MG/1
300 CAPSULE ORAL
Refills: 0 | Status: COMPLETED | OUTPATIENT
Start: 2022-01-01 | End: 2022-01-01

## 2022-01-01 RX ORDER — BIMATOPROST 0.3 MG/ML
1 SOLUTION/ DROPS OPHTHALMIC
Qty: 0 | Refills: 0 | DISCHARGE

## 2022-01-01 RX ORDER — BRIMONIDINE TARTRATE 2 MG/MG
1 SOLUTION/ DROPS OPHTHALMIC
Qty: 0 | Refills: 0 | DISCHARGE

## 2022-01-01 RX ORDER — LANOLIN ALCOHOL/MO/W.PET/CERES
5 CREAM (GRAM) TOPICAL AT BEDTIME
Refills: 0 | Status: DISCONTINUED | OUTPATIENT
Start: 2022-01-01 | End: 2022-01-01

## 2022-01-01 RX ORDER — DORZOLAMIDE HYDROCHLORIDE 20 MG/ML
1 SOLUTION/ DROPS OPHTHALMIC THREE TIMES A DAY
Refills: 0 | Status: DISCONTINUED | OUTPATIENT
Start: 2022-01-01 | End: 2022-01-01

## 2022-01-01 RX ORDER — POLYETHYLENE GLYCOL 3350 17 G/17G
17 POWDER, FOR SOLUTION ORAL DAILY
Refills: 0 | Status: DISCONTINUED | OUTPATIENT
Start: 2022-01-01 | End: 2022-01-01

## 2022-01-01 RX ORDER — SODIUM CHLORIDE 9 MG/ML
1000 INJECTION, SOLUTION INTRAVENOUS
Refills: 0 | Status: DISCONTINUED | OUTPATIENT
Start: 2022-01-01 | End: 2022-01-01

## 2022-01-01 RX ORDER — LANOLIN ALCOHOL/MO/W.PET/CERES
1 CREAM (GRAM) TOPICAL
Qty: 0 | Refills: 0 | DISCHARGE

## 2022-01-01 RX ORDER — SEVELAMER CARBONATE 2400 MG/1
800 POWDER, FOR SUSPENSION ORAL
Refills: 0 | Status: DISCONTINUED | OUTPATIENT
Start: 2022-01-01 | End: 2022-01-01

## 2022-01-01 RX ORDER — POLYETHYLENE GLYCOL 3350 17 G/17G
17 POWDER, FOR SOLUTION ORAL
Qty: 0 | Refills: 0 | DISCHARGE
Start: 2022-01-01

## 2022-01-01 RX ORDER — TRAZODONE HCL 50 MG
1 TABLET ORAL
Qty: 0 | Refills: 0 | DISCHARGE

## 2022-01-01 RX ORDER — SODIUM ZIRCONIUM CYCLOSILICATE 10 G/10G
10 POWDER, FOR SUSPENSION ORAL EVERY 8 HOURS
Refills: 0 | Status: COMPLETED | OUTPATIENT
Start: 2022-01-01 | End: 2022-01-01

## 2022-01-01 RX ORDER — TIMOLOL 0.5 %
1 DROPS OPHTHALMIC (EYE)
Refills: 0 | Status: DISCONTINUED | OUTPATIENT
Start: 2022-01-01 | End: 2022-01-01

## 2022-01-01 RX ORDER — SIMETHICONE 80 MG/1
80 TABLET, CHEWABLE ORAL EVERY 6 HOURS
Refills: 0 | Status: DISCONTINUED | OUTPATIENT
Start: 2022-01-01 | End: 2022-01-01

## 2022-01-01 RX ORDER — CEFEPIME 1 G/1
2000 INJECTION, POWDER, FOR SOLUTION INTRAMUSCULAR; INTRAVENOUS ONCE
Refills: 0 | Status: COMPLETED | OUTPATIENT
Start: 2022-01-01 | End: 2022-01-01

## 2022-01-01 RX ORDER — DEXTROSE 50 % IN WATER 50 %
12.5 SYRINGE (ML) INTRAVENOUS ONCE
Refills: 0 | Status: DISCONTINUED | OUTPATIENT
Start: 2022-01-01 | End: 2022-01-01

## 2022-01-01 RX ORDER — SERTRALINE 25 MG/1
100 TABLET, FILM COATED ORAL DAILY
Refills: 0 | Status: DISCONTINUED | OUTPATIENT
Start: 2022-01-01 | End: 2022-01-01

## 2022-01-01 RX ORDER — LATANOPROST 0.05 MG/ML
1 SOLUTION/ DROPS OPHTHALMIC; TOPICAL
Qty: 0 | Refills: 0 | DISCHARGE

## 2022-01-01 RX ORDER — HYDRALAZINE HCL 50 MG
1 TABLET ORAL
Qty: 0 | Refills: 0 | DISCHARGE

## 2022-01-01 RX ORDER — DORZOLAMIDE HYDROCHLORIDE TIMOLOL MALEATE 20; 5 MG/ML; MG/ML
1 SOLUTION/ DROPS OPHTHALMIC
Qty: 0 | Refills: 0 | DISCHARGE

## 2022-01-01 RX ORDER — PANTOPRAZOLE SODIUM 20 MG/1
40 TABLET, DELAYED RELEASE ORAL
Refills: 0 | Status: DISCONTINUED | OUTPATIENT
Start: 2022-01-01 | End: 2022-01-01

## 2022-01-01 RX ORDER — HYDROXYZINE HCL 10 MG
25 TABLET ORAL DAILY
Refills: 0 | Status: DISCONTINUED | OUTPATIENT
Start: 2022-01-01 | End: 2022-01-01

## 2022-01-01 RX ORDER — LACTULOSE 10 G/15ML
15 SOLUTION ORAL
Qty: 0 | Refills: 0 | DISCHARGE

## 2022-01-01 RX ORDER — CALCIUM GLUCONATE 100 MG/ML
2 VIAL (ML) INTRAVENOUS ONCE
Refills: 0 | Status: DISCONTINUED | OUTPATIENT
Start: 2022-01-01 | End: 2022-01-01

## 2022-01-01 RX ORDER — TAMSULOSIN HYDROCHLORIDE 0.4 MG/1
1 CAPSULE ORAL
Qty: 0 | Refills: 0 | DISCHARGE

## 2022-01-01 RX ORDER — SIMETHICONE 80 MG/1
2 TABLET, CHEWABLE ORAL
Qty: 0 | Refills: 0 | DISCHARGE

## 2022-01-01 RX ORDER — TRAZODONE HCL 50 MG
50 TABLET ORAL AT BEDTIME
Refills: 0 | Status: DISCONTINUED | OUTPATIENT
Start: 2022-01-01 | End: 2022-01-01

## 2022-01-01 RX ORDER — VANCOMYCIN HCL 1 G
1000 VIAL (EA) INTRAVENOUS ONCE
Refills: 0 | Status: COMPLETED | OUTPATIENT
Start: 2022-01-01 | End: 2022-01-01

## 2022-01-01 RX ORDER — DORZOLAMIDE HYDROCHLORIDE 20 MG/ML
1 SOLUTION/ DROPS OPHTHALMIC
Qty: 0 | Refills: 0 | DISCHARGE

## 2022-01-01 RX ORDER — DEXTROSE 50 % IN WATER 50 %
50 SYRINGE (ML) INTRAVENOUS ONCE
Refills: 0 | Status: DISCONTINUED | OUTPATIENT
Start: 2022-01-01 | End: 2022-01-01

## 2022-01-01 RX ORDER — POLYETHYLENE GLYCOL 3350 17 G/17G
17 POWDER, FOR SOLUTION ORAL
Refills: 0 | Status: DISCONTINUED | OUTPATIENT
Start: 2022-01-01 | End: 2022-01-01

## 2022-01-01 RX ORDER — NIFEDIPINE 30 MG
1 TABLET, EXTENDED RELEASE 24 HR ORAL
Qty: 0 | Refills: 0 | DISCHARGE

## 2022-01-01 RX ORDER — MICONAZOLE NITRATE 2 %
1 CREAM (GRAM) TOPICAL
Qty: 0 | Refills: 0 | DISCHARGE

## 2022-01-01 RX ORDER — HEPARIN SODIUM 5000 [USP'U]/ML
5000 INJECTION INTRAVENOUS; SUBCUTANEOUS EVERY 8 HOURS
Refills: 0 | Status: DISCONTINUED | OUTPATIENT
Start: 2022-01-01 | End: 2022-01-01

## 2022-01-01 RX ORDER — DEXTROSE 50 % IN WATER 50 %
15 SYRINGE (ML) INTRAVENOUS ONCE
Refills: 0 | Status: DISCONTINUED | OUTPATIENT
Start: 2022-01-01 | End: 2022-01-01

## 2022-01-01 RX ORDER — ONDANSETRON 8 MG/1
4 TABLET, FILM COATED ORAL ONCE
Refills: 0 | Status: COMPLETED | OUTPATIENT
Start: 2022-01-01 | End: 2022-01-01

## 2022-01-01 RX ORDER — CALCIUM GLUCONATE 100 MG/ML
2 VIAL (ML) INTRAVENOUS ONCE
Refills: 0 | Status: COMPLETED | OUTPATIENT
Start: 2022-01-01 | End: 2022-01-01

## 2022-01-01 RX ADMIN — DORZOLAMIDE HYDROCHLORIDE 1 DROP(S): 20 SOLUTION/ DROPS OPHTHALMIC at 13:36

## 2022-01-01 RX ADMIN — CEFTRIAXONE 100 MILLIGRAM(S): 500 INJECTION, POWDER, FOR SOLUTION INTRAMUSCULAR; INTRAVENOUS at 13:29

## 2022-01-01 RX ADMIN — Medication 1 DROP(S): at 06:31

## 2022-01-01 RX ADMIN — Medication 1 DROP(S): at 18:05

## 2022-01-01 RX ADMIN — DORZOLAMIDE HYDROCHLORIDE 1 DROP(S): 20 SOLUTION/ DROPS OPHTHALMIC at 22:39

## 2022-01-01 RX ADMIN — BRIMONIDINE TARTRATE 1 DROP(S): 2 SOLUTION/ DROPS OPHTHALMIC at 05:57

## 2022-01-01 RX ADMIN — HEPARIN SODIUM 5000 UNIT(S): 5000 INJECTION INTRAVENOUS; SUBCUTANEOUS at 13:20

## 2022-01-01 RX ADMIN — SEVELAMER CARBONATE 2400 MILLIGRAM(S): 2400 POWDER, FOR SUSPENSION ORAL at 17:49

## 2022-01-01 RX ADMIN — Medication 50 MILLIGRAM(S): at 18:05

## 2022-01-01 RX ADMIN — Medication 50 MILLILITER(S): at 23:00

## 2022-01-01 RX ADMIN — CHLORHEXIDINE GLUCONATE 1 APPLICATION(S): 213 SOLUTION TOPICAL at 11:52

## 2022-01-01 RX ADMIN — DORZOLAMIDE HYDROCHLORIDE 1 DROP(S): 20 SOLUTION/ DROPS OPHTHALMIC at 21:43

## 2022-01-01 RX ADMIN — Medication 5 MILLIGRAM(S): at 21:36

## 2022-01-01 RX ADMIN — SENNA PLUS 2 TABLET(S): 8.6 TABLET ORAL at 21:22

## 2022-01-01 RX ADMIN — PANTOPRAZOLE SODIUM 40 MILLIGRAM(S): 20 TABLET, DELAYED RELEASE ORAL at 05:54

## 2022-01-01 RX ADMIN — Medication 200 GRAM(S): at 07:56

## 2022-01-01 RX ADMIN — Medication 5 MILLIGRAM(S): at 21:44

## 2022-01-01 RX ADMIN — Medication 5 MILLIGRAM(S): at 22:38

## 2022-01-01 RX ADMIN — SEVELAMER CARBONATE 2400 MILLIGRAM(S): 2400 POWDER, FOR SUSPENSION ORAL at 08:43

## 2022-01-01 RX ADMIN — DORZOLAMIDE HYDROCHLORIDE 1 DROP(S): 20 SOLUTION/ DROPS OPHTHALMIC at 14:00

## 2022-01-01 RX ADMIN — HEPARIN SODIUM 5000 UNIT(S): 5000 INJECTION INTRAVENOUS; SUBCUTANEOUS at 05:54

## 2022-01-01 RX ADMIN — CHLORHEXIDINE GLUCONATE 1 APPLICATION(S): 213 SOLUTION TOPICAL at 12:54

## 2022-01-01 RX ADMIN — Medication 80 MILLIGRAM(S): at 18:06

## 2022-01-01 RX ADMIN — Medication 50 MILLILITER(S): at 02:17

## 2022-01-01 RX ADMIN — SEVELAMER CARBONATE 2400 MILLIGRAM(S): 2400 POWDER, FOR SUSPENSION ORAL at 18:04

## 2022-01-01 RX ADMIN — TAMSULOSIN HYDROCHLORIDE 0.4 MILLIGRAM(S): 0.4 CAPSULE ORAL at 21:37

## 2022-01-01 RX ADMIN — ERYTHROPOIETIN 10000 UNIT(S): 10000 INJECTION, SOLUTION INTRAVENOUS; SUBCUTANEOUS at 11:18

## 2022-01-01 RX ADMIN — Medication 81 MILLIGRAM(S): at 12:53

## 2022-01-01 RX ADMIN — POLYETHYLENE GLYCOL 3350 17 GRAM(S): 17 POWDER, FOR SOLUTION ORAL at 13:17

## 2022-01-01 RX ADMIN — Medication 650 MILLIGRAM(S): at 21:04

## 2022-01-01 RX ADMIN — SIMVASTATIN 10 MILLIGRAM(S): 20 TABLET, FILM COATED ORAL at 21:22

## 2022-01-01 RX ADMIN — CHLORHEXIDINE GLUCONATE 1 APPLICATION(S): 213 SOLUTION TOPICAL at 11:00

## 2022-01-01 RX ADMIN — LACTULOSE 10 GRAM(S): 10 SOLUTION ORAL at 14:01

## 2022-01-01 RX ADMIN — HEPARIN SODIUM 5000 UNIT(S): 5000 INJECTION INTRAVENOUS; SUBCUTANEOUS at 06:24

## 2022-01-01 RX ADMIN — Medication 50 MILLIGRAM(S): at 06:23

## 2022-01-01 RX ADMIN — SEVELAMER CARBONATE 2400 MILLIGRAM(S): 2400 POWDER, FOR SUSPENSION ORAL at 12:43

## 2022-01-01 RX ADMIN — Medication 1 DROP(S): at 17:08

## 2022-01-01 RX ADMIN — TAMSULOSIN HYDROCHLORIDE 0.4 MILLIGRAM(S): 0.4 CAPSULE ORAL at 21:23

## 2022-01-01 RX ADMIN — BRIMONIDINE TARTRATE 1 DROP(S): 2 SOLUTION/ DROPS OPHTHALMIC at 17:49

## 2022-01-01 RX ADMIN — PANTOPRAZOLE SODIUM 40 MILLIGRAM(S): 20 TABLET, DELAYED RELEASE ORAL at 06:01

## 2022-01-01 RX ADMIN — LACTULOSE 10 GRAM(S): 10 SOLUTION ORAL at 21:21

## 2022-01-01 RX ADMIN — SEVELAMER CARBONATE 800 MILLIGRAM(S): 2400 POWDER, FOR SUSPENSION ORAL at 09:03

## 2022-01-01 RX ADMIN — PANTOPRAZOLE SODIUM 40 MILLIGRAM(S): 20 TABLET, DELAYED RELEASE ORAL at 06:26

## 2022-01-01 RX ADMIN — SODIUM ZIRCONIUM CYCLOSILICATE 10 GRAM(S): 10 POWDER, FOR SUSPENSION ORAL at 23:28

## 2022-01-01 RX ADMIN — SODIUM CHLORIDE 4 MILLILITER(S): 9 INJECTION INTRAMUSCULAR; INTRAVENOUS; SUBCUTANEOUS at 20:22

## 2022-01-01 RX ADMIN — Medication 5 MILLIGRAM(S): at 21:39

## 2022-01-01 RX ADMIN — Medication 81 MILLIGRAM(S): at 15:05

## 2022-01-01 RX ADMIN — ALBUTEROL 2.5 MILLIGRAM(S): 90 AEROSOL, METERED ORAL at 20:53

## 2022-01-01 RX ADMIN — CEFTRIAXONE 100 MILLIGRAM(S): 500 INJECTION, POWDER, FOR SOLUTION INTRAMUSCULAR; INTRAVENOUS at 12:40

## 2022-01-01 RX ADMIN — POLYETHYLENE GLYCOL 3350 17 GRAM(S): 17 POWDER, FOR SOLUTION ORAL at 13:29

## 2022-01-01 RX ADMIN — SODIUM CHLORIDE 4 MILLILITER(S): 9 INJECTION INTRAMUSCULAR; INTRAVENOUS; SUBCUTANEOUS at 01:25

## 2022-01-01 RX ADMIN — BRIMONIDINE TARTRATE 1 DROP(S): 2 SOLUTION/ DROPS OPHTHALMIC at 06:31

## 2022-01-01 RX ADMIN — SEVELAMER CARBONATE 2400 MILLIGRAM(S): 2400 POWDER, FOR SUSPENSION ORAL at 12:53

## 2022-01-01 RX ADMIN — HEPARIN SODIUM 5000 UNIT(S): 5000 INJECTION INTRAVENOUS; SUBCUTANEOUS at 21:44

## 2022-01-01 RX ADMIN — CEFTRIAXONE 100 MILLIGRAM(S): 500 INJECTION, POWDER, FOR SOLUTION INTRAMUSCULAR; INTRAVENOUS at 12:02

## 2022-01-01 RX ADMIN — Medication 50 MILLIGRAM(S): at 21:44

## 2022-01-01 RX ADMIN — Medication 50 MILLIGRAM(S): at 17:48

## 2022-01-01 RX ADMIN — HEPARIN SODIUM 5000 UNIT(S): 5000 INJECTION INTRAVENOUS; SUBCUTANEOUS at 13:17

## 2022-01-01 RX ADMIN — Medication 50 MILLIGRAM(S): at 21:39

## 2022-01-01 RX ADMIN — ALBUTEROL 2.5 MILLIGRAM(S): 90 AEROSOL, METERED ORAL at 08:18

## 2022-01-01 RX ADMIN — Medication 10 MILLIGRAM(S): at 14:02

## 2022-01-01 RX ADMIN — SEVELAMER CARBONATE 2400 MILLIGRAM(S): 2400 POWDER, FOR SUSPENSION ORAL at 07:50

## 2022-01-01 RX ADMIN — SODIUM CHLORIDE 4 MILLILITER(S): 9 INJECTION INTRAMUSCULAR; INTRAVENOUS; SUBCUTANEOUS at 08:30

## 2022-01-01 RX ADMIN — Medication 50 MILLIGRAM(S): at 05:54

## 2022-01-01 RX ADMIN — Medication 80 MILLIGRAM(S): at 06:27

## 2022-01-01 RX ADMIN — SERTRALINE 100 MILLIGRAM(S): 25 TABLET, FILM COATED ORAL at 12:30

## 2022-01-01 RX ADMIN — GABAPENTIN 300 MILLIGRAM(S): 400 CAPSULE ORAL at 10:42

## 2022-01-01 RX ADMIN — DORZOLAMIDE HYDROCHLORIDE 1 DROP(S): 20 SOLUTION/ DROPS OPHTHALMIC at 05:55

## 2022-01-01 RX ADMIN — SENNA PLUS 2 TABLET(S): 8.6 TABLET ORAL at 21:39

## 2022-01-01 RX ADMIN — POLYETHYLENE GLYCOL 3350 17 GRAM(S): 17 POWDER, FOR SOLUTION ORAL at 13:34

## 2022-01-01 RX ADMIN — Medication 1 DROP(S): at 05:49

## 2022-01-01 RX ADMIN — Medication 50 MILLILITER(S): at 22:16

## 2022-01-01 RX ADMIN — SERTRALINE 100 MILLIGRAM(S): 25 TABLET, FILM COATED ORAL at 13:30

## 2022-01-01 RX ADMIN — Medication 1 DROP(S): at 17:56

## 2022-01-01 RX ADMIN — BRIMONIDINE TARTRATE 1 DROP(S): 2 SOLUTION/ DROPS OPHTHALMIC at 05:49

## 2022-01-01 RX ADMIN — SODIUM CHLORIDE 4 MILLILITER(S): 9 INJECTION INTRAMUSCULAR; INTRAVENOUS; SUBCUTANEOUS at 13:39

## 2022-01-01 RX ADMIN — ALBUTEROL 2.5 MILLIGRAM(S): 90 AEROSOL, METERED ORAL at 07:40

## 2022-01-01 RX ADMIN — HEPARIN SODIUM 5000 UNIT(S): 5000 INJECTION INTRAVENOUS; SUBCUTANEOUS at 21:43

## 2022-01-01 RX ADMIN — SIMVASTATIN 10 MILLIGRAM(S): 20 TABLET, FILM COATED ORAL at 21:36

## 2022-01-01 RX ADMIN — SERTRALINE 100 MILLIGRAM(S): 25 TABLET, FILM COATED ORAL at 11:50

## 2022-01-01 RX ADMIN — Medication 80 MILLIGRAM(S): at 17:48

## 2022-01-01 RX ADMIN — LATANOPROST 1 DROP(S): 0.05 SOLUTION/ DROPS OPHTHALMIC; TOPICAL at 21:39

## 2022-01-01 RX ADMIN — SEVELAMER CARBONATE 2400 MILLIGRAM(S): 2400 POWDER, FOR SUSPENSION ORAL at 08:24

## 2022-01-01 RX ADMIN — DORZOLAMIDE HYDROCHLORIDE 1 DROP(S): 20 SOLUTION/ DROPS OPHTHALMIC at 06:27

## 2022-01-01 RX ADMIN — ALBUTEROL 1.25 MILLIGRAM(S): 90 AEROSOL, METERED ORAL at 20:22

## 2022-01-01 RX ADMIN — DORZOLAMIDE HYDROCHLORIDE 1 DROP(S): 20 SOLUTION/ DROPS OPHTHALMIC at 21:40

## 2022-01-01 RX ADMIN — DORZOLAMIDE HYDROCHLORIDE 1 DROP(S): 20 SOLUTION/ DROPS OPHTHALMIC at 13:56

## 2022-01-01 RX ADMIN — Medication 50 MILLIGRAM(S): at 17:56

## 2022-01-01 RX ADMIN — SEVELAMER CARBONATE 2400 MILLIGRAM(S): 2400 POWDER, FOR SUSPENSION ORAL at 17:56

## 2022-01-01 RX ADMIN — Medication 50 MILLIGRAM(S): at 06:24

## 2022-01-01 RX ADMIN — DORZOLAMIDE HYDROCHLORIDE 1 DROP(S): 20 SOLUTION/ DROPS OPHTHALMIC at 21:21

## 2022-01-01 RX ADMIN — SODIUM ZIRCONIUM CYCLOSILICATE 10 GRAM(S): 10 POWDER, FOR SUSPENSION ORAL at 05:58

## 2022-01-01 RX ADMIN — LATANOPROST 1 DROP(S): 0.05 SOLUTION/ DROPS OPHTHALMIC; TOPICAL at 22:39

## 2022-01-01 RX ADMIN — DORZOLAMIDE HYDROCHLORIDE 1 DROP(S): 20 SOLUTION/ DROPS OPHTHALMIC at 13:20

## 2022-01-01 RX ADMIN — TAMSULOSIN HYDROCHLORIDE 0.4 MILLIGRAM(S): 0.4 CAPSULE ORAL at 21:44

## 2022-01-01 RX ADMIN — TAMSULOSIN HYDROCHLORIDE 0.4 MILLIGRAM(S): 0.4 CAPSULE ORAL at 22:39

## 2022-01-01 RX ADMIN — Medication 1 DROP(S): at 05:59

## 2022-01-01 RX ADMIN — SODIUM ZIRCONIUM CYCLOSILICATE 10 GRAM(S): 10 POWDER, FOR SUSPENSION ORAL at 22:59

## 2022-01-01 RX ADMIN — SIMVASTATIN 10 MILLIGRAM(S): 20 TABLET, FILM COATED ORAL at 21:24

## 2022-01-01 RX ADMIN — ALBUTEROL 2.5 MILLIGRAM(S): 90 AEROSOL, METERED ORAL at 14:15

## 2022-01-01 RX ADMIN — Medication 1 DROP(S): at 17:49

## 2022-01-01 RX ADMIN — Medication 133 MILLILITER(S): at 06:44

## 2022-01-01 RX ADMIN — HEPARIN SODIUM 5000 UNIT(S): 5000 INJECTION INTRAVENOUS; SUBCUTANEOUS at 21:25

## 2022-01-01 RX ADMIN — GABAPENTIN 300 MILLIGRAM(S): 400 CAPSULE ORAL at 04:24

## 2022-01-01 RX ADMIN — Medication 50 MILLIGRAM(S): at 18:06

## 2022-01-01 RX ADMIN — ALBUTEROL 2.5 MILLIGRAM(S): 90 AEROSOL, METERED ORAL at 14:18

## 2022-01-01 RX ADMIN — Medication 50 MILLIGRAM(S): at 22:38

## 2022-01-01 RX ADMIN — Medication 50 MILLIGRAM(S): at 17:38

## 2022-01-01 RX ADMIN — SERTRALINE 100 MILLIGRAM(S): 25 TABLET, FILM COATED ORAL at 12:54

## 2022-01-01 RX ADMIN — Medication 5 MILLIGRAM(S): at 21:23

## 2022-01-01 RX ADMIN — POLYETHYLENE GLYCOL 3350 17 GRAM(S): 17 POWDER, FOR SOLUTION ORAL at 18:06

## 2022-01-01 RX ADMIN — BRIMONIDINE TARTRATE 1 DROP(S): 2 SOLUTION/ DROPS OPHTHALMIC at 06:24

## 2022-01-01 RX ADMIN — SEVELAMER CARBONATE 2400 MILLIGRAM(S): 2400 POWDER, FOR SUSPENSION ORAL at 12:30

## 2022-01-01 RX ADMIN — DORZOLAMIDE HYDROCHLORIDE 1 DROP(S): 20 SOLUTION/ DROPS OPHTHALMIC at 05:48

## 2022-01-01 RX ADMIN — CHLORHEXIDINE GLUCONATE 1 APPLICATION(S): 213 SOLUTION TOPICAL at 11:43

## 2022-01-01 RX ADMIN — Medication 1 DROP(S): at 05:54

## 2022-01-01 RX ADMIN — Medication 250 MILLIGRAM(S): at 00:42

## 2022-01-01 RX ADMIN — Medication 1000 MILLILITER(S): at 04:12

## 2022-01-01 RX ADMIN — Medication 81 MILLIGRAM(S): at 13:16

## 2022-01-01 RX ADMIN — Medication 50 MILLIGRAM(S): at 21:23

## 2022-01-01 RX ADMIN — DORZOLAMIDE HYDROCHLORIDE 1 DROP(S): 20 SOLUTION/ DROPS OPHTHALMIC at 15:04

## 2022-01-01 RX ADMIN — CEFEPIME 100 MILLIGRAM(S): 1 INJECTION, POWDER, FOR SOLUTION INTRAMUSCULAR; INTRAVENOUS at 00:28

## 2022-01-01 RX ADMIN — LATANOPROST 1 DROP(S): 0.05 SOLUTION/ DROPS OPHTHALMIC; TOPICAL at 21:41

## 2022-01-01 RX ADMIN — LATANOPROST 1 DROP(S): 0.05 SOLUTION/ DROPS OPHTHALMIC; TOPICAL at 21:22

## 2022-01-01 RX ADMIN — HEPARIN SODIUM 5000 UNIT(S): 5000 INJECTION INTRAVENOUS; SUBCUTANEOUS at 06:27

## 2022-01-01 RX ADMIN — Medication 50 MILLIGRAM(S): at 17:51

## 2022-01-01 RX ADMIN — BRIMONIDINE TARTRATE 1 DROP(S): 2 SOLUTION/ DROPS OPHTHALMIC at 17:52

## 2022-01-01 RX ADMIN — DORZOLAMIDE HYDROCHLORIDE 1 DROP(S): 20 SOLUTION/ DROPS OPHTHALMIC at 21:22

## 2022-01-01 RX ADMIN — OXYCODONE AND ACETAMINOPHEN 2 TABLET(S): 5; 325 TABLET ORAL at 13:10

## 2022-01-01 RX ADMIN — SIMVASTATIN 10 MILLIGRAM(S): 20 TABLET, FILM COATED ORAL at 21:44

## 2022-01-01 RX ADMIN — ALBUTEROL 2.5 MILLIGRAM(S): 90 AEROSOL, METERED ORAL at 08:06

## 2022-01-01 RX ADMIN — SODIUM ZIRCONIUM CYCLOSILICATE 10 GRAM(S): 10 POWDER, FOR SUSPENSION ORAL at 15:05

## 2022-01-01 RX ADMIN — ERYTHROPOIETIN 10000 UNIT(S): 10000 INJECTION, SOLUTION INTRAVENOUS; SUBCUTANEOUS at 11:04

## 2022-01-01 RX ADMIN — SEVELAMER CARBONATE 2400 MILLIGRAM(S): 2400 POWDER, FOR SUSPENSION ORAL at 09:48

## 2022-01-01 RX ADMIN — HEPARIN SODIUM 5000 UNIT(S): 5000 INJECTION INTRAVENOUS; SUBCUTANEOUS at 22:39

## 2022-01-01 RX ADMIN — BRIMONIDINE TARTRATE 1 DROP(S): 2 SOLUTION/ DROPS OPHTHALMIC at 18:05

## 2022-01-01 RX ADMIN — HEPARIN SODIUM 5000 UNIT(S): 5000 INJECTION INTRAVENOUS; SUBCUTANEOUS at 15:06

## 2022-01-01 RX ADMIN — HEPARIN SODIUM 5000 UNIT(S): 5000 INJECTION INTRAVENOUS; SUBCUTANEOUS at 14:00

## 2022-01-01 RX ADMIN — Medication 80 MILLIGRAM(S): at 18:00

## 2022-01-01 RX ADMIN — SODIUM CHLORIDE 4 MILLILITER(S): 9 INJECTION INTRAMUSCULAR; INTRAVENOUS; SUBCUTANEOUS at 20:24

## 2022-01-01 RX ADMIN — SIMVASTATIN 10 MILLIGRAM(S): 20 TABLET, FILM COATED ORAL at 21:39

## 2022-01-01 RX ADMIN — OXYCODONE AND ACETAMINOPHEN 2 TABLET(S): 5; 325 TABLET ORAL at 00:29

## 2022-01-01 RX ADMIN — HEPARIN SODIUM 5000 UNIT(S): 5000 INJECTION INTRAVENOUS; SUBCUTANEOUS at 13:55

## 2022-01-01 RX ADMIN — BRIMONIDINE TARTRATE 1 DROP(S): 2 SOLUTION/ DROPS OPHTHALMIC at 05:54

## 2022-01-01 RX ADMIN — PANTOPRAZOLE SODIUM 40 MILLIGRAM(S): 20 TABLET, DELAYED RELEASE ORAL at 06:23

## 2022-01-01 RX ADMIN — Medication 1 DROP(S): at 18:01

## 2022-01-01 RX ADMIN — LATANOPROST 1 DROP(S): 0.05 SOLUTION/ DROPS OPHTHALMIC; TOPICAL at 21:42

## 2022-01-01 RX ADMIN — Medication 650 MILLIGRAM(S): at 19:38

## 2022-01-01 RX ADMIN — SODIUM CHLORIDE 4 MILLILITER(S): 9 INJECTION INTRAMUSCULAR; INTRAVENOUS; SUBCUTANEOUS at 08:22

## 2022-01-01 RX ADMIN — ALBUTEROL 2.5 MILLIGRAM(S): 90 AEROSOL, METERED ORAL at 19:37

## 2022-01-01 RX ADMIN — Medication 80 MILLIGRAM(S): at 05:54

## 2022-01-01 RX ADMIN — DORZOLAMIDE HYDROCHLORIDE 1 DROP(S): 20 SOLUTION/ DROPS OPHTHALMIC at 21:45

## 2022-01-01 RX ADMIN — SEVELAMER CARBONATE 2400 MILLIGRAM(S): 2400 POWDER, FOR SUSPENSION ORAL at 17:06

## 2022-01-01 RX ADMIN — Medication 50 MILLIGRAM(S): at 21:36

## 2022-01-01 RX ADMIN — ALBUTEROL 2.5 MILLIGRAM(S): 90 AEROSOL, METERED ORAL at 20:14

## 2022-01-01 RX ADMIN — Medication 80 MILLIGRAM(S): at 05:55

## 2022-01-01 RX ADMIN — SIMVASTATIN 10 MILLIGRAM(S): 20 TABLET, FILM COATED ORAL at 22:38

## 2022-01-01 RX ADMIN — DORZOLAMIDE HYDROCHLORIDE 1 DROP(S): 20 SOLUTION/ DROPS OPHTHALMIC at 13:18

## 2022-01-01 RX ADMIN — LACTULOSE 10 GRAM(S): 10 SOLUTION ORAL at 06:31

## 2022-01-01 RX ADMIN — SODIUM CHLORIDE 4 MILLILITER(S): 9 INJECTION INTRAMUSCULAR; INTRAVENOUS; SUBCUTANEOUS at 14:16

## 2022-01-01 RX ADMIN — ERYTHROPOIETIN 10000 UNIT(S): 10000 INJECTION, SOLUTION INTRAVENOUS; SUBCUTANEOUS at 12:00

## 2022-01-01 RX ADMIN — BRIMONIDINE TARTRATE 1 DROP(S): 2 SOLUTION/ DROPS OPHTHALMIC at 17:39

## 2022-01-01 RX ADMIN — BRIMONIDINE TARTRATE 1 DROP(S): 2 SOLUTION/ DROPS OPHTHALMIC at 05:55

## 2022-01-01 RX ADMIN — ONDANSETRON 4 MILLIGRAM(S): 8 TABLET, FILM COATED ORAL at 13:35

## 2022-01-01 RX ADMIN — HEPARIN SODIUM 5000 UNIT(S): 5000 INJECTION INTRAVENOUS; SUBCUTANEOUS at 21:22

## 2022-01-01 RX ADMIN — SEVELAMER CARBONATE 2400 MILLIGRAM(S): 2400 POWDER, FOR SUSPENSION ORAL at 10:46

## 2022-01-01 RX ADMIN — SEVELAMER CARBONATE 2400 MILLIGRAM(S): 2400 POWDER, FOR SUSPENSION ORAL at 07:56

## 2022-01-01 RX ADMIN — SEVELAMER CARBONATE 2400 MILLIGRAM(S): 2400 POWDER, FOR SUSPENSION ORAL at 17:38

## 2022-01-01 RX ADMIN — Medication 1 DROP(S): at 06:24

## 2022-01-01 RX ADMIN — HEPARIN SODIUM 5000 UNIT(S): 5000 INJECTION INTRAVENOUS; SUBCUTANEOUS at 05:57

## 2022-01-01 RX ADMIN — PANTOPRAZOLE SODIUM 40 MILLIGRAM(S): 20 TABLET, DELAYED RELEASE ORAL at 06:55

## 2022-01-01 RX ADMIN — GABAPENTIN 300 MILLIGRAM(S): 400 CAPSULE ORAL at 22:34

## 2022-01-01 RX ADMIN — Medication 1 DROP(S): at 17:38

## 2022-01-01 RX ADMIN — SERTRALINE 100 MILLIGRAM(S): 25 TABLET, FILM COATED ORAL at 10:46

## 2022-01-01 RX ADMIN — HEPARIN SODIUM 5000 UNIT(S): 5000 INJECTION INTRAVENOUS; SUBCUTANEOUS at 13:34

## 2022-01-01 RX ADMIN — SENNA PLUS 2 TABLET(S): 8.6 TABLET ORAL at 21:35

## 2022-01-01 RX ADMIN — Medication 50 MILLIGRAM(S): at 21:22

## 2022-01-01 RX ADMIN — HEPARIN SODIUM 5000 UNIT(S): 5000 INJECTION INTRAVENOUS; SUBCUTANEOUS at 21:40

## 2022-01-01 RX ADMIN — SEVELAMER CARBONATE 2400 MILLIGRAM(S): 2400 POWDER, FOR SUSPENSION ORAL at 17:51

## 2022-01-01 RX ADMIN — POLYETHYLENE GLYCOL 3350 17 GRAM(S): 17 POWDER, FOR SOLUTION ORAL at 12:44

## 2022-01-01 RX ADMIN — SODIUM CHLORIDE 4 MILLILITER(S): 9 INJECTION INTRAMUSCULAR; INTRAVENOUS; SUBCUTANEOUS at 07:40

## 2022-01-01 RX ADMIN — LATANOPROST 1 DROP(S): 0.05 SOLUTION/ DROPS OPHTHALMIC; TOPICAL at 21:24

## 2022-01-01 RX ADMIN — SERTRALINE 150 MILLIGRAM(S): 25 TABLET, FILM COATED ORAL at 12:49

## 2022-01-01 RX ADMIN — BRIMONIDINE TARTRATE 1 DROP(S): 2 SOLUTION/ DROPS OPHTHALMIC at 17:57

## 2022-01-01 RX ADMIN — Medication 81 MILLIGRAM(S): at 10:47

## 2022-01-01 RX ADMIN — Medication 50 MILLILITER(S): at 06:22

## 2022-01-01 RX ADMIN — BRIMONIDINE TARTRATE 1 DROP(S): 2 SOLUTION/ DROPS OPHTHALMIC at 18:06

## 2022-01-01 RX ADMIN — ALBUTEROL 2.5 MILLIGRAM(S): 90 AEROSOL, METERED ORAL at 01:26

## 2022-01-01 RX ADMIN — TAMSULOSIN HYDROCHLORIDE 0.4 MILLIGRAM(S): 0.4 CAPSULE ORAL at 21:39

## 2022-01-01 RX ADMIN — DORZOLAMIDE HYDROCHLORIDE 1 DROP(S): 20 SOLUTION/ DROPS OPHTHALMIC at 05:56

## 2022-01-01 RX ADMIN — SEVELAMER CARBONATE 2400 MILLIGRAM(S): 2400 POWDER, FOR SUSPENSION ORAL at 13:30

## 2022-01-01 RX ADMIN — Medication 5 MILLIGRAM(S): at 21:22

## 2022-01-01 RX ADMIN — SEVELAMER CARBONATE 2400 MILLIGRAM(S): 2400 POWDER, FOR SUSPENSION ORAL at 11:50

## 2022-01-01 RX ADMIN — GLUCAGON INJECTION, SOLUTION 1 MILLIGRAM(S): 0.5 INJECTION, SOLUTION SUBCUTANEOUS at 04:11

## 2022-01-01 RX ADMIN — Medication 81 MILLIGRAM(S): at 12:43

## 2022-01-01 RX ADMIN — Medication 81 MILLIGRAM(S): at 17:57

## 2022-01-01 RX ADMIN — Medication 50 MILLIGRAM(S): at 06:00

## 2022-01-01 RX ADMIN — Medication 80 MILLIGRAM(S): at 06:26

## 2022-01-01 RX ADMIN — DORZOLAMIDE HYDROCHLORIDE 1 DROP(S): 20 SOLUTION/ DROPS OPHTHALMIC at 06:25

## 2022-04-12 NOTE — ED PROVIDER NOTE - NS ED ATTENDING STATEMENT MOD
This was a shared visit with the JAGDISH. I reviewed and verified the documentation and independently performed the documented:

## 2022-04-12 NOTE — ED PROVIDER NOTE - CLINICAL SUMMARY MEDICAL DECISION MAKING FREE TEXT BOX
Pt with low BS.  Given dextrose.  Pt with hyperkalemia- treated with Lokelma. Case d/w Nephro who agrees with plan.  Will have HD in am.  Pt also with elevated WBC count.  CXR with left sided opacity. abx given.  ICU consulted, will admit to ICU.

## 2022-04-12 NOTE — ED PROVIDER NOTE - NS ED ROS FT
Constitutional: (-) fever, (-) chills  Eyes: (+) Chronic visual changes  ENT: (-) nasal congestions  Cardiovascular: (-) chest pain, (-) syncope  Respiratory: (-) cough, (-) shortness of breath, (-) dyspnea,   Gastrointestinal: (-) vomiting, (-) diarrhea, (-)nausea,  Musculoskeletal: (-) neck pain, (-) back pain, (-) joint pain,  Integumentary: (-) rash, (-) edema, (-) bruises  Neurological: (-) headache, (-) loc, (-) dizziness, (-) tingling, (-)numbness,  Psychiatric: (-) hallucinations, (-)nervousness, (-)depression, (-)SI/HI  Peripheral Vascular: (+) leg swelling  :  (-)dysuria,  (-) hematuria  Allergic/Immunologic: (-) pruritus

## 2022-04-12 NOTE — ED PROVIDER NOTE - CARE PLAN
Principal Discharge DX:	Hyperkalemia  Secondary Diagnosis:	Hypoglycemia  Secondary Diagnosis:	ESRD on dialysis  Secondary Diagnosis:	Pneumonia   1

## 2022-04-12 NOTE — ED PROVIDER NOTE - ATTENDING CONTRIBUTION TO CARE
58 yo M PMHx noted including ESRD on HD (Tues, Thurs, Sat), DM, HTN, legally blind rt eye, presents from NH for evaluation of low blood sugar and elevated BUN/Cr.  Per patient he overslept and missed HD today.  Per NH record pt was lethargic today.  They check labs and FS and found glucose to be low.  He was started on D5NS and transferred to ED.  Pt states he feels OK otherwise.  On exam pt in NAD alert and awake, + rt eye opacified, Lungs decreased AE b/l, abd soft nt nd, + rt BKA,      FS 30

## 2022-04-12 NOTE — ED PROVIDER NOTE - PHYSICAL EXAMINATION
Physical Exam  Vital Signs: I have reviewed the initial vital signs.  Constitutional: (+) Obese male, appears uncomfortable in stretcher. Appears confused although awake and speaking full sentences.   Eyes: Conjunctiva pink, (+) R eye opacification.   ENT: OP is clear without exudates, normal dentition, normal gingival, tongue without swelling  Cardiovascular: S1 and S2, regular rate, regular rhythm, radial pulses equal and 2+  Respiratory: unlabored respiratory effort, clear to auscultation bilaterally no wheezing, rales and rhonchi  Gastrointestinal: soft, non-tender abdomen, no pulsatile mass, normal bowl sounds  Musculoskeletal: supple neck, (+) LLE swelling with overlying erythema, (+) R BKA, no midline tenderness  Integumentary: warm, dry, no rash  Neurologic: motor and sensory functions grossly intact  Psychiatric: appropriate mood, appropriate affect

## 2022-04-12 NOTE — ED PROVIDER NOTE - OBJECTIVE STATEMENT
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 Pt states that today, he overslept and missed dialysis and also notes that he has not eaten anything all day. Sent in by nursing home for evaluation. No recent fevers, chills, n/v/d, cough, chest pain or SOB. Mother is at bedside.

## 2022-04-13 NOTE — H&P ADULT - NSICDXPASTMEDICALHX_GEN_ALL_CORE_FT
PAST MEDICAL HISTORY:  Anemia, unspecified type     Anxiety disorder, unspecified type     Bilateral ocular hypertension     Bipolar affective disorder, remission status unspecified     Blind     Diabetes     Dialysis patient Tuesday- Thursday-Saturday    End stage renal disease on HD ( TThSa)    HTN (hypertension)     Insomnia, unspecified type     Obesity, unspecified classification, unspecified obesity type, unspecified whether serious comorbidity present     Pain disorder

## 2022-04-13 NOTE — PROGRESS NOTE ADULT - ASSESSMENT
56yo W male w/ PMH  ESRD on HD (Tues, Thurs, Sat), DM, HTN, legally blind rt eye was sent from  [facility] for  hypoglycemia, hyperkalemia.  Pt missed dialysis yesterday stating that "they did not wake me up for  dialysis" and he did not feel well to go for dialysis.    In ER pt received lokelma & 2 amps D50. renal called for  hemodialysis     58yo W male w/ PMH   HTN, HLD, DM, ESRD on HD TTS, HOMAR, BPH, PVD s/p R BKA legally blind rt eye was sent from  [facility] for  hyperkalemia.  Pt missed dialysis yesterday stating that "they did not wake me up for  dialysis" and he did not feel well to go for dialysis.    In ER pt received lokelma & 2 amps D50. renal called for  hemodialysis    ESRD with hyperkalemia secondary to missed HD     - HD today   - continue home meds   - DC planning

## 2022-04-13 NOTE — CONSULT NOTE ADULT - ASSESSMENT
1. Hyperkalemia secondary to noncompliance with HD. HD now on 1K bath.  2. ESRD on HD TTS. Missed HD yesterday. HD today: 3 hours, opti 200 dialyzer, 1K bath, 3L UF.  3. Anemia. Start EPO 10,000 units IV with HD.  4. Hyperphosphatemia. Renal diet. Increase sevelamer to 2400mg TID with meals.  5. HTN. Patient has intradialytic hypotension which limits fluid removal. Stop hydralazine.
IMPRESSION:  Hypoglycemia  Hyperkalemia  ESRD on HD, L AVF, missed last session  Pulmonary vascular congestion  HO HTN  Probable HOMAR    PLAN:    CNS: Avoid CNS depressants.     HEENT: Oral care    PULMONARY:  HOB @ 45 degrees.  Aspiration precautions. DC NC. Target SpO2 92-96%. AVAPS PRN/ HS. TV-450, EPAP-10, RR-16, minIPAP-14, maxIPAP-25. OP pulm FU for sleep study. Repeat CXR after HD    CARDIOVASCULAR: Avoid volume overload. ECHO. Diurese as tolerated, IV lasix 80q12h. Optimize cardiac therapy. Strict I's and O's, low salt diet <2gm/day, fluid restriction <1L/day    GI: GI prophylaxis. Feeding.      RENAL:  Follow up lytes. Correct as needed. HD as per Nephro, getting HD today.     INFECTIOUS DISEASE: Follow up cultures. No abx.     HEMATOLOGICAL: DVT prophylaxis.    ENDOCRINE: Follow up FS. Target -180.     MUSCULOSKELETAL: bedrest    MICU monitoring  
A/P: 57y  Male with insulin-requiring type 2 DM with multiple complications, with recent hypoglycemia likely secondary to over- insulinization.    For now, observation w/o insulin.  If FBG > 180, begin Lantus 10 units q24hrs  If non-fasting BG > 180, begin Admelog 3 units tid ac

## 2022-04-13 NOTE — CONSULT NOTE ADULT - SUBJECTIVE AND OBJECTIVE BOX
HPI: 57y Male  with h/o insulin-requiring type 2 DM (as per pt) with h/o ESRD/blindness/BKA admitted because he missed dialysis in SNF.  Has had intermittent mild hypoglycemia (gets basal/bolus therapy in SNF as per pt.)  Eating well.    PAST MEDICAL & SURGICAL HISTORY:  Diabetes    End stage renal disease  on HD ( TThSa)    HTN (hypertension)    Blind    Anemia, unspecified type    Pain disorder    Anxiety disorder, unspecified type    Obesity, unspecified classification, unspecified obesity type, unspecified whether serious comorbidity present    Bipolar affective disorder, remission status unspecified    Insomnia, unspecified type    Bilateral ocular hypertension    Dialysis patient  Tuesday- Thursday-Saturday    A-V fistula  LEFT ARM    S/P BKA (below knee amputation) unilateral  RIGHT    Status post glaucoma surgery  RIGHT EYE      FAMILY HISTORY:      Home Medications:  acetaminophen 325 mg oral tablet: 2 tab(s) orally every 6 hours, As needed, Temp greater or equal to 38C (100.4F), Mild Pain (1 - 3) (13 Apr 2022 00:44)  Ambien 10 mg oral tablet: 1 tab(s) orally once a day (at bedtime) (13 Apr 2022 00:44)  aspirin 81 mg oral delayed release tablet: 1 tab(s) orally once a day (13 Apr 2022 00:44)  brimonidine 0.2% ophthalmic solution: 1 drop(s) to each affected eye 2 times a day (13 Apr 2022 00:44)  dorzolamide 2% ophthalmic solution: 1 drop(s) to each affected eye 3 times a day (13 Apr 2022 00:44)  epoetin keagan: 26958 unit(s) subcutaneous 3 times a day during dialysis (13 Apr 2022 00:44)  hydrALAZINE 25 mg oral tablet: 1 tab(s) orally 3 times a day  mon,wed,fri, sun  hold SBP&lt;120 (13 Apr 2022 00:44)  insulin glargine: 30 unit(s) subcutaneous once a day (at bedtime) (13 Apr 2022 00:44)  lactulose 10 g/15 mL oral syrup: 15 milliliter(s) orally every 6 hours, As Needed for constipation (13 Apr 2022 00:44)  Lasix 80 mg oral tablet: 1 tab(s) orally 2 times a day (13 Apr 2022 00:44)  latanoprost 0.005% ophthalmic solution: 1 drop(s) to each left eye once a day (in the evening) (13 Apr 2022 00:44)  Lopressor 50 mg oral tablet: 1 tab(s) orally 2 times a day (13 Apr 2022 00:44)  melatonin 10 mg oral capsule: 1 cap(s) orally once a day (at bedtime) (13 Apr 2022 00:44)  metOLazone 5 mg oral tablet: 1 tab(s) orally 2 times a day (13 Apr 2022 00:44)  Percocet 10 mg-325 mg oral tablet: 1 tab(s) orally every 8 hours, As Needed  pain (13 Apr 2022 00:44)  Renvela 800 mg oral tablet: 3 tab(s) orally 3 times a day (with meals) (13 Apr 2022 00:44)  sertraline 100 mg oral tablet: 1 tab(s) orally once a day (13 Apr 2022 00:44)  simethicone 125 mg oral tablet, chewable: 2 tab(s) orally 2 times a day (with meals), As Needed for indigestion  (13 Apr 2022 00:44)  simvastatin 10 mg oral tablet: 1 tab(s) orally once a day (at bedtime) (13 Apr 2022 00:44)  tamsulosin 0.4 mg oral capsule: 1 cap(s) orally once a day (13 Apr 2022 00:44)  Timoptic 0.5% ophthalmic solution: 1 drop(s) to each affected eye 2 times a day (13 Apr 2022 00:44)  traZODone 50 mg oral tablet: 1 tab(s) orally once a day (at bedtime) (13 Apr 2022 00:44)  Ventolin HFA 90 mcg/inh inhalation aerosol: 2 puff(s) inhaled every 6 hours (13 Apr 2022 00:44)      Current (Non-Endocrine) Meds:  acetaminophen     Tablet .. 650 milliGRAM(s) Oral every 6 hours PRN  ALBUTerol    90 MICROgram(s) HFA Inhaler 2 Puff(s) Inhalation every 6 hours PRN  aspirin enteric coated 81 milliGRAM(s) Oral daily  brimonidine 0.2% Ophthalmic Solution 1 Drop(s) Both EYES two times a day  calcium gluconate IVPB 2 Gram(s) IV Intermittent once PRN  chlorhexidine 4% Liquid 1 Application(s) Topical daily  dextrose 5%. 1000 milliLiter(s) IV Continuous <Continuous>  dextrose 5%. 1000 milliLiter(s) IV Continuous <Continuous>  dorzolamide 2% Ophthalmic Solution 1 Drop(s) Both EYES three times a day  epoetin keagan-epbx (RETACRIT) Injectable 16495 Unit(s) IV Push <User Schedule>  heparin   Injectable 5000 Unit(s) SubCutaneous every 8 hours  lactulose Syrup 10 Gram(s) Oral every 6 hours PRN  latanoprost 0.005% Ophthalmic Solution 1 Drop(s) Left EYE at bedtime  melatonin 5 milliGRAM(s) Oral at bedtime  metoprolol tartrate 50 milliGRAM(s) Oral two times a day  oxycodone    5 mG/acetaminophen 325 mG 2 Tablet(s) Oral every 4 hours PRN  pantoprazole    Tablet 40 milliGRAM(s) Oral before breakfast  sertraline 100 milliGRAM(s) Oral daily  sevelamer carbonate 2400 milliGRAM(s) Oral three times a day with meals  simethicone 80 milliGRAM(s) Chew every 6 hours PRN  sodium zirconium cyclosilicate 10 Gram(s) Oral every 8 hours  tamsulosin 0.4 milliGRAM(s) Oral at bedtime  timolol 0.5% Solution 1 Drop(s) Both EYES two times a day  traZODone 50 milliGRAM(s) Oral at bedtime      Current Endocrine Meds:   dextrose 50% Injectable 50 milliLiter(s) IV Push once PRN  dextrose 50% Injectable 25 Gram(s) IV Push once  dextrose 50% Injectable 12.5 Gram(s) IV Push once  dextrose 50% Injectable 25 Gram(s) IV Push once  dextrose Oral Gel 15 Gram(s) Oral once PRN  glucagon  Injectable 1 milliGRAM(s) IntraMuscular once  insulin lispro (ADMELOG) corrective regimen sliding scale   SubCutaneous three times a day before meals  simvastatin 10 milliGRAM(s) Oral at bedtime      Allergies:  No Known Allergies      Height (cm): 182.9 (04-13 @ 02:00)  Weight (kg): 132.6 (04-13 @ 02:00)  BMI (kg/m2): 39.6 (04-13 @ 02:00)    Vital Signs Last 24 Hrs  T(C): 36.3 (13 Apr 2022 11:15), Max: 37.2 (13 Apr 2022 02:00)  T(F): 97.4 (13 Apr 2022 11:15), Max: 98.9 (13 Apr 2022 02:00)  HR: 82 (13 Apr 2022 12:15) (56 - 85)  BP: 176/72 (13 Apr 2022 12:15) (82/41 - 176/72)  BP(mean): 102 (13 Apr 2022 11:15) (59 - 102)  RR: 22 (13 Apr 2022 13:08) (10 - 22)  SpO2: 99% (13 Apr 2022 12:15) (94% - 99%)  Constitutional: WN/WD in NAD.   Respiratory: few scattered rhonchi  Cardiovascular: regular rate and rhythm, normal S1 and S2, no audible murmurs  GI: soft, NT/ND, no masses/HSM appreciated.        CAPILLARY BLOOD GLUCOSE      POCT Blood Glucose.: 120 mg/dL (13 Apr 2022 10:42)  POCT Blood Glucose.: 83 mg/dL (13 Apr 2022 07:47)  POCT Blood Glucose.: 95 mg/dL (13 Apr 2022 06:45)  POCT Blood Glucose.: 53 mg/dL (13 Apr 2022 06:00)  POCT Blood Glucose.: 90 mg/dL (13 Apr 2022 04:04)  POCT Blood Glucose.: 76 mg/dL (13 Apr 2022 02:06)  POCT Blood Glucose.: 94 mg/dL (12 Apr 2022 23:42)  POCT Blood Glucose.: 150 mg/dL (12 Apr 2022 22:26)  POCT Blood Glucose.: 75 mg/dL (12 Apr 2022 22:04)  POCT Blood Glucose.: 30 mg/dL (12 Apr 2022 21:26)      LABS:                        8.3    15.31 )-----------( 134      ( 13 Apr 2022 05:44 )             27.7     04-13    136  |  99  |  90<HH>  ----------------------------<  50<LL>  7.0<HH>   |  25  |  11.4<HH>    Ca    7.6<L>      13 Apr 2022 05:44  Mg     2.3     04-12    TPro  6.3  /  Alb  3.7  /  TBili  0.4  /  DBili  x   /  AST  10  /  ALT  7   /  AlkPhos  218<H>  04-12    PT/INR - ( 12 Apr 2022 21:40 )   PT: 16.80 sec;   INR: 1.47 ratio         PTT - ( 12 Apr 2022 21:40 )  PTT:43.5 sec                           Thyroid Stimulating Hormone, Serum: 1.87 (11-18 @ 04:50)                  
[facility] NEPHROLOGY INITIAL CONSULT NOTE  --------------------------------------------------------------------------------  HPI:  56yo W male w/ PMH as noted below including ESRD on HD TTS at [facility] under the care of Dr Tejada.  Pt is a [facility] resident who is known to be non compliant to dialysis. He often cuts HD treatments short and missed yesterday's treatment. Last HD treatment was April 9. Pt was brought to ER from NH w/ severe hypoglycemia, hyperkalemia. Renal is consulted for emergency dialysis.    PAST HISTORY  --------------------------------------------------------------------------------  PAST MEDICAL & SURGICAL HISTORY:  Diabetes  End stage renal diseasemon HD ( TThSa)  HTN (hypertension)  Blind  Anemia, unspecified type  Pain disorder  Anxiety disorder, unspecified type  Obesity, unspecified classification, unspecified obesity type, unspecified whether serious comorbidity present  Bipolar affective disorder, remission status unspecified  Insomnia, unspecified type  Bilateral ocular hypertension  A-V fistula LEFT ARM  S/P BKA (below knee amputation) unilateral RIGHT  Status post glaucoma surgery RIGHT EYE    FAMILY HISTORY:  Negative for kidney disease    SOCIAL HISTORY:  No current ETOH, tobacco, or illicit drug use    ALLERGIES & MEDICATIONS  --------------------------------------------------------------------------------  Allergies    No Known Allergies    Standing Inpatient Medications  aspirin enteric coated 81 milliGRAM(s) Oral daily  brimonidine 0.2% Ophthalmic Solution 1 Drop(s) Both EYES two times a day  chlorhexidine 4% Liquid 1 Application(s) Topical daily  dextrose 5%. 1000 milliLiter(s) IV Continuous <Continuous>  dextrose 5%. 1000 milliLiter(s) IV Continuous <Continuous>  dextrose 50% Injectable 25 Gram(s) IV Push once  dextrose 50% Injectable 12.5 Gram(s) IV Push once  dextrose 50% Injectable 25 Gram(s) IV Push once  dorzolamide 2% Ophthalmic Solution 1 Drop(s) Both EYES three times a day  epoetin keagan-epbx (RETACRIT) Injectable 08284 Unit(s) SubCutaneous <User Schedule>  furosemide    Tablet 80 milliGRAM(s) Oral two times a day  glucagon  Injectable 1 milliGRAM(s) IntraMuscular once  heparin   Injectable 5000 Unit(s) SubCutaneous every 8 hours  hydrALAZINE 25 milliGRAM(s) Oral three times a day  insulin lispro (ADMELOG) corrective regimen sliding scale   SubCutaneous three times a day before meals  latanoprost 0.005% Ophthalmic Solution 1 Drop(s) Left EYE at bedtime  melatonin 5 milliGRAM(s) Oral at bedtime  metolazone 5 milliGRAM(s) Oral daily  metoprolol tartrate 50 milliGRAM(s) Oral two times a day  pantoprazole    Tablet 40 milliGRAM(s) Oral before breakfast  sertraline 100 milliGRAM(s) Oral daily  sevelamer carbonate 800 milliGRAM(s) Oral three times a day with meals  simvastatin 10 milliGRAM(s) Oral at bedtime  sodium zirconium cyclosilicate 10 Gram(s) Oral every 8 hours  tamsulosin 0.4 milliGRAM(s) Oral at bedtime  timolol 0.5% Solution 1 Drop(s) Both EYES two times a day  traZODone 50 milliGRAM(s) Oral at bedtime    PRN Inpatient Medications  acetaminophen     Tablet .. 650 milliGRAM(s) Oral every 6 hours PRN  ALBUTerol    90 MICROgram(s) HFA Inhaler 2 Puff(s) Inhalation every 6 hours PRN  calcium gluconate IVPB 2 Gram(s) IV Intermittent once PRN  dextrose 50% Injectable 50 milliLiter(s) IV Push once PRN  dextrose Oral Gel 15 Gram(s) Oral once PRN  lactulose Syrup 10 Gram(s) Oral every 6 hours PRN  oxycodone    5 mG/acetaminophen 325 mG 2 Tablet(s) Oral every 4 hours PRN  simethicone 80 milliGRAM(s) Chew every 6 hours PRN    REVIEW OF SYSTEMS  --------------------------------------------------------------------------------  Gen: No fevers/chills  Skin: No rashes  Head/Eyes/Ears/Mouth: No headache; No sinus pain/discomfort, sore throat  Respiratory: No dyspnea, cough, wheezing, hemoptysis  CV: No chest pain, PND, orthopnea  GI: No abdominal pain, diarrhea, constipation, nausea, vomiting, melena, hematochezia  : No increased frequency, dysuria, hematuria, nocturia  All other systems were reviewed and are negative, except as noted.    VITALS/PHYSICAL EXAM  --------------------------------------------------------------------------------  T(C): 36.4 (04-13-22 @ 07:11), Max: 37.2 (04-13-22 @ 02:00)  HR: 56 (04-13-22 @ 07:12) (56 - 85)  BP: 113/57 (04-13-22 @ 07:12) (82/41 - 142/78)  RR: 10 (04-13-22 @ 07:12) (10 - 18)  SpO2: 98% (04-13-22 @ 07:12) (94% - 99%)  Height (cm): 182.9 (04-13-22 @ 02:00)  Weight (kg): 132.6 (04-13-22 @ 02:00)  BMI (kg/m2): 39.6 (04-13-22 @ 02:00)  BSA (m2): 2.5 (04-13-22 @ 02:00)    04-13-22 @ 07:01  -  04-13-22 @ 08:46  --------------------------------------------------------  IN: 100 mL / OUT: 0 mL / NET: 100 mL    Physical Exam:  	Gen: NAD  	Pulm: CTA B/L  	CV: RRR, S1S2  	Back: No CVA tenderness; no sacral edema  	Abd: +BS, soft, nontender/nondistended  	: No suprapubic tenderness  	LE: Warm, edema  	Neuro: AAO x2  	Vascular access: AVF    LABS/STUDIES  --------------------------------------------------------------------------------              8.3    15.31 >-----------<  134      [04-13-22 @ 05:44]              27.7     136  |  99  |  90  ----------------------------<  50      [04-13-22 @ 05:44]  7.0   |  25  |  11.4        Ca     7.6     [04-13-22 @ 05:44]      Mg     2.3     [04-12-22 @ 21:40]    TPro  6.3  /  Alb  3.7  /  TBili  0.4  /  DBili  x   /  AST  10  /  ALT  7   /  AlkPhos  218  [04-12-22 @ 21:40]    PT/INR: PT 16.80, INR 1.47       [04-12-22 @ 21:40]  PTT: 43.5       [04-12-22 @ 21:40]    Creatinine Trend:  SCr 11.4 [04-13 @ 05:44]  SCr 10.6 [04-12 @ 21:40]    Urinalysis - [05-31-18 @ 16:45]      Color Yellow / Appearance Clear / SG 1.015 / pH 7.5      Gluc Negative / Ketone Negative  / Bili Negative / Urobili 0.2       Blood Trace-lysed / Protein 100 / Leuk Est Negative / Nitrite Negative      RBC 1-2 / WBC 1-2 / Hyaline  / Gran  / Sq Epi  / Non Sq Epi Occasional / Bacteria See Note    HbA1c 8.6      [06-01-18 @ 07:22]  TSH 1.87      [11-18-21 @ 04:50]  Lipid: chol 82, , HDL 33, LDL --      [11-18-21 @ 04:50]
Patient is a 57y old  Male who presents with a chief complaint of hyperkalemia, pulmonary congestion (13 Apr 2022 10:13)      HPI:  56yo W male w/ PMH as noted below.  Pt is a [facility] resident who is known to be non compliant to dialysis. Pt was brought to ER from NH w/ severe hypoglycemia, hyperkalemia.  Pt missed dialysis yesterday stating that "they did not wake me up for  dialysis" and he did not feel well to go for dialysis.  I tried calling [facility] for their version of what happened but no one answering phone.  In ER pt received lokelma & 2 amps D50. renal called for  hemodialysis (13 Apr 2022 01:40)    Overnight events  Hypoglycemic overnight, now resolved.    Drips  None      PAST MEDICAL & SURGICAL HISTORY:  Diabetes    End stage renal disease  on HD ( TThSa)    HTN (hypertension)    Blind    Anemia, unspecified type    Pain disorder    Anxiety disorder, unspecified type    Obesity, unspecified classification, unspecified obesity type, unspecified whether serious comorbidity present    Bipolar affective disorder, remission status unspecified    Insomnia, unspecified type    Bilateral ocular hypertension    Dialysis patient  Tuesday- Thursday-Saturday    A-V fistula  LEFT ARM    S/P BKA (below knee amputation) unilateral  RIGHT    Status post glaucoma surgery  RIGHT EYE        SOCIAL HX:   Smoking     never                    ETOH      denies                      Other    FAMILY HISTORY:  :  No known cardiovascular family history     Review Of Systems:     All ROS are negative except per HPI       Allergies    No Known Allergies    Intolerances          PHYSICAL EXAM    ICU Vital Signs Last 24 Hrs  T(C): 36.4 (13 Apr 2022 07:11), Max: 37.2 (13 Apr 2022 02:00)  T(F): 97.5 (13 Apr 2022 07:11), Max: 98.9 (13 Apr 2022 02:00)  HR: 62 (13 Apr 2022 09:15) (56 - 85)  BP: 151/65 (13 Apr 2022 09:15) (82/41 - 151/65)  BP(mean): 82 (13 Apr 2022 07:12) (59 - 86)  RR: 14 (13 Apr 2022 09:15) (10 - 21)  SpO2: 98% (13 Apr 2022 09:15) (94% - 99%)      CONSTITUTIONAL:  Chronically ill-appearing. NAD    ENT:   Airway patent,   Mouth with normal mucosa.   No thrush    EYES:   pupils equal,   round and reactive to light.    CARDIAC:   Normal rate,   Regular rhythm.    Heart sounds S1, S2.   No edema    RESPIRATORY:   NC 2L, satting 97%  No wheezing   Normal chest expansion  No use of accessory muscles    GASTROINTESTINAL:  Abdomen soft   Non-tender,   No guarding,   + BS    MUSCULOSKELETAL:   R BKA  Range of motion is not limited,  No clubbing, cyanosis    NEUROLOGICAL:   Forgetful  AOx4  Follows commands  Moves all extremities     SKIN:   Skin normal color for race,   Warm and dry  No evidence of rash.    PSYCHIATRIC:   Normal mood and affect.   No apparent risk to self or others.            04-13-22 @ 07:01  -  04-13-22 @ 11:21  --------------------------------------------------------  IN:    IV PiggyBack: 100 mL  Total IN: 100 mL    OUT:  Total OUT: 0 mL    Total NET: 100 mL          LABS:                          8.3    15.31 )-----------( 134      ( 13 Apr 2022 05:44 )             27.7                                               04-13    136  |  99  |  90<HH>  ----------------------------<  50<LL>  7.0<HH>   |  25  |  11.4<HH>    Ca    7.6<L>      13 Apr 2022 05:44  Mg     2.3     04-12    TPro  6.3  /  Alb  3.7  /  TBili  0.4  /  DBili  x   /  AST  10  /  ALT  7   /  AlkPhos  218<H>  04-12      PT/INR - ( 12 Apr 2022 21:40 )   PT: 16.80 sec;   INR: 1.47 ratio         PTT - ( 12 Apr 2022 21:40 )  PTT:43.5 sec                                                                                     LIVER FUNCTIONS - ( 12 Apr 2022 21:40 )  Alb: 3.7 g/dL / Pro: 6.3 g/dL / ALK PHOS: 218 U/L / ALT: 7 U/L / AST: 10 U/L / GGT: x                                                                                                                                       X-Rays reviewed:                                                                                    ECHO    CXR interpreted by me:    MEDICATIONS  (STANDING):  aspirin enteric coated 81 milliGRAM(s) Oral daily  brimonidine 0.2% Ophthalmic Solution 1 Drop(s) Both EYES two times a day  chlorhexidine 4% Liquid 1 Application(s) Topical daily  dextrose 5%. 1000 milliLiter(s) (50 mL/Hr) IV Continuous <Continuous>  dextrose 5%. 1000 milliLiter(s) (100 mL/Hr) IV Continuous <Continuous>  dextrose 50% Injectable 25 Gram(s) IV Push once  dextrose 50% Injectable 12.5 Gram(s) IV Push once  dextrose 50% Injectable 25 Gram(s) IV Push once  dorzolamide 2% Ophthalmic Solution 1 Drop(s) Both EYES three times a day  epoetin keagan-epbx (RETACRIT) Injectable 70351 Unit(s) IV Push <User Schedule>  furosemide    Tablet 80 milliGRAM(s) Oral two times a day  glucagon  Injectable 1 milliGRAM(s) IntraMuscular once  heparin   Injectable 5000 Unit(s) SubCutaneous every 8 hours  insulin lispro (ADMELOG) corrective regimen sliding scale   SubCutaneous three times a day before meals  latanoprost 0.005% Ophthalmic Solution 1 Drop(s) Left EYE at bedtime  melatonin 5 milliGRAM(s) Oral at bedtime  metolazone 5 milliGRAM(s) Oral daily  metoprolol tartrate 50 milliGRAM(s) Oral two times a day  pantoprazole    Tablet 40 milliGRAM(s) Oral before breakfast  sertraline 100 milliGRAM(s) Oral daily  sevelamer carbonate 2400 milliGRAM(s) Oral three times a day with meals  simvastatin 10 milliGRAM(s) Oral at bedtime  sodium zirconium cyclosilicate 10 Gram(s) Oral every 8 hours  tamsulosin 0.4 milliGRAM(s) Oral at bedtime  timolol 0.5% Solution 1 Drop(s) Both EYES two times a day  traZODone 50 milliGRAM(s) Oral at bedtime    MEDICATIONS  (PRN):  acetaminophen     Tablet .. 650 milliGRAM(s) Oral every 6 hours PRN Mild Pain (1 - 3)  ALBUTerol    90 MICROgram(s) HFA Inhaler 2 Puff(s) Inhalation every 6 hours PRN Shortness of Breath and/or Wheezing  calcium gluconate IVPB 2 Gram(s) IV Intermittent once PRN ekg changes  dextrose 50% Injectable 50 milliLiter(s) IV Push once PRN hypoglycemia <70  dextrose Oral Gel 15 Gram(s) Oral once PRN Blood Glucose LESS THAN 70 milliGRAM(s)/deciliter  lactulose Syrup 10 Gram(s) Oral every 6 hours PRN constipation  oxycodone    5 mG/acetaminophen 325 mG 2 Tablet(s) Oral every 4 hours PRN Severe Pain (7 - 10)  simethicone 80 milliGRAM(s) Chew every 6 hours PRN Gas

## 2022-04-13 NOTE — H&P ADULT - NSHPPHYSICALEXAM_GEN_ALL_CORE
GENERAL:  58y/o morbidly Obese W Male NAD, resting comfortably.  HEAD:  Atraumatic, Normocephalic  EYES: EOMI, PERRLA, conjunctiva and sclera clear  NECK: Supple, No JVD, no cervical lymphadenopathy, non-tender  CHEST/LUNG: Clear to auscultation bilaterally; No wheeze, rhonchi, or rales  HEART: Regular rate and rhythm; S1&S2  ABDOMEN: Soft, Nontender, Nondistended x 4 quadrants; Bowel sounds present  EXTREMITIES:   Peripheral Pulses Present, No clubbing, no cyanosis, or no edema, no calf tenderness, Right BKA  PSYCH: AAOx3, cooperative, appropriate  NEUROLOGY: WNL  SKIN: WNL

## 2022-04-13 NOTE — CONSULT NOTE ADULT - ATTENDING COMMENTS
Attending Statement: I have personally performed a face to face diagnostic evaluation on this patient. The patient is suffering from:  Hypoglycemia  Hyperkalemia  ESRD on HD  I have reviewed the above note and agree with the history, exam and suggestions for care, except as I have noted in the text. I have amended the treatment plans as necessary.

## 2022-04-13 NOTE — H&P ADULT - NSHPLABSRESULTS_GEN_ALL_CORE
10.1   24.36 )-----------( 202      ( 12 Apr 2022 21:40 )             33.7       04-12    139  |  100  |  86<HH>  ----------------------------<  25<LL>  6.3<HH>   |  26  |  10.6<HH>    Ca    7.9<L>      12 Apr 2022 21:40  Mg     2.3     04-12    TPro  6.3  /  Alb  3.7  /  TBili  0.4  /  DBili  x   /  AST  10  /  ALT  7   /  AlkPhos  218<H>  04-12                  PT/INR - ( 12 Apr 2022 21:40 )   PT: 16.80 sec;   INR: 1.47 ratio         PTT - ( 12 Apr 2022 21:40 )  PTT:43.5 sec    Lactate Trend            CAPILLARY BLOOD GLUCOSE      POCT Blood Glucose.: 94 mg/dL (12 Apr 2022 23:42)

## 2022-04-13 NOTE — H&P ADULT - HISTORY OF PRESENT ILLNESS
56yo W male w/ PMH as noted below.  Pt is a [facility] resident who is known to be non compliant to dialysis. Pt was brought to ER from NH w/ severe hypoglycemia, hyperkalemia.  Pt missed dialysis yesterday stating that "they did not wake me up for  dialysis" and he did not feel well to go for dialysis.  I tried calling [facility] for their version of what happened but no one answering phone.  In ER pt received lokelma & 2 amps D50. renal called for  hemodialysis

## 2022-04-13 NOTE — PROGRESS NOTE ADULT - SUBJECTIVE AND OBJECTIVE BOX
Patient is awake and comfortable in bed       T(F): 97.5 (04-13-22 @ 07:11), Max: 98.9 (04-13-22 @ 02:00)  HR: 62 (04-13-22 @ 09:15)  BP: 151/65 (04-13-22 @ 09:15)  RR: 14 (04-13-22 @ 09:15)  SpO2: 98% (04-13-22 @ 09:15) (94% - 99%)    PHYSICAL EXAM:  GENERAL: NAD  HEAD:  Atraumatic, Normocephalic  EYES: EOMI, PERRLA, conjunctiva and sclera clear  NERVOUS SYSTEM:  Alert & Oriented X3, no focal deficits   CHEST/LUNG:  bilateral rhonchi  HEART: Regular rate and rhythm; No murmurs, rubs, or gallops  ABDOMEN: Soft, Nontender, Nondistended; Bowel sounds present  EXTREMITIES:  R bka    LABS  04-13    136  |  99  |  90<HH>  ----------------------------<  50<LL>  7.0<HH>   |  25  |  11.4<HH>    Ca    7.6<L>      13 Apr 2022 05:44  Mg     2.3     04-12    TPro  6.3  /  Alb  3.7  /  TBili  0.4  /  DBili  x   /  AST  10  /  ALT  7   /  AlkPhos  218<H>  04-12                          8.3    15.31 )-----------( 134      ( 13 Apr 2022 05:44 )             27.7     PT/INR - ( 12 Apr 2022 21:40 )   PT: 16.80 sec;   INR: 1.47 ratio         PTT - ( 12 Apr 2022 21:40 )  PTT:43.5 sec    RADIOLOGY  < from: Xray Chest 1 View-PORTABLE IMMEDIATE (04.12.22 @ 22:24) >  impression:    Extremely limited study due to patient condition. Diffuse bilateral   opacities including large retrocardiac opacity/left effusion. Recommend   repeat radiographic assessment. Unable to assess for pneumothorax.    < end of copied text >    MEDICATIONS  (STANDING):  aspirin enteric coated 81 milliGRAM(s) Oral daily  brimonidine 0.2% Ophthalmic Solution 1 Drop(s) Both EYES two times a day  chlorhexidine 4% Liquid 1 Application(s) Topical daily  dorzolamide 2% Ophthalmic Solution 1 Drop(s) Both EYES three times a day  epoetin keagan-epbx (RETACRIT) Injectable 96329 Unit(s) IV Push <User Schedule>  furosemide    Tablet 80 milliGRAM(s) Oral two times a day  heparin   Injectable 5000 Unit(s) SubCutaneous every 8 hours  insulin lispro (ADMELOG) corrective regimen sliding scale   SubCutaneous three times a day before meals  latanoprost 0.005% Ophthalmic Solution 1 Drop(s) Left EYE at bedtime  melatonin 5 milliGRAM(s) Oral at bedtime  metolazone 5 milliGRAM(s) Oral daily  metoprolol tartrate 50 milliGRAM(s) Oral two times a day  pantoprazole    Tablet 40 milliGRAM(s) Oral before breakfast  sertraline 100 milliGRAM(s) Oral daily  sevelamer carbonate 2400 milliGRAM(s) Oral three times a day with meals  simvastatin 10 milliGRAM(s) Oral at bedtime  sodium zirconium cyclosilicate 10 Gram(s) Oral every 8 hours  tamsulosin 0.4 milliGRAM(s) Oral at bedtime  timolol 0.5% Solution 1 Drop(s) Both EYES two times a day  traZODone 50 milliGRAM(s) Oral at bedtime    MEDICATIONS  (PRN):  acetaminophen     Tablet .. 650 milliGRAM(s) Oral every 6 hours PRN Mild Pain (1 - 3)  ALBUTerol    90 MICROgram(s) HFA Inhaler 2 Puff(s) Inhalation every 6 hours PRN Shortness of Breath and/or Wheezing  calcium gluconate IVPB 2 Gram(s) IV Intermittent once PRN ekg changes  dextrose 50% Injectable 50 milliLiter(s) IV Push once PRN hypoglycemia <70  dextrose Oral Gel 15 Gram(s) Oral once PRN Blood Glucose LESS THAN 70 milliGRAM(s)/deciliter  lactulose Syrup 10 Gram(s) Oral every 6 hours PRN constipation  oxycodone    5 mG/acetaminophen 325 mG 2 Tablet(s) Oral every 4 hours PRN Severe Pain (7 - 10)  simethicone 80 milliGRAM(s) Chew every 6 hours PRN Gas

## 2022-04-13 NOTE — PROGRESS NOTE ADULT - SUBJECTIVE AND OBJECTIVE BOX
Patient was examined during HD treatment.    Hemodialysis Prescription:  	Access: AVF  	Dialyzer: Opti 200  	Blood Flow (mL/Min): 400  	Dialysate Flow (mL/Min): 500  	Target UF (Liters): 3  	Treatment Time: 3 hours  	Potassium: 1K  	Calcium: 2.5

## 2022-04-13 NOTE — H&P ADULT - NS ATTEND AMEND GEN_ALL_CORE FT
1.  hyperkalemia , fluid overload secondary to non compliance to HD  2.  hypoglycemia secondary to inadequate food intake &/or excessive basal insulin use  3. HX-ESRD on HD, HTN, DM, anxiety, depression, HOMAR,     Case discussed w/ ER MD   adm to ICU   pt on IV dextrose monitor FS  nephro consult for HD in am  repeat am labs ck hyperkalemia

## 2022-04-13 NOTE — CONSULT NOTE ADULT - REASON FOR ADMISSION
hyperkalemia, pulmonary congestion

## 2022-04-13 NOTE — H&P ADULT - ASSESSMENT
1.  hyperkalemia , fluid overload secondary to non compliance to HD  2.  hypoglycemia secondary to inadequate food intake &/or excessive basal insulin use  3. HX-ESRD on HD, HTN, DM, anxiety, depression, HOMAR,     Case discussed w/ ER MD & ICU Moises ARREDONDO  adm to ICU   pt on IV dextrose monitor FS  nephro consult for HD in am  repeat am labs ck hyperkalemia

## 2022-04-13 NOTE — PATIENT PROFILE ADULT - FALL HARM RISK - HARM RISK INTERVENTIONS

## 2022-04-14 NOTE — DISCHARGE NOTE PROVIDER - CARE PROVIDERS DIRECT ADDRESSES
,DirectAddress_Unknown ,DirectAddress_Unknown,[email or web address]

## 2022-04-14 NOTE — DISCHARGE NOTE PROVIDER - HOSPITAL COURSE
56yo W male w/ PMH   HTN, HLD, DM, ESRD on HD TTS, HOMAR, BPH, PVD s/p R BKA legally blind rt eye was sent from  [facility] for  hyperkalemia.  Pt missed dialysis yesterday stating that "they did not wake me up for  dialysis" and he did not feel well to go for dialysis.    In ER pt received lokelma & 2 amps D50. renal called for  hemodialysis. Patient is not s/p urgent HD 4/13. feeling better. During stay patient was hypoglycemic requiring multiple pushes of d50.   56yo W male w/ PMH   HTN, HLD, DM, ESRD on HD TTS, HOMAR, BPH, PVD s/p R BKA legally blind rt eye was sent from  [facility] for  hyperkalemia.  Pt missed dialysis yesterday stating that "they did not wake me up for  dialysis" and he did not feel well to go for dialysis.    In ER pt received lokelma & 2 amps D50. renal called for  hemodialysis. Patient is not s/p urgent HD 4/13. feeling better. During stay patient was hypoglycemic requiring multiple pushes of d50. PAteint need follow up with pulmonary for HOMAR and sleep study    56yo W male w/ PMH   HTN, HLD, DM, ESRD on HD TTS, HOMAR, BPH, PVD s/p R BKA legally blind rt eye was sent from  [facility] for  hyperkalemia.  Pt missed dialysis yesterday stating that "they did not wake me up for  dialysis" and he did not feel well to go for dialysis.    In ER pt received lokelma & 2 amps D50. renal called for  hemodialysis. Patient is not s/p urgent HD 4/13. feeling better. During stay patient was hypoglycemic requiring multiple pushes of d50. PAteint need follow up with pulmonary for HOMAR and sleep study . Holding insulin due to hypoglycemia. Medically stable for dc.

## 2022-04-14 NOTE — PROGRESS NOTE ADULT - ASSESSMENT
1. Hyperkalemia secondary to noncompliance with HD. Resolved. Renal diet.  2. ESRD on HD. HD tomorrow: 3 hours, opti 200 dialyzer, 2K bath, 3.5L UF.  3. Anemia. EPO with HD.  4. Hyperphosphatemia. Renal diet. Sevelamer with meals.  5. HTN. Monitor on metoprolol.

## 2022-04-14 NOTE — DISCHARGE NOTE PROVIDER - NSDCCPCAREPLAN_GEN_ALL_CORE_FT
PRINCIPAL DISCHARGE DIAGNOSIS  Diagnosis: Hyperkalemia  Assessment and Plan of Treatment: you had urgent dialysis here. now your potasium normalized. Please dont miss any dialysis session. follow up with nephrology clinic in 1-2 weeks.      SECONDARY DISCHARGE DIAGNOSES  Diagnosis: Hypoglycemia  Assessment and Plan of Treatment:      PRINCIPAL DISCHARGE DIAGNOSIS  Diagnosis: Hyperkalemia  Assessment and Plan of Treatment: you had urgent dialysis here. now your potasium normalized. Please dont miss any dialysis session. follow up with nephrology clinic in 1-2 weeks.      SECONDARY DISCHARGE DIAGNOSES  Diagnosis: Hypoglycemia  Assessment and Plan of Treatment:     Diagnosis: HOMAR (obstructive sleep apnea)  Assessment and Plan of Treatment: please follow up with pulmomary clinic for sleep study     PRINCIPAL DISCHARGE DIAGNOSIS  Diagnosis: Hyperkalemia  Assessment and Plan of Treatment: you had urgent dialysis here. now your potasium normalized. Please dont miss any dialysis session. follow up with nephrology clinic in 1-2 weeks.      SECONDARY DISCHARGE DIAGNOSES  Diagnosis: Hypoglycemia  Assessment and Plan of Treatment: discontinue insulin    Diagnosis: HOMAR (obstructive sleep apnea)  Assessment and Plan of Treatment: please follow up with pulmomary clinic for sleep study

## 2022-04-14 NOTE — DISCHARGE NOTE PROVIDER - NSDCMRMEDTOKEN_GEN_ALL_CORE_FT
acetaminophen 325 mg oral tablet: 2 tab(s) orally every 6 hours, As needed, Temp greater or equal to 38C (100.4F), Mild Pain (1 - 3)  Ambien 10 mg oral tablet: 1 tab(s) orally once a day (at bedtime)  aspirin 81 mg oral delayed release tablet: 1 tab(s) orally once a day  brimonidine 0.2% ophthalmic solution: 1 drop(s) to each affected eye 2 times a day  dorzolamide 2% ophthalmic solution: 1 drop(s) to each affected eye 3 times a day  epoetin keagan: 46396 unit(s) subcutaneous 3 times a day during dialysis  hydrALAZINE 25 mg oral tablet: 1 tab(s) orally 3 times a day  mon,wed,fri, sun  hold SBP&lt;120  insulin glargine: 30 unit(s) subcutaneous once a day (at bedtime)  lactulose 10 g/15 mL oral syrup: 15 milliliter(s) orally every 6 hours, As Needed for constipation  Lasix 80 mg oral tablet: 1 tab(s) orally 2 times a day  latanoprost 0.005% ophthalmic solution: 1 drop(s) to each left eye once a day (in the evening)  Lopressor 50 mg oral tablet: 1 tab(s) orally 2 times a day  melatonin 10 mg oral capsule: 1 cap(s) orally once a day (at bedtime)  metOLazone 5 mg oral tablet: 1 tab(s) orally 2 times a day  Percocet 10 mg-325 mg oral tablet: 1 tab(s) orally every 8 hours, As Needed  pain  Renvela 800 mg oral tablet: 3 tab(s) orally 3 times a day (with meals)  sertraline 100 mg oral tablet: 1 tab(s) orally once a day  simethicone 125 mg oral tablet, chewable: 2 tab(s) orally 2 times a day (with meals), As Needed for indigestion   simvastatin 10 mg oral tablet: 1 tab(s) orally once a day (at bedtime)  tamsulosin 0.4 mg oral capsule: 1 cap(s) orally once a day  Timoptic 0.5% ophthalmic solution: 1 drop(s) to each affected eye 2 times a day  traZODone 50 mg oral tablet: 1 tab(s) orally once a day (at bedtime)  Ventolin HFA 90 mcg/inh inhalation aerosol: 2 puff(s) inhaled every 6 hours   acetaminophen 325 mg oral tablet: 2 tab(s) orally every 6 hours, As needed, Temp greater or equal to 38C (100.4F), Mild Pain (1 - 3)  Ambien 10 mg oral tablet: 1 tab(s) orally once a day (at bedtime)  aspirin 81 mg oral delayed release tablet: 1 tab(s) orally once a day  brimonidine 0.2% ophthalmic solution: 1 drop(s) to each affected eye 2 times a day  dorzolamide 2% ophthalmic solution: 1 drop(s) to each affected eye 3 times a day  epoetin keagan: 18473 unit(s) subcutaneous 3 times a day during dialysis  hydrALAZINE 25 mg oral tablet: 1 tab(s) orally 3 times a day  mon,wed,fri, sun  hold SBP&lt;120  lactulose 10 g/15 mL oral syrup: 15 milliliter(s) orally every 6 hours, As Needed for constipation  Lasix 80 mg oral tablet: 1 tab(s) orally 2 times a day  latanoprost 0.005% ophthalmic solution: 1 drop(s) to each left eye once a day (in the evening)  Lopressor 50 mg oral tablet: 1 tab(s) orally 2 times a day  melatonin 10 mg oral capsule: 1 cap(s) orally once a day (at bedtime)  metOLazone 5 mg oral tablet: 1 tab(s) orally 2 times a day  Percocet 10 mg-325 mg oral tablet: 1 tab(s) orally every 8 hours, As Needed  pain  polyethylene glycol 3350 oral powder for reconstitution: 17 gram(s) orally 2 times a day  Renvela 800 mg oral tablet: 3 tab(s) orally 3 times a day (with meals)  senna oral tablet: 2 tab(s) orally once a day (at bedtime)  sertraline 100 mg oral tablet: 1 tab(s) orally once a day  simethicone 125 mg oral tablet, chewable: 2 tab(s) orally 2 times a day (with meals), As Needed for indigestion   simvastatin 10 mg oral tablet: 1 tab(s) orally once a day (at bedtime)  tamsulosin 0.4 mg oral capsule: 1 cap(s) orally once a day  Timoptic 0.5% ophthalmic solution: 1 drop(s) to each affected eye 2 times a day  traZODone 50 mg oral tablet: 1 tab(s) orally once a day (at bedtime)  Ventolin HFA 90 mcg/inh inhalation aerosol: 2 puff(s) inhaled every 6 hours

## 2022-04-14 NOTE — PROGRESS NOTE ADULT - ASSESSMENT
IMPRESSION:  Hypoglycemia  Hyperkalemia  ESRD on HD, L AVF, missed last session  Pulmonary vascular congestion  HO HTN  Probable HOMAR    PLAN:    CNS: Avoid CNS depressants.     HEENT: Oral care    PULMONARY:  HOB @ 45 degrees.  Aspiration precautions. DC NC. Target SpO2 92-96%. AVAPS PRN/ HS. TV-450, EPAP-10, RR-16, minIPAP-14, maxIPAP-25. OP pulm FU for sleep study. Repeat CXR after HD    CARDIOVASCULAR: Avoid volume overload. ECHO. Diurese as tolerated, IV lasix 80q12h. Optimize cardiac therapy. Strict I's and O's, low salt diet <2gm/day, fluid restriction <1L/day    GI: GI prophylaxis. Feeding.      RENAL:  Follow up lytes. Correct as needed. HD as per Nephro, getting HD today.     INFECTIOUS DISEASE: Follow up cultures. No abx.     HEMATOLOGICAL: DVT prophylaxis.    ENDOCRINE: Follow up FS. Target -180.     MUSCULOSKELETAL: bedrest    MICU monitoring   IMPRESSION:  L lung atelectasis and likely mucus plugging  Hypoglycemia  Hyperkalemia  ESRD on HD, L AVF, missed last session  Pulmonary vascular congestion  HO HTN  Probable HOMAR    PLAN:    CNS: Avoid CNS depressants.     HEENT: Oral care    PULMONARY:  HOB @ 45 degrees.  Aspiration precautions. 2L NC. Target SpO2 92-96%. AVAPS 4hrs on/off/PRN/HS. TV-450, EPAP-10, RR-16, minIPAP-14, maxIPAP-25. Incentive spirometry. Chest physiotherapy. Hypertonic saline q4h.. Aggressive pulmonary toilet. OP pulm FU for sleep study. Repeat CXR in PM.    CARDIOVASCULAR: Avoid volume overload. FU ECHO. Diurese as tolerated, IV lasix 80q12h. Optimize cardiac therapy. Strict I's and O's, low salt diet <2gm/day, fluid restriction <1L/day.    GI: GI prophylaxis. Feeding as tolerated.     RENAL:  Follow up lytes. Correct as needed. HD as per Nephro, s/p HD yesterday, 3.5L removed. FU Nephro.      INFECTIOUS DISEASE: Follow up cultures. No abx. Procal.     HEMATOLOGICAL: DVT prophylaxis.    ENDOCRINE: Follow up FS. Target -180.     MUSCULOSKELETAL: bedrest    MICU monitoring   IMPRESSION:  L lung atelectasis and likely mucus plugging /rotational film  Hypoglycemia  Hyperkalemia  ESRD on HD, L AVF, missed last session  Pulmonary vascular congestion  HO HTN  Probable HOMAR    PLAN:    CNS: Avoid CNS depressants.     HEENT: Oral care    PULMONARY:  HOB @ 45 degrees.  Aspiration precautions. 2L NC. Target SpO2 92-96%. AVAPS 4hrs on/off/PRN/HS. TV-450, EPAP-10, RR-16, minIPAP-14, maxIPAP-25. Incentive spirometry.   Chest physiotherapy. Hypertonic saline q4h..   Aggressive pulmonary toilet.   OP pulm FU for sleep study.   Repeat CXR in PM. Align pt correctly for CXR    CARDIOVASCULAR: Avoid volume overload. FU ECHO. Diurese as tolerated, IV lasix 80q12h. Optimize cardiac therapy. Strict I's and O's, low salt diet <2gm/day, fluid restriction <1L/day.    GI: GI prophylaxis. Feeding as tolerated.     RENAL:  Follow up lytes. Correct as needed. HD as per Nephro, s/p HD yesterday, 3.5L removed. FU Nephro.      INFECTIOUS DISEASE: Follow up cultures. No abx. Procal.     HEMATOLOGICAL: DVT prophylaxis.    ENDOCRINE: Follow up FS. Target -180.     MUSCULOSKELETAL: bedrest    MICU monitoring

## 2022-04-14 NOTE — DISCHARGE NOTE PROVIDER - PROVIDER TOKENS
PROVIDER:[TOKEN:[[id number]],FOLLOWUP:[1 week]] PROVIDER:[TOKEN:[[id number]],FOLLOWUP:[1 week]],PROVIDER:[TOKEN:[[id number]],FOLLOWUP:[1 week]]

## 2022-04-14 NOTE — DISCHARGE NOTE PROVIDER - CARE PROVIDER_API CALL
Ayaan Owens (MD)  Internal Medicine; Nephrology  [address] [place] [address]  Phone: [phone]  Fax: [phone]  Follow Up Time: 1 week   Ayaan Owens)  Internal Medicine; Nephrology  [address] [place] [address]  Phone: [phone]  Fax: [phone]  Follow Up Time: 1 week    Tony Regalado)  EndocrinologyMetabDiabetes; Internal Medicine  [address] [place] [address]  Phone: [phone]  Fax: [phone]  Follow Up Time: 1 week

## 2022-04-14 NOTE — PROGRESS NOTE ADULT - ASSESSMENT
56yo W male w/ PMH   HTN, HLD, DM, ESRD on HD TTS, HOMAR, BPH, PVD s/p R BKA legally blind rt eye was sent from  [facility] for  hyperkalemia.  Pt missed dialysis yesterday stating that "they did not wake me up for  dialysis" and he did not feel well to go for dialysis.    In ER pt received lokelma & 2 amps D50. renal called for  hemodialysis    ESRD with hyperkalemia secondary to missed HD   - s/p HD feeling better    - continue home meds   - DC planning    Lung atelectasis   F/u procal  pulmojnary toliet/ avaops q4    DM with hypoglycemia  encourage increase glucose intake now  Hold any insulin products  Endo note appreciated   a1c 4.7      Further care as per ICU team

## 2022-04-14 NOTE — PROGRESS NOTE ADULT - ATTENDING COMMENTS
Attending Statement: I have personally performed a face to face diagnostic evaluation on this patient. The patient is suffering from:  Hypoglycemia  Hyperkalemia  ESRD on HD  aspiration   I have reviewed the above note and agree with the history, exam and suggestions for care, except as I have noted in the text. I have amended the treatment plans as necessary.

## 2022-04-14 NOTE — PROGRESS NOTE ADULT - SUBJECTIVE AND OBJECTIVE BOX
[place] HAMM  57y  Male      Patient is a 57y old  Male who presents with a chief complaint of hyperkalemia, pulmonary congestion (13 Apr 2022 14:04)      INTERVAL HPI/OVERNIGHT EVENTS:  feeling better, was hypoglycemic overnight s/p d50    Vital Signs Last 24 Hrs  T(C): 36 (14 Apr 2022 03:00), Max: 36.8 (13 Apr 2022 23:00)  T(F): 96.8 (14 Apr 2022 03:00), Max: 98.3 (13 Apr 2022 23:00)  HR: 62 (14 Apr 2022 06:00) (60 - 83)  BP: 130/60 (14 Apr 2022 06:00) (102/55 - 176/72)  BP(mean): 86 (14 Apr 2022 06:00) (70 - 102)  RR: 18 (14 Apr 2022 03:00) (10 - 23)  SpO2: 98% (14 Apr 2022 06:00) (94% - 100%)    PHYSICAL EXAM:  GENERAL: NAD  HEAD:  Atraumatic, Normocephalic  EYES: EOMI, PERRLA, conjunctiva and sclera clear  NERVOUS SYSTEM:  Alert & Oriented X3, no focal deficits   CHEST/LUNG:  bilateral rhonchi  HEART: Regular rate and rhythm; No murmurs, rubs, or gallops  ABDOMEN: Soft, Nontender, Nondistended; Bowel sounds present  EXTREMITIES:  R bka    LABS:                        8.3    15.20 )-----------( 139      ( 14 Apr 2022 05:45 )             27.8     04-13    139  |  100  |  50<H>  ----------------------------<  88  4.6   |  29  |  7.0<HH>    Ca    7.7<L>      13 Apr 2022 18:54  Mg     2.3     04-12    TPro  5.2<L>  /  Alb  3.1<L>  /  TBili  0.3  /  DBili  x   /  AST  7   /  ALT  6   /  AlkPhos  170<H>  04-13    PT/INR - ( 12 Apr 2022 21:40 )   PT: 16.80 sec;   INR: 1.47 ratio         PTT - ( 12 Apr 2022 21:40 )  PTT:43.5 sec      CAPILLARY BLOOD GLUCOSE      POCT Blood Glucose.: 118 mg/dL (14 Apr 2022 06:22)  POCT Blood Glucose.: 67 mg/dL (14 Apr 2022 04:04)  POCT Blood Glucose.: 90 mg/dL (14 Apr 2022 01:36)  POCT Blood Glucose.: 102 mg/dL (14 Apr 2022 00:10)  POCT Blood Glucose.: 70 mg/dL (13 Apr 2022 21:50)  POCT Blood Glucose.: 77 mg/dL (13 Apr 2022 19:47)  POCT Blood Glucose.: 89 mg/dL (13 Apr 2022 17:55)  POCT Blood Glucose.: 102 mg/dL (13 Apr 2022 14:52)  POCT Blood Glucose.: 120 mg/dL (13 Apr 2022 10:42)  POCT Blood Glucose.: 83 mg/dL (13 Apr 2022 07:47)      Imaging Personally Reviewed:  [ ] YES  [ ] NO

## 2022-04-14 NOTE — DISCHARGE NOTE PROVIDER - ATTENDING DISCHARGE PHYSICAL EXAMINATION:
GENERAL: No acute distress, well-developed  HEAD:  Atraumatic, Normocephalic  EYES: EOMI, PERRLA, conjunctiva and sclera clear  NECK: Supple, no lymphadenopathy, no JVD  CHEST/LUNG: CTAB; No wheezes, rales, or rhonchi  HEART: Regular rate and rhythm; No murmurs, rubs, or gallops  ABDOMEN: Soft, non-tender, non-distended; normal bowel sounds, no organomegaly  EXTREMITIES:  2+ peripheral pulses b/l, No clubbing, cyanosis, or edema  NEUROLOGY: A&O x 3, no focal deficits  SKIN: No rashes or lesions

## 2022-04-14 NOTE — PROGRESS NOTE ADULT - SUBJECTIVE AND OBJECTIVE BOX
Patient is a 57y old  Male who presents with a chief complaint of hyperkalemia, pulmonary congestion (14 Apr 2022 07:30)        Over Night Events:        ROS:     All pertinent ROS are negative except HPI         PHYSICAL EXAM    ICU Vital Signs Last 24 Hrs  T(C): 36.2 (14 Apr 2022 07:10), Max: 36.8 (13 Apr 2022 23:00)  T(F): 97.2 (14 Apr 2022 07:10), Max: 98.3 (13 Apr 2022 23:00)  HR: 62 (14 Apr 2022 06:00) (60 - 83)  BP: 130/60 (14 Apr 2022 06:00) (102/55 - 176/72)  BP(mean): 86 (14 Apr 2022 06:00) (70 - 102)  ABP: --  ABP(mean): --  RR: 22 (14 Apr 2022 07:10) (10 - 23)  SpO2: 98% (14 Apr 2022 06:00) (94% - 100%)      CONSTITUTIONAL:   Ill appearing.  Well nourished.  NAD    ENT:   Airway patent,   Mouth with normal mucosa.   No thrush    EYES:   Pupils equal,   Round and reactive to light.    CARDIAC:   Normal rate,   Regular rhythm.    No edema      Vascular:  Normal systolic impulse  No Carotid bruits    RESPIRATORY:   No wheezing  Bilateral BS  Normal chest expansion  Not tachypneic,  No use of accessory muscles    GASTROINTESTINAL:  Abdomen soft,   Non-tender,   No guarding,   + BS    GENITOURINARY  normal genitalia for sex  no edema    MUSCULOSKELETAL:   Range of motion is not limited,  No clubbing, cyanosis    NEUROLOGICAL:   Alert and oriented   No motor  deficits.  pertinent DTRs normal    SKIN:   Skin normal color for race,   Warm and dry  No evidence of rash.    PSYCHIATRIC:   Normal mood and affect.   No apparent risk to self or others.    HEMATOLOGICAL:  No cervical  lymphadenopathy.  no inguinal lymphadenopathy      04-13-22 @ 07:01  -  04-14-22 @ 07:00  --------------------------------------------------------  IN:    IV PiggyBack: 350 mL  Total IN: 350 mL    OUT:    Other (mL): 3500 mL    Voided (mL): 300 mL  Total OUT: 3800 mL    Total NET: -3450 mL          LABS:                            8.3    15.20 )-----------( 139      ( 14 Apr 2022 05:45 )             27.8                                               04-14    140  |  100  |  53<H>  ----------------------------<  131<H>  4.7   |  29  |  x     Ca    7.9<L>      14 Apr 2022 05:45  Mg     2.1     04-14    TPro  5.3<L>  /  Alb  3.1<L>  /  TBili  0.4  /  DBili  x   /  AST  6   /  ALT  <5  /  AlkPhos  174<H>  04-14      PT/INR - ( 12 Apr 2022 21:40 )   PT: 16.80 sec;   INR: 1.47 ratio         PTT - ( 12 Apr 2022 21:40 )  PTT:43.5 sec                                                                                     LIVER FUNCTIONS - ( 14 Apr 2022 05:45 )  Alb: 3.1 g/dL / Pro: 5.3 g/dL / ALK PHOS: 174 U/L / ALT: <5 U/L / AST: 6 U/L / GGT: x                                                                                                                                       MEDICATIONS  (STANDING):  aspirin enteric coated 81 milliGRAM(s) Oral daily  brimonidine 0.2% Ophthalmic Solution 1 Drop(s) Both EYES two times a day  chlorhexidine 4% Liquid 1 Application(s) Topical daily  dextrose 5%. 1000 milliLiter(s) (100 mL/Hr) IV Continuous <Continuous>  dextrose 5%. 1000 milliLiter(s) (50 mL/Hr) IV Continuous <Continuous>  dextrose 50% Injectable 25 Gram(s) IV Push once  dextrose 50% Injectable 12.5 Gram(s) IV Push once  dextrose 50% Injectable 25 Gram(s) IV Push once  dorzolamide 2% Ophthalmic Solution 1 Drop(s) Both EYES three times a day  epoetin keagan-epbx (RETACRIT) Injectable 22442 Unit(s) IV Push <User Schedule>  glucagon  Injectable 1 milliGRAM(s) IntraMuscular once  heparin   Injectable 5000 Unit(s) SubCutaneous every 8 hours  latanoprost 0.005% Ophthalmic Solution 1 Drop(s) Left EYE at bedtime  melatonin 5 milliGRAM(s) Oral at bedtime  metoprolol tartrate 50 milliGRAM(s) Oral two times a day  pantoprazole    Tablet 40 milliGRAM(s) Oral before breakfast  sertraline 100 milliGRAM(s) Oral daily  sevelamer carbonate 2400 milliGRAM(s) Oral three times a day with meals  simvastatin 10 milliGRAM(s) Oral at bedtime  tamsulosin 0.4 milliGRAM(s) Oral at bedtime  timolol 0.5% Solution 1 Drop(s) Both EYES two times a day  traZODone 50 milliGRAM(s) Oral at bedtime    MEDICATIONS  (PRN):  acetaminophen     Tablet .. 650 milliGRAM(s) Oral every 6 hours PRN Mild Pain (1 - 3)  ALBUTerol    90 MICROgram(s) HFA Inhaler 2 Puff(s) Inhalation every 6 hours PRN Shortness of Breath and/or Wheezing  calcium gluconate IVPB 2 Gram(s) IV Intermittent once PRN ekg changes  dextrose 50% Injectable 50 milliLiter(s) IV Push once PRN hypoglycemia <70  dextrose Oral Gel 15 Gram(s) Oral once PRN Blood Glucose LESS THAN 70 milliGRAM(s)/deciliter  lactulose Syrup 10 Gram(s) Oral every 6 hours PRN constipation  oxycodone    5 mG/acetaminophen 325 mG 2 Tablet(s) Oral every 4 hours PRN Severe Pain (7 - 10)  simethicone 80 milliGRAM(s) Chew every 6 hours PRN Gas      New X-rays reviewed:                                                                                  ECHO    CXR interpreted by me:       Patient is a 57y old  Male who presents with a chief complaint of hyperkalemia, pulmonary congestion (14 Apr 2022 07:30)        Over Night Events:  S/p HD, 3.5L removed. Remained hypoglycemic overnight, received glucagon and D50.     Drips  None      ROS:     All pertinent ROS are negative except HPI         PHYSICAL EXAM    ICU Vital Signs Last 24 Hrs  T(C): 36.2 (14 Apr 2022 07:10), Max: 36.8 (13 Apr 2022 23:00)  T(F): 97.2 (14 Apr 2022 07:10), Max: 98.3 (13 Apr 2022 23:00)  HR: 62 (14 Apr 2022 06:00) (60 - 83)  BP: 130/60 (14 Apr 2022 06:00) (102/55 - 176/72)  BP(mean): 86 (14 Apr 2022 06:00) (70 - 102)  RR: 22 (14 Apr 2022 07:10) (10 - 23)  SpO2: 98% (14 Apr 2022 06:00) (94% - 100%)      CONSTITUTIONAL:   Ill appearing.  NAD    ENT:   Airway patent,   Mouth with normal mucosa.   No thrush    EYES:   Pupils equal,   Round and reactive to light.    CARDIAC:   Normal rate,   Regular rhythm.    No edema    RESPIRATORY:   3L NC, satting 97%  No wheezing  Bilateral BS  Normal chest expansion  Not tachypneic,  No use of accessory muscles    GASTROINTESTINAL:  Abdomen soft,   Non-tender,   No guarding,   + BS    MUSCULOSKELETAL:   Range of motion is not limited,  No clubbing, cyanosis    NEUROLOGICAL:   AOx4  Follows commands  Moves all extremities     SKIN:   Skin normal color for race,   Warm and dry  No evidence of rash.    PSYCHIATRIC:   Normal mood and affect.   No apparent risk to self or others.    04-13-22 @ 07:01  -  04-14-22 @ 07:00  --------------------------------------------------------  IN:    IV PiggyBack: 350 mL  Total IN: 350 mL    OUT:    Other (mL): 3500 mL    Voided (mL): 300 mL  Total OUT: 3800 mL    Total NET: -3450 mL        LABS:                            8.3    15.20 )-----------( 139      ( 14 Apr 2022 05:45 )             27.8                                               04-14    140  |  100  |  53<H>  ----------------------------<  131<H>  4.7   |  29  |  x     Ca    7.9<L>      14 Apr 2022 05:45  Mg     2.1     04-14    TPro  5.3<L>  /  Alb  3.1<L>  /  TBili  0.4  /  DBili  x   /  AST  6   /  ALT  <5  /  AlkPhos  174<H>  04-14      PT/INR - ( 12 Apr 2022 21:40 )   PT: 16.80 sec;   INR: 1.47 ratio         PTT - ( 12 Apr 2022 21:40 )  PTT:43.5 sec                                                                                     LIVER FUNCTIONS - ( 14 Apr 2022 05:45 )  Alb: 3.1 g/dL / Pro: 5.3 g/dL / ALK PHOS: 174 U/L / ALT: <5 U/L / AST: 6 U/L / GGT: x                                                                                     MEDICATIONS  (STANDING):  aspirin enteric coated 81 milliGRAM(s) Oral daily  brimonidine 0.2% Ophthalmic Solution 1 Drop(s) Both EYES two times a day  chlorhexidine 4% Liquid 1 Application(s) Topical daily  dextrose 5%. 1000 milliLiter(s) (100 mL/Hr) IV Continuous <Continuous>  dextrose 5%. 1000 milliLiter(s) (50 mL/Hr) IV Continuous <Continuous>  dextrose 50% Injectable 25 Gram(s) IV Push once  dextrose 50% Injectable 12.5 Gram(s) IV Push once  dextrose 50% Injectable 25 Gram(s) IV Push once  dorzolamide 2% Ophthalmic Solution 1 Drop(s) Both EYES three times a day  epoetin keagan-epbx (RETACRIT) Injectable 85528 Unit(s) IV Push <User Schedule>  glucagon  Injectable 1 milliGRAM(s) IntraMuscular once  heparin   Injectable 5000 Unit(s) SubCutaneous every 8 hours  latanoprost 0.005% Ophthalmic Solution 1 Drop(s) Left EYE at bedtime  melatonin 5 milliGRAM(s) Oral at bedtime  metoprolol tartrate 50 milliGRAM(s) Oral two times a day  pantoprazole    Tablet 40 milliGRAM(s) Oral before breakfast  sertraline 100 milliGRAM(s) Oral daily  sevelamer carbonate 2400 milliGRAM(s) Oral three times a day with meals  simvastatin 10 milliGRAM(s) Oral at bedtime  tamsulosin 0.4 milliGRAM(s) Oral at bedtime  timolol 0.5% Solution 1 Drop(s) Both EYES two times a day  traZODone 50 milliGRAM(s) Oral at bedtime    MEDICATIONS  (PRN):  acetaminophen     Tablet .. 650 milliGRAM(s) Oral every 6 hours PRN Mild Pain (1 - 3)  ALBUTerol    90 MICROgram(s) HFA Inhaler 2 Puff(s) Inhalation every 6 hours PRN Shortness of Breath and/or Wheezing  calcium gluconate IVPB 2 Gram(s) IV Intermittent once PRN ekg changes  dextrose 50% Injectable 50 milliLiter(s) IV Push once PRN hypoglycemia <70  dextrose Oral Gel 15 Gram(s) Oral once PRN Blood Glucose LESS THAN 70 milliGRAM(s)/deciliter  lactulose Syrup 10 Gram(s) Oral every 6 hours PRN constipation  oxycodone    5 mG/acetaminophen 325 mG 2 Tablet(s) Oral every 4 hours PRN Severe Pain (7 - 10)  simethicone 80 milliGRAM(s) Chew every 6 hours PRN Gas      New X-rays reviewed:                                                                                  ECHO    CXR interpreted by me:  complete left lung opacification, likely atelectasis and mucus plugging

## 2022-04-14 NOTE — PROGRESS NOTE ADULT - ASSESSMENT
56yo W male w/ PMH   HTN, HLD, DM, ESRD on HD TTS, HOMAR, BPH, PVD s/p R BKA legally blind rt eye was sent from  [facility] for  hyperkalemia.  Pt missed dialysis yesterday stating that "they did not wake me up for  dialysis" and he did not feel well to go for dialysis.    In ER pt received lokelma & 2 amps D50. renal called for  hemodialysis    ESRD with hyperkalemia secondary to missed HD   - s/p HD feeling better    - continue home meds   - DC planning\    DM with hypoglycemia  encourage increase glucose intake now  Hold any insulin products  Endo note appreciated          58yo W male w/ PMH   HTN, HLD, DM, ESRD on HD TTS, HOMAR, BPH, PVD s/p R BKA legally blind rt eye was sent from  [facility] for  hyperkalemia.  Pt missed dialysis yesterday stating that "they did not wake me up for  dialysis" and he did not feel well to go for dialysis.    In ER pt received lokelma & 2 amps D50. renal called for  hemodialysis    ESRD with hyperkalemia secondary to missed HD   - s/p HD feeling better    - continue home meds   - DC planning\    Lung atelectasis   F/u procal  pulmojnary toliet/ avaops q4    DM with hypoglycemia  encourage increase glucose intake now  Hold any insulin products  Endo note appreciated   a1c 4.7

## 2022-04-14 NOTE — DISCHARGE NOTE PROVIDER - NSDCCAREPROVSEEN_GEN_ALL_CORE_FT
Sid Tomas Sid Tomas, Mart Giron, Abundio Diallo, Jose Maria Trevizo, Rod Regalado, Tony CHRISTIE  Doctor, Not Available  iSd Tomas, Ayaan Brito, Chiquis Thomas, Fabiola Benavidez, Magalis Warren, Trent Cavazos, Luis Solano, Briseida Wynn, John Jenkins, Valentin Mayes, Silas Jackson, Louis Mosqueda

## 2022-04-14 NOTE — PROGRESS NOTE ADULT - SUBJECTIVE AND OBJECTIVE BOX
CC.  hyperkalemia, pulmonary congestion   HPI.  Patient reports that he feels better  SOB resolved  Offers no other complaints               Constitutional: No fever, fatigue or weight loss.  Skin: No rash.  Eyes: No recent vision problems or eye pain.  ENT: No congestion, ear pain, or sore throat.  Endocrine: No thyroid problems.  Cardiovascular: No chest pain or palpation.  Respiratory: No cough, shortness of breath, congestion, or wheezing.  Gastrointestinal: No abdominal pain, nausea, vomiting, or diarrhea.  Genitourinary: No dysuria.  Musculoskeletal: No joint swelling.  Neurologic: No headache.      Vital Signs Last 24 Hrs  T(C): 36.2 (04-14-22 @ 07:10), Max: 36.8 (04-13-22 @ 23:00)  T(F): 97.2 (04-14-22 @ 07:10), Max: 98.3 (04-13-22 @ 23:00)  HR: 62 (04-14-22 @ 10:01) (56 - 83)  BP: 144/67 (04-14-22 @ 10:01) (102/55 - 176/72)  BP(mean): 102 (04-14-22 @ 10:01) (70 - 102)  RR: 19 (04-14-22 @ 10:01) (10 - 23)  SpO2: 95% (04-14-22 @ 10:01) (94% - 100%)        PHYSICAL EXAM-  GENERAL: NAD   HEAD:  Atraumatic, Normocephalic  EYES: EOMI, PERRLA, conjunctiva and sclera clear  NECK: Supple, No JVD, Normal thyroid  NERVOUS SYSTEM:  Alert & Oriented X3, Moving all extremities  CHEST/LUNG: Clear to percussion bilaterally; No rales, rhonchi, wheezing, or rubs  HEART: Regular rate and rhythm; No murmurs, rubs, or gallops  ABDOMEN: Soft, Nontender, Nondistended; Bowel sounds present  EXTREMITIES:    No clubbing, cyanosis, or edema  SKIN: No rashes or lesions                                  8.3    15.20 )-----------( 139      ( 14 Apr 2022 05:45 )             27.8     04-14    140  |  100  |  53<H>  ----------------------------<  131<H>  4.7   |  29  |  7.6<HH>    Ca    7.9<L>      14 Apr 2022 05:45  Mg     2.1     04-14    TPro  5.3<L>  /  Alb  3.1<L>  /  TBili  0.4  /  DBili  x   /  AST  6   /  ALT  <5  /  AlkPhos  174<H>  04-14            PT/INR - ( 12 Apr 2022 21:40 )   PT: 16.80 sec;   INR: 1.47 ratio         PTT - ( 12 Apr 2022 21:40 )  PTT:43.5 sec        MEDICATIONS  (STANDING):  aspirin enteric coated 81 milliGRAM(s) Oral daily  brimonidine 0.2% Ophthalmic Solution 1 Drop(s) Both EYES two times a day  chlorhexidine 4% Liquid 1 Application(s) Topical daily  dextrose 5%. 1000 milliLiter(s) (100 mL/Hr) IV Continuous <Continuous>  dextrose 5%. 1000 milliLiter(s) (50 mL/Hr) IV Continuous <Continuous>  dextrose 50% Injectable 25 Gram(s) IV Push once  dextrose 50% Injectable 12.5 Gram(s) IV Push once  dextrose 50% Injectable 25 Gram(s) IV Push once  dorzolamide 2% Ophthalmic Solution 1 Drop(s) Both EYES three times a day  epoetin keagan-epbx (RETACRIT) Injectable 93439 Unit(s) IV Push <User Schedule>  glucagon  Injectable 1 milliGRAM(s) IntraMuscular once  heparin   Injectable 5000 Unit(s) SubCutaneous every 8 hours  latanoprost 0.005% Ophthalmic Solution 1 Drop(s) Left EYE at bedtime  melatonin 5 milliGRAM(s) Oral at bedtime  metoprolol tartrate 50 milliGRAM(s) Oral two times a day  pantoprazole    Tablet 40 milliGRAM(s) Oral before breakfast  sertraline 100 milliGRAM(s) Oral daily  sevelamer carbonate 2400 milliGRAM(s) Oral three times a day with meals  simvastatin 10 milliGRAM(s) Oral at bedtime  sodium chloride 3%  Inhalation 4 milliLiter(s) Inhalation every 6 hours  tamsulosin 0.4 milliGRAM(s) Oral at bedtime  timolol 0.5% Solution 1 Drop(s) Both EYES two times a day  traZODone 50 milliGRAM(s) Oral at bedtime    MEDICATIONS  (PRN):  acetaminophen     Tablet .. 650 milliGRAM(s) Oral every 6 hours PRN Mild Pain (1 - 3)  ALBUTerol    90 MICROgram(s) HFA Inhaler 2 Puff(s) Inhalation every 6 hours PRN Shortness of Breath and/or Wheezing  calcium gluconate IVPB 2 Gram(s) IV Intermittent once PRN ekg changes  dextrose 50% Injectable 50 milliLiter(s) IV Push once PRN hypoglycemia <70  dextrose Oral Gel 15 Gram(s) Oral once PRN Blood Glucose LESS THAN 70 milliGRAM(s)/deciliter  lactulose Syrup 10 Gram(s) Oral every 6 hours PRN constipation  oxycodone    5 mG/acetaminophen 325 mG 2 Tablet(s) Oral every 4 hours PRN Severe Pain (7 - 10)  simethicone 80 milliGRAM(s) Chew every 6 hours PRN Gas        Imaging Personally Reviewed:     [x ] YES  [ ] NO    Consultant(s) Notes Reviewed:  [x ] YES  [ ] NO    Care Discussed with Consultants/Other Providers [x ] YES  [ ] NO

## 2022-04-14 NOTE — PROGRESS NOTE ADULT - SUBJECTIVE AND OBJECTIVE BOX
[place] NEPHROLOGY FOLLOW UP NOTE  --------------------------------------------------------------------------------  24 hour events/subjective: Patient examined. Appears comfortable.    PAST HISTORY  --------------------------------------------------------------------------------  No significant changes to PMH, PSH, FHx, SHx, unless otherwise noted    ALLERGIES & MEDICATIONS  --------------------------------------------------------------------------------  Allergies    No Known Allergies    Standing Inpatient Medications  aspirin enteric coated 81 milliGRAM(s) Oral daily  brimonidine 0.2% Ophthalmic Solution 1 Drop(s) Both EYES two times a day  chlorhexidine 4% Liquid 1 Application(s) Topical daily  dextrose 5%. 1000 milliLiter(s) IV Continuous <Continuous>  dextrose 5%. 1000 milliLiter(s) IV Continuous <Continuous>  dextrose 50% Injectable 25 Gram(s) IV Push once  dextrose 50% Injectable 12.5 Gram(s) IV Push once  dextrose 50% Injectable 25 Gram(s) IV Push once  dorzolamide 2% Ophthalmic Solution 1 Drop(s) Both EYES three times a day  epoetin keagan-epbx (RETACRIT) Injectable 24701 Unit(s) IV Push <User Schedule>  glucagon  Injectable 1 milliGRAM(s) IntraMuscular once  heparin   Injectable 5000 Unit(s) SubCutaneous every 8 hours  latanoprost 0.005% Ophthalmic Solution 1 Drop(s) Left EYE at bedtime  melatonin 5 milliGRAM(s) Oral at bedtime  metoprolol tartrate 50 milliGRAM(s) Oral two times a day  pantoprazole    Tablet 40 milliGRAM(s) Oral before breakfast  sertraline 100 milliGRAM(s) Oral daily  sevelamer carbonate 2400 milliGRAM(s) Oral three times a day with meals  simvastatin 10 milliGRAM(s) Oral at bedtime  tamsulosin 0.4 milliGRAM(s) Oral at bedtime  timolol 0.5% Solution 1 Drop(s) Both EYES two times a day  traZODone 50 milliGRAM(s) Oral at bedtime    PRN Inpatient Medications  acetaminophen     Tablet .. 650 milliGRAM(s) Oral every 6 hours PRN  ALBUTerol    90 MICROgram(s) HFA Inhaler 2 Puff(s) Inhalation every 6 hours PRN  calcium gluconate IVPB 2 Gram(s) IV Intermittent once PRN  dextrose 50% Injectable 50 milliLiter(s) IV Push once PRN  dextrose Oral Gel 15 Gram(s) Oral once PRN  lactulose Syrup 10 Gram(s) Oral every 6 hours PRN  oxycodone    5 mG/acetaminophen 325 mG 2 Tablet(s) Oral every 4 hours PRN  simethicone 80 milliGRAM(s) Chew every 6 hours PRN    VITALS/PHYSICAL EXAM  --------------------------------------------------------------------------------  T(C): 36.2 (04-14-22 @ 07:10), Max: 36.8 (04-13-22 @ 23:00)  HR: 60 (04-14-22 @ 08:00) (60 - 83)  BP: 107/51 (04-14-22 @ 08:00) (102/55 - 176/72)  RR: 20 (04-14-22 @ 08:00) (10 - 23)  SpO2: 97% (04-14-22 @ 08:00) (94% - 100%)  Height (cm): 182.9 (04-13-22 @ 02:00)  Weight (kg): 132.6 (04-13-22 @ 02:00)  BMI (kg/m2): 39.6 (04-13-22 @ 02:00)  BSA (m2): 2.5 (04-13-22 @ 02:00)    04-13-22 @ 07:01  -  04-14-22 @ 07:00  --------------------------------------------------------  IN: 350 mL / OUT: 3800 mL / NET: -3450 mL    Physical Exam:  	Gen: NAD  	Pulm: CTA B/L  	CV: RRR, S1S2  	Abd: +BS, soft, nontender/nondistended  	: No suprapubic tenderness  	LE: Warm, edema  	Vascular access: AVF    LABS/STUDIES  --------------------------------------------------------------------------------              8.3    15.20 >-----------<  139      [04-14-22 @ 05:45]              27.8     140  |  100  |  53  ----------------------------<  131      [04-14-22 @ 05:45]  4.7   |  29  |  7.6        Ca     7.9     [04-14-22 @ 05:45]      Mg     2.1     [04-14-22 @ 05:45]    TPro  5.3  /  Alb  3.1  /  TBili  0.4  /  DBili  x   /  AST  6   /  ALT  <5  /  AlkPhos  174  [04-14-22 @ 05:45]    PT/INR: PT 16.80, INR 1.47       [04-12-22 @ 21:40]  PTT: 43.5       [04-12-22 @ 21:40]    Creatinine Trend:  SCr 7.6 [04-14 @ 05:45]  SCr 7.0 [04-13 @ 18:54]  SCr 7.3 [04-13 @ 11:00]  SCr 11.4 [04-13 @ 05:44]  SCr 10.6 [04-12 @ 21:40]    HbA1c 8.6      [06-01-18 @ 07:22]  TSH 1.87      [11-18-21 @ 04:50]  Lipid: chol 82, , HDL 33, LDL --      [11-18-21 @ 04:50]

## 2022-04-15 NOTE — PROGRESS NOTE ADULT - SUBJECTIVE AND OBJECTIVE BOX
Patient is a 57y old  Male who presents with a chief complaint of hyperkalemia, pulmonary congestion (15 Apr 2022 08:15)        Over Night Events:        ROS:     All pertinent ROS are negative except HPI         PHYSICAL EXAM    ICU Vital Signs Last 24 Hrs  T(C): 36 (15 Apr 2022 07:01), Max: 37.3 (2022 15:15)  T(F): 96.8 (15 Apr 2022 07:01), Max: 99.2 (2022 15:15)  HR: 62 (15 Apr 2022 07:01) (51 - 76)  BP: 118/54 (15 Apr 2022 07:01) (103/51 - 161/72)  BP(mean): 100 (15 Apr 2022 05:00) (72 - 120)  RR: 15 (15 Apr 2022 07:01) (12 - 37)  SpO2: 100% (15 Apr 2022 05:00) (94% - 100%)      CONSTITUTIONAL:   Ill appearing.  Well nourished.  NAD    ENT:   Airway patent,   Mouth with normal mucosa.   No thrush    EYES:   Pupils equal,   Round and reactive to light.    CARDIAC:   Normal rate,   Regular rhythm.    No edema    RESPIRATORY:   No wheezing  Bilateral BS  Normal chest expansion  Not tachypneic,  No use of accessory muscles    GASTROINTESTINAL:  Abdomen soft,   Non-tender,   No guarding,   + BS    GENITOURINARY  normal genitalia for sex  no edema    MUSCULOSKELETAL:   Range of motion is not limited,  No clubbing, cyanosis    NEUROLOGICAL:   Alert and oriented   No motor  deficits.  pertinent DTRs normal    SKIN:   Skin normal color for race,   Warm and dry  No evidence of rash.    PSYCHIATRIC:   Normal mood and affect.   No apparent risk to self or others.      22 @ 07:01  -  04-15-22 @ 07:00  --------------------------------------------------------  IN:    Oral Fluid: 476 mL  Total IN: 476 mL    OUT:    Voided (mL): 75 mL  Total OUT: 75 mL    Total NET: 401 mL        LABS:                            7.9    8.79  )-----------( 152      ( 15 Apr 2022 05:46 )             28.0                                               04-15    141  |  101  |  62<HH>  ----------------------------<  96  5.5<H>   |  27  |  8.2<HH>    Ca    8.4<L>      15 Apr 2022 05:46  Phos  4.2     04-15  Mg     2.3     04-15    TPro  5.5<L>  /  Alb  3.0<L>  /  TBili  0.3  /  DBili  x   /  AST  7   /  ALT  5   /  AlkPhos  170<H>  04-15                                             Urinalysis Basic - ( 2022 14:20 )    Color: Yellow / Appearance: Clear / S.020 / pH: x  Gluc: x / Ketone: Negative  / Bili: Negative / Urobili: 0.2 mg/dL   Blood: x / Protein: 100 mg/dL / Nitrite: Negative   Leuk Esterase: Large / RBC: 11-25 /HPF / WBC >50 /HPF   Sq Epi: x / Non Sq Epi: x / Bacteria: Few                                              LIVER FUNCTIONS - ( 15 Apr 2022 05:46 )  Alb: 3.0 g/dL / Pro: 5.5 g/dL / ALK PHOS: 170 U/L / ALT: 5 U/L / AST: 7 U/L / GGT: x                                                  Culture - Blood (collected 2022 22:31)  Source: .Blood Blood  Preliminary Report (2022 19:02):    No growth to date.    Culture - Blood (collected 2022 22:31)  Source: .Blood Blood  Preliminary Report (2022 19:02):    No growth to date.                              MEDICATIONS  (STANDING):  aspirin enteric coated 81 milliGRAM(s) Oral daily  brimonidine 0.2% Ophthalmic Solution 1 Drop(s) Both EYES two times a day  chlorhexidine 4% Liquid 1 Application(s) Topical daily  dextrose 5%. 1000 milliLiter(s) (100 mL/Hr) IV Continuous <Continuous>  dextrose 5%. 1000 milliLiter(s) (50 mL/Hr) IV Continuous <Continuous>  dextrose 50% Injectable 25 Gram(s) IV Push once  dextrose 50% Injectable 12.5 Gram(s) IV Push once  dextrose 50% Injectable 25 Gram(s) IV Push once  dorzolamide 2% Ophthalmic Solution 1 Drop(s) Both EYES three times a day  epoetin keagan-epbx (RETACRIT) Injectable 04324 Unit(s) IV Push <User Schedule>  furosemide   Injectable 80 milliGRAM(s) IV Push every 12 hours  glucagon  Injectable 1 milliGRAM(s) IntraMuscular once  heparin   Injectable 5000 Unit(s) SubCutaneous every 8 hours  latanoprost 0.005% Ophthalmic Solution 1 Drop(s) Left EYE at bedtime  melatonin 5 milliGRAM(s) Oral at bedtime  metoprolol tartrate 50 milliGRAM(s) Oral two times a day  pantoprazole    Tablet 40 milliGRAM(s) Oral before breakfast  sertraline 100 milliGRAM(s) Oral daily  sevelamer carbonate 2400 milliGRAM(s) Oral three times a day with meals  simvastatin 10 milliGRAM(s) Oral at bedtime  sodium chloride 3%  Inhalation 4 milliLiter(s) Inhalation every 6 hours  tamsulosin 0.4 milliGRAM(s) Oral at bedtime  timolol 0.5% Solution 1 Drop(s) Both EYES two times a day  traZODone 50 milliGRAM(s) Oral at bedtime    MEDICATIONS  (PRN):  acetaminophen     Tablet .. 650 milliGRAM(s) Oral every 6 hours PRN Mild Pain (1 - 3)  ALBUTerol    90 MICROgram(s) HFA Inhaler 2 Puff(s) Inhalation every 6 hours PRN Shortness of Breath and/or Wheezing  calcium gluconate IVPB 2 Gram(s) IV Intermittent once PRN ekg changes  dextrose 50% Injectable 50 milliLiter(s) IV Push once PRN hypoglycemia <70  dextrose Oral Gel 15 Gram(s) Oral once PRN Blood Glucose LESS THAN 70 milliGRAM(s)/deciliter  lactulose Syrup 10 Gram(s) Oral every 6 hours PRN constipation  oxycodone    5 mG/acetaminophen 325 mG 2 Tablet(s) Oral every 4 hours PRN Severe Pain (7 - 10)  simethicone 80 milliGRAM(s) Chew every 6 hours PRN Gas      New X-rays reviewed:                                                                                  ECHO    CXR interpreted by me:       Patient is a 57y old  Male who presents with a chief complaint of hyperkalemia, pulmonary congestion (15 Apr 2022 08:15)        Over Night Events:  Tolerated AVAPS overnight.     Drips  None      ROS:     All pertinent ROS are negative except HPI         PHYSICAL EXAM    ICU Vital Signs Last 24 Hrs  T(C): 36 (15 Apr 2022 07:01), Max: 37.3 (2022 15:15)  T(F): 96.8 (15 Apr 2022 07:01), Max: 99.2 (2022 15:15)  HR: 62 (15 Apr 2022 07:01) (51 - 76)  BP: 118/54 (15 Apr 2022 07:01) (103/51 - 161/72)  BP(mean): 100 (15 Apr 2022 05:00) (72 - 120)  RR: 15 (15 Apr 2022 07:01) (12 - 37)  SpO2: 100% (15 Apr 2022 05:00) (94% - 100%)    CONSTITUTIONAL:   Ill appearing.  NAD    ENT:   Airway patent,   Mouth with normal mucosa.   No thrush    EYES:   Pupils equal,   Round and reactive to light.    CARDIAC:   Normal rate,   Regular rhythm.    No edema    RESPIRATORY:   2L NC, satting 95%  Dec B/L BS, crackles  Normal chest expansion  Not tachypneic,  No use of accessory muscles    GASTROINTESTINAL:  Abdomen soft,   Non-tender,   No guarding,   + BS    MUSCULOSKELETAL:   Range of motion is not limited,  No clubbing, cyanosis    NEUROLOGICAL:   AOx4  Follows commands  Moves all extremities     SKIN:   Skin normal color for race,   Warm and dry  No evidence of rash.    PSYCHIATRIC:   Normal mood and affect.   No apparent risk to self or others.      22 @ 07:01  -  04-15-22 @ 07:00  --------------------------------------------------------  IN:    Oral Fluid: 476 mL  Total IN: 476 mL    OUT:    Voided (mL): 75 mL  Total OUT: 75 mL    Total NET: 401 mL      LABS:                            7.9    8.79  )-----------( 152      ( 15 Apr 2022 05:46 )             28.0                                               04-15    141  |  101  |  62<HH>  ----------------------------<  96  5.5<H>   |  27  |  8.2<HH>    Ca    8.4<L>      15 Apr 2022 05:46  Phos  4.2     04-15  Mg     2.3     04-15    TPro  5.5<L>  /  Alb  3.0<L>  /  TBili  0.3  /  DBili  x   /  AST  7   /  ALT  5   /  AlkPhos  170<H>  04-15                                             Urinalysis Basic - ( 2022 14:20 )    Color: Yellow / Appearance: Clear / S.020 / pH: x  Gluc: x / Ketone: Negative  / Bili: Negative / Urobili: 0.2 mg/dL   Blood: x / Protein: 100 mg/dL / Nitrite: Negative   Leuk Esterase: Large / RBC: 11-25 /HPF / WBC >50 /HPF   Sq Epi: x / Non Sq Epi: x / Bacteria: Few                                              LIVER FUNCTIONS - ( 15 Apr 2022 05:46 )  Alb: 3.0 g/dL / Pro: 5.5 g/dL / ALK PHOS: 170 U/L / ALT: 5 U/L / AST: 7 U/L / GGT: x                                                  Culture - Blood (collected 2022 22:31)  Source: .Blood Blood  Preliminary Report (2022 19:02):    No growth to date.    Culture - Blood (collected 2022 22:31)  Source: .Blood Blood  Preliminary Report (2022 19:02):    No growth to date.                              MEDICATIONS  (STANDING):  aspirin enteric coated 81 milliGRAM(s) Oral daily  brimonidine 0.2% Ophthalmic Solution 1 Drop(s) Both EYES two times a day  chlorhexidine 4% Liquid 1 Application(s) Topical daily  dextrose 5%. 1000 milliLiter(s) (100 mL/Hr) IV Continuous <Continuous>  dextrose 5%. 1000 milliLiter(s) (50 mL/Hr) IV Continuous <Continuous>  dextrose 50% Injectable 25 Gram(s) IV Push once  dextrose 50% Injectable 12.5 Gram(s) IV Push once  dextrose 50% Injectable 25 Gram(s) IV Push once  dorzolamide 2% Ophthalmic Solution 1 Drop(s) Both EYES three times a day  epoetin keagan-epbx (RETACRIT) Injectable 93682 Unit(s) IV Push <User Schedule>  furosemide   Injectable 80 milliGRAM(s) IV Push every 12 hours  glucagon  Injectable 1 milliGRAM(s) IntraMuscular once  heparin   Injectable 5000 Unit(s) SubCutaneous every 8 hours  latanoprost 0.005% Ophthalmic Solution 1 Drop(s) Left EYE at bedtime  melatonin 5 milliGRAM(s) Oral at bedtime  metoprolol tartrate 50 milliGRAM(s) Oral two times a day  pantoprazole    Tablet 40 milliGRAM(s) Oral before breakfast  sertraline 100 milliGRAM(s) Oral daily  sevelamer carbonate 2400 milliGRAM(s) Oral three times a day with meals  simvastatin 10 milliGRAM(s) Oral at bedtime  sodium chloride 3%  Inhalation 4 milliLiter(s) Inhalation every 6 hours  tamsulosin 0.4 milliGRAM(s) Oral at bedtime  timolol 0.5% Solution 1 Drop(s) Both EYES two times a day  traZODone 50 milliGRAM(s) Oral at bedtime    MEDICATIONS  (PRN):  acetaminophen     Tablet .. 650 milliGRAM(s) Oral every 6 hours PRN Mild Pain (1 - 3)  ALBUTerol    90 MICROgram(s) HFA Inhaler 2 Puff(s) Inhalation every 6 hours PRN Shortness of Breath and/or Wheezing  calcium gluconate IVPB 2 Gram(s) IV Intermittent once PRN ekg changes  dextrose 50% Injectable 50 milliLiter(s) IV Push once PRN hypoglycemia <70  dextrose Oral Gel 15 Gram(s) Oral once PRN Blood Glucose LESS THAN 70 milliGRAM(s)/deciliter  lactulose Syrup 10 Gram(s) Oral every 6 hours PRN constipation  oxycodone    5 mG/acetaminophen 325 mG 2 Tablet(s) Oral every 4 hours PRN Severe Pain (7 - 10)  simethicone 80 milliGRAM(s) Chew every 6 hours PRN Gas      New X-rays reviewed:                                                                                  ECHO    CXR interpreted by me:  mildly improved left lung opacification

## 2022-04-15 NOTE — PROGRESS NOTE ADULT - ATTENDING COMMENTS
Attending Statement: I have personally performed a face to face diagnostic evaluation on this patient. The patient is suffering from:  L lung atelectasis   Hypoglycemia  Hyperkalemia  ESRD on HD,   Pulmonary vascular congestion,  I have reviewed the above note and agree with the history, exam and suggestions for care, except as I have noted in the text. I have amended the treatment plans as necessary.

## 2022-04-15 NOTE — PROGRESS NOTE ADULT - ASSESSMENT
IMPRESSION:  L lung atelectasis and likely mucus plugging /rotational film  Hypoglycemia  Hyperkalemia  ESRD on HD, L AVF, missed last session  Pulmonary vascular congestion  HO HTN  Probable HOMAR    PLAN:    CNS: Avoid CNS depressants.     HEENT: Oral care    PULMONARY:  HOB @ 45 degrees.  Aspiration precautions. 2L NC. Target SpO2 92-96%. AVAPS 4hrs on/off/PRN/HS. TV-450, EPAP-10, RR-16, minIPAP-14, maxIPAP-25. Incentive spirometry.   Chest physiotherapy. Hypertonic saline q4h..   Aggressive pulmonary toilet.   OP pulm FU for sleep study.   Repeat CXR in PM. Align pt correctly for CXR    CARDIOVASCULAR: Avoid volume overload. FU ECHO. Diurese as tolerated, IV lasix 80q12h. Optimize cardiac therapy. Strict I's and O's, low salt diet <2gm/day, fluid restriction <1L/day.    GI: GI prophylaxis. Feeding as tolerated.     RENAL:  Follow up lytes. Correct as needed. HD as per Nephro, s/p HD yesterday, 3.5L removed. FU Nephro.      INFECTIOUS DISEASE: Follow up cultures. No abx. Procal.     HEMATOLOGICAL: DVT prophylaxis.    ENDOCRINE: Follow up FS. Target -180.     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 Kearney    Consent: Patient seen and examined, I reviewed Dr. Marie's note in detail and performed by own H+P. Risks, benefits, and alternatives of aforementioned procedures were discussed with patient and mother (Monica). Consenting persons were given opportunities to ask questions and discuss other options. Risks including but not limited to inaccurate staging, malignant seeding, perforation, infection, bleeding, missed lesion(s), indefinite diagnosis, cardiac and/or pulmonary event, aspiration, hypoxia, stroke, medication and/or anesthesia reaction, possible need for surgery and/or interventional radiology, hospitalization possibly prolonged, discomfort, unsuccessful and/or incomplete procedure, indefinite diagnosis, ineffective therapy and/or persistent symptoms, possible need for repeat procedures and/or additional testings, damage to adjacent organs and/or vascular structures, medication reaction, and other adverse events possibly life-threatening. Interactive discussion was undertaken with Layman's terms. Consenting persons stated understanding and acceptance of these risks, and wished to proceed. Informed consent was given in clear state of mind.    Endoscopic procedures in detail: Diagnostic endoscope was inserted from mouth into second portion of the duodenum, retroflexion was performed in the stomach.  Distal esophageal biopsies were obtained.  There was suction of insufflated  air and stomach fluid contents upon removal.      Radial echoendoscope was inserted from mouth to second portion of the duodenum. Gastric cancer was imaged to determine depth of invasion and the adjacent structures such as the aorta, liver, diaphragm and pancreas were imaged.  Celiac axis was imaged to look for echogenic lymph nodes.  Fine-needle aspiration biopsies were not obtained since there were zuleika-gastric vascular collaterals and due to close vicinity to cancer with areas that were circumferential. There was suction of insufflated air and stomach fluid contents upon removal.  There was suction of insufflated air and stomach fluid contents upon removal.      Procedure times:  - In-room 08:00  - Start 08:06  - Completed 08:20  - Out of room per nursing records    Esophagogastroduodenoscopy Findings:  - Esophagus: whitish plaques at distal esophagus, biopsied   - Stomach: proximal stomach sessile mass (cardia to proximal body, 45-48cm from lips) along greater curvature involving 20% of lumen distally and circumferential proximally.  - Duodenum: endoscopically unremarkable to 2nd portion    Endoscopic Ultrasound Findings:  - Stomach: 45.7x23.8mm (largest 2D area of 18.27cm2) gastric cancer invading through all layers into diaphragm abutting liver but not invading into liver.  Proximal zuleika-gastric vascular collaterals.   - Lymph nodes: multiple (at least 10) zuleika-gastric and celiac axis lymph nodes, ranging in size from 5mm up to 1.1cm, malignant appearing.    Impression:  1. Gastric cancer (cardia to proximal body, greater curvature), T4b N3a Mx  2. Distal esophageal whitish plaques, biopsied.    Recommendations:   1.  Routine post-endoscopy anesthesia recovery care.  Discharge patient when he is awake, alert and comfortable, when recovery criteria are met.  Aspiration and fall precautions x 24 hours.   2. Follow-up with established GI Dr. Opal Betancourt  3. Follow-up with oncologist Dr. Keyes for  chemotherapy.  4. I spoke to patient, mother and recovery nurse about impression, diagnosis and recommendations.            IMPRESSION:  L lung atelectasis and likely mucus plugging /rotational film  Hypoglycemia, resolved  Hyperkalemia, resolved  ESRD on HD, L AVF, missed last session  Pulmonary vascular congestion, improving  HO HTN  Probable HOMAR    PLAN:    CNS: Avoid CNS depressants.     HEENT: Oral care    PULMONARY:  HOB @ 45 degrees.  Aspiration precautions. 2L NC. Target SpO2 92-96%. AVAPS 4hrs on/off/PRN/HS. TV-450, EPAP-10, RR-16, minIPAP-14, maxIPAP-25. Incentive spirometry. Chest physiotherapy. Hypertonic saline q4h..   Aggressive pulmonary toilet. Repeat CXR in PM.   OP pulm FU for sleep study.     CARDIOVASCULAR: Avoid volume overload. FU ECHO. Diurese as tolerated, IV lasix 80q12h. Optimize cardiac therapy. Strict I's and O's, low salt diet <2gm/day, fluid restriction <1L/day.    GI: GI prophylaxis. Feeding as tolerated. Add senna and miralax.     RENAL:  Follow up lytes. Correct as needed. HD as per Nephro. HD today. Repeat BMP in afternoon.     INFECTIOUS DISEASE: Follow up cultures. Add rocephin. FU procal and urine cultures.     HEMATOLOGICAL: DVT prophylaxis.     ENDOCRINE: Follow up FS. Target -180.     MUSCULOSKELETAL: bedrest    MICU monitoring

## 2022-04-15 NOTE — BH CONSULTATION LIAISON ASSESSMENT NOTE - NSBHCHARTREVIEWVS_PSY_A_CORE FT
Vital Signs Last 24 Hrs  T(C): 36.1 (15 Apr 2022 15:10), Max: 36.9 (15 Apr 2022 11:00)  T(F): 96.9 (15 Apr 2022 15:10), Max: 98.5 (15 Apr 2022 11:00)  HR: 64 (15 Apr 2022 18:45) (51 - 84)  BP: 170/73 (15 Apr 2022 17:01) (97/46 - 179/78)  BP(mean): 105 (15 Apr 2022 17:01) (67 - 112)  RR: 19 (15 Apr 2022 17:01) (14 - 28)  SpO2: 100% (15 Apr 2022 18:45) (94% - 100%)

## 2022-04-15 NOTE — BH CONSULTATION LIAISON ASSESSMENT NOTE - SUMMARY
Patient is a 56yo male, resides at [facility] for the past five years, history of depression, anxiety, no inpatient hospitalizations, no attempts, PMHx of HTN, HLD, DM, ESRD on HD, HOMAR, BPH, PVD, Legally blind in right eye, admitted for hyperkalemia, missed HD, atelectasis, consulted for depression. He endorses appropriate sleep,  dysphoric mood, feels intermittently hopeless regarding 'my life'.  He reports feeling 'scared' to use AVAPS overnight unless staff is present. Patient did not recall taking Zoloft 100mg for depression.     Plan:  Consider titrating up Zoloft to 150mg daily to address tearfulness, low mood if patient in agreement  Consider initiating Vistaril 50mg po prn for anxiety  Continue Trazodone 50mg at bedtime  Consider 1:1 as he may be more likely to receive AVAPS treatment

## 2022-04-15 NOTE — PROGRESS NOTE ADULT - SUBJECTIVE AND OBJECTIVE BOX
[facility] FOLLOW UP NOTE  --------------------------------------------------------------------------------  24 hour events/subjective: Patient examined. Appears comfortable.    PAST HISTORY  --------------------------------------------------------------------------------  No significant changes to PMH, PSH, FHx, SHx, unless otherwise noted    ALLERGIES & MEDICATIONS  --------------------------------------------------------------------------------  Allergies    No Known Allergies    Standing Inpatient Medications  aspirin enteric coated 81 milliGRAM(s) Oral daily  brimonidine 0.2% Ophthalmic Solution 1 Drop(s) Both EYES two times a day  chlorhexidine 4% Liquid 1 Application(s) Topical daily  dextrose 5%. 1000 milliLiter(s) IV Continuous <Continuous>  dextrose 5%. 1000 milliLiter(s) IV Continuous <Continuous>  dextrose 50% Injectable 25 Gram(s) IV Push once  dextrose 50% Injectable 12.5 Gram(s) IV Push once  dextrose 50% Injectable 25 Gram(s) IV Push once  dorzolamide 2% Ophthalmic Solution 1 Drop(s) Both EYES three times a day  epoetin keagan-epbx (RETACRIT) Injectable 34982 Unit(s) IV Push <User Schedule>  furosemide   Injectable 80 milliGRAM(s) IV Push every 12 hours  glucagon  Injectable 1 milliGRAM(s) IntraMuscular once  heparin   Injectable 5000 Unit(s) SubCutaneous every 8 hours  latanoprost 0.005% Ophthalmic Solution 1 Drop(s) Left EYE at bedtime  melatonin 5 milliGRAM(s) Oral at bedtime  metoprolol tartrate 50 milliGRAM(s) Oral two times a day  pantoprazole    Tablet 40 milliGRAM(s) Oral before breakfast  sertraline 100 milliGRAM(s) Oral daily  sevelamer carbonate 2400 milliGRAM(s) Oral three times a day with meals  simvastatin 10 milliGRAM(s) Oral at bedtime  sodium chloride 3%  Inhalation 4 milliLiter(s) Inhalation every 6 hours  tamsulosin 0.4 milliGRAM(s) Oral at bedtime  timolol 0.5% Solution 1 Drop(s) Both EYES two times a day  traZODone 50 milliGRAM(s) Oral at bedtime    PRN Inpatient Medications  acetaminophen     Tablet .. 650 milliGRAM(s) Oral every 6 hours PRN  ALBUTerol    90 MICROgram(s) HFA Inhaler 2 Puff(s) Inhalation every 6 hours PRN  calcium gluconate IVPB 2 Gram(s) IV Intermittent once PRN  dextrose 50% Injectable 50 milliLiter(s) IV Push once PRN  dextrose Oral Gel 15 Gram(s) Oral once PRN  lactulose Syrup 10 Gram(s) Oral every 6 hours PRN  oxycodone    5 mG/acetaminophen 325 mG 2 Tablet(s) Oral every 4 hours PRN  simethicone 80 milliGRAM(s) Chew every 6 hours PRN    VITALS/PHYSICAL EXAM  --------------------------------------------------------------------------------  T(C): 36 (04-15-22 @ 07:01), Max: 37.3 (04-14-22 @ 15:15)  HR: 62 (04-15-22 @ 07:01) (51 - 76)  BP: 118/54 (04-15-22 @ 07:01) (103/51 - 161/72)  RR: 15 (04-15-22 @ 07:01) (12 - 37)  SpO2: 100% (04-15-22 @ 05:00) (94% - 100%)    04-14-22 @ 07:01  -  04-15-22 @ 07:00  --------------------------------------------------------  IN: 476 mL / OUT: 75 mL / NET: 401 mL    Physical Exam:  	Gen: NAD  	Pulm: CTA B/L  	CV: RRR, S1S2  	Abd: +BS, soft, nontender/nondistended  	: No suprapubic tenderness  	LE: Warm, no edema  	Vascular access: AVF    LABS/STUDIES  --------------------------------------------------------------------------------              7.9    8.79  >-----------<  152      [04-15-22 @ 05:46]              28.0     141  |  101  |  x   ----------------------------<  96      [04-15-22 @ 05:46]  5.5   |  27  |  x         Ca     8.4     [04-15-22 @ 05:46]      Mg     2.3     [04-15-22 @ 05:46]      Phos  4.2     [04-15-22 @ 05:46]    TPro  5.5  /  Alb  3.0  /  TBili  0.3  /  DBili  x   /  AST  7   /  ALT  5   /  AlkPhos  170  [04-15-22 @ 05:46]    Creatinine Trend:  SCr 7.6 [04-14 @ 05:45]  SCr 7.0 [04-13 @ 18:54]  SCr 7.3 [04-13 @ 11:00]  SCr 11.4 [04-13 @ 05:44]  SCr 10.6 [04-12 @ 21:40]    Urinalysis - [04-14-22 @ 14:20]      Color Yellow / Appearance Clear / SG 1.020 / pH 6.0      Gluc Negative / Ketone Negative  / Bili Negative / Urobili 0.2       Blood Large / Protein 100 / Leuk Est Large / Nitrite Negative      RBC 11-25 / WBC >50 / Hyaline  / Gran  / Sq Epi  / Non Sq Epi  / Bacteria Few    HbA1c 8.6      [06-01-18 @ 07:22]  TSH 1.87      [11-18-21 @ 04:50]  Lipid: chol 82, , HDL 33, LDL --      [11-18-21 @ 04:50]

## 2022-04-15 NOTE — BH CONSULTATION LIAISON ASSESSMENT NOTE - HPI (INCLUDE ILLNESS QUALITY, SEVERITY, DURATION, TIMING, CONTEXT, MODIFYING FACTORS, ASSOCIATED SIGNS AND SYMPTOMS)
Patient is lying in bed, calm, cooperative with assessment. He expresses sleep is adequate from 10pm-6am, good appetite, dysphoric mood, feels intermittently hopeless regarding 'my life'.  He denies feelings of guilt or helplessness. He expresses feeling intermittently nervous regarding his treatment since hospitalization, denies episodes of panic attacks. He realizes hospitalization has been difficult, but denies thoughts of not being alive, suicidal ideation, intent or plan.  He denies previous episodes of hypomania, sridevi or psychosis. Patient did not recall taking Zoloft for depression or Trazodone for insomnia.  He reports feeling 'scared' to use AVAPS overnight unless staff is present. He has been at NH for the past five years. At the NH, he ambulates with wheelchair, interacts with peers and engages in group activities.  He has positive support from parents and two brothers.  As per staff, he has been tearful and upset to use AVAPS.

## 2022-04-15 NOTE — BH CONSULTATION LIAISON ASSESSMENT NOTE - NSSUICPROTFACT_PSY_ALL_CORE
Identifies reasons for living/Supportive social network of family or friends/Positive therapeutic relationships/Ability to cope with stress

## 2022-04-15 NOTE — BH CONSULTATION LIAISON ASSESSMENT NOTE - RISK ASSESSMENT
Risk factors: Medically compromised, multiple medical conditions  Protective factors: Resides in facility, positive family support, willingness to ask for assistance.

## 2022-04-15 NOTE — PROGRESS NOTE ADULT - ASSESSMENT
56yo W male w/ PMH   HTN, HLD, DM, ESRD on HD TTS, HOMAR, BPH, PVD s/p R BKA legally blind rt eye was sent from  [facility] for  hyperkalemia.  Pt missed dialysis yesterday stating that "they did not wake me up for  dialysis" and he did not feel well to go for dialysis.    In ER pt received lokelma & 2 amps D50. renal called for  hemodialysis    ESRD with hyperkalemia secondary to missed HD   - s/p HD feeling better    - continue home meds   - f/u nephrology     Lung atelectasis   F/u procal  pulmojnary toliet/ avaops q4 as tolerated   daily xray     DM with hypoglycemia- resolved   encourage increase glucose intake now  Hold any insulin products  Endo note appreciated   a1c 4.7  Will need insulin to be dced on discharge    58yo W male w/ PMH   HTN, HLD, DM, ESRD on HD TTS, HOMAR, BPH, PVD s/p R BKA legally blind rt eye was sent from  [facility] for  hyperkalemia.  Pt missed dialysis yesterday stating that "they did not wake me up for  dialysis" and he did not feel well to go for dialysis.    In ER pt received lokelma & 2 amps D50. renal called for  hemodialysis    ESRD with hyperkalemia secondary to missed HD   - s/p HD feeling better    - continue home meds   - f/u nephrology     #UTI  -UA noted. f/u urine culture  - started ceftriaxone   - requesting frequent straight cath, refuse singh     Lung atelectasis   F/u procal  pulmojnary toliet/ avaops q4 as tolerated   daily xray     DM with hypoglycemia- resolved   encourage increase glucose intake now  Hold any insulin products  Endo note appreciated   a1c 4.7  Will need insulin to be dced on discharge

## 2022-04-15 NOTE — PROGRESS NOTE ADULT - SUBJECTIVE AND OBJECTIVE BOX
FLOR[place] HAMM  57y  Male      Patient is a 57y old  Male who presents with a chief complaint of hyperkalemia, pulmonary congestion (15 Apr 2022 08:29)      INTERVAL HPI/OVERNIGHT EVENTS:  no acute events overnight, patient was sleeping this AM. Did not use AVAPS as asked overnight.     Vital Signs Last 24 Hrs  T(C): 36 (15 Apr 2022 07:01), Max: 37.3 (2022 15:15)  T(F): 96.8 (15 Apr 2022 07:01), Max: 99.2 (2022 15:15)  HR: 62 (15 Apr 2022 07:01) (51 - 76)  BP: 118/54 (15 Apr 2022 07:01) (103/51 - 161/72)  BP(mean): 100 (15 Apr 2022 05:00) (72 - 120)  RR: 15 (15 Apr 2022 07:01) (12 - 37)  SpO2: 100% (15 Apr 2022 05:00) (94% - 100%)    PHYSICAL EXAM:  GENERAL: NAD  HEAD:  Atraumatic, Normocephalic  EYES: EOMI, PERRLA, conjunctiva and sclera clear  NERVOUS SYSTEM:  Alert & Oriented X3, no focal deficits   CHEST/LUNG:  decreased breath sound on the left   HEART: Regular rate and rhythm; No murmurs, rubs, or gallops  ABDOMEN: Soft, Nontender, Nondistended; Bowel sounds present  EXTREMITIES:  R bka  LABS:                        7.9    8.79  )-----------( 152      ( 15 Apr 2022 05:46 )             28.0     04-15    141  |  101  |  62<HH>  ----------------------------<  96  5.5<H>   |  27  |  8.2<HH>    Ca    8.4<L>      15 Apr 2022 05:46  Phos  4.2     04-15  Mg     2.3     04-15    TPro  5.5<L>  /  Alb  3.0<L>  /  TBili  0.3  /  DBili  x   /  AST  7   /  ALT  5   /  AlkPhos  170<H>  04-15      Urinalysis Basic - ( 2022 14:20 )    Color: Yellow / Appearance: Clear / S.020 / pH: x  Gluc: x / Ketone: Negative  / Bili: Negative / Urobili: 0.2 mg/dL   Blood: x / Protein: 100 mg/dL / Nitrite: Negative   Leuk Esterase: Large / RBC: 11-25 /HPF / WBC >50 /HPF   Sq Epi: x / Non Sq Epi: x / Bacteria: Few        CAPILLARY BLOOD GLUCOSE      POCT Blood Glucose.: 119 mg/dL (15 Apr 2022 08:42)  POCT Blood Glucose.: 96 mg/dL (15 Apr 2022 06:01)  POCT Blood Glucose.: 98 mg/dL (15 Apr 2022 03:45)  POCT Blood Glucose.: 100 mg/dL (2022 23:06)  POCT Blood Glucose.: 115 mg/dL (2022 21:12)  POCT Blood Glucose.: 116 mg/dL (2022 17:55)  POCT Blood Glucose.: 133 mg/dL (2022 15:18)  POCT Blood Glucose.: 116 mg/dL (2022 12:16)  POCT Blood Glucose.: 130 mg/dL (2022 10:32)

## 2022-04-15 NOTE — BH CONSULTATION LIAISON ASSESSMENT NOTE - CURRENT MEDICATION
MEDICATIONS  (STANDING):  aspirin enteric coated 81 milliGRAM(s) Oral daily  brimonidine 0.2% Ophthalmic Solution 1 Drop(s) Both EYES two times a day  cefTRIAXone   IVPB 1000 milliGRAM(s) IV Intermittent every 24 hours  chlorhexidine 4% Liquid 1 Application(s) Topical daily  dextrose 5%. 1000 milliLiter(s) (50 mL/Hr) IV Continuous <Continuous>  dextrose 5%. 1000 milliLiter(s) (100 mL/Hr) IV Continuous <Continuous>  dextrose 50% Injectable 25 Gram(s) IV Push once  dextrose 50% Injectable 12.5 Gram(s) IV Push once  dextrose 50% Injectable 25 Gram(s) IV Push once  dorzolamide 2% Ophthalmic Solution 1 Drop(s) Both EYES three times a day  epoetin keagan-epbx (RETACRIT) Injectable 68534 Unit(s) IV Push <User Schedule>  furosemide   Injectable 80 milliGRAM(s) IV Push every 12 hours  glucagon  Injectable 1 milliGRAM(s) IntraMuscular once  heparin   Injectable 5000 Unit(s) SubCutaneous every 8 hours  latanoprost 0.005% Ophthalmic Solution 1 Drop(s) Left EYE at bedtime  melatonin 5 milliGRAM(s) Oral at bedtime  metoprolol tartrate 50 milliGRAM(s) Oral two times a day  pantoprazole    Tablet 40 milliGRAM(s) Oral before breakfast  polyethylene glycol 3350 17 Gram(s) Oral daily  senna 2 Tablet(s) Oral at bedtime  sertraline 100 milliGRAM(s) Oral daily  sevelamer carbonate 2400 milliGRAM(s) Oral three times a day with meals  simvastatin 10 milliGRAM(s) Oral at bedtime  sodium chloride 3%  Inhalation 4 milliLiter(s) Inhalation every 6 hours  tamsulosin 0.4 milliGRAM(s) Oral at bedtime  timolol 0.5% Solution 1 Drop(s) Both EYES two times a day  traZODone 50 milliGRAM(s) Oral at bedtime    MEDICATIONS  (PRN):  acetaminophen     Tablet .. 650 milliGRAM(s) Oral every 6 hours PRN Mild Pain (1 - 3)  ALBUTerol    90 MICROgram(s) HFA Inhaler 2 Puff(s) Inhalation every 6 hours PRN Shortness of Breath and/or Wheezing  ALBUTerol   0.042% 1.25 milliGRAM(s) Nebulizer every 6 hours PRN toliet  calcium gluconate IVPB 2 Gram(s) IV Intermittent once PRN ekg changes  dextrose 50% Injectable 50 milliLiter(s) IV Push once PRN hypoglycemia <70  dextrose Oral Gel 15 Gram(s) Oral once PRN Blood Glucose LESS THAN 70 milliGRAM(s)/deciliter  lactulose Syrup 10 Gram(s) Oral every 6 hours PRN constipation  oxycodone    5 mG/acetaminophen 325 mG 2 Tablet(s) Oral every 4 hours PRN Severe Pain (7 - 10)  simethicone 80 milliGRAM(s) Chew every 6 hours PRN Gas

## 2022-04-15 NOTE — PROGRESS NOTE ADULT - ASSESSMENT
1. Hyperkalemia secondary to noncompliance with HD. HD. Renal diet.  2. ESRD on HD. HD today: 3 hours, opti 200 dialyzer, 2K bath, 3.5L UF.  3. Anemia. EPO with HD.  4. Hyperphosphatemia. Renal diet. Sevelamer with meals.  5. HTN. Monitor on metoprolol.

## 2022-04-15 NOTE — PROGRESS NOTE ADULT - ATTENDING COMMENTS
pt seen and examined in ccu  discussed with cc team  currently getting HD, morbidly obese, BKA  #noncompliance - missed HD  #fluid overload - CXR reviwed - atelectasis/fluid overload L>R  #ESRD on HD - HD running today  #hyperkalemia improved - recheck labs   # hypogluycemia resolved - keep off insulin - a1c 4.7  #UTI - on rocephin - follow up ucx, bcx    follow up repeat CXR post HD  PT pt seen and examined in ccu  discussed with cc team  currently getting HD, morbidly obese, BKA  #noncompliance - missed HD  #fluid overload - CXR reviwed - atelectasis/fluid overload L>R  noncomplaint with AVAPS last night   #ESRD on HD - HD running today  #hyperkalemia improved - recheck labs   # hypogluycemia resolved - keep off insulin - a1c 4.7  #UTI - on rocephin - follow up ucx, bcx    follow up repeat CXR post HD  PT

## 2022-04-16 NOTE — PROGRESS NOTE ADULT - ASSESSMENT
ESRD on HD TTS (MWF inpatient)  fluid overload, better  hyperkalemia, resolved  anemia  HTN  hyperphosphatemia  DM2  UTI  PVD / BKA    plan:    next HD on Monday  access via LUE AVF / left arm precautions  epogen with HD  renal diet   sevelamer with meals  can change to lasix 80mg po bid  full code  ICU downgrade

## 2022-04-16 NOTE — PROGRESS NOTE ADULT - ASSESSMENT
58yo W male w/ PMH   HTN, HLD, DM, ESRD on HD TTS, HOMAR, BPH, PVD s/p R BKA legally blind rt eye was sent from  [facility] for  hyperkalemia.  Pt missed dialysis yesterday stating that "they did not wake me up for  dialysis" and he did not feel well to go for dialysis.    In ER pt received lokelma & 2 amps D50. renal called for  hemodialysis    ESRD with hyperkalemia secondary to missed HD   HD per nephro  continue home meds  f/u nephrology     UTI  UA noted.  f/u urine culture  c/w ceftriaxone   requesting frequent straight cath  refuse singh     Lung atelectasis   F/u procal  pulmojnary toliet  avaops q4 as tolerated   daily xray     DM with hypoglycemia, resolved   a1c 4.7  Hold any insulin products  Endo note appreciated      58yo W male w/ PMH   HTN, HLD, DM, ESRD on HD TTS, HOMAR, BPH, PVD s/p R BKA legally blind rt eye was sent from  [facility] for  hyperkalemia.  Pt missed dialysis yesterday stating that "they did not wake me up for  dialysis" and he did not feel well to go for dialysis.    In ER pt received lokelma & 2 amps D50. renal called for  hemodialysis    ESRD with hyperkalemia secondary to missed HD   HD per nephro  continue home meds  f/u nephrology     UTI  UA noted.  f/u urine culture  c/w ceftriaxone   requesting frequent straight cath  refuse singh     Lung atelectasis   F/u procal  pulmojnary toliet  avaops q4 as tolerated   daily xray     DM with hypoglycemia, resolved   a1c 4.7  Hold any insulin products  Endo note appreciated     Further care as per ICU team   56yo W male w/ PMH   HTN, HLD, DM, ESRD on HD TTS, HOMAR, BPH, PVD s/p R BKA legally blind rt eye was sent from  [facility] for  hyperkalemia.  Pt missed dialysis yesterday stating that "they did not wake me up for  dialysis" and he did not feel well to go for dialysis.    In ER pt received lokelma & 2 amps D50. renal called for  hemodialysis    ESRD with hyperkalemia secondary to missed HD   HD per nephro  continue home meds  f/u nephrology     UTI  UA noted.  f/u urine culture  c/w ceftriaxone   requesting frequent straight cath  refuse singh     Lung atelectasis   pulmojnary toliet  avaops q4 as tolerated     DM with hypoglycemia, resolved   a1c 4.7  Hold any insulin products  Endo note appreciated     Downgraded by ICU

## 2022-04-16 NOTE — PROGRESS NOTE ADULT - SUBJECTIVE AND OBJECTIVE BOX
[facility] FOLLOW UP NOTE  --------------------------------------------------------------------------------  pt seen and examined  hd yesterday  downgraded from icu.    PAST HISTORY  --------------------------------------------------------------------------------  No significant changes to PMH, PSH, FHx, SHx, unless otherwise noted    ALLERGIES & MEDICATIONS  --------------------------------------------------------------------------------  Allergies    No Known Allergies      Physical Exam:  	Gen: NAD  	Pulm: CTA B/L  	CV: RRR, S1S2  	Abd: +BS, soft, nontender/nondistended  	: No suprapubic tenderness  	LE: Warm, no edema BKA  	Vascular access: [facility] Medications:   MEDICATIONS  (STANDING):  ALBUTerol    0.083% 2.5 milliGRAM(s) Nebulizer every 6 hours  aspirin enteric coated 81 milliGRAM(s) Oral daily  brimonidine 0.2% Ophthalmic Solution 1 Drop(s) Both EYES two times a day  cefTRIAXone   IVPB 1000 milliGRAM(s) IV Intermittent every 24 hours  chlorhexidine 4% Liquid 1 Application(s) Topical daily  dextrose 5%. 1000 milliLiter(s) (100 mL/Hr) IV Continuous <Continuous>  dextrose 5%. 1000 milliLiter(s) (50 mL/Hr) IV Continuous <Continuous>  dextrose 50% Injectable 25 Gram(s) IV Push once  dextrose 50% Injectable 12.5 Gram(s) IV Push once  dextrose 50% Injectable 25 Gram(s) IV Push once  dorzolamide 2% Ophthalmic Solution 1 Drop(s) Both EYES three times a day  epoetin keagan-epbx (RETACRIT) Injectable 80389 Unit(s) IV Push <User Schedule>  furosemide   Injectable 80 milliGRAM(s) IV Push every 12 hours  glucagon  Injectable 1 milliGRAM(s) IntraMuscular once  heparin   Injectable 5000 Unit(s) SubCutaneous every 8 hours  latanoprost 0.005% Ophthalmic Solution 1 Drop(s) Left EYE at bedtime  melatonin 5 milliGRAM(s) Oral at bedtime  metoprolol tartrate 50 milliGRAM(s) Oral two times a day  pantoprazole    Tablet 40 milliGRAM(s) Oral before breakfast  polyethylene glycol 3350 17 Gram(s) Oral daily  senna 2 Tablet(s) Oral at bedtime  sertraline 100 milliGRAM(s) Oral daily  sevelamer carbonate 2400 milliGRAM(s) Oral three times a day with meals  simvastatin 10 milliGRAM(s) Oral at bedtime  sodium chloride 3%  Inhalation 4 milliLiter(s) Inhalation every 6 hours  tamsulosin 0.4 milliGRAM(s) Oral at bedtime  timolol 0.5% Solution 1 Drop(s) Both EYES two times a day  traZODone 50 milliGRAM(s) Oral at bedtime        VITALS:  T(F): 97.3 (22 @ 07:00), Max: 100.4 (04-15-22 @ 19:00)  HR: 76 (22 @ 13:01)  BP: 154/119 (22 @ 13:01)  RR: 37 (22 @ 13:01)  SpO2: 97% (22 @ 13:01)  Wt(kg): --     @ 07:01  -  04-15 @ 07:00  --------------------------------------------------------  IN: 476 mL / OUT: 75 mL / NET: 401 mL    04-15 @ 07:01  -   @ 07:00  --------------------------------------------------------  IN: 500 mL / OUT: 3630 mL / NET: -3130 mL     @ 07:01  -   @ 15:36  --------------------------------------------------------  IN: 50 mL / OUT: 75 mL / NET: -25 mL          LABS:      141  |  101  |  39<H>  ----------------------------<  119<H>  4.1   |  30  |  6.1<HH>    Ca    8.4<L>      2022 06:04  Phos  4.2     04-15  Mg     2.3         TPro  5.2<L>  /  Alb  3.0<L>  /  TBili  0.3  /  DBili      /  AST  <5  /  ALT  <5  /  AlkPhos  165<H>                            7.5    6.93  )-----------( 165      ( 2022 06:04 )             25.3       Urine Studies:  Urinalysis Basic - ( 2022 14:20 )    Color: Yellow / Appearance: Clear / S.020 / pH:   Gluc:  / Ketone: Negative  / Bili: Negative / Urobili: 0.2 mg/dL   Blood:  / Protein: 100 mg/dL / Nitrite: Negative   Leuk Esterase: Large / RBC: 11-25 /HPF / WBC >50 /HPF   Sq Epi:  / Non Sq Epi:  / Bacteria: Few          RADIOLOGY & ADDITIONAL STUDIES:

## 2022-04-16 NOTE — PROGRESS NOTE ADULT - SUBJECTIVE AND OBJECTIVE BOX
PROGRESS NOTE:   Sid Tomas MD  Cell [phone]    Patient is a 57y old  Male who presents with a chief complaint of hyperkalemia, pulmonary congestion (15 Apr 2022 08:48)      SUBJECTIVE / OVERNIGHT EVENTS:  Patient seen for follow up for hyperkalemia    ADDITIONAL REVIEW OF SYSTEMS:    MEDICATIONS  (STANDING):  ALBUTerol    0.083% 2.5 milliGRAM(s) Nebulizer every 6 hours  aspirin enteric coated 81 milliGRAM(s) Oral daily  brimonidine 0.2% Ophthalmic Solution 1 Drop(s) Both EYES two times a day  cefTRIAXone   IVPB 1000 milliGRAM(s) IV Intermittent every 24 hours  chlorhexidine 4% Liquid 1 Application(s) Topical daily  dextrose 5%. 1000 milliLiter(s) (50 mL/Hr) IV Continuous <Continuous>  dextrose 5%. 1000 milliLiter(s) (100 mL/Hr) IV Continuous <Continuous>  dextrose 50% Injectable 25 Gram(s) IV Push once  dextrose 50% Injectable 12.5 Gram(s) IV Push once  dextrose 50% Injectable 25 Gram(s) IV Push once  dorzolamide 2% Ophthalmic Solution 1 Drop(s) Both EYES three times a day  epoetin keagan-epbx (RETACRIT) Injectable 36776 Unit(s) IV Push <User Schedule>  furosemide   Injectable 80 milliGRAM(s) IV Push every 12 hours  glucagon  Injectable 1 milliGRAM(s) IntraMuscular once  heparin   Injectable 5000 Unit(s) SubCutaneous every 8 hours  latanoprost 0.005% Ophthalmic Solution 1 Drop(s) Left EYE at bedtime  melatonin 5 milliGRAM(s) Oral at bedtime  metoprolol tartrate 50 milliGRAM(s) Oral two times a day  pantoprazole    Tablet 40 milliGRAM(s) Oral before breakfast  polyethylene glycol 3350 17 Gram(s) Oral daily  senna 2 Tablet(s) Oral at bedtime  sertraline 100 milliGRAM(s) Oral daily  sevelamer carbonate 2400 milliGRAM(s) Oral three times a day with meals  simvastatin 10 milliGRAM(s) Oral at bedtime  sodium chloride 3%  Inhalation 4 milliLiter(s) Inhalation every 6 hours  tamsulosin 0.4 milliGRAM(s) Oral at bedtime  timolol 0.5% Solution 1 Drop(s) Both EYES two times a day  traZODone 50 milliGRAM(s) Oral at bedtime    MEDICATIONS  (PRN):  acetaminophen     Tablet .. 650 milliGRAM(s) Oral every 6 hours PRN Mild Pain (1 - 3)  ALBUTerol    90 MICROgram(s) HFA Inhaler 2 Puff(s) Inhalation every 6 hours PRN Shortness of Breath and/or Wheezing  calcium gluconate IVPB 2 Gram(s) IV Intermittent once PRN ekg changes  dextrose 50% Injectable 50 milliLiter(s) IV Push once PRN hypoglycemia <70  dextrose Oral Gel 15 Gram(s) Oral once PRN Blood Glucose LESS THAN 70 milliGRAM(s)/deciliter  lactulose Syrup 10 Gram(s) Oral every 6 hours PRN constipation  oxycodone    5 mG/acetaminophen 325 mG 2 Tablet(s) Oral every 4 hours PRN Severe Pain (7 - 10)  simethicone 80 milliGRAM(s) Chew every 6 hours PRN Gas      CAPILLARY BLOOD GLUCOSE      POCT Blood Glucose.: 116 mg/dL (2022 07:34)  POCT Blood Glucose.: 123 mg/dL (15 Apr 2022 21:17)  POCT Blood Glucose.: 153 mg/dL (15 Apr 2022 16:51)  POCT Blood Glucose.: 119 mg/dL (15 Apr 2022 08:42)    I&O's Summary    15 Apr 2022 07:01  -  2022 07:00  --------------------------------------------------------  IN: 500 mL / OUT: 3630 mL / NET: -3130 mL        PHYSICAL EXAM:  Vital Signs Last 24 Hrs  T(C): 36.3 (2022 07:00), Max: 38 (15 Apr 2022 19:00)  T(F): 97.3 (2022 07:00), Max: 100.4 (15 Apr 2022 19:00)  HR: 55 (2022 07:00) (55 - 84)  BP: 153/69 (2022 07:00) (139/65 - 179/78)  BP(mean): 99 (2022 07:00) (93 - 112)  RR: 10 (2022 07:00) (10 - 28)  SpO2: 97% (2022 07:00) (94% - 100%)    GENERAL: No acute distress, well-developed  HEAD:  Atraumatic, Normocephalic  EYES: EOMI, PERRLA, conjunctiva and sclera clear  NECK: Supple, no lymphadenopathy, no JVD  CHEST/LUNG: CTAB; No wheezes, rales, or rhonchi  HEART: Regular rate and rhythm; No murmurs, rubs, or gallops  ABDOMEN: Soft, non-tender, non-distended; normal bowel sounds, no organomegaly  EXTREMITIES:  2+ peripheral pulses b/l, No clubbing, cyanosis, or edema  NEUROLOGY: A&O x 3, no focal deficits  SKIN: No rashes or lesions    LABS:                        7.5    6.93  )-----------( 165      ( 2022 06:04 )             25.3     04-16    141  |  101  |  39<H>  ----------------------------<  119<H>  4.1   |  30  |  6.1<HH>    Ca    8.4<L>      2022 06:04  Phos  4.2     04-15  Mg     2.3     04-16    TPro  5.2<L>  /  Alb  3.0<L>  /  TBili  0.3  /  DBili  x   /  AST  <5  /  ALT  <5  /  AlkPhos  165<H>  04-16          Urinalysis Basic - ( 2022 14:20 )    Color: Yellow / Appearance: Clear / S.020 / pH: x  Gluc: x / Ketone: Negative  / Bili: Negative / Urobili: 0.2 mg/dL   Blood: x / Protein: 100 mg/dL / Nitrite: Negative   Leuk Esterase: Large / RBC: 11-25 /HPF / WBC >50 /HPF   Sq Epi: x / Non Sq Epi: x / Bacteria: Few        Culture - Urine (collected 2022 14:20)  Source: Kidney Kidney  Preliminary Report (2022 08:08):    No growth to date.        RADIOLOGY & ADDITIONAL TESTS:  Results Reviewed:   Imaging Personally Reviewed:  Electrocardiogram Personally Reviewed:    COORDINATION OF CARE:  Care Discussed with Consultants/Other Providers [Y/N]:  Prior or Outpatient Records Reviewed [Y/N]:   PROGRESS NOTE:   Sid Tomas MD  Cell [phone]    Patient is a 57y old  Male who presents with a chief complaint of hyperkalemia, pulmonary congestion (15 Apr 2022 08:48)      SUBJECTIVE / OVERNIGHT EVENTS:  Patient seen for follow up for hyperkalemia  potassium improved  complaining that he wants to stay in the icu    ADDITIONAL REVIEW OF SYSTEMS:    MEDICATIONS  (STANDING):  ALBUTerol    0.083% 2.5 milliGRAM(s) Nebulizer every 6 hours  aspirin enteric coated 81 milliGRAM(s) Oral daily  brimonidine 0.2% Ophthalmic Solution 1 Drop(s) Both EYES two times a day  cefTRIAXone   IVPB 1000 milliGRAM(s) IV Intermittent every 24 hours  chlorhexidine 4% Liquid 1 Application(s) Topical daily  dextrose 5%. 1000 milliLiter(s) (50 mL/Hr) IV Continuous <Continuous>  dextrose 5%. 1000 milliLiter(s) (100 mL/Hr) IV Continuous <Continuous>  dextrose 50% Injectable 25 Gram(s) IV Push once  dextrose 50% Injectable 12.5 Gram(s) IV Push once  dextrose 50% Injectable 25 Gram(s) IV Push once  dorzolamide 2% Ophthalmic Solution 1 Drop(s) Both EYES three times a day  epoetin keagan-epbx (RETACRIT) Injectable 73455 Unit(s) IV Push <User Schedule>  furosemide   Injectable 80 milliGRAM(s) IV Push every 12 hours  glucagon  Injectable 1 milliGRAM(s) IntraMuscular once  heparin   Injectable 5000 Unit(s) SubCutaneous every 8 hours  latanoprost 0.005% Ophthalmic Solution 1 Drop(s) Left EYE at bedtime  melatonin 5 milliGRAM(s) Oral at bedtime  metoprolol tartrate 50 milliGRAM(s) Oral two times a day  pantoprazole    Tablet 40 milliGRAM(s) Oral before breakfast  polyethylene glycol 3350 17 Gram(s) Oral daily  senna 2 Tablet(s) Oral at bedtime  sertraline 100 milliGRAM(s) Oral daily  sevelamer carbonate 2400 milliGRAM(s) Oral three times a day with meals  simvastatin 10 milliGRAM(s) Oral at bedtime  sodium chloride 3%  Inhalation 4 milliLiter(s) Inhalation every 6 hours  tamsulosin 0.4 milliGRAM(s) Oral at bedtime  timolol 0.5% Solution 1 Drop(s) Both EYES two times a day  traZODone 50 milliGRAM(s) Oral at bedtime    MEDICATIONS  (PRN):  acetaminophen     Tablet .. 650 milliGRAM(s) Oral every 6 hours PRN Mild Pain (1 - 3)  ALBUTerol    90 MICROgram(s) HFA Inhaler 2 Puff(s) Inhalation every 6 hours PRN Shortness of Breath and/or Wheezing  calcium gluconate IVPB 2 Gram(s) IV Intermittent once PRN ekg changes  dextrose 50% Injectable 50 milliLiter(s) IV Push once PRN hypoglycemia <70  dextrose Oral Gel 15 Gram(s) Oral once PRN Blood Glucose LESS THAN 70 milliGRAM(s)/deciliter  lactulose Syrup 10 Gram(s) Oral every 6 hours PRN constipation  oxycodone    5 mG/acetaminophen 325 mG 2 Tablet(s) Oral every 4 hours PRN Severe Pain (7 - 10)  simethicone 80 milliGRAM(s) Chew every 6 hours PRN Gas      CAPILLARY BLOOD GLUCOSE      POCT Blood Glucose.: 116 mg/dL (2022 07:34)  POCT Blood Glucose.: 123 mg/dL (15 Apr 2022 21:17)  POCT Blood Glucose.: 153 mg/dL (15 Apr 2022 16:51)  POCT Blood Glucose.: 119 mg/dL (15 Apr 2022 08:42)    I&O's Summary    15 Apr 2022 07:01  -  2022 07:00  --------------------------------------------------------  IN: 500 mL / OUT: 3630 mL / NET: -3130 mL        PHYSICAL EXAM:  Vital Signs Last 24 Hrs  T(C): 36.3 (2022 07:00), Max: 38 (15 Apr 2022 19:00)  T(F): 97.3 (2022 07:00), Max: 100.4 (15 Apr 2022 19:00)  HR: 55 (2022 07:00) (55 - 84)  BP: 153/69 (2022 07:00) (139/65 - 179/78)  BP(mean): 99 (2022 07:00) (93 - 112)  RR: 10 (2022 07:00) (10 - 28)  SpO2: 97% (2022 07:00) (94% - 100%)    GENERAL: No acute distress, well-developed  HEAD:  Atraumatic, Normocephalic  EYES: EOMI, PERRLA, conjunctiva and sclera clear  NECK: Supple, no lymphadenopathy, no JVD  CHEST/LUNG: CTAB; No wheezes, rales, or rhonchi  HEART: Regular rate and rhythm; No murmurs, rubs, or gallops  ABDOMEN: Soft, non-tender, non-distended; normal bowel sounds, no organomegaly  EXTREMITIES:  2+ peripheral pulses b/l, No clubbing, cyanosis, or edema  NEUROLOGY: A&O x 3, no focal deficits  SKIN: No rashes or lesions    LABS:                        7.5    6.93  )-----------( 165      ( 2022 06:04 )             25.3     04-16    141  |  101  |  39<H>  ----------------------------<  119<H>  4.1   |  30  |  6.1<HH>    Ca    8.4<L>      2022 06:04  Phos  4.2     04-15  Mg     2.3     04-16    TPro  5.2<L>  /  Alb  3.0<L>  /  TBili  0.3  /  DBili  x   /  AST  <5  /  ALT  <5  /  AlkPhos  165<H>  04-16          Urinalysis Basic - ( 2022 14:20 )    Color: Yellow / Appearance: Clear / S.020 / pH: x  Gluc: x / Ketone: Negative  / Bili: Negative / Urobili: 0.2 mg/dL   Blood: x / Protein: 100 mg/dL / Nitrite: Negative   Leuk Esterase: Large / RBC: 11-25 /HPF / WBC >50 /HPF   Sq Epi: x / Non Sq Epi: x / Bacteria: Few        Culture - Urine (collected 2022 14:20)  Source: Kidney Kidney  Preliminary Report (2022 08:08):    No growth to date.        RADIOLOGY & ADDITIONAL TESTS:  Results Reviewed:   Imaging Personally Reviewed:  Electrocardiogram Personally Reviewed:    COORDINATION OF CARE:  Care Discussed with Consultants/Other Providers [Y/N]:  Prior or Outpatient Records Reviewed [Y/N]:

## 2022-04-17 NOTE — PROGRESS NOTE ADULT - SUBJECTIVE AND OBJECTIVE BOX
PROGRESS NOTE:   Sid Tomas MD  Cell [phone]    Patient is a 57y old  Male who presents with a chief complaint of hyperkalemia, pulmonary congestion (15 Apr 2022 08:48)      SUBJECTIVE / OVERNIGHT EVENTS:  Patient seen for follow up for hyperkalemia  No new complaints  sugars   HD tomorrow  dc planning    ADDITIONAL REVIEW OF SYSTEMS:    MEDICATIONS  (STANDING):  ALBUTerol    0.083% 2.5 milliGRAM(s) Nebulizer every 6 hours  aspirin enteric coated 81 milliGRAM(s) Oral daily  brimonidine 0.2% Ophthalmic Solution 1 Drop(s) Both EYES two times a day  cefTRIAXone   IVPB 1000 milliGRAM(s) IV Intermittent every 24 hours  chlorhexidine 4% Liquid 1 Application(s) Topical daily  dextrose 5%. 1000 milliLiter(s) (50 mL/Hr) IV Continuous <Continuous>  dextrose 5%. 1000 milliLiter(s) (100 mL/Hr) IV Continuous <Continuous>  dextrose 50% Injectable 25 Gram(s) IV Push once  dextrose 50% Injectable 12.5 Gram(s) IV Push once  dextrose 50% Injectable 25 Gram(s) IV Push once  dorzolamide 2% Ophthalmic Solution 1 Drop(s) Both EYES three times a day  epoetin keagan-epbx (RETACRIT) Injectable 49283 Unit(s) IV Push <User Schedule>  furosemide   Injectable 80 milliGRAM(s) IV Push every 12 hours  glucagon  Injectable 1 milliGRAM(s) IntraMuscular once  heparin   Injectable 5000 Unit(s) SubCutaneous every 8 hours  latanoprost 0.005% Ophthalmic Solution 1 Drop(s) Left EYE at bedtime  melatonin 5 milliGRAM(s) Oral at bedtime  metoprolol tartrate 50 milliGRAM(s) Oral two times a day  pantoprazole    Tablet 40 milliGRAM(s) Oral before breakfast  polyethylene glycol 3350 17 Gram(s) Oral daily  senna 2 Tablet(s) Oral at bedtime  sertraline 100 milliGRAM(s) Oral daily  sevelamer carbonate 2400 milliGRAM(s) Oral three times a day with meals  simvastatin 10 milliGRAM(s) Oral at bedtime  sodium chloride 3%  Inhalation 4 milliLiter(s) Inhalation every 6 hours  tamsulosin 0.4 milliGRAM(s) Oral at bedtime  timolol 0.5% Solution 1 Drop(s) Both EYES two times a day  traZODone 50 milliGRAM(s) Oral at bedtime    MEDICATIONS  (PRN):  acetaminophen     Tablet .. 650 milliGRAM(s) Oral every 6 hours PRN Mild Pain (1 - 3)  ALBUTerol    90 MICROgram(s) HFA Inhaler 2 Puff(s) Inhalation every 6 hours PRN Shortness of Breath and/or Wheezing  calcium gluconate IVPB 2 Gram(s) IV Intermittent once PRN ekg changes  dextrose 50% Injectable 50 milliLiter(s) IV Push once PRN hypoglycemia <70  dextrose Oral Gel 15 Gram(s) Oral once PRN Blood Glucose LESS THAN 70 milliGRAM(s)/deciliter  lactulose Syrup 10 Gram(s) Oral every 6 hours PRN constipation  oxycodone    5 mG/acetaminophen 325 mG 2 Tablet(s) Oral every 4 hours PRN Severe Pain (7 - 10)  simethicone 80 milliGRAM(s) Chew every 6 hours PRN Gas      CAPILLARY BLOOD GLUCOSE      POCT Blood Glucose.: 116 mg/dL (2022 07:34)  POCT Blood Glucose.: 123 mg/dL (15 Apr 2022 21:17)  POCT Blood Glucose.: 153 mg/dL (15 Apr 2022 16:51)  POCT Blood Glucose.: 119 mg/dL (15 Apr 2022 08:42)    I&O's Summary    15 Apr 2022 07:01  -  2022 07:00  --------------------------------------------------------  IN: 500 mL / OUT: 3630 mL / NET: -3130 mL        PHYSICAL EXAM:  Vital Signs Last 24 Hrs  T(C): 36.3 (2022 07:00), Max: 38 (15 Apr 2022 19:00)  T(F): 97.3 (2022 07:00), Max: 100.4 (15 Apr 2022 19:00)  HR: 55 (2022 07:00) (55 - 84)  BP: 153/69 (2022 07:00) (139/65 - 179/78)  BP(mean): 99 (2022 07:00) (93 - 112)  RR: 10 (2022 07:00) (10 - 28)  SpO2: 97% (2022 07:00) (94% - 100%)    GENERAL: No acute distress, well-developed  HEAD:  Atraumatic, Normocephalic  EYES: EOMI, PERRLA, conjunctiva and sclera clear  NECK: Supple, no lymphadenopathy, no JVD  CHEST/LUNG: CTAB; No wheezes, rales, or rhonchi  HEART: Regular rate and rhythm; No murmurs, rubs, or gallops  ABDOMEN: Soft, non-tender, non-distended; normal bowel sounds, no organomegaly  EXTREMITIES:  2+ peripheral pulses b/l, No clubbing, cyanosis, or edema  NEUROLOGY: A&O x 3, no focal deficits  SKIN: No rashes or lesions    LABS:                        7.5    6.93  )-----------( 165      ( 2022 06:04 )             25.3     04-16    141  |  101  |  39<H>  ----------------------------<  119<H>  4.1   |  30  |  6.1<HH>    Ca    8.4<L>      2022 06:04  Phos  4.2     04-15  Mg     2.3     04-16    TPro  5.2<L>  /  Alb  3.0<L>  /  TBili  0.3  /  DBili  x   /  AST  <5  /  ALT  <5  /  AlkPhos  165<H>  04-16          Urinalysis Basic - ( 2022 14:20 )    Color: Yellow / Appearance: Clear / S.020 / pH: x  Gluc: x / Ketone: Negative  / Bili: Negative / Urobili: 0.2 mg/dL   Blood: x / Protein: 100 mg/dL / Nitrite: Negative   Leuk Esterase: Large / RBC: 11-25 /HPF / WBC >50 /HPF   Sq Epi: x / Non Sq Epi: x / Bacteria: Few        Culture - Urine (collected 2022 14:20)  Source: Kidney Kidney  Preliminary Report (2022 08:08):    No growth to date.        RADIOLOGY & ADDITIONAL TESTS:  Results Reviewed:   Imaging Personally Reviewed:  Electrocardiogram Personally Reviewed:    COORDINATION OF CARE:  Care Discussed with Consultants/Other Providers [Y/N]:  Prior or Outpatient Records Reviewed [Y/N]:

## 2022-04-17 NOTE — PROGRESS NOTE ADULT - ASSESSMENT
58yo W male w/ PMH   HTN, HLD, DM, ESRD on HD TTS, HOMAR, BPH, PVD s/p R BKA legally blind rt eye was sent from  [facility] for  hyperkalemia.  Pt missed dialysis yesterday stating that "they did not wake me up for  dialysis" and he did not feel well to go for dialysis.    In ER pt received lokelma & 2 amps D50. renal called for  hemodialysis    ESRD with hyperkalemia secondary to missed HD  HD per nephro  continue home meds  f/u nephrology     UTI?  Cultures negative  stop abx  requesting frequent straight cath  refuse singh     Lung atelectasis   procal elevated, but afebrile and no wbc  pulmojnary toliet  avaops q4 as tolerated     DM with hypoglycemia, resolved   a1c 4.7  Hold any insulin products  Endo note appreciated     Dispo: DC planning

## 2022-04-17 NOTE — PROGRESS NOTE ADULT - ASSESSMENT
ESRD on HD TTS (MWF inpatient)  fluid overload, better  hyperkalemia, resolved  anemia  HTN  hyperphosphatemia  DM2  UTI  PVD / BKA  psychiatric disorder    plan:    next HD tomorrow  access via LUE AVF / left arm precautions  epogen with HD  renal diet   sevelamer with meals  cont lasix 80mg po bid  complete abx regimen  f/u H/H  1:1 nursing ongoing  d/w nursing

## 2022-04-18 NOTE — CHART NOTE - NSCHARTNOTEFT_GEN_A_CORE
Patient says he takes gabapentin 300 mg twice daily at NH but both NH and our records do not indicate this. He says this medication is more effective then percocet (which is ordered) and he was given a dose of it last night. At this time will order doses of gabapentin 300 mg BID for tonight and tomorrow but will need day staff input as to whether this medication should be continued
Pt was placed on NIV, pt ripped off the mask, stated he does not want to wear it. Pt is currently on NC 3LPM.
pt was seen in bed alert, comfortable, NAD  covid-19 sample was taken from b/l nostrils  pt tolerated procedure well  fup results  monitor vss  monitor pt
58yo W male w/ PMH   HTN, HLD, DM, ESRD on HD TTS, HOMAR, BPH, PVD s/p R BKA legally blind rt eye was sent from  [facility] for  hyperkalemia.  Pt missed dialysis day before admission stating that "they did not wake me up for  dialysis" and he did not feel well to go for dialysis.  In ER pt received lokelma & 2 amps D50. renal called for  hemodialysis. Patient is not s/p urgent HD 4/13. feeling better. During stay patient was hypoglycemic requiring multiple pushes of d50, now resolved. Patient need follow up with pulmonary for HOMAR and sleep study. Stable for downgrade to medical floor.       ESRD with hyperkalemia secondary to missed HD   - s/p HD feeling better    - continue home meds   - f/u nephrology     #UTI  -UA noted. f/u urine culture  - started ceftriaxone   - requesting frequent straight cath, refuse singh     Lung atelectasis   F/u procal  pulmonary toilet/ avaps q4 as tolerated   daily xray     DM with hypoglycemia- resolved   encourage increase glucose intake now  Hold any insulin products  Endo note appreciated   a1c 4.7  Will need insulin to be dced on discharge
Patient told RN that he has pain from not urinating but bladder scan does not show urine retention. Will order bladder sonogram but if pain continues, may need to do straight cath before study

## 2022-04-18 NOTE — PROGRESS NOTE ADULT - REASON FOR ADMISSION
hyperkalemia, pulmonary congestion

## 2022-04-18 NOTE — PROGRESS NOTE ADULT - SUBJECTIVE AND OBJECTIVE BOX
[facility] FOLLOW UP NOTE  --------------------------------------------------------------------------------  24 hour events/subjective: Patient examined. Appears comfortable.    PAST HISTORY  --------------------------------------------------------------------------------  No significant changes to PMH, PSH, FHx, SHx, unless otherwise noted    ALLERGIES & MEDICATIONS  --------------------------------------------------------------------------------  Allergies    No Known Allergies    Standing Inpatient Medications  ALBUTerol    0.083% 2.5 milliGRAM(s) Nebulizer every 6 hours  aspirin enteric coated 81 milliGRAM(s) Oral daily  brimonidine 0.2% Ophthalmic Solution 1 Drop(s) Both EYES two times a day  chlorhexidine 4% Liquid 1 Application(s) Topical daily  dextrose 5%. 1000 milliLiter(s) IV Continuous <Continuous>  dextrose 5%. 1000 milliLiter(s) IV Continuous <Continuous>  dextrose 50% Injectable 25 Gram(s) IV Push once  dextrose 50% Injectable 25 Gram(s) IV Push once  dextrose 50% Injectable 12.5 Gram(s) IV Push once  dorzolamide 2% Ophthalmic Solution 1 Drop(s) Both EYES three times a day  epoetin keagan-epbx (RETACRIT) Injectable 15997 Unit(s) IV Push <User Schedule>  furosemide    Tablet 80 milliGRAM(s) Oral two times a day  glucagon  Injectable 1 milliGRAM(s) IntraMuscular once  heparin   Injectable 5000 Unit(s) SubCutaneous every 8 hours  latanoprost 0.005% Ophthalmic Solution 1 Drop(s) Left EYE at bedtime  melatonin 5 milliGRAM(s) Oral at bedtime  metoprolol tartrate 50 milliGRAM(s) Oral two times a day  pantoprazole    Tablet 40 milliGRAM(s) Oral before breakfast  polyethylene glycol 3350 17 Gram(s) Oral daily  senna 2 Tablet(s) Oral at bedtime  sertraline 100 milliGRAM(s) Oral daily  sevelamer carbonate 2400 milliGRAM(s) Oral three times a day with meals  simvastatin 10 milliGRAM(s) Oral at bedtime  sodium chloride 3%  Inhalation 4 milliLiter(s) Inhalation every 6 hours  tamsulosin 0.4 milliGRAM(s) Oral at bedtime  timolol 0.5% Solution 1 Drop(s) Both EYES two times a day  traZODone 50 milliGRAM(s) Oral at bedtime    PRN Inpatient Medications  acetaminophen     Tablet .. 650 milliGRAM(s) Oral every 6 hours PRN  ALBUTerol    90 MICROgram(s) HFA Inhaler 2 Puff(s) Inhalation every 6 hours PRN  calcium gluconate IVPB 2 Gram(s) IV Intermittent once PRN  dextrose 50% Injectable 50 milliLiter(s) IV Push once PRN  dextrose Oral Gel 15 Gram(s) Oral once PRN  lactulose Syrup 10 Gram(s) Oral every 6 hours PRN  oxycodone    5 mG/acetaminophen 325 mG 2 Tablet(s) Oral every 4 hours PRN  simethicone 80 milliGRAM(s) Chew every 6 hours PRN    VITALS/PHYSICAL EXAM  --------------------------------------------------------------------------------  T(C): 37 (04-18-22 @ 05:09), Max: 37 (04-17-22 @ 14:04)  HR: 63 (04-18-22 @ 05:09) (56 - 63)  BP: 143/64 (04-18-22 @ 05:09) (143/64 - 167/72)  RR: 18 (04-18-22 @ 05:09) (18 - 18)    Physical Exam:  	Gen: NAD  	Pulm: CTA B/L  	CV: RRR, S1S2  	Abd: +BS, soft, nontender/nondistended  	: No suprapubic tenderness  	LE: Warm, edema  	Vascular access: AVF    LABS/STUDIES  --------------------------------------------------------------------------------              9.0    9.09  >-----------<  187      [04-17-22 @ 06:42]              30.3     143  |  100  |  47  ----------------------------<  98      [04-17-22 @ 06:42]  4.8   |  28  |  7.1        Ca     8.7     [04-17-22 @ 06:42]      Mg     2.4     [04-17-22 @ 06:42]    TPro  5.9  /  Alb  3.5  /  TBili  0.3  /  DBili  x   /  AST  6   /  ALT  5   /  AlkPhos  188  [04-17-22 @ 06:42]    Creatinine Trend:  SCr 7.1 [04-17 @ 06:42]  SCr 6.1 [04-16 @ 06:04]  SCr 5.6 [04-15 @ 19:01]  SCr 8.2 [04-15 @ 05:46]  SCr 7.6 [04-14 @ 05:45]    Urinalysis - [04-14-22 @ 14:20]      Color Yellow / Appearance Clear / SG 1.020 / pH 6.0      Gluc Negative / Ketone Negative  / Bili Negative / Urobili 0.2       Blood Large / Protein 100 / Leuk Est Large / Nitrite Negative      RBC 11-25 / WBC >50 / Hyaline  / Gran  / Sq Epi  / Non Sq Epi  / Bacteria Few    HbA1c 8.6      [06-01-18 @ 07:22]  TSH 1.87      [11-18-21 @ 04:50]  Lipid: chol 82, , HDL 33, LDL --      [11-18-21 @ 04:50]

## 2022-04-18 NOTE — DISCHARGE NOTE NURSING/CASE MANAGEMENT/SOCIAL WORK - PATIENT PORTAL LINK FT
You can access the FollowMyHealth Patient Portal offered by [facility] by registering at the following website: [email or web address]. By joining [facility]’s FollowMyHealth portal, you will also be able to view your health information using other applications (apps) compatible with our system.

## 2022-04-18 NOTE — PHARMACOTHERAPY INTERVENTION NOTE - COMMENTS
Check phos level while on high dose sevelamer
Spoke with RADHA Giron. Order for fleet saline enema x1dose. Due to patient's impaired renal function (GFR =8), recommended mineral oil enema. Saline enemas are phosphate based, and renally impaired patients can accumulate phosphate, and therefore caution is recommended for saline enemas in renal impairment. Mineral oil does not have this issue.

## 2022-04-18 NOTE — DISCHARGE NOTE NURSING/CASE MANAGEMENT/SOCIAL WORK - NSDCPEFALRISK_GEN_ALL_CORE
For information on Fall & Injury Prevention, visit: [email or web address] OR  [email or web address] OR  https://www.cdc.gov/steadi/patient.html

## 2022-04-18 NOTE — PROGRESS NOTE ADULT - ASSESSMENT
ESRD on HD TTS (MWF inpatient)  fluid overload, better  hyperkalemia, resolved  anemia  HTN  hyperphosphatemia  DM2  UTI  PVD / BKA  psychiatric disorder    plan:    HD today: 3 hours, opti 200 dialyzer, 2K bath, 3.5L UF  access via LUE AVF / left arm precautions  epogen with HD  renal diet   sevelamer with meals  complete abx regimen

## 2022-04-18 NOTE — PROGRESS NOTE ADULT - ASSESSMENT
56yo W male w/ PMH   HTN, HLD, DM, ESRD on HD TTS, HOMAR, BPH, PVD s/p R BKA legally blind rt eye was sent from  [facility] for  hyperkalemia.  Pt missed dialysis yesterday stating that "they did not wake me up for  dialysis" and he did not feel well to go for dialysis.    In ER pt received lokelma & 2 amps D50. renal called for  hemodialysis    ESRD with hyperkalemia secondary to missed HD  HD per nephro  continue home meds  f/u nephrology     UTI?  Cultures negative  stop abx  requesting frequent straight cath  refuse singh     Lung atelectasis   procal elevated, but afebrile and no wbc  pulmojnary toliet  avaops q4 as tolerated     DM with hypoglycemia, resolved   a1c 4.7  Hold any insulin products  Endo note appreciated     Dispo: DC today

## 2022-04-18 NOTE — PROGRESS NOTE ADULT - SUBJECTIVE AND OBJECTIVE BOX
PROGRESS NOTE:   Sid Tomas MD  Cell [phone]    Patient is a 57y old  Male who presents with a chief complaint of hyperkalemia, pulmonary congestion (15 Apr 2022 08:48)      SUBJECTIVE / OVERNIGHT EVENTS:  Patient seen for follow up for hyperkalemia  complaining of constipation, had large BM after tap enema  complaining of urinary discomfort <100cc on bladder scan and urine culture unremarkable with lacto growing  DC today    ADDITIONAL REVIEW OF SYSTEMS:    MEDICATIONS  (STANDING):  ALBUTerol    0.083% 2.5 milliGRAM(s) Nebulizer every 6 hours  aspirin enteric coated 81 milliGRAM(s) Oral daily  brimonidine 0.2% Ophthalmic Solution 1 Drop(s) Both EYES two times a day  cefTRIAXone   IVPB 1000 milliGRAM(s) IV Intermittent every 24 hours  chlorhexidine 4% Liquid 1 Application(s) Topical daily  dextrose 5%. 1000 milliLiter(s) (50 mL/Hr) IV Continuous <Continuous>  dextrose 5%. 1000 milliLiter(s) (100 mL/Hr) IV Continuous <Continuous>  dextrose 50% Injectable 25 Gram(s) IV Push once  dextrose 50% Injectable 12.5 Gram(s) IV Push once  dextrose 50% Injectable 25 Gram(s) IV Push once  dorzolamide 2% Ophthalmic Solution 1 Drop(s) Both EYES three times a day  epoetin keagan-epbx (RETACRIT) Injectable 97788 Unit(s) IV Push <User Schedule>  furosemide   Injectable 80 milliGRAM(s) IV Push every 12 hours  glucagon  Injectable 1 milliGRAM(s) IntraMuscular once  heparin   Injectable 5000 Unit(s) SubCutaneous every 8 hours  latanoprost 0.005% Ophthalmic Solution 1 Drop(s) Left EYE at bedtime  melatonin 5 milliGRAM(s) Oral at bedtime  metoprolol tartrate 50 milliGRAM(s) Oral two times a day  pantoprazole    Tablet 40 milliGRAM(s) Oral before breakfast  polyethylene glycol 3350 17 Gram(s) Oral daily  senna 2 Tablet(s) Oral at bedtime  sertraline 100 milliGRAM(s) Oral daily  sevelamer carbonate 2400 milliGRAM(s) Oral three times a day with meals  simvastatin 10 milliGRAM(s) Oral at bedtime  sodium chloride 3%  Inhalation 4 milliLiter(s) Inhalation every 6 hours  tamsulosin 0.4 milliGRAM(s) Oral at bedtime  timolol 0.5% Solution 1 Drop(s) Both EYES two times a day  traZODone 50 milliGRAM(s) Oral at bedtime    MEDICATIONS  (PRN):  acetaminophen     Tablet .. 650 milliGRAM(s) Oral every 6 hours PRN Mild Pain (1 - 3)  ALBUTerol    90 MICROgram(s) HFA Inhaler 2 Puff(s) Inhalation every 6 hours PRN Shortness of Breath and/or Wheezing  calcium gluconate IVPB 2 Gram(s) IV Intermittent once PRN ekg changes  dextrose 50% Injectable 50 milliLiter(s) IV Push once PRN hypoglycemia <70  dextrose Oral Gel 15 Gram(s) Oral once PRN Blood Glucose LESS THAN 70 milliGRAM(s)/deciliter  lactulose Syrup 10 Gram(s) Oral every 6 hours PRN constipation  oxycodone    5 mG/acetaminophen 325 mG 2 Tablet(s) Oral every 4 hours PRN Severe Pain (7 - 10)  simethicone 80 milliGRAM(s) Chew every 6 hours PRN Gas      CAPILLARY BLOOD GLUCOSE      POCT Blood Glucose.: 116 mg/dL (2022 07:34)  POCT Blood Glucose.: 123 mg/dL (15 Apr 2022 21:17)  POCT Blood Glucose.: 153 mg/dL (15 Apr 2022 16:51)  POCT Blood Glucose.: 119 mg/dL (15 Apr 2022 08:42)    I&O's Summary    15 Apr 2022 07:01  -  2022 07:00  --------------------------------------------------------  IN: 500 mL / OUT: 3630 mL / NET: -3130 mL        PHYSICAL EXAM:  Vital Signs Last 24 Hrs  T(C): 36.3 (2022 07:00), Max: 38 (15 Apr 2022 19:00)  T(F): 97.3 (2022 07:00), Max: 100.4 (15 Apr 2022 19:00)  HR: 55 (2022 07:00) (55 - 84)  BP: 153/69 (2022 07:00) (139/65 - 179/78)  BP(mean): 99 (2022 07:00) (93 - 112)  RR: 10 (2022 07:00) (10 - 28)  SpO2: 97% (2022 07:00) (94% - 100%)    GENERAL: No acute distress, well-developed  HEAD:  Atraumatic, Normocephalic  EYES: EOMI, PERRLA, conjunctiva and sclera clear  NECK: Supple, no lymphadenopathy, no JVD  CHEST/LUNG: CTAB; No wheezes, rales, or rhonchi  HEART: Regular rate and rhythm; No murmurs, rubs, or gallops  ABDOMEN: Soft, non-tender, non-distended; normal bowel sounds, no organomegaly  EXTREMITIES:  2+ peripheral pulses b/l, No clubbing, cyanosis, or edema  NEUROLOGY: A&O x 3, no focal deficits  SKIN: No rashes or lesions    LABS:                        7.5    6.93  )-----------( 165      ( 2022 06:04 )             25.3     04-16    141  |  101  |  39<H>  ----------------------------<  119<H>  4.1   |  30  |  6.1<HH>    Ca    8.4<L>      2022 06:04  Phos  4.2     04-15  Mg     2.3     04-16    TPro  5.2<L>  /  Alb  3.0<L>  /  TBili  0.3  /  DBili  x   /  AST  <5  /  ALT  <5  /  AlkPhos  165<H>  04-16          Urinalysis Basic - ( 2022 14:20 )    Color: Yellow / Appearance: Clear / S.020 / pH: x  Gluc: x / Ketone: Negative  / Bili: Negative / Urobili: 0.2 mg/dL   Blood: x / Protein: 100 mg/dL / Nitrite: Negative   Leuk Esterase: Large / RBC: 11-25 /HPF / WBC >50 /HPF   Sq Epi: x / Non Sq Epi: x / Bacteria: Few        Culture - Urine (collected 2022 14:20)  Source: Kidney Kidney  Preliminary Report (2022 08:08):    No growth to date.        RADIOLOGY & ADDITIONAL TESTS:  Results Reviewed:   Imaging Personally Reviewed:  Electrocardiogram Personally Reviewed:    COORDINATION OF CARE:  Care Discussed with Consultants/Other Providers [Y/N]:  Prior or Outpatient Records Reviewed [Y/N]:   No heavy lifting/Nothing per vagina

## 2022-05-05 NOTE — DIETITIAN INITIAL EVALUATION ADULT. - WEIGHT IN LBS
Health Maintenance Due   Topic Date Due   • COVID-19 Vaccine (1) Never done   • Depression Screening  Never done   • Pneumococcal Vaccine 0-64 (2 - PCV) 06/05/2010   • Diabetes Foot Exam  05/16/2019   • DM/CKD GFR  09/08/2021   • Diabetes Eye Exam  09/15/2021   • Breast Cancer Screening  10/09/2021   • Diabetes A1C  03/09/2022       Patient is due for the topics as listed above and wishes to proceed with them. Orders placed for Diabetes A1C.   184.7

## 2022-06-08 NOTE — DISCHARGE NOTE ADULT - NS AS DC FU INST LIST INST
Dunia from Dr Muñoz office  called today with regards to the following preoperative needs: the H & P  Notes ,and the labs  Fax to [phone]    For additional information, or if you need to contact the caller at the following number:    Contact Phone Number: [phone]    dunia states that it is okay to leave a detailed message.    Preferred time for callback: any    Caller was informed that this message would be sent to the clinical staff for review, and the clinical staff will be calling the patient back in 24-48 hours.       no

## 2022-06-23 NOTE — BH CONSULTATION LIAISON ASSESSMENT NOTE - NSRISKVIOL_PSY_A_CORE
"Nurse Triage SBAR    Is this a 2nd Level Triage? NO    Situation: Patient calling with New onset vomiting today.  Consent: not needed    Background: Pt calling as she \"can't hold anything down\".  States she ate something this morning and threw it up right away.  States she cannot keep water down.  Pt had some water 2 hours ago and said \"it kind of stayed down\" and says she drank some water and did vomit but only some of it.      Assessment:   * Vomiting began this morning or afternoon.  Pt is unsure of timeline   Pt states she has vomited 4-6 times since this morning/afternoon.    * Vomited water, potatoes, and watermelon.   * States she is \"very much so\" having abdominal pain and rates this pain at 8/10. States pain is constant.   * Denies diarrhea  * Denies any other household member being sick.   * Pt states stress and anxiety may be causing her vomiting and states this does happen to her often.  States that prior today, last week was the last time she experienced vomiting due to stress and anxiety.  Pt states sleeping all day helped her feel better last week.  Pt states that prior to last week the vomiting has happened before due to stress/anxiety.    * Pt denies chance of pregnancy.  Pt is currently on her menstrual period.   * Pt states she has dry lips and a dry mouth.  Last time of urination was 5 minutes ago per patient.        Protocol Recommended Disposition:   See HCP within 4 hours.  Pt states she is unable to go to the [facility] at this time as her transportation company closed and she has no one to take her.  Pt suggested she would call 911 and be taken by ambulance to the ED.  Disconnected the all at this time as patient was going to call for an ambulance.       Recommendation: Advised patient to Go to urgent care . Reviewed concerning symptoms and when to call back.     Sharon Perez RN [facility] 6/23/2022 4:55 PM    COVID 19 Nurse Triage Plan/Patient Instructions    Please be aware " that novel coronavirus (COVID-19) may be circulating in the community. If you develop symptoms such as fever, cough, or SOB or if you have concerns about the presence of another infection including coronavirus (COVID-19), please contact your health care provider or visit [email or web address].     Disposition/Instructions    In-Person Visit with provider recommended. Reference Visit Selection Guide.    Thank you for taking steps to prevent the spread of this virus.  o Limit your contact with others.  o Wear a simple mask to cover your cough.  o Wash your hands well and often.    Resources    [facility]: About COVID-19: [email or web address]    CDC: What to Do If You're Sick: www.cdc.gov/coronavirus/2019-ncov/about/steps-when-sick.html    CDC: Ending Home Isolation: www.cdc.gov/coronavirus/2019-ncov/hcp/disposition-in-home-patients.html     CDC: Caring for Someone: www.cdc.gov/coronavirus/2019-ncov/if-you-are-sick/care-for-someone.html     [facility]: Interim Guidance for Hospital Discharge to Home: www.health.[facility].[place]/diseases/coronavirus/hcp/hospdischarge.pdf    [facility] clinical trials (COVID-19 research studies): [email or web address]-[email or web address]-trials     Below are the COVID-19 hotlines at the [place] [facility] ([facility]). Interpreters are available.   o For health questions: Call 274-711-6239 or 1-709.926.2721 (7 a.m. to 7 p.m.)  o For questions about schools and childcare: Call 391-691-5829 or 1-160.813.4280 (7 a.m. to 7 p.m.)                         Reason for Disposition    [1] Constant abdominal pain AND [2] present > 2 hours    Additional Information    Negative: Shock suspected (e.g., cold/pale/clammy skin, too weak to stand, low BP, rapid pulse)    Negative: Difficult to awaken or acting confused (e.g., disoriented, slurred speech)    Negative: Sounds like a life-threatening emergency to the triager    Negative: Vomiting occurs only while coughing     "Negative: [1] Pregnant < 20 Weeks AND [2] nausea/vomiting began in early pregnancy (i.e., 4-8 weeks pregnant)    Negative: Chest pain    Negative: Headache is main symptom    Negative: Vomiting (or Nausea) in a cancer patient who is currently (or recently) receiving chemotherapy or radiation therapy, or cancer patient who has metastatic or end-stage cancer and is receiving palliative care    Negative: [1] Vomiting AND [2] contains red blood or black (\"coffee ground\") material  (Exception: few red streaks in vomit that only happened once)    Negative: Severe pain in one eye    Negative: Recent head injury (within last 3 days)    Negative: Recent abdominal injury (within last 3 days)    Negative: [1] Insulin-dependent diabetes (Type I) AND [2] glucose > 400 mg/dl (22 mmol/l)    Negative: [1] SEVERE vomiting (e.g., 6 or more times/day) AND [2] present > 8 hours    Negative: [1] MODERATE vomiting (e.g., 3 - 5 times/day) AND [2] age > 60    Negative: Severe headache (e.g., excruciating) (Exception: similar to previous migraines)    Negative: High-risk adult (e.g., diabetes mellitus, brain tumor, V-P shunt, hernia)    Negative: [1] Drinking very little AND [2] dehydration suspected (e.g., no urine > 12 hours, very dry mouth, very lightheaded)    Negative: Patient sounds very sick or weak to the triager    Negative: [1] Vomiting AND [2] abdomen looks much more swollen than usual    Negative: [1] Vomiting AND [2] contains bile (green color)    Protocols used: VOMITING-A-AH      " Low

## 2022-09-19 PROBLEM — N18.6 END STAGE RENAL DISEASE: Chronic | Status: ACTIVE | Noted: 2018-02-27

## 2022-09-19 NOTE — ED PROVIDER NOTE - PHYSICAL EXAMINATION
CONSTITUTIONAL: obese. ill appearing  SKIN: warm, dry  HEAD: Normocephalic; atraumatic.  EYES: no conjunctival injection. right eye cataract. left pupil reactive to light  ENT: No nasal discharge; airway clear.  NECK: Supple  CARD: S1, S2 normal; Regular rate and rhythm.   RESP: agonal breath sounds  ABD: soft nd. large   EXT: +right BKA. no edema. b/l LE chronic peripheral vascular disease  NEURO: unresponsive

## 2022-09-19 NOTE — ED PROVIDER NOTE - CARE PLAN
1 Principal Discharge DX:	Cardiac arrest  Secondary Diagnosis:	Acute respiratory failure with hypoxia  Secondary Diagnosis:	Pulmonary embolism   Principal Discharge DX:	Cardiac arrest  Secondary Diagnosis:	Acute respiratory failure with hypoxia

## 2022-09-19 NOTE — ED PROVIDER NOTE - OBJECTIVE STATEMENT
56 y/o M completely blind in R eye w pmhx ESRD on HD T,TH,S, HTN, DM, s/p BKA, s/p A-V fistula BIBEMS from [facility] NH found to be hypoxic and minimally responsive.

## 2022-09-19 NOTE — ED PROCEDURE NOTE - CPROC ED TIME OUT STATEMENT1
“Patient's name, , procedure and correct site were confirmed during the [place] Timeout.”
“Patient's name, , procedure and correct site were confirmed during the [place] Timeout.”

## 2022-09-19 NOTE — ED PROVIDER NOTE - ATTENDING CONTRIBUTION TO CARE
57-year-old man with a past medical history of ESRD on hemodialysis hypertension diabetes right BKA AV fistula from [facility] brought to the ED actively getting bagged as per EMS as per EMS patient was found febrile at nursing home hypoxic in the 80s in nursing home placed him on 15 L nonrebreather as per EMS desatted to 69 in route was nonresponsive and was getting bagged patient placed in room immediately fingerstick 195 patient was being placed in the monitor on IV placement in preparation to intubate patient and patient became pulseless CPR initiated immediately multiple doses of epi given calcium mag bicarb patient was in PEA POCUS demonstrated right heart strain and possible clot in RV TNKase was pushed and multiple rounds of CPR continued definitive airway intubation performed during CPR patient pronounced at 3:49 AM   CONSTITUTIONAL: unresponsive  HEAD: NCAT  EYES: right eye cataract  ENT: Normal pharynx; mucous membranes pink/moist, no erythema.  NECK: Supple;   CARD: in active cardiac arrest   RESP: R/l breath sounds  ABD: Soft, NT ND  MSK/EXT: + right bka

## 2022-09-19 NOTE — ED PROVIDER NOTE - CLINICAL SUMMARY MEDICAL DECISION MAKING FREE TEXT BOX
57-year-old man with a past medical history of ESRD on hemodialysis hypertension diabetes right BKA AV fistula from [facility] brought to the ED actively getting bagged as per EMS as per EMS patient was found febrile at nursing home hypoxic in the 80s in nursing home placed him on 15 L nonrebreather as per EMS desatted to 69 in route was nonresponsive and was getting bagged patient placed in room immediately fingerstick 195 patient was being placed in the monitor on IV placement in preparation to intubate patient and patient became pulseless CPR initiated immediately multiple doses of epi given calcium mag bicarb patient was in PEA POCUS demonstrated right heart strain and possible clot in RV TNKase was pushed and multiple rounds of CPR continued definitive airway intubation performed during CPR patient pronounced at 3:49 AM

## 2022-09-19 NOTE — ED PROVIDER NOTE - PROGRESS NOTE DETAILS
CP: Patient arrived by EMS being bagged without definitive airway. patient was agonally breathing with pulse. while preparing for intubation, patient lost pulses and cpr was initiated. on bedside us, D-sign noted. left femoral TLC placed. TNK pushed. 3+ rounds of CPR continued without ROSC. CP: attempted to call family twice with no answer. CP: ANITRA referral # [id number] - [facility]

## 2022-09-29 NOTE — ED ADULT TRIAGE NOTE - ARRIVAL FROM
Spoke to patient and informed him it is normal for his toes to still be numb after the block this morning. Patient voiced understanding. Nursing home

## 2023-02-13 NOTE — ED ADULT NURSE NOTE - NS PRO PASSIVE SMOKE EXP
"[facility]  Initial Inpatient Consult Note    Referring Provider: Preethi Meyer PA-C    Reason for Consultation: Right flank and lower abdominal pain    Subjective     History of present illness:      Thank you for allowing us to participate in the care of this patient.    46 y.o. male patient of Dr. Magaña/LAN Khan with a past medical history significant for alcoholic cirrhosis complicated by esophageal varices, sober from alcohol for 3 years, who presented to the emergency department with complaints of lower abdominal pain.    He reports cramping lower abdominal pain which began several days ago and accompanied by nonbloody diarrhea as well as nausea and 1 episode of vomiting.  No hematemesis, fevers, chills, upper respiratory symptoms.  His symptoms have improved and there are plans for discharge noted.     CT of the abdomen and pelvis completed without contrast on 2/12/2023 with findings of cholelithiasis. HIDA scan negative.    Persistent thrombocytopenia with most current platelet count of 57,000.  Total bilirubin 1.4, normal transaminases, normal alkaline phosphatase    Past Medical History:  Past Medical History:   Diagnosis Date   • Acquired stuttering     WIFE STATES HE CAN BE HARD TO UNDERSTAND.. \"Stateless [employer]ENT [employer] STUTTERS\"   • Bleeding esophageal varices (HCC)    • Cirrhosis (HCC)     end-stage   • H/O PAF (paroxysmal atrial fibrillation) (CMS/HCC)    • Hepatosplenomegaly     secondary to cirrhosis   • History of alcohol abuse    • History of anemia    • History of ascites    • History of sepsis    • History of transfusion    • Hyperlipidemia      Past Surgical History:  Past Surgical History:   Procedure Laterality Date   • ENDOSCOPY N/A 11/18/2020    Procedure: ESOPHAGOGASTRODUODENOSCOPY with esophageal banding x3;  Surgeon: Erik Magaña MD;  Location: [facility] ENDOSCOPY;  Service: Gastroenterology;  Laterality: N/A;  pre - cirrhosis of the liver and " varices  post - gastritis, portal hypertensive gastropathy   • ENDOSCOPY N/A 03/23/2022    Procedure: ESOPHAGOGASTRODUODENOSCOPY WITH COLD BIOPSIES AND BANDING x 5;  Surgeon: Erik Magaña MD;  Location: [facility] ENDOSCOPY;  Service: Gastroenterology;  Laterality: N/A;  HISTORY OF VARICES  VARICES, EROSIVE GASTRITIS   • ENDOSCOPY N/A 06/01/2022    Procedure: ESOPHAGOGASTRODUODENOSCOPY;  Surgeon: Erik Magaña MD;  Location: [facility] ENDOSCOPY;  Service: Gastroenterology;  Laterality: N/A;  pre-hx varices  post-gastritis   • ENDOSCOPY W/ BANDING  2020    varices   • ENDOSCOPY W/ BANDING     • HERNIA REPAIR     • PARACENTESIS      900 cc fluid drained      Social History:   Social History     Tobacco Use   • Smoking status: Every Day     Packs/day: 0.50     Types: Cigarettes   • Smokeless tobacco: Never   • Tobacco comments:     quit 6 months ago   Substance Use Topics   • Alcohol use: Not Currently     Comment: H/O alcoholism (quit 08/2020)      Family History:  Family History   Problem Relation Age of Onset   • Ovarian cancer Mother    • Throat cancer Father    • Malig Hyperthermia Neg Hx        Home Meds:  Medications Prior to Admission   Medication Sig Dispense Refill Last Dose   • ferrous sulfate 324 (65 Fe) MG tablet delayed-release EC tablet Take 324 mg by mouth Daily With Breakfast.   2/12/2023 at 1000   • furosemide (LASIX) 40 MG tablet Take 1 tablet by mouth Daily. 90 tablet 3 2/12/2023 at 1000   • glucosamine-chondroitin 500-400 MG capsule capsule Take  by mouth Daily.   2/12/2023 at 1000   • multivitamin with minerals (MULTIVITAMIN ADULT PO) Take 1 tablet by mouth Daily.   2/12/2023 at 1000   • nadolol (CORGARD) 40 MG tablet Take 1 tablet by mouth Daily. 90 tablet 3 2/12/2023 at 1000   • pantoprazole (PROTONIX) 40 MG EC tablet Take 1 tablet by mouth 2 (Two) Times a Day. (Patient taking differently: Take 40 mg by mouth Daily.) 180 tablet 3 2/12/2023 at 1000   • spironolactone (Aldactone) 25 MG  tablet Take 1 tablet by mouth Daily. 90 tablet 3 2/12/2023 at 1000   • [DISCONTINUED] traZODone (DESYREL) 50 MG tablet Take 50 mg by mouth At Night As Needed.   Unknown     Current Meds:   furosemide, 40 mg, Oral, Daily  nadolol, 40 mg, Oral, Daily  nicotine, 1 patch, Transdermal, Q24H  pantoprazole, 40 mg, Oral, BID  sodium chloride, 10 mL, Intravenous, Q12H  spironolactone, 25 mg, Oral, Daily      Allergies:  No Known Allergies  Review of Systems   Constitutional:   No fevers, chills, sweats   Eye:   No recent visual problems, eye discharge, eye pain, redness   HENT:   No ear pain, nasal congestion, sore throat, voice changes   Respiratory:   No shortness of breath, cough, pain on breathing, sputum production   Cardiovascular:   No Chest pain, palpitations, syncope, shortness of breath while laying flat   Gastrointestinal:   Nausea, vomiting, diarrhea, cramping lower abdominal pain  Genitourinary:   No hematuria, dysuria, incontinence   Hema/Lymph:   Negative for bruising tendency, swollen lymph glands, nosebleeds, history of anticoagulation   Endocrine:   Negative for excessive thirst, excessive hunger, excessive urination,   Musculoskeletal:   No back pain, neck pain, joint pain, muscle pain, decreased range of motion   Integumentary:   No rash, pruritus, abrasions   Neurologic: No weakness, numbness, frequent headaches   Psychiatric:   No anxiety, depression, mood change    Objective     Vital Signs  Temp:  [97 °F (36.1 °C)-97.9 °F (36.6 °C)] 97 °F (36.1 °C)  Heart Rate:  [55-80] 71  Resp:  [17-19] 18  BP: ()/(59-92) 122/92  Physical Exam:   Constitutional:   Well developed, well nourished. No acute distress.   Head:   Atraumatic, normocephalic.   Neck:   Supple and symmetric. Trachea midline.   ENT:   External ears and nose without lesion. Hearing grossly intact.   Eyes:   Pupils equal and round. Sclerae anicteric. Conjunctivae clear.   Respiratory:   Quiet, even, non-labored breathing. Clear to  auscultation bilaterally.   Cardiovascular:   Regular rate and rhythm. No murmur, gallop or rub appreciated.   Gastrointestinal:   Soft, nondistended, nontender. No rebound or guarding present. Bowel sounds present in all 4 quadrants.   Integumentary:   Skin warm and dry, not  jaundiced.   Neurological:   Alert and oriented to person, place and time. Fluid of speech.   Musculoskeletal:   Active range of motion all 4 extremities. No rashes or edema.   Psychological:   Appropriate mood and affect. Cooperative.    Results Review:   I reviewed the patient's new clinical results.    Results from last 7 days   Lab Units 02/13/23  0447 02/12/23  1157   WBC 10*3/mm3 3.72 5.91   HEMOGLOBIN g/dL 12.3* 14.6   HEMATOCRIT % 36.0* 43.5   PLATELETS 10*3/mm3 57* 77*     Results from last 7 days   Lab Units 02/13/23  0447 02/12/23  1157   SODIUM mmol/L 134* 140   POTASSIUM mmol/L 4.2 3.3*   CHLORIDE mmol/L 104 102   CO2 mmol/L 24.2 27.8   BUN mg/dL 14 16   CREATININE mg/dL 0.65* 0.85   CALCIUM mg/dL 8.3* 9.4   BILIRUBIN mg/dL 1.4* 1.8*   ALK PHOS U/L 77 94   ALT (SGPT) U/L 20 25   AST (SGOT) U/L 25 28   GLUCOSE mg/dL 111* 106*     Results from last 7 days   Lab Units 02/12/23  1620   INR  1.44*     Lab Results   Lab Value Date/Time    LIPASE 48 02/13/2023 0447    LIPASE 68 (H) 02/12/2023 1157       Radiology:  NM HIDA SCAN WITH PHARMACOLOGICAL INTERVENTION   Final Result   Negative.       This report was finalized on 2/13/2023 8:21 AM by Dr. Adrian Perdomo M.D.          CT Abdomen Pelvis Without Contrast   Final Result           1. Cholelithiasis with findings that may be evidence of acute   cholecystitis, correlate clinically.   2. No acute inflammatory process of bowel is identified, follow up as   indications persist.   3. No urolithiasis or hydronephrosis.   4. Cirrhotic liver, splenomegaly. Ventral hernias of fat.       This report was finalized on 2/12/2023 12:56 PM by Dr. Oscar Martinez M.D.              Assessment &  Plan   Patient Active Problem List   Diagnosis   • Thrombocytopenia (HCC)   • Anemia   • PAF (paroxysmal atrial fibrillation) (HCC)   • Cirrhosis (HCC)   • Bleeding esophageal varices (HCC)   • History of alcohol abuse   • Tobacco dependence   • Alcoholic cirrhosis of liver with ascites (HCC)   • Secondary esophageal varices without bleeding (HCC)   • Abdominal pain       Assessment:  1. Abdominal pain, nausea with vomiting, diarrhea-improved  2. Alcoholic cirrhosis, MELD 15-follows with [facility] transplant through [facility]  3. Cholelithiasis    Plan:  · Resume diuretic regimen.    · Continue nadolol.  · 2 g sodium diet with 1500 mL fluid restriction.  Recommend starting with bland diet then slowly advancing.  · No objections to discharge from a GI perspective.  · Outpatient follow-up has been arranged with Dr. Magaña.      I discussed the patient's findings and my recommendations with patient, family and nursing staff.    Dragon dictation used throughout this note.            SHRUTI Lyn  [facility]  [address] [place] [address]  Office: [phone]         Unknown

## 2023-02-23 NOTE — PROGRESS NOTE ADULT - ASSESSMENT
IMPRESSION:  ARF hypercapnia and hypoxic   fluid overload can not r/o PNA   ESRD       PLAN: transfer to ICU     CNS: no sedation     HEENT: oral care     PULMONARY: refused ct scan yesterday repeat cxr while in chair not rotated    ct of chest no contrast to evaluate the left lung       CARDIOVASCULAR continue  lasix   echo     GI: GI prophylaxis.  Feeding     RENAL: stat HD renal was informed   follow renal c    INFECTIOUS DISEASE: pan cx doubt PNA     HEMATOLOGICAL:  DVT prophylaxis. heparin Sq     ENDOCRINE:  Follow up FS.  Insulin protocol if needed.    MUSCULOSKELETAL:        CRITICAL CARE TIME SPENT: *** no

## 2023-03-27 NOTE — PHYSICAL THERAPY INITIAL EVALUATION ADULT - LEVEL OF INDEPENDENCE: GAIT, REHAB EVAL
CRISTOBAL ambulatory encounter  INTERNAL MEDICINE OFFICE VISIT    CHIEF COMPLAINT:    Chief Complaint   Patient presents with   • Establish Care       SUBJECTIVE:  Britta Macdonald is a 35 year old female who presented requesting evaluation to establish care.      Patient denies any complaints today.      Patient has history of essential hypertension and wants to get her medications refilled.  She sees Dr. Siddiqui for depression and anxiety.    Denies history of colon cancer, breast cancer in the family.  Stopped smoking after 2.5 pack years, drinks occasionally.    Next   Review of systems:   Constitutional:  Denies fever, chills, or weight loss.  Eyes:  Denies change in visual acuity.   HENT:  Denies nasal congestion or sore throat.   Respiratory:  Denies cough or shortness of breath.   Cardiovascular:  Denies chest pain or palpitations.  Gastrointestinal:  Denies abdominal pain, nausea, vomiting, diarrhea or constipation.  Genitourinary:  Denies dysuria, urgency or frequency.   Musculoskeletal:  Denies back pain or joint pain.   Dermatologic:  Denies rash.   Neurologic:  Denies headache, focal motor or sensory changes.   Endocrine:  Denies polyuria or polydipsia.   Lymphatic:  Denies swollen glands.   Psychiatric:  Denies depression or anxiety.      PAST HISTORIES:  I have reviewed the patient's medications and allergies, past medical, surgical, social and family history, updating these as appropriate.  See Histories section of the electronic medical record for a display of this information.    OBJECTIVE:  Physical Exam:    Vital Signs:    Vitals:    03/27/23 0832   BP: 100/72   BP Location: LUE - Left upper extremity   Patient Position: Sitting   Cuff Size: Regular   Pulse: 83   SpO2: 98%   Weight: 103.9 kg (229 lb)   Height: 5' 3\" (1.6 m)   LMP: 03/12/2023     HEENT:  Normocephalic, atraumatic.  Conjunctivae pink.  Mucous membranes moist and pink.  Respiratory:  Clear to auscultation bilaterally.  Normal inspiratory  effort.   Cardiovascular:  Regular rate and rhythm.  Normal S1, S2.  No S3, S4.  No murmur.  Dorsalis pedis and posterior tibial pulses present.  Extremities:  No pallor.  No cyanosis. No Pedal edema.  Neurologic: No sensory or motor deficits.      LAB RESULTS:  Pertinent labs reviewed.    Assessment:   1. Essential hypertension    2. Depression with anxiety    3. Establishing care with new doctor, encounter for    4. Screening for hyperlipidemia    5. Screening for diabetes mellitus (DM)    6. Screening for thyroid disorder    7. Vitamin D deficiency    8. Need for Tdap vaccination    9. Need for vaccination            PLAN:    Orders Placed This Encounter   • TETANUS DIPHTHERIA ACELLULAR PERTUSSIS VACC, 10+ YRS (BOOSTRIX)   • Comprehensive Metabolic Panel   • Comprehensive Metabolic Panel   • CBC with Automated Differential   • Glycohemoglobin   • Lipid Panel With Reflex   • Thyroid Stimulating Hormone Reflex   • Vitamin D -25 Hydroxy   • lisinopril-hydroCHLOROthiazide (ZESTORETIC) 20-12.5 MG per tablet     1. Establish care:  -will do yearly labs with annual physical next visit.      2. Primary hypertension:   Will repeat CMP 6 monthly.    -patient has well-controlled blood pressure at this time, denies any dizziness or lightheadedness.    -refilled hydrochlorothiazide and lisinopril combination.   -follow-up in 6 months     3. Depression and anxiety, ADHD:  -follows Psychiatry Dr. Krystle Siddiqui.    -medication management per Psychiatry.    Health promotion:  Tdap vaccine: Administered today.    COVID-19 vaccine:  Declined   Pap smear: Up-to-date.    Return in about 6 months (around 9/27/2023) for annual physical with fasting labs done prior.        Instructions provided as documented in the after visit summary.    The patient indicated understanding of the diagnosis and agreed with the plan of care.     non-ambulatory at this time. Prosthesis being re-sized

## 2023-04-09 NOTE — DISCHARGE NOTE ADULT - NS MD DC FALL RISK RISK
For information on Fall & Injury Prevention, visit [email or web address] [Arrhythmia/ECG Abnorrmalities] : arrhythmia/ECG abnormalities [Structural Heart and Valve Disease] : structural heart and valve disease [Hyperlipidemia] : hyperlipidemia [Hypertension] : hypertension [Other: ____] : [unfilled]

## 2023-07-31 NOTE — CONSULT NOTE ADULT - CONSULT REQUESTED DATE/TIME
MRN: [id number] FMLA/[employer]
Please print FMLA prescan out of [employer] and then complete and sign.   Once form is completed and signed, please fax to [phone].    Please direct any questions to [phone].   Thanks,  Forms Completion Team. 
08-Mar-2018 11:52
08-Mar-2018 14:26
08-Mar-2018 19:35
10-Mar-2018 15:30
14-Mar-2018 15:23

## 2024-02-27 NOTE — ED PROVIDER NOTE - IMAGING STUDIES QUESTION 2 - PERFORMED INDEPENDENT VISUALIZATION
Physical Therapy    Physical Therapy Evaluation    Patient Name: Anatoly Lane  MRN: [id number]  Today's Date: 2/27/2024   Time Calculation  Start Time: 1536  Stop Time: 1601  Time Calculation (min): 25 min    Assessment/Plan   PT Assessment  PT Assessment Results: Decreased endurance  End of Session Communication: Bedside nurse  End of Session Patient Position: Alarm off, not on at start of session (Seated EOB)  IP OR SWING BED PT PLAN  Inpatient or Swing Bed: Inpatient  PT Plan  PT Plan: PT Eval only  PT Eval Only Reason: No acute PT needs identified  PT Frequency: PT eval only  PT Discharge Recommendations: No PT needed after discharge, No further acute PT  PT Recommended Transfer Status: Other (comment) (supervision)  PT - OK to Discharge: Yes      Subjective   General Visit Information:  General  Reason for Referral: ADHF with course c/b GIB on 2/24 and elevated TSH.  Endocopy (2/25) and colonoscopy (2/26), Capsule study (2/27)  all negative for acute bleeding.  Past Medical History Relevant to Rehab: stage D HFrEF 10-15%, ICM, s/p HM3 (2023 )CAD,  pAF, VT s/p ICD, and hypothyroidism  Prior to Session Communication: Bedside nurse  Patient Position Received: Bed, 3 rail up, Alarm off, not on at start of session  General Comment: Pt is pleasant and cooperative. Patient able to ambulate and ascend/ descend 5 stairs with mild increase in SOB (vitals remaining stable)  and quick recovery. (Pre sessionLVAD (speed: 5500, flow: 4.8, PI: 3.8, power: 4.1); Post session LVAD (speed: 5500, flow: 4.5, PI: 3.7, power: 4.1))  Home Living:  Home Living  Type of Home: House  Lives With: Alone (neighbor may be able to help at times but pt. unsure.)  Home Adaptive Equipment: None  Home Layout: Two level, Able to live on main level with bedroom/bathroom (Does not use 2nd floor)  Home Access: Stairs to enter with rails  Entrance Stairs-Rails: Left  Entrance Stairs-Number of Steps: 5  Bathroom Shower/Tub:  (pt. takes sponge baths- not  using shower bag yet, pt. educated about use.)  Bathroom Equipment: None  Prior Level of Function:  Prior Function Per Pt/Caregiver Report  Level of [place]: Independent with ADLs and functional transfers  ADL Assistance: Independent  Homemaking Assistance: Independent  Ambulatory Assistance: Independent (community ambulator- prefers to stay at home, no AD use prior to admission, no h/o falls but 4-5 episodes of light headeness in past year limited to walking upto [address] in the past 2 weeks)  Vocational: On disability  Leisure: watching TV  Precautions:  Precautions  Medical Precautions: Cardiac precautions, Fall precautions  Vital Signs:  Vital Signs  Heart Rate:  (pre: 76, durin, post: 73)  Resp:  (pre: 14, durin, post: 17)  SpO2:  (pre: 96, durin, post: 98)    Objective   Pain:  Pain Assessment  Pain Assessment: 0-10  Pain Score: 0 - No pain  Cognition:  Cognition  Overall Cognitive Status: Within Functional Limits  Arousal/Alertness: Appropriate responses to stimuli  Orientation Level: Oriented X4  Following Commands: Follows all commands and directions without difficulty    General Assessments:    Activity Tolerance  Early Mobility/Exercise Safety Screen: Proceed with mobilization - No exclusion criteria met    Sensation  Light Touch: No apparent deficits    Strength  Strength Comments: Grossly >/=3+/5  Strength  Strength Comments: Grossly >/=3+/5      Postural Control  Postural Control: Within Functional Limits    Static Sitting Balance  Static Sitting-Balance Support: No upper extremity supported, Feet unsupported  Static Sitting-Level of Assistance: Close supervision  Dynamic Sitting Balance  Dynamic Sitting-Balance Support: No upper extremity supported, Feet supported  Dynamic Sitting-Balance:  (Close supervision)  Dynamic Sitting-Comments: Maintains good balance, no SOB/ CP/dizziness.    Static Standing Balance  Static Standing-Balance Support: No upper extremity supported  Static  Standing-Level of Assistance: Close supervision  Dynamic Standing Balance  Dynamic Standing-Balance Support: No upper extremity supported  Dynamic Standing-Balance:  (Close supervision)  Dynamic Standing-Comments: Miantains good balance, no SOB/dizziness/CP reported.  Functional Assessments:  Bed Mobility  Bed Mobility: Yes  Bed Mobility 1  Bed Mobility 1: Supine to sitting  Level of Assistance 1: Distant supervision  Bed Mobility Comments 1: Uses BUE support as needed.    Transfer 1  Technique 1: Sit to stand  Transfer Level of Assistance 1: Close supervision  Trials/Comments 1: 1 trial from EOB, uses BUE support.  Transfers 2  Technique 2: Stand to sit  Transfer Level of Assistance 2: Close supervision  Trials/Comments 2: 1 trial from EOB, good eccentric control    Ambulation/Gait Training  Ambulation/Gait Training Performed: Yes  Ambulation/Gait Training 1  Surface 1: Level tile  Device 1: No device  Assistance 1: Close supervision  Quality of Gait 1: Wide base of support (good los, mild SOB not requiring break)  Comments/Distance (ft) 1: 500 ft    Stairs  Stairs: Yes  Stairs  Rails 1: Left  Curb Step 1: No  Device 1: Railing  Assistance 1: Contact guard  Comment/Number of Steps 1: 5 ascending and 5 descending  Extremity/Trunk Assessments:  RLE   RLE : Within Functional Limits  LLE   LLE : Within Functional Limits  Outcome Measures:  [facility] Basic Mobility  Turning from your back to your side while in a flat bed without using bedrails: A little  Moving from lying on your back to sitting on the side of a flat bed without using bedrails: A little  Moving to and from bed to chair (including a wheelchair): A little  Standing up from a chair using your arms (e.g. wheelchair or bedside chair): A little  To walk in hospital room: A little  Climbing 3-5 steps with railing: A little  Basic Mobility - Total Score: 18    Confusion Assessment Method-ICU (CAM-ICU)  Feature 1: Acute Onset or Fluctuating Course:  Negative  Overall CAM-ICU: Negative    FSS-ICU  Ambulation: Walks >/ or equal to 150 feet with supervision  Rolling: Supervision or set-up only  Sitting: Supervision or set-up only  Transfer Sit-to-Stand: Supervision or set-up only  Transfer Supine-to-Sit: Supervision or set-up only  Total Score: 25      ICU Mobility Screen  Early Mobility/Exercise Safety Screen: Proceed with mobilization - No exclusion criteria met  E = Exercise and Early Mobility  Early Mobility/Exercise Safety Screen: Proceed with mobilization - No exclusion criteria met  Current Activity: Ambulating in [place]        Education Documentation  Mobility Training, taught by VENU Kinsey at 2/27/2024  4:50 PM.  Learner: Patient  Readiness: Acceptance  Method: Explanation  Response: Verbalizes Understanding    Education Comments  No comments found.      Makenna Badillo, SPT         Yes

## 2024-04-02 NOTE — PRE-ANESTHESIA EVALUATION ADULT - MALLAMPATI CLASS
Class IV (difficult) - the soft palate is not visible at all
Class III - visualization of the soft palate and the base of the uvula
Class III - visualization of the soft palate and the base of the uvula
02-Apr-2024 06:07

## 2024-06-15 NOTE — DISCHARGE NOTE ADULT - NURSING SECTION COMPLETE
PLASTIC  SURGERY  CONSULTATION        Reason for consultation:       Open wound left knee      History of present illness:      63-year-old female status post patellar fracture with open reduction internal fixation developed an open wound with dehiscence underwent incision and drainage now with a wound VAC        Past medical history:      Congestive heart failure  Anxiety  Arthritis  Factor V Leiden  Depression  Gout  Hepatomegaly  Psoriasis  Sleep apnea        Past surgical history:      Tubal ligation  Cholecystectomy  Total hip arthroplasty  Cardiac catheterization      Medications:       Bumetanide  Colace  Lovenox  Neurontin  Metoprolol  Versed  Entresto  Zoloft  Celebrex  Vancomycin  Albuterol    ALLERGIES:      Ceftriaxone  Codeine  Penicillin  Percocet  Valsalva trend  Bactrim  Latex  Hydrogen peroxide        Social history:      Admits to alcohol use and smoking      Family history:      Aneurysm            Review of systems:  At least 10 systems reviewed and are otherwise negative except for as noted in the history of present illness                PHYSICAL  EXAMINATION       Height:      5 foot 4 inches              weight:      246 pounds                    Vital signs:    Temperature 36 pulse 60 blood pressure 120/59    General: Well-developed, female           in no acute distress, alert and oriented ×3    HEENT:   Within normal limits    Neck:   Supple, no observable masses    Lungs: Clear to auscultation    Heart: Regular rate and rhythm    Abdomen: Soft, nontender    Extremities: Full range of motion; left knee with large dressing and wound VAC in place    Neurological: Grossly within normal limits                Impression:      Open wound left knee      Recommendation:      Continue wound VAC        Thank you for the referral.    Roland Reyes, MD        *These notes are being done using Dragon voice recognition technology and may include unintended errors with respect to translation of  words, typographical errors or grammar errors which may not have been identified prior to finalization of the chart note.     Patient/Caregiver provided printed discharge information.

## 2024-07-05 NOTE — BH CONSULTATION LIAISON ASSESSMENT NOTE - NSBHMSEINTELL_PSY_A_CORE
"Referring Provider: Boom Figueroa MD    Reason for follow-up: atrial fibrillation     Patient Care Team:  Rut Pierce APRN as PCP - General (Nurse Practitioner)  Austin Brooks MD as Cardiologist (Cardiology)      SUBJECTIVE  The patient's mental status declined overnight. She was found to have acute hypercapnic and hypoxic respiratory failure. She is currently on AVAPS and a nasogastric tube was inserted for medication administration. She remains in a-fib, but her heart rate is controlled.      ROS  Review of all systems negative except as indicated.    Since I have last seen, the patient has been without any chest discomfort, shortness of breath, palpitations, dizziness or syncope.  Denies having any headache, abdominal pain, nausea, vomiting, diarrhea, constipation, loss of weight or loss of appetite.  Denies having any excessive bruising, hematuria or blood in the stool.        Personal History:    Past Medical History:   Diagnosis Date    Bilateral leg edema     Chronic respiratory failure with hypoxia, on home O2 therapy 07/01/2024    COPD (chronic obstructive pulmonary disease)     Morbid obesity with BMI of 40.0-44.9, adult 11/08/2010    Primary hypertension 03/01/2017    Pulmonary embolism     \"many years ago, was on warfarin\"    Sleep apnea        History reviewed. No pertinent surgical history.    History reviewed. No pertinent family history.    Social History     Tobacco Use    Smoking status: Never    Smokeless tobacco: Never   Vaping Use    Vaping status: Never Used   Substance Use Topics    Alcohol use: Never    Drug use: Never        Home meds:  Prior to Admission medications    Medication Sig Start Date End Date Taking? Authorizing Provider   Allergy Relief 180 MG tablet Take 1 tablet by mouth Daily. 5/30/24  Yes Chadwick Johnson MD   bumetanide (BUMEX) 1 MG tablet Take 1 tablet by mouth 3 times a day. 5/30/24  Yes ProviderChadwick MD   docusate sodium (COLACE) 100 MG capsule " Take 1 capsule by mouth Every 12 (Twelve) Hours. 5/30/24  Yes Chadwick Johnson MD   furosemide (LASIX) 20 MG tablet Take 1 tablet by mouth Daily.   Yes Chadwick Johnson MD   HYDROcodone-acetaminophen (NORCO)  MG per tablet Take 1 tablet by mouth 3 times a day. 5/30/24  Yes Provider, Historical, MD   lisinopril (PRINIVIL,ZESTRIL) 30 MG tablet Take 1 tablet by mouth Daily. 3/18/24  Yes Provider, Historical, MD   meloxicam (MOBIC) 7.5 MG tablet Take 1 tablet by mouth Daily As Needed. 3/18/24  Yes Provider, Historical, MD   metoprolol tartrate (LOPRESSOR) 50 MG tablet Take 1 tablet by mouth Daily.   Yes Provider, Historical, MD   montelukast (SINGULAIR) 10 MG tablet Take 1 tablet by mouth every night at bedtime.   Yes Provider, Historical, MD   omeprazole (priLOSEC) 20 MG capsule Take 1 capsule by mouth Daily. 3/18/24  Yes Provider, Historical, MD   oxybutynin XL (DITROPAN-XL) 10 MG 24 hr tablet Take 2 tablets by mouth Daily. 3/18/24  Yes Provider, Historical, MD   potassium chloride (MICRO-K) 10 MEQ CR capsule Take 1 capsule by mouth Every 12 (Twelve) Hours. 3/18/24  Yes Provider, Historical, MD   vitamin D (ERGOCALCIFEROL) 1.25 MG (97824 UT) capsule capsule Take 1 capsule by mouth 2 (Two) Times a Week. Monday and Thursday. 5/30/24  Yes Chadwick Johnson MD       Allergies:  Patient has no known allergies.    Scheduled Meds:amiodarone, 200 mg, Nasogastric, Q8H   Followed by  [START ON 7/9/2024] amiodarone, 200 mg, Oral, Q12H   Followed by  [START ON 7/23/2024] amiodarone, 200 mg, Oral, Daily  bumetanide, 1 mg, Nasogastric, BID  cephalexin, 500 mg, Nasogastric, Q8H  dextrose, 25 g, Intravenous, Once  doxycycline, 100 mg, Nasogastric, Q12H  metoprolol tartrate, 12.5 mg, Nasogastric, Q12H  miconazole, 1 Application, Topical, Q12H  midodrine, 10 mg, Nasogastric, Q8H  pantoprazole, 40 mg, Intravenous, Q AM  povidone-iodine, , Topical, Daily  rivaroxaban, 15 mg, Oral, BID With Meals   Followed by  [START  "ON 7/24/2024] rivaroxaban, 20 mg, Oral, Daily With Dinner  sodium chloride, 10 mL, Intravenous, Q12H      Continuous Infusions:   PRN Meds:.  acetaminophen **OR** acetaminophen    Calcium Replacement - Follow Nurse / BPA Driven Protocol    ipratropium-albuterol    Magnesium Standard Dose Replacement - Follow Nurse / BPA Driven Protocol    nitroglycerin    ondansetron ODT **OR** ondansetron    Phosphorus Replacement - Follow Nurse / BPA Driven Protocol    Potassium Replacement - Follow Nurse / BPA Driven Protocol    [COMPLETED] Insert Peripheral IV **AND** sodium chloride    sodium chloride    sodium chloride      OBJECTIVE    Vital Signs  Vitals:    07/05/24 0800 07/05/24 0805 07/05/24 0810 07/05/24 0815   BP: 90/56 103/51 (!) 89/47    BP Location:       Patient Position:       Pulse: 102 91 94 94   Resp:       Temp:       TempSrc:       SpO2: 94% 98% 97% 96%   Weight:       Height:           Flowsheet Rows      Flowsheet Row First Filed Value   Admission Height 147.3 cm (58\") Documented at 06/30/2024 1650   Admission Weight 108 kg (239 lb) Documented at 06/30/2024 1650              Intake/Output Summary (Last 24 hours) at 7/5/2024 0906  Last data filed at 7/5/2024 0600  Gross per 24 hour   Intake 866 ml   Output 585 ml   Net 281 ml          Telemetry:  atrial fibrillation, RBBB    Physical Exam:  The patient is lethargic and difficult to arouse.  Morbidly obese.   Vital signs as noted above.  Head and neck revealed no carotid bruits or jugular venous distention.    Lungs with coarse rhonchi and diminished bases. On AVAPS.   Heart:  rhythm irregularly irregular.  Normal first and second heart sounds.  No precordial rub is present.  No gallop is present.  Abdomen soft and nontender.   Extremities with good peripheral pulses with BLE edema.  Skin warm. BLE erythema.  Ulcerations on both great toes.   Musculoskeletal system is grossly normal.  CNS:  lethargic, confused       Results Review:  I have personally reviewed " the results from the time of this admission to 7/5/2024 09:06 EDT and agree with these findings:  [x]  Laboratory  [x]  Microbiology  [x]  Radiology  [x]  EKG/Telemetry   [x]  Cardiology/Vascular   []  Pathology  [x]  Old records  []  Other:    Most notable findings include:    Lab Results (last 24 hours)       Procedure Component Value Units Date/Time    Blood Gas, Arterial - [[id number]]  (Abnormal) Collected: 07/05/24 0711    Specimen: Arterial Blood Updated: 07/05/24 0821     Site Left Radial     Reece's Test N/A     pH, Arterial 7.281 pH units      pCO2, Arterial 78.4 mm Hg      pO2, Arterial 106.7 mm Hg      HCO3, Arterial 37.0 mmol/L      Base Excess, Arterial 6.5 mmol/L      Comment: Serial Number: [id number]:  [id number]        O2 Saturation, Arterial 97.0 %      CO2 Content 39.4 mmol/L      Barometric Pressure for Blood Gas --     Comment: N/A        Modality BiPap     FIO2 75 %      Ventilator Mode PC/PS     Set Tidal Volume 450     PEEP 5     Notified Who --     Read Back --     Notified Time --     Hemodilution No     Respiratory Rate 24     PO2/FIO2 142    POC Glucose Once [[id number]]  (Abnormal) Collected: 07/05/24 0736    Specimen: Blood Updated: 07/05/24 0737     Glucose 144 mg/dL      Comment: Serial Number: [id number]:  [id number]       Blood Gas, Arterial - [[id number]]  (Abnormal) Collected: 07/05/24 0442    Specimen: Arterial Blood Updated: 07/05/24 0454     Site Left Radial     Reece's Test Positive     pH, Arterial 7.250 pH units      pCO2, Arterial 80.9 mm Hg      pO2, Arterial 76.6 mm Hg      HCO3, Arterial 35.5 mmol/L      Base Excess, Arterial 4.5 mmol/L      Comment: Serial Number: [id number]:  [id number]        O2 Saturation, Arterial 91.7 %      CO2 Content 37.9 mmol/L      Barometric Pressure for Blood Gas --     Comment: N/A        Modality BiPap     FIO2 75 %      Ventilator Mode PRVC/PS     Set Tidal Volume 450     PEEP 5     Notified Who lise bach     Read Back Yes     Notified  Time --     Hemodilution No     Respiratory Rate 24     PO2/FIO2 102    POC Potassium [[id number]]  (Abnormal) Collected: 07/05/24 0442    Specimen: Arterial Blood Updated: 07/05/24 0447     POC Potassium 5.0 mmol/L      Comment: Serial Number: [id number]:  [id number]       POC Glucose Once [[id number]]  (Abnormal) Collected: 07/05/24 0442    Specimen: Arterial Blood Updated: 07/05/24 0447     Glucose 207 mg/dL      Comment: Serial Number: [id number]:  [id number]       Blood Gas, Arterial - [[id number]]  (Abnormal) Collected: 07/05/24 0353    Specimen: Arterial Blood Updated: 07/05/24 0410     Site Left Radial     Reece's Test N/A     pH, Arterial 7.204 pH units      pCO2, Arterial 94.8 mm Hg      pO2, Arterial 87.0 mm Hg      HCO3, Arterial 37.4 mmol/L      Base Excess, Arterial 4.6 mmol/L      Comment: Serial Number: [id number]:  [id number]        O2 Saturation, Arterial 93.1 %      CO2 Content 40.4 mmol/L      Barometric Pressure for Blood Gas --     Comment: N/A        Modality BiPap     FIO2 75 %      Set Tidal Volume 440     PEEP 5     PSV 10 cmH2O      Notified Who mona     Read Back Yes     Notified Time --     Hemodilution No     Respiratory Rate 20     PO2/FIO2 116    Magnesium [[id number]]  (Normal) Collected: 07/05/24 0257    Specimen: Blood Updated: 07/05/24 0333     Magnesium 2.0 mg/dL     Comprehensive Metabolic Panel [[id number]]  (Abnormal) Collected: 07/05/24 0257    Specimen: Blood Updated: 07/05/24 0333     Glucose 99 mg/dL      BUN 65 mg/dL      Creatinine 1.88 mg/dL      Sodium 136 mmol/L      Potassium 5.9 mmol/L      Comment: Slight hemolysis detected by analyzer. Result may be falsely elevated.        Chloride 95 mmol/L      CO2 35.0 mmol/L      Calcium 9.9 mg/dL      Total Protein 6.7 g/dL      Albumin 2.5 g/dL      ALT (SGPT) 16 U/L      AST (SGOT) 28 U/L      Alkaline Phosphatase 129 U/L      Total Bilirubin 1.6 mg/dL      Globulin 4.2 gm/dL      A/G Ratio 0.6 g/dL      BUN/Creatinine Ratio  34.6     Anion Gap 6.0 mmol/L      eGFR 28.6 mL/min/1.73     Narrative:      GFR Normal >60  Chronic Kidney Disease <60  Kidney Failure <15      Phosphorus [[id number]]  (Abnormal) Collected: 07/05/24 0257    Specimen: Blood Updated: 07/05/24 0332     Phosphorus 5.3 mg/dL     Vancomycin, Trough [[id number]]  (Normal) Collected: 07/05/24 0257    Specimen: Blood Updated: 07/05/24 0332     Vancomycin Trough 15.40 mcg/mL     Narrative:      Therapeutic Ranges for Vancomycin    Vancomycin Random   5.0-40.0 mcg/mL  Vancomycin Trough   5.0-20.0 mcg/mL  Vancomycin Peak     20.0-40.0 mcg/mL    CBC & Differential [[id number]]  (Abnormal) Collected: 07/05/24 0257    Specimen: Blood Updated: 07/05/24 0303    Narrative:      The following orders were created for panel order CBC & Differential.  Procedure                               Abnormality         Status                     ---------                               -----------         ------                     CBC Auto Differential[[id number]]        Abnormal            Final result                 Please view results for these tests on the individual orders.    CBC Auto Differential [[id number]]  (Abnormal) Collected: 07/05/24 0257    Specimen: Blood Updated: 07/05/24 0303     WBC 11.98 10*3/mm3      RBC 4.78 10*6/mm3      Hemoglobin 14.6 g/dL      Hematocrit 48.1 %      .6 fL      MCH 30.5 pg      MCHC 30.4 g/dL      RDW 15.8 %      RDW-SD 58.4 fl      MPV 10.3 fL      Platelets 133 10*3/mm3      Neutrophil % 86.9 %      Lymphocyte % 4.4 %      Monocyte % 6.8 %      Eosinophil % 0.1 %      Basophil % 0.2 %      Immature Grans % 1.6 %      Neutrophils, Absolute 10.41 10*3/mm3      Lymphocytes, Absolute 0.53 10*3/mm3      Monocytes, Absolute 0.82 10*3/mm3      Eosinophils, Absolute 0.01 10*3/mm3      Basophils, Absolute 0.02 10*3/mm3      Immature Grans, Absolute 0.19 10*3/mm3      nRBC 0.8 /100 WBC     POC Glucose Once [[id number]]  (Abnormal) Collected: 07/05/24 0258     Specimen: Blood Updated: 07/05/24 0300     Glucose 107 mg/dL      Comment: Serial Number: [id number]:  [id number]       Blood Gas, Arterial - [[id number]]  (Abnormal) Collected: 07/05/24 0212    Specimen: Arterial Blood Updated: 07/05/24 0227     Site Left Radial     Reece's Test N/A     pH, Arterial 7.208 pH units      pCO2, Arterial 92.8 mm Hg      pO2, Arterial 76.7 mm Hg      HCO3, Arterial 37.0 mmol/L      Base Excess, Arterial 4.4 mmol/L      Comment: Serial Number: [id number]:  [id number]        O2 Saturation, Arterial 90.5 %      CO2 Content 39.8 mmol/L      Barometric Pressure for Blood Gas --     Comment: N/A        Modality HFNC     FIO2 56 %      Notified Who mona     Read Back Yes     Notified Time --     Hemodilution No     PO2/FIO2 137    POC Glucose Once [[id number]]  (Normal) Collected: 07/05/24 0220    Specimen: Blood Updated: 07/05/24 0222     Glucose 92 mg/dL      Comment: Serial Number: [id number]:  [id number]       POC Glucose Once [[id number]] Collected: 07/05/24 0212    Specimen: Arterial Blood Updated: 07/05/24 0218     Glucose --     Comment: Serial Number: [id number]:  [id number]       POC Glucose Once [[id number]]  (Normal) Collected: 07/04/24 2249    Specimen: Blood Updated: 07/04/24 2323     Glucose 81 mg/dL      Comment: Serial Number: [id number]:  [id number]       Potassium [[id number]]  (Abnormal) Collected: 07/04/24 2248    Specimen: Blood Updated: 07/04/24 2310     Potassium 5.4 mmol/L      Comment: Specimen hemolyzed.  Result may be falsely elevated.       POC Glucose Once [[id number]]  (Abnormal) Collected: 07/04/24 2007    Specimen: Blood Updated: 07/04/24 2115     Glucose 150 mg/dL      Comment: Serial Number: [id number]:  [id number]       Blood Culture - Blood, Arm, Right [[id number]]  (Normal) Collected: 06/30/24 1844    Specimen: Blood from Arm, Right Updated: 07/04/24 1915     Blood Culture No growth at 4 days    Narrative:      Less than seven (7) mL's of  blood was collected.  Insufficient quantity may yield false negative results.    Blood Culture - Blood, Arm, Right [[id number]]  (Normal) Collected: 06/30/24 1757    Specimen: Blood from Arm, Right Updated: 07/04/24 1815     Blood Culture No growth at 4 days    Narrative:      Less than seven (7) mL's of blood was collected.  Insufficient quantity may yield false negative results.    Basic Metabolic Panel [[id number]]  (Abnormal) Collected: 07/04/24 1727    Specimen: Blood Updated: 07/04/24 1809     Glucose 101 mg/dL      BUN 60 mg/dL      Creatinine 1.95 mg/dL      Sodium 137 mmol/L      Potassium 6.0 mmol/L      Comment: Specimen hemolyzed.  Result may be falsely elevated.        Chloride 95 mmol/L      CO2 33.9 mmol/L      Calcium 9.5 mg/dL      BUN/Creatinine Ratio 30.8     Anion Gap 8.1 mmol/L      eGFR 27.4 mL/min/1.73     Narrative:      GFR Normal >60  Chronic Kidney Disease <60  Kidney Failure <15      BNP [[id number]]  (Abnormal) Collected: 07/04/24 1727    Specimen: Blood Updated: 07/04/24 1759     proBNP 13,484.0 pg/mL     Narrative:      This assay is used as an aid in the diagnosis of individuals suspected of having heart failure. It can be used as an aid in the diagnosis of acute decompensated heart failure (ADHF) in patients presenting with signs and symptoms of ADHF to the emergency department (ED). In addition, NT-proBNP of <300 pg/mL indicates ADHF is not likely.    Age Range Result Interpretation  NT-proBNP Concentration (pg/mL:      <50             Positive            >450                   Gray                 300-450                    Negative             <300    50-75           Positive            >900                  Gray                300-900                  Negative            <300      >75             Positive            >1800                  Gray                300-1800                  Negative            <300    Protein, Urine, Random - Urine, Clean Catch [[id number]] Collected:  07/04/24 1717    Specimen: Urine, Clean Catch Updated: 07/04/24 1749     Total Protein, Urine 25.9 mg/dL     Narrative:      Reference intervals for random urine have not been established.  Clinical usage is dependent upon physician's interpretation in combination with other laboratory tests.       Sodium, Urine, Random - Urine, Clean Catch [[id number]] Collected: 07/04/24 1717    Specimen: Urine, Clean Catch Updated: 07/04/24 1749     Sodium, Urine <20 mmol/L     Narrative:      Reference intervals for random urine have not been established.  Clinical usage is dependent upon physician's interpretation in combination with other laboratory tests.       Creatinine Urine Random (kidney function) GFR component - Urine, Clean Catch [[id number]] Collected: 07/04/24 1717    Specimen: Urine, Clean Catch Updated: 07/04/24 1725            Imaging Results (Last 24 Hours)       Procedure Component Value Units Date/Time    XR Chest 1 View [[id number]] Resulted: 07/05/24 0848     Updated: 07/05/24 0848    XR Abdomen KUB [[id number]] Collected: 07/04/24 2156     Updated: 07/04/24 2201    Narrative:      XR ABDOMEN KUB    Date of Exam: 7/4/2024 8:30 PM EDT    Indication: dobhoff    Comparison: None available.    Findings:  There is a feeding tube in the stomach. The gas pattern is nonspecific. Densities in the left upper abdomen may be contrast within the stomach.      Impression:      Impression:  Feeding tube is in the stomach.        Electronically Signed: Obed Sidhu MD    7/4/2024 9:59 PM EDT    Workstation ID: [id number]    US Renal Bilateral [[id number]] Collected: 07/04/24 1613     Updated: 07/04/24 1616    Narrative:      US RENAL BILATERAL    Date of Exam: 7/4/2024 3:00 PM EDT    Indication: ARABELLA.    Comparison: CT abdomen and pelvis without 7/1/2024    Technique: Grayscale and color Doppler ultrasound evaluation of the kidneys and urinary bladder was performed.      Findings:  RIGHT kidney measures 9.8 x 5.5 x 4.2 cm.  Kidney  echogenicity, size, and vascularity appear within normal limits. There is no solid kidney mass. There is a 1.1 cm simple peripelvic cyst. No echogenic shadowing stone.  No hydronephrosis.    LEFT kidney measures 9.7 x 5.7 x 4.9 cm. Kidney echogenicity, size, and vascularity appear within normal limits. There is no solid kidney mass.  No echogenic shadowing stone.  No hydronephrosis.    Urinary bladder is unremarkable in appearance with volume of 111 cc at time of exam.        Impression:      Impression:  No acute process nor significant abnormality identified.        Electronically Signed: Beto Kaur MD    7/4/2024 4:14 PM EDT    Workstation ID: [id number]            LAB RESULTS (LAST 7 DAYS)    CBC  Results from last 7 days   Lab Units 07/05/24  0257 07/04/24  0414 07/03/24 0635 07/02/24  0329 07/01/24  0910 06/30/24  1858 06/30/24  1732   WBC 10*3/mm3 11.98* 9.76 10.53 10.53 13.80*  --  16.80*   RBC 10*6/mm3 4.78 4.80 4.63 4.48 4.62  --  5.16   HEMOGLOBIN g/dL 14.6 14.7 14.4 13.8 14.2  --  15.9   HEMOGLOBIN, POC g/dL  --   --   --   --   --  17.0  --    HEMATOCRIT % 48.1* 47.3* 46.0 42.9 43.8  --  49.0*   HEMATOCRIT POC %  --   --   --   --   --  50  --    MCV fL 100.6* 98.5* 99.4* 95.8 94.8  --  95.0   PLATELETS 10*3/mm3 133* 123* 104* 92* 104*  --  119*       BMP  Results from last 7 days   Lab Units 07/05/24  0257 07/04/24 2248 07/04/24  1727 07/04/24  0414 07/03/24 2001 07/03/24  0635 07/02/24  2042 07/02/24  0329 07/01/24  1633 07/01/24  0910 06/30/24  2043   SODIUM mmol/L 136  --  137 136  --  139  --  138  --  134* 131*   POTASSIUM mmol/L 5.9* 5.4* 6.0* 5.9* 5.2 3.5 3.3* 3.5   < > 5.4* 6.5*   CHLORIDE mmol/L 95*  --  95* 94*  --  95*  --  96*  --  97* 101   CO2 mmol/L 35.0*  --  33.9* 33.4*  --  36.9*  --  34.5*  --  31.4* 24.4   BUN mg/dL 65*  --  60* 54*  --  45*  --  43*  --  46* 50*   CREATININE mg/dL 1.88*  --  1.95* 1.77*  --  1.31*  --  1.14*  --  1.33* 1.38*   GLUCOSE mg/dL 99  --  101* 108*   --  107*  --  82  --  102* 194*   MAGNESIUM mg/dL 2.0  --   --  1.9  --  1.8  --  1.7  --  2.0  --    PHOSPHORUS mg/dL 5.3*  --   --  5.8*  --  4.8*  --  4.6*  --  3.4  --     < > = values in this interval not displayed.       CMP   Results from last 7 days   Lab Units 07/05/24  0257 07/04/24  2248 07/04/24  1727 07/04/24  0414 07/03/24 2001 07/03/24  0635 07/02/24  2042 07/02/24  0329 07/01/24  1633 07/01/24  0910 06/30/24 2043   SODIUM mmol/L 136  --  137 136  --  139  --  138  --  134* 131*   POTASSIUM mmol/L 5.9* 5.4* 6.0* 5.9* 5.2 3.5 3.3* 3.5   < > 5.4* 6.5*   CHLORIDE mmol/L 95*  --  95* 94*  --  95*  --  96*  --  97* 101   CO2 mmol/L 35.0*  --  33.9* 33.4*  --  36.9*  --  34.5*  --  31.4* 24.4   BUN mg/dL 65*  --  60* 54*  --  45*  --  43*  --  46* 50*   CREATININE mg/dL 1.88*  --  1.95* 1.77*  --  1.31*  --  1.14*  --  1.33* 1.38*   GLUCOSE mg/dL 99  --  101* 108*  --  107*  --  82  --  102* 194*   ALBUMIN g/dL 2.5*  --   --  2.6*  --  2.5*  --  2.3*  --  2.5* 2.5*   BILIRUBIN mg/dL 1.6*  --   --  1.3*  --  1.0  --  0.9  --  1.3* 1.6*   ALK PHOS U/L 129*  --   --  138*  --  130*  --  109  --  115 126*   AST (SGOT) U/L 28  --   --  38*  --  30  --  24  --  39* 42*   ALT (SGPT) U/L 16  --   --  17  --  16  --  14  --  17 18    < > = values in this interval not displayed.       BNP        TROPONIN  Results from last 7 days   Lab Units 07/01/24  0910   HSTROP T ng/L 36*       CoAg        Creatinine Clearance  Estimated Creatinine Clearance: 29.3 mL/min (A) (by C-G formula based on SCr of 1.88 mg/dL (H)).    ABG  Results from last 7 days   Lab Units 07/05/24  0711 07/05/24  0442 07/05/24  0353 07/05/24  0212   PH, ARTERIAL pH units 7.281* 7.250* 7.204* 7.208*   PCO2, ARTERIAL mm Hg 78.4* 80.9* 94.8* 92.8*   PO2 ART mm Hg 106.7 76.6* 87.0 76.7*   O2 SATURATION ART % 97.0 91.7* 93.1* 90.5*   BASE EXCESS ART mmol/L 6.5* 4.5* 4.6* 4.4*       Radiology  XR Abdomen KUB    Result Date: 7/4/2024  Impression: Feeding  tube is in the stomach. Electronically Signed: Obed Sidhu MD  7/4/2024 9:59 PM EDT  Workstation ID: [id number]    US Renal Bilateral    Result Date: 7/4/2024  Impression: No acute process nor significant abnormality identified. Electronically Signed: Beto Kaur MD  7/4/2024 4:14 PM EDT  Workstation ID: [id number]    XR Chest 1 View    Result Date: 7/4/2024  Impression: Cardiomegaly with pulmonary vascular congestion and bilateral pleural effusions. Bibasilar airspace disease could be atelectasis or pneumonia. Electronically Signed: Obed Sidhu MD  7/4/2024 2:34 AM EDT  Workstation ID: [id number]       EKG  I personally viewed and interpreted the patient's EKG/Telemetry data:  ECG 12 Lead Drug Monitoring; amiodarone   Preliminary Result   HEART OHBK=130  bpm   RR Cblhnnbd=630  ms   MS Interval=  ms   P Horizontal Axis=  deg   P Front Axis=  deg   QRSD Clwypmgu=061  ms   QT Nndqhlqq=113  ms   ZViA=985  ms   QRS Axis=-94  deg   T Wave Axis=70  deg   - ABNORMAL ECG -   Atrial fibrillation   Ventricular premature complex   Right bundle branch block   Date and Time of Study:2024-07-05 02:24:17      ECG 12 Lead Electrolyte Imbalance   Preliminary Result   HEART CZJX=477  bpm   RR Iwuoovln=725  ms   MS Ufpebknq=115  ms   P Horizontal Axis=113  deg   P Front Axis=263  deg   QRSD Apmrvxlb=080  ms   QT Ayefbjaf=792  ms   KUaA=462  ms   QRS Axis=227  deg   T Wave Axis=27  deg   - ABNORMAL ECG -   Sinus tachycardia with irregular rate   Consider dextrocardia   Date and Time of Study:2024-07-04 09:17:07      ECG 12 Lead Drug Monitoring; Amiodarone   Preliminary Result   HEART ZDYI=539  bpm   RR Yrdtyzxq=283  ms   MS Interval=  ms   P Horizontal Axis=  deg   P Front Axis=  deg   QRSD Rrvnpzdh=646  ms   QT Mrpfjova=974  ms   OZnC=845  ms   QRS Axis=-90  deg   T Wave Axis=36  deg   - ABNORMAL ECG -   Atrial fibrillation   Right bundle branch block   ST elevation secondary to IVCD   Date and Time of Study:2024-07-04 04:20:51      ECG  12 Lead Chest Pain   Preliminary Result   HEART RHWA=884  bpm   RR Rrhzajtj=700  ms   HI Interval=  ms   P Horizontal Axis=  deg   P Front Axis=  deg   QRSD Xzdevplc=406  ms   QT Onbwqvdp=323  ms   YKvE=945  ms   QRS Axis=-98  deg   T Wave Axis=37  deg   - ABNORMAL ECG -   Atrial fibrillation   Right bundle branch block   Anterolateral infarct, old   Date and Time of Study:2024-07-03 17:38:52      ECG 12 Lead Drug Monitoring; Amiodarone   Preliminary Result   HEART EJDQ=474  bpm   RR Hvmgcueb=916  ms   HI Interval=  ms   P Horizontal Axis=  deg   P Front Axis=  deg   QRSD Tguvuixe=101  ms   QT Xbpsympa=672  ms   PRaY=201  ms   QRS Axis=-90  deg   T Wave Axis=74  deg   - ABNORMAL ECG -   Atrial fibrillation   Right bundle branch block   ST elevation secondary to IVCD   Date and Time of Study:2024-07-03 04:02:32      ECG 12 Lead Drug Monitoring; Amiodarone   Preliminary Result   HEART DCEE=829  bpm   RR Dnjqhele=851  ms   HI Interval=  ms   P Horizontal Axis=  deg   P Front Axis=  deg   QRSD Txwyiwmm=102  ms   QT Stwwxzhy=754  ms   KPhW=129  ms   QRS Axis=-93  deg   T Wave Axis=45  deg   - ABNORMAL ECG -   Atrial fibrillation   Right bundle branch block   Inferior infarct, old   When compared with ECG of 01-Jul-2024 04:42:13,   Nonspecific significant change   Date and Time of Study:2024-07-02 15:12:56      ECG 12 Lead Drug Monitoring; Amiodarone   Final Result   HEART NPNU=412  bpm   RR Gbndiqrb=450  ms   HI Interval=  ms   P Horizontal Axis=  deg   P Front Axis=  deg   QRSD Dilnxzxs=627  ms   QT Ngdtyxie=597  ms   RHaD=564  ms   QRS Axis=-80  deg   T Wave Axis=102  deg   - ABNORMAL ECG -   Atrial fibrillation   Right bundle branch block   Inferior  infarct, old   Anterolateral  infarct, age indeterminate   When compared with ECG of 30-Jun-2024 16:58:43,   Significant rate decrease   New or worsened ischemia or infarction   Electronically Signed By: Austin Brooks ([facility]) 2024-07-01 13:11:59   Date and Time of  Study:2024-07-01 04:42:13      ECG 12 Lead Dyspnea   Final Result   HEART IOTG=237  bpm   RR Irstytyq=160  ms   MI Interval=  ms   P Horizontal Axis=  deg   P Front Axis=  deg   QRSD Duamatpk=208  ms   QT Srzhlaay=510  ms   CTdW=867  ms   QRS Axis=-101  deg   T Wave Axis=43  deg   - ABNORMAL ECG -   Atrial fibrillation   Right bundle branch block   ST elevation secondary to IVCD   Electronically Signed By: Jeffery Kwon ([facility]) 2024-07-01 07:37:02   Date and Time of Study:2024-06-30 16:58:43      Telemetry Scan   Final Result      Telemetry Scan   Final Result      Telemetry Scan   Final Result      Telemetry Scan   Final Result      Telemetry Scan   Final Result      Telemetry Scan   Final Result      Telemetry Scan   Final Result      Telemetry Scan   Final Result      Telemetry Scan   Final Result      Telemetry Scan   Final Result      Telemetry Scan   Final Result      Telemetry Scan   Final Result      Telemetry Scan   Final Result      Telemetry Scan   Final Result      Telemetry Scan   Final Result      Telemetry Scan   Final Result      Telemetry Scan   Final Result      Telemetry Scan   Final Result      Telemetry Scan   Final Result      Telemetry Scan   Final Result      Telemetry Scan   Final Result      Telemetry Scan   Final Result      Telemetry Scan   Final Result      Telemetry Scan   Final Result      Telemetry Scan   Final Result      Telemetry Scan   Final Result      Telemetry Scan   Final Result      Telemetry Scan   Final Result      ECG 12 Lead Electrolyte Imbalance    (Results Pending)   ECG 12 Lead Rhythm Change    (Results Pending)         Echocardiogram:    Results for orders placed during the hospital encounter of 06/30/24    Adult Transthoracic Echo Complete w/ Color, Spectral and Contrast if Necessary Per Protocol    Interpretation Summary    Left ventricular ejection fraction appears to be 31 - 35%.    Left ventricular diastolic dysfunction is noted.    The left atrial cavity is  dilated.    Moderate aortic valve stenosis is present. Mean/peak gradient of 14/24 mmHg.  Dimensionless index 0.36    Moderate to severe mitral valve regurgitation is present.    Estimated right ventricular systolic pressure from tricuspid regurgitation is normal (<35 mmHg).        Stress Test:         Cardiac Catheterization:  No results found for this or any previous visit.         Other:         ASSESSMENT & PLAN:    Principal Problem:    Atrial fibrillation with RVR  Active Problems:    Primary hypertension    Morbid obesity with BMI of 40.0-44.9, adult    Right bundle branch block    Acute deep vein thrombosis (DVT) of both lower extremities    ARABELLA (acute kidney injury)    Acute hyperkalemia    Sepsis    Acute UTI    Acute systolic congestive heart failure    Rhinovirus infection    Multifocal pneumonia    Chronic respiratory failure with hypoxia, on home O2 therapy    Skin ulcer of right great toe    Skin ulcer of left great toe    SEDRICK (obstructive sleep apnea)    Aortic stenosis    Mitral regurgitation    Acute HFrEF (heart failure with reduced ejection fraction)    COPD (chronic obstructive pulmonary disease)      Acute respiratory failure with hypoxia and hypercapnia  Acute encephalopathy   Currently on AVAPS  NGT placed for meds  Intensivist following    Atrial fibrillation with RVR  Newly diagnosed  Electrolytes have been corrected  Rate controlled  Continue amiodarone and beta blocker  [id number] score is 7  Continue Xarelto      Acute systolic CHF  Newly diagnosed  Echocardiogram shows EF of 31 to 35%, moderate aortic stenosis and moderate to severe mitral regurgitation.  Continue Bumex 1 mg oral twice daily  Switch metoprolol succinate to tartrate since meds have to be crushed and given thru NGT  Monitor I's and O's and replace electrolytes as needed.  We will initiate and uptitrate GDMT as tolerated.  Plan to repeat echocardiogram after 3 months of completing GDMT     Acute deep vein thrombosis  (DVT) of both lower extremities  Extensive DVT in bilateral lower extremities involving femoral, popliteal and posterior tibial.  Continue anticoagulation as above  She will need lifelong anticoagulation      Thrombocytopenia  Closely monitor platelets while on anticoagulation     ARABELLA (acute kidney injury)  Presented with creatinine of 1.4.  Creatinine 1.86 today   Closely monitor renal function while on diuretics  Nephrology following      Acute hyperkalemia  Receiving [facility]  Nephrology following      Sepsis / UTI / Multifocal pneumonia / Rhinovirus infection  Multifactorial secondary to UTI and pneumonia with possible cellulitis  Continue empiric antibiotics      Skin ulcer of right great toe  Skin ulcer of left great toe  Podiatry following   A1c is 6.2%     Primary hypertension, chronic  Currently hypotensive likely secondary to sepsis  Requiring midodrine 10 mg every 8 hours  Now on Levophed drip  Closely monitor blood pressure      Morbid obesity with BMI of 40.0-44.9, adult  BMI is 41.88  Lifestyle modifications recommended   LDL 27, HDL 13, triglyceride 86 and total cholesterol 58.  No indication for statin     SEDRICK (obstructive sleep apnea)  Encouraged compliance with CPAP      Electronically signed by MANJU Kennedy, 07/05/24, 1:15 PM EDT.     Average

## 2024-07-10 NOTE — PROGRESS NOTE ADULT - ASSESSMENT
Assessment and Plan:     · Assessment		  1. ESRD on HD, overload, s/p HD on sat, next HD on Tuesday,  fluid restriction, d/w pt again,  Lasix and metolazone  2. low Na/k diet.  3. Anemia. EPO with HD.  4. Hyperphosphatemia. Renal diet. Sevelamer with meals.  5, DM and HTN. 79 yo with CAD s/p PCI (most recent stent 6/12/24), afib (on Eliquis) with hypotension (on midodrine), neuroendocrine tumor admitted with hypotension; hospital course c/b GIB, now transferred to MICU with hemorrhagic shock, RAZIA, shock liver.     #neuro: intubated, sedated  #CV:  - hemorrhagic shock in the setting of GIB - s/p 5 U pRBC total. C/w volume resuscitation as appears hypovolemic on POCUS  -- chronic hypotension of unclear etiology, thought to be orthostatic. On home midodrine. [place] repeat cortisol levels  -- CAD with multiple stents, most recent pLAD on 6/12/24. Unable to give Plavix 2/2 lack of PO access. Start Cangrelor gtt.   -- trops and CKs are elevated. Continue trending. Cardiology input appreciated  #Resp: intubated for airway protection during EGD. Will keep intubated today  #GI:   -- massive GIB, s/p EGD s/p empiric GDA embolization on 7/9 in IR. C/w PPI gtt. Has not required any further transfusions  -- elevated LFTs 2/2 shock liver. Monitor  #ID: no e/o infection  #Renal: RAZIA, likely 2/2 ATN. Monitor Cr and UO  #Endo: monitor FS, check cortisol levels  #Heme: GI in the setting of Plavix and Eliquis. s/p 5 U pRBC. s/p EGD s/p GDA empbolization with IR. Monitor h/h. Given risk for stent thromobosis, start Cangrelor gtt.   #GOC: full code. prognosis guarded. Daughter updated at bedside

## 2025-01-31 NOTE — ED PROVIDER NOTE - NS ED MD TWO [place]IGHT[place] YN
Results for orders placed or performed in visit on 01/31/25   Cardiac EP device report    Narrative    MDT-DUAL CHAMBER PPM (AAI-DDD MODE) / NOT MRI CONDITIONAL  DEVICE INTERROGATED IN THE [place] OFFICE. BATTERY VOLTAGE ADEQUATE (8.4 YRS). AP: 49.6%. : 1.5% (MVP-ON). ALL LEAD PARAMETERS WITHIN NORMAL LIMITS. 1 AT/AF EPISODE W/ EGM SHOWING PAT, DURATION 3 MINS 5 SECS. AF BURDEN: <0.1%. PT TAKES METOPROLOL SUCC, DIGOXIN, ASA 81MG. EF: 55% (ECHO 1/10/20). PT SCHEDULED FOR SVT ABL 2/21/25. NO PROGRAMMING CHANGES MADE TO DEVICE PARAMETERS. NORMAL DEVICE FUNCTION. CH        
Yes

## 2025-02-03 NOTE — ED BEHAVIORAL HEALTH ASSESSMENT NOTE - OTHER PAST PSYCHIATRIC HISTORY (INCLUDE DETAILS REGARDING ONSET, COURSE OF ILLNESS, INPATIENT/OUTPATIENT TREATMENT)
Application Tool (Optional): Liquid Nitrogen Sprayer
Render Note In Bullet Format When Appropriate: No
Number Of Freeze-Thaw Cycles: 1 freeze-thaw cycle
Post-Care Instructions: I reviewed with the patient in detail post-care instructions. Patient is to wear sunprotection, and avoid picking at any of the treated lesions. Pt may apply Vaseline to crusted or scabbing areas.
Consent: The patient's consent was obtained including but not limited to risks of crusting, scabbing, blistering, scarring, darker or lighter pigmentary change, recurrence, incomplete removal and infection.
Show Aperture Variable?: Yes
Detail Level: Simple
Duration Of Freeze Thaw-Cycle (Seconds): 10
no ipp admissions, no history of SI HI, treated with zoloft 100mg and xanax dose unknown